# Patient Record
Sex: FEMALE | Race: WHITE | NOT HISPANIC OR LATINO | Employment: FULL TIME | URBAN - METROPOLITAN AREA
[De-identification: names, ages, dates, MRNs, and addresses within clinical notes are randomized per-mention and may not be internally consistent; named-entity substitution may affect disease eponyms.]

---

## 2017-02-08 ENCOUNTER — ALLSCRIPTS OFFICE VISIT (OUTPATIENT)
Dept: OTHER | Facility: OTHER | Age: 52
End: 2017-02-08

## 2017-02-08 DIAGNOSIS — M77.12 LATERAL EPICONDYLITIS OF LEFT ELBOW: ICD-10-CM

## 2017-02-15 ENCOUNTER — APPOINTMENT (OUTPATIENT)
Dept: OCCUPATIONAL THERAPY | Facility: CLINIC | Age: 52
End: 2017-02-15
Payer: COMMERCIAL

## 2017-02-15 DIAGNOSIS — M77.12 LATERAL EPICONDYLITIS OF LEFT ELBOW: ICD-10-CM

## 2017-02-15 PROCEDURE — 97140 MANUAL THERAPY 1/> REGIONS: CPT

## 2017-02-15 PROCEDURE — 97165 OT EVAL LOW COMPLEX 30 MIN: CPT

## 2017-02-15 PROCEDURE — 97035 APP MDLTY 1+ULTRASOUND EA 15: CPT

## 2017-02-21 ENCOUNTER — APPOINTMENT (OUTPATIENT)
Dept: OCCUPATIONAL THERAPY | Facility: CLINIC | Age: 52
End: 2017-02-21
Payer: COMMERCIAL

## 2017-02-21 PROCEDURE — 97140 MANUAL THERAPY 1/> REGIONS: CPT

## 2017-02-21 PROCEDURE — 97010 HOT OR COLD PACKS THERAPY: CPT

## 2017-02-21 PROCEDURE — 97110 THERAPEUTIC EXERCISES: CPT

## 2017-02-23 ENCOUNTER — APPOINTMENT (OUTPATIENT)
Dept: OCCUPATIONAL THERAPY | Facility: CLINIC | Age: 52
End: 2017-02-23
Payer: COMMERCIAL

## 2017-02-23 PROCEDURE — 97110 THERAPEUTIC EXERCISES: CPT

## 2017-02-23 PROCEDURE — 97014 ELECTRIC STIMULATION THERAPY: CPT

## 2017-02-23 PROCEDURE — 97140 MANUAL THERAPY 1/> REGIONS: CPT

## 2017-02-23 PROCEDURE — 97035 APP MDLTY 1+ULTRASOUND EA 15: CPT

## 2017-03-02 ENCOUNTER — APPOINTMENT (OUTPATIENT)
Dept: OCCUPATIONAL THERAPY | Facility: CLINIC | Age: 52
End: 2017-03-02
Payer: COMMERCIAL

## 2017-03-02 PROCEDURE — 97110 THERAPEUTIC EXERCISES: CPT

## 2017-03-02 PROCEDURE — 97035 APP MDLTY 1+ULTRASOUND EA 15: CPT

## 2017-03-02 PROCEDURE — 97014 ELECTRIC STIMULATION THERAPY: CPT

## 2017-03-02 PROCEDURE — 97140 MANUAL THERAPY 1/> REGIONS: CPT

## 2017-03-07 ENCOUNTER — APPOINTMENT (OUTPATIENT)
Dept: OCCUPATIONAL THERAPY | Facility: CLINIC | Age: 52
End: 2017-03-07
Payer: COMMERCIAL

## 2017-03-07 PROCEDURE — 97035 APP MDLTY 1+ULTRASOUND EA 15: CPT

## 2017-03-07 PROCEDURE — 97014 ELECTRIC STIMULATION THERAPY: CPT

## 2017-03-07 PROCEDURE — 97140 MANUAL THERAPY 1/> REGIONS: CPT

## 2017-03-07 PROCEDURE — 97110 THERAPEUTIC EXERCISES: CPT

## 2017-03-10 ENCOUNTER — APPOINTMENT (OUTPATIENT)
Dept: OCCUPATIONAL THERAPY | Facility: CLINIC | Age: 52
End: 2017-03-10
Payer: COMMERCIAL

## 2017-03-10 PROCEDURE — 97035 APP MDLTY 1+ULTRASOUND EA 15: CPT

## 2017-03-10 PROCEDURE — 97110 THERAPEUTIC EXERCISES: CPT

## 2017-03-10 PROCEDURE — 97014 ELECTRIC STIMULATION THERAPY: CPT

## 2017-03-10 PROCEDURE — 97140 MANUAL THERAPY 1/> REGIONS: CPT

## 2017-03-15 ENCOUNTER — APPOINTMENT (OUTPATIENT)
Dept: OCCUPATIONAL THERAPY | Facility: CLINIC | Age: 52
End: 2017-03-15
Payer: COMMERCIAL

## 2017-03-16 ENCOUNTER — APPOINTMENT (OUTPATIENT)
Dept: OCCUPATIONAL THERAPY | Facility: CLINIC | Age: 52
End: 2017-03-16
Payer: COMMERCIAL

## 2017-03-21 ENCOUNTER — APPOINTMENT (OUTPATIENT)
Dept: OCCUPATIONAL THERAPY | Facility: CLINIC | Age: 52
End: 2017-03-21
Payer: COMMERCIAL

## 2017-03-21 PROCEDURE — 97110 THERAPEUTIC EXERCISES: CPT

## 2017-03-21 PROCEDURE — 97014 ELECTRIC STIMULATION THERAPY: CPT

## 2017-03-21 PROCEDURE — 97035 APP MDLTY 1+ULTRASOUND EA 15: CPT

## 2017-03-21 PROCEDURE — 97140 MANUAL THERAPY 1/> REGIONS: CPT

## 2017-03-22 ENCOUNTER — ALLSCRIPTS OFFICE VISIT (OUTPATIENT)
Dept: OTHER | Facility: OTHER | Age: 52
End: 2017-03-22

## 2017-03-28 ENCOUNTER — APPOINTMENT (OUTPATIENT)
Dept: OCCUPATIONAL THERAPY | Facility: CLINIC | Age: 52
End: 2017-03-28
Payer: COMMERCIAL

## 2017-03-28 PROCEDURE — 97110 THERAPEUTIC EXERCISES: CPT

## 2017-03-28 PROCEDURE — 97014 ELECTRIC STIMULATION THERAPY: CPT

## 2017-03-28 PROCEDURE — 97140 MANUAL THERAPY 1/> REGIONS: CPT

## 2017-04-04 ENCOUNTER — APPOINTMENT (OUTPATIENT)
Dept: OCCUPATIONAL THERAPY | Facility: CLINIC | Age: 52
End: 2017-04-04
Payer: COMMERCIAL

## 2017-04-04 PROCEDURE — 97110 THERAPEUTIC EXERCISES: CPT

## 2017-04-04 PROCEDURE — 97140 MANUAL THERAPY 1/> REGIONS: CPT

## 2017-04-04 PROCEDURE — 97014 ELECTRIC STIMULATION THERAPY: CPT

## 2017-04-04 PROCEDURE — 97035 APP MDLTY 1+ULTRASOUND EA 15: CPT

## 2017-04-06 ENCOUNTER — APPOINTMENT (OUTPATIENT)
Dept: OCCUPATIONAL THERAPY | Facility: CLINIC | Age: 52
End: 2017-04-06
Payer: COMMERCIAL

## 2017-04-06 PROCEDURE — 97110 THERAPEUTIC EXERCISES: CPT

## 2017-04-06 PROCEDURE — 97035 APP MDLTY 1+ULTRASOUND EA 15: CPT

## 2017-04-06 PROCEDURE — 97140 MANUAL THERAPY 1/> REGIONS: CPT

## 2017-04-06 PROCEDURE — 97014 ELECTRIC STIMULATION THERAPY: CPT

## 2017-04-14 ENCOUNTER — APPOINTMENT (OUTPATIENT)
Dept: OCCUPATIONAL THERAPY | Facility: CLINIC | Age: 52
End: 2017-04-14
Payer: COMMERCIAL

## 2017-04-14 PROCEDURE — 97035 APP MDLTY 1+ULTRASOUND EA 15: CPT

## 2017-04-14 PROCEDURE — 97014 ELECTRIC STIMULATION THERAPY: CPT

## 2017-04-14 PROCEDURE — 97110 THERAPEUTIC EXERCISES: CPT

## 2017-04-14 PROCEDURE — 97140 MANUAL THERAPY 1/> REGIONS: CPT

## 2017-04-18 ENCOUNTER — APPOINTMENT (OUTPATIENT)
Dept: OCCUPATIONAL THERAPY | Facility: CLINIC | Age: 52
End: 2017-04-18
Payer: COMMERCIAL

## 2017-04-18 PROCEDURE — 97010 HOT OR COLD PACKS THERAPY: CPT

## 2017-04-18 PROCEDURE — 97140 MANUAL THERAPY 1/> REGIONS: CPT

## 2017-04-18 PROCEDURE — 97014 ELECTRIC STIMULATION THERAPY: CPT

## 2017-04-18 PROCEDURE — 97110 THERAPEUTIC EXERCISES: CPT

## 2017-04-20 ENCOUNTER — APPOINTMENT (OUTPATIENT)
Dept: OCCUPATIONAL THERAPY | Facility: CLINIC | Age: 52
End: 2017-04-20
Payer: COMMERCIAL

## 2017-04-20 PROCEDURE — 97035 APP MDLTY 1+ULTRASOUND EA 15: CPT

## 2017-04-20 PROCEDURE — 97110 THERAPEUTIC EXERCISES: CPT

## 2017-04-20 PROCEDURE — 97140 MANUAL THERAPY 1/> REGIONS: CPT

## 2017-04-25 ENCOUNTER — APPOINTMENT (OUTPATIENT)
Dept: OCCUPATIONAL THERAPY | Facility: CLINIC | Age: 52
End: 2017-04-25
Payer: COMMERCIAL

## 2017-04-25 PROCEDURE — 97014 ELECTRIC STIMULATION THERAPY: CPT

## 2017-04-25 PROCEDURE — 97140 MANUAL THERAPY 1/> REGIONS: CPT

## 2017-04-25 PROCEDURE — 97110 THERAPEUTIC EXERCISES: CPT

## 2017-04-27 ENCOUNTER — APPOINTMENT (OUTPATIENT)
Dept: OCCUPATIONAL THERAPY | Facility: CLINIC | Age: 52
End: 2017-04-27
Payer: COMMERCIAL

## 2017-04-28 ENCOUNTER — APPOINTMENT (OUTPATIENT)
Dept: OCCUPATIONAL THERAPY | Facility: CLINIC | Age: 52
End: 2017-04-28
Payer: COMMERCIAL

## 2017-05-04 ENCOUNTER — ALLSCRIPTS OFFICE VISIT (OUTPATIENT)
Dept: OTHER | Facility: OTHER | Age: 52
End: 2017-05-04

## 2017-05-10 ENCOUNTER — APPOINTMENT (OUTPATIENT)
Dept: OCCUPATIONAL THERAPY | Facility: CLINIC | Age: 52
End: 2017-05-10
Payer: COMMERCIAL

## 2017-05-10 PROCEDURE — 97110 THERAPEUTIC EXERCISES: CPT

## 2017-05-10 PROCEDURE — 97140 MANUAL THERAPY 1/> REGIONS: CPT

## 2017-05-10 PROCEDURE — 97014 ELECTRIC STIMULATION THERAPY: CPT

## 2017-05-11 ENCOUNTER — APPOINTMENT (OUTPATIENT)
Dept: OCCUPATIONAL THERAPY | Facility: CLINIC | Age: 52
End: 2017-05-11
Payer: COMMERCIAL

## 2017-05-11 PROCEDURE — 97140 MANUAL THERAPY 1/> REGIONS: CPT

## 2017-05-11 PROCEDURE — 97014 ELECTRIC STIMULATION THERAPY: CPT

## 2017-05-11 PROCEDURE — 97110 THERAPEUTIC EXERCISES: CPT

## 2017-05-17 ENCOUNTER — APPOINTMENT (OUTPATIENT)
Dept: OCCUPATIONAL THERAPY | Facility: CLINIC | Age: 52
End: 2017-05-17
Payer: COMMERCIAL

## 2017-05-17 PROCEDURE — 97110 THERAPEUTIC EXERCISES: CPT

## 2017-05-17 PROCEDURE — 97014 ELECTRIC STIMULATION THERAPY: CPT

## 2017-05-17 PROCEDURE — 97140 MANUAL THERAPY 1/> REGIONS: CPT

## 2017-05-19 ENCOUNTER — APPOINTMENT (OUTPATIENT)
Dept: OCCUPATIONAL THERAPY | Facility: CLINIC | Age: 52
End: 2017-05-19
Payer: COMMERCIAL

## 2017-05-19 PROCEDURE — 97140 MANUAL THERAPY 1/> REGIONS: CPT

## 2017-05-19 PROCEDURE — 97110 THERAPEUTIC EXERCISES: CPT

## 2017-05-23 ENCOUNTER — APPOINTMENT (OUTPATIENT)
Dept: OCCUPATIONAL THERAPY | Facility: CLINIC | Age: 52
End: 2017-05-23
Payer: COMMERCIAL

## 2017-05-23 PROCEDURE — 97140 MANUAL THERAPY 1/> REGIONS: CPT

## 2017-05-23 PROCEDURE — 97110 THERAPEUTIC EXERCISES: CPT

## 2017-05-23 PROCEDURE — 97014 ELECTRIC STIMULATION THERAPY: CPT

## 2017-05-25 ENCOUNTER — APPOINTMENT (OUTPATIENT)
Dept: OCCUPATIONAL THERAPY | Facility: CLINIC | Age: 52
End: 2017-05-25
Payer: COMMERCIAL

## 2017-05-30 ENCOUNTER — APPOINTMENT (OUTPATIENT)
Dept: OCCUPATIONAL THERAPY | Facility: CLINIC | Age: 52
End: 2017-05-30
Payer: COMMERCIAL

## 2017-05-30 PROCEDURE — 97014 ELECTRIC STIMULATION THERAPY: CPT

## 2017-05-30 PROCEDURE — 97140 MANUAL THERAPY 1/> REGIONS: CPT

## 2017-05-30 PROCEDURE — 97110 THERAPEUTIC EXERCISES: CPT

## 2017-06-02 ENCOUNTER — APPOINTMENT (OUTPATIENT)
Dept: OCCUPATIONAL THERAPY | Facility: CLINIC | Age: 52
End: 2017-06-02
Payer: COMMERCIAL

## 2017-06-07 ENCOUNTER — APPOINTMENT (OUTPATIENT)
Dept: OCCUPATIONAL THERAPY | Facility: CLINIC | Age: 52
End: 2017-06-07
Payer: COMMERCIAL

## 2017-06-07 PROCEDURE — 97014 ELECTRIC STIMULATION THERAPY: CPT

## 2017-06-07 PROCEDURE — 97110 THERAPEUTIC EXERCISES: CPT

## 2017-06-07 PROCEDURE — 97140 MANUAL THERAPY 1/> REGIONS: CPT

## 2017-06-15 ENCOUNTER — ALLSCRIPTS OFFICE VISIT (OUTPATIENT)
Dept: OTHER | Facility: OTHER | Age: 52
End: 2017-06-15

## 2017-06-15 ENCOUNTER — APPOINTMENT (OUTPATIENT)
Dept: OCCUPATIONAL THERAPY | Facility: CLINIC | Age: 52
End: 2017-06-15
Payer: COMMERCIAL

## 2017-06-22 ENCOUNTER — TRANSCRIBE ORDERS (OUTPATIENT)
Dept: LAB | Facility: HOSPITAL | Age: 52
End: 2017-06-22

## 2017-06-22 ENCOUNTER — ALLSCRIPTS OFFICE VISIT (OUTPATIENT)
Dept: OTHER | Facility: OTHER | Age: 52
End: 2017-06-22

## 2017-06-22 ENCOUNTER — APPOINTMENT (OUTPATIENT)
Dept: LAB | Facility: HOSPITAL | Age: 52
End: 2017-06-22
Payer: COMMERCIAL

## 2017-06-22 DIAGNOSIS — K51.90 ULCERATIVE COLITIS WITHOUT COMPLICATIONS (HCC): ICD-10-CM

## 2017-06-22 LAB
ALBUMIN SERPL BCP-MCNC: 3.4 G/DL (ref 3.5–5)
ALP SERPL-CCNC: 86 U/L (ref 46–116)
ALT SERPL W P-5'-P-CCNC: 16 U/L (ref 12–78)
ANION GAP SERPL CALCULATED.3IONS-SCNC: 4 MMOL/L (ref 4–13)
AST SERPL W P-5'-P-CCNC: 17 U/L (ref 5–45)
BASOPHILS # BLD AUTO: 0.04 THOUSANDS/ΜL (ref 0–0.1)
BASOPHILS NFR BLD AUTO: 0 % (ref 0–1)
BILIRUB SERPL-MCNC: 0.24 MG/DL (ref 0.2–1)
BUN SERPL-MCNC: 12 MG/DL (ref 5–25)
CALCIUM SERPL-MCNC: 10.1 MG/DL (ref 8.3–10.1)
CHLORIDE SERPL-SCNC: 105 MMOL/L (ref 100–108)
CO2 SERPL-SCNC: 29 MMOL/L (ref 21–32)
CREAT SERPL-MCNC: 0.8 MG/DL (ref 0.6–1.3)
CRP SERPL QL: 10.8 MG/L
EOSINOPHIL # BLD AUTO: 0.28 THOUSAND/ΜL (ref 0–0.61)
EOSINOPHIL NFR BLD AUTO: 3 % (ref 0–6)
ERYTHROCYTE [DISTWIDTH] IN BLOOD BY AUTOMATED COUNT: 13.5 % (ref 11.6–15.1)
ERYTHROCYTE [SEDIMENTATION RATE] IN BLOOD: 34 MM/HOUR (ref 0–20)
GFR SERPL CREATININE-BSD FRML MDRD: >60 ML/MIN/1.73SQ M
GLUCOSE SERPL-MCNC: 85 MG/DL (ref 65–140)
HCT VFR BLD AUTO: 35.2 % (ref 34.8–46.1)
HGB BLD-MCNC: 11.2 G/DL (ref 11.5–15.4)
LYMPHOCYTES # BLD AUTO: 2.54 THOUSANDS/ΜL (ref 0.6–4.47)
LYMPHOCYTES NFR BLD AUTO: 27 % (ref 14–44)
MCH RBC QN AUTO: 28.9 PG (ref 26.8–34.3)
MCHC RBC AUTO-ENTMCNC: 31.8 G/DL (ref 31.4–37.4)
MCV RBC AUTO: 91 FL (ref 82–98)
MONOCYTES # BLD AUTO: 0.47 THOUSAND/ΜL (ref 0.17–1.22)
MONOCYTES NFR BLD AUTO: 5 % (ref 4–12)
NEUTROPHILS # BLD AUTO: 6.04 THOUSANDS/ΜL (ref 1.85–7.62)
NEUTS SEG NFR BLD AUTO: 65 % (ref 43–75)
NRBC BLD AUTO-RTO: 0 /100 WBCS
PLATELET # BLD AUTO: 430 THOUSANDS/UL (ref 149–390)
PMV BLD AUTO: 9.6 FL (ref 8.9–12.7)
POTASSIUM SERPL-SCNC: 4.1 MMOL/L (ref 3.5–5.3)
PROT SERPL-MCNC: 7 G/DL (ref 6.4–8.2)
RBC # BLD AUTO: 3.88 MILLION/UL (ref 3.81–5.12)
SODIUM SERPL-SCNC: 138 MMOL/L (ref 136–145)
WBC # BLD AUTO: 9.39 THOUSAND/UL (ref 4.31–10.16)

## 2017-06-22 PROCEDURE — 86480 TB TEST CELL IMMUN MEASURE: CPT

## 2017-06-22 PROCEDURE — 85652 RBC SED RATE AUTOMATED: CPT

## 2017-06-22 PROCEDURE — 87340 HEPATITIS B SURFACE AG IA: CPT

## 2017-06-22 PROCEDURE — 36415 COLL VENOUS BLD VENIPUNCTURE: CPT

## 2017-06-22 PROCEDURE — 80053 COMPREHEN METABOLIC PANEL: CPT

## 2017-06-22 PROCEDURE — 86704 HEP B CORE ANTIBODY TOTAL: CPT

## 2017-06-22 PROCEDURE — 86140 C-REACTIVE PROTEIN: CPT

## 2017-06-22 PROCEDURE — 86706 HEP B SURFACE ANTIBODY: CPT

## 2017-06-22 PROCEDURE — 85025 COMPLETE CBC W/AUTO DIFF WBC: CPT

## 2017-06-23 LAB
HBV CORE AB SER QL: NORMAL
HBV SURFACE AB SER-ACNC: >1000 MIU/ML
HBV SURFACE AG SER QL: NORMAL

## 2017-06-24 LAB
ANNOTATION COMMENT IMP: NORMAL
GAMMA INTERFERON BACKGROUND BLD IA-ACNC: 0.03 IU/ML
M TB IFN-G BLD-IMP: NEGATIVE
M TB IFN-G CD4+ BCKGRND COR BLD-ACNC: 0.02 IU/ML
M TB IFN-G CD4+ T-CELLS BLD-ACNC: 0.05 IU/ML
MITOGEN IGNF BLD-ACNC: 2.23 IU/ML
QUANTIFERON-TB GOLD IN TUBE: NORMAL
SERVICE CMNT-IMP: NORMAL

## 2017-06-25 ENCOUNTER — APPOINTMENT (OUTPATIENT)
Dept: LAB | Facility: HOSPITAL | Age: 52
End: 2017-06-25
Payer: COMMERCIAL

## 2017-06-25 DIAGNOSIS — K51.90 ULCERATIVE COLITIS WITHOUT COMPLICATIONS (HCC): ICD-10-CM

## 2017-06-25 PROCEDURE — 83993 ASSAY FOR CALPROTECTIN FECAL: CPT

## 2017-06-25 PROCEDURE — 87493 C DIFF AMPLIFIED PROBE: CPT

## 2017-06-26 ENCOUNTER — GENERIC CONVERSION - ENCOUNTER (OUTPATIENT)
Dept: OTHER | Facility: OTHER | Age: 52
End: 2017-06-26

## 2017-06-26 LAB — C DIFF TOX GENS STL QL NAA+PROBE: NORMAL

## 2017-06-30 ENCOUNTER — ALLSCRIPTS OFFICE VISIT (OUTPATIENT)
Dept: OTHER | Facility: OTHER | Age: 52
End: 2017-06-30

## 2017-06-30 LAB — CALPROTECTIN STL-MCNT: 301 UG/G (ref 0–120)

## 2017-07-03 ENCOUNTER — GENERIC CONVERSION - ENCOUNTER (OUTPATIENT)
Dept: OTHER | Facility: OTHER | Age: 52
End: 2017-07-03

## 2017-07-25 ENCOUNTER — TRANSCRIBE ORDERS (OUTPATIENT)
Dept: ADMINISTRATIVE | Facility: HOSPITAL | Age: 52
End: 2017-07-25

## 2017-07-25 ENCOUNTER — APPOINTMENT (OUTPATIENT)
Dept: LAB | Facility: HOSPITAL | Age: 52
End: 2017-07-25
Payer: COMMERCIAL

## 2017-07-25 DIAGNOSIS — Z00.8 HEALTH EXAMINATION IN POPULATION SURVEY: ICD-10-CM

## 2017-07-25 DIAGNOSIS — Z00.8 HEALTH EXAMINATION IN POPULATION SURVEY: Primary | ICD-10-CM

## 2017-07-25 LAB
CHOLEST SERPL-MCNC: 198 MG/DL (ref 50–200)
EST. AVERAGE GLUCOSE BLD GHB EST-MCNC: 128 MG/DL
HBA1C MFR BLD: 6.1 % (ref 4.2–6.3)
HDLC SERPL-MCNC: 95 MG/DL (ref 40–60)
LDLC SERPL CALC-MCNC: 85 MG/DL (ref 0–100)
TRIGL SERPL-MCNC: 91 MG/DL

## 2017-07-25 PROCEDURE — 80061 LIPID PANEL: CPT

## 2017-07-25 PROCEDURE — 36415 COLL VENOUS BLD VENIPUNCTURE: CPT

## 2017-07-25 PROCEDURE — 83036 HEMOGLOBIN GLYCOSYLATED A1C: CPT

## 2017-08-16 DIAGNOSIS — M47.819 SPONDYLOSIS WITHOUT MYELOPATHY OR RADICULOPATHY: ICD-10-CM

## 2017-08-22 ENCOUNTER — GENERIC CONVERSION - ENCOUNTER (OUTPATIENT)
Dept: OTHER | Facility: OTHER | Age: 52
End: 2017-08-22

## 2017-08-29 ENCOUNTER — APPOINTMENT (OUTPATIENT)
Dept: LAB | Facility: HOSPITAL | Age: 52
End: 2017-08-29
Payer: COMMERCIAL

## 2017-08-29 ENCOUNTER — TRANSCRIBE ORDERS (OUTPATIENT)
Dept: ADMINISTRATIVE | Facility: HOSPITAL | Age: 52
End: 2017-08-29

## 2017-08-29 DIAGNOSIS — M47.819 SPONDYLOSIS WITHOUT MYELOPATHY OR RADICULOPATHY: ICD-10-CM

## 2017-08-29 LAB
ALBUMIN SERPL BCP-MCNC: 3.1 G/DL (ref 3.5–5)
ALP SERPL-CCNC: 79 U/L (ref 46–116)
ALT SERPL W P-5'-P-CCNC: 16 U/L (ref 12–78)
ANION GAP SERPL CALCULATED.3IONS-SCNC: 6 MMOL/L (ref 4–13)
AST SERPL W P-5'-P-CCNC: 13 U/L (ref 5–45)
BASOPHILS # BLD AUTO: 0.05 THOUSANDS/ΜL (ref 0–0.1)
BASOPHILS NFR BLD AUTO: 1 % (ref 0–1)
BILIRUB SERPL-MCNC: 0.2 MG/DL (ref 0.2–1)
BUN SERPL-MCNC: 10 MG/DL (ref 5–25)
CALCIUM ALBUM COR SERPL-MCNC: 11 MG/DL (ref 8.3–10.1)
CALCIUM SERPL-MCNC: 10.3 MG/DL (ref 8.3–10.1)
CHLORIDE SERPL-SCNC: 108 MMOL/L (ref 100–108)
CO2 SERPL-SCNC: 29 MMOL/L (ref 21–32)
CREAT SERPL-MCNC: 0.91 MG/DL (ref 0.6–1.3)
CRP SERPL QL: 6.9 MG/L
EOSINOPHIL # BLD AUTO: 0.36 THOUSAND/ΜL (ref 0–0.61)
EOSINOPHIL NFR BLD AUTO: 4 % (ref 0–6)
ERYTHROCYTE [DISTWIDTH] IN BLOOD BY AUTOMATED COUNT: 14.3 % (ref 11.6–15.1)
ERYTHROCYTE [SEDIMENTATION RATE] IN BLOOD: 19 MM/HOUR (ref 0–15)
GFR SERPL CREATININE-BSD FRML MDRD: 73 ML/MIN/1.73SQ M
GLUCOSE P FAST SERPL-MCNC: 97 MG/DL (ref 65–99)
HCT VFR BLD AUTO: 34.7 % (ref 34.8–46.1)
HGB BLD-MCNC: 11.2 G/DL (ref 11.5–15.4)
LYMPHOCYTES # BLD AUTO: 3.23 THOUSANDS/ΜL (ref 0.6–4.47)
LYMPHOCYTES NFR BLD AUTO: 35 % (ref 14–44)
MCH RBC QN AUTO: 29.6 PG (ref 26.8–34.3)
MCHC RBC AUTO-ENTMCNC: 32.3 G/DL (ref 31.4–37.4)
MCV RBC AUTO: 92 FL (ref 82–98)
MONOCYTES # BLD AUTO: 0.63 THOUSAND/ΜL (ref 0.17–1.22)
MONOCYTES NFR BLD AUTO: 7 % (ref 4–12)
NEUTROPHILS # BLD AUTO: 5.07 THOUSANDS/ΜL (ref 1.85–7.62)
NEUTS SEG NFR BLD AUTO: 53 % (ref 43–75)
PLATELET # BLD AUTO: 390 THOUSANDS/UL (ref 149–390)
PMV BLD AUTO: 10.3 FL (ref 8.9–12.7)
POTASSIUM SERPL-SCNC: 3.5 MMOL/L (ref 3.5–5.3)
PROT SERPL-MCNC: 6.5 G/DL (ref 6.4–8.2)
RBC # BLD AUTO: 3.78 MILLION/UL (ref 3.81–5.12)
SODIUM SERPL-SCNC: 143 MMOL/L (ref 136–145)
WBC # BLD AUTO: 9.34 THOUSAND/UL (ref 4.31–10.16)

## 2017-08-29 PROCEDURE — 36415 COLL VENOUS BLD VENIPUNCTURE: CPT

## 2017-08-29 PROCEDURE — 85025 COMPLETE CBC W/AUTO DIFF WBC: CPT

## 2017-08-29 PROCEDURE — 85652 RBC SED RATE AUTOMATED: CPT

## 2017-08-29 PROCEDURE — 86140 C-REACTIVE PROTEIN: CPT

## 2017-08-29 PROCEDURE — 80053 COMPREHEN METABOLIC PANEL: CPT

## 2017-08-30 ENCOUNTER — ALLSCRIPTS OFFICE VISIT (OUTPATIENT)
Dept: OTHER | Facility: OTHER | Age: 52
End: 2017-08-30

## 2017-10-16 DIAGNOSIS — M47.819 SPONDYLOSIS WITHOUT MYELOPATHY OR RADICULOPATHY: ICD-10-CM

## 2017-10-16 DIAGNOSIS — K51.90 ULCERATIVE COLITIS WITHOUT COMPLICATIONS (HCC): ICD-10-CM

## 2017-10-16 DIAGNOSIS — Z79.899 OTHER LONG TERM (CURRENT) DRUG THERAPY: ICD-10-CM

## 2017-10-24 ENCOUNTER — TRANSCRIBE ORDERS (OUTPATIENT)
Dept: LAB | Facility: CLINIC | Age: 52
End: 2017-10-24

## 2017-10-24 ENCOUNTER — APPOINTMENT (OUTPATIENT)
Dept: LAB | Facility: CLINIC | Age: 52
End: 2017-10-24
Payer: COMMERCIAL

## 2017-10-24 DIAGNOSIS — Z79.899 OTHER LONG TERM (CURRENT) DRUG THERAPY: ICD-10-CM

## 2017-10-24 DIAGNOSIS — K51.90 ULCERATIVE COLITIS WITHOUT COMPLICATIONS (HCC): ICD-10-CM

## 2017-10-24 DIAGNOSIS — M47.819 SPONDYLOSIS WITHOUT MYELOPATHY OR RADICULOPATHY: ICD-10-CM

## 2017-10-24 LAB
ALBUMIN SERPL BCP-MCNC: 3.8 G/DL (ref 3.5–5)
ALP SERPL-CCNC: 93 U/L (ref 46–116)
ALT SERPL W P-5'-P-CCNC: 23 U/L (ref 12–78)
ANION GAP SERPL CALCULATED.3IONS-SCNC: 8 MMOL/L (ref 4–13)
AST SERPL W P-5'-P-CCNC: 21 U/L (ref 5–45)
BASOPHILS # BLD AUTO: 0.05 THOUSANDS/ΜL (ref 0–0.1)
BASOPHILS NFR BLD AUTO: 1 % (ref 0–1)
BILIRUB SERPL-MCNC: 0.3 MG/DL (ref 0.2–1)
BUN SERPL-MCNC: 12 MG/DL (ref 5–25)
CALCIUM ALBUM COR SERPL-MCNC: 11 MG/DL (ref 8.3–10.1)
CALCIUM SERPL-MCNC: 10.8 MG/DL (ref 8.3–10.1)
CHLORIDE SERPL-SCNC: 103 MMOL/L (ref 100–108)
CO2 SERPL-SCNC: 29 MMOL/L (ref 21–32)
CREAT SERPL-MCNC: 0.87 MG/DL (ref 0.6–1.3)
CRP SERPL QL: 4.2 MG/L
EOSINOPHIL # BLD AUTO: 0.85 THOUSAND/ΜL (ref 0–0.61)
EOSINOPHIL NFR BLD AUTO: 10 % (ref 0–6)
ERYTHROCYTE [DISTWIDTH] IN BLOOD BY AUTOMATED COUNT: 13.9 % (ref 11.6–15.1)
ERYTHROCYTE [SEDIMENTATION RATE] IN BLOOD: 13 MM/HOUR (ref 0–20)
GFR SERPL CREATININE-BSD FRML MDRD: 77 ML/MIN/1.73SQ M
GLUCOSE SERPL-MCNC: 102 MG/DL (ref 65–140)
HCT VFR BLD AUTO: 38.9 % (ref 34.8–46.1)
HGB BLD-MCNC: 12.5 G/DL (ref 11.5–15.4)
LYMPHOCYTES # BLD AUTO: 3.25 THOUSANDS/ΜL (ref 0.6–4.47)
LYMPHOCYTES NFR BLD AUTO: 37 % (ref 14–44)
MCH RBC QN AUTO: 28.7 PG (ref 26.8–34.3)
MCHC RBC AUTO-ENTMCNC: 32.1 G/DL (ref 31.4–37.4)
MCV RBC AUTO: 89 FL (ref 82–98)
MONOCYTES # BLD AUTO: 0.59 THOUSAND/ΜL (ref 0.17–1.22)
MONOCYTES NFR BLD AUTO: 7 % (ref 4–12)
NEUTROPHILS # BLD AUTO: 4.1 THOUSANDS/ΜL (ref 1.85–7.62)
NEUTS SEG NFR BLD AUTO: 45 % (ref 43–75)
PLATELET # BLD AUTO: 380 THOUSANDS/UL (ref 149–390)
PMV BLD AUTO: 9.9 FL (ref 8.9–12.7)
POTASSIUM SERPL-SCNC: 3.8 MMOL/L (ref 3.5–5.3)
PROT SERPL-MCNC: 7.4 G/DL (ref 6.4–8.2)
RBC # BLD AUTO: 4.35 MILLION/UL (ref 3.81–5.12)
SODIUM SERPL-SCNC: 140 MMOL/L (ref 136–145)
WBC # BLD AUTO: 8.84 THOUSAND/UL (ref 4.31–10.16)

## 2017-10-24 PROCEDURE — 80053 COMPREHEN METABOLIC PANEL: CPT

## 2017-10-24 PROCEDURE — 86140 C-REACTIVE PROTEIN: CPT

## 2017-10-24 PROCEDURE — 85652 RBC SED RATE AUTOMATED: CPT

## 2017-10-24 PROCEDURE — 36415 COLL VENOUS BLD VENIPUNCTURE: CPT

## 2017-10-24 PROCEDURE — 85025 COMPLETE CBC W/AUTO DIFF WBC: CPT

## 2017-11-01 ENCOUNTER — ALLSCRIPTS OFFICE VISIT (OUTPATIENT)
Dept: OTHER | Facility: OTHER | Age: 52
End: 2017-11-01

## 2017-11-02 ENCOUNTER — APPOINTMENT (OUTPATIENT)
Dept: LAB | Facility: HOSPITAL | Age: 52
End: 2017-11-02
Payer: COMMERCIAL

## 2017-11-02 ENCOUNTER — TRANSCRIBE ORDERS (OUTPATIENT)
Dept: ADMINISTRATIVE | Facility: HOSPITAL | Age: 52
End: 2017-11-02

## 2017-11-02 DIAGNOSIS — M47.819 SPINAL ARTHRITIS DEFORMANS: ICD-10-CM

## 2017-11-02 DIAGNOSIS — M47.819 SPINAL ARTHRITIS DEFORMANS: Primary | ICD-10-CM

## 2017-11-02 DIAGNOSIS — K51.90 ULCERATIVE COLITIS WITHOUT COMPLICATIONS (HCC): ICD-10-CM

## 2017-11-02 DIAGNOSIS — M47.819 SPONDYLOSIS WITHOUT MYELOPATHY OR RADICULOPATHY: ICD-10-CM

## 2017-11-02 LAB
CALCIUM SERPL-MCNC: 10.1 MG/DL (ref 8.3–10.1)
CHOLEST SERPL-MCNC: 176 MG/DL (ref 50–200)
EST. AVERAGE GLUCOSE BLD GHB EST-MCNC: 120 MG/DL
HBA1C MFR BLD: 5.8 % (ref 4.2–6.3)
HDLC SERPL-MCNC: 93 MG/DL (ref 40–60)
LDLC SERPL CALC-MCNC: 73 MG/DL (ref 0–100)
PTH-INTACT SERPL-MCNC: 42.7 PG/ML (ref 14–72)
TRIGL SERPL-MCNC: 49 MG/DL
TSH SERPL DL<=0.05 MIU/L-ACNC: 2.34 UIU/ML (ref 0.36–3.74)

## 2017-11-02 PROCEDURE — 36415 COLL VENOUS BLD VENIPUNCTURE: CPT

## 2017-11-02 PROCEDURE — 83036 HEMOGLOBIN GLYCOSYLATED A1C: CPT

## 2017-11-02 PROCEDURE — 82310 ASSAY OF CALCIUM: CPT

## 2017-11-02 PROCEDURE — 83970 ASSAY OF PARATHORMONE: CPT

## 2017-11-02 PROCEDURE — 84443 ASSAY THYROID STIM HORMONE: CPT

## 2017-11-02 PROCEDURE — 80061 LIPID PANEL: CPT

## 2017-11-13 ENCOUNTER — ALLSCRIPTS OFFICE VISIT (OUTPATIENT)
Dept: OTHER | Facility: OTHER | Age: 52
End: 2017-11-13
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
## 2017-11-30 ENCOUNTER — GENERIC CONVERSION - ENCOUNTER (OUTPATIENT)
Dept: OTHER | Facility: OTHER | Age: 52
End: 2017-11-30

## 2017-11-30 DIAGNOSIS — M25.559 PAIN IN HIP: ICD-10-CM

## 2017-12-07 ENCOUNTER — HOSPITAL ENCOUNTER (OUTPATIENT)
Dept: RADIOLOGY | Facility: HOSPITAL | Age: 52
Discharge: HOME | End: 2017-12-07
Payer: COMMERCIAL

## 2017-12-07 ENCOUNTER — TRANSCRIBE ORDERS (OUTPATIENT)
Dept: ADMINISTRATIVE | Facility: HOSPITAL | Age: 52
End: 2017-12-07

## 2017-12-07 DIAGNOSIS — M25.559 PAIN IN HIP: ICD-10-CM

## 2017-12-26 ENCOUNTER — ALLSCRIPTS OFFICE VISIT (OUTPATIENT)
Dept: OTHER | Facility: OTHER | Age: 52
End: 2017-12-26

## 2017-12-26 LAB
BILIRUB UR QL STRIP: NORMAL
CLARITY UR: NORMAL
COLOR UR: YELLOW
GLUCOSE (HISTORICAL): NORMAL
HGB UR QL STRIP.AUTO: NORMAL
KETONES UR STRIP-MCNC: NORMAL MG/DL
LEUKOCYTE ESTERASE UR QL STRIP: NORMAL
NITRITE UR QL STRIP: NORMAL
PROT UR STRIP-MCNC: NORMAL MG/DL
SP GR UR STRIP.AUTO: 1
UROBILINOGEN UR QL STRIP.AUTO: 0.2
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
## 2018-01-10 NOTE — RESULT NOTES
Verified Results  (1) TSH 17Wyv0233 10:54AM Jer[place]      Test Name Result Flag Reference   TSH 2 271 uIU/mL  0 358-3 740   Patients undergoing fluorescein dye angiography may retain small amounts of fluorescein in the body for 48-72 hours post procedure  Samples containing fluorescein can produce falsely depressed TSH values  If the patient had this procedure,a specimen should be resubmitted post fluorescein clearance            The recommended reference ranges for TSH during pregnancy are as follows:  First trimester 0 1 to 2 5 uIU/mL  Second trimester  0 2 to 3 0 uIU/mL  Third trimester 0 3 to 3 0 uIU/m

## 2018-01-12 VITALS
HEIGHT: 63 IN | DIASTOLIC BLOOD PRESSURE: 70 MMHG | SYSTOLIC BLOOD PRESSURE: 116 MMHG | OXYGEN SATURATION: 97 % | TEMPERATURE: 98.3 F | HEART RATE: 80 BPM

## 2018-01-12 VITALS
HEART RATE: 66 BPM | WEIGHT: 150 LBS | HEIGHT: 63 IN | SYSTOLIC BLOOD PRESSURE: 116 MMHG | BODY MASS INDEX: 26.58 KG/M2 | DIASTOLIC BLOOD PRESSURE: 76 MMHG

## 2018-01-12 VITALS
HEIGHT: 63 IN | SYSTOLIC BLOOD PRESSURE: 108 MMHG | WEIGHT: 150 LBS | BODY MASS INDEX: 26.58 KG/M2 | HEART RATE: 66 BPM | DIASTOLIC BLOOD PRESSURE: 72 MMHG

## 2018-01-12 VITALS — HEIGHT: 63 IN | SYSTOLIC BLOOD PRESSURE: 114 MMHG | HEART RATE: 68 BPM | DIASTOLIC BLOOD PRESSURE: 74 MMHG

## 2018-01-12 NOTE — RESULT NOTES
Verified Results  (1) C  DIFFICILE TOXIN BY PCR 32Sef8917 08:07PM Naomi Lima Order Number: [id number]     Test Name Result Flag Reference   C  DIFFICILE TOXIN BY PCR   NEGATIVE for C difficle toxin by PCR  NEGATIVE for C difficle toxin by PCR  Plan  Ulcerative colitis without complications    · PredniSONE 10 MG Oral Tablet;  Take 3 tablets by mouth daily for 1 week, then 2  tablets by mouth daily for 1 week, then 1 tablet by mouth daily for 1 week, then STOP

## 2018-01-12 NOTE — MISCELLANEOUS
Message  Spoke with pt  at [phone] re: MRI results  I explained that there is a significant amount of tenosynovitis and a partial thickness tear of the posterior tibialis tendon  Will refer pt  to Dr Vivi Rodriguez for further evaluation and management  I explained to the pt  that someone from his office would be contacting her to schedule her  F/U as scheduled with me        Signatures   Electronically signed by : All Streeter DO; Jul 1 2016  8:49AM EST                       (Author)

## 2018-01-13 VITALS
HEIGHT: 63 IN | SYSTOLIC BLOOD PRESSURE: 117 MMHG | DIASTOLIC BLOOD PRESSURE: 75 MMHG | BODY MASS INDEX: 26.58 KG/M2 | WEIGHT: 150 LBS | HEART RATE: 60 BPM

## 2018-01-13 VITALS
HEIGHT: 63 IN | DIASTOLIC BLOOD PRESSURE: 71 MMHG | WEIGHT: 150 LBS | BODY MASS INDEX: 26.58 KG/M2 | HEART RATE: 69 BPM | SYSTOLIC BLOOD PRESSURE: 109 MMHG
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
## 2018-01-13 NOTE — PROGRESS NOTES
Assessment    1  Seronegative spondyloarthropathy (721 90) (M47 819)   2  Ulcerative colitis without complications (281 2) (Y61 58)   3  Raynaud's syndrome without gangrene (443 0) (I73 00)   4  Hypothyroidism (244 9) (E03 9)   5  Hypokalemia (276 8) (E87 6)   6  Bilateral leg edema (782 3) (R60 0)    Plan    1  (1) CBC/PLT/DIFF; Status:Active; Requested for:18Oct2016;    2  (1) COMPREHENSIVE METABOLIC PANEL; Status:Active; Requested for:18Oct2016;    3  (1) C-REACTIVE PROTEIN; Status:Active; Requested for:18Oct2016;    4  (1) SED RATE; Status:Active; Requested for:18Oct2016;    5  Follow-up visit in 2 months Evaluation and Treatment  Follow-up  Status: Hold For -   Scheduling  Requested for: 01Sep2016    6  Call [phone] if: The pain seems worse ; Status:Complete;   Done: 00FXO6828   7  Call [phone] if: The symptoms seem worse ; Status:Complete;   Done:   84QJR6470   8  Call [phone] if: You have questions or concerns about your problem ;   Status:Complete;   Done: 25CJQ2482    Discussion/Summary    Ms Blount underwent surgery for her left ankle tendon tear on August 12 of this year  She was initially treated with a boot, but unfortunately she did not have improvement in her symptoms  She is currently to remain nonweightbearing for the next several weeks as her ankle continues to heal  She does complain of some pain in her hands from using her crutches  She also had a significant episode of back spasms which took 2 weeks before resolution, and she did require Soma and Valium to help treat her pain  She is no longer utilizing his muscle relaxers  She does continue to have some mild lower back pain  She has been infrequently using tramadol as needed for her pain with some good relief  She does report swelling in her left foot and ankle, as well as her bilateral hands  She states that overall her pain has been improved, as she has been out of work for the last several weeks because of her surgery  She denies any significant morning stiffness  She does report difficulty sleeping because of pain as well as nonrestorative sleep  She does report fatigue which she attributes to her medications  She also states that since her last visit she did have an ulcerative colitis flare and was seen in the emergency department here  She had significant diarrhea and was found to be hypokalemic  She was treated by her primary care physician with a prolonged prednisone taper and did resume her Lialda and her UC has been relatively well controlled at this time  On exam, there is mild tenderness to palpation of the MCPs of the hands without any overt swelling  There is no obvious active synovitis on her exam today  She does have some crepitus of her bilateral knees  There is trace bilateral lower extremity edema  She does have paraspinal spasm noted in the lumbar spine area  Review of laboratory studies revealed negative connective-tissue disease serologies with the exception of a mildly positive CCP antibody at 20  HLA-B27 antigen was negative  CBC and CMP were essentially within normal limits  A urinalysis did show trace blood but no protein  A urine protein creatinine ratio is within normal limits  Vitamin D was mildly decreased at 22 6  ESR was normal, but CRP was elevated at 12 0  At this time, her history, exam, and laboratory studies do appear 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 which is mildly active  Since she is feeling generally well right now, I will not make any changes to her medications at this time  We will see what her joint pain does when she returns to work  If she does experience worsening of her pain at that time, we may consider adding sulfasalazine to her regimen  I would like to recheck a CBC, CMP, ESR, and CRP before her follow-up  She can continue to utilize the tramadol on an as-needed basis  I will reevaluate her in 2 months   She will call in the interim if there are any questions or concerns  Counseling  Rheumatology Counseling Documentation: The patient and patient's family was counseled regarding diagnostic results, instructions for management, impressions and risks and benefits of treatment options  Chief Complaint  F/U polyarthritis   Patient is here today for follow up of chronic conditions described in HPI  History of Present Illness  Pt  returns for F/U for polyarthritis  Had L ankle tendon repair on 8/12 for torn tendon  Tried to do Cam boot before surgery, but it did not heal  To be non-weight bearing for the next few weeks  c/o pain in hands from using crutches  Was having significant back spasms since last visit  Took Soma and Valium for 2 weeks with significant improvement  Still with some mild LBP  Taking Tramadol as needed for pain with good relief  + swelling in L ankle and foot  + swelling in hands as well  Out of work for the last several weeks due to surgery  No significant AM stiffness  + difficulty sleeping due to pain  + non-restorative sleep  + fatigue due to meds  Had UC flare after last visit  Was seen in ED at [facility]  Had significant diarrhea  RAPID3: 18 5/30      Review of Systems    Constitutional: fever and recent weight loss, but no recent weight gain, no chills and no anorexia    The patient presents with complaints of fatigue (2/2 meds)  HEENT: dryness of the eyes and dryness mouth, but no blurred vision, no double vision, no amaurosis fugax, no eye pain, no erythema eye(s), no mouth sores, not feeling congested and no sore throat  Cardiovascular: palpitations present  and swelling in the arms or legs, but no chest pain or pressure and no dyspnea on exertion  Respiratory: no unusual or persistent cough, no shortness of breath and no pleurisy  Gastrointestinal: heartburn, but no abdominal pain, no nausea, no vomiting, no constipation, no melena and no BRBPR    The patient presents with complaints of diarrhea (2/2 UC)  Genitourinary: no foamy urine, but no dysuria and no hematuria  Musculoskeletal: as noted in HPI  Integumentary rash, Raynaud's, alopecia and nail changes, but no photosensitivity  Endocrine polydipsia, but no polyuria  Hematologic/Lymphatic: no unusual bleeding and no tendency for easy bruising  Neurological: headache, tingling and weakness    The patient presents with complaints of occasional episodes of vertigo or dizziness, described as lightheadedness  Symptoms are made worse by getting up quickly  Psychiatric: insomnia and non-restorative sleep  Active Problems    1  History of Acute colitis (558 9) (K52 9)   2  Acute left-sided low back pain with left-sided sciatica (724 2,724 3) (M54 42)   3  Asthma with acute exacerbation (493 92) (J45 901)   4  Asthma, mild intermittent (493 90) (J45 20)   5  Bilateral leg edema (782 3) (R60 0)   6  Dyspareunia (625 0)   7  Fatigue (780 79) (R53 83)   8  Ganglion, tendon sheath (727 42) (M67 40)   9  Hand pain, unspecified laterality   10  Hypokalemia (276 8) (E87 6)   11  Hypothyroidism (244 9) (E03 9)   12  Injury of superior glenoid labrum of shoulder joint, left, initial encounter   13  Left shoulder tendonitis (726 10) (M75 82)   14  Loss of appetite (783 0) (R63 0)   15  Muscle spasm of back (724 8) (M62 830)   16  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)   17  Posterior tibial tendon dysfunction (734) (M21 40)   18  Raynaud's syndrome without gangrene (443 0) (I73 00)   19  Recent weight loss (783 21) (R63 4)   20  Seronegative spondyloarthropathy (721 90) (M47 819)   21  Shoulder pain (719 41) (M25 519)   22  Ulcerative colitis without complications (603 8) (R42 64)    Past Medical History    1  History of Abrasion of skin (919 0) (T14 8)   2  History of Acute colitis (558 9) (K52 9)   3  History of Acute sinusitis (461 9) (J01 90)   4  History of Adjustment disorder (309 9) (F43 20)   5   History of Aftercare following surgery of the musculoskeletal system (V58 78) (Z47 89)   6  History of Animal-rider injured by fall from or being thrown from horse in [address]   accident, initial encounter (E828 2) (V80 010A)   7  History of Ankle pain (719 47) (M25 579)   8  History of Carpal tunnel syndrome, unspecified laterality (354 0) (G56 00)   9  History of Encounter for screening mammogram for malignant neoplasm of breast   (V76 12) (Z12 31)   10  History of Ganglion (727 43) (M67 40)   11  History of Herniated cervical disc (722 0) (M50 20)   12  History of anemia (V12 3) (Z86 2)   13  History of arthritis (V13 4) (Z87 39)   14  History of blepharitis (V12 49) (Z86 69)   15  History of bronchitis (V12 69) (Z87 09)   16  History of diarrhea (V12 79) (Z87 898)   17  History of edema (V13 89) (Z87 898)   18  History of motion sickness (V13 89) (Z87 898)   19  History of polyarthritis (V13 4) (Z87 39)   20  History of temporomandibular joint disorder (V13 59) (Z87 39)   21  History of thyroid disease (V12 29) (Z86 39)   22  History of Lump of skin (782 2) (R22 9)   23  History of Mouth ulcers (528 9) (K12 1)   24  History of Neck pain (723 1) (M54 2)   25  History of Other acute sinusitis (461 8) (J01 80)   26  History of Other eczema (692 9) (L30 8)   27  History of Other sinusitis (473 8) (J32 9)   28  History of Sprain of trapezium, left, initial encounter (842 09) (I83 56PO)    The active problems and past medical history were reviewed and updated today  Surgical History    1  History of Ankle Surgery   2  History of Arthroscopy Knee   3  History of  Section   4  History of Hysterectomy   5  History of Lipectomy   6  History of Neuroplasty Decompression Median Nerve At Carpal Tunnel   7  History of Shoulder Surgery   8  History of Tonsillectomy   9  History of Umbilical Hernia Repair    The surgical history was reviewed and updated today  Family History  Mother    1   Family history of rheumatoid arthritis (V17 7) (Z82 61)   2  Family history of Osteoporosis (V17 81)  Father    3  Family history of Acute Myocardial Infarction (V17 3)   4  Family history of Hypertension (V17 49)  Paternal Grandfather    11  Family history of Malignant Penile Neoplasm (V16 49)   6  Family history of Prostate Cancer (V16 42)  Maternal Aunt    7  Family history of rheumatoid arthritis (V17 7) (Z82 61)  Maternal Uncle    8  Family history of Crohn's disease (V18 59) (Z83 79)   9  Family history of psoriasis (V19 4) (Z84 0)   10  Family history of ulcerative colitis (V18 59) (Z83 79)  Other    11  Family history of Crohn's disease (V18 59) (Z83 79)  Family History    12  Family history of Crohn's Disease   13  Denied: Family history of systemic lupus erythematosus   14  Family history of Osteoarthritis (V17 7)   15  Family history of Rheumatoid Arthritis    The family history was reviewed and updated today  Social History    · Being A Social Drinker   · Employed   · Former smoker (A48 14) (T96 585)   · Never a smoker   · No drug use  The social history was reviewed and updated today  The social history was reviewed and is unchanged  Current Meds   1  Albuterol Sulfate 1 25 MG/3ML Inhalation Nebulization Solution; USE 1 UNIT DOSE IN   NEBULIZER EVERY 4 TO 6 HOURS AS NEEDED; Therapy: 42Fwm8332 to (Last Rx:42Zxq4665)  Requested for: 75Qxc9317 Ordered   2  Diazepam 5 MG Oral Tablet; TAKE 1 TABLET AT BEDTIME AS NEEDED; Therapy: 14BYJ9198 to (Evaluate:36Gdn0714); Last Rx:47Tiy7752 Ordered   3  Hyoscyamine Sulfate 0 125 MG Oral Tablet Dispersible; TAKE 1 TABLET 3-4 TIMES   DAILY as needed for abdominal cramping; Therapy: 81Udq5247 to (Last Rx:52Mpa7452)  Requested for: 84Lom8579 Ordered   4  Levothyroxine Sodium 25 MCG Oral Tablet; take 1 tablet by mouth once daily; Therapy: 66OMW1329 to (Last Rx:71Nnh6640)  Requested for: 13Ypb0972 Ordered   5  Lialda 1 2 GM Oral Tablet Delayed Release; TAKE 2 TABS DAILY;  Last Rx:69Jmo2162   Ordered 6  Multi Vitamin Daily Oral Tablet; TAKE 1 TABLET DAILY; Therapy: (Recorded:24Jun2016) to Recorded   7  Potassium Chloride Odalys ER 20 MEQ Oral Tablet Extended Release; take 1 tablet by   mouth once daily; Therapy: 92Wzb4395 to (Evaluate:45Jfp7545)  Requested for: 39Gzl5159; Last   Rx:62Kou8967 Ordered   8  Proventil  (90 Base) MCG/ACT Inhalation Aerosol Solution; INHALE 2 PUFFS   EVERY 4 TO 6 HOURS AS NEEDED; Therapy: 86BKP5375 to (Evaluate:11Nov2015)  Requested for: 54Ohe8213; Last   Rx:36Emu6406 Ordered   9  Tacrolimus 0 03 % External Ointment; APPLY AND GENTLY MASSAGE INTO AFFECTED   AREA(S) TWICE DAILY; Therapy: 95RFT8411 to (Last Rx:34Ydw8650)  Requested for: 26SZZ7856 Ordered   10  Zolpidem Tartrate 10 MG Oral Tablet; take 1 tablet by mouth at bedtime for sleep; Therapy: 88QLG7002 to (Evaluate:30Oct2016)  Requested for: 24Jzc8942; Last    Rx:73Mof4143 Ordered    The medication list was reviewed and updated today  Immunizations  Influenza --- Lesli Grow: Oct 2015   PPD --- Lesli Grow: 30-Jan-2013   Tdap --- Series1: 08-Dec-2009     Allergies    1  No Known Drug Allergies    Vitals  Signs   Recorded: 64VLO2573 09:60JY   Systolic: 926  Diastolic: 78  Heart Rate: 64    Physical Exam    Constitutional   General appearance: Abnormal   overweight  Eyes   Conjunctiva and lids: No swelling, erythema or discharge  Pupils and irises: Equal, round and reactive to light  Ears, Nose, Mouth, and Throat   External inspection of ears and nose: Normal     Oropharynx: Normal with no erythema, edema, exudate lesions, or ulcers  Pulmonary   Respiratory effort: No increased work of breathing or signs of respiratory distress  Auscultation of lungs: Clear to auscultation  Cardiovascular   Auscultation of heart: Normal rate and rhythm, normal S1 and S2, without murmurs      Examination of extremities for edema and/or varicosities: Abnormal   (+ trace B/L LE edema)   Lymphatic   Palpation of lymph nodes in neck: No lymphadenopathy  Psychiatric   Orientation to person, place, and time: Normal     Mood and affect: Normal         Left Lower Extremity: (unable to assess foot and ankle due to presence of Cam walker boot)   Musculoskeletal - Joints, bones, and muscles: Abnormal  Palpation - bilateral knee crepitus  Skin - Skin and subcutaneous tissue: Normal    Neurologic - Sensation: Normal      The patient has tenderness in all MCP joints of the right hand  The patient has tenderness in all MCP joints of the left hand  Results/Data  (1) CBC/PLT/DIFF 31JCU6874 10:54AM Devoraharavind Blanco     Test Name Result Flag Reference   WBC COUNT 8 32 Thousand/uL  4 31-10 16   RBC COUNT 4 38 Million/uL  3 81-5 12   HEMOGLOBIN 12 3 g/dL  11 5-15 4   HEMATOCRIT 38 7 %  34 8-46  1   MCV 88 fL  82-98   MCH 28 1 pg  26 8-34 3   MCHC 31 8 g/dL  31 4-37 4   RDW 12 8 %  11 6-15 1   MPV 9 9 fL  8 9-12 7   PLATELET COUNT 017 Thousands/uL H 149-390   NEUTROPHILS RELATIVE PERCENT 67 %  43-75   LYMPHOCYTES RELATIVE PERCENT 21 %  14-44   MONOCYTES RELATIVE PERCENT 8 %  4-12   EOSINOPHILS RELATIVE PERCENT 4 %  0-6   BASOPHILS RELATIVE PERCENT 0 %  0-1   NEUTROPHILS ABSOLUTE COUNT 5 59 Thousands/?L  1 85-7 62   LYMPHOCYTES ABSOLUTE COUNT 1 71 Thousands/?L  0 60-4 47   MONOCYTES ABSOLUTE COUNT 0 70 Thousand/?L  0 17-1 22   EOSINOPHILS ABSOLUTE COUNT 0 29 Thousand/?L  0 00-0 61   BASOPHILS ABSOLUTE COUNT 0 03 Thousands/?L  0 00-0 10     (1) TSH 18Qtq8496 10:54AM Hien Hernandez     Test Name Result Flag Reference   TSH 2 271 uIU/mL  0 358-3 740   Patients undergoing fluorescein dye angiography may retain small amounts of fluorescein in the body for 48-72 hours post procedure  Samples containing fluorescein can produce falsely depressed TSH values  If the patient had this procedure,a specimen should be resubmitted post fluorescein clearance            The recommended reference ranges for TSH during pregnancy are as follows:  First trimester 0 1 to 2 5 uIU/mL  Second trimester  0 2 to 3 0 uIU/mL  Third trimester 0 3 to 3 0 uIU/m     * MRI ANKLE/HEEL LEFT  WO CONTRAST 30Jun2016 08:45AM Burnadette Horseman     Test Name Result Flag Reference   MRI ANKLE/HEEL LEFT  WO CONTRAST (Report)     This is a summary report  The complete report is available in the patient's medical record  If you cannot access the medical record, please contact the sending organization for a detailed fax or copy  MRI LEFT ANKLE - WITHOUT CONTRAST     INDICATION: Persistent heel pain and swelling     COMPARISON: Plain film dated 5/22/2016     TECHNIQUE:  MR sequences were obtained of the left ankle including: Localizers, coronal STIR, coronal T1, axial T2 fat sat, axial PD, sagittal T2 fat sat, sagittal T1 sequences were obtained  Images were acquired on a1 5 Kellee Unit  Gadolinium was not    used  FINDINGS:     SUBCUTANEOUS TISSUES: Normal     JOINT EFFUSION: None  TENDONS:   Achilles tendon: Normal    Peroneus brevis and longus: Minimal common peroneal tenosynovitis without tendinosis or tear  Posterior tibialis, flexor digitorum longus, flexor hallucis longus: Medial compartment tenosynovitis with posterior tibialis tendinosis and longitudinal interstitial tearing as well as a 50% thickness superficial tear of the posterior tibialis best seen   on series 4 image 23 with minimal tendon retraction at the level of a vitamin E marker placed prior to imaging  Anterior tibialis, extensor digitorum longus, extensor hallucis longus: Normal      LIGAMENTS:   Lateral collateral ligament complex: Normal    Distal tibiofibular syndesmosis: Normal    Medial collateral ligament complex: Normal      PLANTAR FASCIA: Normal      ARTICULAR SURFACE: Intact  SINUS TARSI: Normal      Tarsal Tunnel: Unremarkable  BONE MARROW SIGNAL: Hindfoot degenerative changes noted  Mild midfoot degenerative changes also evident       MUSCULATURE: Normal        IMPRESSION:   Medial compartment tenosynovitis with posterior tibialis tendinosis as well as a 50% thickness tear of the tendon adjacent to a vitamin E marker placed prior to imaging over the region of interest  Mild tendon retraction with an intact    insertion at the medial navicular  No secondary findings to indicate PTT dysfunction  ##sigslh##sigslh       Workstation performed: [id number]     Signed by:   Mat Barros MD   6/30/16     (1) CBC/PLT/DIFF 33CQE4035 10:01AM Shabana [facility] Order Number: [id number]   Order Number: [id number]     Test Name Result Flag Reference   WBC COUNT 6 33 Thousand/uL  4 31-10 16   RBC COUNT 4 45 Million/uL  3 81-5 12   HEMOGLOBIN 13 1 g/dL  11 5-15 4   HEMATOCRIT 39 7 %  34 8-46  1   MCV 89 fL  82-98   MCH 29 4 pg  26 8-34 3   MCHC 33 0 g/dL  31 4-37 4   RDW 13 3 %  11 6-15 1   MPV 10 2 fL  8 9-12 7   PLATELET COUNT 271 Thousands/uL H 149-390   NEUTROPHILS RELATIVE PERCENT 60 %  43-75   LYMPHOCYTES RELATIVE PERCENT 27 %  14-44   MONOCYTES RELATIVE PERCENT 8 %  4-12   EOSINOPHILS RELATIVE PERCENT 4 %  0-6   BASOPHILS RELATIVE PERCENT 1 %  0-1   NEUTROPHILS ABSOLUTE COUNT 3 82 Thousands/?L  1 85-7 62   LYMPHOCYTES ABSOLUTE COUNT 1 72 Thousands/?L  0 60-4 47   MONOCYTES ABSOLUTE COUNT 0 49 Thousand/?L  0 17-1 22   EOSINOPHILS ABSOLUTE COUNT 0 26 Thousand/?L  0 00-0 61   BASOPHILS ABSOLUTE COUNT 0 04 Thousands/?L  0 00-0 10     (1) CHRONIC HEPATITIS PANEL 24Jun2016 10:01AM Shabana Gomez    Order Number: [id number]  TW Order Number: [id number]     Test Name Result Flag Reference   HEPATITIS B SURFACE ANTIGEN Non-reactive  Non-reactive, NonReactive - Confirmed   HEPATITIS C ANTIBODY Non-reactive  Non-reactive   HEPATITIS B CORE IGM ANTIBODY Non-reactive  Non-reactive   HEPATITIS B CORE TOTAL ANTIBODY Non-reactive  Non-reactive     (1) COMPREHENSIVE METABOLIC PANEL 46NNN5876 43:09BO Shabana [facility] Order Number: [id number]   Order Number: [id number] Order Number: [id number]     Test Name Result Flag Reference   GLUCOSE,RANDM 93 mg/dL     If the patient is fasting, the ADA then defines impaired fasting glucose as > 100 mg/dL and diabetes as > or equal to 123 mg/dL  SODIUM 139 mmol/L  136-145   POTASSIUM 3 6 mmol/L  3 5-5 3   CHLORIDE 103 mmol/L  100-108   CARBON DIOXIDE 29 mmol/L  21-32   ANION GAP (CALC) 7 mmol/L  4-13   BLOOD UREA NITROGEN 11 mg/dL  5-25   CREATININE 0 88 mg/dL  0 60-1 30   Standardized to IDMS reference method   CALCIUM 9 8 mg/dL  8 3-10 1   BILI, TOTAL 0 20 mg/dL  0 20-1 00   ALK PHOSPHATAS 75 U/L     ALT (SGPT) 18 U/L  12-78   AST(SGOT) 18 U/L  5-45   ALBUMIN 3 7 g/dL  3 5-5 0   TOTAL PROTEIN 7 6 g/dL  6 4-8 2   eGFR Non-African American      >60 0 ml/min/1 73sq [facility] Disease Education Program recommendations are as follows:  GFR calculation is accurate only with a steady state creatinine  Chronic Kidney disease less than 60 ml/min/1 73 sq  meters  Kidney failure less than 15 ml/min/1 73 sq  meters       (1) C-REACTIVE PROTEIN 15AGV5173 10:01AM Bonnie Perry    Order Number: [id number]  TW Order Number: [id number] Order Number: [id number]     Test Name Result Flag Reference   C-REACT PROTEIN 12 0 mg/L H <1 1     (1) CYCLIC CITRULLINATED PEPTIDE 01GSI3278 10:01AM Kasia Trotter Order Number: [id number]     Test Name Result Flag Reference   CYCLIC CITRULLINATED PEPTIDE 20 units H 0 - 19   Negative               <20                          Weak positive      20 - 39                          Moderate positive  40 - 59                          Strong positive        >59    Performed at:  [address]  [id number]  : Scout Strauss MD, Phone:  [phone]     (1) HIV AG/AB Marla, [facility]9 [facility] [id number] 10:01[place] [address] Order Number: [id number] Test Name Result Flag Reference   HIV 1/2 AND P24 Non-Reactive  Non-Reactive   This test detects HIV 1, HIV2 and p24 Antigen  (1) HLA B27 33BMG3927 10:01AM Mendel MAURICE Order Number: [id number]     Test Name Result Flag Reference   HLA B27 Negative     HLA-B*27 NegativeHLA allele interpretation for all loci based on IMGT/HLAdatabase version 3 21A Lab CLIA ID Number [id number] test was performed using PCR (Polymerase Chain Reaction)/SSOP(Sequence Specific Oligonucleotide Probes) technique  SBT (SequenceBased Typing) and/or SSP (Sequence Specific Primers) may be used assupplemental methods when necessary  Please contact HLA CustomerService at [phone] if you have any questions  Director of [facility] Dr Kobi Ortiz, PhD    Performed at:  [address]  [id number]  : Royal Okeefe PhD, Phone:  [phone]     (1) SED RATE [id number] 10:01AM Mendel MAURICE Order Number: [id number]     Test Name Result Flag Reference   SED RATE 12 mm/hour  0-20     (1) [facility] 70PTU8506 10:01AM Mendel Anand   TW Order Number: [id number]     Test Name Result Flag Reference   SS-A <0 2 AI  0 0 - 0 9   SS-B <0 2 AI  0 0 - 0 9   Performed at:  [address] [place] [address]  [id number]  : Elijah Messer MD, Phone:  [phone]     (1) VITAMIN D 25-HYDROXY 76KHS8067 10:01AM Mendel MAURICE Order Number: [id number]  TW Order Number: [id number]     Test Name Result Flag Reference   VIT D 25-HYDROX 22 6 ng/mL L 30 0-100 0   This assay is a certified procedure of the CDC Vitamin D Standardization Certification Program (VDSCP)     Deficiency <20ng/ml   Insufficiency 20-30ng/ml   Sufficient  ng/ml     *Patients undergoing fluorescein dye angiography may retain small amounts of fluorescein in the body for 48-72 hours post procedure  Samples containing fluorescein can produce falsely elevated Vitamin D values  If the patient had this procedure, a specimen should be resubmitted post fluorescein clearance  (1) URINALYSIS (will reflex a microscopy if leukocytes, occult blood, protein or nitrites are not within normal limits) 24Jun2016 10:01AM Mica Resendez    Order Number: [id number]     Test Name Result Flag Reference   COLOR Yellow     CLARITY Clear     SPECIFIC GRAVITY UA <=1 005  1 003-1 030   PH UA 5 5  4 5-8 0   LEUKOCYTE ESTERASE UA Negative  Negative   NITRITE UA Negative  Negative   PROTEIN UA Negative mg/dl  Negative   GLUCOSE UA Negative mg/dl  Negative   KETONES UA Negative mg/dl  Negative   UROBILINOGEN UA 0 2 E U /dl  0 2, 1 0 E U /dl   BILIRUBIN UA Negative  Negative   BLOOD UA Trace-Intact A Negative   BACTERIA None Seen /hpf  None Seen, Occasional   EPITHELIAL CELLS None Seen /hpf  None Seen, Occasional   RBC UA 1-2 /hpf A None Seen   WBC UA 0-1 /hpf A None Seen     (1) URINE PROTEIN CREATININE RATIO 24Jun2016 10:01AM Mica Resendez    Order Number: [id number]   Order Number: [id number]     Test Name Result Flag Reference   CREATININE URINE 23 7 mg/dL     URINE PROTEIN:CREATININE RATIO <0 25 H 0 00-0 10   URINE PROTEIN <6 mg/dL         Future Appointments    Date/Time Provider Specialty Site   11/01/2016 08:00 AM Italo Randolph DO Gastroenterology Adult [facility] GASTROENTEROLOGY ENDOSCOPY   12/13/2016 10:00 AM Italo Randolph DO Gastroenterology Adult [facility] GASTROENTEROLOGY SPECIAL   11/01/2016 10:20 AM Mica Resendez DO Rheumatology [facility]   09/08/2016 08:40 AM LEANA Mac   Orthopedic Surgery [address]     Signatures   Electronically signed by : Lincoln Means DO; Sep  1 2016  3:34PM EST                       (Author)

## 2018-01-14 VITALS
HEIGHT: 63 IN | HEART RATE: 82 BPM | DIASTOLIC BLOOD PRESSURE: 70 MMHG | SYSTOLIC BLOOD PRESSURE: 122 MMHG | BODY MASS INDEX: 26.58 KG/M2 | WEIGHT: 150 LBS

## 2018-01-14 NOTE — CONSULTS
Assessment    1  Polyarthritis (716 50) (M13 0)   2  Ulcerative colitis without complications (512 0) (U11 65)   3  Raynaud's syndrome without gangrene (443 0) (I73 00)   4  Bilateral leg edema (782 3) (R60 0)   5  Hypothyroidism (244 9) (E03 9)   6  Asthma, mild intermittent (493 90) (J45 20)    Plan  Bilateral leg edema, Polyarthritis    · (1) URINALYSIS (will reflex a microscopy if leukocytes, occult blood, protein or nitrites are  not within normal limits); Status:Active; Requested QJP:47DTH7647;    · (1) URINE PROTEIN CREATININE RATIO; Status:Active; Requested CFY:29HTJ5776;   Polyarthritis    · Call [phone] if: The pain seems worse ; Status:Complete;   Done: 22OZM0890   · Call [phone] if: The symptoms seem worse ; Status:Complete;   Done:  13XWD9944   · Call [phone] if: You have questions or concerns about your problem ;  Status:Complete;   Done: 50GHW1350  Polyarthritis, Raynaud's syndrome without gangrene, Ulcerative colitis without  complications    · (1) CBC/PLT/DIFF; Status:Active; Requested CTR:70UNE1932;    · (1) CHRONIC HEPATITIS PANEL; Status:Active; Requested VAO:34XFF9956;    · (1) COMPREHENSIVE METABOLIC PANEL; Status:Active; Requested QKM:37RHS9074;    · (1) C-REACTIVE PROTEIN; Status:Active; Requested VFU:61NGV1098;    · (1) CYCLIC CITRULLINATED PEPTIDE; Status:Active; Requested TFI:91EBK3708;    · (1) HIV AG/AB COMBO, 4TH GEN; [Do Not Release]; Status:Active; Requested  RTE:58DCW6318;    · (1) HLA B27; Status:Active; Requested QCZ:73GCR0356;    · (1) SED RATE; Status:Active; Requested SJY:58VUJ0246;    · (1) SJOGREN'S ANTIBODIES; Status:Active; Requested MML:23SHV8986;    · (1) VITAMIN D 25-HYDROXY; Status:Active; Requested FFT:13PRJ6682;    · * MRI ANKLE/HEEL LEFT  WO CONTRAST; Status:Need Information - Financial  Authorization;  Requested OUC:[id number];    · Follow-up visit in 2 months Evaluation and Treatment  Follow-up  Status: Hold For -  Scheduling  Requested for: 41FVM9844    Discussion/Summary    Ms Vera Elam is a 60-year-old  female who presents the office with a 10 year history of multiple complaints including hypothyroidism, Raynaud's phenomenon involving her fingers, toes, and does, and joint pain and swelling  She also carries a history of eyelid eczema and angular cheilitis  She states that she's had laboratory studies over the years to investigate her complaints, but no clear etiology has ever been found  She reports that in early May of this year she suffered an injury to her left ankle when a champagne bottle which dropped on the ground and the Court ankle last shot in her left medial aspect of the ankle  She was seen in the emergency department and had some glass extracted and was sutured, but she continues to have persistent pain and swelling noted in this area  She has been taking tramadol as needed for this discomfort without much relief  She does have some concerns about possible RSD involving this ankle  She complains of pain in her bilateral hands, right more so than left  She has also noted swelling in her knees and her left ankle  She denies any other obvious joint swelling  She reports a history of pitting edema bilaterally in her lower extremities for which she takes Lasix  She states that the pain in her left ankle is constant, but her other arthralgias are usually only present with overuse  She is unable to take NSAIDs for her discomfort because of a history of ulcerative colitis  She reports some mild morning stiffness in her feet which typically lasts several minutes before improvement  She does report difficulty sleeping because of pain  She denies nonrestorative sleep, as she uses Ambien to help with this  She also reports occasional fatigue  On exam, she is having active Raynaud's in the office  There is synovitis of the MCPs and PIPs bilaterally, with tenderness to palpation of the bilateral second MCPs   There is also mild swelling of the right ankle, and significant swelling and tenderness to palpation of the left ankle  She does have mildly decreased range of motion of the left shoulder  Review of laboratory studies revealed a negative EMELY and rheumatoid factor, and a markedly elevated ESR in 2013 at 53  A recent ESR was only 21  At this time, her history and exam to appear consistent with an inflammatory arthropathy, 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 given her history of ulcerative colitis  Given the fact that her findings in her left ankle her much out of proportion to her other joints, I will obtain an MRI of this ankle to evaluate for any other soft tissue injuries that were not visible on her x-ray  I will check some basic laboratory studies in preparation for definitive DMARD therapy for her joint symptoms, including a CBC, CMP, hepatitis panel, HIV, ESR, CRP, CCP, HLA-B27 antigen, SSA, and SSB antibodies  I will also obtain a vitamin D level, a urinalysis, and a urine protein to creatinine ratio  I instructed her to contact me approximately 1-2 weeks after the laboratory studies are drawn, at which time we may plan to start sulfasalazine 500 mg daily for 1 week, then 500 mg twice a day for 1 week, then 1000 mg twice a day  If we do start this medication, we will plan to recheck a CBC, CMP, ESR, and CRP before her follow-up  I will reevaluate her in 2 months  She will call in the interim if there are any questions or concerns  Counseling  Rheumatology Counseling Documentation: The patient was counseled regarding diagnostic results, instructions for management, impressions and risks and benefits of treatment options  Chief Complaint  Joint pain and swelling      History of Present Illness  Pt  is a 49 yo  female who presents with 10 year history of hypothyroidism, followed by Raynaud's phenomenon usually involving fingers, toes, and nose  Also with joint pain and swelling   Has history of eyelid eczema and angular cheilitis  In May, she suffered injury to L ankle with champagne cork  Still with ankle pain and swelling  Taking Tramadol for pain without much relief  Has concerns about RSD in L ankle  Also with pain in hands (R > L)  Has seen swelling in knees and L ankle  No other obvious joint swelling  Has history of pitting edema for which she takes Lasix  Pain in L ankle is constant, but other arthralgias are usually present with use  Unable to take NSAIDs due history of UC  + AM stiffness in feet x several minutes  + difficulty sleeping due to pain  No non-restorative sleep with Ambien use  + occasional fatigue  RAPID3: 16 7/30      Review of Systems    Constitutional: no fever, no recent weight gain, no chills, no recent weight loss and no anorexia    The patient presents with complaints of occasional episodes of fatigue  HEENT: no blurred vision, no double vision, no amaurosis fugax, no eye pain, no erythema eye(s), no dryness mouth, not feeling congested and no sore throat    The patient presents with complaints of dryness of the eyes  Symptoms are made worse by contact lens use  The patient presents with complaints of occasional episodes of mouth sores  Cardiovascular: swelling in the arms or legs, but no chest pain or pressure and no dyspnea on exertion  Respiratory: no unusual or persistent cough, no shortness of breath and no pleurisy  Gastrointestinal: heartburn, but no abdominal pain, no nausea, no vomiting, no constipation, no melena and no BRBPR    The patient presents with complaints of occasional episodes of diarrhea  Genitourinary: no foamy urine, but no dysuria and no hematuria  Musculoskeletal: as noted in HPI  Integumentary rash, Raynaud's, alopecia and nail changes, but no photosensitivity  Endocrine no polyuria and no polydipsia  Hematologic/Lymphatic: no unusual bleeding and no tendency for easy bruising     Neurological: headache, tingling and weakness    The patient presents with complaints of occasional episodes of vertigo or dizziness, described as lightheadedness and sensation of movement  Symptoms are made worse by getting up quickly  Psychiatric: no insomnia, no non-restorative sleep, no depression and no anxiety  Active Problems    1  History of Acute colitis (558 9) (K52 9)   2  Asthma with acute exacerbation (493 92) (J45 901)   3  Asthma, mild intermittent (493 90) (J45 20)   4  Dyspareunia (625 0)   5  Ganglion, tendon sheath (727 42) (M67 40)   6  Hand pain, unspecified laterality   7  Hypokalemia (276 8) (E87 6)   8  Hypothyroidism (244 9) (E03 9)   9  Injury of superior glenoid labrum of shoulder joint, left, initial encounter   10  Left shoulder tendonitis (726 10) (M75 82)   11  Mouth ulcers (528 9) (K12 1)   12  Neck pain (723 1) (M54 2)   13  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)   14  Raynaud's syndrome without gangrene (443 0) (I73 00)   15  Screening for diabetes mellitus (DM) (V77 1) (Z13 1)   16  Shoulder pain (719 41) (M25 519)   17  Ulcerative colitis without complications (324 3) (Z79 08)    Past Medical History    1  History of Abrasion of skin (919 0) (T14 8)   2  History of Acute colitis (558 9) (K52 9)   3  History of Acute sinusitis (461 9) (J01 90)   4  History of Adjustment disorder (309 9) (F43 20)   5  History of Aftercare following surgery of the musculoskeletal system (V58 78) (Z47 89)   6  History of Animal-rider injured by fall from or being thrown from horse in [address] [place]   accident, initial encounter (E828 2) (V80 010A)   7  History of Carpal tunnel syndrome, unspecified laterality (354 0) (G56 00)   8  History of Encounter for screening mammogram for malignant neoplasm of breast   (V76 12) (Z12 31)   9  History of Ganglion (727 43) (M67 40)   10  History of Herniated cervical disc (722 0) (M50 20)   11  History of blepharitis (V12 49) (Z86 69)   12  History of bronchitis (V12 69) (Z87 09)   13   History of diarrhea (V12 79) (Z87 898)   14  History of edema (V13 89) (Z87 898)   15  History of motion sickness (V13 89) (Z87 898)   16  History of temporomandibular joint disorder (V13 59) (Z87 39)   17  History of Lump of skin (782 2) (R22 9)   18  History of Other acute sinusitis (461 8) (J01 80)   19  History of Other eczema (692 9) (L30 8)   20  History of Other sinusitis (473 8) (J32 9)   21  History of Sprain of trapezium, left, initial encounter (842 09) (B09 86TB)    Surgical History    1  History of Arthroscopy Knee   2  History of  Section   3  History of Hysterectomy   4  History of Lipectomy   5  History of Neuroplasty Decompression Median Nerve At Carpal Tunnel   6  History of Shoulder Surgery   7  History of Tonsillectomy   8  History of Umbilical Hernia Repair    OB History  Pregnancy History (Brief):   Prior pregnancies: : 4  Para: 1 (abortions) and 3 (living)   Additional pregnancy history details: 1 miscarriage(s)   1 early miscarriage      Family History  Mother    1  Family history of rheumatoid arthritis (V17 7) (Z82 61)   2  Family history of Osteoporosis (V17 81)  Father    3  Family history of Acute Myocardial Infarction (V17 3)   4  Family history of Hypertension (V17 49)  Paternal Grandfather    11  Family history of Malignant Penile Neoplasm (V16 49)   6  Family history of Prostate Cancer (V16 42)  Maternal Aunt    7  Family history of rheumatoid arthritis (V17 7) (Z82 61)  Maternal Uncle    8  Family history of Crohn's disease (V18 59) (Z83 79)   9  Family history of psoriasis (V19 4) (Z84 0)   10  Family history of ulcerative colitis (V18 59) (Z83 79)  Other    11  Family history of Crohn's disease (V18 59) (Z83 79)  Family History    12  Family history of Crohn's Disease   13  Denied: Family history of systemic lupus erythematosus   14  Family history of Osteoarthritis (V17 7)   15   Family history of Rheumatoid Arthritis    Social History    · Being A Social Drinker   · Employed · Former smoker (V15 82) (W35 002)   · No drug use    Current Meds   1  Albuterol Sulfate 1 25 MG/3ML Inhalation Nebulization Solution; USE 1 UNIT DOSE IN   NEBULIZER EVERY 4 TO 6 HOURS AS NEEDED; Therapy: 13Yyp6104 to (Last Rx:12Sep2015)  Requested for: 13Zmi6956 Ordered   2  Furosemide 40 MG Oral Tablet; take 1/2 tablet daily as directed; Therapy: (Recorded:24Jun2016) to Recorded   3  Levothyroxine Sodium 25 MCG Oral Tablet; take 1 tablet by mouth once daily; Therapy: 73RYC4731 to (Last Rx:18Mar2016)  Requested for: 15DDF9870 Ordered   4  Multi Vitamin Daily Oral Tablet; TAKE 1 TABLET DAILY; Therapy: (Recorded:24Jun2016) to Recorded   5  Proventil  (90 Base) MCG/ACT Inhalation Aerosol Solution; INHALE 2 PUFFS   EVERY 4 TO 6 HOURS AS NEEDED; Therapy: 63SKQ9682 to (Evaluate:11Nov2015)  Requested for: 12Sep2015; Last   Rx:12Sep2015 Ordered   6  Tacrolimus 0 03 % External Ointment; APPLY AND GENTLY MASSAGE INTO AFFECTED   AREA(S) TWICE DAILY; Therapy: 69DQU9154 to (Last Rx:81Aoc9741)  Requested for: 16HAH1668 Ordered   7  Zolpidem Tartrate 10 MG Oral Tablet; take 1 tablet by mouth at bedtime for sleep; Therapy: 39KLJ7875 to (Evaluate:21Emw3333)  Requested for: 20Apr2016; Last   Rx:88Rvr9419 Ordered    Immunizations   1    PPD  16CAT3678    Tdap  91Kou7660     Allergies    1  No Known Drug Allergies    Vitals  Signs [Data Includes: Current Encounter]   Recorded: 10AUH8786 09:03AM   Heart Rate: 80  Systolic: 231  Diastolic: 72  Weight: 964 lb   BMI Calculated: 26 93  BSA Calculated: 1 72    Physical Exam    Constitutional   General appearance: Abnormal   overweight  Eyes   Conjunctiva and lids: No swelling, erythema or discharge  Pupils and irises: Equal, round and reactive to light  Ears, Nose, Mouth, and Throat   External inspection of ears and nose: Normal     Oropharynx: Normal with no erythema, edema, exudate lesions, or ulcers      Pulmonary   Respiratory effort: No increased work of breathing or signs of respiratory distress  Auscultation of lungs: Clear to auscultation  Cardiovascular   Auscultation of heart: Normal rate and rhythm, normal S1 and S2, without murmurs  Examination of extremities for edema and/or varicosities: Abnormal   (+ trace B/L LE edema)   Lymphatic   Palpation of lymph nodes in neck: No lymphadenopathy  Psychiatric   Orientation to person, place, and time: Normal     Mood and affect: Normal       Right 2nd MCP tenderness  Right glenohumeral joint tenderness  Left 2nd MCP tenderness  Left glenohumeral joint tenderness and restricted ROM  Right ankle swelling  Left ankle tenderness, swelling and restricted ROM  Musculoskeletal - Joints, bones, and muscles: Abnormal  Palpation - bilateral knee crepitus  Muscle strength/tone: Normal  Motor Strength Findings: right hand dominance, but normal upper extremity strength and normal lower extremity strength  Right upper extremity: shoulder flexion 5/5, shoulder extension 5/5, biceps 5/5, triceps 5/5, but normal hand   Left upper extremity shoulder flexion 5/5, shoulder extension 5/5, biceps 5/5, triceps 5/5, but normal hand   Right lower extremity strength: hip flexion 5/5, hip abduction 5/5, hip adduction 5/5, knee flexion 5/5, knee extension 5/5, ankle dorsiflexion 5/5, ankle plantar flexion 5/5  Left lower extremity strength: hip flexion 5/5, hip abduction 5/5, hip adduction 5/5, knee flexion 5/5, knee extension 5/5, ankle dorsiflexion 5/5, ankle plantar flexion 5/5  Skin - Skin and subcutaneous tissue: Abnormal  Clinical impression: Raynaud's phenomenon on both hands (on multiple fingers of both hands )  Neurologic - Reflexes: Normal  Deep tendon reflexes: 2+ right biceps, 2+ left biceps, 2+ right triceps, 2+ left triceps, 2+ right brachioradialis, 2+ left brachioradialis, 2+ right patella, 2+ left patella, 2+ right ankle jerk and 2+ left ankle jerk   Sensation: Normal      The patient has swelling of all MCP and PIP joints of the right hand  The patient has swelling of all MCP and PIP joints of the left hand  Results/Data  Results   * XR ANKLE 3+ VIEW LEFT 55ATO4065 04:11PM EPIC, Provider   Test ordered by: Mary Farfan     Test Name Result Flag Reference   XR ANKLE 3+ VW LEFT (Report)     LEFT ANKLE     INDICATION: Patient has swelling, pain and slight redness along the medial left ankle  Patient states previous laceration from shattered champagne bottle which required stitches  COMPARISON: Images of the left foot from 9/18/2012     VIEWS: 3; 3 images     FINDINGS:     There is no acute fracture or dislocation  No degenerative changes  No lytic or blastic lesions are seen  Mild soft tissue swelling overlying the medial malleolus without evidence of soft tissue gas or radiopaque foreign bodies  IMPRESSION:     No acute osseous abnormality  Mild soft tissue swelling overlying the medial malleolus  Workstation performed: [id number]     Signed by:   Francesco Ventura DO   5/23/16     (1) SED RATE 53UJY6576 12:58PM Ramandeep Setters     Test Name Result Flag Reference   SED RATE 21 mm/hour H 0-15     (1) RHEUMATOID FACTOR SCREEN 51BWC4416 12:58PM Ramandeep Setters     Test Name Result Flag Reference   RHEUMATOID FACTOR Negative  Negative     (1) EMELY SCREEN W/REFLEX TO TITER/PATTERN 57RNH0395 12:58PM Ramandeep Setters     Test Name Result Flag Reference   EMELY SCREEN   Negative  Negative       Future Appointments    Date/Time Provider Specialty Site   08/18/2016 10:00 AM Sofie Castillo DO Rheumatology [facility] [address] [facility]     Signatures   Electronically signed by : Scott Bañuelos DO; Jun 24 2016 10:36AM EST                       (Author)

## 2018-01-14 NOTE — MISCELLANEOUS
Message  Return to work or school:  03/02/2016   She is able to return to work on  03/12/2016      Kin Jacksonan was seen in the office on 03/02/2016 with a DX of J01 80, J40 & R19 7        Signatures   Electronically signed by : Leyla Rollins OM; Mar 21 2016  2:47PM EST                       (Author)
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
## 2018-01-15 NOTE — RESULT NOTES
Verified Results  (1) C-REACTIVE PROTEIN 55WVQ2696 03:49PM Jennifer Grullon Order Number: [id number]     Test Name Result Flag Reference   C-REACT PROTEIN 10 8 mg/L H <3 0     (1) SED RATE 45DNS9611 03:49PM Jenniferhemanth Grullon Order Number: [id number]     Test Name Result Flag Reference   SED RATE 34 mm/hour H 0-20     (1) CBC/PLT/DIFF 70JCG3239 03:49PM Jenniferhemanth Grullon Order Number: [id number]     Test Name Result Flag Reference   WBC COUNT 9 39 Thousand/uL  4 31-10 16   RBC COUNT 3 88 Million/uL  3 81-5 12   HEMOGLOBIN 11 2 g/dL L 11 5-15 4   HEMATOCRIT 35 2 %  34 8-46  1   MCV 91 fL  82-98   MCH 28 9 pg  26 8-34 3   MCHC 31 8 g/dL  31 4-37 4   RDW 13 5 %  11 6-15 1   MPV 9 6 fL  8 9-12 7   PLATELET COUNT 055 Thousands/uL H 149-390   nRBC AUTOMATED 0 /100 WBCs     NEUTROPHILS RELATIVE PERCENT 65 %  43-75   LYMPHOCYTES RELATIVE PERCENT 27 %  14-44   MONOCYTES RELATIVE PERCENT 5 %  4-12   EOSINOPHILS RELATIVE PERCENT 3 %  0-6   BASOPHILS RELATIVE PERCENT 0 %  0-1   NEUTROPHILS ABSOLUTE COUNT 6 04 Thousands/? ??L  1 85-7 62   LYMPHOCYTES ABSOLUTE COUNT 2 54 Thousands/? ??L  0 60-4 47   MONOCYTES ABSOLUTE COUNT 0 47 Thousand/? ??L  0 17-1 22   EOSINOPHILS ABSOLUTE COUNT 0 28 Thousand/? ??L  0 00-0 61   BASOPHILS ABSOLUTE COUNT 0 04 Thousands/? ??L  0 00-0 10     (1) COMPREHENSIVE METABOLIC PANEL 01WMI9120 13:82FZ Jenniferhemanth Grullon Order Number: [id number]     Test Name Result Flag Reference   GLUCOSE,RANDM 85 mg/dL     If the patient is fasting, the ADA then defines impaired fasting glucose as > 100 mg/dL and diabetes as > or equal to 123 mg/dL     SODIUM 138 mmol/L  136-145   POTASSIUM 4 1 mmol/L  3 5-5 3   CHLORIDE 105 mmol/L  100-108   CARBON DIOXIDE 29 mmol/L  21-32   ANION GAP (CALC) 4 mmol/L  4-13   BLOOD UREA NITROGEN 12 mg/dL  5-25   CREATININE 0 80 mg/dL  0 60-1 30   Standardized to IDMS reference method   CALCIUM 10 1 mg/dL 8  3-10 1   BILI, TOTAL 0 24 mg/dL  0 20-1 00   ALK PHOSPHATAS 86 U/L     ALT (SGPT) 16 U/L  12-78   AST(SGOT) 17 U/L  5-45   ALBUMIN 3 4 g/dL L 3 5-5 0   TOTAL PROTEIN 7 0 g/dL  6 4-8 2   eGFR Non-African American      >60 0 ml/min/1 73sq m   [facility] Disease Education Program recommendations are as follows:  GFR calculation is accurate only with a steady state creatinine  Chronic Kidney disease less than 60 ml/min/1 73 sq  meters  Kidney failure less than 15 ml/min/1 73 sq  meters  (1) QUANTIFERON - TB GOLD 99HFH9157 03:49PM Carly Mendez Order Number: [id number]     Test Name Result Flag Reference   QUANTIFERON TB GOLD      Specimen incubated at [place], [place]    Performed at:  [address] [place] [address]  [id number]  : Zuleyma Davsi MD, Phone:  [phone]   QUANTIFERON TB GOLD Negative  Negative   QUANTIFERON CRITERIA Comment     To be considered positive a specimen should have a TB Ag minus Nil  value greater than or equal to 0 35 IU/mL and in addition the TB Ag  minus Nil value must be greater than or equal to 25% of the Nil  value  There may be insufficient information in these values to  differentiate between some negative and some indeterminate test  values  QUANTIFERON TB AG VALUE 0 05 IU/mL     QUANTIFERON NIL VALUE 0 03 IU/mL     QUANTIFERON MITOGEN VALUE 2 23 IU/mL     QFT TB AG MINUS NIL VALUE 0 02 IU/mL     QFT INTERPRETATION Comment     The QuantiFERON TB Gold (in Tube) assay is intended for use as an aid  in the diagnosis of TB infection  Negative results suggest that there  is no TB infection  In patients with high suspicion of exposure, a  negative test should be repeated  A positive test indicates infection  with Mycobacterium tuberculosis  Among individuals without  tuberculosis infection, a positive test may be due to exposure to  [place], M  christin or M  marinum   On the Internet, go to  cdc gov/tb for further details      Performed at:  [address]  [id number]  : Ankit Ho MD, Phone:  [phone]     (1) Camila 03QUN1768 03:49PM Ruth Aureliant Order Number: [id number]     Test Name Result Flag Reference   HEPATITIS B SURFACE ANTIGEN Non-reactive  Non-reactive, NonReactive - Confirmed     (1) HEP B SURFACE ANTIBODY [id number] 03:49PM Ruth Qumuing Order Number: [id number]     Test Name Result Flag Reference   HEPATITIS B SURFACE ANTIBODY >1000 00 mIU/mL     Protective Immunity: Hep B Surface Antibody >= 10 mIu/ml (Traceable to [facility] Reference Preparation)     (1) HEP B CORE AB, TOTAL 89VDG5776 03:49PM Ruth Aureliant Order Number: [id number]     Test Name Result Flag Reference   HEPATITIS B CORE TOTAL ANTIBODY Non-reactive  Non-reactive

## 2018-01-15 NOTE — MISCELLANEOUS
Message  I called and left  for Leilani regarding lab and stool study results  Labs are generally stable overall--ESR and CRP were elevated  She is negative for hepatitis B and TB  She does have protective immunity from hepatitis B  C  diff testing came back negative, good news, so I prescribed 3 week prednisone taper  In the meantime, our office will complete prior authorization for Entbre  I told her to call the office with any questions or concerns and left our office number  Plan  Ulcerative colitis without complications    · PredniSONE 10 MG Oral Tablet;  Take 3 tablets by mouth daily for 1 week, then 2  tablets by mouth daily for 1 week, then 1 tablet by mouth daily for 1 week, then makr   Electronically signed by : LYUBOV Norman; Jun 26 2017  1:11PM EST                       (Author)

## 2018-01-15 NOTE — RESULT NOTES
Verified Results  (1) BASIC METABOLIC PROFILE 60EJC0759 03:00PM Momo Deleon     Test Name Result Flag Reference   GLUCOSE,RANDM 102 mg/dL     If the patient is fasting, the ADA then defines impaired fasting glucose as > 100 mg/dL and diabetes as > or equal to 123 mg/dL  SODIUM 141 mmol/L  136-145   POTASSIUM 4 4 mmol/L  3 5-5 3   CHLORIDE 104 mmol/L  100-108   CARBON DIOXIDE 30 mmol/L  21-32   ANION GAP (CALC) 7 mmol/L  4-13   BLOOD UREA NITROGEN 5 mg/dL  5-25   CREATININE 0 72 mg/dL  0 60-1 30   Standardized to IDMS reference method   CALCIUM 9 8 mg/dL  8 3-10 1   eGFR Non-African American      >60 0 ml/min/1 73sq [facility]asmin [facility] [employer] Disease Education Program recommendations are as follows:  GFR calculation is accurate only with a steady state creatinine  Chronic Kidney disease less than 60 ml/min/1 73 sq  meters  Kidney failure less than 15 ml/min/1 73 sq  meters

## 2018-01-15 NOTE — RESULT NOTES
Verified Results  (1) RHEUMATOID FACTOR SCREEN 12YAN1746 12:58PM Cheryl Rash     Test Name Result Flag Reference   RHEUMATOID FACTOR Negative  Negative     (1) EMELY SCREEN, (INC  PATTERN IF INDICATED) 92NOY3943 12:58PM Cheryl Rash     Test Name Result Flag Reference   EMELY SCREEN   Negative  Negative

## 2018-01-15 NOTE — MISCELLANEOUS
Message  Return to work or school:   Marbella Harris is under my professional care  She was seen in my office on 3/2/2016   She is able to return to work on  3/12/2016      She was out of work on 3/5 AND 3/6/2016  Claudette Burton DO        Signatures   Electronically signed by : Aida Krueger DO; Mar  9 2016 11:13PM EST                       (Author)

## 2018-01-16 NOTE — MISCELLANEOUS
Message  Return to work or school:   Yasmine Awad is under my professional care  She was seen in my office on 11/13/2017, NO WORK 11/12, 11/13, 11/14        VANDANA Chapman        Future Appointments    Signatures   Electronically signed by : Negro Ortiz DO; Nov 13 2017 11:02PM EST                       (Author)
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
## 2018-01-22 ENCOUNTER — APPOINTMENT (OUTPATIENT)
Dept: LAB | Facility: HOSPITAL | Age: 53
End: 2018-01-22
Payer: COMMERCIAL

## 2018-01-22 ENCOUNTER — TRANSCRIBE ORDERS (OUTPATIENT)
Dept: LAB | Facility: HOSPITAL | Age: 53
End: 2018-01-22

## 2018-01-22 VITALS — HEIGHT: 63 IN | HEART RATE: 78 BPM | DIASTOLIC BLOOD PRESSURE: 78 MMHG | SYSTOLIC BLOOD PRESSURE: 126 MMHG

## 2018-01-22 VITALS
SYSTOLIC BLOOD PRESSURE: 110 MMHG | OXYGEN SATURATION: 98 % | HEIGHT: 63 IN | BODY MASS INDEX: 27.29 KG/M2 | DIASTOLIC BLOOD PRESSURE: 65 MMHG | WEIGHT: 154 LBS | HEART RATE: 73 BPM

## 2018-01-22 VITALS — SYSTOLIC BLOOD PRESSURE: 102 MMHG | HEART RATE: 78 BPM | DIASTOLIC BLOOD PRESSURE: 70 MMHG

## 2018-01-22 DIAGNOSIS — K51.90 ULCERATIVE COLITIS WITHOUT COMPLICATIONS (HCC): ICD-10-CM

## 2018-01-22 DIAGNOSIS — M47.819 SPONDYLOSIS WITHOUT MYELOPATHY OR RADICULOPATHY: ICD-10-CM

## 2018-01-22 LAB
ALBUMIN SERPL BCP-MCNC: 3.8 G/DL (ref 3.5–5)
ALP SERPL-CCNC: 96 U/L (ref 46–116)
ALT SERPL W P-5'-P-CCNC: 15 U/L (ref 12–78)
ANION GAP SERPL CALCULATED.3IONS-SCNC: 5 MMOL/L (ref 4–13)
AST SERPL W P-5'-P-CCNC: 12 U/L (ref 5–45)
BASOPHILS # BLD AUTO: 0.03 THOUSANDS/ΜL (ref 0–0.1)
BASOPHILS NFR BLD AUTO: 0 % (ref 0–1)
BILIRUB SERPL-MCNC: 0.27 MG/DL (ref 0.2–1)
BUN SERPL-MCNC: 14 MG/DL (ref 5–25)
CALCIUM ALBUM COR SERPL-MCNC: 10.7 MG/DL (ref 8.3–10.1)
CALCIUM SERPL-MCNC: 10.5 MG/DL (ref 8.3–10.1)
CHLORIDE SERPL-SCNC: 106 MMOL/L (ref 100–108)
CO2 SERPL-SCNC: 27 MMOL/L (ref 21–32)
CREAT SERPL-MCNC: 0.69 MG/DL (ref 0.6–1.3)
CRP SERPL QL: 6.2 MG/L
EOSINOPHIL # BLD AUTO: 0.29 THOUSAND/ΜL (ref 0–0.61)
EOSINOPHIL NFR BLD AUTO: 4 % (ref 0–6)
ERYTHROCYTE [DISTWIDTH] IN BLOOD BY AUTOMATED COUNT: 12.8 % (ref 11.6–15.1)
ERYTHROCYTE [SEDIMENTATION RATE] IN BLOOD: 19 MM/HOUR (ref 0–20)
GFR SERPL CREATININE-BSD FRML MDRD: 100 ML/MIN/1.73SQ M
GLUCOSE P FAST SERPL-MCNC: 81 MG/DL (ref 65–99)
HCT VFR BLD AUTO: 36.1 % (ref 34.8–46.1)
HGB BLD-MCNC: 11.8 G/DL (ref 11.5–15.4)
LYMPHOCYTES # BLD AUTO: 3.12 THOUSANDS/ΜL (ref 0.6–4.47)
LYMPHOCYTES NFR BLD AUTO: 42 % (ref 14–44)
MCH RBC QN AUTO: 29.8 PG (ref 26.8–34.3)
MCHC RBC AUTO-ENTMCNC: 32.7 G/DL (ref 31.4–37.4)
MCV RBC AUTO: 91 FL (ref 82–98)
MONOCYTES # BLD AUTO: 0.59 THOUSAND/ΜL (ref 0.17–1.22)
MONOCYTES NFR BLD AUTO: 8 % (ref 4–12)
NEUTROPHILS # BLD AUTO: 3.4 THOUSANDS/ΜL (ref 1.85–7.62)
NEUTS SEG NFR BLD AUTO: 46 % (ref 43–75)
NRBC BLD AUTO-RTO: 0 /100 WBCS
PLATELET # BLD AUTO: 365 THOUSANDS/UL (ref 149–390)
PMV BLD AUTO: 10.7 FL (ref 8.9–12.7)
POTASSIUM SERPL-SCNC: 3.7 MMOL/L (ref 3.5–5.3)
PROT SERPL-MCNC: 7.4 G/DL (ref 6.4–8.2)
RBC # BLD AUTO: 3.96 MILLION/UL (ref 3.81–5.12)
SODIUM SERPL-SCNC: 138 MMOL/L (ref 136–145)
WBC # BLD AUTO: 7.44 THOUSAND/UL (ref 4.31–10.16)

## 2018-01-22 PROCEDURE — 36415 COLL VENOUS BLD VENIPUNCTURE: CPT

## 2018-01-22 PROCEDURE — 86140 C-REACTIVE PROTEIN: CPT

## 2018-01-22 PROCEDURE — 80053 COMPREHEN METABOLIC PANEL: CPT

## 2018-01-22 PROCEDURE — 85025 COMPLETE CBC W/AUTO DIFF WBC: CPT

## 2018-01-22 PROCEDURE — 85652 RBC SED RATE AUTOMATED: CPT

## 2018-01-23 VITALS
WEIGHT: 293 LBS | BODY MASS INDEX: 51.91 KG/M2 | HEART RATE: 60 BPM | HEIGHT: 63 IN | OXYGEN SATURATION: 98 % | DIASTOLIC BLOOD PRESSURE: 70 MMHG | SYSTOLIC BLOOD PRESSURE: 110 MMHG | TEMPERATURE: 98.8 F

## 2018-01-30 PROBLEM — L40.50 PSORIATIC ARTHRITIS (HCC): Status: ACTIVE | Noted: 2017-11-30

## 2018-01-30 NOTE — PROGRESS NOTES
No problem-specific Assessment & Plan notes found for this encounter  Assessment/Plan:  Diagnoses and all orders for this visit:    Seronegative spondyloarthropathy    Psoriatic arthritis ([facility] [address] )    Ulcerative colitis without complications, unspecified location ([facility] [address] )    Raynaud's syndrome without gangrene    Hypothyroidism, unspecified type    Mild intermittent asthma, unspecified whether complicated     This is a 70-year-old female presenting today for follow-up for an IBD associated spondyloarthropathy  The patient states that she was sick for the last month therefore was off of Humira  She states that she did restart Humira yesterday and does plan to restarted every other week  She states that she did notice symptoms of colitis returning yesterday as well  She notes pain in her hips however states that she was sent for x-rays of her hips by her gastroenterologist   She does note that she has more pain in her hips with the use of Humira  She does however state that her ankles have been more severe and have had increased pain since being off of Humira  The patient states she is working out will often and that may be contributing  She also feels like her ankles home week  She describes morning stiffness lasting a few minutes and also describes pain in the right hand  She notes swelling of the legs but denies any further obvious joint swelling  Patient states that she does have difficulty with sleep due to pain as well as non restorative sleep and fatigue  On physical examination, there is synovitis of the PIP is bilaterally as well as the ankles  She has tenderness as well as swelling of the 1st MCP of the right hand  There is tenderness of the left elbow as well as the right hip and the left ankle  There is crepitus of the knees  Her most recent labs reveal an elevated CRP of 6 2 as well as an elevated corrected calcium tab  Otherwise patient's CBC, CMP, and sed rate are within normal range    At this time patient's history and physical examination is 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 which appears to be active at this time with the use of Humira  Patient would like to continue with Humira every other week at this time  Patient was however advised that if her inflammatory markers remain elevated she continues to have joint pain it would be recommended that she escalate her dose of Humira to weekly will consider further biologic therapy  In addition we may consider the addition of azathioprine or methotrexate  Patient did not want to proceed with any further treatment changes at this time  She will plan to obtain a CBC, CMP, CRP, ESR before next follow-up  In addition she was provided with a referral to Endocrinology at had elevated calcium pills on several occasions with a normal PTH  Patient will plan to return to the office in 3 months time however contact the office in the interim if she has any further questions or concerns  She will continue to follow up with Gastroenterology for further evaluation of her ulcerative colitis as well  Subjective:      Patient ID: Roshan Allan is a 46 y o   female presenting today for follow up for USpA/PsA/UC  Was sick and off Humira x 1 month  Restarted yesterday  Symptoms of colitis yesterday  Pain in the hips and GI ordered xray of the hips  Hips are improving  Ankles are worse  Working out more often at this time  Ankles feel weak  +Am stiffness x few minutes  Pain in the right index finger as well  +Swelling in the legs  No other obvious joint swelling  +Difficulty with sleep due to pain  +Non restorative sleep   +fatigue  Using Humira every toher week  The following portions of the patient's history were reviewed and updated as appropriate:   She  has a past medical history of Anemia; Asthma; Bilateral leg edema; Colitis, acute; Dyspareunia in female; History of transfusion; Hypokalemia;  Hypothyroidism; Hypothyroidism; Insomnia; Nontraumatic tear of left tibialis posterior tendon; Raynaud's disease; Rayna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 ([facility] [address] ); Ulcerative colitis ([facility] [address] ); and Ulcerative colitis ([facility] [address] )  She  does not have any pertinent problems on file  She  has a past surgical history that includes Hysterectomy; Carpal tunnel release (Bilateral); Shoulder surgery (Left); Lipoma resection;  section, low transverse; Hernia repair; Knee arthroscopy (Right); Tonsillectomy; pr colonoscopy flx dx w/collj spec when pfrmd (N/A, 2016); and pr repair flex leg tendon,second,ea (Left, 2016)  Her family history is not on file  She  reports that she has quit smoking  She does not have any smokeless tobacco history on file  She reports that she drinks alcohol  She reports that she does not use drugs  Current Outpatient Prescriptions   Medication Sig Dispense Refill    albuterol (ACCUNEB) 1 25 MG/3ML nebulizer solution Take 1 ampule by nebulization every 6 (six) hours as needed for wheezing      Budesonide 9 MG TB24 Take 9 mg by mouth as needed   furosemide (LASIX) 20 mg tablet Take 20 mg by mouth 2 (two) times a day      hyoscyamine (ANASPAZ) 0 125 mg Take 0 125 mg by mouth 4 (four) times a day as needed      levothyroxine 25 mcg tablet Take 25 mcg by mouth daily   mesalamine (LIALDA) 1 2 G EC tablet Take 1,200 mg by mouth daily  2 tablets Q am       Multiple Vitamins-Minerals (MULTIVITAL PO) Take 1 tablet by mouth daily at bedtime  Woman's over 50       phenazopyridine (PYRIDIUM) 100 mg tablet Take 1 tablet by mouth 3 (three) times a day as needed      Potassium Chloride JULIOCESAR 20 mEq by Does not apply route daily      tacrolimus (PROTOPIC) 0 03 % ointment Apply topically 2 (two) times a day      zolpidem (AMBIEN) 10 mg tablet Take 10 mg by mouth daily at bedtime as needed for sleep        adalimumab (HUMIRA) 40 mg/0 8 mL PSKT Inject 0 8 mL (40 mg total) under the skin every 14 (fourteen) days 6 each 0     No current facility-administered medications for this visit  Current Outpatient Prescriptions on File Prior to Visit   Medication Sig    albuterol (ACCUNEB) 1 25 MG/3ML nebulizer solution Take 1 ampule by nebulization every 6 (six) hours as needed for wheezing    Budesonide 9 MG TB24 Take 9 mg by mouth as needed   furosemide (LASIX) 20 mg tablet Take 20 mg by mouth 2 (two) times a day    hyoscyamine (ANASPAZ) 0 125 mg Take 0 125 mg by mouth 4 (four) times a day as needed    levothyroxine 25 mcg tablet Take 25 mcg by mouth daily   mesalamine (LIALDA) 1 2 G EC tablet Take 1,200 mg by mouth daily  2 tablets Q am     Multiple Vitamins-Minerals (MULTIVITAL PO) Take 1 tablet by mouth daily at bedtime  Woman's over 50     Potassium Chloride JULIOCESAR 20 mEq by Does not apply route daily    tacrolimus (PROTOPIC) 0 03 % ointment Apply topically 2 (two) times a day    zolpidem (AMBIEN) 10 mg tablet Take 10 mg by mouth daily at bedtime as needed for sleep   [DISCONTINUED] traMADol (ULTRAM) 50 mg tablet Take 50 mg by mouth every 6 (six) hours as needed for moderate pain  Takes 1/2 tab    [DISCONTINUED] Methylprednisolone (MEDROL) 4 MG TBPK Use as directed on package    [DISCONTINUED] PredniSONE 10 MG (21) TBPK Take by mouth     No current facility-administered medications on file prior to visit  She has No Known Allergies     Review of Systems   Constitutional: Positive for chills, fatigue and fever  Negative for appetite change and unexpected weight change  HENT: Positive for congestion  Negative for mouth sores and sore throat  Eyes: Negative for pain, redness and visual disturbance  Respiratory: Negative for cough, chest tightness and shortness of breath  Cardiovascular: Negative for chest pain and leg swelling  Gastrointestinal: Positive for blood in stool (1 episode )  Negative for abdominal pain, constipation, diarrhea, nausea and vomiting     Endocrine: Negative for polydipsia and polyuria  Genitourinary: Negative for frequency and hematuria  Skin: Negative for color change and rash  Neurological: Positive for light-headedness (intermittently )  Negative for weakness, numbness and headaches  Hematological: Negative for adenopathy  Psychiatric/Behavioral: Negative for behavioral problems  The patient is not nervous/anxious  Objective:    Physical Exam   Constitutional: She is oriented to person, place, and time  She appears well-developed and well-nourished  HENT:   Head: Normocephalic  Mouth/Throat: Oropharynx is clear and moist    Eyes: Conjunctivae are normal  Pupils are equal, round, and reactive to light  Neck: Normal range of motion  Neck supple  Cardiovascular: Normal rate, regular rhythm and normal heart sounds  Pulmonary/Chest: Effort normal and breath sounds normal    Musculoskeletal:   Crepitus of B/L knees   Neurological: She is alert and oriented to person, place, and time  Skin: Skin is warm and dry  Psychiatric: She has a normal mood and affect   Her behavior is normal        Physical Exam     Tenderness:   LUE: ulnohumeral and radiohumeral  Right hand: 2nd MCP  RLE: acetabulofemoral  LLE: tibiotalar    Swelling:   Right hand: 2nd MCP, 2nd PIP, 3rd PIP, 4th PIP and 5th PIP  Left hand: 2nd PIP, 3rd PIP, 4th PIP and 5th PIP  RLE: tibiotalar  LLE: tibiotalar    HUIZAR-28 tender joint count: 2  HUIZAR-28 swollen joint count: 9  HUIZAR-28 score: 1 63 (Remission)      Results Reviewed     None

## 2018-01-31 ENCOUNTER — OFFICE VISIT (OUTPATIENT)
Dept: RHEUMATOLOGY | Facility: CLINIC | Age: 53
End: 2018-01-31
Payer: COMMERCIAL

## 2018-01-31 VITALS
DIASTOLIC BLOOD PRESSURE: 70 MMHG | SYSTOLIC BLOOD PRESSURE: 112 MMHG | BODY MASS INDEX: 27.29 KG/M2 | HEART RATE: 68 BPM | HEIGHT: 63 IN | WEIGHT: 154 LBS

## 2018-01-31 DIAGNOSIS — E03.9 HYPOTHYROIDISM, UNSPECIFIED TYPE: ICD-10-CM

## 2018-01-31 DIAGNOSIS — I73.00 RAYNAUD'S SYNDROME WITHOUT GANGRENE: ICD-10-CM

## 2018-01-31 DIAGNOSIS — K51.90 ULCERATIVE COLITIS WITHOUT COMPLICATIONS, UNSPECIFIED LOCATION (HCC): ICD-10-CM

## 2018-01-31 DIAGNOSIS — J45.20 MILD INTERMITTENT ASTHMA, UNSPECIFIED WHETHER COMPLICATED: ICD-10-CM

## 2018-01-31 DIAGNOSIS — L40.50 PSORIATIC ARTHRITIS (HCC): ICD-10-CM

## 2018-01-31 DIAGNOSIS — M47.819 SERONEGATIVE SPONDYLOARTHROPATHY: Primary | ICD-10-CM

## 2018-01-31 PROCEDURE — 99214 OFFICE O/P EST MOD 30 MIN: CPT | Performed by: PHYSICIAN ASSISTANT

## 2018-01-31 RX ORDER — PHENAZOPYRIDINE HYDROCHLORIDE 100 MG/1
1 TABLET, FILM COATED ORAL 3 TIMES DAILY PRN
COMMUNITY
Start: 2017-12-26 | End: 2018-04-06

## 2018-01-31 RX ORDER — CEFUROXIME AXETIL 250 MG/1
250 TABLET ORAL EVERY 12 HOURS
Refills: 0 | COMMUNITY
Start: 2017-11-13 | End: 2018-01-31 | Stop reason: CLARIF

## 2018-01-31 RX ORDER — CIPROFLOXACIN 500 MG/1
500 TABLET, FILM COATED ORAL 2 TIMES DAILY
Refills: 0 | COMMUNITY
Start: 2017-12-26 | End: 2018-01-31 | Stop reason: CLARIF

## 2018-03-05 ENCOUNTER — TELEPHONE (OUTPATIENT)
Dept: FAMILY MEDICINE CLINIC | Facility: CLINIC | Age: 53
End: 2018-03-05

## 2018-03-05 DIAGNOSIS — J11.1 INFLUENZA: Primary | ICD-10-CM

## 2018-03-05 RX ORDER — OSELTAMIVIR PHOSPHATE 75 MG/1
75 CAPSULE ORAL EVERY 12 HOURS SCHEDULED
Qty: 10 CAPSULE | Refills: 0 | Status: SHIPPED | OUTPATIENT
Start: 2018-03-05 | End: 2018-03-10

## 2018-03-05 NOTE — TELEPHONE ENCOUNTER
PATIENT CALLED EARLIER TODAY WITH FLU LIKE Sx, NAUSEA DIARREA, TEMP  PATIENT IS WONDERING IF YOU ARE ABLE TO SEND TAMIFLU INTO [facility] [place]      [address] [facility] PHONE NUMBER

## 2018-03-05 NOTE — TELEPHONE ENCOUNTER
PT HAVING FLU LIKE SYMPTOMS DIARRHEA, FATIGUE, FEVER, COUGH ASKING IF SHE NEEDS TO COME IN OR IF SHE CAN HAVE A PRESCRIPTION CALLED IN

## 2018-03-07 ENCOUNTER — OFFICE VISIT (OUTPATIENT)
Dept: URGENT CARE | Facility: CLINIC | Age: 53
End: 2018-03-07
Payer: COMMERCIAL

## 2018-03-07 VITALS
SYSTOLIC BLOOD PRESSURE: 118 MMHG | TEMPERATURE: 98.3 F | BODY MASS INDEX: 26.58 KG/M2 | WEIGHT: 150 LBS | DIASTOLIC BLOOD PRESSURE: 62 MMHG | OXYGEN SATURATION: 97 % | HEIGHT: 63 IN | HEART RATE: 87 BPM | RESPIRATION RATE: 16 BRPM

## 2018-03-07 DIAGNOSIS — R05.9 COUGH: ICD-10-CM

## 2018-03-07 DIAGNOSIS — J06.9 ACUTE URI: Primary | ICD-10-CM

## 2018-03-07 PROBLEM — M25.559 HIP JOINT PAIN: Status: ACTIVE | Noted: 2017-11-30

## 2018-03-07 PROBLEM — M77.12 LATERAL EPICONDYLITIS OF LEFT ELBOW: Status: ACTIVE | Noted: 2017-02-08

## 2018-03-07 PROBLEM — E83.52 HYPERCALCEMIA: Status: ACTIVE | Noted: 2017-08-30

## 2018-03-07 PROBLEM — N39.0 URINARY TRACT INFECTION: Status: ACTIVE | Noted: 2017-12-26

## 2018-03-07 PROBLEM — R39.9 SYMPTOMS INVOLVING URINARY SYSTEM: Status: ACTIVE | Noted: 2017-12-26

## 2018-03-07 PROCEDURE — S9088 SERVICES PROVIDED IN URGENT: HCPCS | Performed by: NURSE PRACTITIONER

## 2018-03-07 PROCEDURE — 99213 OFFICE O/P EST LOW 20 MIN: CPT | Performed by: NURSE PRACTITIONER

## 2018-03-07 RX ORDER — FUROSEMIDE 40 MG/1
TABLET ORAL
COMMUNITY
Start: 2018-01-08 | End: 2018-12-13 | Stop reason: SINTOL

## 2018-03-07 RX ORDER — BENZONATATE 100 MG/1
100 CAPSULE ORAL 3 TIMES DAILY PRN
Qty: 20 CAPSULE | Refills: 0 | Status: SHIPPED | OUTPATIENT
Start: 2018-03-07 | End: 2018-04-06

## 2018-03-07 RX ORDER — ADALIMUMAB 40MG/0.8ML
KIT SUBCUTANEOUS
COMMUNITY
Start: 2018-02-06 | End: 2018-04-06

## 2018-03-07 RX ORDER — LEVOFLOXACIN 500 MG/1
500 TABLET, FILM COATED ORAL EVERY 24 HOURS
Qty: 7 TABLET | Refills: 0 | Status: SHIPPED | OUTPATIENT
Start: 2018-03-07 | End: 2018-03-14

## 2018-03-07 NOTE — LETTER
March 7, 2018     Patient: Chirag Gann   YOB: 1965   Date of Visit: 3/7/2018       To Whom It May Concern: It is my medical opinion that [place] Andrew may return to work on 03/09/2018  If you have any questions or concerns, please don't hesitate to call           Sincerely,        YFN Torres    CC: No Recipients

## 2018-03-07 NOTE — PATIENT INSTRUCTIONS
Follow up with pcp   Take meds as directed  Rest  Increase fluids  Use zyrtec and flonase as directed     Continue tamiflu for flu like symptoms

## 2018-03-07 NOTE — PROGRESS NOTES
NAME: Bassam Irvin is a 46 y o  female  : 1965    MRN: [id number]      Assessment and Plan   Acute URI [J06 9]  1  Acute URI  levofloxacin (LEVAQUIN) 500 mg tablet    benzonatate (TESSALON PERLES) 100 mg capsule   2  Cough  benzonatate (TESSALON PERLES) 100 mg capsule       Leilani was seen today for cold like symptoms  Diagnoses and all orders for this visit:    Acute URI  -     levofloxacin (LEVAQUIN) 500 mg tablet; Take 1 tablet (500 mg total) by mouth every 24 hours for 7 days  -     benzonatate (TESSALON PERLES) 100 mg capsule; Take 1 capsule (100 mg total) by mouth 3 (three) times a day as needed for cough    Cough  -     benzonatate (TESSALON PERLES) 100 mg capsule; Take 1 capsule (100 mg total) by mouth 3 (three) times a day as needed for cough        Patient Instructions   Patient Instructions   Follow up with pcp   Take meds as directed  Rest  Increase fluids  Use zyrtec and flonase as directed  Proceed to ER if symptoms worsen  Chief Complaint     Chief Complaint   Patient presents with    Cold Like Symptoms     fatigue, fever, sore throat, body aches x days  Started tamiflu on monday  Took tylenol and nightquil last night         History of Present Illness     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  She woke on Saturday night and feels horrible  She has nausea, back pain, body aches and feels horrible  She has been having these symptoms since the weekend and started tamiflu right away on Monday night and no better in feeling  She feels worse since Monday  Today she has dry cough, chest burning, sore throat and exhausted  She has decrease appetite, and energy  No fevers today but fever 100 5 today and has been taking a lot of tylenol and taking it every 4 hours  She is currently taking tamiflu and will continue to do so  Review of Systems   Review of Systems   Constitutional: Positive for appetite change (decreased), fatigue and fever     HENT: Positive for congestion, postnasal drip, sinus pain, sinus pressure and sore throat  Negative for rhinorrhea and sneezing  Respiratory: Positive for cough and chest tightness  Negative for shortness of breath and wheezing  Apnea: burning sensation at times  Cardiovascular: Negative  Endocrine: Negative  Genitourinary: Negative  Musculoskeletal: Positive for myalgias  Neurological: Positive for headaches  Hematological: Negative  Psychiatric/Behavioral: Negative  Current Medications       Current Outpatient Prescriptions:     adalimumab (HUMIRA) 40 mg/0 8 mL PSKT, Inject 0 8 mL (40 mg total) under the skin every 14 (fourteen) days, Disp: 6 each, Rfl: 0    albuterol (ACCUNEB) 1 25 MG/3ML nebulizer solution, Take 1 ampule by nebulization every 6 (six) hours as needed for wheezing, Disp: , Rfl:     furosemide (LASIX) 20 mg tablet, Take 20 mg by mouth 2 (two) times a day, Disp: , Rfl:     hyoscyamine (ANASPAZ) 0 125 mg, Take 0 125 mg by mouth 4 (four) times a day as needed, Disp: , Rfl:     levothyroxine 25 mcg tablet, Take 25 mcg by mouth daily  , Disp: , Rfl:     Multiple Vitamins-Minerals (MULTIVITAL PO), Take 1 tablet by mouth daily at bedtime  Woman's over 50 , Disp: , Rfl:     oseltamivir (TAMIFLU) 75 mg capsule, Take 1 capsule (75 mg total) by mouth every 12 (twelve) hours for 5 days, Disp: 10 capsule, Rfl: 0    phenazopyridine (PYRIDIUM) 100 mg tablet, Take 1 tablet by mouth 3 (three) times a day as needed, Disp: , Rfl:     tacrolimus (PROTOPIC) 0 03 % ointment, Apply topically 2 (two) times a day, Disp: , Rfl:     zolpidem (AMBIEN) 10 mg tablet, Take 10 mg by mouth daily at bedtime as needed for sleep , Disp: , Rfl:     benzonatate (TESSALON PERLES) 100 mg capsule, Take 1 capsule (100 mg total) by mouth 3 (three) times a day as needed for cough, Disp: 20 capsule, Rfl: 0    Budesonide 9 MG TB24, Take 9 mg by mouth as needed    , Disp: , Rfl:     furosemide (LASIX) 40 mg tablet, , Disp: , Rfl:    HUMIRA PEN 40 MG/0 8ML PNKT, , Disp: , Rfl:     levofloxacin (LEVAQUIN) 500 mg tablet, Take 1 tablet (500 mg total) by mouth every 24 hours for 7 days, Disp: 7 tablet, Rfl: 0    mesalamine (LIALDA) 1 2 G EC tablet, Take 1,200 mg by mouth daily  2 tablets Q am , Disp: , Rfl:     Potassium Chloride JULIOCESAR, 20 mEq by Does not apply route daily, Disp: , Rfl:     Current Allergies     Allergies as of 03/07/2018    (No Known Allergies)            The following portions of the patient's history were reviewed and updated as appropriate: allergies, current medications, past family history, past medical history, past social history, past surgical history and problem list     Objective   /62   Pulse 87   Temp 98 3 °F (36 8 °C)   Resp 16   Ht 5' 3" (1 6 m)   Wt 68 kg (150 lb)   SpO2 97%   BMI 26 57 kg/m²      Physical Exam     Physical Exam   Constitutional: She is oriented to person, place, and time  She appears well-developed and well-nourished  She appears ill  HENT:   Head: Normocephalic  Right Ear: Hearing, tympanic membrane, external ear and ear canal normal    Left Ear: Hearing, tympanic membrane, external ear and ear canal normal    Nose: Mucosal edema present  No rhinorrhea  Mouth/Throat: Uvula is midline, oropharynx is clear and moist and mucous membranes are normal    Eyes: Conjunctivae and EOM are normal  Lids are everted and swept, no foreign bodies found  Neck: Trachea normal    Cardiovascular: Regular rhythm, normal heart sounds and normal pulses  Pulmonary/Chest: No respiratory distress  She has decreased breath sounds in the right lower field and the left lower field  She has wheezes in the right middle field and the left middle field  She has no rhonchi  She has no rales  Musculoskeletal:   Body aches all over and worse with deeper touch   Neurological: She is alert and oriented to person, place, and time  Skin: No rash noted         YFN Pollack

## 2018-03-08 ENCOUNTER — TELEPHONE (OUTPATIENT)
Dept: FAMILY MEDICINE CLINIC | Facility: CLINIC | Age: 53
End: 2018-03-08

## 2018-03-08 NOTE — TELEPHONE ENCOUNTER
Pt has been out of work since 03/04/2018  She ended up going to [facility] yesterday and was diagnosed with upper & Lower resp infect  Was placed on Levaquin and Tessalon Perles  Is asking for a note to cover her from 03/04/2018 thru 03/11/2018

## 2018-03-08 NOTE — LETTER
march 8, 2018    Please excuse Rheta Estimable from work from 3/4-3/11/2018      Sincerely,        Monita Bosworth, D O

## 2018-03-30 ENCOUNTER — TELEPHONE (OUTPATIENT)
Dept: GASTROENTEROLOGY | Facility: MEDICAL CENTER | Age: 53
End: 2018-03-30

## 2018-04-06 ENCOUNTER — OFFICE VISIT (OUTPATIENT)
Dept: FAMILY MEDICINE CLINIC | Facility: CLINIC | Age: 53
End: 2018-04-06
Payer: COMMERCIAL

## 2018-04-06 VITALS
HEART RATE: 66 BPM | TEMPERATURE: 97.9 F | HEIGHT: 63 IN | DIASTOLIC BLOOD PRESSURE: 60 MMHG | RESPIRATION RATE: 14 BRPM | SYSTOLIC BLOOD PRESSURE: 102 MMHG

## 2018-04-06 DIAGNOSIS — J01.00 ACUTE NON-RECURRENT MAXILLARY SINUSITIS: Primary | ICD-10-CM

## 2018-04-06 DIAGNOSIS — J06.9 ACUTE URI: ICD-10-CM

## 2018-04-06 PROCEDURE — 99213 OFFICE O/P EST LOW 20 MIN: CPT | Performed by: FAMILY MEDICINE

## 2018-04-06 RX ORDER — BENZONATATE 100 MG/1
100 CAPSULE ORAL 3 TIMES DAILY PRN
Qty: 30 CAPSULE | Refills: 0 | Status: SHIPPED | OUTPATIENT
Start: 2018-04-06 | End: 2018-06-04

## 2018-04-06 RX ORDER — LEVOFLOXACIN 500 MG/1
500 TABLET, FILM COATED ORAL EVERY 24 HOURS
Qty: 10 TABLET | Refills: 0 | Status: SHIPPED | OUTPATIENT
Start: 2018-04-06 | End: 2018-04-16

## 2018-04-06 NOTE — PROGRESS NOTES
Assessment/Plan:    No problem-specific Assessment & Plan notes found for this encounter  Diagnoses and all orders for this visit:    Acute non-recurrent maxillary sinusitis  -     levofloxacin (LEVAQUIN) 500 mg tablet; Take 1 tablet (500 mg total) by mouth every 24 hours for 10 days    Acute URI  -     benzonatate (TESSALON PERLES) 100 mg capsule; Take 1 capsule (100 mg total) by mouth 3 (three) times a day as needed for cough      I suspect that the patient has a bacterial sinusitis  I prescribed antibiotics and encouraged medication for sx relief  Rest and fluids encouraged as well  Subjective:      Patient ID: Phil Schwab is a 46 y o  female  The pt is here because she has been sick for 5 days  Always feels terrible on Mondays - works over the weekend at night  but has gotten worse over the rest of the week  + sinus pain/pressure - teeth hurt  + ear pain and pressure  + cough, she has taken tessalon perles at night - dry cough  + nasal congestion  + headaches  + PND  very sore throat, feels it looks terrible when she looks  No fevers          The following portions of the patient's history were reviewed and updated as appropriate: allergies, current medications, past family history, past medical history, past social history, past surgical history and problem list     Review of Systems      Objective:  Vitals:    04/06/18 1143   BP: 102/60   Pulse: 66   Resp: 14   Temp: 97 9 °F (36 6 °C)   Height: 5' 3" (1 6 m)      Physical Exam   Constitutional: She is oriented to person, place, and time  Vital signs are normal  She appears well-developed and well-nourished  She appears ill  HENT:   Head: Normocephalic and atraumatic  Right Ear: Tympanic membrane and external ear normal    Left Ear: Tympanic membrane and external ear normal    Nose: Mucosal edema and sinus tenderness present  No rhinorrhea  Right sinus exhibits maxillary sinus tenderness and frontal sinus tenderness   Left sinus exhibits maxillary sinus tenderness and frontal sinus tenderness  Mouth/Throat: Mucous membranes are normal  Posterior oropharyngeal erythema present  No oropharyngeal exudate, posterior oropharyngeal edema or tonsillar abscesses  Eyes: Conjunctivae and lids are normal    Pulmonary/Chest: Effort normal and breath sounds normal    Lymphadenopathy:     She has cervical adenopathy  Neurological: She is alert and oriented to person, place, and time  Skin: Skin is warm, dry and intact  Psychiatric: She has a normal mood and affect   Thought content normal

## 2018-04-06 NOTE — PATIENT INSTRUCTIONS
Rhinosinusitis   WHAT YOU NEED TO KNOW:   Rhinosinusitis (RS) is inflammation of your nose and sinuses  It commonly begins as a virus, often as a common cold  Viruses usually last 7 to 10 days and do not need treatment  When the virus does not get better on its own, you may have bacterial RS  This means that bacteria have begun to grow inside your sinuses  Acute RS lasts less than 4 weeks  Chronic RS lasts 12 weeks or more  Recurrent RS is when you have 4 or more episodes of RS in one year  DISCHARGE INSTRUCTIONS:   Return to the emergency department if:   · Your eye and eyelid are red, swollen, and painful  · You cannot open your eye  · You have double vision or you cannot see  · Your eyeball bulges out or you cannot move your eye  · You are more sleepy than normal or you notice changes in your ability to think, move, or talk  · You have a stiff neck, a fever, or a bad headache  · You have swelling of your forehead or scalp  Contact your healthcare provider if:   · Your symptoms are worse or do not improve after 3 to 5 days of treatment  · You have questions or concerns about your condition or care  Medicines: You may need any of the following:  · Acetaminophen  decreases pain and fever  It is available without a doctor's order  Ask how much to take and how often to take it  Follow directions  Acetaminophen can cause liver damage if not taken correctly  · NSAIDs , such as ibuprofen, help decrease swelling, pain, and fever  This medicine is available with or without a doctor's order  NSAIDs can cause stomach bleeding or kidney problems in certain people  If you take blood thinner medicine, always ask your healthcare provider if NSAIDs are safe for you  Always read the medicine label and follow directions  · Nasal steroid sprays  decrease inflammation in your nose and sinuses  · Decongestants  reduce swelling and drain mucus in the nose and sinuses   They may help you breathe easier  · Antihistamines  dry mucus in the nose and relieve sneezing  · Antibiotics  treat a bacterial infection and may be needed if your symptoms do not improve or they get worse  · Take your medicine as directed  Contact your healthcare provider if you think your medicine is not helping or if you have side effects  Tell him or her if you are allergic to any medicine  Keep a list of the medicines, vitamins, and herbs you take  Include the amounts, and when and why you take them  Bring the list or the pill bottles to follow-up visits  Carry your medicine list with you in case of an emergency  Self-care:   · Rinse your sinuses  Use a sinus rinse device to rinse your nasal passages with a saline (salt water) solution  This will help thin the mucus in your nose and rinse away pollen and dirt  It will also help reduce swelling so you can breathe normally  Ask your healthcare provider how often to do this  · Breathe in steam   Heat a bowl of water until you see steam  Lean over the bowl and make a tent over your head with a large towel  Breathe deeply for about 20 minutes  Be careful not to get too close to the steam or burn yourself  Do this 3 times a day  You can also breathe deeply when you take a hot shower  · Sleep with your head elevated  Place an extra pillow under your head before you go to sleep to help your sinuses drain  · Drink liquids as directed  Ask your healthcare provider how much liquid to drink each day and which liquids are best for you  Liquids will thin the mucus in your nose and help it drain  Avoid drinks that contain alcohol or caffeine  · Do not smoke, and avoid secondhand smoke  Nicotine and other chemicals in cigarettes and cigars can make your symptoms worse  Ask your healthcare provider for information if you currently smoke and need help to quit  E-cigarettes or smokeless tobacco still contain nicotine   Talk to your healthcare provider before you use these products  Follow up with your healthcare provider as directed: Follow up if your symptoms are worse or not better after 3 to 5 days of treatment  Write down your questions so you remember to ask them during your visits  © 2017 [employer] Information is for End User's use only and may not be sold, redistributed or otherwise used for commercial purposes  All illustrations and images included in CareNotes® are the copyrighted property of A [employer] Inc  or [employer]  The above information is an  only  It is not intended as medical advice for individual conditions or treatments  Talk to your doctor, nurse or pharmacist before following any medical regimen to see if it is safe and effective for you

## 2018-04-18 ENCOUNTER — TRANSCRIBE ORDERS (OUTPATIENT)
Dept: LAB | Facility: HOSPITAL | Age: 53
End: 2018-04-18

## 2018-04-18 ENCOUNTER — APPOINTMENT (OUTPATIENT)
Dept: LAB | Facility: HOSPITAL | Age: 53
End: 2018-04-18
Payer: COMMERCIAL

## 2018-04-18 LAB
ALBUMIN SERPL BCP-MCNC: 3.9 G/DL (ref 3.5–5)
ALP SERPL-CCNC: 113 U/L (ref 46–116)
ALT SERPL W P-5'-P-CCNC: 16 U/L (ref 12–78)
ANION GAP SERPL CALCULATED.3IONS-SCNC: 7 MMOL/L (ref 4–13)
AST SERPL W P-5'-P-CCNC: 18 U/L (ref 5–45)
BASOPHILS # BLD AUTO: 0.02 THOUSANDS/ΜL (ref 0–0.1)
BASOPHILS NFR BLD AUTO: 0 % (ref 0–1)
BILIRUB SERPL-MCNC: 0.38 MG/DL (ref 0.2–1)
BUN SERPL-MCNC: 13 MG/DL (ref 5–25)
CALCIUM ALBUM COR SERPL-MCNC: 10.3 MG/DL (ref 8.3–10.1)
CALCIUM SERPL-MCNC: 10.2 MG/DL (ref 8.3–10.1)
CHLORIDE SERPL-SCNC: 108 MMOL/L (ref 100–108)
CO2 SERPL-SCNC: 25 MMOL/L (ref 21–32)
CREAT SERPL-MCNC: 0.76 MG/DL (ref 0.6–1.3)
CRP SERPL QL: <3 MG/L
EOSINOPHIL # BLD AUTO: 0.21 THOUSAND/ΜL (ref 0–0.61)
EOSINOPHIL NFR BLD AUTO: 3 % (ref 0–6)
ERYTHROCYTE [DISTWIDTH] IN BLOOD BY AUTOMATED COUNT: 13.7 % (ref 11.6–15.1)
ERYTHROCYTE [SEDIMENTATION RATE] IN BLOOD: 13 MM/HOUR (ref 0–20)
GFR SERPL CREATININE-BSD FRML MDRD: 90 ML/MIN/1.73SQ M
GLUCOSE SERPL-MCNC: 90 MG/DL (ref 65–140)
HCT VFR BLD AUTO: 38.7 % (ref 34.8–46.1)
HGB BLD-MCNC: 12.4 G/DL (ref 11.5–15.4)
LYMPHOCYTES # BLD AUTO: 3.12 THOUSANDS/ΜL (ref 0.6–4.47)
LYMPHOCYTES NFR BLD AUTO: 37 % (ref 14–44)
MCH RBC QN AUTO: 29.1 PG (ref 26.8–34.3)
MCHC RBC AUTO-ENTMCNC: 32 G/DL (ref 31.4–37.4)
MCV RBC AUTO: 91 FL (ref 82–98)
MONOCYTES # BLD AUTO: 0.61 THOUSAND/ΜL (ref 0.17–1.22)
MONOCYTES NFR BLD AUTO: 7 % (ref 4–12)
NEUTROPHILS # BLD AUTO: 4.54 THOUSANDS/ΜL (ref 1.85–7.62)
NEUTS SEG NFR BLD AUTO: 53 % (ref 43–75)
NRBC BLD AUTO-RTO: 0 /100 WBCS
PLATELET # BLD AUTO: 409 THOUSANDS/UL (ref 149–390)
PMV BLD AUTO: 10.2 FL (ref 8.9–12.7)
POTASSIUM SERPL-SCNC: 3.8 MMOL/L (ref 3.5–5.3)
PROT SERPL-MCNC: 7.6 G/DL (ref 6.4–8.2)
RBC # BLD AUTO: 4.26 MILLION/UL (ref 3.81–5.12)
SODIUM SERPL-SCNC: 140 MMOL/L (ref 136–145)
WBC # BLD AUTO: 8.51 THOUSAND/UL (ref 4.31–10.16)

## 2018-04-18 PROCEDURE — 36415 COLL VENOUS BLD VENIPUNCTURE: CPT | Performed by: PHYSICIAN ASSISTANT

## 2018-04-18 PROCEDURE — 85025 COMPLETE CBC W/AUTO DIFF WBC: CPT | Performed by: PHYSICIAN ASSISTANT

## 2018-04-18 PROCEDURE — 86140 C-REACTIVE PROTEIN: CPT | Performed by: PHYSICIAN ASSISTANT

## 2018-04-18 PROCEDURE — 80053 COMPREHEN METABOLIC PANEL: CPT | Performed by: PHYSICIAN ASSISTANT

## 2018-04-18 PROCEDURE — 85652 RBC SED RATE AUTOMATED: CPT | Performed by: PHYSICIAN ASSISTANT

## 2018-04-25 DIAGNOSIS — F51.04 CHRONIC INSOMNIA: ICD-10-CM

## 2018-04-25 DIAGNOSIS — E03.9 ACQUIRED HYPOTHYROIDISM: Primary | ICD-10-CM

## 2018-04-26 DIAGNOSIS — F51.04 CHRONIC INSOMNIA: Primary | ICD-10-CM

## 2018-04-26 DIAGNOSIS — E03.9 ACQUIRED HYPOTHYROIDISM: ICD-10-CM

## 2018-04-26 RX ORDER — LEVOTHYROXINE SODIUM 0.03 MG/1
25 TABLET ORAL DAILY
Qty: 90 TABLET | Refills: 1 | Status: CANCELLED | OUTPATIENT
Start: 2018-04-26

## 2018-04-26 RX ORDER — ZOLPIDEM TARTRATE 10 MG/1
10 TABLET ORAL
Qty: 30 TABLET | Refills: 0 | Status: CANCELLED | OUTPATIENT
Start: 2018-04-26

## 2018-04-27 RX ORDER — LEVOTHYROXINE SODIUM 0.03 MG/1
25 TABLET ORAL DAILY
Qty: 90 TABLET | Refills: 3 | Status: SHIPPED | OUTPATIENT
Start: 2018-04-27 | End: 2019-07-24 | Stop reason: SDUPTHER

## 2018-04-27 RX ORDER — ZOLPIDEM TARTRATE 10 MG/1
10 TABLET ORAL
Qty: 90 TABLET | Refills: 0 | Status: SHIPPED | OUTPATIENT
Start: 2018-04-27 | End: 2018-11-12 | Stop reason: SDUPTHER

## 2018-06-04 ENCOUNTER — OFFICE VISIT (OUTPATIENT)
Dept: GASTROENTEROLOGY | Facility: CLINIC | Age: 53
End: 2018-06-04
Payer: COMMERCIAL

## 2018-06-04 VITALS
DIASTOLIC BLOOD PRESSURE: 75 MMHG | HEART RATE: 63 BPM | SYSTOLIC BLOOD PRESSURE: 119 MMHG | TEMPERATURE: 97.9 F | HEIGHT: 63 IN

## 2018-06-04 DIAGNOSIS — E55.9 VITAMIN D DEFICIENCY: Primary | ICD-10-CM

## 2018-06-04 PROCEDURE — 99213 OFFICE O/P EST LOW 20 MIN: CPT | Performed by: INTERNAL MEDICINE

## 2018-06-04 NOTE — LETTER
June 5, 2018     Edni Carmichael, [address] [address]    Patient: Lor Garcia   YOB: 1965   Date of Visit: 6/4/2018       Dear Dr Jorge Flair: Thank you for referring Brianna Hopkins to me for evaluation  Below are my notes for this consultation  If you have questions, please do not hesitate to call me  I look forward to following your patient along with you           Sincerely,        Tono Peralta DO        CC: No Recipients

## 2018-06-05 NOTE — PROGRESS NOTES
[facility] - Outpatient Follow-up Note  Quentin Ferris 46 y o  female MRN: [id number]  Encounter: [id number]          ASSESSMENT AND PLAN:      1  pancolitis due to UC  -now improved on humira, she is compliant with her medication and grateful for my care     2  Vit D def  -continue vit d    Follow up in six months time      ______________________________________________________________________    SUBJECTIVE:  Pt here for follow up  Doing well now  BM are more formed and denies rectal bleeding  REVIEW OF SYSTEMS IS OTHERWISE NEGATIVE  Historical Information   Past Medical History:   Diagnosis Date    Anemia     HX of    Asthma     mild - intermittent    Bilateral leg edema     Colitis, acute     Dyspareunia in female     History of transfusion     Hypokalemia     Hypothyroidism     Hypothyroidism     Insomnia     Nontraumatic tear of left tibialis posterior tendon     Traumatic teart     Raynaud's disease     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 [address] [facility]     [facility]cerative colitis [facility] [facility]     Ulcerative colitis ([facility] [address] [facility]      Past Surgical History:   Procedure Laterality Date    CARPAL TUNNEL RELEASE Bilateral      SECTION, LOW TRANSVERSE      HERNIA REPAIR      umbilical    HYSTERECTOMY      KNEE ARTHROSCOPY Right     LIPOMA RESECTION      NC COLONOSCOPY FLX DX W/COLLJ SPEC WHEN PFRMD N/A 2016    Procedure: COLONOSCOPY;  Surgeon: Jean Pierre Emerson DO;  Location: [facility] GI LAB;   Service: Gastroenterology    NC REPAIR FLEX LEG TENDON,SECOND,EA Left 2016    Procedure: REPAIR OF LEFT POSTERIOR TIBIAL TENDON WITH GRAFT, EXPLORATION LEFT ANKLE ;  Surgeon: Selma Jacinto DPM;  Location: [facility] OR;  Service: Podiatry    SHOULDER SURGERY Left     bicep tendon and labrum repair    TONSILLECTOMY       Social History   History   Alcohol Use No     History   Drug Use No     History   Smoking Status    Former Smoker    Quit date: 2003   Smokeless Tobacco    Never Used     No family history on file  Meds/Allergies       Current Outpatient Prescriptions:     adalimumab (HUMIRA) 40 mg/0 8 mL PSKT    albuterol (ACCUNEB) 1 25 MG/3ML nebulizer solution    furosemide (LASIX) 40 mg tablet    levothyroxine 25 mcg tablet    tacrolimus (PROTOPIC) 0 03 % ointment    zolpidem (AMBIEN) 10 mg tablet    No Known Allergies        Objective     Blood pressure 119/75, pulse 63, temperature 97 9 °F (36 6 °C), temperature source Tympanic, height 5' 3" (1 6 m)  There is no height or weight on file to calculate BMI  PHYSICAL EXAM:      General Appearance:   Alert, cooperative, no distress   HEENT:   Normocephalic, atraumatic, anicteric      Neck:  Supple, symmetrical, trachea midline   Lungs:   Clear to auscultation bilaterally; no rales, rhonchi or wheezing; respirations unlabored    Heart[de-identified]   Regular rate and rhythm; no murmur, rub, or gallop  Abdomen:   Soft, non-tender, non-distended; normal bowel sounds; no masses, no organomegaly    Genitalia:   Deferred    Rectal:   Deferred    Extremities:  No cyanosis, clubbing or edema    Pulses:  2+ and symmetric    Skin:  No jaundice, rashes, or lesions    Lymph nodes:  No palpable cervical lymphadenopathy        Lab Results:   No visits with results within 1 Day(s) from this visit     Latest known visit with results is:   Office Visit on 01/31/2018   Component Date Value    WBC 04/18/2018 8 51     RBC 04/18/2018 4 26     Hemoglobin 04/18/2018 12 4     Hematocrit 04/18/2018 38 7     MCV 04/18/2018 91     MCH 04/18/2018 29 1     MCHC 04/18/2018 32 0     RDW 04/18/2018 13 7     MPV 04/18/2018 10 2     Platelets 17/51/7095 409*    nRBC 04/18/2018 0     Neutrophils Relative 04/18/2018 53     Lymphocytes Relative 04/18/2018 37     Monocytes Relative 04/18/2018 7     Eosinophils Relative 04/18/2018 3     Basophils Relative 04/18/2018 0     Neutrophils Absolute 04/18/2018 4 54     Lymphocytes Absolute 04/18/2018 3 12  Monocytes Absolute 04/18/2018 0 61     Eosinophils Absolute 04/18/2018 0 21     Basophils Absolute 04/18/2018 0 02     Sodium 04/18/2018 140     Potassium 04/18/2018 3 8     Chloride 04/18/2018 108     CO2 04/18/2018 25     Anion Gap 04/18/2018 7     BUN 04/18/2018 13     Creatinine 04/18/2018 0 76     Glucose 04/18/2018 90     Calcium 04/18/2018 10 2*    Corrected Calcium 04/18/2018 10 3*    AST 04/18/2018 18     ALT 04/18/2018 16     Alkaline Phosphatase 04/18/2018 113     Total Protein 04/18/2018 7 6     Albumin 04/18/2018 3 9     Total Bilirubin 04/18/2018 0 38     eGFR 04/18/2018 90     CRP 04/18/2018 <3 0     Sed Rate 04/18/2018 13          Radiology Results:   No results found

## 2018-06-13 ENCOUNTER — APPOINTMENT (OUTPATIENT)
Dept: LAB | Facility: HOSPITAL | Age: 53
End: 2018-06-13
Payer: COMMERCIAL

## 2018-06-13 ENCOUNTER — TRANSCRIBE ORDERS (OUTPATIENT)
Dept: ADMINISTRATIVE | Facility: HOSPITAL | Age: 53
End: 2018-06-13

## 2018-06-13 DIAGNOSIS — E55.9 AVITAMINOSIS D: ICD-10-CM

## 2018-06-13 DIAGNOSIS — E83.52 HYPERCALCEMIA: ICD-10-CM

## 2018-06-13 DIAGNOSIS — E21.3 HYPERPARATHYROIDISM, UNSPECIFIED (HCC): ICD-10-CM

## 2018-06-13 DIAGNOSIS — E55.9 VITAMIN D DEFICIENCY: ICD-10-CM

## 2018-06-13 DIAGNOSIS — E83.52 HYPERCALCEMIA: Primary | ICD-10-CM

## 2018-06-13 DIAGNOSIS — Z00.8 HEALTH EXAMINATION IN POPULATION SURVEY: Primary | ICD-10-CM

## 2018-06-13 DIAGNOSIS — Z00.8 HEALTH EXAMINATION IN POPULATION SURVEY: ICD-10-CM

## 2018-06-13 LAB
25(OH)D3 SERPL-MCNC: 18.9 NG/ML (ref 30–100)
ANION GAP SERPL CALCULATED.3IONS-SCNC: 7 MMOL/L (ref 4–13)
BUN SERPL-MCNC: 14 MG/DL (ref 5–25)
CALCIUM SERPL-MCNC: 9.8 MG/DL (ref 8.3–10.1)
CHLORIDE SERPL-SCNC: 106 MMOL/L (ref 100–108)
CHOLEST SERPL-MCNC: 190 MG/DL (ref 50–200)
CO2 SERPL-SCNC: 28 MMOL/L (ref 21–32)
CREAT SERPL-MCNC: 0.85 MG/DL (ref 0.6–1.3)
EST. AVERAGE GLUCOSE BLD GHB EST-MCNC: 111 MG/DL
GFR SERPL CREATININE-BSD FRML MDRD: 79 ML/MIN/1.73SQ M
GLUCOSE P FAST SERPL-MCNC: 100 MG/DL (ref 65–99)
HBA1C MFR BLD: 5.5 % (ref 4.2–6.3)
HDLC SERPL-MCNC: 101 MG/DL (ref 40–60)
LDLC SERPL CALC-MCNC: 81 MG/DL (ref 0–100)
MAGNESIUM SERPL-MCNC: 1.8 MG/DL (ref 1.6–2.6)
NONHDLC SERPL-MCNC: 89 MG/DL
PHOSPHATE SERPL-MCNC: 2.8 MG/DL (ref 2.7–4.5)
POTASSIUM SERPL-SCNC: 3.9 MMOL/L (ref 3.5–5.3)
PTH-INTACT SERPL-MCNC: 66 PG/ML (ref 18.4–80.1)
SODIUM SERPL-SCNC: 141 MMOL/L (ref 136–145)
TRIGL SERPL-MCNC: 40 MG/DL

## 2018-06-13 PROCEDURE — 80048 BASIC METABOLIC PNL TOTAL CA: CPT

## 2018-06-13 PROCEDURE — 83970 ASSAY OF PARATHORMONE: CPT

## 2018-06-13 PROCEDURE — 83735 ASSAY OF MAGNESIUM: CPT

## 2018-06-13 PROCEDURE — 82306 VITAMIN D 25 HYDROXY: CPT

## 2018-06-13 PROCEDURE — 83036 HEMOGLOBIN GLYCOSYLATED A1C: CPT

## 2018-06-13 PROCEDURE — 36415 COLL VENOUS BLD VENIPUNCTURE: CPT

## 2018-06-13 PROCEDURE — 80061 LIPID PANEL: CPT

## 2018-06-13 PROCEDURE — 84100 ASSAY OF PHOSPHORUS: CPT

## 2018-06-14 ENCOUNTER — APPOINTMENT (OUTPATIENT)
Dept: LAB | Facility: HOSPITAL | Age: 53
End: 2018-06-14
Payer: COMMERCIAL

## 2018-06-14 DIAGNOSIS — E21.3 HYPERPARATHYROIDISM, UNSPECIFIED (HCC): ICD-10-CM

## 2018-06-14 DIAGNOSIS — E83.52 HYPERCALCEMIA: ICD-10-CM

## 2018-06-14 DIAGNOSIS — E55.9 AVITAMINOSIS D: ICD-10-CM

## 2018-06-14 LAB
CALCIUM 24H UR-MCNC: 363 MG/24 HRS (ref 42–353)
SPECIMEN VOL UR: 3000 ML

## 2018-06-14 PROCEDURE — 82340 ASSAY OF CALCIUM IN URINE: CPT

## 2018-06-25 ENCOUNTER — TELEPHONE (OUTPATIENT)
Dept: GASTROENTEROLOGY | Facility: MEDICAL CENTER | Age: 53
End: 2018-06-25

## 2018-06-25 DIAGNOSIS — E55.9 VITAMIN D DEFICIENCY: ICD-10-CM

## 2018-06-25 DIAGNOSIS — Z98.84 GASTRIC BYPASS STATUS FOR OBESITY: Primary | ICD-10-CM

## 2018-08-21 ENCOUNTER — TRANSCRIBE ORDERS (OUTPATIENT)
Dept: GASTROENTEROLOGY | Facility: CLINIC | Age: 53
End: 2018-08-21

## 2018-08-21 ENCOUNTER — TELEPHONE (OUTPATIENT)
Dept: GASTROENTEROLOGY | Facility: CLINIC | Age: 53
End: 2018-08-21

## 2018-08-21 DIAGNOSIS — E55.9 AVITAMINOSIS D: Primary | ICD-10-CM

## 2018-08-21 DIAGNOSIS — E21.3 HYPERPARATHYROIDISM, UNSPECIFIED (HCC): ICD-10-CM

## 2018-08-21 NOTE — TELEPHONE ENCOUNTER
Received a request from [facility] for  medical records sent request via faxed to [facility] on 8/21/18 when info was received in the [place] office

## 2018-09-06 ENCOUNTER — APPOINTMENT (OUTPATIENT)
Dept: LAB | Facility: HOSPITAL | Age: 53
End: 2018-09-06
Payer: COMMERCIAL

## 2018-09-06 ENCOUNTER — TRANSCRIBE ORDERS (OUTPATIENT)
Dept: ADMINISTRATIVE | Facility: HOSPITAL | Age: 53
End: 2018-09-06

## 2018-09-06 ENCOUNTER — TELEPHONE (OUTPATIENT)
Dept: GASTROENTEROLOGY | Facility: CLINIC | Age: 53
End: 2018-09-06

## 2018-09-06 DIAGNOSIS — Z03.89 OBSERVATION FOR SUSPECTED GENETIC OR METABOLIC CONDITION: ICD-10-CM

## 2018-09-06 DIAGNOSIS — E21.3 HYPERPARATHYROIDISM, UNSPECIFIED (HCC): ICD-10-CM

## 2018-09-06 DIAGNOSIS — E55.9 AVITAMINOSIS D: Primary | ICD-10-CM

## 2018-09-06 DIAGNOSIS — E55.9 AVITAMINOSIS D: ICD-10-CM

## 2018-09-06 LAB
25(OH)D3 SERPL-MCNC: 29 NG/ML (ref 30–100)
CA-I BLD-SCNC: 1.39 MMOL/L (ref 1.12–1.32)
EST. AVERAGE GLUCOSE BLD GHB EST-MCNC: 114 MG/DL
GLUCOSE P FAST SERPL-MCNC: 87 MG/DL (ref 65–99)
HBA1C MFR BLD: 5.6 % (ref 4.2–6.3)
PTH-INTACT SERPL-MCNC: 56.4 PG/ML (ref 18.4–80.1)

## 2018-09-06 PROCEDURE — 82306 VITAMIN D 25 HYDROXY: CPT

## 2018-09-06 PROCEDURE — 83036 HEMOGLOBIN GLYCOSYLATED A1C: CPT

## 2018-09-06 PROCEDURE — 82947 ASSAY GLUCOSE BLOOD QUANT: CPT

## 2018-09-06 PROCEDURE — 36415 COLL VENOUS BLD VENIPUNCTURE: CPT

## 2018-09-06 PROCEDURE — 83970 ASSAY OF PARATHORMONE: CPT

## 2018-09-06 PROCEDURE — 82330 ASSAY OF CALCIUM: CPT

## 2018-09-06 NOTE — TELEPHONE ENCOUNTER
Patient called back stating that she does not want her medical records forwarded to [facility] on 9/6/18 she is calling them to let them know

## 2018-09-06 NOTE — TELEPHONE ENCOUNTER
Governor Rounds from [facility] called in to check the status of the request for medical records   Phone # [phone] reference Case ID # [id number]

## 2018-09-06 NOTE — TELEPHONE ENCOUNTER
Clifford for patient regarding her medical records that she needed the status on let patient know that they were forwarded to [facility] on 8/21/18 and they have 30 days from the day they received them to forward information

## 2018-09-17 ENCOUNTER — TRANSCRIBE ORDERS (OUTPATIENT)
Dept: ADMINISTRATIVE | Facility: HOSPITAL | Age: 53
End: 2018-09-17

## 2018-09-17 DIAGNOSIS — E83.50 DISORDER OF CALCIUM METABOLISM: ICD-10-CM

## 2018-09-17 DIAGNOSIS — E21.3 HYPERPARATHYROIDISM, UNSPECIFIED (HCC): Primary | ICD-10-CM

## 2018-09-18 ENCOUNTER — HOSPITAL ENCOUNTER (OUTPATIENT)
Dept: BONE DENSITY | Facility: IMAGING CENTER | Age: 53
Discharge: HOME | End: 2018-09-18
Payer: COMMERCIAL

## 2018-09-18 DIAGNOSIS — E21.3 HYPERPARATHYROIDISM, UNSPECIFIED (HCC): ICD-10-CM

## 2018-09-18 PROCEDURE — 77080 DXA BONE DENSITY AXIAL: CPT

## 2018-09-28 ENCOUNTER — HOSPITAL ENCOUNTER (OUTPATIENT)
Dept: ULTRASOUND IMAGING | Facility: HOSPITAL | Age: 53
Discharge: HOME | End: 2018-09-28
Payer: COMMERCIAL

## 2018-09-28 ENCOUNTER — HOSPITAL ENCOUNTER (OUTPATIENT)
Dept: NUCLEAR MEDICINE | Facility: HOSPITAL | Age: 53
Discharge: HOME | End: 2018-09-28
Payer: COMMERCIAL

## 2018-09-28 DIAGNOSIS — E83.50 DISORDER OF CALCIUM METABOLISM: ICD-10-CM

## 2018-09-28 DIAGNOSIS — E21.3 HYPERPARATHYROIDISM, UNSPECIFIED (HCC): ICD-10-CM

## 2018-09-28 PROCEDURE — 78071 PARATHYRD PLANAR W/WO SUBTRJ: CPT

## 2018-09-28 PROCEDURE — 76770 US EXAM ABDO BACK WALL COMP: CPT

## 2018-09-28 PROCEDURE — A9500 TC99M SESTAMIBI: HCPCS

## 2018-10-25 ENCOUNTER — TRANSCRIBE ORDERS (OUTPATIENT)
Dept: ADMINISTRATIVE | Facility: HOSPITAL | Age: 53
End: 2018-10-25

## 2018-10-25 DIAGNOSIS — E21.3 HYPERPARATHYROIDISM (HCC): Primary | ICD-10-CM

## 2018-10-25 DIAGNOSIS — E83.52 HYPERCALCEMIA: ICD-10-CM

## 2018-10-25 DIAGNOSIS — E55.9 VITAMIN D DEFICIENCY: ICD-10-CM

## 2018-10-26 DIAGNOSIS — M47.819 SPONDYLOARTHROPATHY: Primary | ICD-10-CM

## 2018-10-26 RX ORDER — ADALIMUMAB 40MG/0.8ML
KIT SUBCUTANEOUS
Qty: 4 EACH | Refills: 0 | Status: SHIPPED | OUTPATIENT
Start: 2018-10-26 | End: 2018-11-25 | Stop reason: HOSPADM

## 2018-10-26 NOTE — TELEPHONE ENCOUNTER
Patient needs her humera sent to the [facility]  Patient has been dealing with Leela Saavedra at [phone]  She took her last [place] today  Does patient need a follow up appt it has been a year since she was seen  Thanks

## 2018-10-26 NOTE — TELEPHONE ENCOUNTER
Jan Arvizu is assisting in the scheduling of this patient  Patient made aware Axel Nicole approved for refill

## 2018-10-29 ENCOUNTER — HOSPITAL ENCOUNTER (OUTPATIENT)
Dept: ULTRASOUND IMAGING | Facility: HOSPITAL | Age: 53
Discharge: HOME | End: 2018-10-29
Attending: SPECIALIST
Payer: COMMERCIAL

## 2018-10-29 DIAGNOSIS — E83.52 HYPERCALCEMIA: ICD-10-CM

## 2018-10-29 DIAGNOSIS — E21.3 HYPERPARATHYROIDISM (HCC): ICD-10-CM

## 2018-10-29 DIAGNOSIS — E55.9 VITAMIN D DEFICIENCY: ICD-10-CM

## 2018-10-29 PROCEDURE — 76536 US EXAM OF HEAD AND NECK: CPT

## 2018-11-01 ENCOUNTER — TRANSCRIBE ORDERS (OUTPATIENT)
Dept: ADMINISTRATIVE | Facility: HOSPITAL | Age: 53
End: 2018-11-01

## 2018-11-01 ENCOUNTER — APPOINTMENT (OUTPATIENT)
Dept: LAB | Facility: HOSPITAL | Age: 53
End: 2018-11-01
Payer: COMMERCIAL

## 2018-11-01 ENCOUNTER — OFFICE VISIT (OUTPATIENT)
Dept: GASTROENTEROLOGY | Facility: CLINIC | Age: 53
End: 2018-11-01
Payer: COMMERCIAL

## 2018-11-01 ENCOUNTER — TELEPHONE (OUTPATIENT)
Dept: GASTROENTEROLOGY | Facility: CLINIC | Age: 53
End: 2018-11-01

## 2018-11-01 VITALS
SYSTOLIC BLOOD PRESSURE: 117 MMHG | BODY MASS INDEX: 27.29 KG/M2 | HEART RATE: 73 BPM | TEMPERATURE: 99.2 F | HEIGHT: 63 IN | WEIGHT: 154 LBS | DIASTOLIC BLOOD PRESSURE: 72 MMHG

## 2018-11-01 DIAGNOSIS — E83.52 HYPERCALCEMIA: ICD-10-CM

## 2018-11-01 DIAGNOSIS — K51.00 ULCERATIVE PANCOLITIS WITHOUT COMPLICATION (HCC): ICD-10-CM

## 2018-11-01 DIAGNOSIS — E21.3 HYPERPARATHYROIDISM (HCC): Primary | ICD-10-CM

## 2018-11-01 DIAGNOSIS — E55.9 VITAMIN D DEFICIENCY: ICD-10-CM

## 2018-11-01 DIAGNOSIS — K92.1 HEMATOCHEZIA: ICD-10-CM

## 2018-11-01 DIAGNOSIS — E05.90 HYPERTHYROIDISM: Primary | ICD-10-CM

## 2018-11-01 DIAGNOSIS — K51.00 ULCERATIVE PANCOLITIS WITHOUT COMPLICATION (HCC): Primary | ICD-10-CM

## 2018-11-01 LAB
ALBUMIN SERPL BCP-MCNC: 3 G/DL (ref 3.5–5)
ALP SERPL-CCNC: 88 U/L (ref 46–116)
ALT SERPL W P-5'-P-CCNC: 12 U/L (ref 12–78)
ANION GAP SERPL CALCULATED.3IONS-SCNC: 7 MMOL/L (ref 4–13)
AST SERPL W P-5'-P-CCNC: 11 U/L (ref 5–45)
BASOPHILS # BLD AUTO: 0.05 THOUSANDS/ΜL (ref 0–0.1)
BASOPHILS NFR BLD AUTO: 1 % (ref 0–1)
BILIRUB SERPL-MCNC: 0.21 MG/DL (ref 0.2–1)
BUN SERPL-MCNC: 13 MG/DL (ref 5–25)
CALCIUM ALBUM COR SERPL-MCNC: 11.1 MG/DL (ref 8.3–10.1)
CALCIUM SERPL-MCNC: 10.3 MG/DL (ref 8.3–10.1)
CHLORIDE SERPL-SCNC: 106 MMOL/L (ref 100–108)
CO2 SERPL-SCNC: 26 MMOL/L (ref 21–32)
CREAT SERPL-MCNC: 0.68 MG/DL (ref 0.6–1.3)
CRP SERPL QL: 39.9 MG/L
EOSINOPHIL # BLD AUTO: 0.35 THOUSAND/ΜL (ref 0–0.61)
EOSINOPHIL NFR BLD AUTO: 3 % (ref 0–6)
ERYTHROCYTE [DISTWIDTH] IN BLOOD BY AUTOMATED COUNT: 12.2 % (ref 11.6–15.1)
GFR SERPL CREATININE-BSD FRML MDRD: 100 ML/MIN/1.73SQ M
GLUCOSE P FAST SERPL-MCNC: 90 MG/DL (ref 65–99)
HCT VFR BLD AUTO: 35.1 % (ref 34.8–46.1)
HGB BLD-MCNC: 11 G/DL (ref 11.5–15.4)
IMM GRANULOCYTES # BLD AUTO: 0.04 THOUSAND/UL (ref 0–0.2)
IMM GRANULOCYTES NFR BLD AUTO: 0 % (ref 0–2)
LYMPHOCYTES # BLD AUTO: 2.88 THOUSANDS/ΜL (ref 0.6–4.47)
LYMPHOCYTES NFR BLD AUTO: 28 % (ref 14–44)
MCH RBC QN AUTO: 29.2 PG (ref 26.8–34.3)
MCHC RBC AUTO-ENTMCNC: 31.3 G/DL (ref 31.4–37.4)
MCV RBC AUTO: 93 FL (ref 82–98)
MONOCYTES # BLD AUTO: 0.71 THOUSAND/ΜL (ref 0.17–1.22)
MONOCYTES NFR BLD AUTO: 7 % (ref 4–12)
NEUTROPHILS # BLD AUTO: 6.12 THOUSANDS/ΜL (ref 1.85–7.62)
NEUTS SEG NFR BLD AUTO: 61 % (ref 43–75)
NRBC BLD AUTO-RTO: 0 /100 WBCS
PLATELET # BLD AUTO: 440 THOUSANDS/UL (ref 149–390)
PMV BLD AUTO: 9.6 FL (ref 8.9–12.7)
POTASSIUM SERPL-SCNC: 3.9 MMOL/L (ref 3.5–5.3)
PROT SERPL-MCNC: 7 G/DL (ref 6.4–8.2)
RBC # BLD AUTO: 3.77 MILLION/UL (ref 3.81–5.12)
SODIUM SERPL-SCNC: 139 MMOL/L (ref 136–145)
WBC # BLD AUTO: 10.15 THOUSAND/UL (ref 4.31–10.16)

## 2018-11-01 PROCEDURE — 99213 OFFICE O/P EST LOW 20 MIN: CPT | Performed by: PHYSICIAN ASSISTANT

## 2018-11-01 PROCEDURE — 87493 C DIFF AMPLIFIED PROBE: CPT | Performed by: PHYSICIAN ASSISTANT

## 2018-11-01 PROCEDURE — 36415 COLL VENOUS BLD VENIPUNCTURE: CPT

## 2018-11-01 PROCEDURE — 83993 ASSAY FOR CALPROTECTIN FECAL: CPT | Performed by: PHYSICIAN ASSISTANT

## 2018-11-01 PROCEDURE — 80053 COMPREHEN METABOLIC PANEL: CPT

## 2018-11-01 PROCEDURE — 86140 C-REACTIVE PROTEIN: CPT

## 2018-11-01 PROCEDURE — 85025 COMPLETE CBC W/AUTO DIFF WBC: CPT

## 2018-11-01 NOTE — PROGRESS NOTES
[facility] - Outpatient Follow-up Note  Jordi King 48 y o  female MRN: [id number]  Encounter: [id number]          ASSESSMENT AND PLAN:      1  Ulcerative pancolitis without complication ([address] )  2  Hematochezia  She reports worsening abdominal pain, diarrhea, and bleeding for the past 2 months  She likely is experiencing a flare of ulcerative colitis however should rule out infection  Check CBC, CMP, CRP, fecal calprotectin, and C diff toxin  If infection is ruled out, we can start steroids  We will also order Humira drug and antibody levels  She was instructed to get this blood work done the day prior to her next dose of Humira  Depending upon her drug and antibody levels, may need to increase frequency of dosing or change biologic therapy altogether  She already got her flu shot and is scheduled to receive the pneumonia vaccine this month  Follow-up in 2 months with Dr Jorge L Weaver  ______________________________________________________________________    SUBJECTIVE:  59-year-old female with history of ulcerative colitis presenting for evaluation of worsening diarrhea, abdominal pain, and bleeding  She has been on Humira for over 1 year  She was doing very well up until the end of August 2018  She reports upwards of 10 bowel movements per day  The stool is generally light brown, watery, and mixed with blood  Sometimes she passes only frothy blood  She is often incontinent of stool and wears a pad while at work  She has ruined several pairs of underwear  She reports nocturnal symptoms  She has generalized lower abdominal cramping which is constant and unrelenting  REVIEW OF SYSTEMS IS OTHERWISE NEGATIVE        Historical Information   Past Medical History:   Diagnosis Date    Adjustment disorder     last assessed 05/16/12    Anemia     HX of    Asthma     mild - intermittent    Bilateral leg edema     Blepharitis     last assessed 02/04/16    Carpal tunnel syndrome Unspecified laterality    Colitis, acute     Dyspareunia in female     Edema     last assessed 06/22/15    Ganglion     Herniated cervical disc     History of transfusion     Hypokalemia     Hypothyroidism     Hypothyroidism     Insomnia     Lumps on the skin     last assessed 14    Mouth ulcers     last assessed 06/22/15    Nontraumatic tear of left tibialis posterior tendon     Traumatic teart     Polyarthritis     last assessed 16    Raynaud's disease     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 [address] [facility]     Temporomandibular disorder     Joint    Thyroid disease     Ulcerative colitis [facility] [address] [facility]     Ulcerative colitis ([facility] [address] [facility]      Past Surgical History:   Procedure Laterality Date    ANKLE SURGERY Left     Tendon repair    CARPAL TUNNEL RELEASE Bilateral      SECTION, LOW TRANSVERSE      COLONOSCOPY      HERNIA REPAIR      umbilical    HYSTERECTOMY      KNEE ARTHROSCOPY Right     LIPECTOMY      Multipe lipoma removals    LIPOMA RESECTION      MA COLONOSCOPY FLX DX W/COLLJ SPEC WHEN PFRMD N/A 2016    Procedure: COLONOSCOPY;  Surgeon: Amish Taylor DO;  Location: [facility] GI LAB;   Service: Gastroenterology    MA REPAIR FLEX LEG TENDON,SECOND,EA Left 2016    Procedure: REPAIR OF LEFT POSTERIOR TIBIAL TENDON WITH GRAFT, EXPLORATION LEFT ANKLE ;  Surgeon: Mundo Morse DPM;  Location: [facility] OR;  Service: Podiatry    SHOULDER SURGERY Left     bicep tendon and labrum repair    TONSILLECTOMY       Social History   History   Alcohol Use No     Comment: social 1 drink per weeek     History   Drug Use No     History   Smoking Status    Former Smoker    Quit date: 2003   Smokeless Tobacco    Never Used     Comment: Quit , rare use for 2 years     Family History   Problem Relation Age of Onset    Parkinsonism Mother     Rheum arthritis Mother     Heart attack Father     Hypertension Father     Osteoporosis Father     Prostate cancer Father     Hashimoto's thyroiditis Sister     Cancer Paternal Grandfather         Penile    Prostate cancer Paternal Grandfather     Crohn's disease Family     Osteoarthritis Family     Rheum arthritis Family     Crohn's disease Other     Crohn's disease Maternal Uncle     Psoriasis Maternal Uncle     Ulcerative colitis Maternal Uncle     Rheum arthritis Maternal Aunt     Ulcerative colitis Family        Meds/Allergies       Current Outpatient Prescriptions:     adalimumab (HUMIRA) 40 mg/0 8 mL PSKT    albuterol (ACCUNEB) 1 25 MG/3ML nebulizer solution    HUMIRA PEN 40 MG/0 8ML PNKT    tacrolimus (PROTOPIC) 0 03 % ointment    calcium carbonate-vitamin D (OSCAL-D) 500 mg-200 units per tablet    furosemide (LASIX) 40 mg tablet    levothyroxine 25 mcg tablet    zolpidem (AMBIEN) 10 mg tablet    No Known Allergies        Objective     Blood pressure 117/72, pulse 73, temperature 99 2 °F (37 3 °C), temperature source Tympanic, height 5' 3" (1 6 m), weight 69 9 kg (154 lb)  Body mass index is 27 28 kg/m²  PHYSICAL EXAM:      General Appearance:   Alert, cooperative, no distress   HEENT:   Normocephalic, atraumatic, anicteric      Neck:  Supple, symmetrical, trachea midline   Lungs:   Clear to auscultation bilaterally; no rales, rhonchi or wheezing; respirations unlabored    Heart[de-identified]   Regular rate and rhythm; no murmur, rub, or gallop  Abdomen:   Non-distended  Normal bowel sounds  Soft  Diffuse mild tenderness to palpation  No rebound or guarding      Genitalia:   Deferred    Rectal:   Deferred    Extremities:  No cyanosis, clubbing or edema    Pulses:  2+ and symmetric    Skin:  No jaundice, rashes, or lesions    Lymph nodes:  No palpable cervical lymphadenopathy        Lab Results:   No visits with results within 1 Day(s) from this visit     Latest known visit with results is:   Appointment on 09/06/2018   Component Date Value    Vit D, 25-Hydroxy 09/06/2018 29 0*    Glucose, Fasting 09/06/2018 87     Hemoglobin A1C 09/06/2018 5 6     EAG 09/06/2018 114     PTH 09/06/2018 56 4     Calcium, Ionized 09/06/2018 1 39*         Radiology Results:   Us Thyroid    Result Date: 10/30/2018  Narrative: THYROID ULTRASOUND INDICATION:    E55 9: Vitamin D deficiency, unspecified E21 3: Hyperparathyroidism, unspecified E83 52: Hypercalcemia  Recent nuclear medicine scan parathyroid identifying subtle focus in the left inferior neck  COMPARISON:  Nuclear medicine parathyroid scan with on September 28, 2018  TECHNIQUE:   Ultrasound of the thyroid was performed with a high frequency linear transducer in transverse and sagittal planes including volumetric imaging sweeps as well as traditional still imaging technique  FINDINGS:  Thyroid parenchyma is diffusely heterogeneous in echotexture  Right lobe:  5 x 1 6 x 1 4 cm  Left lobe:  4 x 1 3 x 1 3 cm  Isthmus:  0 2 cm  Nodule #1  Image 1024  RIGHT lower pole nodule measuring 0 7 x 0 3 x 0 6 cm  No priors for comparison  COMPOSITION:  2 points, solid or almost completely solid   ECHOGENICITY:  2 points, hypoechoic  SHAPE:  0 points, wider-than-tall  MARGIN: 0 points, smooth  ECHOGENIC FOCI:  0 points, none or large comet-tail artifacts  TI-RADS Classification: TR 4 (4-6 points), Moderately suspicious  FNA if > 1 5 cm  Follow if > 1cm  Nodule #2  Image 1792  RIGHT lower pole nodule measuring 0 8 x 0 5 x 0 7 cm  No priors for comparison  COMPOSITION:  2 points, solid or almost completely solid   ECHOGENICITY:  1 point, hyperechoic or isoechoic  SHAPE:  0 points, wider-than-tall  MARGIN: 0 points, ill-defined  ECHOGENIC FOCI:  0 points, none or large comet-tail artifacts  TI-RADS Classification: TR 3 (3 points), Mildly suspicious  FNA if >2 5 cm  Follow if >1 5 cm  Nodule #3  Image [id number]  LEFT lower pole nodule measuring 0 8 x 0 4 x 0 7 cm  No priors for comparison  COMPOSITION:  2 points, solid or almost completely solid   ECHOGENICITY:  3 points, very hypoechoic    SHAPE:  0 points, wider-than-tall  MARGIN: 0 points, smooth  ECHOGENIC FOCI:  0 points, none or large comet-tail artifacts  TI-RADS Classification: TR 4 (4-6 points), Moderately suspicious  FNA if > 1 5 cm  Follow if > 1cm  (This nodule does correspond to the location of the uptake on the nuclear medicine parathyroid scan, and given its marked hypoechoic and also hypervascular appearance, could potentially be a intrathyroid parathyroid adenoma ) Nodule #4  Image 7680  LEFT lower pole nodule measuring 1 2 x 0 4 x 0 6 cm  No priors for comparison  COMPOSITION:  2 points, solid or almost completely solid   ECHOGENICITY:  1 point, hyperechoic or isoechoic  SHAPE:  0 points, wider-than-tall  MARGIN: 0 points, ill-defined  ECHOGENIC FOCI:  0 points, none or large comet-tail artifacts  TI-RADS Classification: TR 3 (3 points), Mildly suspicious  FNA if >2 5 cm  Follow if >1 5 cm  There are no extra-thyroid nodules  Impression: Diffusely heterogeneous thyroid gland with several small thyroid nodules as above  No nodule meets current ACR criteria for requiring biopsy or followup ultrasounds  Please note that nodule #3, a 0 8 x 0 4 x 0 7 cm left thyroid lower pole nodule (image [id number]), does correspond to the location of the uptake on the nuclear medicine parathyroid scan, and given its marked hypoechoic and also hypervascular appearance, could  potentially be a intrathyroid parathyroid adenoma  SCRIBE ATTESTATION Raul BATES PA-C am acting as a scribe while in the presence of the attending physician  Princess BATES, personally reviewed and interpreted the study in this report as scribed in my presence  Reference: ACR Thyroid Imaging, Reporting and Data System (TI-RADS): White Paper of the [employer]   J AM Beverly Radiol [id number]:[id number]  (additional recommendations based on American Thyroid Association 2015 guidelines ) Workstation performed: [id number]

## 2018-11-01 NOTE — LETTER
November 1, 2018     Radha Vora, [address] [address]    Patient: Bassam Irvin   YOB: 1965   Date of Visit: 11/1/2018       Dear Dr Shivam Gabriel: Thank you for referring Joselito Small to me for evaluation  Below are my notes for this consultation  If you have questions, please do not hesitate to call me  I look forward to following your patient along with you  Sincerely,        Wilfredo PatientMARY        CC: No Recipients  Wilfredo PatientMARY  11/1/2018  5:43 PM  Sign at close encounter  [facility] - Outpatient Follow-up Note  Bassam Irvin 48 y o  female MRN: [id number]  Encounter: [id number]          ASSESSMENT AND PLAN:      1  Ulcerative pancolitis without complication ([address] )  2  Hematochezia  She reports worsening abdominal pain, diarrhea, and bleeding for the past 2 months  She likely is experiencing a flare of ulcerative colitis however should rule out infection  Check CBC, CMP, CRP, fecal calprotectin, and C diff toxin  If infection is ruled out, we can start steroids  We will also order Humira drug and antibody levels  She was instructed to get this blood work done the day prior to her next dose of Humira  Depending upon her drug and antibody levels, may need to increase frequency of dosing or change biologic therapy altogether  She already got her flu shot and is scheduled to receive the pneumonia vaccine this month  Follow-up in 2 months with Dr Kendrick Ruiz  ______________________________________________________________________    SUBJECTIVE:  51-year-old female with history of ulcerative colitis presenting for evaluation of worsening diarrhea, abdominal pain, and bleeding  She has been on Humira for over 1 year  She was doing very well up until the end of August 2018  She reports upwards of 10 bowel movements per day  The stool is generally light brown, watery, and mixed with blood   Sometimes she passes only frothy blood  She is often incontinent of stool and wears a pad while at work  She has ruined several pairs of underwear  She reports nocturnal symptoms  She has generalized lower abdominal cramping which is constant and unrelenting  REVIEW OF SYSTEMS IS OTHERWISE NEGATIVE  Historical Information   Past Medical History:   Diagnosis Date    Adjustment disorder     last assessed 12    Anemia     HX of    Asthma     mild - intermittent    Bilateral leg edema     Blepharitis     last assessed 16    Carpal tunnel syndrome     Unspecified laterality    Colitis, acute     Dyspareunia in female     Edema     last assessed 06/22/15    Ganglion     Herniated cervical disc     History of transfusion     Hypokalemia     Hypothyroidism     Hypothyroidism     Insomnia     Lumps on the skin     last assessed 14    Mouth ulcers     last assessed 06/22/15    Nontraumatic tear of left tibialis posterior tendon     Traumatic teart     Polyarthritis     last assessed 16    Raynaud's disease     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     Temporomandibular disorder     Joint    Thyroid disease     Ulcerative colitis [facility]     Ulcerative colitis ([facility] )      Past Surgical History:   Procedure Laterality Date    ANKLE SURGERY Left     Tendon repair    CARPAL TUNNEL RELEASE Bilateral      SECTION, LOW TRANSVERSE      COLONOSCOPY      HERNIA REPAIR      umbilical    HYSTERECTOMY      KNEE ARTHROSCOPY Right     LIPECTOMY      Multipe lipoma removals    LIPOMA RESECTION      OR COLONOSCOPY FLX DX W/COLLJ SPEC WHEN PFRMD N/A 2016    Procedure: COLONOSCOPY;  Surgeon: Rolf Santamaria DO;  Location: [facility];   Service: Gastroenterology    OR REPAIR FLEX LEG TENDON,SECOND,EA Left 2016    Procedure: REPAIR OF LEFT POSTERIOR TIBIAL TENDON WITH GRAFT, EXPLORATION LEFT ANKLE ;  Surgeon: Camille Farooq DPM;  Location: [facility] OR;  Service: Podiatry   [facility] Left     bicep tendon and labrum repair    TONSILLECTOMY       Social History   History   Alcohol Use No     Comment: social 1 drink per weeek     History   Drug Use No     History   Smoking Status    Former Smoker    Quit date: 4/6/2003   Smokeless Tobacco    Never Used     Comment: Quit 1991, rare use for 2 years     Family History   Problem Relation Age of Onset    Parkinsonism Mother     Rheum arthritis Mother     Heart attack Father     Hypertension Father     Osteoporosis Father     Prostate cancer Father     Hashimoto's thyroiditis Sister     Cancer Paternal Grandfather         Penile    Prostate cancer Paternal Grandfather     Crohn's disease Family     Osteoarthritis Family     Rheum arthritis Family     Crohn's disease Other     Crohn's disease Maternal Uncle     Psoriasis Maternal Uncle     Ulcerative colitis Maternal Uncle     Rheum arthritis Maternal Aunt     Ulcerative colitis Family        Meds/Allergies       Current Outpatient Prescriptions:     adalimumab (HUMIRA) 40 mg/0 8 mL PSKT    albuterol (ACCUNEB) 1 25 MG/3ML nebulizer solution    HUMIRA PEN 40 MG/0 8ML PNKT    tacrolimus (PROTOPIC) 0 03 % ointment    calcium carbonate-vitamin D (OSCAL-D) 500 mg-200 units per tablet    furosemide (LASIX) 40 mg tablet    levothyroxine 25 mcg tablet    zolpidem (AMBIEN) 10 mg tablet    No Known Allergies        Objective     Blood pressure 117/72, pulse 73, temperature 99 2 °F (37 3 °C), temperature source Tympanic, height 5' 3" (1 6 m), weight 69 9 kg (154 lb)  Body mass index is 27 28 kg/m²  PHYSICAL EXAM:      General Appearance:   Alert, cooperative, no distress   HEENT:   Normocephalic, atraumatic, anicteric      Neck:  Supple, symmetrical, trachea midline   Lungs:   Clear to auscultation bilaterally; no rales, rhonchi or wheezing; respirations unlabored    Heart[de-identified]   Regular rate and rhythm; no murmur, rub, or gallop  Abdomen:   Non-distended  Normal bowel sounds  Soft  Diffuse mild tenderness to palpation  No rebound or guarding      Genitalia:   Deferred    Rectal:   Deferred    Extremities:  No cyanosis, clubbing or edema    Pulses:  2+ and symmetric    Skin:  No jaundice, rashes, or lesions    Lymph nodes:  No palpable cervical lymphadenopathy        Lab Results:   No visits with results within 1 Day(s) from this visit  Latest known visit with results is:   Appointment on 09/06/2018   Component Date Value    Vit D, 25-Hydroxy 09/06/2018 29 0*    Glucose, Fasting 09/06/2018 87     Hemoglobin A1C 09/06/2018 5 6     EAG 09/06/2018 114     PTH 09/06/2018 56 4     Calcium, Ionized 09/06/2018 1 39*         Radiology Results:   Us Thyroid    Result Date: 10/30/2018  Narrative: THYROID ULTRASOUND INDICATION:    E55 9: Vitamin D deficiency, unspecified E21 3: Hyperparathyroidism, unspecified E83 52: Hypercalcemia  Recent nuclear medicine scan parathyroid identifying subtle focus in the left inferior neck  COMPARISON:  Nuclear medicine parathyroid scan with on September 28, 2018  TECHNIQUE:   Ultrasound of the thyroid was performed with a high frequency linear transducer in transverse and sagittal planes including volumetric imaging sweeps as well as traditional still imaging technique  FINDINGS:  Thyroid parenchyma is diffusely heterogeneous in echotexture  Right lobe:  5 x 1 6 x 1 4 cm  Left lobe:  4 x 1 3 x 1 3 cm  Isthmus:  0 2 cm  Nodule #1  Image 1024  RIGHT lower pole nodule measuring 0 7 x 0 3 x 0 6 cm  No priors for comparison  COMPOSITION:  2 points, solid or almost completely solid   ECHOGENICITY:  2 points, hypoechoic  SHAPE:  0 points, wider-than-tall  MARGIN: 0 points, smooth  ECHOGENIC FOCI:  0 points, none or large comet-tail artifacts  TI-RADS Classification: TR 4 (4-6 points), Moderately suspicious  FNA if > 1 5 cm  Follow if > 1cm  Nodule #2  Image 1792  RIGHT lower pole nodule measuring 0 8 x 0 5 x 0 7 cm  No priors for comparison   COMPOSITION: 2 points, solid or almost completely solid   ECHOGENICITY:  1 point, hyperechoic or isoechoic  SHAPE:  0 points, wider-than-tall  MARGIN: 0 points, ill-defined  ECHOGENIC FOCI:  0 points, none or large comet-tail artifacts  TI-RADS Classification: TR 3 (3 points), Mildly suspicious  FNA if >2 5 cm  Follow if >1 5 cm  Nodule #3  Image 6656  LEFT lower pole nodule measuring 0 8 x 0 4 x 0 7 cm  No priors for comparison  COMPOSITION:  2 points, solid or almost completely solid   ECHOGENICITY:  3 points, very hypoechoic  SHAPE:  0 points, wider-than-tall  MARGIN: 0 points, smooth  ECHOGENIC FOCI:  0 points, none or large comet-tail artifacts  TI-RADS Classification: TR 4 (4-6 points), Moderately suspicious  FNA if > 1 5 cm  Follow if > 1cm  (This nodule does correspond to the location of the uptake on the nuclear medicine parathyroid scan, and given its marked hypoechoic and also hypervascular appearance, could potentially be a intrathyroid parathyroid adenoma ) Nodule #4  Image 7680  LEFT lower pole nodule measuring 1 2 x 0 4 x 0 6 cm  No priors for comparison  COMPOSITION:  2 points, solid or almost completely solid   ECHOGENICITY:  1 point, hyperechoic or isoechoic  SHAPE:  0 points, wider-than-tall  MARGIN: 0 points, ill-defined  ECHOGENIC FOCI:  0 points, none or large comet-tail artifacts  TI-RADS Classification: TR 3 (3 points), Mildly suspicious  FNA if >2 5 cm  Follow if >1 5 cm  There are no extra-thyroid nodules  Impression: Diffusely heterogeneous thyroid gland with several small thyroid nodules as above  No nodule meets current ACR criteria for requiring biopsy or followup ultrasounds  Please note that nodule #3, a 0 8 x 0 4 x 0 7 cm left thyroid lower pole nodule (image 7628), does correspond to the location of the uptake on the nuclear medicine parathyroid scan, and given its marked hypoechoic and also hypervascular appearance, could  potentially be a intrathyroid parathyroid adenoma  SCRIBE ATTESTATION Marine BATES PA-C am acting as a scribe while in the presence of the attending physician  I, Gladis Hatfield, personally reviewed and interpreted the study in this report as scribed in my presence  Reference: ACR Thyroid Imaging, Reporting and Data System (TI-RADS): White Paper of the [facility]   J AM Beverly Radiol 1405;34:919-231  (additional recommendations based on American Thyroid Association 2015 guidelines ) Workstation performed: [id number]

## 2018-11-02 ENCOUNTER — TELEPHONE (OUTPATIENT)
Dept: GASTROENTEROLOGY | Facility: CLINIC | Age: 53
End: 2018-11-02

## 2018-11-02 ENCOUNTER — HOSPITAL ENCOUNTER (OUTPATIENT)
Dept: CT IMAGING | Facility: HOSPITAL | Age: 53
Discharge: HOME | End: 2018-11-02
Attending: SPECIALIST
Payer: COMMERCIAL

## 2018-11-02 DIAGNOSIS — E05.90 HYPERTHYROIDISM: ICD-10-CM

## 2018-11-02 DIAGNOSIS — E55.9 VITAMIN D DEFICIENCY: ICD-10-CM

## 2018-11-02 DIAGNOSIS — K51.011 ULCERATIVE PANCOLITIS WITH RECTAL BLEEDING (HCC): Primary | ICD-10-CM

## 2018-11-02 DIAGNOSIS — E83.52 HYPERCALCEMIA: ICD-10-CM

## 2018-11-02 LAB — C DIFF TOX GENS STL QL NAA+PROBE: NORMAL

## 2018-11-02 PROCEDURE — 70492 CT SFT TSUE NCK W/O & W/DYE: CPT

## 2018-11-02 RX ORDER — PREDNISONE 10 MG/1
40 TABLET ORAL
Qty: 120 TABLET | Refills: 2 | Status: SHIPPED | OUTPATIENT
Start: 2018-11-02 | End: 2018-11-25 | Stop reason: HOSPADM

## 2018-11-02 RX ADMIN — IOHEXOL 85 ML: 350 INJECTION, SOLUTION INTRAVENOUS at 14:52

## 2018-11-05 ENCOUNTER — TELEPHONE (OUTPATIENT)
Dept: GASTROENTEROLOGY | Facility: MEDICAL CENTER | Age: 53
End: 2018-11-05

## 2018-11-08 ENCOUNTER — APPOINTMENT (OUTPATIENT)
Dept: LAB | Facility: HOSPITAL | Age: 53
End: 2018-11-08
Payer: COMMERCIAL

## 2018-11-08 DIAGNOSIS — K51.00 ULCERATIVE PANCOLITIS WITHOUT COMPLICATION (HCC): ICD-10-CM

## 2018-11-08 LAB — CALPROTECTIN STL-MCNT: 378 UG/G (ref 0–120)

## 2018-11-08 PROCEDURE — 80299 QUANTITATIVE ASSAY DRUG: CPT

## 2018-11-08 PROCEDURE — 82397 CHEMILUMINESCENT ASSAY: CPT

## 2018-11-08 PROCEDURE — 36415 COLL VENOUS BLD VENIPUNCTURE: CPT

## 2018-11-09 DIAGNOSIS — K51.011 ULCERATIVE PANCOLITIS WITH RECTAL BLEEDING (HCC): Primary | ICD-10-CM

## 2018-11-09 RX ORDER — MESALAMINE 4 G/60ML
4 ENEMA RECTAL
Qty: 1800 ML | Refills: 3 | Status: SHIPPED | OUTPATIENT
Start: 2018-11-09 | End: 2018-12-21 | Stop reason: ALTCHOICE

## 2018-11-09 RX ORDER — HYOSCYAMINE SULFATE 0.125 MG
0.12 TABLET ORAL EVERY 4 HOURS PRN
Qty: 30 TABLET | Refills: 2 | Status: SHIPPED | OUTPATIENT
Start: 2018-11-09 | End: 2018-12-05 | Stop reason: SDUPTHER

## 2018-11-12 DIAGNOSIS — F51.04 CHRONIC INSOMNIA: ICD-10-CM

## 2018-11-13 RX ORDER — ZOLPIDEM TARTRATE 10 MG/1
TABLET ORAL
Qty: 90 TABLET | Refills: 0 | Status: SHIPPED | OUTPATIENT
Start: 2018-11-13 | End: 2019-12-10 | Stop reason: SDUPTHER

## 2018-11-14 ENCOUNTER — CLINICAL SUPPORT (OUTPATIENT)
Dept: FAMILY MEDICINE CLINIC | Facility: CLINIC | Age: 53
End: 2018-11-14
Payer: COMMERCIAL

## 2018-11-14 DIAGNOSIS — Z23 NEED FOR PNEUMOCOCCAL VACCINATION: Primary | ICD-10-CM

## 2018-11-14 PROCEDURE — 90670 PCV13 VACCINE IM: CPT

## 2018-11-14 PROCEDURE — 90471 IMMUNIZATION ADMIN: CPT

## 2018-11-15 ENCOUNTER — TELEPHONE (OUTPATIENT)
Dept: GASTROENTEROLOGY | Facility: AMBULARY SURGERY CENTER | Age: 53
End: 2018-11-15

## 2018-11-15 NOTE — TELEPHONE ENCOUNTER
Called [facility], lab was sent out to lab [facility] who also sent it out to another lab to be resulted  Someone from the out sourced lab said the results should be back by 11/21/2018   Called the patient and informed her of the information

## 2018-11-15 NOTE — TELEPHONE ENCOUNTER
This lab is still pending- she has an appointment with Dr Juan Cheung 11/19- so hopefully the lab will be back by then

## 2018-11-19 ENCOUNTER — OFFICE VISIT (OUTPATIENT)
Dept: GASTROENTEROLOGY | Facility: MEDICAL CENTER | Age: 53
End: 2018-11-19
Payer: COMMERCIAL

## 2018-11-19 ENCOUNTER — HOSPITAL ENCOUNTER (INPATIENT)
Facility: HOSPITAL | Age: 53
LOS: 6 days | Discharge: HOME | DRG: 387 | End: 2018-11-25
Attending: INTERNAL MEDICINE | Admitting: INTERNAL MEDICINE
Payer: COMMERCIAL

## 2018-11-19 VITALS — DIASTOLIC BLOOD PRESSURE: 74 MMHG | HEART RATE: 84 BPM | TEMPERATURE: 98.7 F | SYSTOLIC BLOOD PRESSURE: 126 MMHG

## 2018-11-19 DIAGNOSIS — K51.911 ULCERATIVE COLITIS WITH RECTAL BLEEDING, UNSPECIFIED LOCATION (HCC): Primary | ICD-10-CM

## 2018-11-19 LAB
ALBUMIN SERPL BCP-MCNC: 2.7 G/DL (ref 3.5–5)
ALP SERPL-CCNC: 82 U/L (ref 46–116)
ALT SERPL W P-5'-P-CCNC: 16 U/L (ref 12–78)
ANION GAP SERPL CALCULATED.3IONS-SCNC: 7 MMOL/L (ref 4–13)
APTT PPP: 24 SECONDS (ref 26–38)
AST SERPL W P-5'-P-CCNC: 8 U/L (ref 5–45)
BASOPHILS # BLD AUTO: 0.01 THOUSANDS/ΜL (ref 0–0.1)
BASOPHILS NFR BLD AUTO: 0 % (ref 0–1)
BILIRUB SERPL-MCNC: 0.2 MG/DL (ref 0.2–1)
BUN SERPL-MCNC: 5 MG/DL (ref 5–25)
CALCIUM SERPL-MCNC: 9.9 MG/DL (ref 8.3–10.1)
CHLORIDE SERPL-SCNC: 104 MMOL/L (ref 100–108)
CO2 SERPL-SCNC: 28 MMOL/L (ref 21–32)
CREAT SERPL-MCNC: 0.85 MG/DL (ref 0.6–1.3)
EOSINOPHIL # BLD AUTO: 0.01 THOUSAND/ΜL (ref 0–0.61)
EOSINOPHIL NFR BLD AUTO: 0 % (ref 0–6)
ERYTHROCYTE [DISTWIDTH] IN BLOOD BY AUTOMATED COUNT: 12.4 % (ref 11.6–15.1)
GFR SERPL CREATININE-BSD FRML MDRD: 78 ML/MIN/1.73SQ M
GLUCOSE SERPL-MCNC: 148 MG/DL (ref 65–140)
HCT VFR BLD AUTO: 32.4 % (ref 34.8–46.1)
HGB BLD-MCNC: 10.2 G/DL (ref 11.5–15.4)
IMM GRANULOCYTES # BLD AUTO: 0.03 THOUSAND/UL (ref 0–0.2)
IMM GRANULOCYTES NFR BLD AUTO: 0 % (ref 0–2)
INR PPP: 1.02 (ref 0.86–1.17)
LYMPHOCYTES # BLD AUTO: 1.8 THOUSANDS/ΜL (ref 0.6–4.47)
LYMPHOCYTES NFR BLD AUTO: 17 % (ref 14–44)
MAGNESIUM SERPL-MCNC: 1.8 MG/DL (ref 1.6–2.6)
MCH RBC QN AUTO: 28.8 PG (ref 26.8–34.3)
MCHC RBC AUTO-ENTMCNC: 31.5 G/DL (ref 31.4–37.4)
MCV RBC AUTO: 92 FL (ref 82–98)
MONOCYTES # BLD AUTO: 0.58 THOUSAND/ΜL (ref 0.17–1.22)
MONOCYTES NFR BLD AUTO: 5 % (ref 4–12)
NEUTROPHILS # BLD AUTO: 8.33 THOUSANDS/ΜL (ref 1.85–7.62)
NEUTS SEG NFR BLD AUTO: 78 % (ref 43–75)
NRBC BLD AUTO-RTO: 0 /100 WBCS
PHOSPHATE SERPL-MCNC: 2.9 MG/DL (ref 2.7–4.5)
PLATELET # BLD AUTO: 503 THOUSANDS/UL (ref 149–390)
PMV BLD AUTO: 8.8 FL (ref 8.9–12.7)
POTASSIUM SERPL-SCNC: 3.5 MMOL/L (ref 3.5–5.3)
PROT SERPL-MCNC: 6.3 G/DL (ref 6.4–8.2)
PROTHROMBIN TIME: 13.1 SECONDS (ref 11.8–14.2)
RBC # BLD AUTO: 3.54 MILLION/UL (ref 3.81–5.12)
SODIUM SERPL-SCNC: 139 MMOL/L (ref 136–145)
TSH SERPL DL<=0.05 MIU/L-ACNC: 0.71 UIU/ML (ref 0.36–3.74)
WBC # BLD AUTO: 10.76 THOUSAND/UL (ref 4.31–10.16)

## 2018-11-19 PROCEDURE — 84443 ASSAY THYROID STIM HORMONE: CPT | Performed by: INTERNAL MEDICINE

## 2018-11-19 PROCEDURE — 80053 COMPREHEN METABOLIC PANEL: CPT | Performed by: INTERNAL MEDICINE

## 2018-11-19 PROCEDURE — 84100 ASSAY OF PHOSPHORUS: CPT | Performed by: INTERNAL MEDICINE

## 2018-11-19 PROCEDURE — 99223 1ST HOSP IP/OBS HIGH 75: CPT | Performed by: INTERNAL MEDICINE

## 2018-11-19 PROCEDURE — 99215 OFFICE O/P EST HI 40 MIN: CPT | Performed by: INTERNAL MEDICINE

## 2018-11-19 PROCEDURE — 85730 THROMBOPLASTIN TIME PARTIAL: CPT | Performed by: INTERNAL MEDICINE

## 2018-11-19 PROCEDURE — 83735 ASSAY OF MAGNESIUM: CPT | Performed by: INTERNAL MEDICINE

## 2018-11-19 PROCEDURE — 85610 PROTHROMBIN TIME: CPT | Performed by: INTERNAL MEDICINE

## 2018-11-19 PROCEDURE — 85025 COMPLETE CBC W/AUTO DIFF WBC: CPT | Performed by: INTERNAL MEDICINE

## 2018-11-19 PROCEDURE — 1111F DSCHRG MED/CURRENT MED MERGE: CPT | Performed by: INTERNAL MEDICINE

## 2018-11-19 PROCEDURE — 87493 C DIFF AMPLIFIED PROBE: CPT | Performed by: INTERNAL MEDICINE

## 2018-11-19 RX ORDER — MELATONIN
3000 DAILY
COMMUNITY
End: 2019-04-17 | Stop reason: ALTCHOICE

## 2018-11-19 RX ORDER — MESALAMINE 4 G/60ML
4 ENEMA RECTAL
Status: DISCONTINUED | OUTPATIENT
Start: 2018-11-19 | End: 2018-11-25 | Stop reason: HOSPADM

## 2018-11-19 RX ORDER — ZOLPIDEM TARTRATE 5 MG/1
10 TABLET ORAL
Status: DISCONTINUED | OUTPATIENT
Start: 2018-11-19 | End: 2018-11-25 | Stop reason: HOSPADM

## 2018-11-19 RX ORDER — ONDANSETRON 2 MG/ML
4 INJECTION INTRAMUSCULAR; INTRAVENOUS EVERY 6 HOURS PRN
Status: DISCONTINUED | OUTPATIENT
Start: 2018-11-19 | End: 2018-11-25 | Stop reason: HOSPADM

## 2018-11-19 RX ORDER — DEXTROSE, SODIUM CHLORIDE, AND POTASSIUM CHLORIDE 5; .45; .15 G/100ML; G/100ML; G/100ML
100 INJECTION INTRAVENOUS CONTINUOUS
Status: DISCONTINUED | OUTPATIENT
Start: 2018-11-19 | End: 2018-11-21

## 2018-11-19 RX ORDER — ALBUTEROL SULFATE 1.25 MG/3ML
1 SOLUTION RESPIRATORY (INHALATION) EVERY 6 HOURS PRN
Status: DISCONTINUED | OUTPATIENT
Start: 2018-11-19 | End: 2018-11-20

## 2018-11-19 RX ORDER — ACETAMINOPHEN 325 MG/1
650 TABLET ORAL EVERY 6 HOURS PRN
Status: DISCONTINUED | OUTPATIENT
Start: 2018-11-19 | End: 2018-11-25 | Stop reason: HOSPADM

## 2018-11-19 RX ORDER — LEVOTHYROXINE SODIUM 0.03 MG/1
25 TABLET ORAL
Status: DISCONTINUED | OUTPATIENT
Start: 2018-11-20 | End: 2018-11-25 | Stop reason: HOSPADM

## 2018-11-19 RX ADMIN — DEXTROSE, SODIUM CHLORIDE, AND POTASSIUM CHLORIDE 100 ML/HR: 5; .45; .15 INJECTION INTRAVENOUS at 21:46

## 2018-11-19 RX ADMIN — ZOLPIDEM TARTRATE 10 MG: 5 TABLET, FILM COATED ORAL at 21:46

## 2018-11-19 RX ADMIN — MESALAMINE 4 G: 4 ENEMA RECTAL at 21:29

## 2018-11-19 RX ADMIN — ACETAMINOPHEN 650 MG: 325 TABLET, FILM COATED ORAL at 21:27

## 2018-11-19 NOTE — LETTER
November 19, 2018     Matthew Freire, [address] [address]    Patient: Roshan Allan   YOB: 1965   Date of Visit: 11/19/2018       Dear Dr Rizwana Wong Recipients: Thank you for referring Jacek Head to me for evaluation  Below are my notes for this consultation  If you have questions, please do not hesitate to call me  I look forward to following your patient along with you  Sincerely,        Kera Mack,         CC: No Recipients  Tedooro Marcosnoemy  11/19/2018  3:05 PM  Sign at close encounter  [facility] - Outpatient Follow-up Note  Roshan Allan 48 y o  female MRN: [id number]  Encounter: [id number]          ASSESSMENT AND PLAN:    Roshan Allan is a 48 y o  female who presents today in follow-up regarding continued management of her ulcerative colitis  1  Ulcerative colitis with rectal bleeding  She is currently on Humira every other week, mesalamine, hyoscyamine, and Prednisone 40 mg but continues to experience abdominal pain, hematochezia, frequent diarrhea, lack of appetite, and nausea  CRP on 11/1 was elevated at 39 9, fecal calprotectin was elevated at 378  She did get Humira level and antibody drawn but results are currently pending  She has been taking the Prednisone for two weeks without relief of symptoms  She will eat a very little amount of food each day with approximately 5 BM during the day and several throughout the night  Explained that it would be best to have her admitted to the hospital for IV steroids and to have her scheduled for a flexible sigmoidoscopy to help alleviate symptoms and evaluation of her colitis   She will be admitted to [address] [place] [address] and I informed the admitting team of the patient's condition as well as the likely plan     ______________________________________________________________________    SUBJECTIVE:  Roshan Allan is a 48 y o  female who presents today in follow-up regarding continued management of her ulcerative colitis  Last seen by Nata Bridges PA-C on 11/1/18 at which time patient reported worsening abdominal pain, diarrhea, and hematochezia for 2 months  CRP on 11/1 was elevated at 39 9, fecal calprotectin was elevated at 378  CBC on 11/1 showed low RBC of 3 7, low Hgb of 11, and elevated platelets of 898  Patient was negative for C diff  Adalimumad concentration and anti-adalimumad levels were ordered and results are currently pending  She is currently on Humira every other week as well as mesalamine 4 g, hyoscyamine, and Prednisone  Today, she reports that she is not doing well, experiencing frequent bowel movements, loss of appetite, abdominal pain, and nausea  She has been taking Prednisone for two weeks and has been using the mesalamine enemas but reports difficulty with keeping these down  She had 5 BM in the middle of the night last night  She will have intermittent blood in the stool but believes that when there is a reduction in blood it is because she does not eat a significant amount each day  Yesterday, she only had one drink and a piece of toast but had several BM  She has been on Humira for over a year with good compliance  Last colonoscopy was one year ago  REVIEW OF SYSTEMS IS OTHERWISE NEGATIVE        Historical Information   Past Medical History:   Diagnosis Date    Adjustment disorder     last assessed 05/16/12    Anemia     HX of    Asthma     mild - intermittent    Bilateral leg edema     Blepharitis     last assessed 02/04/16    Carpal tunnel syndrome     Unspecified laterality    Colitis, acute     Dyspareunia in female     Edema     last assessed 06/22/15    Ganglion     Herniated cervical disc     History of transfusion     Hypokalemia     Hypothyroidism     Hypothyroidism     Insomnia     Lumps on the skin     last assessed 03/12/14    Mouth ulcers     last assessed 06/22/15    Nontraumatic tear of left tibialis posterior tendon     Traumatic teart     Polyarthritis     last assessed 16    Raynaud's disease     [facility]na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 [address]     Temporomandibular disorder     Joint    Thyroid disease     Ulcerative colitis [facility] [address] [facility]     Ulcerative colitis ([facility] [address] )      Past Surgical History:   Procedure Laterality Date    ANKLE SURGERY Left     Tendon repair    CARPAL TUNNEL RELEASE Bilateral      SECTION, LOW TRANSVERSE      COLONOSCOPY      HERNIA REPAIR      umbilical    HYSTERECTOMY      KNEE ARTHROSCOPY Right     LIPECTOMY      Multipe lipoma removals    LIPOMA RESECTION      AK COLONOSCOPY FLX DX W/COLLJ SPEC WHEN PFRMD N/A 2016    Procedure: COLONOSCOPY;  Surgeon: Abhilash Cabrera DO;  Location: [facility];   Service: Gastroenterology    AK REPAIR FLEX LEG TENDON,SECOND,EA Left 2016    Procedure: REPAIR OF LEFT POSTERIOR TIBIAL TENDON WITH GRAFT, EXPLORATION LEFT ANKLE ;  Surgeon: Js Mckeon DPM;  Location: [facility] OR;  Service: Podiatry    SHOULDER SURGERY Left     bicep tendon and labrum repair    TONSILLECTOMY       Social History   History   Alcohol Use No     Comment: social 1 drink per weeek     History   Drug Use No     History   Smoking Status    Former Smoker    Quit date: 2003   Smokeless Tobacco    Never Used     Comment: Quit , rare use for 2 years     Family History   Problem Relation Age of Onset    Parkinsonism Mother     Rheum arthritis Mother     Heart attack Father     Hypertension Father     Osteoporosis Father     Prostate cancer Father     Hashimoto's thyroiditis Sister     Cancer Paternal Grandfather         Penile    Prostate cancer Paternal Grandfather     Crohn's disease Family     Osteoarthritis Family     Rheum arthritis Family     Crohn's disease Other     Crohn's disease Maternal Uncle     Psoriasis Maternal Uncle     Ulcerative colitis Maternal Uncle     Rheum arthritis Maternal Aunt     Ulcerative colitis Family Meds/Allergies       Current Outpatient Prescriptions:     adalimumab (HUMIRA) 40 mg/0 8 mL PSKT    albuterol (ACCUNEB) 1 25 MG/3ML nebulizer solution    calcium carbonate-vitamin D (OSCAL-D) 500 mg-200 units per tablet    furosemide (LASIX) 40 mg tablet    HUMIRA PEN 40 MG/0 8ML PNKT    hyoscyamine (ANASPAZ,LEVSIN) 0 125 MG tablet    mesalamine (ROWASA) 4 g    predniSONE 10 mg tablet    tacrolimus (PROTOPIC) 0 03 % ointment    zolpidem (AMBIEN) 10 mg tablet    levothyroxine 25 mcg tablet    No Known Allergies        Objective     Blood pressure 126/74, pulse 84, temperature 98 7 °F (37 1 °C), temperature source Tympanic  There is no height or weight on file to calculate BMI  PHYSICAL EXAM:      General Appearance:   Alert, cooperative, no distress   HEENT:   Normocephalic, atraumatic, anicteric      Neck:  Supple, symmetrical, trachea midline   Lungs:   Clear to auscultation bilaterally; no rales, rhonchi or wheezing; respirations unlabored    Heart[de-identified]   Regular rate and rhythm; no murmur, rub, or gallop  Abdomen:   Soft, non-tender, non-distended; normal bowel sounds; no masses, no organomegaly    Genitalia:   Deferred    Rectal:   Deferred    Extremities:  No cyanosis, clubbing or edema    Pulses:  2+ and symmetric    Skin:  No jaundice, rashes, or lesions    Lymph nodes:  No palpable cervical lymphadenopathy        Lab Results:   No visits with results within 1 Day(s) from this visit     Latest known visit with results is:   Appointment on 11/01/2018   Component Date Value    CRP 11/01/2018 39 9*    WBC 11/01/2018 10 15     RBC 11/01/2018 3 77*    Hemoglobin 11/01/2018 11 0*    Hematocrit 11/01/2018 35 1     MCV 11/01/2018 93     MCH 11/01/2018 29 2     MCHC 11/01/2018 31 3*    RDW 11/01/2018 12 2     MPV 11/01/2018 9 6     Platelets 20/53/0820 440*    nRBC 11/01/2018 0     Neutrophils Relative 11/01/2018 61     Immat GRANS % 11/01/2018 0     Lymphocytes Relative 11/01/2018 28     Monocytes Relative 11/01/2018 7     Eosinophils Relative 11/01/2018 3     Basophils Relative 11/01/2018 1     Neutrophils Absolute 11/01/2018 6 12     Immature Grans Absolute 11/01/2018 0 04     Lymphocytes Absolute 11/01/2018 2 88     Monocytes Absolute 11/01/2018 0 71     Eosinophils Absolute 11/01/2018 0 35     Basophils Absolute 11/01/2018 0 05     Sodium 11/01/2018 139     Potassium 11/01/2018 3 9     Chloride 11/01/2018 106     CO2 11/01/2018 26     ANION GAP 11/01/2018 7     BUN 11/01/2018 13     Creatinine 11/01/2018 0 68     Glucose, Fasting 11/01/2018 90     Calcium 11/01/2018 10 3*    Corrected Calcium 11/01/2018 11 1*    AST 11/01/2018 11     ALT 11/01/2018 12     Alkaline Phosphatase 11/01/2018 88     Total Protein 11/01/2018 7 0     Albumin 11/01/2018 3 0*    Total Bilirubin 11/01/2018 0 21     eGFR 11/01/2018 100          Radiology Results:     US Thyroid on 10/30/2018  Impression: Diffusely heterogeneous thyroid gland with several small thyroid nodules as above  No nodule meets current ACR criteria for requiring biopsy or followup ultrasounds  Please note that nodule #3, a 0 8 x 0 4 x 0 7 cm left thyroid lower pole nodule (image [id number]), does correspond to the location of the uptake on the nuclear medicine parathyroid scan, and given its marked hypoechoic and also hypervascular appearance, could  potentially be a intrathyroid parathyroid adenoma  SCRIBE ATTESTATION Amanda BATES PA-C am acting as a scribe while in the presence of the attending physician  Sreedhar BATES, personally reviewed and interpreted the study in this report as scribed in my presence  Reference: ACR Thyroid Imaging, Reporting and Data System (TI-RADS): White Paper of the [employer]   J AM Beverly Radiol 9641;44:[id number]  (additional recommendations based on American Thyroid Association 2015 guidelines ) Workstation performed: [id number]       CT Parathyroid Study W Wo Contrast on 11/5/2018  Impression: 1   3 8 x 4 2 x 3 mm lesion meeting the CT enhancement criteria suspicious for parathyroid adenoma identified in the right neck, sitting posterior to the inferior right thyroid lobe, and immediately lateral to the trachea  Please see above for additional details  2   Lesion identified by sestamibi and ultrasound located in the inferior left thyroid lobe is most consistent with a thyroid nodule  3   Evidence of chronic sinusitis in involving the right maxillary sinus  Workstation performed: [id number]       Attestation:   By signing my name below, Avinash Burns, attest that this documentation has been prepared under the direction and in the presence of Willian Aviles MD  Electronically Signed: Cristina Peck  11/19/18     I, Willian Aviles, personally performed the services described in this documentation  All medical record entries made by the scribe were at my direction and in my presence  I have reviewed the chart and discharge instructions and agree that the record reflects my personal performance and is accurate and complete   Willian Aviles MD  11/19/18

## 2018-11-19 NOTE — LETTER
November 19, 2018     Jeremy Soliman MD  [address] [facility]    Patient: Raguel Bence   YOB: 1965   Date of Visit: 11/19/2018       Dear Dr Cindy Jacobo: Thank you for referring Cece Villalobos to me for evaluation  Below are my notes for this consultation  If you have questions, please do not hesitate to call me  I look forward to following your patient along with you  Sincerely,        [employer], DO        CC: Fransico Giles PA-C  [employer], DO  11/19/2018  3:14 PM  Addendum  [address] [facility] - Outpatient Follow-up Note  Raguel Bence 48 y o  female MRN: [id number]  Encounter: [id number]          ASSESSMENT AND PLAN:    Raguel Bence is a 48 y o  female who presents today in follow-up regarding continued management of her ulcerative colitis  1  Ulcerative colitis with rectal bleeding  She is currently on Humira every other week, mesalamine, hyoscyamine, and Prednisone 40 mg but continues to experience abdominal pain, hematochezia, frequent diarrhea, lack of appetite, and nausea  CRP on 11/1 was elevated at 39 9, fecal calprotectin was elevated at 378  She did get Humira level and antibody drawn but results are currently pending  She has been taking the Prednisone for two weeks without relief of symptoms  She will eat a very little amount of food each day with approximately 5 BM during the day and several throughout the night  Explained that it would be best to have her admitted to the hospital for IV steroids and to have her scheduled for a flexible sigmoidoscopy to help alleviate symptoms and evaluation of her colitis  She will be admitted to [facility] and I will inform the admitting team and the rounding GI team about this pt  Recent C diff was negative  Would repeat this and then start of solumedrol 20 IV Q12 if remains neg    Plan clear liquid diet and two tap water enemas and plan flex sig with biopsies to assess for infectious vs inflammatory cause of her continued symptoms despite prednisone 40 mg for the past two weeks  Also will need IVF hydration  Follow up in office in one month  ______________________________________________________________________    SUBJECTIVE:  Doc Johnny is a 48 y o  female who presents today in follow-up regarding continued management of her ulcerative colitis  Last seen by Margo Castaneda PA-C on 11/1/18 at which time patient reported worsening abdominal pain, diarrhea, and hematochezia for 2 months  CRP on 11/1 was elevated at 39 9, fecal calprotectin was elevated at 378  CBC on 11/1 showed low RBC of 3 7, low Hgb of 11, and elevated platelets of 336  Patient was negative for C diff  Adalimumad concentration and anti-adalimumad levels were ordered and results are currently pending  She is currently on Humira every other week as well as mesalamine 4 g, hyoscyamine, and Prednisone  Today, she reports that she is not doing well, experiencing frequent bowel movements, loss of appetite, abdominal pain, and nausea  She has been taking Prednisone for two weeks and has been using the mesalamine enemas but reports difficulty with keeping these down  She had 5 BM in the middle of the night last night  She will have intermittent blood in the stool but believes that when there is a reduction in blood it is because she does not eat a significant amount each day  Yesterday, she only had one drink and a piece of toast but had several BM  She has been on Humira for over a year with good compliance  Last colonoscopy was one year ago  REVIEW OF SYSTEMS IS OTHERWISE NEGATIVE        Historical Information   Past Medical History:   Diagnosis Date    Adjustment disorder     last assessed 05/16/12    Anemia     HX of    Asthma     mild - intermittent    Bilateral leg edema     Blepharitis     last assessed 02/04/16    Carpal tunnel syndrome     Unspecified laterality    Colitis, acute  Dyspareunia in female     Edema     last assessed 06/22/15    Ganglion     Herniated cervical disc     History of transfusion     Hypokalemia     Hypothyroidism     Hypothyroidism     Insomnia     Lumps on the skin     last assessed 14    Mouth ulcers     last assessed 06/22/15    Nontraumatic tear of left tibialis posterior tendon     Traumatic teart     Polyarthritis     last assessed 16    Raynaud's disease     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 [address]     Temporomandibular disorder     Joint    Thyroid disease     Ulcerative colitis [facility] [address] [facility]     Ulcerative colitis ([facility] [address] [facility]      Past Surgical History:   Procedure Laterality Date    ANKLE SURGERY Left     Tendon repair    CARPAL TUNNEL RELEASE Bilateral      SECTION, LOW TRANSVERSE      COLONOSCOPY      HERNIA REPAIR      umbilical    HYSTERECTOMY      KNEE ARTHROSCOPY Right     LIPECTOMY      Multipe lipoma removals    LIPOMA RESECTION      SC COLONOSCOPY FLX DX W/COLLJ SPEC WHEN PFRMD N/A 2016    Procedure: COLONOSCOPY;  Surgeon: Deyvi Mcclellan DO;  Location: [facility] GI LAB;   Service: Gastroenterology    SC REPAIR FLEX LEG TENDON,SECOND,EA Left 2016    Procedure: REPAIR OF LEFT POSTERIOR TIBIAL TENDON WITH GRAFT, EXPLORATION LEFT ANKLE ;  Surgeon: Natalya Gould DPM;  Location: [facility] OR;  Service: Podiatry    SHOULDER SURGERY Left     bicep tendon and labrum repair    TONSILLECTOMY       Social History   History   Alcohol Use No     Comment: social 1 drink per weeek     History   Drug Use No     History   Smoking Status    Former Smoker    Quit date: 2003   Smokeless Tobacco    Never Used     Comment: Quit , rare use for 2 years     Family History   Problem Relation Age of Onset    Parkinsonism Mother     Rheum arthritis Mother     Heart attack Father     Hypertension Father     Osteoporosis Father     Prostate cancer Father     Hashimoto's thyroiditis Sister     Cancer Paternal Grandfather Penile    Prostate cancer Paternal Grandfather     Crohn's disease Family     Osteoarthritis Family     Rheum arthritis Family     Crohn's disease Other     Crohn's disease Maternal Uncle     Psoriasis Maternal Uncle     Ulcerative colitis Maternal Uncle     Rheum arthritis Maternal Aunt     Ulcerative colitis Family        Meds/Allergies       Current Outpatient Prescriptions:     adalimumab (HUMIRA) 40 mg/0 8 mL PSKT    albuterol (ACCUNEB) 1 25 MG/3ML nebulizer solution    calcium carbonate-vitamin D (OSCAL-D) 500 mg-200 units per tablet    furosemide (LASIX) 40 mg tablet    HUMIRA PEN 40 MG/0 8ML PNKT    hyoscyamine (ANASPAZ,LEVSIN) 0 125 MG tablet    mesalamine (ROWASA) 4 g    predniSONE 10 mg tablet    tacrolimus (PROTOPIC) 0 03 % ointment    zolpidem (AMBIEN) 10 mg tablet    levothyroxine 25 mcg tablet    No Known Allergies        Objective     Blood pressure 126/74, pulse 84, temperature 98 7 °F (37 1 °C), temperature source Tympanic  There is no height or weight on file to calculate BMI  PHYSICAL EXAM:      General Appearance:   Alert, cooperative, no distress   HEENT:   Normocephalic, atraumatic, anicteric      Neck:  Supple, symmetrical, trachea midline   Lungs:   Clear to auscultation bilaterally; no rales, rhonchi or wheezing; respirations unlabored    Heart[de-identified]   Regular rate and rhythm; no murmur, rub, or gallop  Abdomen:   Soft, non-tender, non-distended; normal bowel sounds; no masses, no organomegaly    Genitalia:   Deferred    Rectal:   Deferred    Extremities:  No cyanosis, clubbing or edema    Pulses:  2+ and symmetric    Skin:  No jaundice, rashes, or lesions    Lymph nodes:  No palpable cervical lymphadenopathy        Lab Results:   No visits with results within 1 Day(s) from this visit     Latest known visit with results is:   Appointment on 11/01/2018   Component Date Value    CRP 11/01/2018 39 9*    WBC 11/01/2018 10 15     RBC 11/01/2018 3 77*    Hemoglobin 11/01/2018 11 0*    Hematocrit 11/01/2018 35 1     MCV 11/01/2018 93     MCH 11/01/2018 29 2     MCHC 11/01/2018 31 3*    RDW 11/01/2018 12 2     MPV 11/01/2018 9 6     Platelets 51/30/8321 440*    nRBC 11/01/2018 0     Neutrophils Relative 11/01/2018 61     Immat GRANS % 11/01/2018 0     Lymphocytes Relative 11/01/2018 28     Monocytes Relative 11/01/2018 7     Eosinophils Relative 11/01/2018 3     Basophils Relative 11/01/2018 1     Neutrophils Absolute 11/01/2018 6 12     Immature Grans Absolute 11/01/2018 0 04     Lymphocytes Absolute 11/01/2018 2 88     Monocytes Absolute 11/01/2018 0 71     Eosinophils Absolute 11/01/2018 0 35     Basophils Absolute 11/01/2018 0 05     Sodium 11/01/2018 139     Potassium 11/01/2018 3 9     Chloride 11/01/2018 106     CO2 11/01/2018 26     ANION GAP 11/01/2018 7     BUN 11/01/2018 13     Creatinine 11/01/2018 0 68     Glucose, Fasting 11/01/2018 90     Calcium 11/01/2018 10 3*    Corrected Calcium 11/01/2018 11 1*    AST 11/01/2018 11     ALT 11/01/2018 12     Alkaline Phosphatase 11/01/2018 88     Total Protein 11/01/2018 7 0     Albumin 11/01/2018 3 0*    Total Bilirubin 11/01/2018 0 21     eGFR 11/01/2018 100          Radiology Results:     US Thyroid on 10/30/2018  Impression: Diffusely heterogeneous thyroid gland with several small thyroid nodules as above  No nodule meets current ACR criteria for requiring biopsy or followup ultrasounds  Please note that nodule #3, a 0 8 x 0 4 x 0 7 cm left thyroid lower pole nodule (image [id number]), does correspond to the location of the uptake on the nuclear medicine parathyroid scan, and given its marked hypoechoic and also hypervascular appearance, could  potentially be a intrathyroid parathyroid adenoma  SCRIBE ATTESTATION Pepe BATES PA-C am acting as a scribe while in the presence of the attending physician   ILeatha, personally reviewed and interpreted the study in this report as scribed in my presence  Reference: ACR Thyroid Imaging, Reporting and Data System (TI-RADS): White Paper of the [employer]  J AM Beverly Radiol 1893;33:092-531  (additional recommendations based on American Thyroid Association 2015 guidelines ) Workstation performed: [id number]       CT Parathyroid Study W Wo Contrast on 11/5/2018  Impression: 1   3 8 x 4 2 x 3 mm lesion meeting the CT enhancement criteria suspicious for parathyroid adenoma identified in the right neck, sitting posterior to the inferior right thyroid lobe, and immediately lateral to the trachea  Please see above for additional details  2   Lesion identified by sestamibi and ultrasound located in the inferior left thyroid lobe is most consistent with a thyroid nodule  3   Evidence of chronic sinusitis in involving the right maxillary sinus  Workstation performed: [id number]       Attestation:   By signing my name below, Kiran Kang, attest that this documentation has been prepared under the direction and in the presence of Lenny Chaidez MD  Electronically Signed: Cristina Sanders  11/19/18     I, Lenny Chaidez, personally performed the services described in this documentation  All medical record entries made by the scribe were at my direction and in my presence  I have reviewed the chart and discharge instructions and agree that the record reflects my personal performance and is accurate and complete   Lenny Chaidez MD  11/19/18

## 2018-11-19 NOTE — PROGRESS NOTES
[facility] - Outpatient Follow-up Note  Urban Tucker 48 y o  female MRN: [id number]  Encounter: [id number]          ASSESSMENT AND PLAN:    Urban Tucker is a 48 y o  female who presents today in follow-up regarding continued management of her ulcerative colitis  1  Ulcerative colitis with rectal bleeding  She is currently on Humira every other week, mesalamine, hyoscyamine, and Prednisone 40 mg but continues to experience abdominal pain, hematochezia, frequent diarrhea, lack of appetite, and nausea  CRP on 11/1 was elevated at 39 9, fecal calprotectin was elevated at 378  She did get Humira level and antibody drawn but results are currently pending  She has been taking the Prednisone for two weeks without relief of symptoms  She will eat a very little amount of food each day with approximately 5 BM during the day and several throughout the night  Explained that it would be best to have her admitted to the hospital for IV steroids and to have her scheduled for a flexible sigmoidoscopy to help alleviate symptoms and evaluation of her colitis  She will be admitted to [address] [place] [address] and I will inform the admitting team and the rounding GI team about this pt  Recent C diff was negative  Would repeat this and then start of solumedrol 20 IV Q12 if remains neg  Plan clear liquid diet and two tap water enemas and plan flex sig with biopsies to assess for infectious vs inflammatory cause of her continued symptoms despite prednisone 40 mg for the past two weeks  Also will need IVF hydration  Follow up in office in one month  ______________________________________________________________________    SUBJECTIVE:  Urban Tucker is a 48 y o  female who presents today in follow-up regarding continued management of her ulcerative colitis  Last seen by Ni Neri PA-C on 11/1/18 at which time patient reported worsening abdominal pain, diarrhea, and hematochezia for 2 months  CRP on 11/1 was elevated at 39 9, fecal calprotectin was elevated at 378  CBC on 11/1 showed low RBC of 3 7, low Hgb of 11, and elevated platelets of 425  Patient was negative for C diff  Adalimumad concentration and anti-adalimumad levels were ordered and results are currently pending  She is currently on Humira every other week as well as mesalamine 4 g, hyoscyamine, and Prednisone  Today, she reports that she is not doing well, experiencing frequent bowel movements, loss of appetite, abdominal pain, and nausea  She has been taking Prednisone for two weeks and has been using the mesalamine enemas but reports difficulty with keeping these down  She had 5 BM in the middle of the night last night  She will have intermittent blood in the stool but believes that when there is a reduction in blood it is because she does not eat a significant amount each day  Yesterday, she only had one drink and a piece of toast but had several BM  She has been on Humira for over a year with good compliance  Last colonoscopy was one year ago  REVIEW OF SYSTEMS IS OTHERWISE NEGATIVE        Historical Information   Past Medical History:   Diagnosis Date    Adjustment disorder     last assessed 05/16/12    Anemia     HX of    Asthma     mild - intermittent    Bilateral leg edema     Blepharitis     last assessed 02/04/16    Carpal tunnel syndrome     Unspecified laterality    Colitis, acute     Dyspareunia in female     Edema     last assessed 06/22/15    Ganglion     Herniated cervical disc     History of transfusion     Hypokalemia     Hypothyroidism     Hypothyroidism     Insomnia     Lumps on the skin     last assessed 03/12/14    Mouth ulcers     last assessed 06/22/15    Nontraumatic tear of left tibialis posterior tendon     Traumatic teart     Polyarthritis     last assessed 06/24/16    Raynaud's disease     Rayna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 ([facility] [address]     Temporomandibular disorder     Joint    Thyroid disease     Ulcerative colitis [facility] [address]     Ulcerative colitis ([facility] [address] [facility]      Past Surgical History:   Procedure Laterality Date    ANKLE SURGERY Left     Tendon repair    CARPAL TUNNEL RELEASE Bilateral      SECTION, LOW TRANSVERSE      COLONOSCOPY      HERNIA REPAIR      umbilical    HYSTERECTOMY      KNEE ARTHROSCOPY Right     LIPECTOMY      Multipe lipoma removals    LIPOMA RESECTION      NH COLONOSCOPY FLX DX W/COLLJ SPEC WHEN PFRMD N/A 2016    Procedure: COLONOSCOPY;  Surgeon: Yaron Ness DO;  Location: [facility];   Service: Gastroenterology    NH REPAIR FLEX LEG TENDON,SECOND,EA Left 2016    Procedure: REPAIR OF LEFT POSTERIOR TIBIAL TENDON WITH GRAFT, EXPLORATION LEFT ANKLE ;  Surgeon: Mono Abbott DPM;  Location: [facility] OR;  Service: Podiatry    SHOULDER SURGERY Left     bicep tendon and labrum repair    TONSILLECTOMY       Social History   History   Alcohol Use No     Comment: social 1 drink per weeek     History   Drug Use No     History   Smoking Status    Former Smoker    Quit date: 2003   Smokeless Tobacco    Never Used     Comment: Quit , rare use for 2 years     Family History   Problem Relation Age of Onset    Parkinsonism Mother     Rheum arthritis Mother     Heart attack Father     Hypertension Father     Osteoporosis Father     Prostate cancer Father     Hashimoto's thyroiditis Sister     Cancer Paternal Grandfather         Penile    Prostate cancer Paternal Grandfather     Crohn's disease Family     Osteoarthritis Family     Rheum arthritis Family     Crohn's disease Other     Crohn's disease Maternal Uncle     Psoriasis Maternal Uncle     Ulcerative colitis Maternal Uncle     Rheum arthritis Maternal Aunt     Ulcerative colitis Family        Meds/Allergies       Current Outpatient Prescriptions:     adalimumab (HUMIRA) 40 mg/0 8 mL PSKT    albuterol (ACCUNEB) 1 25 MG/3ML nebulizer solution    calcium carbonate-vitamin D (OSCAL-D) 500 mg-200 units per tablet    furosemide (LASIX) 40 mg tablet    HUMIRA PEN 40 MG/0 8ML PNKT    hyoscyamine (ANASPAZ,LEVSIN) 0 125 MG tablet    mesalamine (ROWASA) 4 g    predniSONE 10 mg tablet    tacrolimus (PROTOPIC) 0 03 % ointment    zolpidem (AMBIEN) 10 mg tablet    levothyroxine 25 mcg tablet    No Known Allergies        Objective     Blood pressure 126/74, pulse 84, temperature 98 7 °F (37 1 °C), temperature source Tympanic  There is no height or weight on file to calculate BMI  PHYSICAL EXAM:      General Appearance:   Alert, cooperative, no distress   HEENT:   Normocephalic, atraumatic, anicteric      Neck:  Supple, symmetrical, trachea midline   Lungs:   Clear to auscultation bilaterally; no rales, rhonchi or wheezing; respirations unlabored    Heart[de-identified]   Regular rate and rhythm; no murmur, rub, or gallop  Abdomen:   Soft, non-tender, non-distended; normal bowel sounds; no masses, no organomegaly    Genitalia:   Deferred    Rectal:   Deferred    Extremities:  No cyanosis, clubbing or edema    Pulses:  2+ and symmetric    Skin:  No jaundice, rashes, or lesions    Lymph nodes:  No palpable cervical lymphadenopathy        Lab Results:   No visits with results within 1 Day(s) from this visit     Latest known visit with results is:   Appointment on 11/01/2018   Component Date Value    CRP 11/01/2018 39 9*    WBC 11/01/2018 10 15     RBC 11/01/2018 3 77*    Hemoglobin 11/01/2018 11 0*    Hematocrit 11/01/2018 35 1     MCV 11/01/2018 93     MCH 11/01/2018 29 2     MCHC 11/01/2018 31 3*    RDW 11/01/2018 12 2     MPV 11/01/2018 9 6     Platelets 12/71/1620 440*    nRBC 11/01/2018 0     Neutrophils Relative 11/01/2018 61     Immat GRANS % 11/01/2018 0     Lymphocytes Relative 11/01/2018 28     Monocytes Relative 11/01/2018 7     Eosinophils Relative 11/01/2018 3     Basophils Relative 11/01/2018 1     Neutrophils Absolute 11/01/2018 6 12     Immature Grans Absolute 11/01/2018 0 04  Lymphocytes Absolute 11/01/2018 2 88     Monocytes Absolute 11/01/2018 0 71     Eosinophils Absolute 11/01/2018 0 35     Basophils Absolute 11/01/2018 0 05     Sodium 11/01/2018 139     Potassium 11/01/2018 3 9     Chloride 11/01/2018 106     CO2 11/01/2018 26     ANION GAP 11/01/2018 7     BUN 11/01/2018 13     Creatinine 11/01/2018 0 68     Glucose, Fasting 11/01/2018 90     Calcium 11/01/2018 10 3*    Corrected Calcium 11/01/2018 11 1*    AST 11/01/2018 11     ALT 11/01/2018 12     Alkaline Phosphatase 11/01/2018 88     Total Protein 11/01/2018 7 0     Albumin 11/01/2018 3 0*    Total Bilirubin 11/01/2018 0 21     eGFR 11/01/2018 100          Radiology Results:     US Thyroid on 10/30/2018  Impression: Diffusely heterogeneous thyroid gland with several small thyroid nodules as above  No nodule meets current ACR criteria for requiring biopsy or followup ultrasounds  Please note that nodule #3, a 0 8 x 0 4 x 0 7 cm left thyroid lower pole nodule (image [id number]), does correspond to the location of the uptake on the nuclear medicine parathyroid scan, and given its marked hypoechoic and also hypervascular appearance, could  potentially be a intrathyroid parathyroid adenoma  SCRIBE ATTESTATION Ira BATES PA-C am acting as a scribe while in the presence of the attending physician  Dina BATES, personally reviewed and interpreted the study in this report as scribed in my presence  Reference: ACR Thyroid Imaging, Reporting and Data System (TI-RADS): White Paper of the [employer]   J AM Beverly Radiol 0623;94:[id number]  (additional recommendations based on American Thyroid Association 2015 guidelines ) Workstation performed: [id number]       CT Parathyroid Study W Wo Contrast on 11/5/2018  Impression: 1   3 8 x 4 2 x 3 mm lesion meeting the CT enhancement criteria suspicious for parathyroid adenoma identified in the right neck, sitting posterior to the inferior right thyroid lobe, and immediately lateral to the trachea  Please see above for additional details  2   Lesion identified by sestamibi and ultrasound located in the inferior left thyroid lobe is most consistent with a thyroid nodule  3   Evidence of chronic sinusitis in involving the right maxillary sinus  Workstation performed: [id number]       Attestation:   By signing my name below, Virgie Perkins, attest that this documentation has been prepared under the direction and in the presence of James Al MD  Electronically Signed: Sherry Arriaga  11/19/18     I, James Al, personally performed the services described in this documentation  All medical record entries made by the sherry were at my direction and in my presence  I have reviewed the chart and discharge instructions and agree that the record reflects my personal performance and is accurate and complete   James Al MD  11/19/18

## 2018-11-20 ENCOUNTER — ANESTHESIA EVENT (INPATIENT)
Dept: GASTROENTEROLOGY | Facility: HOSPITAL | Age: 53
DRG: 387 | End: 2018-11-20
Payer: COMMERCIAL

## 2018-11-20 ENCOUNTER — ANESTHESIA (INPATIENT)
Dept: GASTROENTEROLOGY | Facility: HOSPITAL | Age: 53
DRG: 387 | End: 2018-11-20
Payer: COMMERCIAL

## 2018-11-20 LAB
ANION GAP SERPL CALCULATED.3IONS-SCNC: 8 MMOL/L (ref 4–13)
BUN SERPL-MCNC: 4 MG/DL (ref 5–25)
C DIFF TOX GENS STL QL NAA+PROBE: NORMAL
CALCIUM SERPL-MCNC: 9.8 MG/DL (ref 8.3–10.1)
CHLORIDE SERPL-SCNC: 107 MMOL/L (ref 100–108)
CO2 SERPL-SCNC: 27 MMOL/L (ref 21–32)
CREAT SERPL-MCNC: 0.75 MG/DL (ref 0.6–1.3)
ERYTHROCYTE [DISTWIDTH] IN BLOOD BY AUTOMATED COUNT: 12.3 % (ref 11.6–15.1)
GFR SERPL CREATININE-BSD FRML MDRD: 91 ML/MIN/1.73SQ M
GLUCOSE SERPL-MCNC: 95 MG/DL (ref 65–140)
HCT VFR BLD AUTO: 30.2 % (ref 34.8–46.1)
HGB BLD-MCNC: 9.6 G/DL (ref 11.5–15.4)
MCH RBC QN AUTO: 29 PG (ref 26.8–34.3)
MCHC RBC AUTO-ENTMCNC: 31.8 G/DL (ref 31.4–37.4)
MCV RBC AUTO: 91 FL (ref 82–98)
PLATELET # BLD AUTO: 443 THOUSANDS/UL (ref 149–390)
PMV BLD AUTO: 9.1 FL (ref 8.9–12.7)
POTASSIUM SERPL-SCNC: 3.6 MMOL/L (ref 3.5–5.3)
RBC # BLD AUTO: 3.31 MILLION/UL (ref 3.81–5.12)
SODIUM SERPL-SCNC: 142 MMOL/L (ref 136–145)
WBC # BLD AUTO: 13.32 THOUSAND/UL (ref 4.31–10.16)

## 2018-11-20 PROCEDURE — 88341 IMHCHEM/IMCYTCHM EA ADD ANTB: CPT | Performed by: PATHOLOGY

## 2018-11-20 PROCEDURE — 88342 IMHCHEM/IMCYTCHM 1ST ANTB: CPT | Performed by: PATHOLOGY

## 2018-11-20 PROCEDURE — 80048 BASIC METABOLIC PNL TOTAL CA: CPT | Performed by: INTERNAL MEDICINE

## 2018-11-20 PROCEDURE — 88305 TISSUE EXAM BY PATHOLOGIST: CPT | Performed by: PATHOLOGY

## 2018-11-20 PROCEDURE — 85027 COMPLETE CBC AUTOMATED: CPT | Performed by: INTERNAL MEDICINE

## 2018-11-20 PROCEDURE — 0DBL8ZX EXCISION OF TRANSVERSE COLON, VIA NATURAL OR ARTIFICIAL OPENING ENDOSCOPIC, DIAGNOSTIC: ICD-10-PCS | Performed by: INTERNAL MEDICINE

## 2018-11-20 PROCEDURE — 45380 COLONOSCOPY AND BIOPSY: CPT | Performed by: INTERNAL MEDICINE

## 2018-11-20 PROCEDURE — 99254 IP/OBS CNSLTJ NEW/EST MOD 60: CPT | Performed by: INTERNAL MEDICINE

## 2018-11-20 PROCEDURE — 99232 SBSQ HOSP IP/OBS MODERATE 35: CPT | Performed by: INTERNAL MEDICINE

## 2018-11-20 RX ORDER — METHYLPREDNISOLONE SODIUM SUCCINATE 40 MG/ML
20 INJECTION, POWDER, LYOPHILIZED, FOR SOLUTION INTRAMUSCULAR; INTRAVENOUS EVERY 8 HOURS SCHEDULED
Status: DISCONTINUED | OUTPATIENT
Start: 2018-11-20 | End: 2018-11-20

## 2018-11-20 RX ORDER — SODIUM CHLORIDE 9 MG/ML
INJECTION, SOLUTION INTRAVENOUS CONTINUOUS PRN
Status: DISCONTINUED | OUTPATIENT
Start: 2018-11-20 | End: 2018-11-20 | Stop reason: SURG

## 2018-11-20 RX ORDER — METHYLPREDNISOLONE SODIUM SUCCINATE 40 MG/ML
20 INJECTION, POWDER, LYOPHILIZED, FOR SOLUTION INTRAMUSCULAR; INTRAVENOUS EVERY 12 HOURS SCHEDULED
Status: DISCONTINUED | OUTPATIENT
Start: 2018-11-20 | End: 2018-11-20

## 2018-11-20 RX ORDER — MELATONIN
3000 DAILY
Status: DISCONTINUED | OUTPATIENT
Start: 2018-11-21 | End: 2018-11-25 | Stop reason: HOSPADM

## 2018-11-20 RX ORDER — METHYLPREDNISOLONE SODIUM SUCCINATE 40 MG/ML
20 INJECTION, POWDER, LYOPHILIZED, FOR SOLUTION INTRAMUSCULAR; INTRAVENOUS EVERY 8 HOURS SCHEDULED
Status: DISCONTINUED | OUTPATIENT
Start: 2018-11-20 | End: 2018-11-23

## 2018-11-20 RX ORDER — ALBUTEROL SULFATE 2.5 MG/3ML
2.5 SOLUTION RESPIRATORY (INHALATION) EVERY 6 HOURS PRN
Status: DISCONTINUED | OUTPATIENT
Start: 2018-11-20 | End: 2018-11-25 | Stop reason: HOSPADM

## 2018-11-20 RX ORDER — PROPOFOL 10 MG/ML
INJECTION, EMULSION INTRAVENOUS AS NEEDED
Status: DISCONTINUED | OUTPATIENT
Start: 2018-11-20 | End: 2018-11-20 | Stop reason: SURG

## 2018-11-20 RX ADMIN — LIDOCAINE HYDROCHLORIDE 100 MG: 20 INJECTION, SOLUTION INTRAVENOUS at 15:21

## 2018-11-20 RX ADMIN — PROPOFOL 30 MG: 10 INJECTION, EMULSION INTRAVENOUS at 15:30

## 2018-11-20 RX ADMIN — DEXTROSE, SODIUM CHLORIDE, AND POTASSIUM CHLORIDE 100 ML/HR: 5; .45; .15 INJECTION INTRAVENOUS at 22:51

## 2018-11-20 RX ADMIN — HYOSCYAMINE SULFATE 0.12 MG: 0.12 TABLET ORAL at 10:21

## 2018-11-20 RX ADMIN — PROPOFOL 200 MG: 10 INJECTION, EMULSION INTRAVENOUS at 15:21

## 2018-11-20 RX ADMIN — METHYLPREDNISOLONE SODIUM SUCCINATE 20 MG: 40 INJECTION, POWDER, FOR SOLUTION INTRAMUSCULAR; INTRAVENOUS at 16:47

## 2018-11-20 RX ADMIN — LEVOTHYROXINE SODIUM 25 MCG: 25 TABLET ORAL at 05:29

## 2018-11-20 RX ADMIN — ACETAMINOPHEN 650 MG: 325 TABLET, FILM COATED ORAL at 17:13

## 2018-11-20 RX ADMIN — SODIUM CHLORIDE: 0.9 INJECTION, SOLUTION INTRAVENOUS at 12:50

## 2018-11-20 RX ADMIN — PROPOFOL 30 MG: 10 INJECTION, EMULSION INTRAVENOUS at 15:26

## 2018-11-20 RX ADMIN — DEXTROSE, SODIUM CHLORIDE, AND POTASSIUM CHLORIDE 100 ML/HR: 5; .45; .15 INJECTION INTRAVENOUS at 08:04

## 2018-11-20 RX ADMIN — ZOLPIDEM TARTRATE 10 MG: 5 TABLET, FILM COATED ORAL at 21:29

## 2018-11-20 RX ADMIN — ACETAMINOPHEN 650 MG: 325 TABLET, FILM COATED ORAL at 11:06

## 2018-11-20 RX ADMIN — PROPOFOL 30 MG: 10 INJECTION, EMULSION INTRAVENOUS at 15:24

## 2018-11-20 RX ADMIN — METRONIDAZOLE 500 MG: 500 INJECTION, SOLUTION INTRAVENOUS at 23:55

## 2018-11-20 RX ADMIN — METRONIDAZOLE 500 MG: 500 INJECTION, SOLUTION INTRAVENOUS at 17:06

## 2018-11-20 RX ADMIN — ACETAMINOPHEN 650 MG: 325 TABLET, FILM COATED ORAL at 05:31

## 2018-11-20 NOTE — ANESTHESIA PREPROCEDURE EVALUATION
Review of Systems/Medical History  Patient summary reviewed  Chart reviewed  No history of anesthetic complications     Cardiovascular   Pulmonary  Asthma ,        GI/Hepatic      Comment: UC          Endo/Other  History of thyroid disease , hypothyroidism,      GYN       Hematology  Anemia ,     Musculoskeletal    Comment: Psoriatic Arthritis  Raynaud's      Neurology   Psychology           Physical Exam    Airway    Mallampati score: II  TM Distance: >3 FB  Neck ROM: full     Dental       Cardiovascular      Pulmonary      Other Findings        Anesthesia Plan  ASA Score- 3     Anesthesia Type- IV sedation with anesthesia with ASA Monitors  Additional Monitors:   Airway Plan:         Plan Factors-    Induction- intravenous  Postoperative Plan-     Informed Consent- Anesthetic plan and risks discussed with patient  I personally reviewed this patient with the CRNA  Discussed and agreed on the Anesthesia Plan with the CRNA  Staci Bright

## 2018-11-20 NOTE — CONSULTS
Consultation - [address] [facility]  Sneha Kerri 48 y o  female MRN: [id number]  Unit/Bed#: -01 Encounter: [id number]    Inpatient consult to gastroenterology  Consult performed by: Javier Curran ordered by: Ramiro Campbell      Reason for Consult / Principal Problem: ulcerative colitis with rectal bleeding    ASSESSMENT AND PLAN:      Diarrhea with rectal bleeding in the setting of ulcerative colitis  -Follows with Dr Unique Martinez for 1 year, she had been doing well up until September when she started with loose bloody stools and abdominal pain  -inflammatory markers elevated CRP 39 9 fecal calprotectin 378  Hemoglobin is 9 6 Albumin is 2 7  -r/o cdiff, was negative 11/1, if negative will start her on 20mg IV solumedrol BID  -continue mesalamine enemas  -plan for flex sig today to assess severity of inflammation, also to assess for infectious causes of colitis such as CMV    ______________________________________________________________________    HPI:  Shaina Finnegan is a 49 y/o female with a history of ulcerative pancolitis on humira who presents with worsening symptoms  She sees Dr Rosa Stanley as an outpatient and was seen in the office yesterday and sent to the hospital given lack of response to PO steroids  She has been on humira for about a year and had been doing well until about September when her symptoms starting flaring  She developed loose stools with blood, going about 7-8 times daily  She was seen by GI and found to have elevated inflammatory markers and a negative cdiff 11/1  Humira antibodies are pending  She has general abdominal cramping, nausea with 1 episode of vomiting about 1 week ago  Her last colonoscopy was in 2016 and she had severe proctitis as well as colitis throughout the colon  She has lost about 10 pounds in the last 3 weeks  She states she has not been eating because it causes her to run to the bathroom   She didn't notice significant change on PO steroids, she noticed a slight decrease in bleeding after the enemas were started       REVIEW OF SYSTEMS:    CONSTITUTIONAL: Denies any fever, chills + weight loss  HEENT: Denies hearing loss or visual disturbances  CARDIOVASCULAR: No chest pain or palpitations  RESPIRATORY: Denies any cough, hemoptysis, shortness of breath or dyspnea on exertion  GASTROINTESTINAL: As noted in the History of Present Illness  GENITOURINARY: No problems with urination  NEUROLOGIC: +dizziness, denies headaches  MUSCULOSKELETAL: Denies any muscle or joint pain  SKIN: Denies skin rashes or itching  ENDOCRINE: Denies excessive thirst  Denies intolerance to heat or cold  PSYCHOSOCIAL: Denies depression or anxiety  Denies any recent memory loss         Historical Information   Past Medical History:   Diagnosis Date    Adjustment disorder     last assessed 12    Anemia     HX of    Asthma     mild - intermittent    Bilateral leg edema     Blepharitis     last assessed 16    Carpal tunnel syndrome     Unspecified laterality    Colitis, acute     Dyspareunia in female     Edema     last assessed 06/22/15    Ganglion     Herniated cervical disc     History of transfusion     Hypokalemia     Hypothyroidism     Hypothyroidism     Insomnia     Lumps on the skin     last assessed 14    Mouth ulcers     last assessed 06/22/15    Nontraumatic tear of left tibialis posterior tendon     Traumatic teart     Polyarthritis     last assessed 16    Raynaud's disease     Rayna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 ([address]     Temporomandibular disorder     Joint    Thyroid disease     Ulcerative colitis ([address]     Ulcerative colitis ([address] )      Past Surgical History:   Procedure Laterality Date    ANKLE SURGERY Left     Tendon repair    CARPAL TUNNEL RELEASE Bilateral      SECTION, LOW TRANSVERSE      COLONOSCOPY      HERNIA REPAIR      umbilical    HYSTERECTOMY      KNEE ARTHROSCOPY Right     LIPECTOMY      Multipe lipoma removals    LIPOMA RESECTION      SD COLONOSCOPY FLX DX W/COLLJ SPEC WHEN PFRMD N/A 11/1/2016    Procedure: COLONOSCOPY;  Surgeon: Machelle Petit DO;  Location: [facility] GI LAB;   Service: Gastroenterology    SD REPAIR FLEX LEG TENDON,SECOND,EA Left 8/12/2016    Procedure: REPAIR OF LEFT POSTERIOR TIBIAL TENDON WITH GRAFT, EXPLORATION LEFT ANKLE ;  Surgeon: Paige Schultz DPM;  Location: [facility] OR;  Service: Podiatry    SHOULDER SURGERY Left     bicep tendon and labrum repair    TONSILLECTOMY       Social History   History   Alcohol Use No     Comment: social 1 drink per weeek     History   Drug Use No     History   Smoking Status    Former Smoker    Quit date: 4/6/2003   Smokeless Tobacco    Never Used     Comment: Quit 1991, rare use for 2 years     Family History   Problem Relation Age of Onset    Parkinsonism Mother     Rheum arthritis Mother     Heart attack Father     Hypertension Father     Osteoporosis Father     Prostate cancer Father     Hashimoto's thyroiditis Sister     Cancer Paternal Grandfather         Penile    Prostate cancer Paternal Grandfather     Crohn's disease Family     Osteoarthritis Family     Rheum arthritis Family     Crohn's disease Other     Crohn's disease Maternal Uncle     Psoriasis Maternal Uncle     Ulcerative colitis Maternal Uncle     Rheum arthritis Maternal Aunt     Ulcerative colitis Family        Meds/Allergies     Prescriptions Prior to Admission   Medication    adalimumab (HUMIRA) 40 mg/0 8 mL PSKT    cholecalciferol (VITAMIN D3) 1,000 units tablet    hyoscyamine (ANASPAZ,LEVSIN) 0 125 MG tablet    levothyroxine 25 mcg tablet    mesalamine (ROWASA) 4 g    predniSONE 10 mg tablet    zolpidem (AMBIEN) 10 mg tablet    albuterol (ACCUNEB) 1 25 MG/3ML nebulizer solution    calcium carbonate-vitamin D (OSCAL-D) 500 mg-200 units per tablet    furosemide (LASIX) 40 mg tablet    HUMIRA PEN 40 MG/0 8ML PNKT    tacrolimus (PROTOPIC) 0 03 % ointment     Current Facility-Administered Medications   Medication Dose Route Frequency    acetaminophen (TYLENOL) tablet 650 mg  650 mg Oral Q6H PRN    albuterol inhalation solution 2 5 mg  2 5 mg Nebulization Q6H PRN    dextrose 5 % and sodium chloride 0 45 % with KCl 20 mEq/L infusion  100 mL/hr Intravenous Continuous    hyoscyamine (LEVSIN/SL) SL tablet 0 125 mg  0 125 mg Oral Q4H PRN    levothyroxine tablet 25 mcg  25 mcg Oral Early Morning    mesalamine (ROWASA) enema 4 g  4 g Rectal HS    ondansetron (ZOFRAN) injection 4 mg  4 mg Intravenous Q6H PRN    zolpidem (AMBIEN) tablet 10 mg  10 mg Oral HS PRN       No Known Allergies        Objective     Blood pressure 90/52, pulse 74, temperature 99 8 °F (37 7 °C), temperature source Oral, resp  rate 16, height 5' 3" (1 6 m), weight 69 7 kg (153 lb 11 2 oz), SpO2 98 %  Body mass index is 27 23 kg/m²  Intake/Output Summary (Last 24 hours) at 11/20/18 0855  Last data filed at 11/20/18 0804   Gross per 24 hour   Intake             1030 ml   Output             1100 ml   Net              -70 ml         PHYSICAL EXAM:      General Appearance:   Alert, cooperative, no distress   HEENT:   Normocephalic, atraumatic, anicteric      Neck:  Supple, symmetrical, trachea midline   Lungs:   Clear to auscultation bilaterally   Heart[de-identified]   Regular rate and rhythm; no murmur, rub, or gallop     Abdomen:   Soft, mildly tender diffusely, non-distended; normal bowel sounds; no masses, no organomegaly    Genitalia:   Deferred    Rectal:   Deferred    Extremities:  Mild bilateral pitting edema   Pulses:  2+ and symmetric all extremities    Skin:  No jaundice, rashes, or lesions    Lymph nodes:  No palpable cervical lymphadenopathy        Lab Results:   Admission on 11/19/2018   Component Date Value    TSH 3RD GENERATON 11/19/2018 0 707     Sodium 11/19/2018 139     Potassium 11/19/2018 3 5     Chloride 11/19/2018 104     CO2 11/19/2018 28     ANION GAP 11/19/2018 7     BUN 11/19/2018 5     Creatinine 11/19/2018 0 85     Glucose 11/19/2018 148*    Calcium 11/19/2018 9 9     AST 11/19/2018 8     ALT 11/19/2018 16     Alkaline Phosphatase 11/19/2018 82     Total Protein 11/19/2018 6 3*    Albumin 11/19/2018 2 7*    Total Bilirubin 11/19/2018 0 20     eGFR 11/19/2018 78     Magnesium 11/19/2018 1 8     Phosphorus 11/19/2018 2 9     WBC 11/19/2018 10 76*    RBC 11/19/2018 3 54*    Hemoglobin 11/19/2018 10 2*    Hematocrit 11/19/2018 32 4*    MCV 11/19/2018 92     MCH 11/19/2018 28 8     MCHC 11/19/2018 31 5     RDW 11/19/2018 12 4     MPV 11/19/2018 8 8*    Platelets 92/31/1178 503*    nRBC 11/19/2018 0     Neutrophils Relative 11/19/2018 78*    Immat GRANS % 11/19/2018 0     Lymphocytes Relative 11/19/2018 17     Monocytes Relative 11/19/2018 5     Eosinophils Relative 11/19/2018 0     Basophils Relative 11/19/2018 0     Neutrophils Absolute 11/19/2018 8 33*    Immature Grans Absolute 11/19/2018 0 03     Lymphocytes Absolute 11/19/2018 1 80     Monocytes Absolute 11/19/2018 0 58     Eosinophils Absolute 11/19/2018 0 01     Basophils Absolute 11/19/2018 0 01     Protime 11/19/2018 13 1     INR 11/19/2018 1 02     PTT 11/19/2018 24*    WBC 11/20/2018 13 32*    RBC 11/20/2018 3 31*    Hemoglobin 11/20/2018 9 6*    Hematocrit 11/20/2018 30 2*    MCV 11/20/2018 91     MCH 11/20/2018 29 0     MCHC 11/20/2018 31 8     RDW 11/20/2018 12 3     Platelets 98/76/5454 443*    MPV 11/20/2018 9 1     Sodium 11/20/2018 142     Potassium 11/20/2018 3 6     Chloride 11/20/2018 107     CO2 11/20/2018 27     ANION GAP 11/20/2018 8     BUN 11/20/2018 4*    Creatinine 11/20/2018 0 75     Glucose 11/20/2018 95     Calcium 11/20/2018 9 8     eGFR 11/20/2018 91        Imaging Studies: I have personally reviewed pertinent imaging studies

## 2018-11-20 NOTE — UTILIZATION REVIEW
Initial Clinical Review    Admission: Date/Time/Statement: 11/19/18 @ 783 2304     Orders Placed This Encounter   Procedures    Inpatient Admission     Standing Status:   Standing     Number of Occurrences:   1     Order Specific Question:   Admitting Physician     Answer:   Jalyan Mendoza [[id number]]     Order Specific Question:   Level of Care     Answer:   Med Surg [[id number]]     Order Specific Question:   Estimated length of stay     Answer:   More than 2 Midnights     Order Specific Question:   Certification     Answer:   I certify that inpatient services are medically necessary for this patient for a duration of greater than two midnights  See H&P and MD Progress Notes for additional information about the patient's course of treatment  Chief Complaint: Diarrhea and rectal bleeding  History of Illness: 48 y o  female who presents to [facility] as a direct admit from the GI doctor (Dr Netta Hicks) due to persistent diarrhea and rectal bleeding  Patient has history of ulcerative colitis and lately, according to patient's GI doctor, patient has been failing outpatient treatment  Patient claims that she has been having persistent diarrhea as well as rectal bleeding for some time now  According to the patient, 2 weeks ago, she was started on prednisone 2 weeks ago, however, without any relief of her symptoms  Patient was also started with mesalamine enemas, but again no improvement  Patient is on urea every other week  Patient also has been having very poor appetite and has not been really eating well  Patient has occasional nausea  Patient having generalized abdominal pains for some time now  Approximately 2 weeks ago, patient had negative C difficile PCR, her CRP as well as fecal calprotectin was noted to be high at that point  Patient had Humira antibody level drawn, but no results yet    Patient was sent here by the GI doctor for further evaluation management and likely for IV Solu-Medrol once patient is ruled out again for C difficile diarrhea    ED Vital Signs:   ED Triage Vitals [11/19/18 1736]   Temperature Pulse Respirations Blood Pressure SpO2   98 4 °F (36 9 °C) 65 18 144/74 100 %      Temp Source Heart Rate Source Patient Position - Orthostatic VS BP Location FiO2 (%)   Oral -- Lying Left arm --      Pain Score       5        Wt Readings from Last 1 Encounters:   11/19/18 69 7 kg (153 lb 11 2 oz)       Vital Signs (abnormal):   11/20/18 0745  99 8 °F (37 7 °C)  74  16  90/52  98 %  None (Room air)     Exam - dry oral mucosa  General abdominal tenderness,    Abnormal Labs/Diagnostic Test Results:   Wbc 10 76, hgb 10 2,  hct 32 4  Glucose 148  Total protein 6 3  Albumin 2 7      11/20/2018-  Wbc 13 32, hgb 9 6, hct 30 2  Platelets 285  Bun 4  Stool clostridium difficile in process    Past Medical/Surgical History:   Past Medical History:   Diagnosis Date    Adjustment disorder     Anemia     Asthma     Bilateral leg edema     Blepharitis     Carpal tunnel syndrome     Colitis, acute     Dyspareunia in female     Edema     Ganglion     Herniated cervical disc     History of transfusion     Hypokalemia     Hypothyroidism     Hypothyroidism     Insomnia     Lumps on the skin     Mouth ulcers     Nontraumatic tear of left tibialis posterior tendon     Polyarthritis     Raynaud's disease     Rayna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 [address] [address]     Temporomandibular disorder     Thyroid disease     Ulcerative colitis ([facility]     Ulcerative colitis ([facility] )        Admitting Diagnosis: Ulcerative colitis with rectal bleeding, unspecified location [facility])    Age/Sex: 48 y o  female    Assessment/Plan:   Ulcerative colitis with rectal bleeding (HCC)   Assessment & Plan     · History of ulcerative colitis; according to Dr Donny Swain, GI doctor, patient failed outpatient treatment  Patient was on Humira, oral prednisone and mesalamine    · With significant diarrhea, rectal bleeding, poor appetite  · From the instructions of the GI doctor: We will rule out 1st possibility that patient has Clostridium difficile infection  Thus patient will be contact precautions and C difficile PCR will be sent  Only if the test is negative, that we may start patient on IV steroids with Solu-Medrol at 20 mg IV q 12 hours  Patient should be on clear liquid diet for now  Patient will eventually need flexible sigmoidoscopy with enema prep  According to the Dr Roxana White, this will be ordered by the GI doctor here in [place]  At this point in time, patient wants to expedite things and she told me that she would want to be NPO post midnight so that if possible, they can do the sigmoidoscopy as soon as possible/tomorrow  GI doctor wants me to hold off on patient's oral prednisone at this point as we are ruling out the possibility of C difficile infection  For now, continue with patient's mesalamine  · Monitor CBC/hemoglobin hematocrit  · Transfuse as needed       Hypercalcemia   Assessment & Plan     · On the last blood exam, November 1, 2018, patient's calcium was already elevated at that point  · Thus we will hold off patient's calcium with vitamin-D as well as vitamin-D medications in the meantime  · Check CMP  · IV fluids  · Monitor calcium levels       Asthma, mild intermittent   Assessment & Plan     · No exacerbation  · Continue albuterol p r n         Hypothyroidism   Assessment & Plan     · Continue Synthroid  · Check TSH            Admission Orders:  11/19/2018  1929 INPATIENT   Scheduled Meds:   Current Facility-Administered Medications:  acetaminophen 650 mg Oral Q6H PRN    albuterol 2 5 mg Nebulization Q6H PRN    dextrose 5 % and sodium chloride 0 45 % with KCl 20 mEq/L 100 mL/hr Intravenous Continuous Last Rate: 100 mL/hr (11/20/18 0804)   hyoscyamine 0 125 mg Oral Q4H PRN    levothyroxine 25 mcg Oral Early Morning    mesalamine 4 g Rectal HS    ondansetron 4 mg Intravenous Q6H PRN    zolpidem 10 mg Oral HS PRN      Continuous Infusions:   dextrose 5 % and sodium chloride 0 45 % with KCl 20 mEq/L 100 mL/hr Last Rate: 100 mL/hr (11/20/18 0804)     PRN Meds:   Acetaminophen - used x 2      Zolpidem - used x 1       Tap water enema  scds  NPO after midnight  Consult GI

## 2018-11-20 NOTE — PLAN OF CARE
DISCHARGE PLANNING     Discharge to home or other facility with appropriate resources Progressing        GASTROINTESTINAL - ADULT     Minimal or absence of nausea and/or vomiting Progressing     Maintains or returns to baseline bowel function Progressing     Maintains adequate nutritional intake Progressing        INFECTION - ADULT     Absence or prevention of progression during hospitalization Progressing     Absence of fever/infection during neutropenic period Progressing        Knowledge Deficit     Patient/family/caregiver demonstrates understanding of disease process, treatment plan, medications, and discharge instructions Progressing        Nutrition/Hydration-ADULT     Nutrient/Hydration intake appropriate for improving, restoring or maintaining nutritional needs Progressing        PAIN - ADULT     Verbalizes/displays adequate comfort level or baseline comfort level Progressing        SAFETY ADULT     Patient will remain free of falls Progressing     Maintain or return to baseline ADL function Progressing     Maintain or return mobility status to optimal level Progressing

## 2018-11-20 NOTE — ANESTHESIA POSTPROCEDURE EVALUATION
Post-Op Assessment Note      CV Status:  Stable    Mental Status:  Alert and awake    Hydration Status:  Euvolemic    PONV Controlled:  Controlled    Airway Patency:  Patent and adequate    Post Op Vitals Reviewed: Yes          Staff: CRNA           BP   137/68   Temp      Pulse  73   Resp      SpO2   96%

## 2018-11-20 NOTE — PROGRESS NOTES
Progress Note - Sheng Ulloa 1965, 48 y o  female MRN: [id number]    Unit/Bed#: [facility] Encounter: [id number]    Primary Care Provider: Evelyn Burton DO   Date and time admitted to hospital: 11/19/2018  5:55 PM    * Ulcerative colitis with rectal bleeding ([facility] [address] [address] )   Assessment & Plan    · Presents with intractable diarrhea with rectal bleeding having failed outpatient treatment  · Had been on Humira as well as prednisone and mesalamine and had initially done well but now symptoms have worsened  · She is status post flexible sigmoidoscopy today showing severe ulcerative colitis from the hepatic flexure, distally with ulcerations and diffuse inflammatory changes  · Stools negative for C diff  · She will be started on IV Solu-Medrol and metronidazole  · Results of Humira antibody levels pending     Hypercalcemia   Assessment & Plan    · Calcium levels now within normal limits  Continue to monitor  · Calcium and vitamin D supplements discontinued     Hypothyroidism   Assessment & Plan    · Continue Synthroid  · TSH within normal limits       VTE Pharmacologic Prophylaxis:   Pharmacologic: Pharmacologic VTE Prophylaxis contraindicated due to GI bleed  Mechanical VTE Prophylaxis in Place: Yes    Patient Centered Rounds: I have performed bedside rounds with nursing staff today  Discussions with Specialists or Other Care Team Provider:  Nursing    Education and Discussions with Family / Patient:  Patient    Current Length of Stay: 1 day(s)    Current Patient Status: Inpatient   Certification Statement: The patient will continue to require additional inpatient hospital stay due to Above diagnosis and care plan    Discharge Plan:  Not medically stable for discharge    Code Status: Level 1 - Full Code      Subjective:   Reports abdominal pain with continued diarrhea which he is now cleared since taking enema prep  Low-grade temp of 99° this morning which she is high for her      Objective:     Vitals:   Temp (24hrs), Av 7 °F (37 1 °C), Min:97 5 °F (36 4 °C), Max:99 8 °F (37 7 °C)    Temp:  [97 5 °F (36 4 °C)-99 8 °F (37 7 °C)] 98 5 °F (36 9 °C)  HR:  [62-76] 62  Resp:  [16-18] 18  BP: ()/(52-74) 98/53  SpO2:  [96 %-100 %] 99 %  Body mass index is 27 23 kg/m²  Input and Output Summary (last 24 hours): Intake/Output Summary (Last 24 hours) at 18 1632  Last data filed at 18 1600   Gross per 24 hour   Intake             1480 ml   Output             1300 ml   Net              180 ml       Physical Exam:  General Appearance:    Alert, cooperative, no distress, appropriately responsive    Head:    Normocephalic, without obvious abnormality, atraumatic, mucous membranes dry   Eyes:    Conjunctiva/corneas clear, EOM's intact   Neck:   Supple   Lungs:     Clear to auscultation bilaterally, respirations unlabored, no crackles or wheeze     Heart:    Regular rate and rhythm, S1 and S2    Abdomen:     Soft, generalized tenderness, no guarding, no rebound   Extremities:  Trace ankle edema bilaterally   Neurologic:  nonfocal         Additional Data:     Labs:      Results from last 7 days  Lab Units 18  0450 18  2109   WBC Thousand/uL 13 32* 10 76*   HEMOGLOBIN g/dL 9 6* 10 2*   HEMATOCRIT % 30 2* 32 4*   PLATELETS Thousands/uL 443* 503*   NEUTROS PCT %  --  78*   LYMPHS PCT %  --  17   MONOS PCT %  --  5   EOS PCT %  --  0       Results from last 7 days  Lab Units 18  0450 18  2109   POTASSIUM mmol/L 3 6 3 5   CHLORIDE mmol/L 107 104   CO2 mmol/L 27 28   BUN mg/dL 4* 5   CREATININE mg/dL 0 75 0 85   CALCIUM mg/dL 9 8 9 9   ALK PHOS U/L  --  82   ALT U/L  --  16   AST U/L  --  8       Results from last 7 days  Lab Units 18  2109   INR  1 02       * I Have Reviewed All Lab Data Listed Above  * Additional Pertinent Lab Tests Reviewed:  [address] Admission Reviewed    Cultures:   Blood Culture: No results found for: BLOODCX  Urine Culture: No results found for: URINECX  Sputum Culture: No components found for: SPUTUMCX  Wound Culture: No results found for: WOUNDCULT    Last 24 Hours Medication List:     Current Facility-Administered Medications:  acetaminophen 650 mg Oral Q6H PRN Alice Ennis MD    albuterol 2 5 mg Nebulization Q6H PRN Tess Calix MD    dextrose 5 % and sodium chloride 0 45 % with KCl 20 mEq/L 100 mL/hr Intravenous Continuous Alice Ennis MD Last Rate: 100 mL/hr (11/20/18 0804)   hyoscyamine 0 125 mg Oral Q4H PRN Alice Ennis MD    levothyroxine 25 mcg Oral Early Morning Alice Ennis MD    mesalamine 4 g Rectal HS Alice Ennis MD    methylPREDNISolone sodium succinate 20 mg Intravenous Q8H Jaycee Maddox PA-C    ondansetron 4 mg Intravenous Q6H PRN Alice Ennis MD    zolpidem 10 mg Oral HS PRN Meredith Stern MD         Today, Patient Was Seen By: Tess Calix MD    ** Please Note: Dragon 360 Dictation voice to text software may have been used in the creation of this document   **

## 2018-11-20 NOTE — ASSESSMENT & PLAN NOTE
· On the last blood exam, November 1, 2018, patient's calcium was already elevated at that point  · Thus we will hold off patient's calcium with vitamin-D as well as vitamin-D medications in the meantime  · Check CMP  · IV fluids  · Monitor calcium levels

## 2018-11-20 NOTE — OP NOTE
Colonoscopy Procedure Note    Procedure: Colonoscopy    Sedation: Monitored anesthesia care, check anesthesia records      ASA Class: 2    INDICATIONS:  Ulcerative colitis    POST-OP DIAGNOSIS: See the impression below    Procedure Details     Prior colonoscopy: 3 years ago  Informed consent was obtained for the procedure, including sedation  Risks of perforation, hemorrhage, adverse drug reaction and aspiration were discussed  The patient was placed in the left lateral decubitus position  Based on the pre-procedure assessment, including review of the patient's medical history, medications, allergies, and review of systems, she had been deemed to be an appropriate candidate for conscious sedation; she was therefore sedated with the medications listed below  The patient was monitored continuously with telemetry, pulse oximetry, blood pressure monitoring, and direct observations  A rectal examination was performed  The variable-stiffness pediatric colonoscope was inserted into the rectum and advanced under direct vision to the cecum, which was identified by the ileocecal valve and appendiceal orifice  The quality of the colonic preparation was fair  A careful inspection was made as the colonoscope was withdrawn, including a retroflexed view of the rectum; findings and interventions are described below  Findings:    Severe inflammatory changes from the anal verge extending to the hepatic flexure  Diffusely edematous, erythematous, friable mucosa with deep ulcerations in the left side of the colon  Loss of haustral folds  Multiple biopsies were taken for CMV  Normalization of mucosa in the ascending colon, the cecum was poorly prepped there was no evidence of inflammatory changes  Due to the stool in the cecum, TI was not intubated  Retroflexion was not performed due to severe inflammatory changes             Complications:  Awaiting results of adalimumab antibody levels e; patient tolerated the procedure well  Impression:      Severe ulcerative colitis from the hepatic flexure on distally with ulcerations, diffuse inflammatory changes    Multiple biopsies taken for CMV    Recommendations:    Return to floor  Clear liquid diet today  20 mg q 8 hours of IV Solu-Medrol  Flagyl IV  Awaiting results of adalimumab antibody levels

## 2018-11-20 NOTE — H&P
H&P- Stuart Natalia 1965, 48 y o  female MRN: [id number]    Unit/Bed#: -01 Encounter: [id number]    Primary Care Provider: Amelia Christianson DO   Date and time admitted to hospital: 11/19/2018  5:55 PM        * Ulcerative colitis with rectal bleeding ([facility] [address] )   Assessment & Plan    · History of ulcerative colitis; according to Dr Horace Velasquez, GI doctor, patient failed outpatient treatment  Patient was on Humira, oral prednisone and mesalamine  · With significant diarrhea, rectal bleeding, poor appetite  · From the instructions of the GI doctor: We will rule out 1st possibility that patient has Clostridium difficile infection  Thus patient will be contact precautions and C difficile PCR will be sent  Only if the test is negative, that we may start patient on IV steroids with Solu-Medrol at 20 mg IV q 12 hours  Patient should be on clear liquid diet for now  Patient will eventually need flexible sigmoidoscopy with enema prep  According to the Dr Horace Velasquez, this will be ordered by the GI doctor here in [facility]  At this point in time, patient wants to expedite things and she told me that she would want to be NPO post midnight so that if possible, they can do the sigmoidoscopy as soon as possible/tomorrow  GI doctor wants me to hold off on patient's oral prednisone at this point as we are ruling out the possibility of C difficile infection  For now, continue with patient's mesalamine  · Monitor CBC/hemoglobin hematocrit  · Transfuse as needed  Hypercalcemia   Assessment & Plan    · On the last blood exam, November 1, 2018, patient's calcium was already elevated at that point  · Thus we will hold off patient's calcium with vitamin-D as well as vitamin-D medications in the meantime  · Check CMP  · IV fluids  · Monitor calcium levels  Asthma, mild intermittent   Assessment & Plan    · No exacerbation  · Continue albuterol p r n       Hypothyroidism   Assessment & Plan    · Continue Synthroid  · Check TSH         VTE Prophylaxis: Pharmacologic VTE Prophylaxis contraindicated due to GI bleeding   / sequential compression device   Code Status:  Full code  POLST: POLST form is not discussed and not completed at this time  Discussion with family:  I spoke to the patient's  at bedside  I discussed our findings and plans  Anticipated Length of Stay:  Patient will be admitted on an Inpatient basis with an anticipated length of stay of  greater than 2 midnights  Justification for Hospital Stay:  Due to the above findings and plans  Total Time for Visit, including Counseling / Coordination of Care: 1 hour  Greater than 50% of this total time spent on direct patient counseling and coordination of care  Chief Complaint:   Diarrhea and rectal bleeding  History of Present Illness:    Siddhartha Phillips is a 48 y o  female who presents to [facility] as a direct admit from the GI doctor (Dr Naga Chiu) due to persistent diarrhea and rectal bleeding  Patient has history of ulcerative colitis and lately, according to patient's GI doctor, patient has been failing outpatient treatment  Patient claims that she has been having persistent diarrhea as well as rectal bleeding for some time now  According to the patient, 2 weeks ago, she was started on prednisone 2 weeks ago, however, without any relief of her symptoms  Patient was also started with mesalamine enemas, but again no improvement  Patient is on urea every other week  Patient also has been having very poor appetite and has not been really eating well  Patient has occasional nausea but no vomiting episodes  Patient having generalized abdominal pains for some time now  Approximately 2 weeks ago, patient had negative C difficile PCR, her CRP as well as fecal calprotectin was noted to be high at that point  Patient had Humira antibody level drawn, but no results yet    Patient was sent here by the GI doctor for further evaluation management and likely for IV Solu-Medrol once patient is ruled out again for C difficile diarrhea  Patient denies any other symptoms other the ones mentioned above  No fever or chills  Review of Systems:    Review of Systems    Ten point review systems done and they were negative except for the ones I mentioned in my history of present illness  Past Medical and Surgical History:     Past Medical History:   Diagnosis Date    Adjustment disorder     last assessed 12    Anemia     HX of    Asthma     mild - intermittent    Bilateral leg edema     Blepharitis     last assessed 16    Carpal tunnel syndrome     Unspecified laterality    Colitis, acute     Dyspareunia in female     Edema     last assessed 06/22/15    Ganglion     Herniated cervical disc     History of transfusion     Hypokalemia     Hypothyroidism     Hypothyroidism     Insomnia     Lumps on the skin     last assessed 14    Mouth ulcers     last assessed 06/22/15    Nontraumatic tear of left tibialis posterior tendon     Traumatic teart     Polyarthritis     last assessed 16    Raynaud's disease     Rayna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 ([address] )     Temporomandibular disorder     Joint    Thyroid disease     Ulcerative colitis ([address]     Ulcerative colitis ([address] )        Past Surgical History:   Procedure Laterality Date    ANKLE SURGERY Left     Tendon repair    CARPAL TUNNEL RELEASE Bilateral      SECTION, LOW TRANSVERSE      COLONOSCOPY      HERNIA REPAIR      umbilical    HYSTERECTOMY      KNEE ARTHROSCOPY Right     LIPECTOMY      Multipe lipoma removals    LIPOMA RESECTION      MI COLONOSCOPY FLX DX W/COLLJ SPEC WHEN PFRMD N/A 2016    Procedure: COLONOSCOPY;  Surgeon: Franco Gupta DO;  Location: [facility];   Service: Gastroenterology    MI REPAIR FLEX LEG TENDON,SECOND,EA Left 2016    Procedure: REPAIR OF LEFT POSTERIOR TIBIAL TENDON WITH GRAFT, EXPLORATION LEFT ANKLE ;  Surgeon: Lis Pena DPM;  Location: [facility];  Service: Podiatry    SHOULDER SURGERY Left     bicep tendon and labrum repair    TONSILLECTOMY         Meds/Allergies:    Prior to Admission medications    Medication Sig Start Date End Date Taking?  Authorizing Provider   adalimumab (HUMIRA) 40 mg/0 8 mL PSKT Inject 0 8 mL (40 mg total) under the skin every 14 (fourteen) days 1/31/18  Yes Stephenie Bush PA-C   cholecalciferol (VITAMIN D3) 1,000 units tablet Take 3,000 Units by mouth daily   Yes Historical Provider, MD   hyoscyamine (ANASPAZ,LEVSIN) 0 125 MG tablet Take 1 tablet (0 125 mg total) by mouth every 4 (four) hours as needed for cramping 11/9/18  Yes Bindu Melgar PA-C   levothyroxine 25 mcg tablet Take 1 tablet (25 mcg total) by mouth daily for 90 days 4/27/18 11/19/18 Yes Curly Ojeda DO   mesalamine (ROWASA) 4 g Insert 60 mL (4 g total) into the rectum daily at bedtime for 30 days 11/9/18 12/9/18 Yes Bindu Melgra PA-C   predniSONE 10 mg tablet Take 4 tablets (40 mg total) by mouth daily with breakfast for 30 days 11/2/18 12/2/18 Yes Dago Machado PA-C   zolpidem (AMBIEN) 10 mg tablet TAKE ONE TABLET BY MOUTH EVERY DAY AT BEDTIME AS NEEDED FOR SLEEP 11/13/18  Yes Curly Ojeda DO   albuterol (ACCUNEB) 1 25 MG/3ML nebulizer solution Take 1 ampule by nebulization every 6 (six) hours as needed for wheezing    Historical Provider, MD   calcium carbonate-vitamin D (OSCAL-D) 500 mg-200 units per tablet Take 1 tablet by mouth 2 (two) times a day with meals  Patient not taking: Reported on 11/19/2018 6/25/18   Kaim Vasquez DO   furosemide (LASIX) 40 mg tablet  1/8/18   Historical Provider, MD SOUSA PEN 40 MG/0 8ML PNKT INJECT [facility]  Patient not taking: Reported on 11/19/2018 10/26/18   Tiffany Sanches PA-C   tacrolimus (PROTOPIC) 0 03 % ointment Apply topically 2 (two) times a day    Historical Provider, MD     Medication list was reviewed    Allergies: No Known Allergies    Social History:     Marital Status: /Civil Union     History   Alcohol Use No     Comment: social 1 drink per weeek     History   Smoking Status    Former Smoker    Quit date: 4/6/2003   Smokeless Tobacco    Never Used     Comment: Quit 1991, rare use for 2 years     History   Drug Use No       Family History:    Family History   Problem Relation Age of Onset    Parkinsonism Mother     Rheum arthritis Mother     Heart attack Father     Hypertension Father     Osteoporosis Father     Prostate cancer Father     Hashimoto's thyroiditis Sister     Cancer Paternal Grandfather         Penile    Prostate cancer Paternal Grandfather     Crohn's disease Family     Osteoarthritis Family     Rheum arthritis Family     Crohn's disease Other     Crohn's disease Maternal Uncle     Psoriasis Maternal Uncle     Ulcerative colitis Maternal Uncle     Rheum arthritis Maternal Aunt     Ulcerative colitis Family        Physical Exam:     Vitals:   Blood Pressure: 144/74 (11/19/18 1736)  Pulse: 65 (11/19/18 1736)  Temperature: 98 4 °F (36 9 °C) (11/19/18 1736)  Temp Source: Oral (11/19/18 1736)  Respirations: 18 (11/19/18 1736)  Height: 5' 3" (160 cm) (11/19/18 1839)  Weight - Scale: 69 7 kg (153 lb 11 2 oz) (11/19/18 1839)  SpO2: 100 % (11/19/18 1736)    Physical Exam   Constitutional: No distress  HENT:   Head: Normocephalic and atraumatic  Mouth/Throat: No oropharyngeal exudate  Dry oral mucosa  Eyes: Conjunctivae are normal  Right eye exhibits no discharge  Left eye exhibits no discharge  No scleral icterus  Neck: No JVD present  No tracheal deviation present  Cardiovascular: Normal rate, regular rhythm and normal heart sounds  Exam reveals no gallop and no friction rub  No murmur heard  Pulmonary/Chest: Effort normal and breath sounds normal  No stridor  No respiratory distress  She has no wheezes  She has no rales  Abdominal: Soft   Bowel sounds are normal  She exhibits no distension  There is tenderness  There is no rebound and no guarding  Positive for generalized abdominal direct tenderness   Musculoskeletal: She exhibits no edema, tenderness or deformity  Neurological: She is alert  No cranial nerve deficit  Skin: Skin is warm and dry  No rash noted  She is not diaphoretic  No erythema  No pallor  Psychiatric: She has a normal mood and affect  Her behavior is normal  Thought content normal    Vitals reviewed  Additional Data:     Lab Results: No labs yet for today  Imaging: No imaging studies done yet  No orders to display       EKG, Pathology, and Other Studies Reviewed on Admission:   · No EKG done  Allscripts / Epic Records Reviewed: Yes     ** Please Note: This note has been constructed using a voice recognition system   **

## 2018-11-20 NOTE — ASSESSMENT & PLAN NOTE
· Calcium levels now within normal limits    Continue to monitor  · Calcium and vitamin D supplements discontinued

## 2018-11-20 NOTE — ASSESSMENT & PLAN NOTE
· Presents with intractable diarrhea with rectal bleeding having failed outpatient treatment  · Had been on Humira as well as prednisone and mesalamine and had initially done well but now symptoms have worsened  · She is status post flexible sigmoidoscopy today showing severe ulcerative colitis from the hepatic flexure, distally with ulcerations and diffuse inflammatory changes  · Stools negative for C diff  · She will be started on IV Solu-Medrol and metronidazole  · Results of Humira antibody levels pending

## 2018-11-20 NOTE — ASSESSMENT & PLAN NOTE
· History of ulcerative colitis; according to Dr Donny Swain, GI doctor, patient failed outpatient treatment  Patient was on Humira, oral prednisone and mesalamine  · With significant diarrhea, rectal bleeding, poor appetite  · From the instructions of the GI doctor: We will rule out 1st possibility that patient has Clostridium difficile infection  Thus patient will be contact precautions and C difficile PCR will be sent  Only if the test is negative, that we may start patient on IV steroids with Solu-Medrol at 20 mg IV q 12 hours  Patient should be on clear liquid diet for now  Patient will eventually need flexible sigmoidoscopy with enema prep  According to the Dr Donny Swain, this will be ordered by the GI doctor here in [facility]  At this point in time, patient wants to expedite things and she told me that she would want to be NPO post midnight so that if possible, they can do the sigmoidoscopy as soon as possible/tomorrow  GI doctor wants me to hold off on patient's oral prednisone at this point as we are ruling out the possibility of C difficile infection  For now, continue with patient's mesalamine  · Monitor CBC/hemoglobin hematocrit  · Transfuse as needed

## 2018-11-21 PROBLEM — E83.52 HYPERCALCEMIA: Status: RESOLVED | Noted: 2017-08-30 | Resolved: 2018-11-21

## 2018-11-21 LAB
ADALIMUMAB AB SERPL-MCNC: NORMAL NG/ML
ADALIMUMAB SERPL-MCNC: <0.6 UG/ML
ANION GAP SERPL CALCULATED.3IONS-SCNC: 7 MMOL/L (ref 4–13)
BASOPHILS # BLD MANUAL: 0 THOUSAND/UL (ref 0–0.1)
BASOPHILS NFR MAR MANUAL: 0 % (ref 0–1)
BUN SERPL-MCNC: 4 MG/DL (ref 5–25)
CALCIUM SERPL-MCNC: 9.7 MG/DL (ref 8.3–10.1)
CHLORIDE SERPL-SCNC: 106 MMOL/L (ref 100–108)
CO2 SERPL-SCNC: 26 MMOL/L (ref 21–32)
CREAT SERPL-MCNC: 0.73 MG/DL (ref 0.6–1.3)
EOSINOPHIL # BLD MANUAL: 0 THOUSAND/UL (ref 0–0.4)
EOSINOPHIL NFR BLD MANUAL: 0 % (ref 0–6)
ERYTHROCYTE [DISTWIDTH] IN BLOOD BY AUTOMATED COUNT: 12.3 % (ref 11.6–15.1)
GFR SERPL CREATININE-BSD FRML MDRD: 94 ML/MIN/1.73SQ M
GLUCOSE SERPL-MCNC: 151 MG/DL (ref 65–140)
HCT VFR BLD AUTO: 32.9 % (ref 34.8–46.1)
HGB BLD-MCNC: 10.3 G/DL (ref 11.5–15.4)
LYMPHOCYTES # BLD AUTO: 0.36 THOUSAND/UL (ref 0.6–4.47)
LYMPHOCYTES # BLD AUTO: 3 % (ref 14–44)
MCH RBC QN AUTO: 28.7 PG (ref 26.8–34.3)
MCHC RBC AUTO-ENTMCNC: 31.3 G/DL (ref 31.4–37.4)
MCV RBC AUTO: 92 FL (ref 82–98)
MONOCYTES # BLD AUTO: 0.12 THOUSAND/UL (ref 0–1.22)
MONOCYTES NFR BLD: 1 % (ref 4–12)
NEUTROPHILS # BLD MANUAL: 11.65 THOUSAND/UL (ref 1.85–7.62)
NEUTS BAND NFR BLD MANUAL: 1 % (ref 0–8)
NEUTS SEG NFR BLD AUTO: 95 % (ref 43–75)
NRBC BLD AUTO-RTO: 0 /100 WBCS
PLATELET # BLD AUTO: 474 THOUSANDS/UL (ref 149–390)
PLATELET BLD QL SMEAR: ABNORMAL
PMV BLD AUTO: 9 FL (ref 8.9–12.7)
POTASSIUM SERPL-SCNC: 4.2 MMOL/L (ref 3.5–5.3)
RBC # BLD AUTO: 3.59 MILLION/UL (ref 3.81–5.12)
SODIUM SERPL-SCNC: 139 MMOL/L (ref 136–145)
TOTAL CELLS COUNTED SPEC: 100
WBC # BLD AUTO: 12.14 THOUSAND/UL (ref 4.31–10.16)

## 2018-11-21 PROCEDURE — 85027 COMPLETE CBC AUTOMATED: CPT | Performed by: INTERNAL MEDICINE

## 2018-11-21 PROCEDURE — 80048 BASIC METABOLIC PNL TOTAL CA: CPT | Performed by: INTERNAL MEDICINE

## 2018-11-21 PROCEDURE — 99232 SBSQ HOSP IP/OBS MODERATE 35: CPT | Performed by: PHYSICIAN ASSISTANT

## 2018-11-21 PROCEDURE — 85007 BL SMEAR W/DIFF WBC COUNT: CPT | Performed by: INTERNAL MEDICINE

## 2018-11-21 RX ADMIN — LEVOTHYROXINE SODIUM 25 MCG: 25 TABLET ORAL at 05:19

## 2018-11-21 RX ADMIN — METRONIDAZOLE 500 MG: 500 INJECTION, SOLUTION INTRAVENOUS at 08:40

## 2018-11-21 RX ADMIN — ZOLPIDEM TARTRATE 10 MG: 5 TABLET, FILM COATED ORAL at 21:35

## 2018-11-21 RX ADMIN — METHYLPREDNISOLONE SODIUM SUCCINATE 20 MG: 40 INJECTION, POWDER, FOR SOLUTION INTRAMUSCULAR; INTRAVENOUS at 08:40

## 2018-11-21 RX ADMIN — METRONIDAZOLE 500 MG: 500 INJECTION, SOLUTION INTRAVENOUS at 17:02

## 2018-11-21 RX ADMIN — METHYLPREDNISOLONE SODIUM SUCCINATE 20 MG: 40 INJECTION, POWDER, FOR SOLUTION INTRAMUSCULAR; INTRAVENOUS at 00:00

## 2018-11-21 RX ADMIN — VITAMIN D, TAB 1000IU (100/BT) 3000 UNITS: 25 TAB at 08:40

## 2018-11-21 RX ADMIN — METHYLPREDNISOLONE SODIUM SUCCINATE 20 MG: 40 INJECTION, POWDER, FOR SOLUTION INTRAMUSCULAR; INTRAVENOUS at 17:02

## 2018-11-21 RX ADMIN — ACETAMINOPHEN 650 MG: 325 TABLET, FILM COATED ORAL at 08:40

## 2018-11-21 RX ADMIN — ACETAMINOPHEN 650 MG: 325 TABLET, FILM COATED ORAL at 21:35

## 2018-11-21 RX ADMIN — DEXTROSE, SODIUM CHLORIDE, AND POTASSIUM CHLORIDE 100 ML/HR: 5; .45; .15 INJECTION INTRAVENOUS at 08:44

## 2018-11-21 NOTE — PLAN OF CARE
Problem: Nutrition/Hydration-ADULT  Goal: Nutrient/Hydration intake appropriate for improving, restoring or maintaining nutritional needs  Monitor and assess patient's nutrition/hydration status for malnutrition (ex- brittle hair, bruises, dry skin, pale skin and conjunctiva, muscle wasting, smooth red tongue, and disorientation)  Collaborate with interdisciplinary team and initiate plan and interventions as ordered  Monitor patient's weight and dietary intake as ordered or per policy  Utilize nutrition screening tool and intervene per policy  Determine patient's food preferences and provide high-protein, high-caloric foods as appropriate       INTERVENTIONS:  - Monitor oral intake, urinary output, labs, and treatment plans  - Assess nutrition and hydration status and recommend course of action  - Evaluate amount of meals eaten  - Assist patient with eating if necessary   - Allow adequate time for meals  - Recommend/ encourage appropriate diets, oral nutritional supplements, and vitamin/mineral supplements  - Order, calculate, and assess calorie counts as needed  - Recommend, monitor, and adjust tube feedings and TPN/PPN based on assessed needs  - Assess need for intravenous fluids  - Provide specific nutrition/hydration education as appropriate  - Include patient/family/caregiver in decisions related to nutrition   Outcome: Progressing      Problem: PAIN - ADULT  Goal: Verbalizes/displays adequate comfort level or baseline comfort level  Interventions:  - Encourage patient to monitor pain and request assistance  - Assess pain using appropriate pain scale  - Administer analgesics based on type and severity of pain and evaluate response  - Implement non-pharmacological measures as appropriate and evaluate response  - Consider cultural and social influences on pain and pain management  - Notify physician/advanced practitioner if interventions unsuccessful or patient reports new pain   Outcome: Progressing      Problem: INFECTION - ADULT  Goal: Absence or prevention of progression during hospitalization  INTERVENTIONS:  - Assess and monitor for signs and symptoms of infection  - Monitor lab/diagnostic results  - Monitor all insertion sites, i e  indwelling lines, tubes, and drains  - Monitor endotracheal (as able) and nasal secretions for changes in amount and color  - [place] appropriate cooling/warming therapies per order  - Administer medications as ordered  - Instruct and encourage patient and family to use good hand hygiene technique  - Identify and instruct in appropriate isolation precautions for identified infection/condition   Outcome: Progressing    Goal: Absence of fever/infection during neutropenic period  INTERVENTIONS:  - Monitor WBC  - Implement neutropenic guidelines   Outcome: Completed Date Met: 11/21/18      Problem: SAFETY ADULT  Goal: Patient will remain free of falls  INTERVENTIONS:  - Assess patient frequently for physical needs  -  Identify cognitive and physical deficits and behaviors that affect risk of falls    -  [place] fall precautions as indicated by assessment   - Educate patient/family on patient safety including physical limitations  - Instruct patient to call for assistance with activity based on assessment  - Modify environment to reduce risk of injury  - Consider OT/PT consult to assist with strengthening/mobility   Outcome: Progressing    Goal: Maintain or return to baseline ADL function  INTERVENTIONS:  -  Assess patient's ability to carry out ADLs; assess patient's baseline for ADL function and identify physical deficits which impact ability to perform ADLs (bathing, care of mouth/teeth, toileting, grooming, dressing, etc )  - Assess/evaluate cause of self-care deficits   - Assess range of motion  - Assess patient's mobility; develop plan if impaired  - Assess patient's need for assistive devices and provide as appropriate  - Encourage maximum independence but intervene and supervise when necessary  ¯ Involve family in performance of ADLs  ¯ Assess for home care needs following discharge   ¯ Request OT consult to assist with ADL evaluation and planning for discharge  ¯ Provide patient education as appropriate   Outcome: Completed Date Met: 11/21/18    Goal: Maintain or return mobility status to optimal level  INTERVENTIONS:  - Assess patient's baseline mobility status (ambulation, transfers, stairs, etc )    - Identify cognitive and physical deficits and behaviors that affect mobility  - Identify mobility aids required to assist with transfers and/or ambulation (gait belt, sit-to-stand, lift, walker, cane, etc )  - [place] fall precautions as indicated by assessment  - Record patient progress and toleration of activity level on Mobility SBAR; progress patient to next Phase/Stage  - Instruct patient to call for assistance with activity based on assessment  - Request Rehabilitation consult to assist with strengthening/weightbearing, etc    Outcome: Completed Date Met: 11/21/18      Problem: DISCHARGE PLANNING  Goal: Discharge to home or other facility with appropriate resources  INTERVENTIONS:  - Identify barriers to discharge w/patient and caregiver  - Arrange for needed discharge resources and transportation as appropriate  - Identify discharge learning needs (meds, wound care, etc )  - Arrange for interpretive services to assist at discharge as needed  - Refer to Case Management Department for coordinating discharge planning if the patient needs post-hospital services based on physician/advanced practitioner order or complex needs related to functional status, cognitive ability, or social support system   Outcome: Progressing      Problem: Knowledge Deficit  Goal: Patient/family/caregiver demonstrates understanding of disease process, treatment plan, medications, and discharge instructions  Complete learning assessment and assess knowledge base    Interventions:  - Provide teaching at level of understanding  - Provide teaching via preferred learning methods   Outcome: Progressing      Problem: GASTROINTESTINAL - ADULT  Goal: Minimal or absence of nausea and/or vomiting  INTERVENTIONS:  - Administer IV fluids as ordered to ensure adequate hydration  - Maintain NPO status until nausea and vomiting are resolved  - Nasogastric tube as ordered  - Administer ordered antiemetic medications as needed  - Provide nonpharmacologic comfort measures as appropriate  - Advance diet as tolerated, if ordered  - Nutrition services referral to assist patient with adequate nutrition and appropriate food choices   Outcome: Progressing    Goal: Maintains or returns to baseline bowel function  INTERVENTIONS:  - Assess bowel function  - Encourage oral fluids to ensure adequate hydration  - Administer IV fluids as ordered to ensure adequate hydration  - Administer ordered medications as needed  - Encourage mobilization and activity  - Nutrition services referral to assist patient with appropriate food choices   Outcome: Progressing    Goal: Maintains adequate nutritional intake  INTERVENTIONS:  - Monitor percentage of each meal consumed  - Identify factors contributing to decreased intake, treat as appropriate  - Assist with meals as needed  - Monitor I&O, WT and lab values  - Obtain nutrition services referral as needed   Outcome: Progressing

## 2018-11-21 NOTE — PROGRESS NOTES
[facility]  Progress Note - Gayleen Frame 1965, 48 y o  female MRN: [id number]    Unit/Bed#: -01 Encounter: [id number]    Primary Care Provider: Kurt Burleson DO   Date and time admitted to hospital: 11/19/2018  5:55 PM        * Ulcerative colitis with rectal bleeding [facility])   Assessment & Plan    · Presents with intractable diarrhea with rectal bleeding having failed outpatient treatment  Had been on Humira as well as prednisone and mesalamine and had initially done well but now symptoms have worsened, it is noted that she has developed antibodies to Humira and this will no longer be effective  She is status post flexible sigmoidoscopy showing severe ulcerative colitis from the hepatic flexure, distally with ulcerations and diffuse inflammatory changes  Stools negative for C diff  · Continue IV Solu-Medrol and metronidazole and monitor for stool response  · GI has advanced diet   · Will need close outpatient follow up and steroid taper at discharge      Hypothyroidism   Assessment & Plan    · Continue Synthroid  · TSH within normal limits     Hypercalcemia-resolved as of 11/21/2018   Assessment & Plan    · Calcium levels now within normal limits  Continue to monitor  · Calcium and vitamin D supplements discontinued       VTE Pharmacologic Prophylaxis:   Pharmacologic: Pharmacologic VTE Prophylaxis contraindicated due to GIB  Mechanical VTE Prophylaxis in Place: No    Patient Centered Rounds: I have performed bedside rounds with nursing staff today  Discussions with Specialists or Other Care Team Provider: Discussed with RNDEISI    Education and Discussions with Family / Patient: Discussed with patient, she will update family     Time Spent for Care: 30 minutes  More than 50% of total time spent on counseling and coordination of care as described above      Current Length of Stay: 2 day(s)    Current Patient Status: Inpatient   Certification Statement: The patient will continue to require additional inpatient hospital stay due to on going need for IV steroids, IV Flagyl     Discharge Plan: Hopefully in next 24-48 hours pending tolerating diet and decrease in stool amount and bleeding     Code Status: Level 1 - Full Code      Subjective:   Patient reports that she still has some abdominal pain  States that it is generalized  She reports that she still has diarrhea, but reports that it is not bloody, but rather brown  She feels as though she has no more stool to pass  Objective:     Vitals:   Temp (24hrs), Av 4 °F (36 9 °C), Min:97 5 °F (36 4 °C), Max:99 1 °F (37 3 °C)    Temp:  [97 5 °F (36 4 °C)-99 1 °F (37 3 °C)] 98 8 °F (37 1 °C)  HR:  [62-76] 71  Resp:  [18] 18  BP: ()/(53-62) 138/62  SpO2:  [95 %-99 %] 95 %  Body mass index is 27 23 kg/m²  Input and Output Summary (last 24 hours): Intake/Output Summary (Last 24 hours) at 18 1232  Last data filed at 18 0845   Gross per 24 hour   Intake          2808 33 ml   Output              900 ml   Net          1908 33 ml       Physical Exam:     Physical Exam   Constitutional: She is oriented to person, place, and time  Vital signs are normal  She appears well-developed and well-nourished  Non-toxic appearance  No distress  HENT:   Head: Normocephalic and atraumatic  Mouth/Throat: Mucous membranes are not dry  Eyes: Pupils are equal, round, and reactive to light  Conjunctivae and EOM are normal    Neck: Neck supple  Cardiovascular: Normal rate, regular rhythm, S1 normal, S2 normal, normal heart sounds and intact distal pulses  Exam reveals no S3 and no S4  No murmur heard  Pulmonary/Chest: Effort normal  No accessory muscle usage  No respiratory distress  She has no decreased breath sounds  She has no wheezes  She has no rhonchi  She has no rales  She exhibits no tenderness  Abdominal: Soft  Bowel sounds are normal  She exhibits no distension and no mass  There is generalized tenderness   There is no rigidity, no rebound and no guarding  Neurological: She is alert and oriented to person, place, and time  She is not disoriented  Skin: Skin is warm and dry  Additional Data:     Labs:      Results from last 7 days  Lab Units 11/21/18 0446 11/19/18 2109   WBC Thousand/uL 12 14*  < > 10 76*   HEMOGLOBIN g/dL 10 3*  < > 10 2*   HEMATOCRIT % 32 9*  < > 32 4*   PLATELETS Thousands/uL 474*  < > 503*   NEUTROS PCT %  --   --  78*   LYMPHS PCT %  --   --  17   LYMPHO PCT % 3*  --   --    MONOS PCT %  --   --  5   MONO PCT % 1*  --   --    EOS PCT % 0  --  0   < > = values in this interval not displayed  Results from last 7 days  Lab Units 11/21/18 0446 11/19/18 2109   POTASSIUM mmol/L 4 2  < > 3 5   CHLORIDE mmol/L 106  < > 104   CO2 mmol/L 26  < > 28   BUN mg/dL 4*  < > 5   CREATININE mg/dL 0 73  < > 0 85   CALCIUM mg/dL 9 7  < > 9 9   ALK PHOS U/L  --   --  82   ALT U/L  --   --  16   AST U/L  --   --  8   < > = values in this interval not displayed  Results from last 7 days  Lab Units 11/19/18 2109   INR  1 02       * I Have Reviewed All Lab Data Listed Above  * Additional Pertinent Lab Tests Reviewed: [facility] Ad[facility] Reviewed    Imaging:    Imaging Reports Reviewed Today Include: None  Imaging Personally Reviewed by Myself Includes:  None    Recent Cultures (last 7 days):       Results from last 7 days  Lab Units 11/19/18 2101   C DIFF TOXIN B  NEGATIVE for C difficle toxin by PCR          Last 24 Hours Medication List:     Current Facility-Administered Medications:  acetaminophen 650 mg Oral Q6H PRN Alice Ennis MD    albuterol 2 5 mg Nebulization Q6H PRN Sarah Garcia MD    cholecalciferol 3,000 Units Oral Daily Gaston Murcia PA-C    hyoscyamine 0 125 mg Oral Q4H PRN Alice Ennis MD    levothyroxine 25 mcg Oral Early Morning Alice Ennis MD    mesalamine 4 g Rectal HS Alice Ennis MD    methylPREDNISolone sodium succinate 20 mg Intravenous Q8H 1333 Moneda Hamilton MD    metroNIDAZOLE 500 mg Intravenous Q8H Florencio Keating MD Last Rate: 500 mg (11/21/18 0840)   ondansetron 4 mg Intravenous Q6H PRN Alice Ennis MD    zolpidem 10 mg Oral HS PRN Jose Luis Ennis MD         Today, Patient Was Seen By: Gilmar Martinez PA-C    ** Please Note: Dictation voice to text software may have been used in the creation of this document   **

## 2018-11-21 NOTE — PROGRESS NOTES
Progress Note- Samir Carreon 48 y o  female MRN: [id number]    Unit/Bed#: -01 Encounter: [id number]      Assessment and Plan:    Diarrhea with rectal bleeding in the setting of ulcerative colitis  -Follows with Dr Isabella Wong for 1 year prior to that on mesalamines, she had been doing well up until September when she started with loose bloody stools and abdominal pain  -inflammatory markers elevated CRP 39 9 fecal calprotectin 378  -colonoscopy 11/20 with severe colitis from hepatic flexure on distally, biopsies were obtained to rule out CMV  -continue IV solumedrol, discussed that we would like to see improvement in stool consistency, frequency and abdominal pain prior to discharge  She started last night on steroids  -continue mesalamine enemas  -She has developed antibodies to humira and will need alternative  Therapy established as an outpatient    ______________________________________________________________________    Subjective:     Jannie Hernandez feels okay today  She states she only has had 3 watery bms since 8pm last night and that there is less blood at this time  She still has abdominal cramping  No nausea or vomiting       Medication Administration - last 24 hours from 11/20/2018 1042 to 11/21/2018 1042       Date/Time Order Dose Route Action Action by     11/21/2018 0519 levothyroxine tablet 25 mcg 25 mcg Oral Given Lm Lake RN     11/20/2018 2129 mesalamine (ROWASA) enema 4 g 4 g Rectal Not Given Joe Kang RN     11/20/2018 2129 zolpidem (AMBIEN) tablet 10 mg 10 mg Oral Given Joe Kang RN     11/21/2018 0840 acetaminophen (TYLENOL) tablet 650 mg 650 mg Oral Given Kelsey Bowman RN     11/20/2018 1713 acetaminophen (TYLENOL) tablet 650 mg 650 mg Oral Given Joe Kang RN     11/20/2018 1106 acetaminophen (TYLENOL) tablet 650 mg 650 mg Oral Given Yael Hayes RN     11/21/2018 0844 dextrose 5 % and sodium chloride 0 45 % with KCl 20 mEq/L infusion 100 mL/hr Intravenous New 1010 [facility], RN     2018 0100 dextrose 5 % and sodium chloride 0 45 % with KCl 20 mEq/L infusion 100 mL/hr Intravenous Restarted Colleen Almaraz, ABBIE     2018 2355 dextrose 5 % and sodium chloride 0 45 % with KCl 20 mEq/L infusion 0 mL/hr Intravenous Hold Stefan Meredith, ABBIE     2018 2251 dextrose 5 % and sodium chloride 0 45 % with KCl 20 mEq/L infusion 100 mL/hr Intravenous New Bag Mary Anne Rodriguez, ABBIE     2018 1648 methylPREDNISolone sodium succinate (Solu-MEDROL) injection 20 mg 20 mg Intravenous Not Given Mary Anne Rodriguez RN     2018 0840 methylPREDNISolone sodium succinate (Solu-MEDROL) injection 20 mg 20 mg Intravenous Given Luba Rodrigues, RN     2018 0000 methylPREDNISolone sodium succinate (Solu-MEDROL) injection 20 mg 20 mg Intravenous Given Colleen Almaraz, RN     2018 1647 methylPREDNISolone sodium succinate (Solu-MEDROL) injection 20 mg 20 mg Intravenous Given Mary Anne Rodriguez RN     2018 2214 metroNIDAZOLE (FLAGYL) IVPB (premix) 500 mg 500 mg Intravenous Not Given Mary Anne Rodriguez, ABBIE     2018 2014 metroNIDAZOLE (FLAGYL) IVPB (premix) 500 mg 0 mg Intravenous Stopped Mary Anne Rodriguez RN     2018 2013 metroNIDAZOLE (FLAGYL) IVPB (premix) 500 mg 0 mg Intravenous Stopped Mary Anne JenniferABBIE saxena     2018 0840 metroNIDAZOLE (FLAGYL) IVPB (premix) 500 mg 500 mg Intravenous [address] Luba Rodrigues, ABBIE     2018 2355 metroNIDAZOLE (FLAGYL) IVPB (premix) 500 mg 500 mg Intravenous [facility] Georgina, ABBIE     2018 1706 metroNIDAZOLE (FLAGYL) IVPB (premix) 500 mg 500 mg Intravenous New [address] Mary Anne Rodriguez RN     2018 0840 cholecalciferol (VITAMIN D3) tablet 3,000 Units 3,000 Units Oral Given Luba Rodrigues, ABBIE          Objective:     Vitals: Blood pressure 138/62, pulse 71, temperature 98 8 °F (37 1 °C), temperature source Oral, resp  rate 18, height 5' 3" (1 6 m), weight 69 7 kg (153 lb 11 2 oz), SpO2 95 %  ,Body mass index is 27 23 kg/m²        Intake/Output Summary (Last 24 hours) at 11/21/18 1042  Last data filed at 11/21/18 0845   Gross per 24 hour   Intake          2808 33 ml   Output              900 ml   Net          1908 33 ml       Physical Exam:   General Appearance: Awake and alert, in no acute distress  Abdomen: Soft, moderate left sided tenderness, non-distended; bowel sounds normal    Invasive Devices     Peripheral Intravenous Line            Peripheral IV 11/19/18 Left Forearm 1 day                Lab Results:  Admission on 11/19/2018   Component Date Value    TSH 3RD GENERATON 11/19/2018 0 707     Sodium 11/19/2018 139     Potassium 11/19/2018 3 5     Chloride 11/19/2018 104     CO2 11/19/2018 28     ANION GAP 11/19/2018 7     BUN 11/19/2018 5     Creatinine 11/19/2018 0 85     Glucose 11/19/2018 148*    Calcium 11/19/2018 9 9     AST 11/19/2018 8     ALT 11/19/2018 16     Alkaline Phosphatase 11/19/2018 82     Total Protein 11/19/2018 6 3*    Albumin 11/19/2018 2 7*    Total Bilirubin 11/19/2018 0 20     eGFR 11/19/2018 78     Magnesium 11/19/2018 1 8     Phosphorus 11/19/2018 2 9     WBC 11/19/2018 10 76*    RBC 11/19/2018 3 54*    Hemoglobin 11/19/2018 10 2*    Hematocrit 11/19/2018 32 4*    MCV 11/19/2018 92     MCH 11/19/2018 28 8     MCHC 11/19/2018 31 5     RDW 11/19/2018 12 4     MPV 11/19/2018 8 8*    Platelets 84/38/3589 503*    nRBC 11/19/2018 0     Neutrophils Relative 11/19/2018 78*    Immat GRANS % 11/19/2018 0     Lymphocytes Relative 11/19/2018 17     Monocytes Relative 11/19/2018 5     Eosinophils Relative 11/19/2018 0     Basophils Relative 11/19/2018 0     Neutrophils Absolute 11/19/2018 8 33*    Immature Grans Absolute 11/19/2018 0 03     Lymphocytes Absolute 11/19/2018 1 80     Monocytes Absolute 11/19/2018 0 58     Eosinophils Absolute 11/19/2018 0 01     Basophils Absolute 11/19/2018 0 01     Protime 11/19/2018 13 1     INR 11/19/2018 1 02     PTT 11/19/2018 24*    C difficile toxin by PCR 11/19/2018 NEGATIVE for C difficle toxin by PCR   WBC 11/20/2018 13 32*    RBC 11/20/2018 3 31*    Hemoglobin 11/20/2018 9 6*    Hematocrit 11/20/2018 30 2*    MCV 11/20/2018 91     MCH 11/20/2018 29 0     MCHC 11/20/2018 31 8     RDW 11/20/2018 12 3     Platelets 51/12/8317 443*    MPV 11/20/2018 9 1     Sodium 11/20/2018 142     Potassium 11/20/2018 3 6     Chloride 11/20/2018 107     CO2 11/20/2018 27     ANION GAP 11/20/2018 8     BUN 11/20/2018 4*    Creatinine 11/20/2018 0 75     Glucose 11/20/2018 95     Calcium 11/20/2018 9 8     eGFR 11/20/2018 91     Sodium 11/21/2018 139     Potassium 11/21/2018 4 2     Chloride 11/21/2018 106     CO2 11/21/2018 26     ANION GAP 11/21/2018 7     BUN 11/21/2018 4*    Creatinine 11/21/2018 0 73     Glucose 11/21/2018 151*    Calcium 11/21/2018 9 7     eGFR 11/21/2018 94     WBC 11/21/2018 12 14*    RBC 11/21/2018 3 59*    Hemoglobin 11/21/2018 10 3*    Hematocrit 11/21/2018 32 9*    MCV 11/21/2018 92     MCH 11/21/2018 28 7     MCHC 11/21/2018 31 3*    RDW 11/21/2018 12 3     MPV 11/21/2018 9 0     Platelets 17/46/5290 474*    nRBC 11/21/2018 0     Segmented % 11/21/2018 95*    Bands % 11/21/2018 1     Lymphocytes % 11/21/2018 3*    Monocytes % 11/21/2018 1*    Eosinophils, % 11/21/2018 0     Basophils % 11/21/2018 0     Absolute Neutrophils 11/21/2018 11 65*    Lymphocytes Absolute 11/21/2018 0 36*    Monocytes Absolute 11/21/2018 0 12     Eosinophils Absolute 11/21/2018 0 00     Basophils Absolute 11/21/2018 0 00     Total Counted 11/21/2018 100     Platelet Estimate 89/33/1218 Increased*       Imaging Studies: I have personally reviewed pertinent imaging studies

## 2018-11-21 NOTE — ASSESSMENT & PLAN NOTE
· Presents with intractable diarrhea with rectal bleeding having failed outpatient treatment  Had been on Humira as well as prednisone and mesalamine and had initially done well but now symptoms have worsened, it is noted that she has developed antibodies to Humira and this will no longer be effective  She is status post flexible sigmoidoscopy showing severe ulcerative colitis from the hepatic flexure, distally with ulcerations and diffuse inflammatory changes   Stools negative for C diff  · Continue IV Solu-Medrol and metronidazole and monitor for stool response  · GI has advanced diet   · Will need close outpatient follow up and steroid taper at discharge

## 2018-11-22 LAB
ALBUMIN SERPL BCP-MCNC: 2.4 G/DL (ref 3.5–5)
ALP SERPL-CCNC: 85 U/L (ref 46–116)
ALT SERPL W P-5'-P-CCNC: 11 U/L (ref 12–78)
ANION GAP SERPL CALCULATED.3IONS-SCNC: 9 MMOL/L (ref 4–13)
AST SERPL W P-5'-P-CCNC: 6 U/L (ref 5–45)
BASOPHILS # BLD AUTO: 0.01 THOUSANDS/ΜL (ref 0–0.1)
BASOPHILS NFR BLD AUTO: 0 % (ref 0–1)
BILIRUB SERPL-MCNC: 0.1 MG/DL (ref 0.2–1)
BUN SERPL-MCNC: 10 MG/DL (ref 5–25)
CALCIUM SERPL-MCNC: 10.1 MG/DL (ref 8.3–10.1)
CHLORIDE SERPL-SCNC: 106 MMOL/L (ref 100–108)
CO2 SERPL-SCNC: 24 MMOL/L (ref 21–32)
CREAT SERPL-MCNC: 0.66 MG/DL (ref 0.6–1.3)
EOSINOPHIL # BLD AUTO: 0 THOUSAND/ΜL (ref 0–0.61)
EOSINOPHIL NFR BLD AUTO: 0 % (ref 0–6)
ERYTHROCYTE [DISTWIDTH] IN BLOOD BY AUTOMATED COUNT: 12.3 % (ref 11.6–15.1)
GFR SERPL CREATININE-BSD FRML MDRD: 101 ML/MIN/1.73SQ M
GLUCOSE SERPL-MCNC: 136 MG/DL (ref 65–140)
HCT VFR BLD AUTO: 33.9 % (ref 34.8–46.1)
HGB BLD-MCNC: 10.7 G/DL (ref 11.5–15.4)
IMM GRANULOCYTES # BLD AUTO: 0.08 THOUSAND/UL (ref 0–0.2)
IMM GRANULOCYTES NFR BLD AUTO: 1 % (ref 0–2)
LYMPHOCYTES # BLD AUTO: 1.25 THOUSANDS/ΜL (ref 0.6–4.47)
LYMPHOCYTES NFR BLD AUTO: 8 % (ref 14–44)
MCH RBC QN AUTO: 28.7 PG (ref 26.8–34.3)
MCHC RBC AUTO-ENTMCNC: 31.6 G/DL (ref 31.4–37.4)
MCV RBC AUTO: 91 FL (ref 82–98)
MONOCYTES # BLD AUTO: 0.61 THOUSAND/ΜL (ref 0.17–1.22)
MONOCYTES NFR BLD AUTO: 4 % (ref 4–12)
NEUTROPHILS # BLD AUTO: 13.95 THOUSANDS/ΜL (ref 1.85–7.62)
NEUTS SEG NFR BLD AUTO: 87 % (ref 43–75)
NRBC BLD AUTO-RTO: 0 /100 WBCS
PLATELET # BLD AUTO: 531 THOUSANDS/UL (ref 149–390)
PMV BLD AUTO: 9 FL (ref 8.9–12.7)
POTASSIUM SERPL-SCNC: 4.1 MMOL/L (ref 3.5–5.3)
PROT SERPL-MCNC: 6.1 G/DL (ref 6.4–8.2)
RBC # BLD AUTO: 3.73 MILLION/UL (ref 3.81–5.12)
SODIUM SERPL-SCNC: 139 MMOL/L (ref 136–145)
WBC # BLD AUTO: 15.9 THOUSAND/UL (ref 4.31–10.16)

## 2018-11-22 PROCEDURE — 99232 SBSQ HOSP IP/OBS MODERATE 35: CPT | Performed by: INTERNAL MEDICINE

## 2018-11-22 PROCEDURE — 99232 SBSQ HOSP IP/OBS MODERATE 35: CPT | Performed by: PHYSICIAN ASSISTANT

## 2018-11-22 PROCEDURE — 80053 COMPREHEN METABOLIC PANEL: CPT | Performed by: PHYSICIAN ASSISTANT

## 2018-11-22 PROCEDURE — 85025 COMPLETE CBC W/AUTO DIFF WBC: CPT | Performed by: PHYSICIAN ASSISTANT

## 2018-11-22 RX ORDER — DICYCLOMINE HYDROCHLORIDE 10 MG/1
10 CAPSULE ORAL
Status: DISCONTINUED | OUTPATIENT
Start: 2018-11-22 | End: 2018-11-23

## 2018-11-22 RX ADMIN — DICYCLOMINE HYDROCHLORIDE 10 MG: 10 CAPSULE ORAL at 11:23

## 2018-11-22 RX ADMIN — DICYCLOMINE HYDROCHLORIDE 10 MG: 10 CAPSULE ORAL at 21:22

## 2018-11-22 RX ADMIN — METHYLPREDNISOLONE SODIUM SUCCINATE 20 MG: 40 INJECTION, POWDER, FOR SOLUTION INTRAMUSCULAR; INTRAVENOUS at 16:54

## 2018-11-22 RX ADMIN — DICYCLOMINE HYDROCHLORIDE 10 MG: 10 CAPSULE ORAL at 17:02

## 2018-11-22 RX ADMIN — HYOSCYAMINE SULFATE 0.12 MG: 0.12 TABLET ORAL at 05:46

## 2018-11-22 RX ADMIN — ZOLPIDEM TARTRATE 10 MG: 5 TABLET, FILM COATED ORAL at 21:22

## 2018-11-22 RX ADMIN — METHYLPREDNISOLONE SODIUM SUCCINATE 20 MG: 40 INJECTION, POWDER, FOR SOLUTION INTRAMUSCULAR; INTRAVENOUS at 08:52

## 2018-11-22 RX ADMIN — METRONIDAZOLE 500 MG: 500 INJECTION, SOLUTION INTRAVENOUS at 01:33

## 2018-11-22 RX ADMIN — METRONIDAZOLE 500 MG: 500 INJECTION, SOLUTION INTRAVENOUS at 08:52

## 2018-11-22 RX ADMIN — METRONIDAZOLE 500 MG: 500 INJECTION, SOLUTION INTRAVENOUS at 16:54

## 2018-11-22 RX ADMIN — ACETAMINOPHEN 650 MG: 325 TABLET, FILM COATED ORAL at 05:43

## 2018-11-22 RX ADMIN — METHYLPREDNISOLONE SODIUM SUCCINATE 20 MG: 40 INJECTION, POWDER, FOR SOLUTION INTRAMUSCULAR; INTRAVENOUS at 01:33

## 2018-11-22 RX ADMIN — ACETAMINOPHEN 650 MG: 325 TABLET, FILM COATED ORAL at 17:53

## 2018-11-22 RX ADMIN — LEVOTHYROXINE SODIUM 25 MCG: 25 TABLET ORAL at 05:43

## 2018-11-22 RX ADMIN — VITAMIN D, TAB 1000IU (100/BT) 3000 UNITS: 25 TAB at 08:52

## 2018-11-22 NOTE — PLAN OF CARE
DISCHARGE PLANNING     Discharge to home or other facility with appropriate resources Progressing        GASTROINTESTINAL - ADULT     Minimal or absence of nausea and/or vomiting Progressing     Maintains or returns to baseline bowel function Progressing     Maintains adequate nutritional intake Progressing    Patient states she feels at baseline   Stool is less watery    INFECTION - ADULT     Absence or prevention of progression during hospitalization Progressing        Knowledge Deficit     Patient/family/caregiver demonstrates understanding of disease process, treatment plan, medications, and discharge instructions Progressing        Nutrition/Hydration-ADULT     Nutrient/Hydration intake appropriate for improving, restoring or maintaining nutritional needs Progressing        PAIN - ADULT     Verbalizes/displays adequate comfort level or baseline comfort level Progressing    Patient feels at baseline    SAFETY ADULT     Patient will remain free of falls Progressing

## 2018-11-22 NOTE — PROGRESS NOTES
[facility]  Progress Note - Atascosa Rash 1965, 48 y o  female MRN: [id number]    Unit/Bed#: -01 Encounter: [id number]    Primary Care Provider: Anshul Monday, DO   Date and time admitted to hospital: 11/19/2018  5:55 PM        * Ulcerative colitis with rectal bleeding [facility])   Assessment & Plan    · Presents with intractable diarrhea with rectal bleeding having failed outpatient treatment  Had been on Humira as well as prednisone and mesalamine and had initially done well but now symptoms have worsened, it is noted that she has developed antibodies to Humira and this will no longer be effective  She is status post flexible sigmoidoscopy showing severe ulcerative colitis from the hepatic flexure, distally with ulcerations and diffuse inflammatory changes  Stools negative for C diff  She continues to have frequent diarrhea with some blood  Notes little improvement  · Continue IV Solu-Medrol and metronidazole and monitor for stool response  · GI has advanced diet   · Will need close outpatient follow up and steroid taper at discharge   · Add Bentyl   · Add Ensure supplementation      Hypothyroidism   Assessment & Plan    · Continue Synthroid  · TSH within normal limits       VTE Pharmacologic Prophylaxis:   Pharmacologic: Pharmacologic VTE Prophylaxis contraindicated due to GI bleeding secondary to UC  Mechanical VTE Prophylaxis in Place: No    Patient Centered Rounds: I have performed bedside rounds with nursing staff today  Discussions with Specialists or Other Care Team Provider: Discussed with RN    Education and Discussions with Family / Patient: Discussed with patient,  at bedside     Time Spent for Care: 30 minutes  More than 50% of total time spent on counseling and coordination of care as described above      Current Length of Stay: 3 day(s)    Current Patient Status: Inpatient   Certification Statement: The patient will continue to require additional inpatient hospital stay due to continued intractable diarrhea and rectal bleeding needing IV steroids     Discharge Plan: Pending improvement of diarrhea, hopefully 24-48 hours     Code Status: Level 1 - Full Code      Subjective:   Patient reports that she does not feel well today  She states that she feels wiped out  Reports that she has had frequent diarrhea overnight, some of which is scantly bloody  She is concerned about her albumin being low  Denies fevers or chills  Denies nausea or vomiting  Objective:     Vitals:   Temp (24hrs), Av °F (37 2 °C), Min:98 6 °F (37 °C), Max:99 2 °F (37 3 °C)    Temp:  [98 6 °F (37 °C)-99 2 °F (37 3 °C)] 99 2 °F (37 3 °C)  HR:  [74-87] 87  Resp:  [18] 18  BP: (105-116)/(51-61) 116/61  SpO2:  [97 %-99 %] 99 %  Body mass index is 27 23 kg/m²  Input and Output Summary (last 24 hours): Intake/Output Summary (Last 24 hours) at 18 1316  Last data filed at 18 1957   Gross per 24 hour   Intake                0 ml   Output              700 ml   Net             -700 ml       Physical Exam:     Physical Exam   Constitutional: She is oriented to person, place, and time  Vital signs are normal  She appears well-developed and well-nourished  Non-toxic appearance  No distress  HENT:   Head: Normocephalic and atraumatic  Eyes: Pupils are equal, round, and reactive to light  Conjunctivae and EOM are normal    Neck: Neck supple  Cardiovascular: Normal rate, regular rhythm, S1 normal, S2 normal, normal heart sounds and intact distal pulses  Exam reveals no S3 and no S4  No murmur heard  Pulmonary/Chest: Effort normal and breath sounds normal  No accessory muscle usage  No respiratory distress  She has no decreased breath sounds  She has no wheezes  She has no rhonchi  She has no rales  She exhibits no tenderness  Abdominal: Soft  Bowel sounds are normal  She exhibits no distension and no mass  There is generalized tenderness   There is no rigidity, no rebound and no guarding  Neurological: She is alert and oriented to person, place, and time  She is not disoriented  GCS eye subscore is 4  GCS verbal subscore is 5  GCS motor subscore is 6  Skin: Skin is warm and dry  Additional Data:     Labs:      Results from last 7 days  Lab Units 11/22/18  0532   WBC Thousand/uL 15 90*   HEMOGLOBIN g/dL 10 7*   HEMATOCRIT % 33 9*   PLATELETS Thousands/uL 531*   NEUTROS PCT % 87*   LYMPHS PCT % 8*   MONOS PCT % 4   EOS PCT % 0       Results from last 7 days  Lab Units 11/22/18  0532   POTASSIUM mmol/L 4 1   CHLORIDE mmol/L 106   CO2 mmol/L 24   BUN mg/dL 10   CREATININE mg/dL 0 66   CALCIUM mg/dL 10 1   ALK PHOS U/L 85   ALT U/L 11*   AST U/L 6       Results from last 7 days  Lab Units 11/19/18  2109   INR  1 02       * I Have Reviewed All Lab Data Listed Above  * Additional Pertinent Lab Tests Reviewed: [facility] 66 Admission Reviewed    Imaging:    Imaging Reports Reviewed Today Include: None  Imaging Personally Reviewed by Myself Includes:  None    Recent Cultures (last 7 days):       Results from last 7 days  Lab Units 11/19/18 2101   C DIFF TOXIN B  NEGATIVE for C difficle toxin by PCR          Last 24 Hours Medication List:     Current Facility-Administered Medications:  acetaminophen 650 mg Oral Q6H PRN Alice Ennis MD    albuterol 2 5 mg Nebulization Q6H PRN Wild Nicole MD    cholecalciferol 3,000 Units Oral Daily Gaston Murcia PA-C    dicyclomine 10 mg Oral 4x Daily (AC & HS) Gaston Murcia PA-C    hyoscyamine 0 125 mg Oral Q4H PRN Alice Ennis MD    levothyroxine 25 mcg Oral Early Morning Alice Ennis MD    mesalamine 4 g Rectal HS Alice Ennis MD    methylPREDNISolone sodium succinate 20 mg Intravenous Q8H [address] Liyah Arroyo MD    metroNIDAZOLE 500 mg Intravenous Q8H Wild Nicole MD Last Rate: 500 mg (11/22/18 0852)   ondansetron 4 mg Intravenous Q6H PRN Alice Ennis MD    zolpidem 10 mg Oral HS PRN Alice GOVEA MD Loli         Today, Patient Was Seen By: Maxim Franklin PA-C    ** Please Note: Dictation voice to text software may have been used in the creation of this document   **

## 2018-11-22 NOTE — PROGRESS NOTES
Progress Note- Dmitriy Bowers 48 y o  female MRN: [id number]    Unit/Bed#: -01 Encounter: [id number]      Assessment and Plan:    1  ulcerative colitis:  -continue IV steroids, 20 mg q 8 hours x 24 hours more  -Humira antibodies returned with high antibody level and minimal therapeutic drug level, will follow-up as an outpatient for likely modification of therapy (vedolizumab vs infliximab), decided pending comoribidity of psoriatic arthritis  - transition to PO steroids tomorrow pending how tolerating diet  - okay for PRN bentyl for abdominal cramping    ______________________________________________________________________    Subjective:     49-year-old female with a history of severe ulcerative colitis, had been on Humira with flare-up recently as an outpatient, failed outpatient prednisone therapy, noted to have moderate to severe ulcerative colitis extending to her hepatic flexure on sigmoidoscopy  Has been started on IV steroids, C diff studies are negative  Diarrhea is decreased in frequency to 5 bm/12 hours  Decreased rectal bleeding  Tolerating regular (low fiber/low residue) diet, but with postprandial cramping with radiation to her back  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      Medication Administration - last 24 hours from 11/21/2018 1153 to 11/22/2018 1153       Date/Time Order Dose Route Action Action by     11/22/2018 0546 hyoscyamine (LEVSIN/SL) SL tablet 0 125 mg 0 125 mg Oral Given Brando Hsu, RN     11/22/2018 0543 levothyroxine tablet 25 mcg 25 mcg Oral Given Brando Hsu, RN     11/21/2018 2135 mesalamine (ROWASA) enema 4 g 4 g Rectal Not Given Brando Hsu, RN     11/21/2018 2135 zolpidem (AMBIEN) tablet 10 mg 10 mg Oral Given Brando Hsu, RN     11/22/2018 0543 acetaminophen (TYLENOL) tablet 650 mg 650 mg Oral Given Brando Hsu, RN     11/21/2018 2135 acetaminophen (TYLENOL) tablet 650 mg 650 mg Oral Given Brando Hsu, RN     11/22/2018 0852 methylPREDNISolone sodium succinate (Solu-MEDROL) injection 20 mg 20 mg Intravenous Given Axel Rosales RN     11/22/2018 0133 methylPREDNISolone sodium succinate (Solu-MEDROL) injection 20 mg 20 mg Intravenous Given Annika Nixon RN     11/21/2018 1702 methylPREDNISolone sodium succinate (Solu-MEDROL) injection 20 mg 20 mg Intravenous Given Shyam Pantoja RN     11/22/2018 0852 metroNIDAZOLE (FLAGYL) IVPB (premix) 500 mg 500 mg Intravenous [address] Axel Rosales RN     11/22/2018 0133 metroNIDAZOLE (FLAGYL) IVPB (premix) 500 mg 500 mg Intravenous New Bag Annika Nixon, ABBIE     11/21/2018 1702 metroNIDAZOLE (FLAGYL) IVPB (premix) 500 mg 500 mg Intravenous [address] Francisco Javierashley Pantoja, ABBIE     11/22/2018 1691 cholecalciferol (VITAMIN D3) tablet 3,000 Units 3,000 Units Oral Given Axel Rosales RN     11/22/2018 1123 dicyclomine (BENTYL) capsule 10 mg 10 mg Oral Given Axel Rosales RN          Objective:     Vitals: Blood pressure 116/61, pulse 87, temperature 99 2 °F (37 3 °C), temperature source Oral, resp  rate 18, height 5' 3" (1 6 m), weight 69 7 kg (153 lb 11 2 oz), SpO2 99 %  ,Body mass index is 27 23 kg/m²        Intake/Output Summary (Last 24 hours) at 11/22/18 1153  Last data filed at 11/21/18 1957   Gross per 24 hour   Intake                0 ml   Output              700 ml   Net             -700 ml       Physical Exam:   General Appearance: Awake and alert, in no acute distress  Abdomen: Soft, diffuse tenderness to palpation, non-distended; bowel sounds normal; no masses or no organomegaly    Invasive Devices     Peripheral Intravenous Line            Peripheral IV 11/19/18 Left Forearm 2 days                Lab Results:      Results from last 7 days  Lab Units 11/22/18  0532 11/21/18  0446 11/20/18  0450   WBC Thousand/uL 15 90* 12 14* 13 32*   HEMOGLOBIN g/dL 10 7* 10 3* 9 6*   HEMATOCRIT % 33 9* 32 9* 30 2*   PLATELETS Thousands/uL 531* 474* 443*         Results from last 7 days  Lab Units 11/22/18  0532 11/21/18  0446 11/20/18  0450 11/19/18  2109   POTASSIUM mmol/L 4 1 4 2 3 6 3 5   CHLORIDE mmol/L 106 106 107 104   CO2 mmol/L 24 26 27 28   BUN mg/dL 10 4* 4* 5   CREATININE mg/dL 0 66 0 73 0 75 0 85   CALCIUM mg/dL 10 1 9 7 9 8 9 9   ALK PHOS U/L 85  --   --  82   ALT U/L 11*  --   --  16   AST U/L 6  --   --  8     Lab Results   Component Value Date    CRP 39 9 (H) 11/01/2018     No results found for: SEDRATE      Imaging Studies: I have personally reviewed pertinent imaging studies

## 2018-11-22 NOTE — ASSESSMENT & PLAN NOTE
· Presents with intractable diarrhea with rectal bleeding having failed outpatient treatment  Had been on Humira as well as prednisone and mesalamine and had initially done well but now symptoms have worsened, it is noted that she has developed antibodies to Humira and this will no longer be effective  She is status post flexible sigmoidoscopy showing severe ulcerative colitis from the hepatic flexure, distally with ulcerations and diffuse inflammatory changes  Stools negative for C diff  She continues to have frequent diarrhea with some blood  Notes little improvement     · Continue IV Solu-Medrol and metronidazole and monitor for stool response  · GI has advanced diet   · Will need close outpatient follow up and steroid taper at discharge   · Add Bentyl   · Add Ensure supplementation

## 2018-11-22 NOTE — PLAN OF CARE
DISCHARGE PLANNING     Discharge to home or other facility with appropriate resources Progressing        GASTROINTESTINAL - ADULT     Minimal or absence of nausea and/or vomiting Progressing     Maintains or returns to baseline bowel function Progressing     Maintains adequate nutritional intake Progressing        INFECTION - ADULT     Absence or prevention of progression during hospitalization Progressing        Knowledge Deficit     Patient/family/caregiver demonstrates understanding of disease process, treatment plan, medications, and discharge instructions Progressing        Nutrition/Hydration-ADULT     Nutrient/Hydration intake appropriate for improving, restoring or maintaining nutritional needs Progressing        PAIN - ADULT     Verbalizes/displays adequate comfort level or baseline comfort level Progressing        SAFETY ADULT     Patient will remain free of falls Progressing

## 2018-11-23 ENCOUNTER — TELEPHONE (OUTPATIENT)
Dept: GASTROENTEROLOGY | Facility: AMBULARY SURGERY CENTER | Age: 53
End: 2018-11-23

## 2018-11-23 LAB
ALBUMIN SERPL BCP-MCNC: 2.4 G/DL (ref 3.5–5)
ALP SERPL-CCNC: 85 U/L (ref 46–116)
ALT SERPL W P-5'-P-CCNC: 13 U/L (ref 12–78)
ANION GAP SERPL CALCULATED.3IONS-SCNC: 6 MMOL/L (ref 4–13)
AST SERPL W P-5'-P-CCNC: 8 U/L (ref 5–45)
BILIRUB SERPL-MCNC: 0.1 MG/DL (ref 0.2–1)
BUN SERPL-MCNC: 10 MG/DL (ref 5–25)
CALCIUM SERPL-MCNC: 9.8 MG/DL (ref 8.3–10.1)
CHLORIDE SERPL-SCNC: 107 MMOL/L (ref 100–108)
CO2 SERPL-SCNC: 27 MMOL/L (ref 21–32)
CREAT SERPL-MCNC: 0.72 MG/DL (ref 0.6–1.3)
ERYTHROCYTE [DISTWIDTH] IN BLOOD BY AUTOMATED COUNT: 12.8 % (ref 11.6–15.1)
GFR SERPL CREATININE-BSD FRML MDRD: 96 ML/MIN/1.73SQ M
GLUCOSE SERPL-MCNC: 121 MG/DL (ref 65–140)
HCT VFR BLD AUTO: 33.3 % (ref 34.8–46.1)
HGB BLD-MCNC: 10.5 G/DL (ref 11.5–15.4)
MCH RBC QN AUTO: 28.6 PG (ref 26.8–34.3)
MCHC RBC AUTO-ENTMCNC: 31.5 G/DL (ref 31.4–37.4)
MCV RBC AUTO: 91 FL (ref 82–98)
PLATELET # BLD AUTO: 525 THOUSANDS/UL (ref 149–390)
PMV BLD AUTO: 9.1 FL (ref 8.9–12.7)
POTASSIUM SERPL-SCNC: 4.2 MMOL/L (ref 3.5–5.3)
PROT SERPL-MCNC: 5.8 G/DL (ref 6.4–8.2)
RBC # BLD AUTO: 3.67 MILLION/UL (ref 3.81–5.12)
SODIUM SERPL-SCNC: 140 MMOL/L (ref 136–145)
WBC # BLD AUTO: 16.16 THOUSAND/UL (ref 4.31–10.16)

## 2018-11-23 PROCEDURE — 99232 SBSQ HOSP IP/OBS MODERATE 35: CPT | Performed by: PHYSICIAN ASSISTANT

## 2018-11-23 PROCEDURE — 80053 COMPREHEN METABOLIC PANEL: CPT | Performed by: PHYSICIAN ASSISTANT

## 2018-11-23 PROCEDURE — 85027 COMPLETE CBC AUTOMATED: CPT | Performed by: PHYSICIAN ASSISTANT

## 2018-11-23 RX ORDER — PREDNISONE 20 MG/1
40 TABLET ORAL DAILY
Status: DISCONTINUED | OUTPATIENT
Start: 2018-11-23 | End: 2018-11-24

## 2018-11-23 RX ORDER — CHOLESTYRAMINE LIGHT 4 G/5.7G
4 POWDER, FOR SUSPENSION ORAL DAILY
Status: DISCONTINUED | OUTPATIENT
Start: 2018-11-23 | End: 2018-11-25 | Stop reason: HOSPADM

## 2018-11-23 RX ADMIN — METRONIDAZOLE 500 MG: 500 INJECTION, SOLUTION INTRAVENOUS at 16:31

## 2018-11-23 RX ADMIN — LEVOTHYROXINE SODIUM 25 MCG: 25 TABLET ORAL at 06:16

## 2018-11-23 RX ADMIN — CHOLESTYRAMINE 4 G: 4 POWDER, FOR SUSPENSION ORAL at 13:39

## 2018-11-23 RX ADMIN — ZOLPIDEM TARTRATE 10 MG: 5 TABLET, FILM COATED ORAL at 21:07

## 2018-11-23 RX ADMIN — METRONIDAZOLE 500 MG: 500 INJECTION, SOLUTION INTRAVENOUS at 01:10

## 2018-11-23 RX ADMIN — ACETAMINOPHEN 650 MG: 325 TABLET, FILM COATED ORAL at 08:28

## 2018-11-23 RX ADMIN — DICYCLOMINE HYDROCHLORIDE 10 MG: 10 CAPSULE ORAL at 08:03

## 2018-11-23 RX ADMIN — PREDNISONE 40 MG: 20 TABLET ORAL at 12:15

## 2018-11-23 RX ADMIN — HYOSCYAMINE SULFATE 0.12 MG: 0.12 TABLET ORAL at 21:59

## 2018-11-23 RX ADMIN — METHYLPREDNISOLONE SODIUM SUCCINATE 20 MG: 40 INJECTION, POWDER, FOR SOLUTION INTRAMUSCULAR; INTRAVENOUS at 01:14

## 2018-11-23 RX ADMIN — HYOSCYAMINE SULFATE 0.12 MG: 0.12 TABLET ORAL at 16:31

## 2018-11-23 RX ADMIN — VITAMIN D, TAB 1000IU (100/BT) 3000 UNITS: 25 TAB at 08:03

## 2018-11-23 RX ADMIN — METRONIDAZOLE 500 MG: 500 INJECTION, SOLUTION INTRAVENOUS at 08:09

## 2018-11-23 RX ADMIN — METHYLPREDNISOLONE SODIUM SUCCINATE 20 MG: 40 INJECTION, POWDER, FOR SOLUTION INTRAMUSCULAR; INTRAVENOUS at 08:04

## 2018-11-23 RX ADMIN — ACETAMINOPHEN 650 MG: 325 TABLET, FILM COATED ORAL at 16:30

## 2018-11-23 NOTE — ASSESSMENT & PLAN NOTE
· Presents with intractable diarrhea with rectal bleeding having failed outpatient treatment  Had been on Humira as well as prednisone and mesalamine and had initially done well but now symptoms have worsened, it is noted that she has developed antibodies to Humira and this will no longer be effective  She is status post flexible sigmoidoscopy showing severe ulcerative colitis from the hepatic flexure, distally with ulcerations and diffuse inflammatory changes  Stools negative for C diff  She continues to have frequent diarrhea with some blood  Notes little improvement     · Start PO Prednisone and monitor for 24 hours to assess response  · Continue Flagyl   · GI has advanced diet   · Will need close outpatient follow up and steroid taper at discharge   · Stop Bentyl    · Add Ensure supplementation

## 2018-11-23 NOTE — PROGRESS NOTES
Progress Note - Tiffanie Doran 48 y o  female MRN: [id number]    Unit/Bed#: -01 Encounter: [id number]    Assessment and Plan:   Principal Problem:    Ulcerative colitis with rectal bleeding ([address] )  Active Problems:    Hypothyroidism    #1  Refractory severe ulcerative colitis: patient still with abdominal cramping and frequent stools but overall improved compared to previous  Minimal blood in stool improved compared to prior, having bowel movements about once every 3-4 hours but was previously every hour  Patient is anxious to get home    -Transition from IV steroids to oral prednisone 40 mg daily today  -Continue flagyl for total of 7 days    -Continue lo residue diet  -Will d/c Bentyl due to side effect of dry eye, dry mouth, etc,   -Continue Levsin PRN  -Will need close follow up with Dr Rocco Mitchell to discuss transition to a different biologic agent since patient has high antibody levels to Humira  Will send a message to [facility] to arrange this      ----------------------------------------------------------------------------------------------------------------    Subjective:     Patient having BM about once every 3-4 hours, loose and with some small amounts of stefany blood at times but this is improved compared to previous  Has abdominal cramping especially with BM and lower back pain that persists for several minutes after  Lack of appetite but is eating small amounts  No N/V  Objective:     Vitals: Blood pressure 108/56, pulse 61, temperature 98 5 °F (36 9 °C), temperature source Oral, resp  rate 18, height 5' 3" (1 6 m), weight 69 7 kg (153 lb 11 2 oz), SpO2 97 %  ,Body mass index is 27 23 kg/m²        Intake/Output Summary (Last 24 hours) at 11/23/18 1007  Last data filed at 11/23/18 8444   Gross per 24 hour   Intake              480 ml   Output                0 ml   Net              480 ml       Physical Exam:     General Appearance: Alert, appears stated age and cooperative  Lungs: Clear to auscultation bilaterally, no rales or rhonchi, no labored breathing/accessory muscle use  Heart: Regular rate and rhythm, S1, S2 normal, no murmur, click, rub or gallop  Abdomen: Soft, diffuse tenderness to palpation, non-distended; hyperactive bowel sounds; no masses or no organomegaly  Extremities: No cyanosis, clubbing, or edema    Invasive Devices     Peripheral Intravenous Line            Peripheral IV 11/19/18 Left Forearm 3 days                Lab Results:    Results from last 7 days  Lab Units 11/23/18  0432 11/22/18  0532   WBC Thousand/uL 16 16* 15 90*   HEMOGLOBIN g/dL 10 5* 10 7*   HEMATOCRIT % 33 3* 33 9*   PLATELETS Thousands/uL 525* 531*   NEUTROS PCT %  --  87*   LYMPHS PCT %  --  8*   MONOS PCT %  --  4   EOS PCT %  --  0       Results from last 7 days  Lab Units 11/23/18  0432   POTASSIUM mmol/L 4 2   CHLORIDE mmol/L 107   CO2 mmol/L 27   BUN mg/dL 10   CREATININE mg/dL 0 72   CALCIUM mg/dL 9 8   ALK PHOS U/L 85   ALT U/L 13   AST U/L 8       Results from last 7 days  Lab Units 11/19/18  2109   INR  1 02           Imaging Studies: I have personally reviewed pertinent imaging studies  No results found

## 2018-11-23 NOTE — PROGRESS NOTES
[facility]  Progress Note - [place] Rash 1965, 48 y o  female MRN: [id number]    Unit/Bed#: -01 Encounter: [id number]    Primary Care Provider: Anshul Monday, DO   Date and time admitted to hospital: 11/19/2018  5:55 PM        * Ulcerative colitis with rectal bleeding [facility])   Assessment & Plan    · Presents with intractable diarrhea with rectal bleeding having failed outpatient treatment  Had been on Humira as well as prednisone and mesalamine and had initially done well but now symptoms have worsened, it is noted that she has developed antibodies to Humira and this will no longer be effective  She is status post flexible sigmoidoscopy showing severe ulcerative colitis from the hepatic flexure, distally with ulcerations and diffuse inflammatory changes  Stools negative for C diff  She continues to have frequent diarrhea with some blood  Notes little improvement  · Start PO Prednisone and monitor for 24 hours to assess response  · Continue Flagyl   · GI has advanced diet   · Will need close outpatient follow up and steroid taper at discharge   · Stop Bentyl    · Add Ensure supplementation      Hypothyroidism   Assessment & Plan    · Continue Synthroid  · TSH within normal limits       VTE Pharmacologic Prophylaxis:   Pharmacologic: Pharmacologic VTE Prophylaxis contraindicated due to UC  Mechanical VTE Prophylaxis in Place: No    Patient Centered Rounds: I have performed bedside rounds with nursing staff today  Discussions with Specialists or Other Care Team Provider: Discussed with RN, CM    Education and Discussions with Family / Patient: Discussed with patient    Time Spent for Care: 30 minutes  More than 50% of total time spent on counseling and coordination of care as described above      Current Length of Stay: 4 day(s)    Current Patient Status: Inpatient   Certification Statement: The patient will continue to require additional inpatient hospital stay due to monitoring response of PO steroids     Discharge Plan: Tomorrow if further response for steroids     Code Status: Level 1 - Full Code      Subjective:   Patient reports that it seems that her diarrhea has decreased slightly and that her stool is not as watery  She still has intense gas pain with bowel movements  Tolerating a diet decently, but reports that her appetite is decreased still  Denies blood in stool     Objective:     Vitals:   Temp (24hrs), Av 6 °F (37 °C), Min:98 5 °F (36 9 °C), Max:98 7 °F (37 1 °C)    Temp:  [98 5 °F (36 9 °C)-98 7 °F (37 1 °C)] 98 5 °F (36 9 °C)  HR:  [55-70] 61  Resp:  [18] 18  BP: (108-129)/(56-66) 108/56  SpO2:  [96 %-97 %] 97 %  Body mass index is 27 23 kg/m²  Input and Output Summary (last 24 hours): Intake/Output Summary (Last 24 hours) at 18 1210  Last data filed at 18 6116   Gross per 24 hour   Intake              480 ml   Output                0 ml   Net              480 ml       Physical Exam:     Physical Exam   Constitutional: She is oriented to person, place, and time  Vital signs are normal  She appears well-developed and well-nourished  Non-toxic appearance  No distress  HENT:   Head: Normocephalic and atraumatic  Eyes: Pupils are equal, round, and reactive to light  Conjunctivae and EOM are normal    Neck: Neck supple  Cardiovascular: Normal rate, regular rhythm, S1 normal, S2 normal, normal heart sounds and intact distal pulses  Exam reveals no S3 and no S4  No murmur heard  Trace pitting edema     Pulmonary/Chest: Effort normal and breath sounds normal  No accessory muscle usage  No respiratory distress  She has no decreased breath sounds  She has no wheezes  She has no rhonchi  She has no rales  She exhibits no tenderness  Abdominal: Soft  Bowel sounds are normal  She exhibits no distension and no mass  There is generalized tenderness  There is no rigidity, no rebound and no guarding     Neurological: She is alert and oriented to person, place, and time  She is not disoriented  GCS eye subscore is 4  GCS verbal subscore is 5  GCS motor subscore is 6  Skin: Skin is warm and dry  Additional Data:     Labs:      Results from last 7 days  Lab Units 11/23/18  0432 11/22/18  0532   WBC Thousand/uL 16 16* 15 90*   HEMOGLOBIN g/dL 10 5* 10 7*   HEMATOCRIT % 33 3* 33 9*   PLATELETS Thousands/uL 525* 531*   NEUTROS PCT %  --  87*   LYMPHS PCT %  --  8*   MONOS PCT %  --  4   EOS PCT %  --  0       Results from last 7 days  Lab Units 11/23/18  0432   POTASSIUM mmol/L 4 2   CHLORIDE mmol/L 107   CO2 mmol/L 27   BUN mg/dL 10   CREATININE mg/dL 0 72   CALCIUM mg/dL 9 8   ALK PHOS U/L 85   ALT U/L 13   AST U/L 8       Results from last 7 days  Lab Units 11/19/18  2109   INR  1 02       * I Have Reviewed All Lab Data Listed Above  * Additional Pertinent Lab Tests Reviewed: [address] [facility] Admission Reviewed    Imaging:    Imaging Reports Reviewed Today Include: None  Imaging Personally Reviewed by Myself Includes:  None    Recent Cultures (last 7 days):       Results from last 7 days  Lab Units 11/19/18  2101   C DIFF TOXIN B  NEGATIVE for C difficle toxin by PCR          Last 24 Hours Medication List:     Current Facility-Administered Medications:  acetaminophen 650 mg Oral Q6H PRN Alice Ennis MD    albuterol 2 5 mg Nebulization Q6H PRN Jason Novak MD    cholecalciferol 3,000 Units Oral Daily Gaston Murcia PA-C    hyoscyamine 0 125 mg Oral Q4H PRN Alice Ennis MD    levothyroxine 25 mcg Oral Early Morning Alice Ennis MD    mesalamine 4 g Rectal HS Alice Ennis MD    metroNIDAZOLE 500 mg Intravenous Q8H Jason Novak MD Last Rate: 500 mg (11/23/18 0809)   ondansetron 4 mg Intravenous Q6H PRN Alice Ennis MD    predniSONE 40 mg Oral Daily Adri Herrera PA-C    zolpidem 10 mg Oral HS PRN Daniela Lara MD         Today, Patient Was Seen By: Kade Horton PA-C    ** Please Note: Dictation voice to text software may have been used in the creation of this document   **

## 2018-11-24 PROCEDURE — 99232 SBSQ HOSP IP/OBS MODERATE 35: CPT | Performed by: INTERNAL MEDICINE

## 2018-11-24 RX ORDER — PREDNISONE 20 MG/1
60 TABLET ORAL DAILY
Status: DISCONTINUED | OUTPATIENT
Start: 2018-11-25 | End: 2018-11-25 | Stop reason: HOSPADM

## 2018-11-24 RX ORDER — PREDNISONE 20 MG/1
20 TABLET ORAL ONCE
Status: COMPLETED | OUTPATIENT
Start: 2018-11-24 | End: 2018-11-24

## 2018-11-24 RX ORDER — METRONIDAZOLE 500 MG/1
500 TABLET ORAL EVERY 8 HOURS SCHEDULED
Status: DISCONTINUED | OUTPATIENT
Start: 2018-11-24 | End: 2018-11-25 | Stop reason: HOSPADM

## 2018-11-24 RX ADMIN — METRONIDAZOLE 500 MG: 500 TABLET ORAL at 10:11

## 2018-11-24 RX ADMIN — HYOSCYAMINE SULFATE 0.12 MG: 0.12 TABLET ORAL at 04:38

## 2018-11-24 RX ADMIN — HYOSCYAMINE SULFATE 0.12 MG: 0.12 TABLET ORAL at 09:40

## 2018-11-24 RX ADMIN — ACETAMINOPHEN 650 MG: 325 TABLET, FILM COATED ORAL at 15:52

## 2018-11-24 RX ADMIN — ZOLPIDEM TARTRATE 10 MG: 5 TABLET, FILM COATED ORAL at 20:50

## 2018-11-24 RX ADMIN — LEVOTHYROXINE SODIUM 25 MCG: 25 TABLET ORAL at 05:19

## 2018-11-24 RX ADMIN — ACETAMINOPHEN 650 MG: 325 TABLET, FILM COATED ORAL at 04:37

## 2018-11-24 RX ADMIN — VITAMIN D, TAB 1000IU (100/BT) 3000 UNITS: 25 TAB at 09:38

## 2018-11-24 RX ADMIN — CHOLESTYRAMINE 4 G: 4 POWDER, FOR SUSPENSION ORAL at 09:40

## 2018-11-24 RX ADMIN — HYOSCYAMINE SULFATE 0.12 MG: 0.12 TABLET ORAL at 20:52

## 2018-11-24 RX ADMIN — METRONIDAZOLE 500 MG: 500 INJECTION, SOLUTION INTRAVENOUS at 00:38

## 2018-11-24 RX ADMIN — HYOSCYAMINE SULFATE 0.12 MG: 0.12 TABLET ORAL at 15:52

## 2018-11-24 RX ADMIN — PREDNISONE 40 MG: 20 TABLET ORAL at 09:38

## 2018-11-24 RX ADMIN — PREDNISONE 20 MG: 20 TABLET ORAL at 11:35

## 2018-11-24 RX ADMIN — METRONIDAZOLE 500 MG: 500 TABLET ORAL at 18:07

## 2018-11-24 RX ADMIN — ACETAMINOPHEN 650 MG: 325 TABLET, FILM COATED ORAL at 20:52

## 2018-11-24 NOTE — PROGRESS NOTES
Progress Note - Aman Gupta 48 y o  female MRN: [id number]    Unit/Bed#: -01 Encounter: [id number]    Assessment and Plan:   Principal Problem:    Ulcerative colitis with rectal bleeding ([address] )  Active Problems:    Hypothyroidism    #1  Severe ulcerative colitis:  Has been refractory to oral steroids  Patient was started on 40 mg oral steroids yesterday and had about 7 bowel movements overnight  Fifteen bowel movements in the last 24 hours  Continues to have abdominal cramping  -we will increase her steroids to 60 mg daily   -continue Questran for symptom control  Can consider increasing this to b i d   -continue Levsin for abdominal cramping  -continue diet for now  If patient continues to have severe symptoms, can consider backing off on diet and doing complete bowel rest with TPN  -patient will need close outpatient follow up with Dr Reyes Monroe  I have sent a message to the office to get her in within the next 1 to 2 weeks  -patient will ultimately need to likely be on Entyvio for her severe ulcerative colitis  She has significant antibodies to Humira which will greatly increase her risk of failing Remicade as well     ----------------------------------------------------------------------------------------------------------------    Subjective:     Patient had about 15 bowel movements in the last 24 hours  Most of them were very small  She did use to have abdominal cramping with bowel movements and back pain for about an hour after each bowel movement  She has a lack of appetite and has been taking an minimal amounts of food  Objective:     Vitals: Blood pressure 132/63, pulse 55, temperature 99 °F (37 2 °C), temperature source Oral, resp  rate 18, height 5' 3" (1 6 m), weight 69 7 kg (153 lb 11 2 oz), SpO2 98 %  ,Body mass index is 27 23 kg/m²        Intake/Output Summary (Last 24 hours) at 11/24/18 1001  Last data filed at 11/23/18 1500   Gross per 24 hour   Intake              260 ml   Output 0 ml   Net              260 ml       Physical Exam:     General Appearance: Alert, appears stated age and cooperative; tearful  Lungs: Clear to auscultation bilaterally, no rales or rhonchi, no labored breathing/accessory muscle use  Heart: Regular rate and rhythm, S1, S2 normal, no murmur, click, rub or gallop  Abdomen: Soft, diffuse abdominal tenderness to palpation, non-distended; bowel sounds normal; no masses or no organomegaly  Extremities: No cyanosis, clubbing, or edema    Invasive Devices     Peripheral Intravenous Line            Peripheral IV 11/19/18 Left Forearm 4 days                Lab Results:    Results from last 7 days  Lab Units 11/23/18  0432 11/22/18  0532   WBC Thousand/uL 16 16* 15 90*   HEMOGLOBIN g/dL 10 5* 10 7*   HEMATOCRIT % 33 3* 33 9*   PLATELETS Thousands/uL 525* 531*   NEUTROS PCT %  --  87*   LYMPHS PCT %  --  8*   MONOS PCT %  --  4   EOS PCT %  --  0       Results from last 7 days  Lab Units 11/23/18  0432   POTASSIUM mmol/L 4 2   CHLORIDE mmol/L 107   CO2 mmol/L 27   BUN mg/dL 10   CREATININE mg/dL 0 72   CALCIUM mg/dL 9 8   ALK PHOS U/L 85   ALT U/L 13   AST U/L 8       Results from last 7 days  Lab Units 11/19/18  2109   INR  1 02           Imaging Studies: I have personally reviewed pertinent imaging studies  No results found

## 2018-11-24 NOTE — PROGRESS NOTES
[facility] Internal Medicine  Progress Note - Roshan Jerrell 1965, 48 y o  female MRN: [id number]    Unit/Bed#: -01 Encounter: [id number]    Primary Care Provider: Matthew Freire,    Date and time admitted to hospital: 11/19/2018  5:55 PM        * Ulcerative colitis with rectal bleeding [facility])   Assessment & Plan    · Presents with intractable diarrhea with rectal bleeding having failed outpatient treatment  Had been on Humira as well as prednisone and mesalamine and had initially done well but now symptoms have worsened, it is noted that she has developed antibodies to Humira and this will no longer be effective  She is status post flexible sigmoidoscopy showing severe ulcerative colitis from the hepatic flexure, distally with ulcerations and diffuse inflammatory changes  Stools negative for C diff  She continues to have frequent diarrhea with some blood that returned last night  · Follow up with further GI recommendations as far as steroid, biologic, and further management   · Continue Flagyl   · Continue current diet   · Will need close outpatient follow up and steroid taper at discharge   · Add Ensure supplementation      Hypothyroidism   Assessment & Plan    · Continue Synthroid  · TSH within normal limits       VTE Pharmacologic Prophylaxis:   Pharmacologic: Pharmacologic VTE Prophylaxis contraindicated due to UC with bleeding  Mechanical VTE Prophylaxis in Place: No    Patient Centered Rounds: I have performed bedside rounds with nursing staff today  Discussions with Specialists or Other Care Team Provider: Discussed with GI, RN, CM    Education and Discussions with Family / Patient: Discussed with patient    Time Spent for Care: 30 minutes  More than 50% of total time spent on counseling and coordination of care as described above      Current Length of Stay: 5 day(s)    Current Patient Status: Inpatient   Certification Statement: The patient will continue to require additional inpatient hospital stay due to on going work up for UC, final plan regarding biologic/steroid     Discharge Plan: Pending further GI recommendations     Code Status: Level 1 - Full Code      Subjective:   Patient reports that she had roughly 9, mostly small, BMs overnight  Still reporting intense abdominal pain prior to BM and back pain status post BM  Reports a small amount of blood  Denies fevers or chills  Denies abdominal pain, nausea, or vomiting  Objective:     Vitals:   Temp (24hrs), Av 9 °F (37 2 °C), Min:98 4 °F (36 9 °C), Max:99 4 °F (37 4 °C)    Temp:  [98 4 °F (36 9 °C)-99 4 °F (37 4 °C)] 99 °F (37 2 °C)  HR:  [52-66] 55  Resp:  [18] 18  BP: (123-132)/(58-65) 132/63  SpO2:  [98 %] 98 %  Body mass index is 27 23 kg/m²  Input and Output Summary (last 24 hours): Intake/Output Summary (Last 24 hours) at 18 1059  Last data filed at 18 1500   Gross per 24 hour   Intake              260 ml   Output                0 ml   Net              260 ml       Physical Exam:     Physical Exam   Constitutional: She is oriented to person, place, and time  Vital signs are normal  She appears well-developed and well-nourished  Non-toxic appearance  No distress  HENT:   Head: Normocephalic and atraumatic  Eyes: Pupils are equal, round, and reactive to light  Conjunctivae and EOM are normal    Neck: Neck supple  Cardiovascular: Normal rate, regular rhythm, S1 normal, S2 normal, normal heart sounds and intact distal pulses  Exam reveals no S3 and no S4  No murmur heard  Pulmonary/Chest: Effort normal and breath sounds normal  No accessory muscle usage  No respiratory distress  She has no decreased breath sounds  She has no wheezes  She has no rhonchi  She has no rales  She exhibits no tenderness  Abdominal: Soft  Bowel sounds are normal  She exhibits no distension and no mass  There is generalized tenderness  There is no rigidity, no rebound and no guarding     Neurological: She is alert and oriented to person, place, and time  She is not disoriented  GCS eye subscore is 4  GCS verbal subscore is 5  GCS motor subscore is 6  Skin: Skin is warm and dry  Additional Data:     Labs:      Results from last 7 days  Lab Units 11/23/18  0432 11/22/18  0532   WBC Thousand/uL 16 16* 15 90*   HEMOGLOBIN g/dL 10 5* 10 7*   HEMATOCRIT % 33 3* 33 9*   PLATELETS Thousands/uL 525* 531*   NEUTROS PCT %  --  87*   LYMPHS PCT %  --  8*   MONOS PCT %  --  4   EOS PCT %  --  0       Results from last 7 days  Lab Units 11/23/18  0432   POTASSIUM mmol/L 4 2   CHLORIDE mmol/L 107   CO2 mmol/L 27   BUN mg/dL 10   CREATININE mg/dL 0 72   CALCIUM mg/dL 9 8   ALK PHOS U/L 85   ALT U/L 13   AST U/L 8       Results from last 7 days  Lab Units 11/19/18  2109   INR  1 02       * I Have Reviewed All Lab Data Listed Above  * Additional Pertinent Lab Tests Reviewed: [address] [facility] Admission Reviewed    Imaging:    Imaging Reports Reviewed Today Include: None  Imaging Personally Reviewed by Myself Includes:  None    Recent Cultures (last 7 days):       Results from last 7 days  Lab Units 11/19/18  2101   C DIFF TOXIN B  NEGATIVE for C difficle toxin by PCR          Last 24 Hours Medication List:     Current Facility-Administered Medications:  acetaminophen 650 mg Oral Q6H PRN Alice Ennis MD   albuterol 2 5 mg Nebulization Q6H PRN Lashaun Angel MD   cholecalciferol 3,000 Units Oral Daily Gaston Murcia PA-C   cholestyramine sugar free 4 g Oral Daily Adri Herrera PA-C   hyoscyamine 0 125 mg Oral Q4H PRN Alice Ennis MD   levothyroxine 25 mcg Oral Early Morning Alice Ennis MD   mesalamine 4 g Rectal HS Alice Ennis MD   metroNIDAZOLE 500 mg Oral Q8H [address] Adri Herrera PA-C   ondansetron 4 mg Intravenous Q6H PRN Alice Ennis MD   predniSONE 40 mg Oral Daily Adri Herrera PA-C   zolpidem 10 mg Oral HS PRN Jan Reed MD        Today, Patient Was Seen By: Gifty Hanna, MARY    ** Please Note: Dictation voice to text software may have been used in the creation of this document   **

## 2018-11-24 NOTE — ASSESSMENT & PLAN NOTE
· Presents with intractable diarrhea with rectal bleeding having failed outpatient treatment  Had been on Humira as well as prednisone and mesalamine and had initially done well but now symptoms have worsened, it is noted that she has developed antibodies to Humira and this will no longer be effective  She is status post flexible sigmoidoscopy showing severe ulcerative colitis from the hepatic flexure, distally with ulcerations and diffuse inflammatory changes  Stools negative for C diff  She continues to have frequent diarrhea with some blood that returned last night     · Follow up with further GI recommendations as far as steroid, biologic, and further management   · Continue Flagyl   · Continue current diet   · Will need close outpatient follow up and steroid taper at discharge   · Add Ensure supplementation

## 2018-11-25 VITALS
TEMPERATURE: 99.1 F | RESPIRATION RATE: 16 BRPM | HEART RATE: 62 BPM | OXYGEN SATURATION: 98 % | BODY MASS INDEX: 27.23 KG/M2 | WEIGHT: 153.7 LBS | SYSTOLIC BLOOD PRESSURE: 119 MMHG | DIASTOLIC BLOOD PRESSURE: 56 MMHG | HEIGHT: 63 IN

## 2018-11-25 LAB
ERYTHROCYTE [DISTWIDTH] IN BLOOD BY AUTOMATED COUNT: 12.6 % (ref 11.6–15.1)
HCT VFR BLD AUTO: 33.4 % (ref 34.8–46.1)
HGB BLD-MCNC: 10.4 G/DL (ref 11.5–15.4)
MCH RBC QN AUTO: 28.4 PG (ref 26.8–34.3)
MCHC RBC AUTO-ENTMCNC: 31.1 G/DL (ref 31.4–37.4)
MCV RBC AUTO: 91 FL (ref 82–98)
PLATELET # BLD AUTO: 528 THOUSANDS/UL (ref 149–390)
PMV BLD AUTO: 9 FL (ref 8.9–12.7)
RBC # BLD AUTO: 3.66 MILLION/UL (ref 3.81–5.12)
WBC # BLD AUTO: 20.69 THOUSAND/UL (ref 4.31–10.16)

## 2018-11-25 PROCEDURE — 85027 COMPLETE CBC AUTOMATED: CPT | Performed by: PHYSICIAN ASSISTANT

## 2018-11-25 PROCEDURE — 99239 HOSP IP/OBS DSCHRG MGMT >30: CPT | Performed by: INTERNAL MEDICINE

## 2018-11-25 PROCEDURE — 99232 SBSQ HOSP IP/OBS MODERATE 35: CPT | Performed by: INTERNAL MEDICINE

## 2018-11-25 RX ORDER — PREDNISONE 10 MG/1
35 TABLET ORAL DAILY
Qty: 24.5 TABLET | Refills: 0 | Status: SHIPPED | OUTPATIENT
Start: 2018-11-25 | End: 2018-12-02

## 2018-11-25 RX ORDER — PREDNISONE 20 MG/1
60 TABLET ORAL DAILY
Qty: 21 TABLET | Refills: 0 | Status: SHIPPED | OUTPATIENT
Start: 2018-11-25 | End: 2018-12-20 | Stop reason: ALTCHOICE

## 2018-11-25 RX ORDER — CHOLESTYRAMINE LIGHT 4 G/5.7G
4 POWDER, FOR SUSPENSION ORAL DAILY
Qty: 60 PACKET | Refills: 0 | Status: SHIPPED | OUTPATIENT
Start: 2018-11-26 | End: 2019-02-05

## 2018-11-25 RX ORDER — PREDNISONE 1 MG/1
5 TABLET ORAL DAILY
Qty: 7 TABLET | Refills: 0 | Status: SHIPPED | OUTPATIENT
Start: 2018-11-25 | End: 2018-12-02

## 2018-11-25 RX ORDER — PREDNISONE 1 MG/1
15 TABLET ORAL DAILY
Qty: 21 TABLET | Refills: 0 | Status: SHIPPED | OUTPATIENT
Start: 2018-11-25 | End: 2018-12-02

## 2018-11-25 RX ORDER — PREDNISONE 10 MG/1
55 TABLET ORAL DAILY
Qty: 39 TABLET | Refills: 0 | Status: SHIPPED | OUTPATIENT
Start: 2018-12-03 | End: 2018-12-10

## 2018-11-25 RX ORDER — PREDNISONE 50 MG/1
50 TABLET ORAL DAILY
Qty: 7 TABLET | Refills: 0 | Status: SHIPPED | OUTPATIENT
Start: 2018-12-11 | End: 2018-12-18

## 2018-11-25 RX ORDER — PREDNISONE 10 MG/1
30 TABLET ORAL DAILY
Qty: 21 TABLET | Refills: 0 | Status: SHIPPED | OUTPATIENT
Start: 2018-11-25 | End: 2018-12-02

## 2018-11-25 RX ORDER — PREDNISONE 10 MG/1
45 TABLET ORAL DAILY
Qty: 32 TABLET | Refills: 0 | Status: SHIPPED | OUTPATIENT
Start: 2018-11-25 | End: 2018-12-02

## 2018-11-25 RX ORDER — PREDNISONE 20 MG/1
10 TABLET ORAL DAILY
Qty: 7 TABLET | Refills: 0 | Status: SHIPPED | OUTPATIENT
Start: 2018-11-25 | End: 2018-12-02

## 2018-11-25 RX ORDER — PREDNISONE 20 MG/1
20 TABLET ORAL DAILY
Qty: 7 TABLET | Refills: 0 | Status: SHIPPED | OUTPATIENT
Start: 2018-11-25 | End: 2018-12-02

## 2018-11-25 RX ORDER — PREDNISONE 10 MG/1
25 TABLET ORAL DAILY
Qty: 18 TABLET | Refills: 0 | Status: SHIPPED | OUTPATIENT
Start: 2018-11-25 | End: 2018-12-02

## 2018-11-25 RX ORDER — PREDNISONE 20 MG/1
40 TABLET ORAL DAILY
Qty: 14 TABLET | Refills: 0 | Status: SHIPPED | OUTPATIENT
Start: 2018-12-26 | End: 2018-12-27

## 2018-11-25 RX ADMIN — METRONIDAZOLE 500 MG: 500 TABLET ORAL at 09:11

## 2018-11-25 RX ADMIN — VITAMIN D, TAB 1000IU (100/BT) 3000 UNITS: 25 TAB at 09:06

## 2018-11-25 RX ADMIN — LEVOTHYROXINE SODIUM 25 MCG: 25 TABLET ORAL at 05:30

## 2018-11-25 RX ADMIN — HYOSCYAMINE SULFATE 0.12 MG: 0.12 TABLET ORAL at 06:05

## 2018-11-25 RX ADMIN — PREDNISONE 60 MG: 20 TABLET ORAL at 09:07

## 2018-11-25 RX ADMIN — CHOLESTYRAMINE 4 G: 4 POWDER, FOR SUSPENSION ORAL at 09:07

## 2018-11-25 RX ADMIN — METRONIDAZOLE 500 MG: 500 TABLET ORAL at 02:08

## 2018-11-25 NOTE — PROGRESS NOTES
Progress Note - Stuart Fisher 48 y o  female MRN: [id number]    Unit/Bed#: -01 Encounter: [id number]    Assessment and Plan:   Principal Problem:    Ulcerative colitis with rectal bleeding ([address] )  Active Problems:    Hypothyroidism       #1  Severe ulcerative colitis:  Has been refractory to oral steroids  Bowel frequency somewhat improved on higher oral dose, went twice overnight, once this morning with moderate amount of blood but hgb stable last several days  -will continue 60 mg of steroids daily for 1 week with a very slow taper  We will taper down 5 mg each week  -continue Questran for symptom control    -continue Levsin for abdominal cramping  -low residue diet  -patient will need close outpatient follow up with Dr Horace Velasquez  I have sent a message to the office to get her in within the next 1 to 2 weeks  I also advised the patient to call 1st thing tomorrow morning to discuss with them about getting the appointment  -patient will ultimately need to likely be on Entyvio for her severe ulcerative colitis  -patient's symptoms are somewhat stable  It is unlikely that we will get much more improvement without starting her on a biologic agent  We will continue with the high-dose oral steroids  We are not doing anything here in the hospital that would be different from what she could do at home  Because of this we will plan for discharge today with very close outpatient follow-up to start a biologic agent for her ulcerative colitis  The patient is aware that if any of her symptoms get significantly worse that she is to let us know and return to the hospital         ----------------------------------------------------------------------------------------------------------------    Subjective:     Patient continues to have loose stools with abdominal cramping  She only had 2 bowel movements overnight  She had another 2 this morning  One of them had moderate amount of blood    Hemoglobin has remained stable  Abdominal cramping still occurs with bowel movements  She is eating breakfast however reports a lack of appetite  Objective:     Vitals: Blood pressure 119/56, pulse 62, temperature 99 1 °F (37 3 °C), temperature source Oral, resp  rate 16, height 5' 3" (1 6 m), weight 69 7 kg (153 lb 11 2 oz), SpO2 98 %  ,Body mass index is 27 23 kg/m²  Intake/Output Summary (Last 24 hours) at 11/25/18 1031  Last data filed at 11/25/18 0900   Gross per 24 hour   Intake              960 ml   Output              800 ml   Net              160 ml       Physical Exam:     General Appearance: Alert, appears stated age and cooperative  Lungs: Clear to auscultation bilaterally, no rales or rhonchi, no labored breathing/accessory muscle use  Heart: Regular rate and rhythm, S1, S2 normal, no murmur, click, rub or gallop  Abdomen: Soft, diffuse discomfort to palpation, non-distended; bowel sounds normal; no masses or no organomegaly  Extremities: No cyanosis, clubbing, or edema    Invasive Devices     Peripheral Intravenous Line            Peripheral IV 11/19/18 Left Forearm 5 days                Lab Results:    Results from last 7 days  Lab Units 11/25/18  0622  11/22/18  0532   WBC Thousand/uL 20 69*  < > 15 90*   HEMOGLOBIN g/dL 10 4*  < > 10 7*   HEMATOCRIT % 33 4*  < > 33 9*   PLATELETS Thousands/uL 528*  < > 531*   NEUTROS PCT %  --   --  87*   LYMPHS PCT %  --   --  8*   MONOS PCT %  --   --  4   EOS PCT %  --   --  0   < > = values in this interval not displayed  Results from last 7 days  Lab Units 11/23/18  0432   POTASSIUM mmol/L 4 2   CHLORIDE mmol/L 107   CO2 mmol/L 27   BUN mg/dL 10   CREATININE mg/dL 0 72   CALCIUM mg/dL 9 8   ALK PHOS U/L 85   ALT U/L 13   AST U/L 8       Results from last 7 days  Lab Units 11/19/18  2109   INR  1 02           Imaging Studies: I have personally reviewed pertinent imaging studies  No results found

## 2018-11-25 NOTE — DISCHARGE SUMMARY
[facility] Internal Medicine  Discharge- Laurie Duncan 1965, 48 y o  female MRN: [id number]    Unit/Bed#: -01 Encounter: [id number]    Primary Care Provider: Angelina Plascencia DO   Date and time admitted to hospital: 11/19/2018  5:55 PM        * Ulcerative colitis with rectal bleeding [facility])   Assessment & Plan    · Presents with intractable diarrhea with rectal bleeding having failed outpatient treatment  Had been on Humira as well as prednisone and mesalamine and had initially done well but now symptoms have worsened, it is noted that she has developed antibodies to Humira and this will no longer be effective  She is status post flexible sigmoidoscopy showing severe ulcerative colitis from the hepatic flexure, distally with ulcerations and diffuse inflammatory changes  Stools negative for C diff  Still with bleeding, but stool amount has decreased  Discussed with GI     · Follow up with further GI recommendations as far as steroid, biologic, and further management   · Stable for discharge   · Prednisone 60 mg taper - 5 mg every week  · Continue current diet      Hypothyroidism   Assessment & Plan    · Continue Synthroid  · TSH within normal limits       Discharging Physician / Practitioner: Juvencio Selby PA-C  PCP: Angelina Plascencia DO  Admission Date:   Admission Orders     Ordered        11/19/18 1929  Inpatient Admission  Once             Discharge Date: 11/25/18    Resolved Problems  Date Reviewed: 11/25/2018          Resolved    Hypercalcemia 11/21/2018     Resolved by  Juvencio Selby PA-C          Consultations During Hospital Stay:  · Gastroenterology - Dr Neo Bernal, Dr Linda Zuniga     Procedures Performed:     · None    Significant Findings / Test Results:     · None    Incidental Findings:   · None     Test Results Pending at Discharge (will require follow up):    · None     Outpatient Tests Requested:  · Follow up with gastroenterology     Complications:  None    Reason for Admission: Ulcerative Colitis with Bleeding     Hospital Course:     Bassam Irvin is a 48 y o  female patient who originally presented to the hospital on 11/19/2018 due to ulcerative colitis with rectal bleeding  The patient was directly admitted from her primary GI office - Dr Kendrikc Ruiz  Gastroenterology was consulted  She was started on IV steroids and further laboratory testing was ordered to rule out C  Diff and other infections in the stool  Gastroenterology saw the patient in consultation and further testing was ordered to check for Humira antibodies  Unfortunately, it was found that she had developed Humira antibodies therefore she would need to find another biologic agent  However, fortunately there was no infection in her colon and her hemoglobin stayed stable throughout her stay  Her diarrhea was slow to improve and after a lengthy hospitalization she was transitioned to an extended steroid taper in an attempted to control her symptoms  She was instructed to follow up with her outpatient gastroenterologist ASAP in order to facilitate the addition of another biologic agent  Please see above list of diagnoses and related plan for additional information  Condition at Discharge: stable     Discharge Day Visit / Exam:     Subjective:  Patient reports that her stool number seems to have decreased overnight  Denies fevers or chills  Patient desperately wants to go home  She understands follow up instructions  Vitals: Blood Pressure: 119/56 (11/25/18 0700)  Pulse: 62 (11/25/18 0700)  Temperature: 99 1 °F (37 3 °C) (11/25/18 0700)  Temp Source: Oral (11/25/18 0700)  Respirations: 16 (11/25/18 0700)  Height: 5' 3" (160 cm) (11/19/18 1839)  Weight - Scale: 69 7 kg (153 lb 11 2 oz) (11/19/18 1839)  SpO2: 98 % (11/25/18 0700)  Exam:   Physical Exam   Constitutional: She is oriented to person, place, and time  Vital signs are normal  She appears well-developed and well-nourished  Non-toxic appearance  No distress     HENT:   Head: Normocephalic and atraumatic  Mouth/Throat: Mucous membranes are not dry  Eyes: Pupils are equal, round, and reactive to light  Conjunctivae and EOM are normal    Neck: Neck supple  Cardiovascular: Normal rate, regular rhythm, S1 normal, S2 normal, normal heart sounds and intact distal pulses  Exam reveals no S3 and no S4  No murmur heard  Pulmonary/Chest: Effort normal and breath sounds normal  No accessory muscle usage  No respiratory distress  She has no decreased breath sounds  She has no wheezes  She has no rhonchi  She has no rales  She exhibits no tenderness  Abdominal: Soft  Bowel sounds are normal  She exhibits no distension and no mass  There is no tenderness  There is no rigidity, no rebound and no guarding  Neurological: She is alert and oriented to person, place, and time  She is not disoriented  GCS eye subscore is 4  GCS verbal subscore is 5  GCS motor subscore is 6  Skin: Skin is warm and dry  Discussion with Family: Discussed with patient     Discharge instructions/Information to patient and family:   See after visit summary for information provided to patient and family  Provisions for Follow-Up Care:  See after visit summary for information related to follow-up care and any pertinent home health orders  Disposition:     Home    For Discharges to [facility]:   · Not Applicable to this Patient - Not Applicable to this Patient    Planned Readmission: None     Discharge Statement:  I spent 45 minutes discharging the patient  This time was spent on the day of discharge  I had direct contact with the patient on the day of discharge  Greater than 50% of the total time was spent examining patient, answering all patient questions, arranging and discussing plan of care with patient as well as directly providing post-discharge instructions  Additional time then spent on discharge activities      Discharge Medications:  See after visit summary for reconciled discharge medications provided to patient and family        ** Please Note: This note has been constructed using a voice recognition system **

## 2018-11-25 NOTE — ASSESSMENT & PLAN NOTE
· Presents with intractable diarrhea with rectal bleeding having failed outpatient treatment  Had been on Humira as well as prednisone and mesalamine and had initially done well but now symptoms have worsened, it is noted that she has developed antibodies to Humira and this will no longer be effective  She is status post flexible sigmoidoscopy showing severe ulcerative colitis from the hepatic flexure, distally with ulcerations and diffuse inflammatory changes  Stools negative for C diff  Still with bleeding, but stool amount has decreased   Discussed with GI     · Follow up with further GI recommendations as far as steroid, biologic, and further management   · Stable for discharge   · Prednisone 60 mg taper - 5 mg every week  · Continue current diet

## 2018-11-26 ENCOUNTER — TELEPHONE (OUTPATIENT)
Dept: GASTROENTEROLOGY | Facility: AMBULARY SURGERY CENTER | Age: 53
End: 2018-11-26

## 2018-11-26 ENCOUNTER — TRANSITIONAL CARE MANAGEMENT (OUTPATIENT)
Dept: FAMILY MEDICINE CLINIC | Facility: CLINIC | Age: 53
End: 2018-11-26

## 2018-11-26 NOTE — TELEPHONE ENCOUNTER
Spoke with Alex Workman, representative from [address] [place] [address] and provided medical information including diagnosis code for Ulcerative colitis, last date of work 11/19/18  All questions were answered to the best of my ability  Requested disability forms be re-faxed to the office today for completion      Klarissa Singh PA-C

## 2018-11-26 NOTE — TELEPHONE ENCOUNTER
DR ELIAS'S PT    [employer] called requesting medical info   Please call [phone] NVN#[id number]

## 2018-12-03 ENCOUNTER — TELEPHONE (OUTPATIENT)
Dept: GASTROENTEROLOGY | Facility: CLINIC | Age: 53
End: 2018-12-03

## 2018-12-03 ENCOUNTER — DOCUMENTATION (OUTPATIENT)
Dept: GASTROENTEROLOGY | Facility: MEDICAL CENTER | Age: 53
End: 2018-12-03

## 2018-12-03 NOTE — TELEPHONE ENCOUNTER
chapdoc Chaves from [facility] called you back regarding entyvio     Call back # [phone]

## 2018-12-03 NOTE — TELEPHONE ENCOUNTER
----- Message from Bruna Coleman MD sent at 12/3/2018 10:49 AM EST -----  P please inform the patient that we received for final biopsy results  There is no evidence of CMV, she does have active colitis      Please have her call with any questions or concerns

## 2018-12-03 NOTE — TELEPHONE ENCOUNTER
Called pt, she requested for the soonest available appointment for an infusion  I called Roshan Haddad, and Pranav Shah for the patient, the first available is 12/7/2018 @ [facility]  I called the patient and informed her  She also has an appointment with Caprice Molina on 12/5/2018   I faxed a physician order to the infusion center @ [phone]

## 2018-12-03 NOTE — TELEPHONE ENCOUNTER
Called Julio Morales back, patient has been approved   I have to call the patient to schedule her first infusion

## 2018-12-05 ENCOUNTER — OFFICE VISIT (OUTPATIENT)
Dept: GASTROENTEROLOGY | Facility: CLINIC | Age: 53
End: 2018-12-05
Payer: COMMERCIAL

## 2018-12-05 ENCOUNTER — APPOINTMENT (OUTPATIENT)
Dept: LAB | Facility: HOSPITAL | Age: 53
End: 2018-12-05
Payer: COMMERCIAL

## 2018-12-05 ENCOUNTER — TELEPHONE (OUTPATIENT)
Dept: GASTROENTEROLOGY | Facility: CLINIC | Age: 53
End: 2018-12-05

## 2018-12-05 VITALS
HEART RATE: 61 BPM | HEIGHT: 63 IN | SYSTOLIC BLOOD PRESSURE: 111 MMHG | BODY MASS INDEX: 26.49 KG/M2 | TEMPERATURE: 98 F | DIASTOLIC BLOOD PRESSURE: 73 MMHG | WEIGHT: 149.5 LBS

## 2018-12-05 DIAGNOSIS — K51.011 ULCERATIVE PANCOLITIS WITH RECTAL BLEEDING (HCC): ICD-10-CM

## 2018-12-05 LAB
ANISOCYTOSIS BLD QL SMEAR: PRESENT
BASOPHILS # BLD MANUAL: 0 THOUSAND/UL (ref 0–0.1)
BASOPHILS NFR MAR MANUAL: 0 % (ref 0–1)
EOSINOPHIL # BLD MANUAL: 0 THOUSAND/UL (ref 0–0.4)
EOSINOPHIL NFR BLD MANUAL: 0 % (ref 0–6)
ERYTHROCYTE [DISTWIDTH] IN BLOOD BY AUTOMATED COUNT: 13.5 % (ref 11.6–15.1)
HCT VFR BLD AUTO: 34.5 % (ref 34.8–46.1)
HGB BLD-MCNC: 10.8 G/DL (ref 11.5–15.4)
LYMPHOCYTES # BLD AUTO: 0.46 THOUSAND/UL (ref 0.6–4.47)
LYMPHOCYTES # BLD AUTO: 3 % (ref 14–44)
MCH RBC QN AUTO: 29 PG (ref 26.8–34.3)
MCHC RBC AUTO-ENTMCNC: 31.3 G/DL (ref 31.4–37.4)
MCV RBC AUTO: 93 FL (ref 82–98)
MONOCYTES # BLD AUTO: 0.31 THOUSAND/UL (ref 0–1.22)
MONOCYTES NFR BLD: 2 % (ref 4–12)
NEUTROPHILS # BLD MANUAL: 14.58 THOUSAND/UL (ref 1.85–7.62)
NEUTS SEG NFR BLD AUTO: 95 % (ref 43–75)
NRBC BLD AUTO-RTO: 0 /100 WBCS
PLATELET # BLD AUTO: 529 THOUSANDS/UL (ref 149–390)
PLATELET BLD QL SMEAR: ABNORMAL
PMV BLD AUTO: 9.4 FL (ref 8.9–12.7)
POLYCHROMASIA BLD QL SMEAR: PRESENT
RBC # BLD AUTO: 3.72 MILLION/UL (ref 3.81–5.12)
RBC MORPH BLD: PRESENT
WBC # BLD AUTO: 15.35 THOUSAND/UL (ref 4.31–10.16)

## 2018-12-05 PROCEDURE — 36415 COLL VENOUS BLD VENIPUNCTURE: CPT | Performed by: PHYSICIAN ASSISTANT

## 2018-12-05 PROCEDURE — 85007 BL SMEAR W/DIFF WBC COUNT: CPT | Performed by: PHYSICIAN ASSISTANT

## 2018-12-05 PROCEDURE — 85027 COMPLETE CBC AUTOMATED: CPT | Performed by: PHYSICIAN ASSISTANT

## 2018-12-05 PROCEDURE — 99213 OFFICE O/P EST LOW 20 MIN: CPT | Performed by: PHYSICIAN ASSISTANT

## 2018-12-05 RX ORDER — HYOSCYAMINE SULFATE 0.125 MG
0.12 TABLET ORAL EVERY 4 HOURS PRN
Qty: 30 TABLET | Refills: 0 | Status: ON HOLD | OUTPATIENT
Start: 2018-12-05 | End: 2019-01-17

## 2018-12-05 RX ORDER — HYDROCODONE BITARTRATE AND ACETAMINOPHEN 5; 325 MG/1; MG/1
1 TABLET ORAL EVERY 6 HOURS PRN
COMMUNITY
End: 2018-12-20 | Stop reason: ALTCHOICE

## 2018-12-05 RX ORDER — AMOXICILLIN 500 MG/1
500 CAPSULE ORAL
COMMUNITY
End: 2018-12-20 | Stop reason: ALTCHOICE

## 2018-12-05 NOTE — PROGRESS NOTES
Marlee Rios [facility] - Outpatient Follow-up Note  Siddhartha Phillips 48 y o  female MRN: [id number]  Encounter: [id number]          ASSESSMENT AND PLAN:      1  Ulcerative pancolitis with rectal bleeding [facility]): severe active colitis seen on flex sig on 11/20  Biopsy without evidence of CMV  She was discharged on 60mg prednisone, she does not feel significantly better and started tapering her steroids now on 55mg, she is worried to be on high dose  I recommended staying on 55mg daily until she can be seen in the office again given her symptoms are not under good control and her first entyvio dose is 12/27 and this may take some time to work, however she wants to try to taper  Recommend continuing at least 40mg daily until next office appt  She continues to have 5-10 episodes of diarrhea daily but it is improved since admission  She also admits to occasional rectal bleeding   - CBC and differential  - hyoscyamine (ANASPAZ,LEVSIN) 0 125 MG tablet; Take 1 tablet (0 125 mg total) by mouth every 4 (four) hours as needed for cramping  Dispense: 30 tablet; Refill: 0  -start entyvio infusions 12/7  -if diarrhea worsens or she is having signs of dehydration including dizziness/lightheadedness she knows she may need to return to ED for fluids  -continue prednisone taper to 40mg, continue 40mg daily until follow up appt  -f/u 2 weeks    ______________________________________________________________________    SUBJECTIVE:  Wes Judd is a 47 yo female with pmh ulcerative colitis previously on humira but failed secondary to antibodies and low levels  She returned to the hospital 11/19 and received IV steroids and underwent flex sig on 11/20 that revealed severe colitis with no evidence of CMV  She was having 15 episodes of diarrhea and this has decreased to 5-10 after discharged on 60mg prednisone  She wants to taper this for fear of high dose steroids   We discussed continuing at least at 40mg until seen again in the office  She is currently taking 55mg prednisone without change in symptoms  She is taking levsin for pain and continues to see occasional blood in her stool with abdominal cramping  She will get her first dose of entyvio   REVIEW OF SYSTEMS IS OTHERWISE NEGATIVE  Historical Information   Past Medical History:   Diagnosis Date    Adjustment disorder     last assessed 12    Anemia     HX of    Asthma     mild - intermittent    Bilateral leg edema     Blepharitis     last assessed 16    Carpal tunnel syndrome     Unspecified laterality    Colitis, acute     Dyspareunia in female     Edema     last assessed 06/22/15    Ganglion     Herniated cervical disc     History of transfusion     Hypokalemia     Hypothyroidism     Hypothyroidism     Insomnia     Lumps on the skin     last assessed 14    Mouth ulcers     last assessed 06/22/15    Nontraumatic tear of left tibialis posterior tendon     Traumatic teart     Polyarthritis     last assessed 16    Raynaud's disease     [facility]na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 [facility] [address] [facility]     Temporomandibular disorder     Joint    Thyroid disease     Ulcerative colitis ([facility] [address] [facility]     Ulcerative colitis ([facility] [address] [facility]      Past Surgical History:   Procedure Laterality Date    ANKLE SURGERY Left     Tendon repair    CARPAL TUNNEL RELEASE Bilateral      SECTION, LOW TRANSVERSE      COLONOSCOPY      COLONOSCOPY N/A 2018    Procedure: COLONOSCOPY;  Surgeon: Juan Jose Neff MD;  [facility]: [facility]; Service: Gastroenterology    HERNIA REPAIR      umbilical    HYSTERECTOMY      KNEE ARTHROSCOPY Right     LIPECTOMY      Multipe lipoma removals    LIPOMA RESECTION      SC COLONOSCOPY FLX DX W/COLLJ SPEC WHEN PFRMD N/A 2016    Procedure: COLONOSCOPY;  Surgeon: Michael Conrad DO;  Location: [facility];   Service: Gastroenterology    SC REPAIR FLEX LEG TENDON,SECOND,EA Left 2016    Procedure: REPAIR OF LEFT POSTERIOR TIBIAL TENDON WITH GRAFT, EXPLORATION LEFT ANKLE ;  Surgeon: Madi Armendariz DPM;  Location: [facility];  Service: Podiatry    SHOULDER SURGERY Left     bicep tendon and labrum repair    TONSILLECTOMY      TOOTH EXTRACTION  12/05/2018     Social History   History   Alcohol Use No     Comment: social 1 drink per weeek     History   Drug Use No     History   Smoking Status    Former Smoker    Quit date: 4/6/2003   Smokeless Tobacco    Never Used     Comment: Quit 1991, rare use for 2 years     Family History   Problem Relation Age of Onset    Parkinsonism Mother     Rheum arthritis Mother     Heart attack Father     Hypertension Father     Osteoporosis Father     Prostate cancer Father     Hashimoto's thyroiditis Sister     Cancer Paternal Grandfather         Penile    Prostate cancer Paternal Grandfather     Crohn's disease Family     Osteoarthritis Family     Rheum arthritis Family     Crohn's disease Other     Crohn's disease Maternal Uncle     Psoriasis Maternal Uncle     Ulcerative colitis Maternal Uncle     Rheum arthritis Maternal Aunt     Ulcerative colitis Family        Meds/Allergies       Current Outpatient Prescriptions:     albuterol (ACCUNEB) 1 25 MG/3ML nebulizer solution    amoxicillin (AMOXIL) 500 mg capsule    cholecalciferol (VITAMIN D3) 1,000 units tablet    cholestyramine sugar free (QUESTRAN LIGHT) 4 g packet    HYDROcodone-acetaminophen (NORCO) 5-325 mg per tablet    hyoscyamine (ANASPAZ,LEVSIN) 0 125 MG tablet    predniSONE 10 mg tablet    predniSONE 20 mg tablet    [START ON 12/26/2018] predniSONE 20 mg tablet    [START ON 12/11/2018] predniSONE 50 mg tablet    tacrolimus (PROTOPIC) 0 03 % ointment    zolpidem (AMBIEN) 10 mg tablet    calcium carbonate-vitamin D (OSCAL-D) 500 mg-200 units per tablet    furosemide (LASIX) 40 mg tablet    levothyroxine 25 mcg tablet    mesalamine (ROWASA) 4 g    No Known Allergies        Objective     Blood pressure 111/73, pulse 61, temperature 98 °F (36 7 °C), temperature source Tympanic, height 5' 3" (1 6 m), weight 67 8 kg (149 lb 8 oz)  Body mass index is 26 48 kg/m²  PHYSICAL EXAM:      General Appearance:   Alert, cooperative, no distress   HEENT:   Normocephalic, atraumatic, anicteric   right eye exhibits no discharge  Left eye exhibits no discharge  No scleral icterus  Neck:  Supple, symmetrical, trachea midline, no stridor   Lungs:   Clear to auscultation bilaterally; no rales, rhonchi or wheezing; respirations unlabored    Heart[de-identified]   Regular rate and rhythm; no murmur, rub, or gallop  Abdomen:   Soft, diffuse tenderness, non-distended; normal bowel sounds; no masses, no organomegaly    Genitalia:   Deferred    Rectal:   Deferred    Extremities:  No cyanosis, clubbing or edema        Skin:  No jaundice, rashes, or lesions          Lab Results:   No visits with results within 1 Day(s) from this visit     Latest known visit with results is:   Admission on 11/19/2018, Discharged on 11/25/2018   Component Date Value    TSH 3RD GENERATON 11/19/2018 0 707     Sodium 11/19/2018 139     Potassium 11/19/2018 3 5     Chloride 11/19/2018 104     CO2 11/19/2018 28     ANION GAP 11/19/2018 7     BUN 11/19/2018 5     Creatinine 11/19/2018 0 85     Glucose 11/19/2018 148*    Calcium 11/19/2018 9 9     AST 11/19/2018 8     ALT 11/19/2018 16     Alkaline Phosphatase 11/19/2018 82     Total Protein 11/19/2018 6 3*    Albumin 11/19/2018 2 7*    Total Bilirubin 11/19/2018 0 20     eGFR 11/19/2018 78     Magnesium 11/19/2018 1 8     Phosphorus 11/19/2018 2 9     WBC 11/19/2018 10 76*    RBC 11/19/2018 3 54*    Hemoglobin 11/19/2018 10 2*    Hematocrit 11/19/2018 32 4*    MCV 11/19/2018 92     MCH 11/19/2018 28 8     MCHC 11/19/2018 31 5     RDW 11/19/2018 12 4     MPV 11/19/2018 8 8*    Platelets 40/34/0556 503*    nRBC 11/19/2018 0     Neutrophils Relative 11/19/2018 78*    Immat GRANS % 11/19/2018 0     Lymphocytes Relative 11/19/2018 17     Monocytes Relative 11/19/2018 5     Eosinophils Relative 11/19/2018 0     Basophils Relative 11/19/2018 0     Neutrophils Absolute 11/19/2018 8 33*    Immature Grans Absolute 11/19/2018 0 03     Lymphocytes Absolute 11/19/2018 1 80     Monocytes Absolute 11/19/2018 0 58     Eosinophils Absolute 11/19/2018 0 01     Basophils Absolute 11/19/2018 0 01     Protime 11/19/2018 13 1     INR 11/19/2018 1 02     PTT 11/19/2018 24*    C difficile toxin by PCR 11/19/2018 NEGATIVE for C difficle toxin by PCR   WBC 11/20/2018 13 32*    RBC 11/20/2018 3 31*    Hemoglobin 11/20/2018 9 6*    Hematocrit 11/20/2018 30 2*    MCV 11/20/2018 91     MCH 11/20/2018 29 0     MCHC 11/20/2018 31 8     RDW 11/20/2018 12 3     Platelets 69/22/5077 443*    MPV 11/20/2018 9 1     Sodium 11/20/2018 142     Potassium 11/20/2018 3 6     Chloride 11/20/2018 107     CO2 11/20/2018 27     ANION GAP 11/20/2018 8     BUN 11/20/2018 4*    Creatinine 11/20/2018 0 75     Glucose 11/20/2018 95     Calcium 11/20/2018 9 8     eGFR 11/20/2018 91     Case Report 11/20/2018                      Value:Surgical Pathology Report                         Case: [id number]                                   Authorizing Provider:  Memo Doyle MD            Collected:           11/20/2018 1527              Ordering Location:     [facility]        Received:            11/20/2018 [facility]                                                           Pathologist:           Jade Bernabe MD                                                              Specimen:    Large Intestine, Transverse Colon                                                          Addendum 11/20/2018                      Value: This result contains rich text formatting which cannot be displayed here   Final Diagnosis 11/20/2018                      Value: This result contains rich text formatting which cannot be displayed here   Note 11/20/2018                      Value: This result contains rich text formatting which cannot be displayed here   Additional Information 11/20/2018                      Value: This result contains rich text formatting which cannot be displayed here  [facility] Gross Description 11/20/2018                      Value: This result contains rich text formatting which cannot be displayed here      Sodium 11/21/2018 139     Potassium 11/21/2018 4 2     Chloride 11/21/2018 106     CO2 11/21/2018 26     ANION GAP 11/21/2018 7     BUN 11/21/2018 4*    Creatinine 11/21/2018 0 73     Glucose 11/21/2018 151*    Calcium 11/21/2018 9 7     eGFR 11/21/2018 94     WBC 11/21/2018 12 14*    RBC 11/21/2018 3 59*    Hemoglobin 11/21/2018 10 3*    Hematocrit 11/21/2018 32 9*    MCV 11/21/2018 92     MCH 11/21/2018 28 7     MCHC 11/21/2018 31 3*    RDW 11/21/2018 12 3     MPV 11/21/2018 9 0     Platelets 64/87/8485 474*    nRBC 11/21/2018 0     Segmented % 11/21/2018 95*    Bands % 11/21/2018 1     Lymphocytes % 11/21/2018 3*    Monocytes % 11/21/2018 1*    Eosinophils, % 11/21/2018 0     Basophils % 11/21/2018 0     Absolute Neutrophils 11/21/2018 11 65*    Lymphocytes Absolute 11/21/2018 0 36*    Monocytes Absolute 11/21/2018 0 12     Eosinophils Absolute 11/21/2018 0 00     Basophils Absolute 11/21/2018 0 00     Total Counted 11/21/2018 100     Platelet Estimate 29/54/3629 Increased*    WBC 11/22/2018 15 90*    RBC 11/22/2018 3 73*    Hemoglobin 11/22/2018 10 7*    Hematocrit 11/22/2018 33 9*    MCV 11/22/2018 91     MCH 11/22/2018 28 7     MCHC 11/22/2018 31 6     RDW 11/22/2018 12 3     MPV 11/22/2018 9 0     Platelets 54/37/3067 531*    nRBC 11/22/2018 0     Neutrophils Relative 11/22/2018 87*    Immat GRANS % 11/22/2018 1     Lymphocytes Relative 11/22/2018 8*    Monocytes Relative 11/22/2018 4     Eosinophils Relative 11/22/2018 0     Basophils Relative 11/22/2018 0     Neutrophils Absolute 11/22/2018 13 95*    Immature Grans Absolute 11/22/2018 0 08     Lymphocytes Absolute 11/22/2018 1 25     Monocytes Absolute 11/22/2018 0 61     Eosinophils Absolute 11/22/2018 0 00     Basophils Absolute 11/22/2018 0 01     Sodium 11/22/2018 139     Potassium 11/22/2018 4 1     Chloride 11/22/2018 106     CO2 11/22/2018 24     ANION GAP 11/22/2018 9     BUN 11/22/2018 10     Creatinine 11/22/2018 0 66     Glucose 11/22/2018 136     Calcium 11/22/2018 10 1     AST 11/22/2018 6     ALT 11/22/2018 11*    Alkaline Phosphatase 11/22/2018 85     Total Protein 11/22/2018 6 1*    Albumin 11/22/2018 2 4*    Total Bilirubin 11/22/2018 0 10*    eGFR 11/22/2018 101     Sodium 11/23/2018 140     Potassium 11/23/2018 4 2     Chloride 11/23/2018 107     CO2 11/23/2018 27     ANION GAP 11/23/2018 6     BUN 11/23/2018 10     Creatinine 11/23/2018 0 72     Glucose 11/23/2018 121     Calcium 11/23/2018 9 8     AST 11/23/2018 8     ALT 11/23/2018 13     Alkaline Phosphatase 11/23/2018 85     Total Protein 11/23/2018 5 8*    Albumin 11/23/2018 2 4*    Total Bilirubin 11/23/2018 0 10*    eGFR 11/23/2018 96     WBC 11/23/2018 16 16*    RBC 11/23/2018 3 67*    Hemoglobin 11/23/2018 10 5*    Hematocrit 11/23/2018 33 3*    MCV 11/23/2018 91     MCH 11/23/2018 28 6     MCHC 11/23/2018 31 5     RDW 11/23/2018 12 8     Platelets 78/80/0731 525*    MPV 11/23/2018 9 1     WBC 11/25/2018 20 69*    RBC 11/25/2018 3 66*    Hemoglobin 11/25/2018 10 4*    Hematocrit 11/25/2018 33 4*    MCV 11/25/2018 91     MCH 11/25/2018 28 4     MCHC 11/25/2018 31 1*    RDW 11/25/2018 12 6     Platelets 91/93/6670 528*    MPV 11/25/2018 9 0          Radiology Results:   No results found

## 2018-12-06 RX ORDER — ACETAMINOPHEN 325 MG/1
975 TABLET ORAL ONCE
Status: DISCONTINUED | OUTPATIENT
Start: 2018-12-07 | End: 2018-12-10 | Stop reason: HOSPADM

## 2018-12-06 RX ORDER — SODIUM CHLORIDE 9 MG/ML
20 INJECTION, SOLUTION INTRAVENOUS ONCE
Status: COMPLETED | OUTPATIENT
Start: 2018-12-07 | End: 2018-12-07

## 2018-12-06 RX ORDER — DIPHENHYDRAMINE HCL 25 MG
25 TABLET ORAL ONCE
Status: COMPLETED | OUTPATIENT
Start: 2018-12-07 | End: 2018-12-07

## 2018-12-06 NOTE — TELEPHONE ENCOUNTER
Patient called back, patient states she discussed with Gina Lau her return to work date should be on the 22nd  Just want to make sure you're aware

## 2018-12-06 NOTE — TELEPHONE ENCOUNTER
I have the forms on Dr Gibran Sommers desk, she is in the office tomorrow  I will have her sign the forms and I will fax it to patients insurance company and I will call her when I send it

## 2018-12-07 ENCOUNTER — HOSPITAL ENCOUNTER (OUTPATIENT)
Dept: INFUSION CENTER | Facility: HOSPITAL | Age: 53
Discharge: HOME | End: 2018-12-07
Payer: COMMERCIAL

## 2018-12-07 ENCOUNTER — TELEPHONE (OUTPATIENT)
Dept: GASTROENTEROLOGY | Facility: CLINIC | Age: 53
End: 2018-12-07

## 2018-12-07 VITALS
HEART RATE: 68 BPM | DIASTOLIC BLOOD PRESSURE: 60 MMHG | TEMPERATURE: 99.1 F | RESPIRATION RATE: 18 BRPM | SYSTOLIC BLOOD PRESSURE: 110 MMHG

## 2018-12-07 PROCEDURE — 96413 CHEMO IV INFUSION 1 HR: CPT

## 2018-12-07 RX ADMIN — VEDOLIZUMAB 300 MG: 300 INJECTION, POWDER, LYOPHILIZED, FOR SOLUTION INTRAVENOUS at 13:41

## 2018-12-07 RX ADMIN — DIPHENHYDRAMINE HCL 25 MG: 25 TABLET, FILM COATED ORAL at 13:01

## 2018-12-07 RX ADMIN — SODIUM CHLORIDE 20 ML/HR: 0.9 INJECTION, SOLUTION INTRAVENOUS at 13:00

## 2018-12-07 NOTE — TELEPHONE ENCOUNTER
Dr Miriam Bowles pt    kallie from the infusion center called requesting an order clarifying that it is ok for the pt to have her infusion done today because she is on antibiotics she was given after her tooth extraction   Please call kallie to clarify at [phone] then fax the order to [phone]

## 2018-12-07 NOTE — PROGRESS NOTES
Entyvio infused without incident, pt tolerated well and offered no complaints   Pt scheduled next appt prior to d/c, card given, declined avs

## 2018-12-07 NOTE — TELEPHONE ENCOUNTER
[employer] [phone] [facility]#[id number]   is Yousuf Manual  Agent stated they need a treatment recommendation

## 2018-12-07 NOTE — PROGRESS NOTES
Called GI to obtain OK to proceed w Missael, pt states she just had extraction 2 days ago and is on abx  PA-C will call me back

## 2018-12-10 NOTE — TELEPHONE ENCOUNTER
The forms are at the [facility] office, if you tell me the treatment recommendation I can add it to the form itself  Please advise

## 2018-12-13 ENCOUNTER — TELEPHONE (OUTPATIENT)
Dept: GASTROENTEROLOGY | Facility: CLINIC | Age: 53
End: 2018-12-13

## 2018-12-13 NOTE — TELEPHONE ENCOUNTER
Dr Nancy Brooke pt    Pt is requesting to speak to a PA in regards to her ongoing issues with diarrhea throughout the day  she states the worst time for her is in the morning  please advise   [phone]

## 2018-12-13 NOTE — TELEPHONE ENCOUNTER
Please let her know that Dr Christopher Colon agreed with trying the Imodium and that she can try light compression stockings but currently would not recommend albumin infusion  If she has any questions I'm happy to speak with her, thanks!

## 2018-12-13 NOTE — TELEPHONE ENCOUNTER
Please let her know that as her inflammation improves her albumin will improve and the swelling should improve  Would not give albumin infusion yet  She can try light compression stockings to improve the swelling in her legs  Also to try imodium first for the diarrhea  I see that she is scheduled to see me soon in the office for an appointment

## 2018-12-13 NOTE — TELEPHONE ENCOUNTER
She recently had her first Entyvio dose - she understands it will take time to work and has noted improvement, but continues to have diarrhea in the morning and is worried about going back to work  She hasn't tried Imodium yet - she will try this in the morning  Also let her know Lomotil would be an option if the Imodium does not work  She continues to have 3+ pitting edema bilaterally  She states this is not new, that it began when the Humira stopped working for her and has not worsened  She questions if an albumin infusion would help this  She tried Lasix 40mg in the past but this did not improve the edema  She does have a low albumin, it was 2 4 last month  What do you think about the albumin?     Juan Batista

## 2018-12-17 ENCOUNTER — TELEPHONE (OUTPATIENT)
Dept: GASTROENTEROLOGY | Facility: MEDICAL CENTER | Age: 53
End: 2018-12-17

## 2018-12-17 NOTE — TELEPHONE ENCOUNTER
Dr Will Bhakta pt called stating she is supposed to return to work this Saturday 12/22/18 and will need a note  Pt states she normally works 12hr shifts but would like the note to be for only 8hrs to see how she does  Pt states she is sched for a FU appt 12/24/18   Pt would like to be called and to discuss and can be reached at [phone]

## 2018-12-17 NOTE — TELEPHONE ENCOUNTER
Spoke with pt but still feeling weak  Wanted a note for 8 instead of 12hrs  Also inquiring about FMLA paperwork  She would like to  at [place] on the 21st when she gets her infusion  Can you check?   Charmayne Duster

## 2018-12-18 NOTE — TELEPHONE ENCOUNTER
Received the e-mail from [address] [place] [address] this morning, fill it out and faxed requested records to [phone]  I also filled out a letter for the patient to take to work for Saturday  I have forwarded that letter to Karin at the [place] office to print out as the patient is requesting to pick this up on Friday

## 2018-12-20 ENCOUNTER — OFFICE VISIT (OUTPATIENT)
Dept: FAMILY MEDICINE CLINIC | Facility: CLINIC | Age: 53
End: 2018-12-20
Payer: COMMERCIAL

## 2018-12-20 ENCOUNTER — TELEPHONE (OUTPATIENT)
Dept: GASTROENTEROLOGY | Facility: CLINIC | Age: 53
End: 2018-12-20

## 2018-12-20 VITALS
BODY MASS INDEX: 27.29 KG/M2 | TEMPERATURE: 98.6 F | HEART RATE: 74 BPM | HEIGHT: 63 IN | DIASTOLIC BLOOD PRESSURE: 76 MMHG | WEIGHT: 154 LBS | OXYGEN SATURATION: 99 % | SYSTOLIC BLOOD PRESSURE: 128 MMHG

## 2018-12-20 DIAGNOSIS — R39.9 UTI SYMPTOMS: ICD-10-CM

## 2018-12-20 DIAGNOSIS — N30.01 ACUTE CYSTITIS WITH HEMATURIA: Primary | ICD-10-CM

## 2018-12-20 LAB
SL AMB  POCT GLUCOSE, UA: NEGATIVE
SL AMB LEUKOCYTE ESTERASE,UA: ABNORMAL
SL AMB POCT BILIRUBIN,UA: NEGATIVE
SL AMB POCT BLOOD,UA: ABNORMAL
SL AMB POCT CLARITY,UA: ABNORMAL
SL AMB POCT COLOR,UA: ABNORMAL
SL AMB POCT KETONES,UA: NEGATIVE
SL AMB POCT NITRITE,UA: NEGATIVE
SL AMB POCT PH,UA: 6.5
SL AMB POCT SPECIFIC GRAVITY,UA: 1.02
SL AMB POCT URINE PROTEIN: 300
SL AMB POCT UROBILINOGEN: 0.2

## 2018-12-20 PROCEDURE — 3008F BODY MASS INDEX DOCD: CPT | Performed by: FAMILY MEDICINE

## 2018-12-20 PROCEDURE — 81002 URINALYSIS NONAUTO W/O SCOPE: CPT | Performed by: FAMILY MEDICINE

## 2018-12-20 PROCEDURE — 87086 URINE CULTURE/COLONY COUNT: CPT | Performed by: FAMILY MEDICINE

## 2018-12-20 PROCEDURE — 87077 CULTURE AEROBIC IDENTIFY: CPT | Performed by: FAMILY MEDICINE

## 2018-12-20 PROCEDURE — 1036F TOBACCO NON-USER: CPT | Performed by: FAMILY MEDICINE

## 2018-12-20 PROCEDURE — 87186 SC STD MICRODIL/AGAR DIL: CPT | Performed by: FAMILY MEDICINE

## 2018-12-20 PROCEDURE — 99213 OFFICE O/P EST LOW 20 MIN: CPT | Performed by: FAMILY MEDICINE

## 2018-12-20 RX ORDER — PHENAZOPYRIDINE HYDROCHLORIDE 200 MG/1
200 TABLET, FILM COATED ORAL
Qty: 10 TABLET | Refills: 0 | Status: SHIPPED | OUTPATIENT
Start: 2018-12-20 | End: 2019-01-17 | Stop reason: HOSPADM

## 2018-12-20 RX ORDER — PREDNISONE 10 MG/1
TABLET ORAL
Refills: 0 | COMMUNITY
Start: 2018-12-14 | End: 2018-12-27 | Stop reason: SDUPTHER

## 2018-12-20 RX ORDER — SODIUM CHLORIDE 9 MG/ML
20 INJECTION, SOLUTION INTRAVENOUS ONCE
Status: COMPLETED | OUTPATIENT
Start: 2018-12-21 | End: 2018-12-21

## 2018-12-20 RX ORDER — CIPROFLOXACIN 500 MG/1
500 TABLET, FILM COATED ORAL EVERY 12 HOURS SCHEDULED
Qty: 14 TABLET | Refills: 0 | Status: SHIPPED | OUTPATIENT
Start: 2018-12-20 | End: 2018-12-24 | Stop reason: SDUPTHER

## 2018-12-20 RX ORDER — DIPHENHYDRAMINE HCL 25 MG
25 TABLET ORAL ONCE
Status: COMPLETED | OUTPATIENT
Start: 2018-12-21 | End: 2018-12-21

## 2018-12-20 RX ORDER — ACETAMINOPHEN 325 MG/1
975 TABLET ORAL ONCE
Status: COMPLETED | OUTPATIENT
Start: 2018-12-21 | End: 2018-12-21

## 2018-12-20 NOTE — PROGRESS NOTES
Subjective:   Chief Complaint   Patient presents with    Urinary Tract Infection     Pt states that she is experiencing burning when urininating since yesterday  She has seen blood in the toilet when urinating, but she is unsure if it is from her ulcertive colitis or if it is from a UTI  She also has cramping, but she is unsure if it is from her Ulcertive Colitis or from a possible UTI  Patient ID: Dhruv Carrero is a 48 y o  female  Pt started with symptoms of burning with urination yesterday     She is on a steroid taper for her ulcerative colitis  Fevers at bedtime, no back pain, decreased appetite   She had some blood in the toilet both was unsure if it was from her ulcerative colitis or urinary tract  She has constant abdominal cramping so she did not notice increased pain  She denies any back pain  The following portions of the patient's history were reviewed and updated as appropriate: allergies, current medications, past family history, past medical history, past social history, past surgical history and problem list     Review of Systems   Constitutional: Positive for fever  Negative for fatigue  HENT: Negative  Eyes: Negative  Respiratory: Negative  Negative for cough  Cardiovascular: Negative  Gastrointestinal: Positive for abdominal pain  Endocrine: Negative  Genitourinary: Positive for dysuria, frequency and urgency  Negative for flank pain  Musculoskeletal: Negative  Skin: Negative  Allergic/Immunologic: Negative  Neurological: Negative  Psychiatric/Behavioral: Negative  Objective:  Vitals:    12/20/18 0935   BP: 128/76   Pulse: 74   Temp: 98 6 °F (37 °C)   SpO2: 99%   Weight: 69 9 kg (154 lb)   Height: 5' 3" (1 6 m)      Physical Exam   Constitutional: She is oriented to person, place, and time  She appears well-developed and well-nourished  HENT:   Head: Normocephalic and atraumatic     Cardiovascular: Normal rate, regular rhythm and normal heart sounds  Pulmonary/Chest: Effort normal and breath sounds normal    Abdominal: Soft  Bowel sounds are normal  There is tenderness  Generalized tenderness over entire abdomen, increased over suprapubic area   Musculoskeletal:   No CVA tenderness   Neurological: She is alert and oriented to person, place, and time  Skin: Skin is warm and dry  Psychiatric: She has a normal mood and affect  Her behavior is normal  Judgment and thought content normal    Nursing note and vitals reviewed  Assessment/Plan:    No problem-specific Assessment & Plan notes found for this encounter  Diagnoses and all orders for this visit:    Acute cystitis with hematuria  -     ciprofloxacin (CIPRO) 500 mg tablet; Take 1 tablet (500 mg total) by mouth every 12 (twelve) hours for 7 days  -     phenazopyridine (PYRIDIUM) 200 mg tablet;  Take 1 tablet (200 mg total) by mouth 3 (three) times a day with meals  -     POCT urine dip  -     Urine culture    UTI symptoms  -     POCT urine dip  -     Urine culture    Other orders  -     predniSONE 10 mg tablet; TAKE 4 & 1/2 TABS DAILY (45MG) FOR 7 DAYS

## 2018-12-20 NOTE — TELEPHONE ENCOUNTER
I called her and let her know that it is okay to proceed with the Entyvio infusion even though she is taking antibiotics for urinary tract infection

## 2018-12-20 NOTE — TELEPHONE ENCOUNTER
Dr Renee Lemons pt    Pt is scheduled for entyvio infusion tomorrow she would like to know if it is ok for her to have the infusion if she was put on antibiotics for a uti   Please advise [phone]

## 2018-12-21 ENCOUNTER — HOSPITAL ENCOUNTER (OUTPATIENT)
Dept: INFUSION CENTER | Facility: HOSPITAL | Age: 53
Discharge: HOME | End: 2018-12-21
Payer: COMMERCIAL

## 2018-12-21 VITALS
SYSTOLIC BLOOD PRESSURE: 116 MMHG | DIASTOLIC BLOOD PRESSURE: 58 MMHG | RESPIRATION RATE: 18 BRPM | HEART RATE: 61 BPM | TEMPERATURE: 98 F

## 2018-12-21 PROCEDURE — 96413 CHEMO IV INFUSION 1 HR: CPT

## 2018-12-21 RX ADMIN — DIPHENHYDRAMINE HCL 25 MG: 25 TABLET, FILM COATED ORAL at 08:35

## 2018-12-21 RX ADMIN — ACETAMINOPHEN 975 MG: 325 TABLET ORAL at 08:35

## 2018-12-21 RX ADMIN — VEDOLIZUMAB 300 MG: 300 INJECTION, POWDER, LYOPHILIZED, FOR SOLUTION INTRAVENOUS at 09:05

## 2018-12-21 RX ADMIN — SODIUM CHLORIDE 20 ML/HR: 0.9 INJECTION, SOLUTION INTRAVENOUS at 08:35

## 2018-12-21 NOTE — PLAN OF CARE
Problem: Potential for Falls  Goal: Patient will remain free of falls  INTERVENTIONS:  - Assess patient frequently for physical needs  -  Identify cognitive and physical deficits and behaviors that affect risk of falls    -  [place] fall precautions as indicated by assessment   - Educate patient/family on patient safety including physical limitations  - Instruct patient to call for assistance with activity based on assessment  - Modify environment to reduce risk of injury  - Consider OT/PT consult to assist with strengthening/mobility   Outcome: Progressing

## 2018-12-22 ENCOUNTER — TELEPHONE (OUTPATIENT)
Dept: OTHER | Facility: OTHER | Age: 53
End: 2018-12-22

## 2018-12-22 LAB — BACTERIA UR CULT: ABNORMAL

## 2018-12-23 NOTE — TELEPHONE ENCOUNTER
Karen Burton 1965  CONFIDENTIALTY NOTICE: This fax transmission is intended only for the addressee  It contains information that is legally privileged,  confidential or otherwise protected from use or disclosure  If you are not the intended recipient, you are strictly prohibited from reviewing,  disclosing, copying using or disseminating any of this information or taking any action in reliance on or regarding this information  If you have  received this fax in error, please notify us immediately by telephone so that we can arrange for its return to us  Page:  3  Call Id: [id number]  Health Call  Standard Call Report  Health Call  Patient Name: Karen Burton  Gender: Female  : 1965  Age: 48 Y 3 M 12 D  Return Phone  Number: [phone] (Home)  Address: [address] [place] [address]/[facility]/Zip: [address] [place], [place] [address]  Practice Name: Mahesh Steinberg  Practice Charged:  Physician:  [facility] Name:  Relationship To  Patient: Self  Return Phone Number: [phone] (Home)  Presenting Problem: "I have a UTI and am currently on  Cipro  I want to know if Pyridium can  be called in for me "  Service Type: Triage  Charged Service 1: Susy Cat U  38  Name and  Number:  Nurse Assessment  Nurse: Scooby Lockhart RN, Carlton Bateman Date/Time: 2018 6:50:47 PM  Type of assessment required:  ---General (Adult or Child)  Duration of Current S/S  ---Since Thursday  Location/Radiation  ---  Temperature (F) and route:  ---Denies fever  Symptom Specific Meds (Dose/Time):  ---Ciprofloxacin 500 mg / tab 1 tab by mouth BID  Other S/S  ---Is still having burning sensation when urinating  Pain Scale on scale of 1-10, 10 being the worst:  ---Discomfort from burning  Symptom progression:  ---same  Intake and Output  ---WNL  LMP/ Pregnancy:  Karen Burton 1965  CONFIDENTIALTY NOTICE: This fax transmission is intended only for the addressee   It contains information that is legally privileged,  confidential or otherwise protected from use or disclosure  If you are not the intended recipient, you are strictly prohibited from reviewing,  disclosing, copying using or disseminating any of this information or taking any action in reliance on or regarding this information  If you have  received this fax in error, please notify us immediately by telephone so that we can arrange for its return to us  Page: 2 of 3  Call Id: [id number]  Nurse Assessment  ---Not discussed  Breastfeeding  ---No  Last Exam/Treatment:  ---In the office on 12/20/2018 and diagnosed with UTI  Protocols  Protocol Title Nurse Date/Time  Urinary Tract Infection on Antibiotic Follow-up  Call - Female  Miranda Farias RN, Gia Walters 12/22/2018 7:01:40 PM  Question Caller Affirmed  Disp  Time Disposition Final User  12/22/2018 6:26:26 PM Send to [address] [place] [address]  12/22/2018 [address] Ot[address] Willingham RN, Gia Walters  12/22/2018 7:09:42 PM RN Triaged Miranda Farias RN, Gia Walters  12/22/2018 7:10:40 PM RN Triaged Yes Miranda Farias RN, [facility] Advice Given Per Protocol  HOME CARE: You should be able to treat this at home  REASSURANCE: * Most bacterial infections begin responding to antibiotics  over a 1-3 day period  * The fever should disappear within 24 hours of starting the antibiotic  * Urinary symptoms and pain should  decrease during the 24-72 hour period after starting the antibiotic  OTC PHENAZOPYRIDINE FOR SEVERE DYSURIA AND  FREQUENCY * It has a numbing effect on the lining of the bladder and urethra  It can help reduce burning during urination, urgency,  and frequency until the antibiotics start working  It does not have any anti-bacterial effect  * [place] Phenazopyridine (Uristat) is  not available OTC in [facility])  * [place]: Phenazopyridine (Uristat) is available OTC  Dosage is 2 pills by mouth three times  a day  * Prescription: Phenazopyridine is also available as a prescription medicine (Pyridium, Urodine, Urogesic)   CAUTION -  PHENAZOPYRIDINE: * It turns the urine bright orange  This can cause staining of underwear  It can also sometimes stain contact  lenses, because it gets in your tears  FLUIDS: Drink extra fluids  Drink 8-10 glasses of liquids a day  (Reason: to produce a dilute,  non-irritating urine ) CAUTION - FLUIDS: Increased fluid intake may be contraindicated in adults with renal failure or heart failure  Discuss with PCP  CRANBERRY JUICE: * Some people think that drinking cranberry juice may help in fighting urinary tract infections  However, there is no good research that has ever proved this  * Dosage Cranberry Juice Cocktail: 8 oz (240 ml) twice a day  * Dosage  100% Cranberry Juice: 1 oz (30 ml) twice a day  * Do not drink more than 12 oz (360 ml)  Here is the reason: too much cranberry juice  can also be irritating to the bladder  CALL BACK IF: * Fever lasts over 24 hours on antibiotics * Pain does not improve by day 4 on  antibiotics * Urine symptoms do not improve by day 4 on antibiotics * You become worse  CARE ADVICE given per Urinary Tract  Infection on Antibiotic Follow-Up Call, Female (Adult) guideline  Caller Understands: Yes  Caller Disagree/Comply: Comply  PreDisposition: Nancy Byrd 1965  CONFIDENTIALTY NOTICE: This fax transmission is intended only for the addressee  It contains information that is legally privileged,  confidential or otherwise protected from use or disclosure  If you are not the intended recipient, you are strictly prohibited from reviewing,  disclosing, copying using or disseminating any of this information or taking any action in reliance on or regarding this information  If you have  received this fax in error, please notify us immediately by telephone so that we can arrange for its return to us  Page: 3 of 3  Call Id: [id number]  Comments  User: Marline Scheuermann, RN Date/Time: 12/22/2018 7:09:36 PM  Patient wanted Pyridium called in for her  Pyridium is an OTC medication that does not need to be called in

## 2018-12-24 ENCOUNTER — OFFICE VISIT (OUTPATIENT)
Dept: GASTROENTEROLOGY | Facility: MEDICAL CENTER | Age: 53
End: 2018-12-24
Payer: COMMERCIAL

## 2018-12-24 ENCOUNTER — TELEPHONE (OUTPATIENT)
Dept: FAMILY MEDICINE CLINIC | Facility: CLINIC | Age: 53
End: 2018-12-24

## 2018-12-24 VITALS
TEMPERATURE: 98 F | SYSTOLIC BLOOD PRESSURE: 122 MMHG | DIASTOLIC BLOOD PRESSURE: 78 MMHG | BODY MASS INDEX: 26.68 KG/M2 | WEIGHT: 150.6 LBS | HEART RATE: 64 BPM

## 2018-12-24 DIAGNOSIS — E88.09 HYPOALBUMINEMIA: ICD-10-CM

## 2018-12-24 DIAGNOSIS — K51.011 ULCERATIVE PANCOLITIS WITH RECTAL BLEEDING (HCC): Primary | ICD-10-CM

## 2018-12-24 DIAGNOSIS — N30.01 ACUTE CYSTITIS WITH HEMATURIA: ICD-10-CM

## 2018-12-24 PROCEDURE — 99214 OFFICE O/P EST MOD 30 MIN: CPT | Performed by: INTERNAL MEDICINE

## 2018-12-24 RX ORDER — CIPROFLOXACIN 500 MG/1
500 TABLET, FILM COATED ORAL EVERY 12 HOURS SCHEDULED
Qty: 14 TABLET | Refills: 0 | Status: SHIPPED | OUTPATIENT
Start: 2018-12-24 | End: 2018-12-31

## 2018-12-24 RX ORDER — PHENAZOPYRIDINE HYDROCHLORIDE 200 MG/1
200 TABLET, FILM COATED ORAL 3 TIMES DAILY PRN
Qty: 90 TABLET | Refills: 1 | Status: SHIPPED | OUTPATIENT
Start: 2018-12-24 | End: 2019-02-05

## 2018-12-24 RX ORDER — PREDNISONE 20 MG/1
40 TABLET ORAL DAILY
Qty: 180 TABLET | Refills: 0 | Status: SHIPPED | OUTPATIENT
Start: 2018-12-24 | End: 2018-12-27

## 2018-12-24 NOTE — LETTER
December 24, 2018     No Recipients    Patient: Aman Gupta   YOB: 1965   Date of Visit: 12/24/2018       Dear Dr Glory Sewell Recipients: Thank you for referring Citlalli Connor to me for evaluation  Below are my notes for this consultation  If you have questions, please do not hesitate to call me  I look forward to following your patient along with you  Sincerely,        Amish Taylor DO        CC: No Recipients  Amish Taylor DO  12/24/2018 11:55 AM  Sign at close encounter  [facility] - Outpatient Follow-up Note  Aman Gupta 48 y o  female MRN: [id number]  Encounter: [id number]      ASSESSMENT AND PLAN:      48year old female here for follow up  1  Ulcerative pancolitis with rectal bleeding ([facility] [address] )  -now severe, continue entyvio  -steroid taper to start now by 5 mg daily and patient instructed to increase dose back to prior dose if symptoms return  -already received flu and getting pneumonia vaccine      2  Hypoalbuminemia-likely due to her ulcerative colitis  -will repeat labs today    3  Recent UTI  -continue prydium and finish course of ciprofloxacin    Follow-up in 1 month      ______________________________________________________________________    SUBJECTIVE:  48year old female here for follow up of severe UC  Recently started on entyvio and has had the first two infusions so far  Is still on prednisone 40 mg  Is currently taking imodium every other day or once daily  She reports recently having increased urination with burning  She was diagnosed with a UTI and was started on ciprofloxacin  She got the PNA 13 and can get the PnA 23 in January  She did receive the flu vaccine this year  REVIEW OF SYSTEMS IS OTHERWISE NEGATIVE        Historical Information   Past Medical History:   Diagnosis Date    Adjustment disorder     last assessed 05/16/12    Anemia     HX of    Asthma     mild - intermittent    Bilateral leg edema     Blepharitis     last assessed 16    Carpal tunnel syndrome     Unspecified laterality    Colitis, acute     Dyspareunia in female     Edema     last assessed 06/22/15    Ganglion     Herniated cervical disc     History of transfusion     Hypokalemia     Hypothyroidism     Hypothyroidism     Insomnia     Lumps on the skin     last assessed 14    Mouth ulcers     last assessed 06/22/15    Nontraumatic tear of left tibialis posterior tendon     Traumatic teart     Polyarthritis     last assessed 16    Raynaud's disease     Rayna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 ([facility] [address] )     Temporomandibular disorder     Joint    Thyroid disease     Ulcerative colitis ([facility] [address] )     Ulcerative colitis ([facility] [address] )      Past Surgical History:   Procedure Laterality Date    ANKLE SURGERY Left     Tendon repair    CARPAL TUNNEL RELEASE Bilateral      SECTION, LOW TRANSVERSE      COLONOSCOPY      COLONOSCOPY N/A 2018    Procedure: COLONOSCOPY;  Surgeon: Erlinda Renteria MD;  Location:  GI LAB; Service: Gastroenterology    HERNIA REPAIR      umbilical    HYSTERECTOMY      KNEE ARTHROSCOPY Right     LIPECTOMY      Multipe lipoma removals    LIPOMA RESECTION      NV COLONOSCOPY FLX DX W/COLLJ SPEC WHEN PFRMD N/A 2016    Procedure: COLONOSCOPY;  Surgeon: Bridgett Downey DO;  Location[facility];   Service: Gastroenterology    NV REPAIR FLEX LEG TENDON,SECOND,EA Left 2016    Procedure: REPAIR OF LEFT POSTERIOR TIBIAL TENDON WITH GRAFT, EXPLORATION LEFT ANKLE ;  Surgeon: Chriss Weaver DPM;  Location: [facility] OR;  Service: Podiatry    SHOULDER SURGERY Left     bicep tendon and labrum repair    TONSILLECTOMY      TOOTH EXTRACTION  2018     Social History   History   Alcohol Use No     Comment: social 1 drink per weeek     History   Drug Use No     History   Smoking Status    Former Smoker    Quit date: 2003   Smokeless Tobacco    Never Used     Comment: Quit , rare use for 2 years     Family History   Problem Relation Age of Onset    Parkinsonism Mother     Rheum arthritis Mother     Heart attack Father     Hypertension Father     Osteoporosis Father     Prostate cancer Father     Hashimoto's thyroiditis Sister     Cancer Paternal Grandfather         Penile    Prostate cancer Paternal Grandfather     Crohn's disease Family     Osteoarthritis Family     Rheum arthritis Family     Crohn's disease Other     Crohn's disease Maternal Uncle     Psoriasis Maternal Uncle     Ulcerative colitis Maternal Uncle     Rheum arthritis Maternal Aunt     Ulcerative colitis Family        Meds/Allergies       Current Outpatient Prescriptions:     albuterol (ACCUNEB) 1 25 MG/3ML nebulizer solution    cholecalciferol (VITAMIN D3) 1,000 units tablet    cholestyramine sugar free (QUESTRAN LIGHT) 4 g packet    ciprofloxacin (CIPRO) 500 mg tablet    hyoscyamine (ANASPAZ,LEVSIN) 0 125 MG tablet    phenazopyridine (PYRIDIUM) 200 mg tablet    [START ON 12/26/2018] predniSONE 20 mg tablet    tacrolimus (PROTOPIC) 0 03 % ointment    zolpidem (AMBIEN) 10 mg tablet    levothyroxine 25 mcg tablet    predniSONE 10 mg tablet    No Known Allergies    Objective     Blood pressure 122/78, pulse 64, temperature 98 °F (36 7 °C), temperature source Tympanic, weight 68 3 kg (150 lb 9 6 oz)  Body mass index is 26 68 kg/m²  PHYSICAL EXAM:      General Appearance:   Alert, cooperative, no distress   HEENT:   Normocephalic, atraumatic, anicteric      Neck:  Supple, symmetrical, trachea midline   Lungs:   Clear to auscultation bilaterally; no rales, rhonchi or wheezing; respirations unlabored    Heart[de-identified]   Regular rate and rhythm; no murmur, rub, or gallop     Abdomen:   Soft, non-tender, non-distended; normal bowel sounds; no masses, no organomegaly    Genitalia:   Deferred    Rectal:   Deferred    Extremities:  No cyanosis, clubbing or edema    Pulses:  2+ and symmetric    Skin:  No jaundice, rashes, or lesions    Lymph nodes:  No palpable cervical lymphadenopathy        Lab Results:   No visits with results within 1 Day(s) from this visit  Latest known visit with results is:   Office Visit on 12/20/2018   Component Date Value    LEUKOCYTE ESTERASE,UA 12/20/2018 moderate     NITRITE,UA 12/20/2018 negative     SL AMB POCT UROBILINOGEN 12/20/2018 0 2     POCT URINE PROTEIN 12/20/2018 300      PH,UA 12/20/2018 6 5     BLOOD,UA 12/20/2018 large     SPECIFIC GRAVITY,UA 12/20/2018 1 020     KETONES,UA 12/20/2018 negative     BILIRUBIN,UA 12/20/2018 negative     GLUCOSE, UA 12/20/2018 negative      COLOR,UA 12/20/2018 bloody     CLARITY,UA 12/20/2018 cloudy     Urine Culture 12/20/2018 80,000-89,000 cfu/ml Klebsiella pneumoniae*     Radiology Results:   No results found

## 2018-12-24 NOTE — LETTER
December 24, 2018     Patient: Lissette Moralez   YOB: 1965   Date of Visit: 12/24/2018       To Whom it May Concern:    Zaygrover Bach is under my professional care  She was seen in my office on 12/24/2018  She may return to work on 12/29/2019, full duties  If you have any questions or concerns, please don't hesitate to call           Sincerely,          Stacia Ortiz,         CC: No Recipients

## 2018-12-24 NOTE — PROGRESS NOTES
[facility] - Outpatient Follow-up Note  Giovanny Valle 48 y o  female MRN: [id number]  Encounter: [id number]      ASSESSMENT AND PLAN:      48year old female here for follow up  1  Ulcerative pancolitis with rectal bleeding ([address] )  -now severe, continue entyvio  -steroid taper to start now by 5 mg daily and patient instructed to increase dose back to prior dose if symptoms return  -already received flu and getting pneumonia vaccine      2  Hypoalbuminemia-likely due to her ulcerative colitis  -will repeat labs today    3  Recent UTI  -continue prydium and finish course of ciprofloxacin, refill for pyrdium given to pt    Follow-up in 1 month      ______________________________________________________________________    SUBJECTIVE:  48year old female here for follow up of severe UC  Recently started on entyvio and has had the first two infusions so far  Is still on prednisone 40 mg  Is currently taking imodium every other day or once daily  She reports recently having increased urination with burning  She was diagnosed with a UTI and was started on ciprofloxacin  She got the PNA 13 and can get the PnA 23 in January  She did receive the flu vaccine this year  REVIEW OF SYSTEMS IS OTHERWISE NEGATIVE        Historical Information   Past Medical History:   Diagnosis Date    Adjustment disorder     last assessed 05/16/12    Anemia     HX of    Asthma     mild - intermittent    Bilateral leg edema     Blepharitis     last assessed 02/04/16    Carpal tunnel syndrome     Unspecified laterality    Colitis, acute     Dyspareunia in female     Edema     last assessed 06/22/15    Ganglion     Herniated cervical disc     History of transfusion     Hypokalemia     Hypothyroidism     Hypothyroidism     Insomnia     Lumps on the skin     last assessed 03/12/14    Mouth ulcers     last assessed 06/22/15    Nontraumatic tear of left tibialis posterior tendon Traumatic teart     Polyarthritis     last assessed 16    Raynaud's disease     Rayna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 ([facility] [address]     Temporomandibular disorder     Joint    Thyroid disease     Ulcerative colitis ([facility] [address] [facility]     Ulcerative colitis ([facility] [address] )      Past Surgical History:   Procedure Laterality Date    ANKLE SURGERY Left     Tendon repair    CARPAL TUNNEL RELEASE Bilateral      SECTION, LOW TRANSVERSE      COLONOSCOPY      COLONOSCOPY N/A 2018    Procedure: COLONOSCOPY;  Surgeon: Dawson Victoria MD;  [facility]; Service: Gastroenterology    HERNIA REPAIR      umbilical    HYSTERECTOMY      KNEE ARTHROSCOPY Right     LIPECTOMY      Multipe lipoma removals    LIPOMA RESECTION      TN COLONOSCOPY FLX DX W/COLLJ SPEC WHEN PFRMD N/A 2016    Procedure: COLONOSCOPY;  Surgeon: Albert Chang DO;  Location: [facility] GI LAB;   Service: Gastroenterology    TN REPAIR FLEX LEG TENDON,SECOND,EA Left 2016    Procedure: REPAIR OF LEFT POSTERIOR TIBIAL TENDON WITH GRAFT, EXPLORATION LEFT ANKLE ;  Surgeon: Francoise Campoverde DPM;  Location: [facility] OR;  Service: Podiatry    SHOULDER SURGERY Left     bicep tendon and labrum repair    TONSILLECTOMY      TOOTH EXTRACTION  2018     Social History   History   Alcohol Use No     Comment: social 1 drink per weeek     History   Drug Use No     History   Smoking Status    Former Smoker    Quit date: 2003   Smokeless Tobacco    Never Used     Comment: Quit , rare use for 2 years     Family History   Problem Relation Age of Onset    Parkinsonism Mother     Rheum arthritis Mother     Heart attack Father     Hypertension Father     Osteoporosis Father     Prostate cancer Father     Hashimoto's thyroiditis Sister     Cancer Paternal Grandfather         Penile    Prostate cancer Paternal Grandfather     Crohn's disease Family     Osteoarthritis Family     Rheum arthritis Family     Crohn's disease Other     Crohn's disease Maternal Uncle     Psoriasis Maternal Uncle     Ulcerative colitis Maternal Uncle     Rheum arthritis Maternal Aunt     Ulcerative colitis Family        Meds/Allergies       Current Outpatient Prescriptions:     albuterol (ACCUNEB) 1 25 MG/3ML nebulizer solution    cholecalciferol (VITAMIN D3) 1,000 units tablet    cholestyramine sugar free (QUESTRAN LIGHT) 4 g packet    ciprofloxacin (CIPRO) 500 mg tablet    hyoscyamine (ANASPAZ,LEVSIN) 0 125 MG tablet    phenazopyridine (PYRIDIUM) 200 mg tablet    [START ON 12/26/2018] predniSONE 20 mg tablet    tacrolimus (PROTOPIC) 0 03 % ointment    zolpidem (AMBIEN) 10 mg tablet    levothyroxine 25 mcg tablet    predniSONE 10 mg tablet    No Known Allergies    Objective     Blood pressure 122/78, pulse 64, temperature 98 °F (36 7 °C), temperature source Tympanic, weight 68 3 kg (150 lb 9 6 oz)  Body mass index is 26 68 kg/m²  PHYSICAL EXAM:      General Appearance:   Alert, cooperative, no distress   HEENT:   Normocephalic, atraumatic, anicteric      Neck:  Supple, symmetrical, trachea midline   Lungs:   Clear to auscultation bilaterally; no rales, rhonchi or wheezing; respirations unlabored    Heart[de-identified]   Regular rate and rhythm; no murmur, rub, or gallop  Abdomen:   Soft, non-tender, non-distended; normal bowel sounds; no masses, no organomegaly    Genitalia:   Deferred    Rectal:   Deferred    Extremities:  No cyanosis, clubbing or edema    Pulses:  2+ and symmetric    Skin:  No jaundice, rashes, or lesions    Lymph nodes:  No palpable cervical lymphadenopathy        Lab Results:   No visits with results within 1 Day(s) from this visit     Latest known visit with results is:   Office Visit on 12/20/2018   Component Date Value    LEUKOCYTE ESTERASE,UA 12/20/2018 moderate     NITRITE,UA 12/20/2018 negative     SL AMB POCT UROBILINOGEN 12/20/2018 0 2     POCT URINE PROTEIN 12/20/2018 300      PH,UA 12/20/2018 6 5     BLOOD,UA 12/20/2018 large     SPECIFIC GRAVITY,UA 12/20/2018 1 020     KETONES,UA 12/20/2018 negative     BILIRUBIN,UA 12/20/2018 negative     GLUCOSE, UA 12/20/2018 negative      COLOR,UA 12/20/2018 bloody     CLARITY,UA 12/20/2018 cloudy     Urine Culture 12/20/2018 80,000-89,000 cfu/ml Klebsiella pneumoniae*     Radiology Results:   No results found

## 2018-12-24 NOTE — TELEPHONE ENCOUNTER
Patient is still having pain and pressure  Patient would like to know if we can renew her medicine      [facility] - [phone]

## 2018-12-24 NOTE — LETTER
December 24, 2018     [address] [address]    Patient: Stuart Fisher   YOB: 1965   Date of Visit: 12/24/2018       Dear Dr Maxine Contreras: Thank you for referring Aissatou Gallagher to me for evaluation  Below are my notes for this consultation  If you have questions, please do not hesitate to call me  I look forward to following your patient along with you  Sincerely,        Albert Chang DO        CC: No Recipients  Albert Chang DO  12/24/2018 11:55 AM  Sign at close encounter  [facility] - Outpatient Follow-up Note  Stuart Fisher 48 y o  female MRN: [id number]  Encounter: [id number]      ASSESSMENT AND PLAN:      48year old female here for follow up  1  Ulcerative pancolitis with rectal bleeding ([facility] [address] )  -now severe, continue entyvio  -steroid taper to start now by 5 mg daily and patient instructed to increase dose back to prior dose if symptoms return  -already received flu and getting pneumonia vaccine      2  Hypoalbuminemia-likely due to her ulcerative colitis  -will repeat labs today    3  Recent UTI  -continue prydium and finish course of ciprofloxacin    Follow-up in 1 month      ______________________________________________________________________    SUBJECTIVE:  48year old female here for follow up of severe UC  Recently started on entyvio and has had the first two infusions so far  Is still on prednisone 40 mg  Is currently taking imodium every other day or once daily  She reports recently having increased urination with burning  She was diagnosed with a UTI and was started on ciprofloxacin  She got the PNA 13 and can get the PnA 23 in January  She did receive the flu vaccine this year  REVIEW OF SYSTEMS IS OTHERWISE NEGATIVE        Historical Information   Past Medical History:   Diagnosis Date    Adjustment disorder     last assessed 05/16/12    Anemia     HX of    Asthma mild - intermittent    Bilateral leg edema     Blepharitis     last assessed 16    Carpal tunnel syndrome     Unspecified laterality    Colitis, acute     Dyspareunia in female     Edema     last assessed 06/22/15    Ganglion     Herniated cervical disc     History of transfusion     Hypokalemia     Hypothyroidism     Hypothyroidism     Insomnia     Lumps on the skin     last assessed 14    Mouth ulcers     last assessed 06/22/15    Nontraumatic tear of left tibialis posterior tendon     Traumatic teart     Polyarthritis     last assessed 16    Raynaud's disease     Rayna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 ([facility] [address] )     Temporomandibular disorder     Joint    Thyroid disease     Ulcerative colitis ([facility] [address] )     Ulcerative colitis ([facility] [address] )      Past Surgical History:   Procedure Laterality Date    ANKLE SURGERY Left     Tendon repair    CARPAL TUNNEL RELEASE Bilateral      SECTION, LOW TRANSVERSE      COLONOSCOPY      COLONOSCOPY N/A 2018    Procedure: COLONOSCOPY;  Surgeon: Guy Pacheco MD;  [facility]; Service: Gastroenterology    HERNIA REPAIR      umbilical    HYSTERECTOMY      KNEE ARTHROSCOPY Right     LIPECTOMY      Multipe lipoma removals    LIPOMA RESECTION      [place] COLONOSCOPY FLX DX W/COLLJ SPEC WHEN PFRMD N/A 2016    Procedure: COLONOSCOPY;  Surgeon: Rolf Santamaira DO;  Location: [facility];   Service: Gastroenterology    [place] REPAIR FLEX LEG TENDON,SECOND,EA Left 2016    Procedure: REPAIR OF LEFT POSTERIOR TIBIAL TENDON WITH GRAFT, EXPLORATION LEFT ANKLE ;  Surgeon: Camille Farooq DPM;  Location: [facility] OR;  Service: Podiatry    SHOULDER SURGERY Left     bicep tendon and labrum repair    TONSILLECTOMY      TOOTH EXTRACTION  2018     Social History   History   Alcohol Use No     Comment: social 1 drink per weeek     History   Drug Use No     History   Smoking Status    Former Smoker    Quit date: 2003   Smokeless Tobacco    Never Used     Comment: Quit 1991, rare use for 2 years     Family History   Problem Relation Age of Onset    Parkinsonism Mother     Rheum arthritis Mother     Heart attack Father     Hypertension Father     Osteoporosis Father     Prostate cancer Father     Hashimoto's thyroiditis Sister     Cancer Paternal Grandfather         Penile    Prostate cancer Paternal Grandfather     Crohn's disease Family     Osteoarthritis Family     Rheum arthritis Family     Crohn's disease Other     Crohn's disease Maternal Uncle     Psoriasis Maternal Uncle     Ulcerative colitis Maternal Uncle     Rheum arthritis Maternal Aunt     Ulcerative colitis Family        Meds/Allergies       Current Outpatient Prescriptions:     albuterol (ACCUNEB) 1 25 MG/3ML nebulizer solution    cholecalciferol (VITAMIN D3) 1,000 units tablet    cholestyramine sugar free (QUESTRAN LIGHT) 4 g packet    ciprofloxacin (CIPRO) 500 mg tablet    hyoscyamine (ANASPAZ,LEVSIN) 0 125 MG tablet    phenazopyridine (PYRIDIUM) 200 mg tablet    [START ON 12/26/2018] predniSONE 20 mg tablet    tacrolimus (PROTOPIC) 0 03 % ointment    zolpidem (AMBIEN) 10 mg tablet    levothyroxine 25 mcg tablet    predniSONE 10 mg tablet    No Known Allergies    Objective     Blood pressure 122/78, pulse 64, temperature 98 °F (36 7 °C), temperature source Tympanic, weight 68 3 kg (150 lb 9 6 oz)  Body mass index is 26 68 kg/m²  PHYSICAL EXAM:      General Appearance:   Alert, cooperative, no distress   HEENT:   Normocephalic, atraumatic, anicteric      Neck:  Supple, symmetrical, trachea midline   Lungs:   Clear to auscultation bilaterally; no rales, rhonchi or wheezing; respirations unlabored    Heart[de-identified]   Regular rate and rhythm; no murmur, rub, or gallop     Abdomen:   Soft, non-tender, non-distended; normal bowel sounds; no masses, no organomegaly    Genitalia:   Deferred    Rectal:   Deferred    Extremities:  No cyanosis, clubbing or edema  Pulses:  2+ and symmetric    Skin:  No jaundice, rashes, or lesions    Lymph nodes:  No palpable cervical lymphadenopathy        Lab Results:   No visits with results within 1 Day(s) from this visit  Latest known visit with results is:   Office Visit on 12/20/2018   Component Date Value    LEUKOCYTE ESTERASE,UA 12/20/2018 moderate     NITRITE,UA 12/20/2018 negative     SL AMB POCT UROBILINOGEN 12/20/2018 0 2     POCT URINE PROTEIN 12/20/2018 300      PH,UA 12/20/2018 6 5     BLOOD,UA 12/20/2018 large     SPECIFIC GRAVITY,UA 12/20/2018 1 020     KETONES,UA 12/20/2018 negative     BILIRUBIN,UA 12/20/2018 negative     GLUCOSE, UA 12/20/2018 negative      COLOR,UA 12/20/2018 bloody     CLARITY,UA 12/20/2018 cloudy     Urine Culture 12/20/2018 80,000-89,000 cfu/ml Klebsiella pneumoniae*     Radiology Results:   No results found

## 2018-12-26 ENCOUNTER — TELEPHONE (OUTPATIENT)
Dept: FAMILY MEDICINE CLINIC | Facility: CLINIC | Age: 53
End: 2018-12-26

## 2018-12-26 NOTE — TELEPHONE ENCOUNTER
Pt wants to have another UA and Urine culture tomorrow  She still has systoms  [id number]    She is very concerned that she is not getting better  Going to [address]    Do you want her to come here today for a dip  Her dr not here today

## 2018-12-27 ENCOUNTER — TELEPHONE (OUTPATIENT)
Dept: GASTROENTEROLOGY | Facility: AMBULARY SURGERY CENTER | Age: 53
End: 2018-12-27

## 2018-12-27 ENCOUNTER — APPOINTMENT (OUTPATIENT)
Dept: LAB | Facility: HOSPITAL | Age: 53
End: 2018-12-27
Payer: COMMERCIAL

## 2018-12-27 ENCOUNTER — TELEPHONE (OUTPATIENT)
Dept: FAMILY MEDICINE CLINIC | Facility: CLINIC | Age: 53
End: 2018-12-27

## 2018-12-27 DIAGNOSIS — K51.011 ULCERATIVE PANCOLITIS WITH RECTAL BLEEDING (HCC): ICD-10-CM

## 2018-12-27 DIAGNOSIS — K51.011 ULCERATIVE PANCOLITIS WITH RECTAL BLEEDING (HCC): Primary | ICD-10-CM

## 2018-12-27 DIAGNOSIS — R31.9 URINARY TRACT INFECTION WITH HEMATURIA, SITE UNSPECIFIED: ICD-10-CM

## 2018-12-27 DIAGNOSIS — N39.0 URINARY TRACT INFECTION WITH HEMATURIA, SITE UNSPECIFIED: Primary | ICD-10-CM

## 2018-12-27 DIAGNOSIS — N39.0 URINARY TRACT INFECTION WITH HEMATURIA, SITE UNSPECIFIED: ICD-10-CM

## 2018-12-27 DIAGNOSIS — R31.9 URINARY TRACT INFECTION WITH HEMATURIA, SITE UNSPECIFIED: Primary | ICD-10-CM

## 2018-12-27 LAB
25(OH)D3 SERPL-MCNC: 26 NG/ML (ref 30–100)
ALBUMIN SERPL BCP-MCNC: 2.8 G/DL (ref 3.5–5)
ALP SERPL-CCNC: 68 U/L (ref 46–116)
ALT SERPL W P-5'-P-CCNC: 14 U/L (ref 12–78)
ANION GAP SERPL CALCULATED.3IONS-SCNC: 10 MMOL/L (ref 4–13)
AST SERPL W P-5'-P-CCNC: 8 U/L (ref 5–45)
BASOPHILS # BLD AUTO: 0.02 THOUSANDS/ΜL (ref 0–0.1)
BASOPHILS NFR BLD AUTO: 0 % (ref 0–1)
BILIRUB SERPL-MCNC: 0.2 MG/DL (ref 0.2–1)
BILIRUB UR QL STRIP: NEGATIVE
BUN SERPL-MCNC: 10 MG/DL (ref 5–25)
CALCIUM SERPL-MCNC: 9.6 MG/DL (ref 8.3–10.1)
CHLORIDE SERPL-SCNC: 104 MMOL/L (ref 100–108)
CLARITY UR: CLEAR
CO2 SERPL-SCNC: 27 MMOL/L (ref 21–32)
COLOR UR: YELLOW
CREAT SERPL-MCNC: 0.87 MG/DL (ref 0.6–1.3)
CRP SERPL QL: 15.8 MG/L
EOSINOPHIL # BLD AUTO: 0.1 THOUSAND/ΜL (ref 0–0.61)
EOSINOPHIL NFR BLD AUTO: 1 % (ref 0–6)
ERYTHROCYTE [DISTWIDTH] IN BLOOD BY AUTOMATED COUNT: 14.8 % (ref 11.6–15.1)
FERRITIN SERPL-MCNC: 21 NG/ML (ref 8–388)
GFR SERPL CREATININE-BSD FRML MDRD: 76 ML/MIN/1.73SQ M
GLUCOSE P FAST SERPL-MCNC: 87 MG/DL (ref 65–99)
GLUCOSE UR STRIP-MCNC: NEGATIVE MG/DL
HCT VFR BLD AUTO: 33 % (ref 34.8–46.1)
HGB BLD-MCNC: 9.9 G/DL (ref 11.5–15.4)
HGB UR QL STRIP.AUTO: NEGATIVE
IMM GRANULOCYTES # BLD AUTO: 0.06 THOUSAND/UL (ref 0–0.2)
IMM GRANULOCYTES NFR BLD AUTO: 0 % (ref 0–2)
IRON SATN MFR SERPL: 11 %
IRON SERPL-MCNC: 26 UG/DL (ref 50–170)
KETONES UR STRIP-MCNC: NEGATIVE MG/DL
LEUKOCYTE ESTERASE UR QL STRIP: NEGATIVE
LYMPHOCYTES # BLD AUTO: 2.46 THOUSANDS/ΜL (ref 0.6–4.47)
LYMPHOCYTES NFR BLD AUTO: 17 % (ref 14–44)
MCH RBC QN AUTO: 27.3 PG (ref 26.8–34.3)
MCHC RBC AUTO-ENTMCNC: 30 G/DL (ref 31.4–37.4)
MCV RBC AUTO: 91 FL (ref 82–98)
MONOCYTES # BLD AUTO: 0.97 THOUSAND/ΜL (ref 0.17–1.22)
MONOCYTES NFR BLD AUTO: 7 % (ref 4–12)
NEUTROPHILS # BLD AUTO: 10.84 THOUSANDS/ΜL (ref 1.85–7.62)
NEUTS SEG NFR BLD AUTO: 75 % (ref 43–75)
NITRITE UR QL STRIP: NEGATIVE
PH UR STRIP.AUTO: 6.5 [PH] (ref 4.5–8)
PLATELET # BLD AUTO: 530 THOUSANDS/UL (ref 149–390)
PMV BLD AUTO: 9.1 FL (ref 8.9–12.7)
POTASSIUM SERPL-SCNC: 3.3 MMOL/L (ref 3.5–5.3)
PROT SERPL-MCNC: 6.3 G/DL (ref 6.4–8.2)
PROT UR STRIP-MCNC: NEGATIVE MG/DL
RBC # BLD AUTO: 3.62 MILLION/UL (ref 3.81–5.12)
SODIUM SERPL-SCNC: 141 MMOL/L (ref 136–145)
SP GR UR STRIP.AUTO: <=1.005 (ref 1–1.03)
TIBC SERPL-MCNC: 231 UG/DL (ref 250–450)
UROBILINOGEN UR QL STRIP.AUTO: 0.2 E.U./DL
WBC # BLD AUTO: 14.45 THOUSAND/UL (ref 4.31–10.16)

## 2018-12-27 PROCEDURE — 86140 C-REACTIVE PROTEIN: CPT

## 2018-12-27 PROCEDURE — 82306 VITAMIN D 25 HYDROXY: CPT

## 2018-12-27 PROCEDURE — 83550 IRON BINDING TEST: CPT

## 2018-12-27 PROCEDURE — 80053 COMPREHEN METABOLIC PANEL: CPT

## 2018-12-27 PROCEDURE — 82728 ASSAY OF FERRITIN: CPT

## 2018-12-27 PROCEDURE — 85025 COMPLETE CBC W/AUTO DIFF WBC: CPT

## 2018-12-27 PROCEDURE — 36415 COLL VENOUS BLD VENIPUNCTURE: CPT

## 2018-12-27 PROCEDURE — 83540 ASSAY OF IRON: CPT

## 2018-12-27 PROCEDURE — 81003 URINALYSIS AUTO W/O SCOPE: CPT

## 2018-12-27 RX ORDER — PREDNISONE 10 MG/1
TABLET ORAL
Qty: 120 TABLET | Refills: 1 | Status: SHIPPED | OUTPATIENT
Start: 2018-12-27 | End: 2019-01-17 | Stop reason: HOSPADM

## 2018-12-27 NOTE — TELEPHONE ENCOUNTER
DR ELIAS'S PT    Pt called requesting prednisone 20mg as discussed during the ov  Pt stated the mail order pharmacy didn't received the order

## 2018-12-27 NOTE — TELEPHONE ENCOUNTER
Rx was printed & not sent  Resent for 10mg tabs as easier to break  Discussed with pt    Charmayne Duster

## 2019-01-03 ENCOUNTER — TELEPHONE (OUTPATIENT)
Dept: GASTROENTEROLOGY | Facility: AMBULARY SURGERY CENTER | Age: 54
End: 2019-01-03

## 2019-01-03 DIAGNOSIS — D50.9 IRON DEFICIENCY ANEMIA, UNSPECIFIED IRON DEFICIENCY ANEMIA TYPE: Primary | ICD-10-CM

## 2019-01-03 DIAGNOSIS — E55.9 VITAMIN D DEFICIENCY: ICD-10-CM

## 2019-01-03 NOTE — TELEPHONE ENCOUNTER
Patient aware of lab results  She is requesting advice on what type of iron supplement she should be using with colitis dx  She is taking Vit D 3000  Can she take multivitamin with elevated calcium issues?

## 2019-01-03 NOTE — TELEPHONE ENCOUNTER
----- Message from Kera Mack DO sent at 1/3/2019  2:31 PM EST -----  Please let patient know that labs were improving, good news  Her C reactive protein has decreased from almost 40 to 15  Her albumin is increasing and now it is 2 8  Around 4 is normal so we will get there once the inflammation in the colon improves  She is still iron deficient but this is also improving  She is still vitamin-D deficient  She should be on iron and vitamin-D supplementation  I should see her in the office as previously scheduled soon  Please let me know if she has any questions  She is a nurse at [employer]

## 2019-01-04 RX ORDER — FERROUS SULFATE TAB EC 324 MG (65 MG FE EQUIVALENT) 324 (65 FE) MG
324 TABLET DELAYED RESPONSE ORAL
Qty: 90 TABLET | Refills: 3 | Status: SHIPPED | OUTPATIENT
Start: 2019-01-04 | End: 2021-07-16

## 2019-01-04 NOTE — TELEPHONE ENCOUNTER
Patient aware Vitamin D and Iron supplement set to pharmacy  She has follow up visit scheduled for February

## 2019-01-04 NOTE — TELEPHONE ENCOUNTER
Please let patient know that I will call in her iron supplementation and a larger dose of vitamin D which is taken once weekly for 3 months  The iron supplementation she should start once a day for a week then twice a day for a week for that third week she should gradually increase to 3 times a day that 3rd week  I will see her in the office in follow-up soon

## 2019-01-07 ENCOUNTER — TELEPHONE (OUTPATIENT)
Dept: GASTROENTEROLOGY | Facility: CLINIC | Age: 54
End: 2019-01-07

## 2019-01-07 NOTE — TELEPHONE ENCOUNTER
chaput patient      She is in the process of weaning off the prednisone but would like to stay at 40 mg for now due to her symptoms  Just wanted to make you aware

## 2019-01-07 NOTE — TELEPHONE ENCOUNTER
Spoke with Eryn Villafana and relayed the response from Dr Rocco Mitchell  She will continue on 40mg prednisone for now  She has an upcoming infusion of Entyvio later this month and a follow up appointment scheduled in early February 2019  She will continue to monitor her symptoms and call with any changes      Wilber Grider PA-C

## 2019-01-09 ENCOUNTER — APPOINTMENT (OUTPATIENT)
Dept: LAB | Facility: HOSPITAL | Age: 54
End: 2019-01-09
Payer: COMMERCIAL

## 2019-01-09 DIAGNOSIS — K51.011 ULCERATIVE PANCOLITIS WITH RECTAL BLEEDING (HCC): ICD-10-CM

## 2019-01-09 DIAGNOSIS — K51.011 ULCERATIVE PANCOLITIS WITH RECTAL BLEEDING (HCC): Primary | ICD-10-CM

## 2019-01-09 LAB
ALBUMIN SERPL BCP-MCNC: 2.9 G/DL (ref 3.5–5)
ALP SERPL-CCNC: 74 U/L (ref 46–116)
ALT SERPL W P-5'-P-CCNC: 13 U/L (ref 12–78)
ANION GAP SERPL CALCULATED.3IONS-SCNC: 9 MMOL/L (ref 4–13)
AST SERPL W P-5'-P-CCNC: 10 U/L (ref 5–45)
BASOPHILS # BLD AUTO: 0.01 THOUSANDS/ΜL (ref 0–0.1)
BASOPHILS NFR BLD AUTO: 0 % (ref 0–1)
BILIRUB SERPL-MCNC: 0.3 MG/DL (ref 0.2–1)
BUN SERPL-MCNC: 9 MG/DL (ref 5–25)
CALCIUM ALBUM COR SERPL-MCNC: 11.1 MG/DL (ref 8.3–10.1)
CALCIUM SERPL-MCNC: 10.2 MG/DL (ref 8.3–10.1)
CHLORIDE SERPL-SCNC: 99 MMOL/L (ref 100–108)
CO2 SERPL-SCNC: 30 MMOL/L (ref 21–32)
CREAT SERPL-MCNC: 0.88 MG/DL (ref 0.6–1.3)
CRP SERPL QL: >90 MG/L
EOSINOPHIL # BLD AUTO: 0.04 THOUSAND/ΜL (ref 0–0.61)
EOSINOPHIL NFR BLD AUTO: 0 % (ref 0–6)
ERYTHROCYTE [DISTWIDTH] IN BLOOD BY AUTOMATED COUNT: 13.8 % (ref 11.6–15.1)
ERYTHROCYTE [SEDIMENTATION RATE] IN BLOOD: 52 MM/HOUR (ref 0–15)
GFR SERPL CREATININE-BSD FRML MDRD: 75 ML/MIN/1.73SQ M
GLUCOSE SERPL-MCNC: 148 MG/DL (ref 65–140)
HCT VFR BLD AUTO: 35.1 % (ref 34.8–46.1)
HGB BLD-MCNC: 10.7 G/DL (ref 11.5–15.4)
IMM GRANULOCYTES # BLD AUTO: 0.08 THOUSAND/UL (ref 0–0.2)
IMM GRANULOCYTES NFR BLD AUTO: 1 % (ref 0–2)
LYMPHOCYTES # BLD AUTO: 1.05 THOUSANDS/ΜL (ref 0.6–4.47)
LYMPHOCYTES NFR BLD AUTO: 10 % (ref 14–44)
MCH RBC QN AUTO: 27.4 PG (ref 26.8–34.3)
MCHC RBC AUTO-ENTMCNC: 30.5 G/DL (ref 31.4–37.4)
MCV RBC AUTO: 90 FL (ref 82–98)
MONOCYTES # BLD AUTO: 0.26 THOUSAND/ΜL (ref 0.17–1.22)
MONOCYTES NFR BLD AUTO: 2 % (ref 4–12)
NEUTROPHILS # BLD AUTO: 9.56 THOUSANDS/ΜL (ref 1.85–7.62)
NEUTS SEG NFR BLD AUTO: 87 % (ref 43–75)
NRBC BLD AUTO-RTO: 0 /100 WBCS
PLATELET # BLD AUTO: 550 THOUSANDS/UL (ref 149–390)
PMV BLD AUTO: 9.3 FL (ref 8.9–12.7)
POTASSIUM SERPL-SCNC: 3.4 MMOL/L (ref 3.5–5.3)
PROT SERPL-MCNC: 7.1 G/DL (ref 6.4–8.2)
RBC # BLD AUTO: 3.9 MILLION/UL (ref 3.81–5.12)
SODIUM SERPL-SCNC: 138 MMOL/L (ref 136–145)
WBC # BLD AUTO: 11 THOUSAND/UL (ref 4.31–10.16)

## 2019-01-09 PROCEDURE — 80053 COMPREHEN METABOLIC PANEL: CPT

## 2019-01-09 PROCEDURE — 86140 C-REACTIVE PROTEIN: CPT

## 2019-01-09 PROCEDURE — 36415 COLL VENOUS BLD VENIPUNCTURE: CPT

## 2019-01-09 PROCEDURE — 85025 COMPLETE CBC W/AUTO DIFF WBC: CPT

## 2019-01-09 PROCEDURE — 85652 RBC SED RATE AUTOMATED: CPT

## 2019-01-09 PROCEDURE — 83993 ASSAY FOR CALPROTECTIN FECAL: CPT

## 2019-01-09 PROCEDURE — 87493 C DIFF AMPLIFIED PROBE: CPT

## 2019-01-09 RX ORDER — DIPHENOXYLATE HYDROCHLORIDE AND ATROPINE SULFATE 2.5; .025 MG/1; MG/1
1 TABLET ORAL 4 TIMES DAILY PRN
Qty: 120 TABLET | Refills: 3 | Status: SHIPPED | OUTPATIENT
Start: 2019-01-09 | End: 2019-01-17 | Stop reason: HOSPADM

## 2019-01-09 NOTE — TELEPHONE ENCOUNTER
I spoke with Dr Pimentel Poster  She can go up to prednisone 60mg & we'll await lab results  LM for pt    Susan Beckford

## 2019-01-09 NOTE — TELEPHONE ENCOUNTER
Pt called back in, she states last night her symptoms worsened - she had liquid stools and today she has 100 3 temperature  Please return her call   [phone]

## 2019-01-09 NOTE — TELEPHONE ENCOUNTER
Spoke with Army Grate  States she is having frequent diarrhea >6 episodes with bleeding despite taking max dose imodium  Lots of abdominal pain  Still on 40mg prednisone  Low grade temp 100 3 last night  Due for next [facility] 1/18  Recommended checking labs & c diff  Also added lomotil  I told her if things continue/ get worse she may need to go back to hospital  She will get labs today  Do you want to increase prednisone?  She states she was on 60mg on d/c from hospital   [address] [place] [address]

## 2019-01-10 LAB — C DIFF TOX GENS STL QL NAA+PROBE: NORMAL

## 2019-01-11 ENCOUNTER — HOSPITAL ENCOUNTER (INPATIENT)
Facility: HOSPITAL | Age: 54
LOS: 6 days | Discharge: HOME | DRG: 386 | End: 2019-01-17
Attending: INTERNAL MEDICINE | Admitting: INTERNAL MEDICINE
Payer: COMMERCIAL

## 2019-01-11 DIAGNOSIS — K51.911 ULCERATIVE COLITIS WITH RECTAL BLEEDING (HCC): ICD-10-CM

## 2019-01-11 DIAGNOSIS — K51.011 ULCERATIVE PANCOLITIS WITH RECTAL BLEEDING (HCC): Primary | ICD-10-CM

## 2019-01-11 PROCEDURE — 99223 1ST HOSP IP/OBS HIGH 75: CPT | Performed by: PHYSICIAN ASSISTANT

## 2019-01-11 RX ORDER — CHOLESTYRAMINE LIGHT 4 G/5.7G
4 POWDER, FOR SUSPENSION ORAL DAILY
Status: DISCONTINUED | OUTPATIENT
Start: 2019-01-12 | End: 2019-01-13

## 2019-01-11 RX ORDER — METHYLPREDNISOLONE SODIUM SUCCINATE 40 MG/ML
20 INJECTION, POWDER, LYOPHILIZED, FOR SOLUTION INTRAMUSCULAR; INTRAVENOUS EVERY 8 HOURS SCHEDULED
Status: DISCONTINUED | OUTPATIENT
Start: 2019-01-11 | End: 2019-01-14

## 2019-01-11 RX ORDER — MELATONIN
3000 DAILY
Status: DISCONTINUED | OUTPATIENT
Start: 2019-01-12 | End: 2019-01-17 | Stop reason: HOSPADM

## 2019-01-11 RX ORDER — ZOLPIDEM TARTRATE 5 MG/1
10 TABLET ORAL ONCE
Status: COMPLETED | OUTPATIENT
Start: 2019-01-11 | End: 2019-01-11

## 2019-01-11 RX ORDER — POTASSIUM CHLORIDE 29.8 MG/ML
40 INJECTION INTRAVENOUS ONCE
Status: DISCONTINUED | OUTPATIENT
Start: 2019-01-11 | End: 2019-01-11 | Stop reason: SDUPTHER

## 2019-01-11 RX ORDER — LEVOTHYROXINE SODIUM 0.03 MG/1
25 TABLET ORAL
Status: DISCONTINUED | OUTPATIENT
Start: 2019-01-12 | End: 2019-01-17 | Stop reason: HOSPADM

## 2019-01-11 RX ORDER — TACROLIMUS 0.3 MG/G
OINTMENT TOPICAL 2 TIMES DAILY
Status: DISCONTINUED | OUTPATIENT
Start: 2019-01-11 | End: 2019-01-12

## 2019-01-11 RX ORDER — ALBUTEROL SULFATE 2.5 MG/3ML
1.25 SOLUTION RESPIRATORY (INHALATION) EVERY 6 HOURS PRN
Status: DISCONTINUED | OUTPATIENT
Start: 2019-01-11 | End: 2019-01-17 | Stop reason: HOSPADM

## 2019-01-11 RX ORDER — ACETAMINOPHEN 325 MG/1
650 TABLET ORAL EVERY 6 HOURS PRN
Status: DISCONTINUED | OUTPATIENT
Start: 2019-01-11 | End: 2019-01-17 | Stop reason: HOSPADM

## 2019-01-11 RX ORDER — SODIUM CHLORIDE 9 MG/ML
50 INJECTION, SOLUTION INTRAVENOUS CONTINUOUS
Status: DISCONTINUED | OUTPATIENT
Start: 2019-01-11 | End: 2019-01-16

## 2019-01-11 RX ORDER — ONDANSETRON 2 MG/ML
4 INJECTION INTRAMUSCULAR; INTRAVENOUS EVERY 6 HOURS PRN
Status: DISCONTINUED | OUTPATIENT
Start: 2019-01-11 | End: 2019-01-17 | Stop reason: HOSPADM

## 2019-01-11 RX ORDER — DIPHENOXYLATE HYDROCHLORIDE AND ATROPINE SULFATE 2.5; .025 MG/1; MG/1
1 TABLET ORAL 4 TIMES DAILY PRN
Status: DISCONTINUED | OUTPATIENT
Start: 2019-01-11 | End: 2019-01-12

## 2019-01-11 RX ORDER — POTASSIUM CHLORIDE 14.9 MG/ML
20 INJECTION INTRAVENOUS ONCE
Status: COMPLETED | OUTPATIENT
Start: 2019-01-12 | End: 2019-01-12

## 2019-01-11 RX ORDER — FERROUS SULFATE 325(65) MG
325 TABLET ORAL
Status: DISCONTINUED | OUTPATIENT
Start: 2019-01-12 | End: 2019-01-17 | Stop reason: HOSPADM

## 2019-01-11 RX ORDER — HYOSCYAMINE SULFATE 0.12 MG/ML
0.12 LIQUID ORAL EVERY 4 HOURS PRN
Status: DISCONTINUED | OUTPATIENT
Start: 2019-01-11 | End: 2019-01-17 | Stop reason: HOSPADM

## 2019-01-11 RX ORDER — PHENAZOPYRIDINE HYDROCHLORIDE 100 MG/1
200 TABLET, FILM COATED ORAL 3 TIMES DAILY PRN
Status: DISCONTINUED | OUTPATIENT
Start: 2019-01-11 | End: 2019-01-17 | Stop reason: HOSPADM

## 2019-01-11 RX ORDER — POTASSIUM CHLORIDE 14.9 MG/ML
20 INJECTION INTRAVENOUS ONCE
Status: COMPLETED | OUTPATIENT
Start: 2019-01-11 | End: 2019-01-12

## 2019-01-11 RX ADMIN — DIPHENOXYLATE HYDROCHLORIDE AND ATROPINE SULFATE 1 TABLET: 2.5; .025 TABLET ORAL at 22:12

## 2019-01-11 RX ADMIN — SODIUM CHLORIDE 125 ML/HR: 0.9 INJECTION, SOLUTION INTRAVENOUS at 21:56

## 2019-01-11 RX ADMIN — METHYLPREDNISOLONE SODIUM SUCCINATE 20 MG: 40 INJECTION, POWDER, FOR SOLUTION INTRAMUSCULAR; INTRAVENOUS at 22:12

## 2019-01-11 RX ADMIN — ZOLPIDEM TARTRATE 10 MG: 5 TABLET, COATED ORAL at 22:12

## 2019-01-11 NOTE — TELEPHONE ENCOUNTER
Pt called again wanting to know the status of Dr Barbee Meckel being able to call her back today   Pt would now like to speak to a PA

## 2019-01-11 NOTE — LETTER
[address] [place] [id number]  Dept: [phone]    January 17, 2019     Patient: Alexy Benavides   YOB: 1965   Date of Visit: 1/11/2019       To Whom it May Concern:    Kaye Galvez is under my professional care  She was seen in the hospital from 1/11/2019   to 01/17/19  She may return to work on 2/5/19  If you have any questions or concerns, please don't hesitate to call           Sincerely,          Cole Mcgowan MD

## 2019-01-11 NOTE — TELEPHONE ENCOUNTER
I called her and so did Dr Jorge L Weaver, we are working on a direct admit for her  She is likely dehydrated and needs IVF as well as solumedrol 20mg Q8, her recent C  Diff was negative so it is ok to start it once she is admitted

## 2019-01-12 PROBLEM — D50.9 IRON DEFICIENCY ANEMIA: Status: ACTIVE | Noted: 2019-01-12

## 2019-01-12 LAB
ANION GAP SERPL CALCULATED.3IONS-SCNC: 6 MMOL/L (ref 4–13)
BUN SERPL-MCNC: 5 MG/DL (ref 5–25)
CALCIUM SERPL-MCNC: 9.9 MG/DL (ref 8.3–10.1)
CHLORIDE SERPL-SCNC: 106 MMOL/L (ref 100–108)
CO2 SERPL-SCNC: 28 MMOL/L (ref 21–32)
CREAT SERPL-MCNC: 0.72 MG/DL (ref 0.6–1.3)
CRP SERPL QL: 70.3 MG/L
ERYTHROCYTE [DISTWIDTH] IN BLOOD BY AUTOMATED COUNT: 13.5 % (ref 11.6–15.1)
ERYTHROCYTE [SEDIMENTATION RATE] IN BLOOD: 46 MM/HOUR (ref 0–20)
GFR SERPL CREATININE-BSD FRML MDRD: 96 ML/MIN/1.73SQ M
GLUCOSE SERPL-MCNC: 154 MG/DL (ref 65–140)
HCT VFR BLD AUTO: 29.4 % (ref 34.8–46.1)
HGB BLD-MCNC: 8.9 G/DL (ref 11.5–15.4)
MAGNESIUM SERPL-MCNC: 1.9 MG/DL (ref 1.6–2.6)
MCH RBC QN AUTO: 26.6 PG (ref 26.8–34.3)
MCHC RBC AUTO-ENTMCNC: 30.3 G/DL (ref 31.4–37.4)
MCV RBC AUTO: 88 FL (ref 82–98)
PLATELET # BLD AUTO: 471 THOUSANDS/UL (ref 149–390)
PMV BLD AUTO: 8.7 FL (ref 8.9–12.7)
POTASSIUM SERPL-SCNC: 4.6 MMOL/L (ref 3.5–5.3)
RBC # BLD AUTO: 3.34 MILLION/UL (ref 3.81–5.12)
SODIUM SERPL-SCNC: 140 MMOL/L (ref 136–145)
WBC # BLD AUTO: 10.64 THOUSAND/UL (ref 4.31–10.16)

## 2019-01-12 PROCEDURE — 86140 C-REACTIVE PROTEIN: CPT | Performed by: PHYSICIAN ASSISTANT

## 2019-01-12 PROCEDURE — 85652 RBC SED RATE AUTOMATED: CPT | Performed by: PHYSICIAN ASSISTANT

## 2019-01-12 PROCEDURE — 85027 COMPLETE CBC AUTOMATED: CPT | Performed by: PHYSICIAN ASSISTANT

## 2019-01-12 PROCEDURE — 80048 BASIC METABOLIC PNL TOTAL CA: CPT | Performed by: PHYSICIAN ASSISTANT

## 2019-01-12 PROCEDURE — 99254 IP/OBS CNSLTJ NEW/EST MOD 60: CPT | Performed by: INTERNAL MEDICINE

## 2019-01-12 PROCEDURE — 99232 SBSQ HOSP IP/OBS MODERATE 35: CPT | Performed by: INTERNAL MEDICINE

## 2019-01-12 PROCEDURE — 83735 ASSAY OF MAGNESIUM: CPT | Performed by: PHYSICIAN ASSISTANT

## 2019-01-12 RX ORDER — ZOLPIDEM TARTRATE 5 MG/1
10 TABLET ORAL
Status: DISCONTINUED | OUTPATIENT
Start: 2019-01-12 | End: 2019-01-17 | Stop reason: HOSPADM

## 2019-01-12 RX ADMIN — POTASSIUM CHLORIDE 20 MEQ: 14.9 INJECTION, SOLUTION INTRAVENOUS at 03:42

## 2019-01-12 RX ADMIN — VITAMIN D, TAB 1000IU (100/BT) 3000 UNITS: 25 TAB at 09:35

## 2019-01-12 RX ADMIN — FERROUS SULFATE TAB 325 MG (65 MG ELEMENTAL FE) 325 MG: 325 (65 FE) TAB at 16:32

## 2019-01-12 RX ADMIN — SODIUM CHLORIDE 125 ML/HR: 0.9 INJECTION, SOLUTION INTRAVENOUS at 03:58

## 2019-01-12 RX ADMIN — METHYLPREDNISOLONE SODIUM SUCCINATE 20 MG: 40 INJECTION, POWDER, FOR SOLUTION INTRAMUSCULAR; INTRAVENOUS at 21:59

## 2019-01-12 RX ADMIN — POTASSIUM CHLORIDE 20 MEQ: 14.9 INJECTION, SOLUTION INTRAVENOUS at 00:20

## 2019-01-12 RX ADMIN — SODIUM CHLORIDE 125 ML/HR: 0.9 INJECTION, SOLUTION INTRAVENOUS at 12:02

## 2019-01-12 RX ADMIN — HYOSCYAMINE SULFATE 0.12 MG: 0.12 SOLUTION/ DROPS ORAL at 14:34

## 2019-01-12 RX ADMIN — DIPHENOXYLATE HYDROCHLORIDE AND ATROPINE SULFATE 1 TABLET: 2.5; .025 TABLET ORAL at 09:29

## 2019-01-12 RX ADMIN — METHYLPREDNISOLONE SODIUM SUCCINATE 20 MG: 40 INJECTION, POWDER, FOR SOLUTION INTRAMUSCULAR; INTRAVENOUS at 05:40

## 2019-01-12 RX ADMIN — METHYLPREDNISOLONE SODIUM SUCCINATE 20 MG: 40 INJECTION, POWDER, FOR SOLUTION INTRAMUSCULAR; INTRAVENOUS at 14:36

## 2019-01-12 RX ADMIN — ZOLPIDEM TARTRATE 10 MG: 5 TABLET, COATED ORAL at 22:14

## 2019-01-12 RX ADMIN — HYOSCYAMINE SULFATE 0.12 MG: 0.12 SOLUTION/ DROPS ORAL at 18:38

## 2019-01-12 RX ADMIN — SODIUM CHLORIDE 125 ML/HR: 0.9 INJECTION, SOLUTION INTRAVENOUS at 19:49

## 2019-01-12 RX ADMIN — HYOSCYAMINE SULFATE 0.12 MG: 0.12 SOLUTION/ DROPS ORAL at 09:29

## 2019-01-12 RX ADMIN — LEVOTHYROXINE SODIUM 25 MCG: 25 TABLET ORAL at 05:41

## 2019-01-12 NOTE — CONSULTS
Consultation - [facility]  Lisa Nunn 48 y o  female MRN: [id number]  Unit/Bed#: -01 Encounter: [id number]         Reason for Consult / Principal Problem:  Ulcerative colitis, patient of Dr Larisa Mason    HPI:  Beatriz Dominguez is a 63-year-old female with history of ulcerative colitis currently on Entyvio, mild intermittent asthma, hypothyroidism, iron deficiency anemia, psoriatic arthritis and Raynaud's  Patient was instructed by Dr Sheela Wu to be seen in the emergency room for persistent abdominal pain, hematochezia and lack of improvement in CRP  Patient has been on a long prednisone taper  Because the patient was not improving on this taper, Dr Sheela Wu instructed the patient to go back up to 60 mg of prednisone  Despite this, the patient was experiencing fever and night sweats  She continued to have numerous bowel movements, and is unable to quantify how many times  She reports having alternating hematochezia  As an outpatient, the patient had a negative C diff  Her CRP was 90  On repeat today, now down to 70  On admission, patient had a white blood cell count of 10k  Hemoglobin 8 9  In the past, the patient developed antibodies to Humira  She recently started [facility], and has only had 2 infusions  She was due for her 6 week loading dose on 1/18  Patient's last colonoscopy was in November with Dr Kaye Landry as an inpatient  This revealed severe inflammatory changes extending from the rectum to the hepatic flexure  The mucosa appeared normal in the ascending colon  The cecum could not be evaluated secondary to stool  Biopsies at that time did not show CMV, and were consistent with chronic active colitis with cryptitis and crypt abscess formation  Review of Systems:    CONSTITUTIONAL: Denies any fever, chills, or rigors  Good appetite, and no recent weight loss  HEENT: No earache or tinnitus  Denies hearing loss or visual disturbances    CARDIOVASCULAR: No chest pain or palpitations  RESPIRATORY: Denies any cough, hemoptysis, shortness of breath or dyspnea on exertion  GASTROINTESTINAL: As noted in the History of Present Illness  GENITOURINARY: No problems with urination  Denies any hematuria or dysuria  NEUROLOGIC: No dizziness or vertigo, denies headaches  MUSCULOSKELETAL: Denies any muscle or joint pain  SKIN: Denies skin rashes or itching  ENDOCRINE: Denies excessive thirst  Denies intolerance to heat or cold  PSYCHOSOCIAL: Denies depression or anxiety  Denies any recent memory loss  Historical Information   Past Medical History:   Diagnosis Date    Adjustment disorder     last assessed 12    Anemia     HX of    Asthma     mild - intermittent    Bilateral leg edema     Blepharitis     last assessed 16    Carpal tunnel syndrome     Unspecified laterality    Colitis, acute     Dyspareunia in female     Edema     last assessed 06/22/15    Ganglion     Herniated cervical disc     History of transfusion     Hypokalemia     Hypothyroidism     Hypothyroidism     Insomnia     Lumps on the skin     last assessed 14    Mouth ulcers     last assessed 06/22/15    Nontraumatic tear of left tibialis posterior tendon     Traumatic teart     Polyarthritis     last assessed 16    Raynaud's disease     Rayna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 ([address]     Temporomandibular disorder     Joint    Thyroid disease     Ulcerative colitis ([address]     Ulcerative colitis ([address] )      Past Surgical History:   Procedure Laterality Date    ANKLE SURGERY Left     Tendon repair    CARPAL TUNNEL RELEASE Bilateral      SECTION, LOW TRANSVERSE      COLONOSCOPY      COLONOSCOPY N/A 2018    Procedure: COLONOSCOPY;  Surgeon: Erlinda Renteria MD;  [facility];   Service: Gastroenterology    HERNIA REPAIR      umbilical    HYSTERECTOMY      KNEE ARTHROSCOPY Right     LIPECTOMY      Multipe lipoma removals    LIPOMA RESECTION      [place] COLONOSCOPY FLX DX W/COLLJ SPEC WHEN PFRMD N/A 11/1/2016    Procedure: COLONOSCOPY;  Surgeon: Amish Taylor DO;  Location: [facility];   Service: Gastroenterology    [place] REPAIR FLEX LEG TENDON,SECOND,EA Left 8/12/2016    Procedure: REPAIR OF LEFT POSTERIOR TIBIAL TENDON WITH GRAFT, EXPLORATION LEFT ANKLE ;  Surgeon: Mundo Morse DPM;  [facility];  Service: Podiatry    SHOULDER SURGERY Left     bicep tendon and labrum repair    TONSILLECTOMY      TOOTH EXTRACTION  12/05/2018     Social History   History   Alcohol Use No     Comment: social 1 drink per weeek     History   Drug Use No     History   Smoking Status    Former Smoker    Quit date: 4/6/2003   Smokeless Tobacco    Never Used     Comment: Quit 1991, rare use for 2 years     Family History   Problem Relation Age of Onset    Parkinsonism Mother     Rheum arthritis Mother     Heart attack Father     Hypertension Father     Osteoporosis Father     Prostate cancer Father     Hashimoto's thyroiditis Sister     Cancer Paternal Grandfather         Penile    Prostate cancer Paternal Grandfather     Crohn's disease Family     Osteoarthritis Family     Rheum arthritis Family     Crohn's disease Other     Crohn's disease Maternal Uncle     Psoriasis Maternal Uncle     Ulcerative colitis Maternal Uncle     Rheum arthritis Maternal Aunt     Ulcerative colitis Family         Meds/Allergies     Current Facility-Administered Medications   Medication Dose Route Frequency    acetaminophen (TYLENOL) tablet 650 mg  650 mg Oral Q6H PRN    albuterol inhalation solution 1 25 mg  1 25 mg Nebulization Q6H PRN    cholecalciferol (VITAMIN D3) tablet 3,000 Units  3,000 Units Oral Daily    cholestyramine sugar free (QUESTRAN LIGHT) packet 4 g  4 g Oral Daily    diphenoxylate-atropine (LOMOTIL) 2 5-0 025 mg per tablet 1 tablet  1 tablet Oral 4x Daily PRN    ferrous sulfate tablet 325 mg  325 mg Oral TID With Meals    hyoscyamine (HYOSYNE) oral drops 0 125 mg  0 125 mg Oral Q4H PRN    levothyroxine tablet 25 mcg  25 mcg Oral Early Morning    methylPREDNISolone sodium succinate (Solu-MEDROL) injection 20 mg  20 mg Intravenous Q8H [facility]    ondansetron (ZOFRAN) injection 4 mg  4 mg Intravenous Q6H PRN    phenazopyridine (PYRIDIUM) tablet 200 mg  200 mg Oral TID PRN    sodium chloride 0 9 % infusion  125 mL/hr Intravenous Continuous       No Known Allergies      Objective     Blood pressure 117/58, pulse 55, temperature 98 3 °F (36 8 °C), temperature source Oral, resp  rate 18, height 5' 3" (1 6 m), weight 66 4 kg (146 lb 6 4 oz), SpO2 96 %  No intake or output data in the 24 hours ending 01/12/19 1119      PHYSICAL EXAM:      General Appearance:   Alert and oriented x 3  Cooperative, and in no respiratory distress   HEENT:   Normocephalic, atraumatic, anicteric      Neck:  Supple, symmetrical, trachea midline   Lungs:   Clear to auscultation bilaterally; no rales, rhonchi or wheezing; respirations unlabored    Heart[de-identified]   S1 and S2 normal; regular rate and rhythm; no murmur, rub, or gallop     Abdomen:   Soft, diffuse tenderness without guarding or rigidity, lower abdominal distension; normal bowel sounds; no masses, no organomegaly    Genitalia:   Deferred    Rectal:   Deferred    Extremities:  No cyanosis, clubbing or edema    Pulses:  2+ and symmetric all extremities    Skin:  Skin color, texture, turgor normal, no rashes or lesions    Lymph nodes:  No palpable cervical or supraclavicular lymphadenopathy        Lab Results:     Results from last 7 days  Lab Units 01/12/19  0511 01/09/19  1409   WBC Thousand/uL 10 64* 11 00*   HEMOGLOBIN g/dL 8 9* 10 7*   HEMATOCRIT % 29 4* 35 1   PLATELETS Thousands/uL 471* 550*   NEUTROS PCT %  --  87*   LYMPHS PCT %  --  10*   MONOS PCT %  --  2*   EOS PCT %  --  0       Results from last 7 days  Lab Units 01/12/19  0509 01/09/19  1409   POTASSIUM mmol/L 4 6 3 4*   CHLORIDE mmol/L 106 99*   CO2 mmol/L 28 30   BUN mg/dL 5 9   CREATININE mg/dL 0 72 0 88   CALCIUM mg/dL 9 9 10 2*   ALK PHOS U/L  --  74   ALT U/L  --  13   AST U/L  --  10             ASSESSMENT and PLAN:      1) Severe ulcerative colitis - Patient was recently switched to Rena Grooms as an outpatient as she developed antibodies to Humira  She was also on a long prednisone taper that she failed  Patient was instructed to increase her dosing up to 60 mg daily, however she continued to have severe symptoms that prompted her admission  Patient's CRP has improved from over 90 to 70  She continues to have numerous bowel movements  She had a negative C diff prior to admission  Dr Pimentel Poster is also aware that the patient is in the hospital   Remicade is being considered as an outpatient  Unfortunately, Rena Grooms does take longer to provide remission for patients as compared to Remicade  - Will continue IV Solu-Medrol 20 mg every 8 hours and monitor her response  - Bowel rest with clear liquids  - Levsin up to 3 times daily as needed  - Would avoid Lomotil or antidiarrheals in the setting of flare  - Serial abdominal exams  - May need to administer Remicade as an inpatient    The patient was seen and examined by Dr Romana Skye, all dos santos medical decisions were made with Dr Romana Skye  Thank you for allowing us to participate in the care of this pleasant patient  We will follow up with you closely

## 2019-01-12 NOTE — MALNUTRITION/BMI
This medical record reflects one or more clinical indicators suggestive of malnutrition and/or morbid obesity  Malnutrition Findings:   Malnutrition type: Chronic illness  Degree of Malnutrition: Malnutrition of mild degree  Malnutrition Characteristics: Fluid accumulation, Weight loss, Inadequate energy (+1 edema, food avoidance, lack of interest in food, 4 6% weight decrease  x <2 months)Currently treated with nutrition education     BMI Findings: Body mass index is 25 93 kg/m²  See Nutrition note dated 1/12/18 for additional details  Completed nutrition assessment is viewable in the nutrition documentation

## 2019-01-12 NOTE — PROGRESS NOTES
Progress Note - Renato Bob 1965, 48 y o  female MRN: [id number]    Unit/Bed#: -01 Encounter: [id number]    Primary Care Provider: Claudette Hopkins DO   Date and time admitted to hospital: 1/11/2019  8:02 PM        * Ulcerative colitis with rectal bleeding ([facility] [address] )   Assessment & Plan    · POA  Patient sent in by GI  Patient hospitalized in November for acute exacerbation  Was discharged on 11/25/18 on 60mg of prednisone (tapering down by 5mg Q2 weeks)  Patient made it do 35mg of prednisone, but began to feel worse (increased BMs, fatigue, dehydration, fevers, weight loss)  CRP elevated  Currently on Entyvio-- next dose is Friday next week  · Continue IV steroid  · Clear liquid diet for now  · IVF, replete electrolytes p r n  [place] · Continue lo-motil, hyosyne  · GI consult-- appreciate input:  Patient was on Humira before but developed antibodies  Patient received to induction doses of Entyvio  Patient has been on oral steroids without much symptomatic relief with inflammatory markers are significantly elevated  If no improvement, consider flexible sigmoidoscopy with biopsies for CMV  Will consider starting Remicade if no improvement  Monitor inflammatory markers, CBC on daily basis  Monitor abdominal exam closely  If worsening findings on abdominal exam, for KUB to assess for megacolon  Iron deficiency anemia   Assessment & Plan    · With acute blood loss anemia due to GI bleeding  · Serum iron is low and serum ferritin at low normal levels  · Ferrous sulfate  Asthma, mild intermittent   Assessment & Plan    · No wheezing on auscultation  · Continue PRN nebs     Hypothyroidism   Assessment & Plan    · Continue synthroid            VTE Pharmacologic Prophylaxis:   Pharmacologic: Pharmacologic VTE Prophylaxis contraindicated due to GI bleeding  Mechanical VTE Prophylaxis in Place: No patient is low risk      Patient Centered Rounds: I have performed bedside rounds with nursing staff today     Discussions with Specialists or Other Care Team Provider:  Case management  Advance practitioner for gastroenterologist     Education and Discussions with Family / Patient:  Patient and patient's family at bedside  I discussed our findings and plans  I answered questions and concerns  Time Spent for Care: 30 minutes  More than 50% of total time spent on counseling and coordination of care as described above  Current Length of Stay: 1 day(s)    Current Patient Status: Inpatient   Certification Statement: The patient will continue to require additional inpatient hospital stay due to Above findings and plans  Discharge Plan:  None yet  Code Status: Level 1 - Full Code      Subjective:   Patient does not feel well  Patient claims still having GI bleeding and abdominal pains  Otherwise no other complaints  No other pains  No shortness of breath  Objective:     Vitals:   Temp (24hrs), Av 6 °F (37 °C), Min:98 2 °F (36 8 °C), Max:99 2 °F (37 3 °C)    Temp:  [98 2 °F (36 8 °C)-99 2 °F (37 3 °C)] 99 2 °F (37 3 °C)  HR:  [55-72] 65  Resp:  [18] 18  BP: (111-138)/(56-74) 111/56  SpO2:  [96 %-99 %] 96 %  Body mass index is 25 93 kg/m²  Input and Output Summary (last 24 hours): Intake/Output Summary (Last 24 hours) at 19 1617  Last data filed at 19 1500   Gross per 24 hour   Intake             1740 ml   Output                0 ml   Net             1740 ml       Physical Exam:     Physical Exam   Constitutional: No distress  HENT:   Head: Normocephalic and atraumatic  Eyes: Right eye exhibits no discharge  Left eye exhibits no discharge  No scleral icterus  Neck: No JVD present  No tracheal deviation present  Cardiovascular: Normal rate, regular rhythm and normal heart sounds  Exam reveals no gallop and no friction rub  No murmur heard  Pulmonary/Chest: Effort normal and breath sounds normal  No stridor  No respiratory distress  She has no wheezes   She has no rales  Abdominal: Soft  Bowel sounds are normal  She exhibits no distension  There is tenderness  There is no rebound and no guarding  Positive for generalized tenderness   Musculoskeletal: She exhibits no edema, tenderness or deformity  Neurological: She is alert  No cranial nerve deficit  Skin: Skin is warm  No rash noted  She is not diaphoretic  No erythema  No pallor  Psychiatric: She has a normal mood and affect  Her behavior is normal  Thought content normal    Vitals reviewed  Additional Data:     Labs:      Results from last 7 days  Lab Units 01/12/19  0511 01/09/19  1409   WBC Thousand/uL 10 64* 11 00*   HEMOGLOBIN g/dL 8 9* 10 7*   HEMATOCRIT % 29 4* 35 1   PLATELETS Thousands/uL 471* 550*   NEUTROS PCT %  --  87*   LYMPHS PCT %  --  10*   MONOS PCT %  --  2*   EOS PCT %  --  0       Results from last 7 days  Lab Units 01/12/19  0509 01/09/19  1409   POTASSIUM mmol/L 4 6 3 4*   CHLORIDE mmol/L 106 99*   CO2 mmol/L 28 30   BUN mg/dL 5 9   CREATININE mg/dL 0 72 0 88   CALCIUM mg/dL 9 9 10 2*   ALK PHOS U/L  --  74   ALT U/L  --  13   AST U/L  --  10           * I Have Reviewed All Lab Data Listed Above  * Additional Pertinent Lab Tests Reviewed: [address] [facility] Admission Reviewed    Imaging:    Imaging Reports Reviewed Today Include:  Diagnostic imaging studies that were done on this admission  Imaging Personally Reviewed by Myself Includes:  None  Recent Cultures (last 7 days):       Results from last 7 days  Lab Units 01/09/19  1428   C DIFF TOXIN B  NEGATIVE for C difficle toxin by PCR          Last 24 Hours Medication List:     Current Facility-Administered Medications:  acetaminophen 650 mg Oral Q6H PRN Kathi Jones PA-C    albuterol 1 25 mg Nebulization Q6H PRN Kathi Jones PA-C    cholecalciferol 3,000 Units Oral Daily Kathi Jones PA-C    cholestyramine sugar free 4 g Oral Daily Kathi Jones PA-C    ferrous sulfate 325 mg Oral TID With Meals Kathi Jones PA-C    hyoscyamine 0 125 mg Oral Q4H PRN Kathi Jones PA-C    levothyroxine 25 mcg Oral Early Morning Kathi Jones PA-C    methylPREDNISolone sodium succinate 20 mg Intravenous Q8H [facility] Kathi Jones PA-C    ondansetron 4 mg Intravenous Q6H PRN Kathi Jones PA-C    phenazopyridine 200 mg Oral TID PRN Kathi Jones PA-C    sodium chloride 125 mL/hr Intravenous Continuous Kathi Jones PA-C Last Rate: 125 mL/hr (01/12/19 1202)        Today, Patient Was Seen By: Hector Ham MD    ** Please Note: Dragon 360 Dictation voice to text software may have been used in the creation of this document   **

## 2019-01-12 NOTE — ASSESSMENT & PLAN NOTE
· POA  Patient sent in by GI  Patient hospitalized in November for acute exacerbation  Was discharged on 11/25/18 on 60mg of prednisone (tapering down by 5mg Q2 weeks)  Patient made it do 35mg of prednisone, but began to feel worse (increased BMs, fatigue, dehydration, fevers, weight loss)  CRP elevated   Currently on Entyvio-- next dose is Friday next week  · Admit  · IV steroids: SoluMedrol 20mg IV TID  · Clear liquid diet   · IVF, replete electrolytes  · Continue lo-motil, hyosyne  · GI consult-- appreciate input

## 2019-01-12 NOTE — ASSESSMENT & PLAN NOTE
· With acute blood loss anemia due to GI bleeding  · Serum iron is low and serum ferritin at low normal levels  · Ferrous sulfate

## 2019-01-12 NOTE — UTILIZATION REVIEW
Initial Clinical Review    Admission: Date/Time/Statement: 1/11/19 @ 2002 direct admission per request of GI    Orders Placed This Encounter   Procedures    Inpatient Admission     Standing Status:   Standing     Number of Occurrences:   1     Order Specific Question:   Admitting Physician     Answer:   Arturo Glover [[id number]]     Order Specific Question:   Level of Care     Answer:   Med Surg [[id number]]     Order Specific Question:   Estimated length of stay     Answer:   More than 2 Midnights     Order Specific Question:   Certification     Answer:   I certify that inpatient services are medically necessary for this patient for a duration of greater than two midnights  See H&P and MD Progress Notes for additional information about the patient's course of treatment  Chief Complaint: Ulcerative colitis flare       History of Illness: 48 y o  female with past medical history significant for ulcerative colitis, hypothyroidism presents the ED for evaluation at the advice of her primary GI doctor  Patient reports she was not hospitalized just before Thanksgiving for an ulcerative colitis flare  She was here for 1 week on IV steroids  She was discharged on an long prednisone taper starting at 60 mg and decreasing by 5 mg every 2 weeks  At the beginning of this week, patient made it to 35 mg of prednisone, but she started to feel worse and had increasing bowel movements with further abdominal pain and blood in stool  She called her GI who said to continue on 40 mg  Patient continued on 40 mg for the following 2 days but still felt poorly  She started having fevers overnight  Her GI ordered further testing and started her on iron and Lomotil  On Wednesday, patient called GI once again who recommended restarting at 60 mg  Patient took this medication for 48 hours but continued to have 6-7 loose watery bowel movements with fevers and significant dehydration and fatigue    At this point, patient's primary GI doctor recommended admission for IV steroids and IV fluids  Patient overall feels unwell  She has reported approximately 18 lb weight loss since November  She has no desire to eat  She has nausea  Patient reports that the majority of her bowel movements are just liquid with the occasional bowel movement having bright red blood     She reports she gets crampy abdominal pain prior to having a bowel movement, then after she gets significant low back pain  Patient is currently on Entyvio and her 6 week appointment is next Friday    ED Vital Signs:   ED Triage Vitals [01/11/19 2011]   Temperature Pulse Respirations Blood Pressure SpO2   98 5 °F (36 9 °C) 72 18 138/74 99 %      Temp Source Heart Rate Source Patient Position - Orthostatic VS BP Location FiO2 (%)   Oral -- Lying Left arm --      Pain Score       6        Wt Readings from Last 1 Encounters:   01/11/19 66 4 kg (146 lb 6 4 oz)       Vital Signs (abnormal): none  Exam - diffuse abdominal tenderness    Abnormal Labs/Diagnostic Test Results:   Wbc 11   hgb 10 , hct 35 1    K 3 4  Cl 99  Glucose 148  Calcium 10 2  Corrected calcium 11 1  Albumin 2 9  Sed rate 52    Clostridium difficile toxin by PCR [[id number]] (Normal) Collected: 01/09/19 1428   Lab Status: Final result Specimen: Stool Updated: 01/10/19 1456    C difficile toxin by PCR      NEGATIVE for C difficle toxin by PCR  Ref Range: NEGATIVE for C difficle toxin by PCR         1/12/2019-  Sed rate 46  Wbc 10 64, hgb 8 9, hct 29 4  Platelets 155  Glucose 154  CRP 70 3       ED Treatment:   Medication Administration - No Administrations Displayed (No Start Event Found)     None          Past Medical/Surgical History:   Past Medical History:   Diagnosis Date    Adjustment disorder     Anemia     Asthma     Bilateral leg edema     Blepharitis     Carpal tunnel syndrome     Colitis, acute     Dyspareunia in female     Edema     Ganglion     Herniated cervical disc     History of transfusion     Hypokalemia     Hypothyroidism     Hypothyroidism     Insomnia     Lumps on the skin     Mouth ulcers     Nontraumatic tear of left tibialis posterior tendon     Polyarthritis     Raynaud's disease     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 [facility]     Temporomandibular disorder     Thyroid disease     Ulcerative colitis ([facility]     Ulcerative colitis ([facility] )        Admitting Diagnosis: Ulcerative pancolitis with rectal bleeding ([facility] )    Age/Sex: 48 y o  female    Assessment/Plan:   Asthma, mild intermittent   Assessment & Plan     · No wheezing on auscultation  · Continue PRN nebs      Hypothyroidism   Assessment & Plan     · Continue synthroid       * Ulcerative colitis with rectal bleeding ([facility])   Assessment & Plan     · POA  Patient sent in by GI  Patient hospitalized in November for acute exacerbation  Was discharged on 11/25/18 on 60mg of prednisone (tapering down by 5mg Q2 weeks)  Patient made it do 35mg of prednisone, but began to feel worse (increased BMs, fatigue, dehydration, fevers, weight loss)  CRP elevated   Currently on Entyvio-- next dose is Friday next week  · Admit  · IV steroids: SoluMedrol 20mg IV TID  · Clear liquid diet   · IVF, replete electrolytes  · Continue lo-motil, hyosyne  · GI consult-- appreciate input          Admission Orders: 1/11/2019 2032 INPATIENT   Scheduled Meds:   Current Facility-Administered Medications:  acetaminophen 650 mg Oral Q6H PRN    albuterol 1 25 mg Nebulization Q6H PRN    cholecalciferol 3,000 Units Oral Daily    cholestyramine sugar free 4 g Oral Daily    diphenoxylate-atropine 1 tablet Oral 4x Daily PRN    ferrous sulfate 325 mg Oral TID With Meals    hyoscyamine 0 125 mg Oral Q4H PRN    levothyroxine 25 mcg Oral Early Morning    methylPREDNISolone sodium succinate 20 mg Intravenous Q8H IVANA    ondansetron 4 mg Intravenous Q6H PRN    phenazopyridine 200 mg Oral TID PRN    sodium chloride 125 mL/hr Intravenous Continuous Last Rate: 125 mL/hr (01/12/19 0358)     Continuous Infusions:   sodium chloride 125 mL/hr Last Rate: 125 mL/hr (01/12/19 0358)     PRN Meds:     diphenoxylate-atropine - used x 2      Hyoscyamine - used x 1  Clears  [id number] [facility] Utilization Review Department  Phone: [phone]; Fax [phone]  [email or web address]  ATTENTION: Please call with any questions or concerns to [phone]  and carefully listen to the prompts so that you are directed to the right person  Send all requests for admission clinical reviews, approved or denied determinations and any other requests to fax [phone]   All voicemails are confidential

## 2019-01-12 NOTE — H&P
H&P- Renato Bob 1965, 48 y o  female MRN: [id number]    Unit/Bed#: -01 Encounter: [id number]    Primary Care Provider: Claudette Hopkins DO   Date and time admitted to hospital: 1/11/2019  8:02 PM    Asthma, mild intermittent   Assessment & Plan    · No wheezing on auscultation  · Continue PRN nebs     Hypothyroidism   Assessment & Plan    · Continue synthroid      * Ulcerative colitis with rectal bleeding (HCC)   Assessment & Plan    · POA  Patient sent in by GI  Patient hospitalized in November for acute exacerbation  Was discharged on 11/25/18 on 60mg of prednisone (tapering down by 5mg Q2 weeks)  Patient made it do 35mg of prednisone, but began to feel worse (increased BMs, fatigue, dehydration, fevers, weight loss)  CRP elevated  Currently on Entyvio-- next dose is Friday next week  · Admit  · IV steroids: SoluMedrol 20mg IV TID  · Clear liquid diet   · IVF, replete electrolytes  · Continue lo-motil, hyosyne  · GI consult-- appreciate input        VTE Prophylaxis: Pharmacologic VTE Prophylaxis contraindicated due to low risk-- ambulate  / reason for no mechanical VTE prophylaxis low risk-- ambulate   Code Status: Level 1-- Full Code  POLST: There is no POLST form on file for this patient (pre-hospital)  Discussion with family:  at bedside     Anticipated Length of Stay:  Patient will be admitted on an Inpatient basis with an anticipated length of stay of  > 2 midnights  Justification for Hospital Stay: tx of UC    Total Time for Visit, including Counseling / Coordination of Care: 30 minutes  Greater than 50% of this total time spent on direct patient counseling and coordination of care  Chief Complaint:   Ulcerative colitis flare    History of Present Illness:    Renato Bob is a 48 y o  female with past medical history significant for ulcerative colitis, hypothyroidism presents the ED for evaluation at the advice of her primary GI doctor    Patient reports she was not hospitalized just before Thanksgiving for an ulcerative colitis flare  She was here for 1 week on IV steroids  She was discharged on an long prednisone taper starting at 60 mg and decreasing by 5 mg every 2 weeks  At the beginning of this week, patient made it to 35 mg of prednisone, but she started to feel worse and had increasing bowel movements with further abdominal pain and blood in stool  She called her GI who said to continue on 40 mg  Patient continued on 40 mg for the following 2 days but still felt poorly  She started having fevers overnight  Her GI ordered further testing and started her on iron and Lomotil  On Wednesday, patient called GI once again who recommended restarting at 60 mg  Patient took this medication for 48 hours but continued to have 6-7 loose watery bowel movements with fevers and significant dehydration and fatigue  At this point, patient's primary GI doctor recommended admission for IV steroids and IV fluids  Patient overall feels unwell  She has reported approximately 18 lb weight loss since November  She has no desire to eat  She has nausea  Patient reports that the majority of her bowel movements are just liquid with the occasional bowel movement having bright red blood  She has had no chest pain or shortness of breath  She reports she gets crampy abdominal pain prior to having a bowel movement, then after she gets significant low back pain  Patient is currently on Entyvio and her 6 week appointment is next Friday  Review of Systems:    Review of Systems   Constitutional: Positive for appetite change, fever and unexpected weight change  HENT: Negative  Eyes: Negative  Respiratory: Negative  Cardiovascular: Negative  Gastrointestinal: Positive for abdominal pain, blood in stool and diarrhea  Genitourinary: Negative  Musculoskeletal: Negative  Skin: Negative  Hematological: Negative  Psychiatric/Behavioral: Negative          Past Medical and Surgical History:     Past Medical History:   Diagnosis Date    Adjustment disorder     last assessed 12    Anemia     HX of    Asthma     mild - intermittent    Bilateral leg edema     Blepharitis     last assessed 16    Carpal tunnel syndrome     Unspecified laterality    Colitis, acute     Dyspareunia in female     Edema     last assessed 06/22/15    Ganglion     Herniated cervical disc     History of transfusion     Hypokalemia     Hypothyroidism     Hypothyroidism     Insomnia     Lumps on the skin     last assessed 14    Mouth ulcers     last assessed 06/22/15    Nontraumatic tear of left tibialis posterior tendon     Traumatic teart     Polyarthritis     last assessed 16    Raynaud's disease     [facility]na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 [facility]     Temporomandibular disorder     Joint    Thyroid disease     Ulcerative colitis [facility]     Ulcerative colitis ([facility] )        Past Surgical History:   Procedure Laterality Date    ANKLE SURGERY Left     Tendon repair    CARPAL TUNNEL RELEASE Bilateral      SECTION, LOW TRANSVERSE      COLONOSCOPY      COLONOSCOPY N/A 2018    Procedure: COLONOSCOPY;  Surgeon: Mike Franklin MD;  [facility]; Service: Gastroenterology    HERNIA REPAIR      umbilical    HYSTERECTOMY      KNEE ARTHROSCOPY Right     LIPECTOMY      Multipe lipoma removals    LIPOMA RESECTION      FL COLONOSCOPY FLX DX W/COLLJ SPEC WHEN PFRMD N/A 2016    Procedure: COLONOSCOPY;  Surgeon: Kristyn De La Paz DO;  Location: [facility];   Service: Gastroenterology    FL REPAIR FLEX LEG TENDON,SECOND,EA Left 2016    Procedure: REPAIR OF LEFT POSTERIOR TIBIAL TENDON WITH GRAFT, EXPLORATION LEFT ANKLE ;  Surgeon: Vera Harman DPM;  Location: [facility];  Service: Podiatry    SHOULDER SURGERY Left     bicep tendon and labrum repair    TONSILLECTOMY      TOOTH EXTRACTION  2018       Meds/Allergies:    Prior to Admission medications Medication Sig Start Date End Date Taking? Authorizing Provider   albuterol (ACCUNEB) 1 25 MG/3ML nebulizer solution Take 1 ampule by nebulization every 6 (six) hours as needed for wheezing    Historical Provider, MD   cholecalciferol (VITAMIN D3) 1,000 units tablet Take 3,000 Units by mouth daily    Historical Provider, MD   Cholecalciferol 96022 units TABS Take 1 tablet (50,000 Units total) by mouth once a week 1/4/19   Du Liz,    cholestyramine sugar free (QUESTRAN LIGHT) 4 g packet Take 1 packet (4 g total) by mouth daily 11/26/18   Gaston CHRISTOPHER Yuliya NicoleMARY saxena   diphenoxylate-atropine (LOMOTIL) 2 5-0 025 mg per tablet Take 1 tablet by mouth 4 (four) times a day as needed for diarrhea 1/9/19   Carlos Ng PA-C   ferrous sulfate 324 (65 Fe) mg Take 1 tablet (324 mg total) by mouth 3 (three) times a day before meals 1/4/19   Du Liz DO   hyoscyamine (ANASPAZ,LEVSIN) 0 125 MG tablet Take 1 tablet (0 125 mg total) by mouth every 4 (four) hours as needed for cramping 12/5/18   Giovanna Hernández PA-C   levothyroxine 25 mcg tablet Take 1 tablet (25 mcg total) by mouth daily for 90 days 4/27/18 11/19/18  Radha Vora DO   phenazopyridine (PYRIDIUM) 200 mg tablet Take 1 tablet (200 mg total) by mouth 3 (three) times a day with meals 12/20/18   Radha Vora DO   phenazopyridine (PYRIDIUM) 200 mg tablet Take 1 tablet (200 mg total) by mouth 3 (three) times a day as needed for bladder spasms 12/24/18   Du Liz DO   predniSONE 10 mg tablet 4 tabs/day x 1 week, then 3 1/2/day x 1 wk, then 3   Until gone 12/27/18   Carlos Ng PA-C   tacrolimus (PROTOPIC) 0 03 % ointment Apply topically 2 (two) times a day    Historical Provider, MD   zolpidem (AMBIEN) 10 mg tablet TAKE ONE TABLET BY MOUTH EVERY DAY AT BEDTIME AS NEEDED FOR SLEEP 11/13/18   Radha Vora, DO     I have reviewed home medications with patient personally      Allergies: No Known Allergies    Social History:     Marital Status: /Civil Union   Occupation: RN @ [employer]  Patient Pre-hospital Living Situation: home with family  Patient Pre-hospital Level of Mobility: independent  Patient Pre-hospital Diet Restrictions: none  Substance Use History:   History   Alcohol Use No     Comment: social 1 drink per weeek     History   Smoking Status    Former Smoker    Quit date: 4/6/2003   Smokeless Tobacco    Never Used     Comment: Quit 1991, rare use for 2 years     History   Drug Use No       Family History:    Family History   Problem Relation Age of Onset    Parkinsonism Mother     Rheum arthritis Mother     Heart attack Father     Hypertension Father     Osteoporosis Father     Prostate cancer Father     Hashimoto's thyroiditis Sister     Cancer Paternal Grandfather         Penile    Prostate cancer Paternal Grandfather     Crohn's disease Family     Osteoarthritis Family     Rheum arthritis Family     Crohn's disease Other     Crohn's disease Maternal Uncle     Psoriasis Maternal Uncle     Ulcerative colitis Maternal Uncle     Rheum arthritis Maternal Aunt     Ulcerative colitis Family        Physical Exam:     Vitals:   Blood Pressure: 138/74 (01/11/19 2011)  Pulse: 72 (01/11/19 2011)  Temperature: 98 5 °F (36 9 °C) (01/11/19 2011)  Temp Source: Oral (01/11/19 2011)  Respirations: 18 (01/11/19 2011)  Height: 5' 3" (160 cm) (01/11/19 2011)  Weight - Scale: 66 4 kg (146 lb 6 4 oz) (01/11/19 2011)  SpO2: 99 % (01/11/19 2011)    Physical Exam   Constitutional: She is oriented to person, place, and time  No distress  HENT:   Head: Normocephalic and atraumatic  Mouth/Throat: No oropharyngeal exudate  Eyes: Pupils are equal, round, and reactive to light  Conjunctivae and EOM are normal  No scleral icterus  Neck: No JVD present  Cardiovascular: Normal rate and regular rhythm  Exam reveals no gallop and no friction rub  No murmur heard    Pulmonary/Chest: Effort normal and breath sounds normal  No respiratory distress  She has no wheezes  She has no rales  Abdominal: Soft  Bowel sounds are normal  She exhibits no distension  There is tenderness (diffuse)  There is no rebound and no guarding  Musculoskeletal: She exhibits no edema or tenderness  Neurological: She is alert and oriented to person, place, and time  She displays normal reflexes  No cranial nerve deficit  She exhibits normal muscle tone  Skin: Skin is warm and dry  No rash noted  She is not diaphoretic  No erythema  Psychiatric: She has a normal mood and affect  Her behavior is normal        Additional Data:     Lab Results: I have personally reviewed pertinent reports  Results from last 7 days  Lab Units 01/09/19  1409   WBC Thousand/uL 11 00*   HEMOGLOBIN g/dL 10 7*   HEMATOCRIT % 35 1   PLATELETS Thousands/uL 550*   NEUTROS PCT % 87*   LYMPHS PCT % 10*   MONOS PCT % 2*   EOS PCT % 0       Results from last 7 days  Lab Units 01/09/19  1409   SODIUM mmol/L 138   POTASSIUM mmol/L 3 4*   CHLORIDE mmol/L 99*   CO2 mmol/L 30   BUN mg/dL 9   CREATININE mg/dL 0 88   ANION GAP mmol/L 9   CALCIUM mg/dL 10 2*   ALBUMIN g/dL 2 9*   TOTAL BILIRUBIN mg/dL 0 30   ALK PHOS U/L 74   ALT U/L 13   AST U/L 10   GLUCOSE RANDOM mg/dL 148*                       Imaging: I have personally reviewed pertinent reports  No orders to display       EKG, Pathology, and Other Studies Reviewed on Admission:   · EKG: unavailable    Allscripts / Epic Records Reviewed: Yes     ** Please Note: This note has been constructed using a voice recognition system   **

## 2019-01-12 NOTE — ASSESSMENT & PLAN NOTE
· POA  Patient sent in by GI  Patient hospitalized in November for acute exacerbation  Was discharged on 11/25/18 on 60mg of prednisone (tapering down by 5mg Q2 weeks)  Patient made it do 35mg of prednisone, but began to feel worse (increased BMs, fatigue, dehydration, fevers, weight loss)  CRP elevated  Currently on Entyvio-- next dose is Friday next week  · Continue IV steroid  · Clear liquid diet for now  · IVF, replete electrolytes p r n  Crystal Burow · Continue lo-motil, hyosyne  · GI consult-- appreciate input:  Patient was on Humira before but developed antibodies  Patient received to induction doses of Entyvio  Patient has been on oral steroids without much symptomatic relief with inflammatory markers are significantly elevated  If no improvement, consider flexible sigmoidoscopy with biopsies for CMV  Will consider starting Remicade if no improvement  Monitor inflammatory markers, CBC on daily basis  Monitor abdominal exam closely  If worsening findings on abdominal exam, for KUB to assess for megacolon

## 2019-01-13 PROBLEM — E44.1 MILD PROTEIN-CALORIE MALNUTRITION (HCC): Status: ACTIVE | Noted: 2019-01-13

## 2019-01-13 PROBLEM — D72.829 LEUKOCYTOSIS: Status: ACTIVE | Noted: 2019-01-13

## 2019-01-13 LAB
CRP SERPL QL: 38.6 MG/L
ERYTHROCYTE [DISTWIDTH] IN BLOOD BY AUTOMATED COUNT: 13.7 % (ref 11.6–15.1)
ERYTHROCYTE [SEDIMENTATION RATE] IN BLOOD: 36 MM/HOUR (ref 0–20)
HCT VFR BLD AUTO: 29.8 % (ref 34.8–46.1)
HGB BLD-MCNC: 8.9 G/DL (ref 11.5–15.4)
MCH RBC QN AUTO: 26.6 PG (ref 26.8–34.3)
MCHC RBC AUTO-ENTMCNC: 29.9 G/DL (ref 31.4–37.4)
MCV RBC AUTO: 89 FL (ref 82–98)
PLATELET # BLD AUTO: 522 THOUSANDS/UL (ref 149–390)
PMV BLD AUTO: 8.6 FL (ref 8.9–12.7)
RBC # BLD AUTO: 3.35 MILLION/UL (ref 3.81–5.12)
WBC # BLD AUTO: 13.5 THOUSAND/UL (ref 4.31–10.16)

## 2019-01-13 PROCEDURE — 99232 SBSQ HOSP IP/OBS MODERATE 35: CPT | Performed by: INTERNAL MEDICINE

## 2019-01-13 PROCEDURE — 85652 RBC SED RATE AUTOMATED: CPT | Performed by: INTERNAL MEDICINE

## 2019-01-13 PROCEDURE — 86140 C-REACTIVE PROTEIN: CPT | Performed by: PHYSICIAN ASSISTANT

## 2019-01-13 PROCEDURE — 85027 COMPLETE CBC AUTOMATED: CPT | Performed by: INTERNAL MEDICINE

## 2019-01-13 RX ADMIN — VITAMIN D, TAB 1000IU (100/BT) 3000 UNITS: 25 TAB at 09:20

## 2019-01-13 RX ADMIN — METHYLPREDNISOLONE SODIUM SUCCINATE 20 MG: 40 INJECTION, POWDER, FOR SOLUTION INTRAMUSCULAR; INTRAVENOUS at 21:19

## 2019-01-13 RX ADMIN — ZOLPIDEM TARTRATE 10 MG: 5 TABLET, COATED ORAL at 21:19

## 2019-01-13 RX ADMIN — SODIUM CHLORIDE 125 ML/HR: 0.9 INJECTION, SOLUTION INTRAVENOUS at 10:22

## 2019-01-13 RX ADMIN — METHYLPREDNISOLONE SODIUM SUCCINATE 20 MG: 40 INJECTION, POWDER, FOR SOLUTION INTRAMUSCULAR; INTRAVENOUS at 13:15

## 2019-01-13 RX ADMIN — FERROUS SULFATE TAB 325 MG (65 MG ELEMENTAL FE) 325 MG: 325 (65 FE) TAB at 09:20

## 2019-01-13 RX ADMIN — FERROUS SULFATE TAB 325 MG (65 MG ELEMENTAL FE) 325 MG: 325 (65 FE) TAB at 16:14

## 2019-01-13 RX ADMIN — SODIUM CHLORIDE 125 ML/HR: 0.9 INJECTION, SOLUTION INTRAVENOUS at 19:07

## 2019-01-13 RX ADMIN — LEVOTHYROXINE SODIUM 25 MCG: 25 TABLET ORAL at 05:40

## 2019-01-13 RX ADMIN — HYOSCYAMINE SULFATE 0.12 MG: 0.12 SOLUTION/ DROPS ORAL at 13:08

## 2019-01-13 RX ADMIN — HYOSCYAMINE SULFATE 0.12 MG: 0.12 SOLUTION/ DROPS ORAL at 18:40

## 2019-01-13 RX ADMIN — HYOSCYAMINE SULFATE 0.12 MG: 0.12 SOLUTION/ DROPS ORAL at 03:49

## 2019-01-13 RX ADMIN — SODIUM CHLORIDE 125 ML/HR: 0.9 INJECTION, SOLUTION INTRAVENOUS at 02:58

## 2019-01-13 RX ADMIN — METHYLPREDNISOLONE SODIUM SUCCINATE 20 MG: 40 INJECTION, POWDER, FOR SOLUTION INTRAMUSCULAR; INTRAVENOUS at 05:40

## 2019-01-13 NOTE — ASSESSMENT & PLAN NOTE
· Presently, patient does not have the GI bleeding anymore  Patient had 2 episodes of bowel movements yesterday with some blood, but none so far today  · POA  Patient sent in by GI  Patient hospitalized in November for acute exacerbation  Was discharged on 11/25/18 on 60mg of prednisone (tapering down by 5mg Q2 weeks)  Patient made it do 35mg of prednisone, but began to feel worse (increased BMs, fatigue, dehydration, fevers, weight loss)  CRP elevated  Currently on Entyvio-- next dose is Friday next week  · Continue IV steroid  · Clear liquid diet for now  · IVF, replete electrolytes p r n  Monia Le · Continue lo-motil, hyosyne  · GI consult-- appreciate input:  Patient was on Humira before but developed antibodies  Patient received to induction doses of Entyvio  Patient has been on oral steroids without much symptomatic relief with inflammatory markers are significantly elevated  If no improvement, consider flexible sigmoidoscopy with biopsies for CMV  Will consider starting Remicade if no improvement  Monitor inflammatory markers, CBC on daily basis  Monitor abdominal exam closely  If worsening findings on abdominal exam, for KUB to assess for megacolon  · C diff PCR was negative

## 2019-01-13 NOTE — ASSESSMENT & PLAN NOTE
· Stable  · With acute blood loss anemia due to GI bleeding  · Today, patient did not report any blood in the stools  Last night, patient admits to 2 bowel movements with some blood  · Serum iron is low and serum ferritin at low normal levels  · Ferrous sulfate

## 2019-01-13 NOTE — ASSESSMENT & PLAN NOTE
Malnutrition Findings:   Malnutrition type: Chronic illness  Degree of Malnutrition: Malnutrition of mild degree    BMI Findings: Body mass index is 25 93 kg/m²  · Due to ulcerative colitis flare, patient is on fluid liquid diet  Will continue with this diet in the meantime  Previously on clear liquid diet

## 2019-01-13 NOTE — PROGRESS NOTES
Progress Note - Siddhartha Phillips 1965, 48 y o  female MRN: [id number]    Unit/Bed#: -01 Encounter: [id number]    Primary Care Provider: Davidson Bennett DO   Date and time admitted to hospital: 1/11/2019  8:02 PM        * Ulcerative colitis with rectal bleeding ([facility] )   Assessment & Plan    · Presently, patient does not have the GI bleeding anymore  Patient had 2 episodes of bowel movements yesterday with some blood, but none so far today  · POA  Patient sent in by GI  Patient hospitalized in November for acute exacerbation  Was discharged on 11/25/18 on 60mg of prednisone (tapering down by 5mg Q2 weeks)  Patient made it do 35mg of prednisone, but began to feel worse (increased BMs, fatigue, dehydration, fevers, weight loss)  CRP elevated  Currently on Entyvio-- next dose is Friday next week  · Continue IV steroid  · Clear liquid diet for now  · IVF, replete electrolytes p r n  [place] · Continue lo-motil, hyosyne  · GI consult-- appreciate input:  Patient was on Humira before but developed antibodies  Patient received to induction doses of Entyvio  Patient has been on oral steroids without much symptomatic relief with inflammatory markers are significantly elevated  If no improvement, consider flexible sigmoidoscopy with biopsies for CMV  Will consider starting Remicade if no improvement  Monitor inflammatory markers, CBC on daily basis  Monitor abdominal exam closely  If worsening findings on abdominal exam, for KUB to assess for megacolon  · C diff PCR was negative  Leukocytosis   Assessment & Plan    · Likely due to steroids  Mild protein-calorie malnutrition ([facility] )   Assessment & Plan    Malnutrition Findings:   Malnutrition type: Chronic illness  Degree of Malnutrition: Malnutrition of mild degree    BMI Findings: Body mass index is 25 93 kg/m²  · Due to ulcerative colitis flare, patient is on fluid liquid diet  Will continue with this diet in the meantime    Previously on clear liquid diet  Iron deficiency anemia   Assessment & Plan    · Stable  · With acute blood loss anemia due to GI bleeding  · Today, patient did not report any blood in the stools  Last night, patient admits to 2 bowel movements with some blood  · Serum iron is low and serum ferritin at low normal levels  · Ferrous sulfate  Asthma, mild intermittent   Assessment & Plan    · No wheezing on auscultation  · Continue PRN nebs     Hypothyroidism   Assessment & Plan    · Continue synthroid            VTE Pharmacologic Prophylaxis:   Pharmacologic: Pharmacologic VTE Prophylaxis contraindicated due to GI bleeding  Mechanical VTE Prophylaxis in Place: No    Patient Centered Rounds: I have performed bedside rounds with nursing staff today  Discussions with Specialists or Other Care Team Provider:     Education and Discussions with Family / Patient:  Patient  Patient's  at bedside  I gave updates for today  I answered questions and concerns  Time Spent for Care: 30 minutes  More than 50% of total time spent on counseling and coordination of care as described above  Current Length of Stay: 2 day(s)    Current Patient Status: Inpatient   Certification Statement: The patient will continue to require additional inpatient hospital stay due to Above findings and plans  Discharge Plan:  None yet  Code Status: Level 1 - Full Code      Subjective:   Patient told me that she had 2 bowel movements yesterday with some blood  Today, so far, no blood in her stools  Patient still at times having abdominal cramping  Otherwise no other pains and no other complaints  Objective:     Vitals:   Temp (24hrs), Av 9 °F (37 2 °C), Min:98 6 °F (37 °C), Max:99 2 °F (37 3 °C)    Temp:  [98 6 °F (37 °C)-99 2 °F (37 3 °C)] 98 6 °F (37 °C)  HR:  [64-73] 64  Resp:  [18] 18  BP: (111-119)/(56-69) 119/69  SpO2:  [96 %-98 %] 98 %  Body mass index is 25 93 kg/m²       Input and Output Summary (last 24 hours): Intake/Output Summary (Last 24 hours) at 01/13/19 1355  Last data filed at 01/13/19 1300   Gross per 24 hour   Intake             2340 ml   Output                0 ml   Net             2340 ml       Physical Exam:     Physical Exam   Constitutional: No distress  HENT:   Head: Normocephalic and atraumatic  Eyes: Right eye exhibits no discharge  Left eye exhibits no discharge  No scleral icterus  Neck: No JVD present  No tracheal deviation present  Cardiovascular: Normal rate, regular rhythm and normal heart sounds  Exam reveals no gallop and no friction rub  No murmur heard  Pulmonary/Chest: Effort normal and breath sounds normal  No stridor  No respiratory distress  She has no wheezes  She has no rales  Abdominal: Soft  Bowel sounds are normal  She exhibits no distension  There is tenderness  There is no rebound and no guarding  Positive for generalized abdominal tenderness  Musculoskeletal: She exhibits no edema, tenderness or deformity  Neurological: She is alert  Skin: Skin is warm  No rash noted  She is not diaphoretic  No erythema  No pallor  Psychiatric: She has a normal mood and affect  Her behavior is normal  Thought content normal    Vitals reviewed  Additional Data:     Labs:      Results from last 7 days  Lab Units 01/13/19  0540  01/09/19  1409   WBC Thousand/uL 13 50*  < > 11 00*   HEMOGLOBIN g/dL 8 9*  < > 10 7*   HEMATOCRIT % 29 8*  < > 35 1   PLATELETS Thousands/uL 522*  < > 550*   NEUTROS PCT %  --   --  87*   LYMPHS PCT %  --   --  10*   MONOS PCT %  --   --  2*   EOS PCT %  --   --  0   < > = values in this interval not displayed  Results from last 7 days  Lab Units 01/12/19  0509 01/09/19  1409   POTASSIUM mmol/L 4 6 3 4*   CHLORIDE mmol/L 106 99*   CO2 mmol/L 28 30   BUN mg/dL 5 9   CREATININE mg/dL 0 72 0 88   CALCIUM mg/dL 9 9 10 2*   ALK PHOS U/L  --  74   ALT U/L  --  13   AST U/L  --  10           * I Have Reviewed All Lab Data Listed Above    * Additional Pertinent Lab Tests Reviewed: [place] [facility] Admission Reviewed    Imaging:    Imaging Reports Reviewed Today Include:  Diagnostic imaging studies that were done on this admission  Imaging Personally Reviewed by Myself Includes:  None  Recent Cultures (last 7 days):       Results from last 7 days  Lab Units 01/09/19  1428   C DIFF TOXIN B  NEGATIVE for C difficle toxin by PCR  Last 24 Hours Medication List:     Current Facility-Administered Medications:  acetaminophen 650 mg Oral Q6H PRN MARIOLA Wade-IRA    albuterol 1 25 mg Nebulization Q6H PRN Kathi Jones, PA-C    cholecalciferol 3,000 Units Oral Daily Kathi Jones PA-C    ferrous sulfate 325 mg Oral TID With Meals Kathi Jones PA-C    hyoscyamine 0 125 mg Oral Q4H PRN Kathi Jones, PA-C    levothyroxine 25 mcg Oral Early Morning MARIOLA Wade-C    methylPREDNISolone sodium succinate 20 mg Intravenous Q8H [address] MARIOLA Wade-IRA    ondansetron 4 mg Intravenous Q6H PRN Kathi Jones, PA-C    phenazopyridine 200 mg Oral TID PRN Kathi Jones, PA-C    sodium chloride 125 mL/hr Intravenous Continuous Kathi Jones PA-C Last Rate: 125 mL/hr (01/13/19 1022)   zolpidem 10 mg Oral HS YFN Orozco         Today, Patient Was Seen By: Luiz Parham MD    ** Please Note: Dragon 360 Dictation voice to text software may have been used in the creation of this document   **

## 2019-01-13 NOTE — PROGRESS NOTES
Progress Note - Stuart Fisher 48 y o  female MRN: [id number]    Unit/Bed#: -La Encounter: [id number]        Subjective:     Patient reports that she is doing slightly better  She reports that she has had 2 bowel movements last evening without bright red blood  She had another to loose bowel movements this morning, also without overt bleeding  ROS: As noted in the HPI, otherwise all others negative  Objective:     Vitals: Blood pressure 119/69, pulse 64, temperature 98 6 °F (37 °C), temperature source Oral, resp  rate 18, height 5' 3" (1 6 m), weight 66 4 kg (146 lb 6 4 oz), SpO2 98 %  ,Body mass index is 25 93 kg/m²  Intake/Output Summary (Last 24 hours) at 01/13/19 1024  Last data filed at 01/13/19 1022   Gross per 24 hour   Intake             2620 ml   Output                0 ml   Net             2620 ml       Physical Exam:     General Appearance: Alert and oriented x 3  In no respiratory distress  Lungs: Clear to auscultation bilaterally, no rales or rhonchi  Heart: Regular rate and rhythm, S1, S2 normal, no murmur, click, rub or gallop  Abdomen: Soft, non-tender, non-distended; bowel sounds normal; no masses or no organomegaly  Extremities: No cyanosis, edema    Invasive Devices     Peripheral Intravenous Line            Peripheral IV 01/11/19 Left Forearm 1 day                Lab Results:    Results from last 7 days  Lab Units 01/13/19  0540  01/09/19  1409   WBC Thousand/uL 13 50*  < > 11 00*   HEMOGLOBIN g/dL 8 9*  < > 10 7*   HEMATOCRIT % 29 8*  < > 35 1   PLATELETS Thousands/uL 522*  < > 550*   NEUTROS PCT %  --   --  87*   LYMPHS PCT %  --   --  10*   MONOS PCT %  --   --  2*   EOS PCT %  --   --  0   < > = values in this interval not displayed      Results from last 7 days  Lab Units 01/12/19  0509 01/09/19  1409   POTASSIUM mmol/L 4 6 3 4*   CHLORIDE mmol/L 106 99*   CO2 mmol/L 28 30   BUN mg/dL 5 9   CREATININE mg/dL 0 72 0 88   CALCIUM mg/dL 9 9 10 2*   ALK PHOS U/L  --  74   ALT U/L --  13   AST U/L  --  10             Assessment and Plan:     1) Severe ulcerative colitis - As an outpatient, the patient was recently switched to [facility]  She is due for her 6 week infusion on 01/18  As an outpatient, she has failed a long prednisone taper  She also had markedly elevated inflammatory markers  She had a negative C diff prior to admission  Today, the patient does have some improvement in the frequency of her hematochezia  Her abdominal exam is also improved  Patient is aware that [facility] this take longer to provide remission of symptoms as compared to Remicade    Dr Naga Chiu is also aware for admission   - Will continue IV Solu-Medrol 20 mg every 8 hours and monitor response  - Will repeat inflammatory markers, CRP and ESR  - Continue Levsin up to 3 times daily  - Full liquid diet as tolerated  - Would avoid Lomotil or antidiarrheals in the setting of flare  - May need to administer Remicade as an inpatient if no improvement  - Could also consider a flexible sigmoidoscopy, however it appears the patient can hold off for now

## 2019-01-14 LAB
CALPROTECTIN STL-MCNT: 443 UG/G (ref 0–120)
CRP SERPL QL: 25.9 MG/L
ERYTHROCYTE [DISTWIDTH] IN BLOOD BY AUTOMATED COUNT: 13.8 % (ref 11.6–15.1)
HCT VFR BLD AUTO: 30.2 % (ref 34.8–46.1)
HGB BLD-MCNC: 9.1 G/DL (ref 11.5–15.4)
MCH RBC QN AUTO: 26.8 PG (ref 26.8–34.3)
MCHC RBC AUTO-ENTMCNC: 30.1 G/DL (ref 31.4–37.4)
MCV RBC AUTO: 89 FL (ref 82–98)
PLATELET # BLD AUTO: 529 THOUSANDS/UL (ref 149–390)
PMV BLD AUTO: 8.7 FL (ref 8.9–12.7)
RBC # BLD AUTO: 3.39 MILLION/UL (ref 3.81–5.12)
WBC # BLD AUTO: 9.87 THOUSAND/UL (ref 4.31–10.16)

## 2019-01-14 PROCEDURE — 99232 SBSQ HOSP IP/OBS MODERATE 35: CPT | Performed by: INTERNAL MEDICINE

## 2019-01-14 PROCEDURE — 94760 N-INVAS EAR/PLS OXIMETRY 1: CPT

## 2019-01-14 PROCEDURE — 85027 COMPLETE CBC AUTOMATED: CPT | Performed by: INTERNAL MEDICINE

## 2019-01-14 PROCEDURE — 86140 C-REACTIVE PROTEIN: CPT | Performed by: INTERNAL MEDICINE

## 2019-01-14 RX ORDER — METHYLPREDNISOLONE SODIUM SUCCINATE 40 MG/ML
20 INJECTION, POWDER, LYOPHILIZED, FOR SOLUTION INTRAMUSCULAR; INTRAVENOUS EVERY 12 HOURS SCHEDULED
Status: DISCONTINUED | OUTPATIENT
Start: 2019-01-14 | End: 2019-01-16

## 2019-01-14 RX ADMIN — FERROUS SULFATE TAB 325 MG (65 MG ELEMENTAL FE) 325 MG: 325 (65 FE) TAB at 09:32

## 2019-01-14 RX ADMIN — FERROUS SULFATE TAB 325 MG (65 MG ELEMENTAL FE) 325 MG: 325 (65 FE) TAB at 13:59

## 2019-01-14 RX ADMIN — METHYLPREDNISOLONE SODIUM SUCCINATE 20 MG: 40 INJECTION, POWDER, FOR SOLUTION INTRAMUSCULAR; INTRAVENOUS at 20:59

## 2019-01-14 RX ADMIN — HYOSCYAMINE SULFATE 0.12 MG: 0.12 SOLUTION/ DROPS ORAL at 03:23

## 2019-01-14 RX ADMIN — SODIUM CHLORIDE 50 ML/HR: 0.9 INJECTION, SOLUTION INTRAVENOUS at 20:59

## 2019-01-14 RX ADMIN — METHYLPREDNISOLONE SODIUM SUCCINATE 20 MG: 40 INJECTION, POWDER, FOR SOLUTION INTRAMUSCULAR; INTRAVENOUS at 13:59

## 2019-01-14 RX ADMIN — METHYLPREDNISOLONE SODIUM SUCCINATE 20 MG: 40 INJECTION, POWDER, FOR SOLUTION INTRAMUSCULAR; INTRAVENOUS at 05:42

## 2019-01-14 RX ADMIN — HYOSCYAMINE SULFATE 0.12 MG: 0.12 SOLUTION/ DROPS ORAL at 22:06

## 2019-01-14 RX ADMIN — SODIUM CHLORIDE 125 ML/HR: 0.9 INJECTION, SOLUTION INTRAVENOUS at 03:20

## 2019-01-14 RX ADMIN — ZOLPIDEM TARTRATE 10 MG: 5 TABLET, COATED ORAL at 21:01

## 2019-01-14 RX ADMIN — HYOSCYAMINE SULFATE 0.12 MG: 0.12 SOLUTION/ DROPS ORAL at 17:38

## 2019-01-14 RX ADMIN — LEVOTHYROXINE SODIUM 25 MCG: 25 TABLET ORAL at 05:42

## 2019-01-14 RX ADMIN — HYOSCYAMINE SULFATE 0.12 MG: 0.12 SOLUTION/ DROPS ORAL at 09:33

## 2019-01-14 RX ADMIN — VITAMIN D, TAB 1000IU (100/BT) 3000 UNITS: 25 TAB at 09:32

## 2019-01-14 RX ADMIN — FERROUS SULFATE TAB 325 MG (65 MG ELEMENTAL FE) 325 MG: 325 (65 FE) TAB at 16:00

## 2019-01-14 NOTE — PROGRESS NOTES
Progress Note - Doc Johnny 1965, 48 y o  female MRN: [id number]    Unit/Bed#: -01 Encounter: [id number]    Primary Care Provider: Joaquina Barr DO   Date and time admitted to hospital: 1/11/2019  8:02 PM        * Ulcerative colitis with rectal bleeding ([facility] )   Assessment & Plan    · Clinically improving  · Likely malabsorption of oral steroids in the setting of hypoalbuminemia  · Presently, patient does not have the GI bleeding anymore  Patient told me that her stools are greenish in color  · POA  Patient sent in by GI  Patient hospitalized in November for acute exacerbation  Was discharged on 11/25/18 on 60mg of prednisone (tapering down by 5mg Q2 weeks)  Patient made it do 35mg of prednisone, but began to feel worse (increased BMs, fatigue, dehydration, fevers, weight loss)  CRP elevated  Currently on Entyvio-- next dose is Friday this week  · Continue IV steroid  · Diet was advanced to surgically soft diet today  · IVF, replete electrolytes p r n  Staci Blend · Continue lo-motil, hyosyne  · Monitor inflammatory markers, CBC on daily basis  Monitor abdominal exam closely  If worsening findings on abdominal exam, for KUB to assess for megacolon  · C diff PCR was negative  · GI doctor on board  · Patient's IV Solu-Medrol was tapered down to 20 mg twice a day today  If she is tolerating this, according to GI, will give her 1 to 2 days of oral dosing of 40 mg prednisone to see if she tolerates this  Then patient will be discharge with a very slow taper  · If she continues not to respond clinically, can consider infliximab  Leukocytosis   Assessment & Plan    · Resolved  · Likely due to steroids  Mild protein-calorie malnutrition ([facility] )   Assessment & Plan    Malnutrition Findings:   Malnutrition type: Chronic illness  Degree of Malnutrition: Malnutrition of mild degree    BMI Findings: Body mass index is 25 93 kg/m²       · Diet was advanced today to surgically soft diet   · Continue with Ensure nutritional supplements  Iron deficiency anemia   Assessment & Plan    · Stable and better  · With acute blood loss anemia due to GI bleeding  · Today, patient did not report any blood in the stools  · Serum iron is low and serum ferritin at low normal levels  · Ferrous sulfate  · Monitor  Asthma, mild intermittent   Assessment & Plan    · No wheezing on auscultation  · Continue PRN nebs     Hypothyroidism   Assessment & Plan    · Continue synthroid            VTE Pharmacologic Prophylaxis:   Pharmacologic: Pharmacologic VTE Prophylaxis contraindicated due to GI bleeding  Mechanical VTE Prophylaxis in Place: Yes    Patient Centered Rounds: I have performed bedside rounds with nursing staff today  Discussions with Specialists or Other Care Team Provider:  Case management  Education and Discussions with Family / Patient:  Patient and patient's   I gave updates for today  I answered questions and concerns  Time Spent for Care: 30 minutes  More than 50% of total time spent on counseling and coordination of care as described above  Current Length of Stay: 3 day(s)    Current Patient Status: Inpatient   Certification Statement: The patient will continue to require additional inpatient hospital stay due to Above findings and plans  Discharge Plan:  None yet  Code Status: Level 1 - Full Code      Subjective:   Patient still has occasional abdominal pains/cramping  No diarrhea  No stefany blood in the stools anymore  Patient told me that her stools are greenish in color  No other complaints  Objective:     Vitals:   Temp (24hrs), Av 1 °F (37 3 °C), Min:99 °F (37 2 °C), Max:99 3 °F (37 4 °C)    Temp:  [99 °F (37 2 °C)-99 3 °F (37 4 °C)] 99 °F (37 2 °C)  HR:  [50-57] 50  Resp:  [18] 18  BP: (106-145)/(53-65) 145/65  SpO2:  [97 %-100 %] 100 %  Body mass index is 25 93 kg/m²  Input and Output Summary (last 24 hours):        Intake/Output Summary (Last 24 hours) at 01/14/19 1823  Last data filed at 01/13/19 1907   Gross per 24 hour   Intake             1000 ml   Output                0 ml   Net             1000 ml       Physical Exam:     Physical Exam   Constitutional: No distress  HENT:   Head: Normocephalic and atraumatic  Eyes: Right eye exhibits no discharge  Left eye exhibits no discharge  No scleral icterus  Neck: No JVD present  No tracheal deviation present  Cardiovascular: Normal rate, regular rhythm and normal heart sounds  Exam reveals no gallop and no friction rub  No murmur heard  Pulmonary/Chest: Effort normal and breath sounds normal  No stridor  No respiratory distress  She has no wheezes  She has no rales  Abdominal: Soft  Bowel sounds are normal  She exhibits no distension  There is tenderness  There is no rebound and no guarding  Musculoskeletal: She exhibits no edema, tenderness or deformity  Neurological: She is alert  No cranial nerve deficit  Skin: Skin is warm  No rash noted  She is not diaphoretic  No erythema  No pallor  Psychiatric: She has a normal mood and affect  Her behavior is normal  Thought content normal    Vitals reviewed  Additional Data:     Labs:      Results from last 7 days  Lab Units 01/14/19  0515  01/09/19  1409   WBC Thousand/uL 9 87  < > 11 00*   HEMOGLOBIN g/dL 9 1*  < > 10 7*   HEMATOCRIT % 30 2*  < > 35 1   PLATELETS Thousands/uL 529*  < > 550*   NEUTROS PCT %  --   --  87*   LYMPHS PCT %  --   --  10*   MONOS PCT %  --   --  2*   EOS PCT %  --   --  0   < > = values in this interval not displayed  Results from last 7 days  Lab Units 01/12/19  0509 01/09/19  1409   POTASSIUM mmol/L 4 6 3 4*   CHLORIDE mmol/L 106 99*   CO2 mmol/L 28 30   BUN mg/dL 5 9   CREATININE mg/dL 0 72 0 88   CALCIUM mg/dL 9 9 10 2*   ALK PHOS U/L  --  74   ALT U/L  --  13   AST U/L  --  10           * I Have Reviewed All Lab Data Listed Above  * Additional Pertinent Lab Tests Reviewed:  All [facility] Admission Reviewed    Imaging:    Imaging Reports Reviewed Today Include:  Diagnostic imaging studies that were done on this admission  Imaging Personally Reviewed by Myself Includes:  None  Recent Cultures (last 7 days):       Results from last 7 days  Lab Units 01/09/19  1428   C DIFF TOXIN B  NEGATIVE for C difficle toxin by PCR  Last 24 Hours Medication List:     Current Facility-Administered Medications:  acetaminophen 650 mg Oral Q6H PRN Kathi Jones PA-C    albuterol 1 25 mg Nebulization Q6H PRN MARIOLA Wade-IRA    cholecalciferol 3,000 Units Oral Daily Kathi Jones PA-C    ferrous sulfate 325 mg Oral TID With Meals Kathi Jones PA-C    hyoscyamine 0 125 mg Oral Q4H PRN Kathi Jones PA-C    levothyroxine 25 mcg Oral Early Morning Kathi Jones PA-C    methylPREDNISolone sodium succinate 20 mg Intravenous Q12H [address] Meinardo Carlos PA-C    ondansetron 4 mg Intravenous Q6H PRN Kathi Jones PA-C    phenazopyridine 200 mg Oral TID PRN Kathi Jones PA-C    sodium chloride 50 mL/hr Intravenous Continuous Manav Gonzalez PA-C Last Rate: 50 mL/hr (01/14/19 1600)   zolpidem 10 mg Oral HS YFN Romano         Today, Patient Was Seen By: Anila Reyes MD    ** Please Note: Dragon 360 Dictation voice to text software may have been used in the creation of this document   **

## 2019-01-14 NOTE — ASSESSMENT & PLAN NOTE
· Clinically improving  · Likely malabsorption of oral steroids in the setting of hypoalbuminemia  · Presently, patient does not have the GI bleeding anymore  Patient told me that her stools are greenish in color  · POA  Patient sent in by GI  Patient hospitalized in November for acute exacerbation  Was discharged on 11/25/18 on 60mg of prednisone (tapering down by 5mg Q2 weeks)  Patient made it do 35mg of prednisone, but began to feel worse (increased BMs, fatigue, dehydration, fevers, weight loss)  CRP elevated  Currently on Entyvio-- next dose is Friday this week  · Continue IV steroid  · Diet was advanced to surgically soft diet today  · IVF, replete electrolytes p r n  Nabil Leyva · Continue lo-motil, hyosyne  · Monitor inflammatory markers, CBC on daily basis  Monitor abdominal exam closely  If worsening findings on abdominal exam, for KUB to assess for megacolon  · C diff PCR was negative  · GI doctor on board  · Patient's IV Solu-Medrol was tapered down to 20 mg twice a day today  If she is tolerating this, according to GI, will give her 1 to 2 days of oral dosing of 40 mg prednisone to see if she tolerates this  Then patient will be discharge with a very slow taper  · If she continues not to respond clinically, can consider infliximab

## 2019-01-14 NOTE — ASSESSMENT & PLAN NOTE
Malnutrition Findings:   Malnutrition type: Chronic illness  Degree of Malnutrition: Malnutrition of mild degree    BMI Findings: Body mass index is 25 93 kg/m²  · Diet was advanced today to surgically soft diet  · Continue with Ensure nutritional supplements

## 2019-01-14 NOTE — ASSESSMENT & PLAN NOTE
· Stable and better  · With acute blood loss anemia due to GI bleeding  · Today, patient did not report any blood in the stools  · Serum iron is low and serum ferritin at low normal levels  · Ferrous sulfate  · Monitor

## 2019-01-14 NOTE — PROGRESS NOTES
[facility]  Progress Note - Shiva Steele 48 y o  female MRN: [id number]    Unit/Bed#: -01 Encounter: [id number]    Assessment/Plan:    Pleasant 63-year-old female with severe ulcerative colitis, refractory to outpatient oral steroid therapy upon tapering down to 40 mg  Patient had been on Humira in the past but developed antibodies  Recently started on Entyvio, she received her 1st 2 induction doses  1  Severe ulcerative colitis:  Failed therapy with Humira, had difficulties once tapering outpatient steroids down to 40 mg  -currently on 20 mg b i d  Of Solu-Medrol tolerating this well, appears to be clinically improving  -likely malabsorption of oral steroids in setting of hypoalbuminemia  -continue to encourage oral intake, Ensure clear  -if tolerating 20 mg b i d  Of Solu-Medrol would give her 1 or 2 days of oral dosing of 40 mg prednisone to see if she tolerates this  -that would discharge her with a very slow taper  -she is due for her 3rd infusion dose of Entyvio this Friday  -continue to follow inflammatory markers  -if she continues to not respond clinically can consider infliximab  -continue Levsin for intermittent abdominal pain and spasm        Subjective: Only 1 bowel movement earlier today  Reports had some abdominal pain last night  Overall feels somewhat improved  Still on minimal diet  Objective:     Vitals: Blood pressure 106/53, pulse 56, temperature 99 3 °F (37 4 °C), temperature source Oral, resp  rate 18, height 5' 3" (1 6 m), weight 66 4 kg (146 lb 6 4 oz), SpO2 97 %  ,Body mass index is 25 93 kg/m²        Intake/Output Summary (Last 24 hours) at 01/14/19 1520  Last data filed at 01/13/19 1907   Gross per 24 hour   Intake             1000 ml   Output                0 ml   Net             1000 ml       Physical Exam:    GEN: wn/wd, NAD  HEENT: MMM, no cervical or supraclavicular LAD, anciteric  CV: RRR, no m/r/g  CHEST: CTA b/l, no w/r/r  ABD: +BS, soft, epigastric and left-sided abdominal tenderness, no hepatosplenomegaly  EXT:  Nonpitting edema  SKIN: no rashes  NEURO: aaox3      Invasive Devices     Peripheral Intravenous Line            Peripheral IV 01/11/19 Left Forearm 2 days                        Lab, Imaging and other studies:     Admission on 01/11/2019   Component Date Value    Sodium 01/12/2019 140     Potassium 01/12/2019 4 6     Chloride 01/12/2019 106     CO2 01/12/2019 28     ANION GAP 01/12/2019 6     BUN 01/12/2019 5     Creatinine 01/12/2019 0 72     Glucose 01/12/2019 154*    Calcium 01/12/2019 9 9     eGFR 01/12/2019 96     WBC 01/12/2019 10 64*    RBC 01/12/2019 3 34*    Hemoglobin 01/12/2019 8 9*    Hematocrit 01/12/2019 29 4*    MCV 01/12/2019 88     MCH 01/12/2019 26 6*    MCHC 01/12/2019 30 3*    RDW 01/12/2019 13 5     Platelets 25/51/3884 471*    MPV 01/12/2019 8 7*    Magnesium 01/12/2019 1 9     CRP 01/12/2019 70 3*    Sed Rate 01/12/2019 46*    WBC 01/13/2019 13 50*    RBC 01/13/2019 3 35*    Hemoglobin 01/13/2019 8 9*    Hematocrit 01/13/2019 29 8*    MCV 01/13/2019 89     MCH 01/13/2019 26 6*    MCHC 01/13/2019 29 9*    RDW 01/13/2019 13 7     Platelets 12/54/3997 522*    MPV 01/13/2019 8 6*    CRP 01/13/2019 38 6*    Sed Rate 01/13/2019 36*    WBC 01/14/2019 9 87     RBC 01/14/2019 3 39*    Hemoglobin 01/14/2019 9 1*    Hematocrit 01/14/2019 30 2*    MCV 01/14/2019 89     MCH 01/14/2019 26 8     MCHC 01/14/2019 30 1*    RDW 01/14/2019 13 8     Platelets 81/68/1120 529*    MPV 01/14/2019 8 7*    CRP 01/14/2019 25 9*         I have personally reviewed pertinent reports        Current Facility-Administered Medications   Medication Dose Route Frequency    acetaminophen (TYLENOL) tablet 650 mg  650 mg Oral Q6H PRN    albuterol inhalation solution 1 25 mg  1 25 mg Nebulization Q6H PRN    cholecalciferol (VITAMIN D3) tablet 3,000 Units  3,000 Units Oral Daily    ferrous sulfate tablet 325 mg  325 mg Oral TID With Meals    hyoscyamine (HYOSYNE) oral drops 0 125 mg  0 125 mg Oral Q4H PRN    levothyroxine tablet 25 mcg  25 mcg Oral Early Morning    methylPREDNISolone sodium succinate (Solu-MEDROL) injection 20 mg  20 mg Intravenous Q12H [facility]    ondansetron (ZOFRAN) injection 4 mg  4 mg Intravenous Q6H PRN    phenazopyridine (PYRIDIUM) tablet 200 mg  200 mg Oral TID PRN    sodium chloride 0 9 % infusion  50 mL/hr Intravenous Continuous    zolpidem (AMBIEN) tablet 10 mg  10 mg Oral HS

## 2019-01-15 LAB
ANION GAP SERPL CALCULATED.3IONS-SCNC: 9 MMOL/L (ref 4–13)
BUN SERPL-MCNC: 7 MG/DL (ref 5–25)
CALCIUM SERPL-MCNC: 10.1 MG/DL (ref 8.3–10.1)
CHLORIDE SERPL-SCNC: 105 MMOL/L (ref 100–108)
CO2 SERPL-SCNC: 28 MMOL/L (ref 21–32)
CREAT SERPL-MCNC: 0.79 MG/DL (ref 0.6–1.3)
CRP SERPL QL: 17.3 MG/L
ERYTHROCYTE [DISTWIDTH] IN BLOOD BY AUTOMATED COUNT: 13.8 % (ref 11.6–15.1)
ERYTHROCYTE [SEDIMENTATION RATE] IN BLOOD: 38 MM/HOUR (ref 0–20)
GFR SERPL CREATININE-BSD FRML MDRD: 86 ML/MIN/1.73SQ M
GLUCOSE SERPL-MCNC: 117 MG/DL (ref 65–140)
HCT VFR BLD AUTO: 35.7 % (ref 34.8–46.1)
HGB BLD-MCNC: 10.7 G/DL (ref 11.5–15.4)
MCH RBC QN AUTO: 26.5 PG (ref 26.8–34.3)
MCHC RBC AUTO-ENTMCNC: 30 G/DL (ref 31.4–37.4)
MCV RBC AUTO: 88 FL (ref 82–98)
PLATELET # BLD AUTO: 662 THOUSANDS/UL (ref 149–390)
PMV BLD AUTO: 8.3 FL (ref 8.9–12.7)
POTASSIUM SERPL-SCNC: 4 MMOL/L (ref 3.5–5.3)
RBC # BLD AUTO: 4.04 MILLION/UL (ref 3.81–5.12)
SODIUM SERPL-SCNC: 142 MMOL/L (ref 136–145)
WBC # BLD AUTO: 11.62 THOUSAND/UL (ref 4.31–10.16)

## 2019-01-15 PROCEDURE — 99232 SBSQ HOSP IP/OBS MODERATE 35: CPT | Performed by: INTERNAL MEDICINE

## 2019-01-15 PROCEDURE — 86140 C-REACTIVE PROTEIN: CPT | Performed by: INTERNAL MEDICINE

## 2019-01-15 PROCEDURE — 80048 BASIC METABOLIC PNL TOTAL CA: CPT | Performed by: INTERNAL MEDICINE

## 2019-01-15 PROCEDURE — 85652 RBC SED RATE AUTOMATED: CPT | Performed by: INTERNAL MEDICINE

## 2019-01-15 PROCEDURE — 85027 COMPLETE CBC AUTOMATED: CPT | Performed by: INTERNAL MEDICINE

## 2019-01-15 RX ADMIN — HYOSCYAMINE SULFATE 0.12 MG: 0.12 SOLUTION/ DROPS ORAL at 13:23

## 2019-01-15 RX ADMIN — FERROUS SULFATE TAB 325 MG (65 MG ELEMENTAL FE) 325 MG: 325 (65 FE) TAB at 16:50

## 2019-01-15 RX ADMIN — HYOSCYAMINE SULFATE 0.12 MG: 0.12 SOLUTION/ DROPS ORAL at 17:23

## 2019-01-15 RX ADMIN — METHYLPREDNISOLONE SODIUM SUCCINATE 20 MG: 40 INJECTION, POWDER, FOR SOLUTION INTRAMUSCULAR; INTRAVENOUS at 21:45

## 2019-01-15 RX ADMIN — LEVOTHYROXINE SODIUM 25 MCG: 25 TABLET ORAL at 05:48

## 2019-01-15 RX ADMIN — ZOLPIDEM TARTRATE 10 MG: 5 TABLET, COATED ORAL at 22:03

## 2019-01-15 RX ADMIN — FERROUS SULFATE TAB 325 MG (65 MG ELEMENTAL FE) 325 MG: 325 (65 FE) TAB at 10:34

## 2019-01-15 RX ADMIN — HYOSCYAMINE SULFATE 0.12 MG: 0.12 SOLUTION/ DROPS ORAL at 05:47

## 2019-01-15 RX ADMIN — HYOSCYAMINE SULFATE 0.12 MG: 0.12 SOLUTION/ DROPS ORAL at 09:29

## 2019-01-15 RX ADMIN — METHYLPREDNISOLONE SODIUM SUCCINATE 20 MG: 40 INJECTION, POWDER, FOR SOLUTION INTRAMUSCULAR; INTRAVENOUS at 08:44

## 2019-01-15 RX ADMIN — VITAMIN D, TAB 1000IU (100/BT) 3000 UNITS: 25 TAB at 10:34

## 2019-01-15 RX ADMIN — HYOSCYAMINE SULFATE 0.12 MG: 0.12 SOLUTION/ DROPS ORAL at 22:06

## 2019-01-15 NOTE — PROGRESS NOTES
Progress Note - Samir Carreon 1965, 48 y o  female MRN: [id number]    Unit/Bed#: -01 Encounter: [id number]    Primary Care Provider: Raul Allen DO   Date and time admitted to hospital: 1/11/2019  8:02 PM        * Ulcerative colitis with rectal bleeding ([facility] )   Assessment & Plan    · Clinically improving  · Likely malabsorption of oral steroids in the setting of hypoalbuminemia  · Presently, patient does not have the GI bleeding anymore  Patient told me that her stools are greenish in color  · POA  Patient sent in by GI  Patient hospitalized in November for acute exacerbation  Was discharged on 11/25/18 on 60mg of prednisone (tapering down by 5mg Q2 weeks)  Patient made it do 35mg of prednisone, but began to feel worse (increased BMs, fatigue, dehydration, fevers, weight loss)  CRP elevated  Currently on Entyvio-- next dose is Friday this week  · Continue IV steroid  · Diet was advanced to surgically soft diet today  · IVF, replete electrolytes p r n  Lotus Hall · Continue lo-motil, hyosyne  · Monitor inflammatory markers, CBC on daily basis  Monitor abdominal exam closely  If worsening findings on abdominal exam, for KUB to assess for megacolon  · C diff PCR was negative  · GI doctor on board  · ON IV Solu-Medrol 20 mg twice a day  Slow steroid taper per GI     Mild protein-calorie malnutrition (HCC)   Assessment & Plan    Malnutrition Findings:   Malnutrition type: Chronic illness  Degree of Malnutrition: Malnutrition of mild degree    BMI Findings: Body mass index is 25 93 kg/m²  · Diet was advanced today to surgically soft diet  · Continue with Ensure nutritional supplements  Iron deficiency anemia   Assessment & Plan    · Stable and better  · With acute blood loss anemia due to GI bleeding  · Today, patient did not report any blood in the stools  · Serum iron is low and serum ferritin at low normal levels  · Ferrous sulfate    · Likely reactive thrombocytosis due to iron deficiency     Asthma, mild intermittent   Assessment & Plan    · No wheezing on auscultation  · Continue PRN nebs     Hypothyroidism   Assessment & Plan    · Continue synthroid               VTE Pharmacologic Prophylaxis:   Pharmacologic: Pharmacologic VTE Prophylaxis contraindicated due to rectal bleed  Mechanical VTE Prophylaxis in Place: Yes    Patient Centered Rounds:     Discussions with Specialists or Other Care Team Provider: GI    Education and Discussions with Family / Patient:  at bedside    Time Spent for Care: 15 minutes  More than 50% of total time spent on counseling and coordination of care as described above  Current Length of Stay: 4 day(s)    Current Patient Status: Inpatient   Certification Statement: The patient will continue to require additional inpatient hospital stay due to above    Discharge Plan / Estimated Discharge Date: On IV steroid    Code Status: Level 1 - Full Code      Subjective:   Patient had 1 episode of abdominal cramps with multiple bowel movement this morning around 5 o'clock  Currently feeling better  Objective:     Vitals:   Temp (24hrs), Av 5 °F (36 9 °C), Min:97 9 °F (36 6 °C), Max:99 °F (37 2 °C)    Temp:  [97 9 °F (36 6 °C)-99 °F (37 2 °C)] 97 9 °F (36 6 °C)  HR:  [50-68] 68  Resp:  [18] 18  BP: (113-145)/(56-73) 137/73  SpO2:  [96 %-100 %] 100 %  Body mass index is 25 93 kg/m²  Input and Output Summary (last 24 hours):     No intake or output data in the 24 hours ending 01/15/19 1140    Physical Exam:     Physical Exam   Constitutional: She is oriented to person, place, and time  No distress  Eyes: Pupils are equal, round, and reactive to light  Conjunctivae are normal    Neck: Normal range of motion  Neck supple  Cardiovascular: Normal rate  Pulmonary/Chest: Effort normal and breath sounds normal  No respiratory distress  Abdominal: Soft  Bowel sounds are normal  She exhibits no distension  Musculoskeletal: She exhibits no edema  Neurological: She is alert and oriented to person, place, and time  Skin: Skin is warm  She is not diaphoretic  Psychiatric: She has a normal mood and affect  Additional Data:     Labs:      Results from last 7 days  Lab Units 01/15/19  0553  01/09/19  1409   WBC Thousand/uL 11 62*  < > 11 00*   HEMOGLOBIN g/dL 10 7*  < > 10 7*   HEMATOCRIT % 35 7  < > 35 1   PLATELETS Thousands/uL 662*  < > 550*   NEUTROS PCT %  --   --  87*   LYMPHS PCT %  --   --  10*   MONOS PCT %  --   --  2*   EOS PCT %  --   --  0   < > = values in this interval not displayed  Results from last 7 days  Lab Units 01/15/19  0553  01/09/19  1409   POTASSIUM mmol/L 4 0  < > 3 4*   CHLORIDE mmol/L 105  < > 99*   CO2 mmol/L 28  < > 30   BUN mg/dL 7  < > 9   CREATININE mg/dL 0 79  < > 0 88   CALCIUM mg/dL 10 1  < > 10 2*   ALK PHOS U/L  --   --  74   ALT U/L  --   --  13   AST U/L  --   --  10   < > = values in this interval not displayed  Recent Cultures (last 7 days):       Results from last 7 days  Lab Units 01/09/19  1428   C DIFF TOXIN B  NEGATIVE for C difficle toxin by PCR          Last 24 Hours Medication List:     Current Facility-Administered Medications:  acetaminophen 650 mg Oral Q6H PRN Kathi Jones PA-C    albuterol 1 25 mg Nebulization Q6H PRN Kathi Jones PA-C    cholecalciferol 3,000 Units Oral Daily Kathi Jones PA-C    ferrous sulfate 325 mg Oral TID With Meals Kathi Jones PA-C    hyoscyamine 0 125 mg Oral Q4H PRN Kathi Jones PA-C    levothyroxine 25 mcg Oral Early Morning Kathi Jones PA-C    methylPREDNISolone sodium succinate 20 mg Intravenous Q12H [address] Manav Gonzalez PA-C    ondansetron 4 mg Intravenous Q6H PRN Kathi Jones PA-C    phenazopyridine 200 mg Oral TID PRN Kathi Jones PA-C    sodium chloride 50 mL/hr Intravenous Continuous Manav Gonzalez PA-C Last Rate: Stopped (01/15/19 1035)   zolpidem 10 mg Oral HS Kaila Payne, YFN         Today, Patient Was Seen By: Art Thomas MD    ** Please Note: This note has been constructed using a voice recognition system   **

## 2019-01-15 NOTE — UTILIZATION REVIEW
Continued Stay Review    Date: 1/15/2019  Persistent abdominal cramping with multiple bowel movements    Vital Signs: /73 (BP Location: Right arm)   Pulse 68   Temp 97 9 °F (36 6 °C) (Oral)   Resp 18   Ht 5' 3" (1 6 m)   Wt 66 4 kg (146 lb 6 4 oz)   SpO2 100%   BMI 25 93 kg/m²     Medications:   Scheduled Meds:   Current Facility-Administered Medications:  acetaminophen 650 mg Oral Q6H PRN    albuterol 1 25 mg Nebulization Q6H PRN    cholecalciferol 3,000 Units Oral Daily    ferrous sulfate 325 mg Oral TID With Meals    hyoscyamine 0 125 mg Oral Q4H PRN    levothyroxine 25 mcg Oral Early Morning    methylPREDNISolone sodium succinate 20 mg Intravenous Q12H [facility] & half-way    ondansetron 4 mg Intravenous Q6H PRN    phenazopyridine 200 mg Oral TID PRN    sodium chloride 50 mL/hr Intravenous Continuous Last Rate: Stopped (01/15/19 1035)   zolpidem 10 mg Oral HS      Continuous Infusions:   sodium chloride 50 mL/hr Last Rate: Stopped (01/15/19 1035)     PRN Meds:     Hyoscyamine - used x 2  Abnormal Labs/Diagnostic Results:   Wbc 11 62, hgb 10 , hct 35 7  CRP 17 3  Sed rate 38    Calprotectin,Fecal [[id number]] (Abnormal) Collected: 01/09/19 1428   Lab Status: Final result Specimen: Stool Updated: 01/14/19 1406    Calprotectin 443 (H) ug/g     Comment: Concentration     Interpretation   Follow-Up   <16 - 50 ug/g     Normal           None   >50 -120 ug/g     Borderline       Re-evaluate in 4-6 weeks       >120 ug/g     Abnormal         Repeat as clinically                                      indicated        Age/Sex: 48 y o  female     Assessment/Plan:   Ulcerative colitis with rectal bleeding (HCC)   Assessment & Plan     · Clinically improving  · Likely malabsorption of oral steroids in the setting of hypoalbuminemia  · Presently, patient does not have the GI bleeding anymore  Patient told me that her stools are greenish in color  · POA  Patient sent in by GI   Patient hospitalized in November for acute exacerbation  Was discharged on 11/25/18 on 60mg of prednisone (tapering down by 5mg Q2 weeks)  Patient made it do 35mg of prednisone, but began to feel worse (increased BMs, fatigue, dehydration, fevers, weight loss)  CRP elevated  Currently on Entyvio-- next dose is Friday this week  · Continue IV steroid  · Diet was advanced to surgically soft diet today  · IVF, replete electrolytes p r n  [place] · Continue lo-motil, hyosyne  · Monitor inflammatory markers, CBC on daily basis  Monitor abdominal exam closely  If worsening findings on abdominal exam, for KUB to assess for megacolon  · C diff PCR was negative  · GI doctor on board  · ON IV Solu-Medrol 20 mg twice a day  Slow steroid taper per GI      Mild protein-calorie malnutrition (HCC)   Assessment & Plan     Malnutrition Findings:   Malnutrition type: Chronic illness  Degree of Malnutrition: Malnutrition of mild degree     BMI Findings: Body mass index is 25 93 kg/m²       · Diet was advanced today to surgically soft diet  · Continue with Ensure nutritional supplements       Iron deficiency anemia   Assessment & Plan     · Stable and better  · With acute blood loss anemia due to GI bleeding  · Today, patient did not report any blood in the stools  · Serum iron is low and serum ferritin at low normal levels  · Ferrous sulfate  · Likely reactive thrombocytosis due to iron deficiency      Asthma, mild intermittent   Assessment & Plan     · No wheezing on auscultation  · Continue PRN nebs      Hypothyroidism   Assessment & Plan     · Continue synthroid             Discharge Plan: to be determined                       [address] [facility]  Utilization Review Department  Phone: [phone]; Fax [phone]  [email or web address]  ATTENTION: Please call with any questions or concerns to [phone]  and carefully listen to the prompts so that you are directed to the right person     Send all requests for admission clinical reviews, approved or denied determinations and any other requests to fax [phone]   All voicemails are confidential

## 2019-01-15 NOTE — ASSESSMENT & PLAN NOTE
· Stable and better  · With acute blood loss anemia due to GI bleeding  · Today, patient did not report any blood in the stools  · Serum iron is low and serum ferritin at low normal levels  · Ferrous sulfate    · Likely reactive thrombocytosis due to iron deficiency

## 2019-01-15 NOTE — PROGRESS NOTES
Progress Note - Raguel Bence 48 y o  female MRN: [id number]    Unit/Bed#: -01 Encounter: [id number]        Subjective:   Patient's that she has had about 3 bowel movements since last night which were associated with a lot of abdominal cramping  She says the Levsin is helping only a little bit  She says her stools are not very formed, she denies any rectal bleeding at this time  Objective:     Vitals: Blood pressure 131/62, pulse 86, temperature 99 4 °F (37 4 °C), temperature source Oral, resp  rate 18, height 5' 3" (1 6 m), weight 66 4 kg (146 lb 6 4 oz), SpO2 99 %  ,Body mass index is 25 93 kg/m²  No intake or output data in the 24 hours ending 01/15/19 1646    Physical Exam:   General appearance: alert, appears stated age and cooperative  Lungs: clear to auscultation bilaterally, no labored breathing/accessory muscle use  Heart: regular rate and rhythm, S1, S2 normal, no murmur, click, rub or gallop  Abdomen: soft,generalized tenderness without guarding; bowel sounds normal; no masses,  no organomegaly  Extremities: no edema    Invasive Devices     Peripheral Intravenous Line            Peripheral IV 01/11/19 Left Forearm 3 days                Lab, Imaging and other studies: I have personally reviewed pertinent reports      Admission on 01/11/2019   Component Date Value    Sodium 01/12/2019 140     Potassium 01/12/2019 4 6     Chloride 01/12/2019 106     CO2 01/12/2019 28     ANION GAP 01/12/2019 6     BUN 01/12/2019 5     Creatinine 01/12/2019 0 72     Glucose 01/12/2019 154*    Calcium 01/12/2019 9 9     eGFR 01/12/2019 96     WBC 01/12/2019 10 64*    RBC 01/12/2019 3 34*    Hemoglobin 01/12/2019 8 9*    Hematocrit 01/12/2019 29 4*    MCV 01/12/2019 88     MCH 01/12/2019 26 6*    MCHC 01/12/2019 30 3*    RDW 01/12/2019 13 5     Platelets 89/50/9228 471*    MPV 01/12/2019 8 7*    Magnesium 01/12/2019 1 9     CRP 01/12/2019 70 3*    Sed Rate 01/12/2019 46*    WBC 01/13/2019 13 50*    RBC 01/13/2019 3 35*    Hemoglobin 01/13/2019 8 9*    Hematocrit 01/13/2019 29 8*    MCV 01/13/2019 89     MCH 01/13/2019 26 6*    MCHC 01/13/2019 29 9*    RDW 01/13/2019 13 7     Platelets 46/36/4139 522*    MPV 01/13/2019 8 6*    CRP 01/13/2019 38 6*    Sed Rate 01/13/2019 36*    WBC 01/14/2019 9 87     RBC 01/14/2019 3 39*    Hemoglobin 01/14/2019 9 1*    Hematocrit 01/14/2019 30 2*    MCV 01/14/2019 89     MCH 01/14/2019 26 8     MCHC 01/14/2019 30 1*    RDW 01/14/2019 13 8     Platelets 69/60/7942 529*    MPV 01/14/2019 8 7*    CRP 01/14/2019 25 9*    WBC 01/15/2019 11 62*    RBC 01/15/2019 4 04     Hemoglobin 01/15/2019 10 7*    Hematocrit 01/15/2019 35 7     MCV 01/15/2019 88     MCH 01/15/2019 26 5*    MCHC 01/15/2019 30 0*    RDW 01/15/2019 13 8     Platelets 10/59/1266 662*    MPV 01/15/2019 8 3*    CRP 01/15/2019 17 3*    Sed Rate 01/15/2019 38*    Sodium 01/15/2019 142     Potassium 01/15/2019 4 0     Chloride 01/15/2019 105     CO2 01/15/2019 28     ANION GAP 01/15/2019 9     BUN 01/15/2019 7     Creatinine 01/15/2019 0 79     Glucose 01/15/2019 117     Calcium 01/15/2019 10 1     eGFR 01/15/2019 86      Results for Hannah Kennedy (MRN [id number]) as of 1/15/2019 16:46   Ref   Range 1/12/2019 05:09 1/12/2019 05:11 1/12/2019 09:05 1/13/2019 05:40 1/13/2019 10:27 1/14/2019 05:15 1/15/2019 05:53   eGFR Latest Units: ml/min/1 73sq m 96      86   Sodium Latest Ref Range: 136 - 145 mmol/L 140      142   Potassium Latest Ref Range: 3 5 - 5 3 mmol/L 4 6      4 0   Chloride Latest Ref Range: 100 - 108 mmol/L 106      105   CO2 Latest Ref Range: 21 - 32 mmol/L 28      28   Anion Gap Latest Ref Range: 4 - 13 mmol/L 6      9   BUN Latest Ref Range: 5 - 25 mg/dL 5      7   Creatinine Latest Ref Range: 0 60 - 1 30 mg/dL 0 72      0 79   Glucose, Random Latest Ref Range: 65 - 140 mg/dL 154 (H)      117   Calcium Latest Ref Range: 8 3 - 10 1 mg/dL 9 9      10 1 Magnesium Latest Ref Range: 1 6 - 2 6 mg/dL 1 9         WBC Latest Ref Range: 4 31 - 10 16 Thousand/uL  10 64 (H)  13 50 (H)  9 87 11 62 (H)   Red Blood Cell Count Latest Ref Range: 3 81 - 5 12 Million/uL  3 34 (L)  3 35 (L)  3 39 (L) 4 04   Hemoglobin Latest Ref Range: 11 5 - 15 4 g/dL  8 9 (L)  8 9 (L)  9 1 (L) 10 7 (L)   HCT Latest Ref Range: 34 8 - 46 1 %  29 4 (L)  29 8 (L)  30 2 (L) 35 7   MCV Latest Ref Range: 82 - 98 fL  88  89  89 88   MCH Latest Ref Range: 26 8 - 34 3 pg  26 6 (L)  26 6 (L)  26 8 26 5 (L)   MCHC Latest Ref Range: 31 4 - 37 4 g/dL  30 3 (L)  29 9 (L)  30 1 (L) 30 0 (L)   RDW Latest Ref Range: 11 6 - 15 1 %  13 5  13 7  13 8 13 8   Platelet Count Latest Ref Range: 149 - 390 Thousands/uL  471 (H)  522 (H)  529 (H) 662 (H)   MPV Latest Ref Range: 8 9 - 12 7 fL  8 7 (L)  8 6 (L)  8 7 (L) 8 3 (L)   Sed Rate Latest Ref Range: 0 - 20 mm/hour   46 (H)  36 (H)  38 (H)   C-REACTIVE PROTEIN Latest Ref Range: <3 0 mg/L 70 3 (H)    38 6 (H) 25 9 (H) 17 3 (H)       Assessment/Plan:    1  Severe ulcerative colitis which has historically been refractory to oral steroid therapy; has developed antibodies to Humira; has recently started [facility] and is due for her 3rd induction dose this Friday  She appears to be having persistent symptoms while on IV steroids, although appears stable today after her dosage of Solumedrol was decreased yesterday from 20 mg TID to 20 mg BID        - Will discuss with pharmacy about having patient receive Entyvio as inpatient, as she requires her induction dose in a timely fashion to achieve steady state  - Continue IV solumedrol 20 mg BID for now and will reassess tomorrow; if she appears stable/improving, can try 20 mg IV in the morning followed by 40 mg prednisone in the evening  - Low residue diet  - Continue Levsin as needed for bowel spasm

## 2019-01-15 NOTE — ASSESSMENT & PLAN NOTE
· Clinically improving  · Likely malabsorption of oral steroids in the setting of hypoalbuminemia  · Presently, patient does not have the GI bleeding anymore  Patient told me that her stools are greenish in color  · POA  Patient sent in by GI  Patient hospitalized in November for acute exacerbation  Was discharged on 11/25/18 on 60mg of prednisone (tapering down by 5mg Q2 weeks)  Patient made it do 35mg of prednisone, but began to feel worse (increased BMs, fatigue, dehydration, fevers, weight loss)  CRP elevated  Currently on Entyvio-- next dose is Friday this week  · Continue IV steroid  · Diet was advanced to surgically soft diet today  · IVF, replete electrolytes p r n  [place] · Continue lo-motil, hyosyne  · Monitor inflammatory markers, CBC on daily basis  Monitor abdominal exam closely  If worsening findings on abdominal exam, for KUB to assess for megacolon  · C diff PCR was negative  · GI doctor on board  · ON IV Solu-Medrol 20 mg twice a day   Slow steroid taper per GI

## 2019-01-16 PROBLEM — R60.0 BILATERAL LEG EDEMA: Status: ACTIVE | Noted: 2019-01-16

## 2019-01-16 PROCEDURE — 99232 SBSQ HOSP IP/OBS MODERATE 35: CPT | Performed by: INTERNAL MEDICINE

## 2019-01-16 RX ORDER — PREDNISONE 20 MG/1
40 TABLET ORAL DAILY
Status: DISCONTINUED | OUTPATIENT
Start: 2019-01-16 | End: 2019-01-17 | Stop reason: HOSPADM

## 2019-01-16 RX ADMIN — PREDNISONE 40 MG: 20 TABLET ORAL at 18:32

## 2019-01-16 RX ADMIN — HYOSCYAMINE SULFATE 0.12 MG: 0.12 SOLUTION/ DROPS ORAL at 08:19

## 2019-01-16 RX ADMIN — ZOLPIDEM TARTRATE 10 MG: 5 TABLET, COATED ORAL at 21:39

## 2019-01-16 RX ADMIN — LEVOTHYROXINE SODIUM 25 MCG: 25 TABLET ORAL at 05:22

## 2019-01-16 RX ADMIN — VITAMIN D, TAB 1000IU (100/BT) 3000 UNITS: 25 TAB at 08:18

## 2019-01-16 RX ADMIN — METHYLPREDNISOLONE SODIUM SUCCINATE 20 MG: 40 INJECTION, POWDER, FOR SOLUTION INTRAMUSCULAR; INTRAVENOUS at 08:18

## 2019-01-16 RX ADMIN — HYOSCYAMINE SULFATE 0.12 MG: 0.12 SOLUTION/ DROPS ORAL at 16:47

## 2019-01-16 RX ADMIN — FERROUS SULFATE TAB 325 MG (65 MG ELEMENTAL FE) 325 MG: 325 (65 FE) TAB at 08:18

## 2019-01-16 RX ADMIN — FERROUS SULFATE TAB 325 MG (65 MG ELEMENTAL FE) 325 MG: 325 (65 FE) TAB at 18:32

## 2019-01-16 RX ADMIN — FERROUS SULFATE TAB 325 MG (65 MG ELEMENTAL FE) 325 MG: 325 (65 FE) TAB at 12:22

## 2019-01-16 RX ADMIN — HYOSCYAMINE SULFATE 0.12 MG: 0.12 SOLUTION/ DROPS ORAL at 12:22

## 2019-01-16 RX ADMIN — ENOXAPARIN SODIUM 40 MG: 40 INJECTION SUBCUTANEOUS at 10:51

## 2019-01-16 NOTE — PROGRESS NOTES
Progress Note - Jovi Shane 48 y o  female MRN: [id number]    Unit/Bed#: -01 Encounter: [id number]        Subjective:   Patient reports feeling better than yesterday, she had 1 bowel movement this morning which was associated with pain and cramping like yesterday, but not as bad  The stools are less frequent, she said she also tolerated her diet this morning  No rectal bleeding  Still feels weak  No subjective fevers or chills  Objective:     Vitals: Blood pressure 151/65, pulse 71, temperature 99 °F (37 2 °C), temperature source Oral, resp  rate 18, height 5' 3" (1 6 m), weight 66 4 kg (146 lb 6 4 oz), SpO2 100 %  ,Body mass index is 25 93 kg/m²  No intake or output data in the 24 hours ending 01/16/19 1111    Physical Exam:   General appearance: alert, appears stated age and cooperative  Lungs: clear to auscultation bilaterally, no labored breathing/accessory muscle use  Heart: regular rate and rhythm, S1, S2 normal, no murmur, click, rub or gallop  Abdomen: soft, non-tender; bowel sounds normal; no masses,  no organomegaly  Extremities: no edema    Invasive Devices     Peripheral Intravenous Line            Peripheral IV 01/11/19 Left Forearm 4 days                Lab, Imaging and other studies: I have personally reviewed pertinent reports      Admission on 01/11/2019   Component Date Value    Sodium 01/12/2019 140     Potassium 01/12/2019 4 6     Chloride 01/12/2019 106     CO2 01/12/2019 28     ANION GAP 01/12/2019 6     BUN 01/12/2019 5     Creatinine 01/12/2019 0 72     Glucose 01/12/2019 154*    Calcium 01/12/2019 9 9     eGFR 01/12/2019 96     WBC 01/12/2019 10 64*    RBC 01/12/2019 3 34*    Hemoglobin 01/12/2019 8 9*    Hematocrit 01/12/2019 29 4*    MCV 01/12/2019 88     MCH 01/12/2019 26 6*    MCHC 01/12/2019 30 3*    RDW 01/12/2019 13 5     Platelets 98/07/2613 471*    MPV 01/12/2019 8 7*    Magnesium 01/12/2019 1 9     CRP 01/12/2019 70 3*    Sed Rate 01/12/2019 46*    WBC 01/13/2019 13 50*    RBC 01/13/2019 3 35*    Hemoglobin 01/13/2019 8 9*    Hematocrit 01/13/2019 29 8*    MCV 01/13/2019 89     MCH 01/13/2019 26 6*    MCHC 01/13/2019 29 9*    RDW 01/13/2019 13 7     Platelets 97/64/4100 522*    MPV 01/13/2019 8 6*    CRP 01/13/2019 38 6*    Sed Rate 01/13/2019 36*    WBC 01/14/2019 9 87     RBC 01/14/2019 3 39*    Hemoglobin 01/14/2019 9 1*    Hematocrit 01/14/2019 30 2*    MCV 01/14/2019 89     MCH 01/14/2019 26 8     MCHC 01/14/2019 30 1*    RDW 01/14/2019 13 8     Platelets 02/60/4674 529*    MPV 01/14/2019 8 7*    CRP 01/14/2019 25 9*    WBC 01/15/2019 11 62*    RBC 01/15/2019 4 04     Hemoglobin 01/15/2019 10 7*    Hematocrit 01/15/2019 35 7     MCV 01/15/2019 88     MCH 01/15/2019 26 5*    MCHC 01/15/2019 30 0*    RDW 01/15/2019 13 8     Platelets 26/20/2190 662*    MPV 01/15/2019 8 3*    CRP 01/15/2019 17 3*    Sed Rate 01/15/2019 38*    Sodium 01/15/2019 142     Potassium 01/15/2019 4 0     Chloride 01/15/2019 105     CO2 01/15/2019 28     ANION GAP 01/15/2019 9     BUN 01/15/2019 7     Creatinine 01/15/2019 0 79     Glucose 01/15/2019 117     Calcium 01/15/2019 10 1     eGFR 01/15/2019 86      Results for Jaycee Lara (MRN [id number]) as of 1/16/2019 11:11   Ref   Range 1/12/2019 05:11 1/12/2019 09:05 1/13/2019 05:40 1/13/2019 10:27 1/14/2019 05:15 1/15/2019 05:53   eGFR Latest Units: ml/min/1 73sq m      86   Sodium Latest Ref Range: 136 - 145 mmol/L      142   Potassium Latest Ref Range: 3 5 - 5 3 mmol/L      4 0   Chloride Latest Ref Range: 100 - 108 mmol/L      105   CO2 Latest Ref Range: 21 - 32 mmol/L      28   Anion Gap Latest Ref Range: 4 - 13 mmol/L      9   BUN Latest Ref Range: 5 - 25 mg/dL      7   Creatinine Latest Ref Range: 0 60 - 1 30 mg/dL      0 79   Glucose, Random Latest Ref Range: 65 - 140 mg/dL      117   Calcium Latest Ref Range: 8 3 - 10 1 mg/dL      10 1   WBC Latest Ref Range: 4 31 - 10 16 Thousand/uL 10 64 (H)  13 50 (H)  9 87 11 62 (H)   Red Blood Cell Count Latest Ref Range: 3 81 - 5 12 Million/uL 3 34 (L)  3 35 (L)  3 39 (L) 4 04   Hemoglobin Latest Ref Range: 11 5 - 15 4 g/dL 8 9 (L)  8 9 (L)  9 1 (L) 10 7 (L)   HCT Latest Ref Range: 34 8 - 46 1 % 29 4 (L)  29 8 (L)  30 2 (L) 35 7   MCV Latest Ref Range: 82 - 98 fL 88  89  89 88   MCH Latest Ref Range: 26 8 - 34 3 pg 26 6 (L)  26 6 (L)  26 8 26 5 (L)   MCHC Latest Ref Range: 31 4 - 37 4 g/dL 30 3 (L)  29 9 (L)  30 1 (L) 30 0 (L)   RDW Latest Ref Range: 11 6 - 15 1 % 13 5  13 7  13 8 13 8   Platelet Count Latest Ref Range: 149 - 390 Thousands/uL 471 (H)  522 (H)  529 (H) 662 (H)   MPV Latest Ref Range: 8 9 - 12 7 fL 8 7 (L)  8 6 (L)  8 7 (L) 8 3 (L)   Sed Rate Latest Ref Range: 0 - 20 mm/hour  46 (H)  36 (H)  38 (H)   C-REACTIVE PROTEIN Latest Ref Range: <3 0 mg/L    38 6 (H) 25 9 (H) 17 3 (H)       Assessment/Plan:    1  Severe ulcerative colitis with pancolitis, recently with rapid progression, failed Humira, having been started on Entyvio with 3rd dose due this Friday  She recently had a flare up while tapering her prednisone; she now appears to be starting to improve while on IV steroids, with some improvement of symptoms noted between yesterday and today  - will transition to oral prednisone this evening, 40 mg once daily    She will require a very slow taper  - next Entyvio infusion is on Friday  - continue Levsin as needed for bowel spasm  - low residue diet  - will discuss with her primary GI regarding long term adjunctive therapy with Imuran or methotrexate

## 2019-01-16 NOTE — ASSESSMENT & PLAN NOTE
· Stable and better  · With acute blood loss anemia due to GI bleeding  ·  patient did not report any blood in the stools  · Serum iron is low and serum ferritin at low normal levels  · Ferrous sulfate    · Likely reactive thrombocytosis due to iron deficiency

## 2019-01-16 NOTE — PROGRESS NOTES
Patient refusing to IV changed  Understands risk of keeping in  IV  Stated she will address with the doctor during rounding

## 2019-01-16 NOTE — PROGRESS NOTES
Progress Note - Giovanny Valle 1965, 48 y o  female MRN: [id number]    Unit/Bed#: -01 Encounter: [id number]    Primary Care Provider: Jane Dial DO   Date and time admitted to hospital: 1/11/2019  8:02 PM        * Ulcerative colitis with rectal bleeding ([facility] )   Assessment & Plan    · Clinically improving  · Likely malabsorption of oral steroids in the setting of hypoalbuminemia  · Presently, patient does not have the GI bleeding anymore  Patient told me that her stools are greenish in color  · POA  Patient sent in by GI  Patient hospitalized in November for acute exacerbation  Was discharged on 11/25/18 on 60mg of prednisone (tapering down by 5mg Q2 weeks)  Patient made it do 35mg of prednisone, but began to feel worse (increased BMs, fatigue, dehydration, fevers, weight loss)  CRP elevated  Currently on Entyvio-- next dose is Friday this week  · Continue IV steroid  · Tolerating diet  · C diff PCR was negative  · ON IV Solu-Medrol 20 mg twice a day  Slow steroid taper per GI     Iron deficiency anemia   Assessment & Plan    · Stable and better  · With acute blood loss anemia due to GI bleeding  ·  patient did not report any blood in the stools  · Serum iron is low and serum ferritin at low normal levels  · Ferrous sulfate  · Likely reactive thrombocytosis due to iron deficiency     Bilateral leg edema   Assessment & Plan    Mild due to IVF and hypoalbuminemia  IVF discontinued  Monitor     Leukocytosis   Assessment & Plan    · Resolved  · Likely due to steroids  Mild protein-calorie malnutrition ([facility] )   Assessment & Plan    Malnutrition Findings:   Malnutrition type: Chronic illness  Degree of Malnutrition: Malnutrition of mild degree    BMI Findings: Body mass index is 25 93 kg/m²  · Diet was advanced today to surgically soft diet  · Continue with Ensure nutritional supplements       Asthma, mild intermittent   Assessment & Plan    · No wheezing on auscultation  · Continue PRN nebs     Hypothyroidism   Assessment & Plan    · Continue synthroid        VTE Pharmacologic Prophylaxis:   Pharmacologic: Enoxaparin (Lovenox) now that her diarrhea/bloody stool has resolved  Mechanical VTE Prophylaxis in Place: Yes    Patient Centered Rounds: I have performed bedside rounds with nursing staff today  Discussions with Specialists or Other Care Team Provider:     Education and Discussions with Family / Patient:     Time Spent for Care: 20 minutes  More than 50% of total time spent on counseling and coordination of care as described above  Current Length of Stay: 5 day(s)    Current Patient Status: Inpatient   Certification Statement: The patient will continue to require additional inpatient hospital stay due to above    Discharge Plan / Estimated Discharge Date: On IV steroid/not ready    Code Status: Level 1 - Full Code      Subjective:   No further diarrhea or bloody stool last 24 hours  Leg edema slightly improved since IVF was discontinued yesterday     Objective:     Vitals:   Temp (24hrs), Av 9 °F (37 2 °C), Min:98 3 °F (36 8 °C), Max:99 4 °F (37 4 °C)    Temp:  [98 3 °F (36 8 °C)-99 4 °F (37 4 °C)] 99 °F (37 2 °C)  HR:  [49-86] 71  Resp:  [18] 18  BP: (114-151)/(58-65) 151/65  SpO2:  [97 %-100 %] 100 %  Body mass index is 25 93 kg/m²  Input and Output Summary (last 24 hours):     No intake or output data in the 24 hours ending 19 1013    Physical Exam:     Physical Exam   Constitutional: She is oriented to person, place, and time  No distress  Cardiovascular: Normal rate and regular rhythm  Pulmonary/Chest: Effort normal and breath sounds normal  No respiratory distress  Abdominal: Soft  Bowel sounds are normal  She exhibits no distension  There is no tenderness  Musculoskeletal:   Trace LE edema   Neurological: She is alert and oriented to person, place, and time  Skin: Skin is warm and dry  She is not diaphoretic     Psychiatric: She has a normal mood and affect  Additional Data:     Labs:      Results from last 7 days  Lab Units 01/15/19  0553  01/09/19  1409   WBC Thousand/uL 11 62*  < > 11 00*   HEMOGLOBIN g/dL 10 7*  < > 10 7*   HEMATOCRIT % 35 7  < > 35 1   PLATELETS Thousands/uL 662*  < > 550*   NEUTROS PCT %  --   --  87*   LYMPHS PCT %  --   --  10*   MONOS PCT %  --   --  2*   EOS PCT %  --   --  0   < > = values in this interval not displayed  Results from last 7 days  Lab Units 01/15/19  0553  01/09/19  1409   POTASSIUM mmol/L 4 0  < > 3 4*   CHLORIDE mmol/L 105  < > 99*   CO2 mmol/L 28  < > 30   BUN mg/dL 7  < > 9   CREATININE mg/dL 0 79  < > 0 88   CALCIUM mg/dL 10 1  < > 10 2*   ALK PHOS U/L  --   --  74   ALT U/L  --   --  13   AST U/L  --   --  10   < > = values in this interval not displayed  Recent Cultures (last 7 days):       Results from last 7 days  Lab Units 01/09/19  1428   C DIFF TOXIN B  NEGATIVE for C difficle toxin by PCR  Last 24 Hours Medication List:     Current Facility-Administered Medications:  acetaminophen 650 mg Oral Q6H PRN Kathi Jones PA-C   albuterol 1 25 mg Nebulization Q6H PRN Kathi Jones PA-C   cholecalciferol 3,000 Units Oral Daily Kathi Jones PA-C   enoxaparin 40 mg Subcutaneous Q24H [address] Ruben Odonnell MD   ferrous sulfate 325 mg Oral TID With Meals Kathi Jones PA-C   hyoscyamine 0 125 mg Oral Q4H PRN Kathi Jones PA-C   levothyroxine 25 mcg Oral Early Morning Kathi Jones PA-C   methylPREDNISolone sodium succinate 20 mg Intravenous Q12H [address] Manav Gonzalez PA-C   ondansetron 4 mg Intravenous Q6H PRN Kathi Jones PA-C   phenazopyridine 200 mg Oral TID PRN Kathi Jones PA-C   zolpidem 10 mg Oral HS YFN [place]        Today, Patient Was Seen By: Ruben Odonnell MD    ** Please Note: This note has been constructed using a voice recognition system   **

## 2019-01-16 NOTE — ASSESSMENT & PLAN NOTE
· Clinically improving  · Likely malabsorption of oral steroids in the setting of hypoalbuminemia  · Presently, patient does not have the GI bleeding anymore  Patient told me that her stools are greenish in color  · POA  Patient sent in by GI  Patient hospitalized in November for acute exacerbation  Was discharged on 11/25/18 on 60mg of prednisone (tapering down by 5mg Q2 weeks)  Patient made it do 35mg of prednisone, but began to feel worse (increased BMs, fatigue, dehydration, fevers, weight loss)  CRP elevated  Currently on Entyvio-- next dose is Friday this week  · Continue IV steroid  · Tolerating diet  · C diff PCR was negative  · ON IV Solu-Medrol 20 mg twice a day   Slow steroid taper per GI

## 2019-01-17 ENCOUNTER — TELEPHONE (OUTPATIENT)
Dept: GASTROENTEROLOGY | Facility: CLINIC | Age: 54
End: 2019-01-17

## 2019-01-17 ENCOUNTER — APPOINTMENT (INPATIENT)
Dept: ULTRASOUND IMAGING | Facility: HOSPITAL | Age: 54
DRG: 386 | End: 2019-01-17
Payer: COMMERCIAL

## 2019-01-17 VITALS
OXYGEN SATURATION: 99 % | DIASTOLIC BLOOD PRESSURE: 69 MMHG | BODY MASS INDEX: 25.94 KG/M2 | RESPIRATION RATE: 18 BRPM | TEMPERATURE: 98.2 F | WEIGHT: 146.4 LBS | HEIGHT: 63 IN | SYSTOLIC BLOOD PRESSURE: 138 MMHG | HEART RATE: 63 BPM

## 2019-01-17 PROBLEM — E87.6 HYPOKALEMIA: Status: ACTIVE | Noted: 2019-01-17

## 2019-01-17 LAB
ALBUMIN SERPL BCP-MCNC: 2.6 G/DL (ref 3.5–5)
ALP SERPL-CCNC: 67 U/L (ref 46–116)
ALT SERPL W P-5'-P-CCNC: 17 U/L (ref 12–78)
ANION GAP SERPL CALCULATED.3IONS-SCNC: 6 MMOL/L (ref 4–13)
AST SERPL W P-5'-P-CCNC: 7 U/L (ref 5–45)
BILIRUB SERPL-MCNC: 0.1 MG/DL (ref 0.2–1)
BUN SERPL-MCNC: 12 MG/DL (ref 5–25)
CALCIUM SERPL-MCNC: 10 MG/DL (ref 8.3–10.1)
CHLORIDE SERPL-SCNC: 104 MMOL/L (ref 100–108)
CO2 SERPL-SCNC: 30 MMOL/L (ref 21–32)
CREAT SERPL-MCNC: 0.71 MG/DL (ref 0.6–1.3)
GFR SERPL CREATININE-BSD FRML MDRD: 98 ML/MIN/1.73SQ M
GLUCOSE SERPL-MCNC: 127 MG/DL (ref 65–140)
POTASSIUM SERPL-SCNC: 3 MMOL/L (ref 3.5–5.3)
PROT SERPL-MCNC: 6.2 G/DL (ref 6.4–8.2)
SODIUM SERPL-SCNC: 140 MMOL/L (ref 136–145)

## 2019-01-17 PROCEDURE — 93970 EXTREMITY STUDY: CPT

## 2019-01-17 PROCEDURE — 99239 HOSP IP/OBS DSCHRG MGMT >30: CPT | Performed by: INTERNAL MEDICINE

## 2019-01-17 PROCEDURE — 99232 SBSQ HOSP IP/OBS MODERATE 35: CPT | Performed by: INTERNAL MEDICINE

## 2019-01-17 PROCEDURE — 80053 COMPREHEN METABOLIC PANEL: CPT | Performed by: PHYSICIAN ASSISTANT

## 2019-01-17 PROCEDURE — 93970 EXTREMITY STUDY: CPT | Performed by: SURGERY

## 2019-01-17 RX ORDER — HYOSCYAMINE SULFATE 0.125 MG
0.12 TABLET ORAL EVERY 4 HOURS PRN
Qty: 30 TABLET | Refills: 0 | Status: SHIPPED | OUTPATIENT
Start: 2019-01-17 | End: 2019-09-24 | Stop reason: SDUPTHER

## 2019-01-17 RX ORDER — PREDNISONE 20 MG/1
40 TABLET ORAL DAILY
Qty: 20 TABLET | Refills: 0 | Status: SHIPPED | OUTPATIENT
Start: 2019-01-18 | End: 2019-04-01

## 2019-01-17 RX ORDER — POTASSIUM CHLORIDE 20 MEQ/1
40 TABLET, EXTENDED RELEASE ORAL 2 TIMES DAILY
Status: DISCONTINUED | OUTPATIENT
Start: 2019-01-17 | End: 2019-01-17 | Stop reason: HOSPADM

## 2019-01-17 RX ADMIN — ENOXAPARIN SODIUM 40 MG: 40 INJECTION SUBCUTANEOUS at 08:12

## 2019-01-17 RX ADMIN — PREDNISONE 40 MG: 20 TABLET ORAL at 08:12

## 2019-01-17 RX ADMIN — VITAMIN D, TAB 1000IU (100/BT) 3000 UNITS: 25 TAB at 08:12

## 2019-01-17 RX ADMIN — HYOSCYAMINE SULFATE 0.12 MG: 0.12 SOLUTION/ DROPS ORAL at 07:58

## 2019-01-17 RX ADMIN — FERROUS SULFATE TAB 325 MG (65 MG ELEMENTAL FE) 325 MG: 325 (65 FE) TAB at 11:28

## 2019-01-17 RX ADMIN — LEVOTHYROXINE SODIUM 25 MCG: 25 TABLET ORAL at 06:38

## 2019-01-17 RX ADMIN — POTASSIUM CHLORIDE 40 MEQ: 1500 TABLET, EXTENDED RELEASE ORAL at 11:28

## 2019-01-17 RX ADMIN — FERROUS SULFATE TAB 325 MG (65 MG ELEMENTAL FE) 325 MG: 325 (65 FE) TAB at 08:12

## 2019-01-17 RX ADMIN — HYOSCYAMINE SULFATE 0.12 MG: 0.12 SOLUTION/ DROPS ORAL at 12:28

## 2019-01-17 NOTE — TELEPHONE ENCOUNTER
chaput patient      She would like a call back about her [facility] papers   She would like the date to be changed until after 2-5-19 when she see dr Kalina Biswas        Call back [phone]

## 2019-01-17 NOTE — PROGRESS NOTES
Progress Note - Shaylee Singh 48 y o  female MRN: [id number]    Unit/Bed#: -01 Encounter: [id number]        Subjective:   Reports feeling better today, she was complaining of some abdominal cramping more with one bowel movement she had this morning, she says the stools are loose, neither formed nor liquid  Denies any rectal bleeding or melena  Objective:     Vitals: Blood pressure 128/78, pulse 67, temperature 98 7 °F (37 1 °C), temperature source Oral, resp  rate 18, height 5' 3" (1 6 m), weight 66 4 kg (146 lb 6 4 oz), SpO2 95 %  ,Body mass index is 25 93 kg/m²  No intake or output data in the 24 hours ending 01/17/19 1129    Physical Exam:   General appearance: alert, ambulating freely, appears stated age and cooperative  Lungs: clear to auscultation bilaterally, no labored breathing/accessory muscle use  Heart: regular rate and rhythm, S1, S2 normal, no murmur, click, rub or gallop  Abdomen: soft, non-tender; bowel sounds normal; no masses,  no organomegaly  Extremities: no edema    Invasive Devices     Peripheral Intravenous Line            Peripheral IV 01/11/19 Left Forearm 5 days                Lab, Imaging and other studies: I have personally reviewed pertinent reports      Admission on 01/11/2019   Component Date Value    Sodium 01/12/2019 140     Potassium 01/12/2019 4 6     Chloride 01/12/2019 106     CO2 01/12/2019 28     ANION GAP 01/12/2019 6     BUN 01/12/2019 5     Creatinine 01/12/2019 0 72     Glucose 01/12/2019 154*    Calcium 01/12/2019 9 9     eGFR 01/12/2019 96     WBC 01/12/2019 10 64*    RBC 01/12/2019 3 34*    Hemoglobin 01/12/2019 8 9*    Hematocrit 01/12/2019 29 4*    MCV 01/12/2019 88     MCH 01/12/2019 26 6*    MCHC 01/12/2019 30 3*    RDW 01/12/2019 13 5     Platelets 40/13/9564 471*    MPV 01/12/2019 8 7*    Magnesium 01/12/2019 1 9     CRP 01/12/2019 70 3*    Sed Rate 01/12/2019 46*    WBC 01/13/2019 13 50*    RBC 01/13/2019 3 35*    Hemoglobin 01/13/2019 8 9*    Hematocrit 01/13/2019 29 8*    MCV 01/13/2019 89     MCH 01/13/2019 26 6*    MCHC 01/13/2019 29 9*    RDW 01/13/2019 13 7     Platelets 68/03/9666 522*    MPV 01/13/2019 8 6*    CRP 01/13/2019 38 6*    Sed Rate 01/13/2019 36*    WBC 01/14/2019 9 87     RBC 01/14/2019 3 39*    Hemoglobin 01/14/2019 9 1*    Hematocrit 01/14/2019 30 2*    MCV 01/14/2019 89     MCH 01/14/2019 26 8     MCHC 01/14/2019 30 1*    RDW 01/14/2019 13 8     Platelets 07/84/7170 529*    MPV 01/14/2019 8 7*    CRP 01/14/2019 25 9*    WBC 01/15/2019 11 62*    RBC 01/15/2019 4 04     Hemoglobin 01/15/2019 10 7*    Hematocrit 01/15/2019 35 7     MCV 01/15/2019 88     MCH 01/15/2019 26 5*    MCHC 01/15/2019 30 0*    RDW 01/15/2019 13 8     Platelets 43/82/7916 662*    MPV 01/15/2019 8 3*    CRP 01/15/2019 17 3*    Sed Rate 01/15/2019 38*    Sodium 01/15/2019 142     Potassium 01/15/2019 4 0     Chloride 01/15/2019 105     CO2 01/15/2019 28     ANION GAP 01/15/2019 9     BUN 01/15/2019 7     Creatinine 01/15/2019 0 79     Glucose 01/15/2019 117     Calcium 01/15/2019 10 1     eGFR 01/15/2019 86     Sodium 01/17/2019 140     Potassium 01/17/2019 3 0*    Chloride 01/17/2019 104     CO2 01/17/2019 30     ANION GAP 01/17/2019 6     BUN 01/17/2019 12     Creatinine 01/17/2019 0 71     Glucose 01/17/2019 127     Calcium 01/17/2019 10 0     AST 01/17/2019 7     ALT 01/17/2019 17     Alkaline Phosphatase 01/17/2019 67     Total Protein 01/17/2019 6 2*    Albumin 01/17/2019 2 6*    Total Bilirubin 01/17/2019 0 10*    eGFR 01/17/2019 98      Results for Viet Eugene (MRN [id number]) as of 1/17/2019 11:29   Ref   Range 1/13/2019 10:27 1/14/2019 05:15 1/15/2019 05:53 1/17/2019 09:57   eGFR Latest Units: ml/min/1 73sq m   86 98   Sodium Latest Ref Range: 136 - 145 mmol/L   142 140   Potassium Latest Ref Range: 3 5 - 5 3 mmol/L   4 0 3 0 (L)   Chloride Latest Ref Range: 100 - 108 mmol/L 105 104   CO2 Latest Ref Range: 21 - 32 mmol/L   28 30   Anion Gap Latest Ref Range: 4 - 13 mmol/L   9 6   BUN Latest Ref Range: 5 - 25 mg/dL   7 12   Creatinine Latest Ref Range: 0 60 - 1 30 mg/dL   0 79 0 71   Glucose, Random Latest Ref Range: 65 - 140 mg/dL   117 127   Calcium Latest Ref Range: 8 3 - 10 1 mg/dL   10 1 10 0   AST Latest Ref Range: 5 - 45 U/L    7   ALT Latest Ref Range: 12 - 78 U/L    17   Alkaline Phosphatase Latest Ref Range: 46 - 116 U/L    67   Total Protein Latest Ref Range: 6 4 - 8 2 g/dL    6 2 (L)   Albumin Latest Ref Range: 3 5 - 5 0 g/dL    2 6 (L)   TOTAL BILIRUBIN Latest Ref Range: 0 20 - 1 00 mg/dL    0 10 (L)   WBC Latest Ref Range: 4 31 - 10 16 Thousand/uL  9 87 11 62 (H)    Red Blood Cell Count Latest Ref Range: 3 81 - 5 12 Million/uL  3 39 (L) 4 04    Hemoglobin Latest Ref Range: 11 5 - 15 4 g/dL  9 1 (L) 10 7 (L)    HCT Latest Ref Range: 34 8 - 46 1 %  30 2 (L) 35 7    MCV Latest Ref Range: 82 - 98 fL  89 88    MCH Latest Ref Range: 26 8 - 34 3 pg  26 8 26 5 (L)    MCHC Latest Ref Range: 31 4 - 37 4 g/dL  30 1 (L) 30 0 (L)    RDW Latest Ref Range: 11 6 - 15 1 %  13 8 13 8    Platelet Count Latest Ref Range: 149 - 390 Thousands/uL  529 (H) 662 (H)    MPV Latest Ref Range: 8 9 - 12 7 fL  8 7 (L) 8 3 (L)    Sed Rate Latest Ref Range: 0 - 20 mm/hour 36 (H)  38 (H)    C-REACTIVE PROTEIN Latest Ref Range: <3 0 mg/L 38 6 (H) 25 9 (H) 17 3 (H)        Assessment/Plan:    1  Severe ulcerative colitis with pan colitis, initially failed outpatient steroid taper, patient is due for her next Entyvio infusion tomorrow for which she is scheduled for outpatient infusion tomorrow morning    She appears to be responding well to transition to oral steroids from IV Solu-Medrol     - case and plan were discussed with Dr Jay Justice [facility]  - continue oral prednisone 40 mg dose until upcoming office visit in approximately 2 weeks, will discuss tapering at that time  - proceed with outpatient [facility] infusion tomorrow  - low residue diet, recommend lactose restriction also  - continue Levsin as needed for bowel spasm

## 2019-01-17 NOTE — ASSESSMENT & PLAN NOTE
· Likely malabsorption of oral steroids in the setting of hypoalbuminemia  · POA  Patient sent in by GI  Patient hospitalized in November for acute exacerbation  Was discharged on 11/25/18 on 60mg of prednisone (tapering down by 5mg Q2 weeks)  Patient made it do 35mg of prednisone, but began to feel worse (increased BMs, fatigue, dehydration, fevers, weight loss)  CRP elevated  · Steroid taper per GI  · Tolerating diet  · C diff PCR was negative  · Due Entyvio   Will defer to GI

## 2019-01-17 NOTE — PROGRESS NOTES
Pt is asleep, no s/s of distress  Pt request to not be disturbed during the night  No complaints at this time, will continue to monitor

## 2019-01-17 NOTE — PROGRESS NOTES
Progress Note - Benjamin Paniagua 1965, 48 y o  female MRN: [id number]    Unit/Bed#: -01 Encounter: [id number]    Primary Care Provider: Farrukh Willis DO   Date and time admitted to hospital: 1/11/2019  8:02 PM        * Ulcerative colitis with rectal bleeding ([facility] )   Assessment & Plan    · Likely malabsorption of oral steroids in the setting of hypoalbuminemia  · POA  Patient sent in by GI  Patient hospitalized in November for acute exacerbation  Was discharged on 11/25/18 on 60mg of prednisone (tapering down by 5mg Q2 weeks)  Patient made it do 35mg of prednisone, but began to feel worse (increased BMs, fatigue, dehydration, fevers, weight loss)  CRP elevated  · Steroid taper per GI  · Tolerating diet  · C diff PCR was negative  · Due Entyvio  Will defer to GI     Iron deficiency anemia   Assessment & Plan    · Stable and better  · With acute blood loss anemia due to GI bleeding  ·  patient did not report any blood in the stools  · Serum iron is low and serum ferritin at low normal levels  · Ferrous sulfate  · Likely reactive thrombocytosis due to iron deficiency     Hypokalemia   Assessment & Plan    Replete K     Bilateral leg edema   Assessment & Plan    Likely due to IVF and hypoalbuminemia  IVF discontinued  Appears L>R Check Doppler LE r/o DVT     Leukocytosis   Assessment & Plan      · Likely due to steroids  Mild protein-calorie malnutrition ([facility] )   Assessment & Plan    Malnutrition Findings:   Malnutrition type: Chronic illness  Degree of Malnutrition: Malnutrition of mild degree    BMI Findings: Body mass index is 25 93 kg/m²  · Diet was advanced today to surgically soft diet  · Continue with Ensure nutritional supplements       Asthma, mild intermittent   Assessment & Plan    · No wheezing on auscultation  · Continue PRN nebs     Hypothyroidism   Assessment & Plan    · Continue synthroid               VTE Pharmacologic Prophylaxis:   Pharmacologic: Enoxaparin (Lovenox)  Mechanical VTE Prophylaxis in Place: Yes    Patient Centered Rounds: I have performed bedside rounds with nursing staff today  Time Spent for Care: 20 minutes  More than 50% of total time spent on counseling and coordination of care as described above  Current Length of Stay: 6 day(s)    Current Patient Status: Inpatient   Certification Statement: The patient will continue to require additional inpatient hospital stay due to above    Discharge Plan / Estimated Discharge Date: D/C after Entyvio if Doppler study negative? Code Status: Level 1 - Full Code      Subjective:   Pt had occasional cramps associated with BM last episode 6 am today  Overall feeling better  Still c/o leg edema  Denies CP or dyspnea    Objective:     Vitals:   Temp (24hrs), Av 6 °F (37 °C), Min:98 2 °F (36 8 °C), Max:99 °F (37 2 °C)    Temp:  [98 2 °F (36 8 °C)-99 °F (37 2 °C)] 98 7 °F (37 1 °C)  HR:  [67-68] 67  Resp:  [18] 18  BP: (113-132)/(63-78) 128/78  SpO2:  [95 %-99 %] 95 %  Body mass index is 25 93 kg/m²  Input and Output Summary (last 24 hours):     No intake or output data in the 24 hours ending 19 1112    Physical Exam:     Physical Exam   Constitutional: She is oriented to person, place, and time  No distress  Neck: Neck supple  No JVD present  Cardiovascular: Normal rate and regular rhythm  Pulmonary/Chest: Effort normal and breath sounds normal  No respiratory distress  She has no wheezes  Abdominal: Soft  Bowel sounds are normal  She exhibits no distension  There is no tenderness  Musculoskeletal:   Trace edema L>R   Neurological: She is alert and oriented to person, place, and time  Skin: Skin is warm and dry  She is not diaphoretic  Psychiatric: She has a normal mood and affect             Additional Data:     Labs:      Results from last 7 days  Lab Units 01/15/19  0553   WBC Thousand/uL 11 62*   HEMOGLOBIN g/dL 10 7*   HEMATOCRIT % 35 7   PLATELETS Thousands/uL 662* Results from last 7 days  Lab Units 01/17/19  0957   POTASSIUM mmol/L 3 0*   CHLORIDE mmol/L 104   CO2 mmol/L 30   BUN mg/dL 12   CREATININE mg/dL 0 71   CALCIUM mg/dL 10 0   ALK PHOS U/L 67   ALT U/L 17   AST U/L 7             Recent Cultures (last 7 days):           Last 24 Hours Medication List:     Current Facility-Administered Medications:  acetaminophen 650 mg Oral Q6H PRN Kathi Jones PA-C   albuterol 1 25 mg Nebulization Q6H PRN Kathi Jones PA-C   cholecalciferol 3,000 Units Oral Daily Kathi Jones PA-C   enoxaparin 40 mg Subcutaneous Q24H [facility] Jerman Sanchez MD   ferrous sulfate 325 mg Oral TID With Meals Kathi Jones PA-C   hyoscyamine 0 125 mg Oral Q4H PRN Kathi Jones PA-C   levothyroxine 25 mcg Oral Early Morning Kathi Jones PA-C   ondansetron 4 mg Intravenous Q6H PRN Kathi Jones PA-C   phenazopyridine 200 mg Oral TID PRN Kathi Jones PA-C   predniSONE 40 mg Oral Daily Manav Gonzalez PA-C   zolpidem 10 mg Oral HS YFN Carr        Today, Patient Was Seen By: Jerman Sanchez MD    ** Please Note: This note has been constructed using a voice recognition system   **

## 2019-01-17 NOTE — DISCHARGE SUMMARY
Discharge Summary - [facility]    Patient Information: Renato Bob 48 y o  female MRN: [id number]  Unit/Bed#: -01 Encounter: [id number]    Discharging Physician / Practitioner: Gato Finch MD  PCP: Claudette Hopkins DO  Admission Date: 1/11/2019  Discharge Date: 01/17/19    Disposition:     Home     Reason for Admission:  Abdominal cramps and diarrhea    Discharge Diagnoses:     Principal Problem (Resolved):    Ulcerative colitis with rectal bleeding ([facility] )  Active Problems:    Iron deficiency anemia    Hypothyroidism    Asthma, mild intermittent    Mild protein-calorie malnutrition ([facility] )    Leukocytosis    Bilateral leg edema    Hypokalemia      Consultations During Hospital Stay:  · GI    Significant Findings / Test Results:     · Doppler study negative for DVT      Hospital Course:     Renato Bob is a 48 y o  female patient with past medical history of ulcerative colitis, hypothyroidism who originally presented to the hospital on 1/11/2019 due to abdominal cramps and diarrhea consistent with ulcerative colitis flare  She was recently admitted for IV steroid and was discharged on a long prednisone taper during which her symptoms came back  Patient was closely followed by G I  She was started another round of IV steroid with significant improvement  P o  Steroid was started 2 days ago and her symptom has been stable so far  Patient is due for outpatient Entyvio infusion tomorrow  She is cleared for discharge from a GI standpoint  Condition at Discharge:     Discharge Day Visit / Exam:     * Please refer to separate progress note for these details *    Discussion with Family:       Discharge instructions/Information to patient and family:   See after visit summary for information provided to patient and family  Provisions for Follow-Up Care:  See after visit summary for information related to follow-up care and any pertinent home health orders        Planned Readmission: No    Discharge Statement:  I spent 30 minutes discharging the patient  This time was spent on the day of discharge  I had direct contact with the patient on the day of discharge  Greater than 50% of the total time was spent examining patient, answering all patient questions, arranging and discussing plan of care with patient as well as directly providing post-discharge instructions  Additional time then spent on discharge activities  Discharge Medications:  See after visit summary for reconciled discharge medications provided to patient and family  ** Please Note: This note has been constructed using a voice recognition system   **

## 2019-01-18 ENCOUNTER — HOSPITAL ENCOUNTER (OUTPATIENT)
Dept: INFUSION CENTER | Facility: HOSPITAL | Age: 54
Discharge: HOME | End: 2019-01-18
Payer: COMMERCIAL

## 2019-01-18 ENCOUNTER — TRANSITIONAL CARE MANAGEMENT (OUTPATIENT)
Dept: FAMILY MEDICINE CLINIC | Facility: CLINIC | Age: 54
End: 2019-01-18

## 2019-01-18 VITALS
HEART RATE: 77 BPM | SYSTOLIC BLOOD PRESSURE: 119 MMHG | RESPIRATION RATE: 18 BRPM | TEMPERATURE: 98.5 F | DIASTOLIC BLOOD PRESSURE: 63 MMHG

## 2019-01-18 PROCEDURE — 96413 CHEMO IV INFUSION 1 HR: CPT

## 2019-01-18 RX ORDER — SODIUM CHLORIDE 9 MG/ML
20 INJECTION, SOLUTION INTRAVENOUS ONCE
Status: COMPLETED | OUTPATIENT
Start: 2019-01-18 | End: 2019-01-18

## 2019-01-18 RX ORDER — ACETAMINOPHEN 325 MG/1
975 TABLET ORAL ONCE
Status: COMPLETED | OUTPATIENT
Start: 2019-01-18 | End: 2019-01-18

## 2019-01-18 RX ORDER — DIPHENHYDRAMINE HCL 25 MG
25 TABLET ORAL ONCE
Status: DISCONTINUED | OUTPATIENT
Start: 2019-01-18 | End: 2019-01-21 | Stop reason: HOSPADM

## 2019-01-18 RX ADMIN — VEDOLIZUMAB 300 MG: 300 INJECTION, POWDER, LYOPHILIZED, FOR SOLUTION INTRAVENOUS at 09:20

## 2019-01-18 RX ADMIN — ACETAMINOPHEN 975 MG: 325 TABLET, FILM COATED ORAL at 08:44

## 2019-01-18 RX ADMIN — SODIUM CHLORIDE 20 ML/HR: 0.9 INJECTION, SOLUTION INTRAVENOUS at 08:44

## 2019-01-18 NOTE — PLAN OF CARE
Problem: Potential for Falls  Goal: Patient will remain free of falls  INTERVENTIONS:  - Assess patient frequently for physical needs  -  Identify cognitive and physical deficits and behaviors that affect risk of falls    -  [place] fall precautions as indicated by assessment   - Educate patient/family on patient safety including physical limitations  - Instruct patient to call for assistance with activity based on assessment  - Modify environment to reduce risk of injury  - Consider OT/PT consult to assist with strengthening/mobility   Outcome: Progressing

## 2019-01-18 NOTE — TELEPHONE ENCOUNTER
Called pt back, I changed the date on the [facility] paperwork   Advised patient to call us if she needs anything before her appointment with Dr Rocco Mitchell on 2/5/2019

## 2019-01-30 ENCOUNTER — TELEPHONE (OUTPATIENT)
Dept: GASTROENTEROLOGY | Facility: AMBULARY SURGERY CENTER | Age: 54
End: 2019-01-30

## 2019-01-30 NOTE — TELEPHONE ENCOUNTER
DR ELIAS'S PT    Pt called wanting to know if blood work is required before her office visit to support her fmla

## 2019-01-31 NOTE — TELEPHONE ENCOUNTER
I called pt back  Answered all of her questions  Will see her at her upcoming office appt  No labs needed prior to that

## 2019-02-05 ENCOUNTER — OFFICE VISIT (OUTPATIENT)
Dept: GASTROENTEROLOGY | Facility: MEDICAL CENTER | Age: 54
End: 2019-02-05
Payer: COMMERCIAL

## 2019-02-05 VITALS
HEIGHT: 63 IN | WEIGHT: 146 LBS | SYSTOLIC BLOOD PRESSURE: 116 MMHG | HEART RATE: 66 BPM | BODY MASS INDEX: 25.87 KG/M2 | DIASTOLIC BLOOD PRESSURE: 72 MMHG | TEMPERATURE: 98.5 F

## 2019-02-05 DIAGNOSIS — E03.8 OTHER SPECIFIED HYPOTHYROIDISM: ICD-10-CM

## 2019-02-05 DIAGNOSIS — K21.9 GASTROESOPHAGEAL REFLUX DISEASE, ESOPHAGITIS PRESENCE NOT SPECIFIED: ICD-10-CM

## 2019-02-05 DIAGNOSIS — K51.011 ULCERATIVE PANCOLITIS WITH RECTAL BLEEDING (HCC): Primary | ICD-10-CM

## 2019-02-05 DIAGNOSIS — E88.09 HYPOALBUMINEMIA: ICD-10-CM

## 2019-02-05 PROCEDURE — 99214 OFFICE O/P EST MOD 30 MIN: CPT | Performed by: INTERNAL MEDICINE

## 2019-02-05 PROCEDURE — 1111F DSCHRG MED/CURRENT MED MERGE: CPT | Performed by: INTERNAL MEDICINE

## 2019-02-05 RX ORDER — RANITIDINE 150 MG/1
150 CAPSULE ORAL 2 TIMES DAILY
Qty: 90 CAPSULE | Refills: 3 | Status: SHIPPED | OUTPATIENT
Start: 2019-02-05 | End: 2019-04-17 | Stop reason: ALTCHOICE

## 2019-02-05 NOTE — PROGRESS NOTES
[facility] - Outpatient Follow-up Note  Daniel Ortega 48 y o  female MRN: [id number]  Encounter: [id number]          ASSESSMENT AND PLAN:      Serafin Smith is a 48 y o female with a history of hypothyroidism here for a follow up regarding ulcerative colitis, abdominal pain and gas  Diagnoses and all orders for this visit:    1  Ulcerative pancolitis with rectal bleeding (HCC)  Continue Entyvio  Start taking 30mg of Prednisone for 3 weeks and then bring it down to 20mg  After Entyvio infusion in March, we will monitor weaning her off of the prednisone by decreasing by 5mg   -     MISCELLANEOUS LAB TEST; Future  -     C-reactive protein; Future  -     CBC and differential; Future  -     Comprehensive metabolic panel; Future  -     Iron Panel; Future  -     Vitamin D 25 hydroxy; Future    2  Hypoalbuminemia    3  Other specified hypothyroidism  -     TSH, 3rd generation with Free T4 reflex; Future      Follow up in 2 months (after March 15)  ______________________________________________________________________    SUBJECTIVE:  Daniel Ortega is a 48 y o  female with a history of hypothyroidism here for a follow up regarding ulcerative colitis, abdominal pain, and gas  She is currently on Prednisone 20mg BID and Entyvio 300mg  Patient had her last Entyvio infusion on January 18  Her next one is in March 15  Her past surgical history includes a colonoscopy on 11/20/18 which showed severe ulcerative colitis from the hepatic flexure on distally with ulcerations, diffuse inflammatory changes  Multiple biopsies taken for CMV  She is complaining of heartburn that are worsened with nocturnal symptoms  REVIEW OF SYSTEMS IS OTHERWISE NEGATIVE        Historical Information   Past Medical History:   Diagnosis Date    Adjustment disorder     last assessed 05/16/12    Anemia     HX of    Asthma     mild - intermittent    Bilateral leg edema     Blepharitis     last assessed 02/04/16    Carpal tunnel syndrome     Unspecified laterality    Colitis, acute     Dyspareunia in female     Edema     last assessed 06/22/15    Ganglion     Herniated cervical disc     History of transfusion     Hypokalemia     Hypothyroidism     Hypothyroidism     Insomnia     Lumps on the skin     last assessed 14    Mouth ulcers     last assessed 06/22/15    Nontraumatic tear of left tibialis posterior tendon     Traumatic teart     Polyarthritis     last assessed 16    Raynaud's disease     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 [address]     Temporomandibular disorder     Joint    Thyroid disease     Ulcerative colitis [facility]     Ulcerative colitis ([facility] [address] [facility]      Past Surgical History:   Procedure Laterality Date    ANKLE SURGERY Left     Tendon repair    CARPAL TUNNEL RELEASE Bilateral      SECTION, LOW TRANSVERSE      COLONOSCOPY      COLONOSCOPY N/A 2018    Procedure: COLONOSCOPY;  Surgeon: Nicolette Abel MD;  [facility]; Service: Gastroenterology    HERNIA REPAIR      umbilical    HYSTERECTOMY      KNEE ARTHROSCOPY Right     LIPECTOMY      Multipe lipoma removals    LIPOMA RESECTION      ID COLONOSCOPY FLX DX W/COLLJ SPEC WHEN PFRMD N/A 2016    Procedure: COLONOSCOPY;  Surgeon: Lieutenant Eveline DO;  Location: [facility];   Service: Gastroenterology    ID REPAIR FLEX LEG TENDON,SECOND,EA Left 2016    Procedure: REPAIR OF LEFT POSTERIOR TIBIAL TENDON WITH GRAFT, EXPLORATION LEFT ANKLE ;  Surgeon: Elise Atkins DPM;  Location: [facility] OR;  Service: Podiatry    SHOULDER SURGERY Left     bicep tendon and labrum repair    TONSILLECTOMY      TOOTH EXTRACTION  2018     Social History   History   Alcohol Use No     Comment: social 1 drink per weeek     History   Drug Use No     History   Smoking Status    Former Smoker    Quit date: 2003   Smokeless Tobacco    Never Used     Comment: Quit , rare use for 2 years     Family History   Problem Relation Age of Onset    Parkinsonism Mother     Rheum arthritis Mother     Heart attack Father     Hypertension Father     Osteoporosis Father     Prostate cancer Father     Hashimoto's thyroiditis Sister     Cancer Paternal Grandfather         Penile    Prostate cancer Paternal Grandfather     Crohn's disease Family     Osteoarthritis Family     Rheum arthritis Family     Crohn's disease Other     Crohn's disease Maternal Uncle     Psoriasis Maternal Uncle     Ulcerative colitis Maternal Uncle     Rheum arthritis Maternal Aunt     Ulcerative colitis Family        Meds/Allergies       Current Outpatient Prescriptions:     albuterol (ACCUNEB) 1 25 MG/3ML nebulizer solution    cholecalciferol (VITAMIN D3) 1,000 units tablet    Cholecalciferol 39814 units TABS    ferrous sulfate 324 (65 Fe) mg    hyoscyamine (ANASPAZ,LEVSIN) 0 125 MG tablet    levothyroxine 25 mcg tablet    predniSONE 20 mg tablet    tacrolimus (PROTOPIC) 0 03 % ointment    vedolizumab (ENTYVIO) 300 MG SOLR    zolpidem (AMBIEN) 10 mg tablet    No Known Allergies        Objective     Blood pressure 116/72, pulse 66, temperature 98 5 °F (36 9 °C), temperature source Tympanic, height 5' 3" (1 6 m), weight 66 2 kg (146 lb)  Body mass index is 25 86 kg/m²  PHYSICAL EXAM:      General Appearance:   Alert, cooperative, no distress   HEENT:   Normocephalic, atraumatic, anicteric      Neck:  Supple, symmetrical, trachea midline   Lungs:   Clear to auscultation bilaterally; no rales, rhonchi or wheezing; respirations unlabored    Heart[de-identified]   Regular rate and rhythm; no murmur, rub, or gallop     Abdomen:   Soft, non-tender, non-distended; normal bowel sounds; no masses, no organomegaly    Genitalia:   Deferred    Rectal:   Deferred    Extremities:  No cyanosis, clubbing or edema    Pulses:  2+ and symmetric    Skin:  No jaundice, rashes, or lesions    Lymph nodes:  No palpable cervical lymphadenopathy        Lab Results:   No visits with results within 1 Day(s) from this visit     Latest known visit with results is:   Admission on 01/11/2019, Discharged on 01/17/2019   Component Date Value    Sodium 01/12/2019 140     Potassium 01/12/2019 4 6     Chloride 01/12/2019 106     CO2 01/12/2019 28     ANION GAP 01/12/2019 6     BUN 01/12/2019 5     Creatinine 01/12/2019 0 72     Glucose 01/12/2019 154*    Calcium 01/12/2019 9 9     eGFR 01/12/2019 96     WBC 01/12/2019 10 64*    RBC 01/12/2019 3 34*    Hemoglobin 01/12/2019 8 9*    Hematocrit 01/12/2019 29 4*    MCV 01/12/2019 88     MCH 01/12/2019 26 6*    MCHC 01/12/2019 30 3*    RDW 01/12/2019 13 5     Platelets 63/77/2017 471*    MPV 01/12/2019 8 7*    Magnesium 01/12/2019 1 9     CRP 01/12/2019 70 3*    Sed Rate 01/12/2019 46*    WBC 01/13/2019 13 50*    RBC 01/13/2019 3 35*    Hemoglobin 01/13/2019 8 9*    Hematocrit 01/13/2019 29 8*    MCV 01/13/2019 89     MCH 01/13/2019 26 6*    MCHC 01/13/2019 29 9*    RDW 01/13/2019 13 7     Platelets 24/35/7578 522*    MPV 01/13/2019 8 6*    CRP 01/13/2019 38 6*    Sed Rate 01/13/2019 36*    WBC 01/14/2019 9 87     RBC 01/14/2019 3 39*    Hemoglobin 01/14/2019 9 1*    Hematocrit 01/14/2019 30 2*    MCV 01/14/2019 89     MCH 01/14/2019 26 8     MCHC 01/14/2019 30 1*    RDW 01/14/2019 13 8     Platelets 15/59/1590 529*    MPV 01/14/2019 8 7*    CRP 01/14/2019 25 9*    WBC 01/15/2019 11 62*    RBC 01/15/2019 4 04     Hemoglobin 01/15/2019 10 7*    Hematocrit 01/15/2019 35 7     MCV 01/15/2019 88     MCH 01/15/2019 26 5*    MCHC 01/15/2019 30 0*    RDW 01/15/2019 13 8     Platelets 82/44/4051 662*    MPV 01/15/2019 8 3*    CRP 01/15/2019 17 3*    Sed Rate 01/15/2019 38*    Sodium 01/15/2019 142     Potassium 01/15/2019 4 0     Chloride 01/15/2019 105     CO2 01/15/2019 28     ANION GAP 01/15/2019 9     BUN 01/15/2019 7     Creatinine 01/15/2019 0 79     Glucose 01/15/2019 117     Calcium 01/15/2019 10 1  eGFR 01/15/2019 86     Sodium 01/17/2019 140     Potassium 01/17/2019 3 0*    Chloride 01/17/2019 104     CO2 01/17/2019 30     ANION GAP 01/17/2019 6     BUN 01/17/2019 12     Creatinine 01/17/2019 0 71     Glucose 01/17/2019 127     Calcium 01/17/2019 10 0     AST 01/17/2019 7     ALT 01/17/2019 17     Alkaline Phosphatase 01/17/2019 67     Total Protein 01/17/2019 6 2*    Albumin 01/17/2019 2 6*    Total Bilirubin 01/17/2019 0 10*    eGFR 01/17/2019 98          Radiology Results:   Vas Lower Limb Venous Duplex Study, Complete Bilateral    Result Date: 1/17/2019  Narrative:  [facility] REPORT [facility]INICAL: Indications: Bilateral Edema, unspecified [R60 9]  Patient presents with bilateral edema, left worse than right x 1 week  Risk Factors The patient has history of previous smoking (quit >10yrs ago)  She has no history of HTN, Diabetes or DVT  FINDINGS:  Segment  Right            Left              Impression       Impression       CFV      Normal (Patent)  Normal (Patent)     CONCLUSION: Impression: RIGHT LOWER LIMB: No evidence of acute or chronic deep vein thrombosis  No evidence of superficial thrombophlebitis noted  Doppler evaluation shows a normal response to augmentation maneuvers  Popliteal, posterior tibial and anterior tibial arterial Doppler waveforms are triphasic  LEFT LOWER LIMB: No evidence of acute or chronic deep vein thrombosis  No evidence of superficial thrombophlebitis noted  Doppler evaluation shows a normal response to augmentation maneuvers  Popliteal, posterior tibial and anterior tibial arterial Doppler waveforms are triphasic    Technical findings were given to Dr Andrea Rudd at 3:55pm    [place]: Electronically Signed by: Lilly Montes MD, [address] on 2019-01-17 05:10:33 PM        Attestation:   By signing my name below, I, Graciela Pierre, attest that this documentation has been    prepared under the direction and in the presence of VANDANA Colin  Electronically    Signed: Cristina Emerson  2/5/19        I, Tammy Adams, personally performed the services described in this documentation  All medical record entries made by the adriaibbillie were at my direction and in my presence  I    have reviewed the chart and discharge instructions and agree that the record reflects my    personal performance and is accurate and complete   VANDANA Hilton  2/5/19

## 2019-02-06 ENCOUNTER — TELEPHONE (OUTPATIENT)
Dept: FAMILY MEDICINE CLINIC | Facility: CLINIC | Age: 54
End: 2019-02-06

## 2019-02-06 DIAGNOSIS — H10.33 ACUTE BACTERIAL CONJUNCTIVITIS OF BOTH EYES: Primary | ICD-10-CM

## 2019-02-06 RX ORDER — POLYMYXIN B SULFATE AND TRIMETHOPRIM 1; 10000 MG/ML; [USP'U]/ML
1 SOLUTION OPHTHALMIC EVERY 4 HOURS
Qty: 10 ML | Refills: 0 | Status: ON HOLD | OUTPATIENT
Start: 2019-02-06 | End: 2019-09-17 | Stop reason: ALTCHOICE

## 2019-02-06 NOTE — TELEPHONE ENCOUNTER
Pt has pink eye and really would like something called in  If you need to see her she has to work  Woke up this morning with it

## 2019-02-07 ENCOUNTER — APPOINTMENT (OUTPATIENT)
Dept: LAB | Facility: HOSPITAL | Age: 54
End: 2019-02-07
Payer: COMMERCIAL

## 2019-02-07 DIAGNOSIS — K51.011 ULCERATIVE PANCOLITIS WITH RECTAL BLEEDING (HCC): ICD-10-CM

## 2019-02-07 DIAGNOSIS — E03.8 OTHER SPECIFIED HYPOTHYROIDISM: ICD-10-CM

## 2019-02-07 LAB
25(OH)D3 SERPL-MCNC: 52.3 NG/ML (ref 30–100)
ALBUMIN SERPL BCP-MCNC: 3.4 G/DL (ref 3.5–5)
ALP SERPL-CCNC: 58 U/L (ref 46–116)
ALT SERPL W P-5'-P-CCNC: 16 U/L (ref 12–78)
ANION GAP SERPL CALCULATED.3IONS-SCNC: 7 MMOL/L (ref 4–13)
AST SERPL W P-5'-P-CCNC: 13 U/L (ref 5–45)
BASOPHILS # BLD AUTO: 0.04 THOUSANDS/ΜL (ref 0–0.1)
BASOPHILS NFR BLD AUTO: 0 % (ref 0–1)
BILIRUB SERPL-MCNC: 0.2 MG/DL (ref 0.2–1)
BUN SERPL-MCNC: 12 MG/DL (ref 5–25)
CALCIUM SERPL-MCNC: 10.1 MG/DL (ref 8.3–10.1)
CHLORIDE SERPL-SCNC: 103 MMOL/L (ref 100–108)
CO2 SERPL-SCNC: 30 MMOL/L (ref 21–32)
CREAT SERPL-MCNC: 0.81 MG/DL (ref 0.6–1.3)
CRP SERPL QL: <3 MG/L
EOSINOPHIL # BLD AUTO: 0.17 THOUSAND/ΜL (ref 0–0.61)
EOSINOPHIL NFR BLD AUTO: 2 % (ref 0–6)
ERYTHROCYTE [DISTWIDTH] IN BLOOD BY AUTOMATED COUNT: 16.8 % (ref 11.6–15.1)
FERRITIN SERPL-MCNC: 24 NG/ML (ref 8–388)
GFR SERPL CREATININE-BSD FRML MDRD: 83 ML/MIN/1.73SQ M
GLUCOSE P FAST SERPL-MCNC: 86 MG/DL (ref 65–99)
HCT VFR BLD AUTO: 36.2 % (ref 34.8–46.1)
HGB BLD-MCNC: 10.8 G/DL (ref 11.5–15.4)
IMM GRANULOCYTES # BLD AUTO: 0.05 THOUSAND/UL (ref 0–0.2)
IMM GRANULOCYTES NFR BLD AUTO: 1 % (ref 0–2)
IRON SATN MFR SERPL: 11 %
IRON SERPL-MCNC: 35 UG/DL (ref 50–170)
LYMPHOCYTES # BLD AUTO: 4.14 THOUSANDS/ΜL (ref 0.6–4.47)
LYMPHOCYTES NFR BLD AUTO: 40 % (ref 14–44)
MCH RBC QN AUTO: 27.4 PG (ref 26.8–34.3)
MCHC RBC AUTO-ENTMCNC: 29.8 G/DL (ref 31.4–37.4)
MCV RBC AUTO: 92 FL (ref 82–98)
MONOCYTES # BLD AUTO: 0.71 THOUSAND/ΜL (ref 0.17–1.22)
MONOCYTES NFR BLD AUTO: 7 % (ref 4–12)
NEUTROPHILS # BLD AUTO: 5.18 THOUSANDS/ΜL (ref 1.85–7.62)
NEUTS SEG NFR BLD AUTO: 50 % (ref 43–75)
NRBC BLD AUTO-RTO: 0 /100 WBCS
PLATELET # BLD AUTO: 422 THOUSANDS/UL (ref 149–390)
PMV BLD AUTO: 9.8 FL (ref 8.9–12.7)
POTASSIUM SERPL-SCNC: 3.4 MMOL/L (ref 3.5–5.3)
PROT SERPL-MCNC: 6.7 G/DL (ref 6.4–8.2)
RBC # BLD AUTO: 3.94 MILLION/UL (ref 3.81–5.12)
SODIUM SERPL-SCNC: 140 MMOL/L (ref 136–145)
TIBC SERPL-MCNC: 306 UG/DL (ref 250–450)
TSH SERPL DL<=0.05 MIU/L-ACNC: 2.92 UIU/ML (ref 0.36–3.74)
WBC # BLD AUTO: 10.29 THOUSAND/UL (ref 4.31–10.16)

## 2019-02-07 PROCEDURE — 36415 COLL VENOUS BLD VENIPUNCTURE: CPT

## 2019-02-07 PROCEDURE — 82306 VITAMIN D 25 HYDROXY: CPT

## 2019-02-07 PROCEDURE — 83550 IRON BINDING TEST: CPT

## 2019-02-07 PROCEDURE — 82728 ASSAY OF FERRITIN: CPT

## 2019-02-07 PROCEDURE — 86140 C-REACTIVE PROTEIN: CPT

## 2019-02-07 PROCEDURE — 84443 ASSAY THYROID STIM HORMONE: CPT

## 2019-02-07 PROCEDURE — 83540 ASSAY OF IRON: CPT

## 2019-02-07 PROCEDURE — 80053 COMPREHEN METABOLIC PANEL: CPT

## 2019-02-07 PROCEDURE — 85025 COMPLETE CBC W/AUTO DIFF WBC: CPT

## 2019-02-08 ENCOUNTER — TELEPHONE (OUTPATIENT)
Dept: GASTROENTEROLOGY | Facility: CLINIC | Age: 54
End: 2019-02-08

## 2019-02-08 NOTE — TELEPHONE ENCOUNTER
Vit D & TSH WNL's  Potassium mildly low  Hbg stable  CRP WNL's  Iron low, continue supplements    Mustapha Chery

## 2019-02-21 DIAGNOSIS — F51.04 CHRONIC INSOMNIA: ICD-10-CM

## 2019-02-21 RX ORDER — ZOLPIDEM TARTRATE 10 MG/1
TABLET ORAL
Qty: 90 TABLET | Refills: 0 | Status: SHIPPED | OUTPATIENT
Start: 2019-02-21 | End: 2019-02-21 | Stop reason: SDUPTHER

## 2019-02-21 RX ORDER — ZOLPIDEM TARTRATE 10 MG/1
10 TABLET ORAL
Qty: 90 TABLET | Refills: 0 | Status: SHIPPED | OUTPATIENT
Start: 2019-02-21 | End: 2019-04-17 | Stop reason: ALTCHOICE

## 2019-03-11 ENCOUNTER — TRANSCRIBE ORDERS (OUTPATIENT)
Dept: ADMINISTRATIVE | Facility: HOSPITAL | Age: 54
End: 2019-03-11

## 2019-03-11 ENCOUNTER — APPOINTMENT (OUTPATIENT)
Dept: LAB | Facility: HOSPITAL | Age: 54
End: 2019-03-11
Payer: COMMERCIAL

## 2019-03-11 DIAGNOSIS — K51.00 ULCERATIVE CHRONIC PANCOLITIS WITHOUT COMPLICATIONS (HCC): ICD-10-CM

## 2019-03-11 DIAGNOSIS — K51.00 ULCERATIVE CHRONIC PANCOLITIS WITHOUT COMPLICATIONS (HCC): Primary | ICD-10-CM

## 2019-03-11 PROCEDURE — 80299 QUANTITATIVE ASSAY DRUG: CPT

## 2019-03-11 PROCEDURE — 82397 CHEMILUMINESCENT ASSAY: CPT

## 2019-03-11 PROCEDURE — 36415 COLL VENOUS BLD VENIPUNCTURE: CPT

## 2019-03-13 ENCOUNTER — TELEPHONE (OUTPATIENT)
Dept: GASTROENTEROLOGY | Facility: CLINIC | Age: 54
End: 2019-03-13

## 2019-03-13 LAB — Lab: NORMAL

## 2019-03-14 ENCOUNTER — TELEPHONE (OUTPATIENT)
Dept: GASTROENTEROLOGY | Facility: AMBULARY SURGERY CENTER | Age: 54
End: 2019-03-14

## 2019-03-14 RX ORDER — SODIUM CHLORIDE 9 MG/ML
20 INJECTION, SOLUTION INTRAVENOUS ONCE
Status: COMPLETED | OUTPATIENT
Start: 2019-03-15 | End: 2019-03-15

## 2019-03-14 RX ORDER — ACETAMINOPHEN 325 MG/1
975 TABLET ORAL ONCE
Status: COMPLETED | OUTPATIENT
Start: 2019-03-15 | End: 2019-03-15

## 2019-03-14 RX ORDER — DIPHENHYDRAMINE HCL 25 MG
25 TABLET ORAL ONCE
Status: DISCONTINUED | OUTPATIENT
Start: 2019-03-15 | End: 2019-03-18 | Stop reason: HOSPADM

## 2019-03-14 NOTE — TELEPHONE ENCOUNTER
Called Allyssa Lee spoke with Neel Raza who stated that the test is not back yet, it was sent out and not due to be back until 3/21/2019

## 2019-03-14 NOTE — TELEPHONE ENCOUNTER
Ok please call pt to let her know that it has not been resulted to us yet       Thanks so much,  Annie Kovacs

## 2019-03-15 ENCOUNTER — HOSPITAL ENCOUNTER (OUTPATIENT)
Dept: INFUSION CENTER | Facility: HOSPITAL | Age: 54
Discharge: HOME | End: 2019-03-15
Payer: COMMERCIAL

## 2019-03-15 VITALS
RESPIRATION RATE: 18 BRPM | DIASTOLIC BLOOD PRESSURE: 68 MMHG | HEART RATE: 69 BPM | SYSTOLIC BLOOD PRESSURE: 124 MMHG | TEMPERATURE: 97.1 F

## 2019-03-15 PROCEDURE — 96413 CHEMO IV INFUSION 1 HR: CPT

## 2019-03-15 RX ADMIN — ACETAMINOPHEN 975 MG: 325 TABLET ORAL at 09:00

## 2019-03-15 RX ADMIN — SODIUM CHLORIDE 20 ML/HR: 0.9 INJECTION, SOLUTION INTRAVENOUS at 09:00

## 2019-03-15 RX ADMIN — VEDOLIZUMAB 300 MG: 300 INJECTION, POWDER, LYOPHILIZED, FOR SOLUTION INTRAVENOUS at 09:48

## 2019-03-15 NOTE — PLAN OF CARE
Problem: Potential for Falls  Goal: Patient will remain free of falls  Description  INTERVENTIONS:  - Assess patient frequently for physical needs  -  Identify cognitive and physical deficits and behaviors that affect risk of falls    -  [place] fall precautions as indicated by assessment   - Educate patient/family on patient safety including physical limitations  - Instruct patient to call for assistance with activity based on assessment  - Modify environment to reduce risk of injury  - Consider OT/PT consult to assist with strengthening/mobility  Outcome: Progressing

## 2019-03-25 LAB — MISCELLANEOUS LAB TEST RESULT: NORMAL

## 2019-04-01 ENCOUNTER — OFFICE VISIT (OUTPATIENT)
Dept: GASTROENTEROLOGY | Facility: MEDICAL CENTER | Age: 54
End: 2019-04-01
Payer: COMMERCIAL

## 2019-04-01 VITALS
TEMPERATURE: 98.1 F | DIASTOLIC BLOOD PRESSURE: 74 MMHG | SYSTOLIC BLOOD PRESSURE: 122 MMHG | BODY MASS INDEX: 26.04 KG/M2 | WEIGHT: 147 LBS | HEART RATE: 73 BPM

## 2019-04-01 DIAGNOSIS — E55.9 VITAMIN D DEFICIENCY: ICD-10-CM

## 2019-04-01 DIAGNOSIS — E88.09 HYPOALBUMINEMIA: ICD-10-CM

## 2019-04-01 DIAGNOSIS — E44.1 MILD PROTEIN-CALORIE MALNUTRITION (HCC): ICD-10-CM

## 2019-04-01 DIAGNOSIS — D50.8 OTHER IRON DEFICIENCY ANEMIA: ICD-10-CM

## 2019-04-01 DIAGNOSIS — G89.29 CHRONIC RIGHT-SIDED LOW BACK PAIN WITHOUT SCIATICA: ICD-10-CM

## 2019-04-01 DIAGNOSIS — Z79.899 OTHER LONG TERM (CURRENT) DRUG THERAPY: ICD-10-CM

## 2019-04-01 DIAGNOSIS — K51.011 ULCERATIVE PANCOLITIS WITH RECTAL BLEEDING (HCC): Primary | ICD-10-CM

## 2019-04-01 DIAGNOSIS — M54.50 CHRONIC RIGHT-SIDED LOW BACK PAIN WITHOUT SCIATICA: ICD-10-CM

## 2019-04-01 PROCEDURE — 99214 OFFICE O/P EST MOD 30 MIN: CPT | Performed by: INTERNAL MEDICINE

## 2019-04-01 RX ORDER — MELATONIN
1000 DAILY
Qty: 90 TABLET | Refills: 5 | Status: SHIPPED | OUTPATIENT
Start: 2019-04-01 | End: 2019-10-18 | Stop reason: SDUPTHER

## 2019-04-17 ENCOUNTER — OFFICE VISIT (OUTPATIENT)
Dept: FAMILY MEDICINE CLINIC | Facility: CLINIC | Age: 54
End: 2019-04-17
Payer: COMMERCIAL

## 2019-04-17 VITALS
HEIGHT: 63 IN | DIASTOLIC BLOOD PRESSURE: 68 MMHG | WEIGHT: 147 LBS | HEART RATE: 89 BPM | BODY MASS INDEX: 26.05 KG/M2 | OXYGEN SATURATION: 96 % | TEMPERATURE: 101.6 F | SYSTOLIC BLOOD PRESSURE: 102 MMHG

## 2019-04-17 DIAGNOSIS — R21 EXANTHEM: ICD-10-CM

## 2019-04-17 DIAGNOSIS — R53.83 OTHER FATIGUE: ICD-10-CM

## 2019-04-17 DIAGNOSIS — R50.9 FEBRILE ILLNESS: ICD-10-CM

## 2019-04-17 DIAGNOSIS — Z12.39 SCREENING FOR BREAST CANCER: ICD-10-CM

## 2019-04-17 DIAGNOSIS — E66.3 OVERWEIGHT (BMI 25.0-29.9): ICD-10-CM

## 2019-04-17 DIAGNOSIS — M25.50 ARTHRALGIA OF MULTIPLE JOINTS: Primary | ICD-10-CM

## 2019-04-17 PROCEDURE — 3008F BODY MASS INDEX DOCD: CPT | Performed by: FAMILY MEDICINE

## 2019-04-17 PROCEDURE — 99214 OFFICE O/P EST MOD 30 MIN: CPT | Performed by: FAMILY MEDICINE

## 2019-04-17 RX ORDER — TRIAMCINOLONE ACETONIDE 1 MG/G
CREAM TOPICAL 2 TIMES DAILY
Qty: 30 G | Refills: 0 | Status: ON HOLD | OUTPATIENT
Start: 2019-04-17 | End: 2019-09-17 | Stop reason: ALTCHOICE

## 2019-04-17 RX ORDER — HYDROCODONE BITARTRATE AND ACETAMINOPHEN 5; 325 MG/1; MG/1
1 TABLET ORAL
Refills: 0 | COMMUNITY
Start: 2019-04-08 | End: 2020-05-15 | Stop reason: ALTCHOICE

## 2019-04-17 RX ORDER — OSELTAMIVIR PHOSPHATE 75 MG/1
75 CAPSULE ORAL 2 TIMES DAILY
Qty: 10 CAPSULE | Refills: 0 | Status: SHIPPED | OUTPATIENT
Start: 2019-04-17 | End: 2019-04-22

## 2019-04-18 ENCOUNTER — APPOINTMENT (OUTPATIENT)
Dept: LAB | Facility: HOSPITAL | Age: 54
End: 2019-04-18
Payer: COMMERCIAL

## 2019-04-18 DIAGNOSIS — M25.50 ARTHRALGIA OF MULTIPLE JOINTS: ICD-10-CM

## 2019-04-18 LAB
BASOPHILS # BLD AUTO: 0.03 THOUSANDS/ΜL (ref 0–0.1)
BASOPHILS NFR BLD AUTO: 1 % (ref 0–1)
EOSINOPHIL # BLD AUTO: 0.67 THOUSAND/ΜL (ref 0–0.61)
EOSINOPHIL NFR BLD AUTO: 12 % (ref 0–6)
ERYTHROCYTE [DISTWIDTH] IN BLOOD BY AUTOMATED COUNT: 14.7 % (ref 11.6–15.1)
ERYTHROCYTE [SEDIMENTATION RATE] IN BLOOD: 24 MM/HOUR (ref 0–15)
HCT VFR BLD AUTO: 36.9 % (ref 34.8–46.1)
HGB BLD-MCNC: 11.4 G/DL (ref 11.5–15.4)
IMM GRANULOCYTES # BLD AUTO: 0.01 THOUSAND/UL (ref 0–0.2)
IMM GRANULOCYTES NFR BLD AUTO: 0 % (ref 0–2)
LYMPHOCYTES # BLD AUTO: 2.34 THOUSANDS/ΜL (ref 0.6–4.47)
LYMPHOCYTES NFR BLD AUTO: 42 % (ref 14–44)
MCH RBC QN AUTO: 27.7 PG (ref 26.8–34.3)
MCHC RBC AUTO-ENTMCNC: 30.9 G/DL (ref 31.4–37.4)
MCV RBC AUTO: 90 FL (ref 82–98)
MONOCYTES # BLD AUTO: 0.77 THOUSAND/ΜL (ref 0.17–1.22)
MONOCYTES NFR BLD AUTO: 14 % (ref 4–12)
NEUTROPHILS # BLD AUTO: 1.73 THOUSANDS/ΜL (ref 1.85–7.62)
NEUTS SEG NFR BLD AUTO: 31 % (ref 43–75)
NRBC BLD AUTO-RTO: 0 /100 WBCS
PLATELET # BLD AUTO: 283 THOUSANDS/UL (ref 149–390)
PMV BLD AUTO: 10.8 FL (ref 8.9–12.7)
RBC # BLD AUTO: 4.11 MILLION/UL (ref 3.81–5.12)
WBC # BLD AUTO: 5.55 THOUSAND/UL (ref 4.31–10.16)

## 2019-04-18 PROCEDURE — 86617 LYME DISEASE ANTIBODY: CPT

## 2019-04-18 PROCEDURE — 86618 LYME DISEASE ANTIBODY: CPT

## 2019-04-18 PROCEDURE — 85652 RBC SED RATE AUTOMATED: CPT

## 2019-04-18 PROCEDURE — 36415 COLL VENOUS BLD VENIPUNCTURE: CPT

## 2019-04-18 PROCEDURE — 85025 COMPLETE CBC W/AUTO DIFF WBC: CPT

## 2019-04-19 ENCOUNTER — HOSPITAL ENCOUNTER (EMERGENCY)
Facility: HOSPITAL | Age: 54
Discharge: HOME | End: 2019-04-19
Attending: EMERGENCY MEDICINE | Admitting: EMERGENCY MEDICINE
Payer: COMMERCIAL

## 2019-04-19 ENCOUNTER — TELEPHONE (OUTPATIENT)
Dept: FAMILY MEDICINE CLINIC | Facility: CLINIC | Age: 54
End: 2019-04-19

## 2019-04-19 VITALS
OXYGEN SATURATION: 100 % | RESPIRATION RATE: 16 BRPM | TEMPERATURE: 99.5 F | DIASTOLIC BLOOD PRESSURE: 67 MMHG | SYSTOLIC BLOOD PRESSURE: 151 MMHG | HEART RATE: 104 BPM

## 2019-04-19 DIAGNOSIS — R50.9 FEVER: ICD-10-CM

## 2019-04-19 DIAGNOSIS — T78.40XA ALLERGIC REACTION, INITIAL ENCOUNTER: Primary | ICD-10-CM

## 2019-04-19 DIAGNOSIS — L50.9 URTICARIA: ICD-10-CM

## 2019-04-19 DIAGNOSIS — B34.9 VIRAL SYNDROME: ICD-10-CM

## 2019-04-19 LAB
ALBUMIN SERPL BCP-MCNC: 3.4 G/DL (ref 3.5–5)
ALP SERPL-CCNC: 71 U/L (ref 46–116)
ALT SERPL W P-5'-P-CCNC: 18 U/L (ref 12–78)
ANION GAP SERPL CALCULATED.3IONS-SCNC: 9 MMOL/L (ref 4–13)
ANISOCYTOSIS BLD QL SMEAR: PRESENT
AST SERPL W P-5'-P-CCNC: 16 U/L (ref 5–45)
B BURGDOR IGG SER IA-ACNC: 0.31
B BURGDOR IGM SER IA-ACNC: 0.99
BASOPHILS # BLD MANUAL: 0.07 THOUSAND/UL (ref 0–0.1)
BASOPHILS NFR MAR MANUAL: 1 % (ref 0–1)
BILIRUB SERPL-MCNC: 0.2 MG/DL (ref 0.2–1)
BUN SERPL-MCNC: 9 MG/DL (ref 5–25)
CALCIUM SERPL-MCNC: 10.7 MG/DL (ref 8.3–10.1)
CHLORIDE SERPL-SCNC: 103 MMOL/L (ref 100–108)
CO2 SERPL-SCNC: 26 MMOL/L (ref 21–32)
CREAT SERPL-MCNC: 0.86 MG/DL (ref 0.6–1.3)
EOSINOPHIL # BLD MANUAL: 0.83 THOUSAND/UL (ref 0–0.4)
EOSINOPHIL NFR BLD MANUAL: 12 % (ref 0–6)
ERYTHROCYTE [DISTWIDTH] IN BLOOD BY AUTOMATED COUNT: 14.6 % (ref 11.6–15.1)
GFR SERPL CREATININE-BSD FRML MDRD: 77 ML/MIN/1.73SQ M
GLUCOSE SERPL-MCNC: 93 MG/DL (ref 65–140)
HCT VFR BLD AUTO: 36.9 % (ref 34.8–46.1)
HGB BLD-MCNC: 11.8 G/DL (ref 11.5–15.4)
LIPASE SERPL-CCNC: 123 U/L (ref 73–393)
LYMPHOCYTES # BLD AUTO: 1.11 THOUSAND/UL (ref 0.6–4.47)
LYMPHOCYTES # BLD AUTO: 16 % (ref 14–44)
MCH RBC QN AUTO: 28.3 PG (ref 26.8–34.3)
MCHC RBC AUTO-ENTMCNC: 32 G/DL (ref 31.4–37.4)
MCV RBC AUTO: 89 FL (ref 82–98)
MONOCYTES # BLD AUTO: 0.55 THOUSAND/UL (ref 0–1.22)
MONOCYTES NFR BLD: 8 % (ref 4–12)
NEUTROPHILS # BLD MANUAL: 4.22 THOUSAND/UL (ref 1.85–7.62)
NEUTS SEG NFR BLD AUTO: 61 % (ref 43–75)
NRBC BLD AUTO-RTO: 0 /100 WBCS
PLATELET # BLD AUTO: 290 THOUSANDS/UL (ref 149–390)
PLATELET BLD QL SMEAR: ADEQUATE
PMV BLD AUTO: 10.2 FL (ref 8.9–12.7)
POTASSIUM SERPL-SCNC: 3.6 MMOL/L (ref 3.5–5.3)
PROT SERPL-MCNC: 6.8 G/DL (ref 6.4–8.2)
RBC # BLD AUTO: 4.17 MILLION/UL (ref 3.81–5.12)
SODIUM SERPL-SCNC: 138 MMOL/L (ref 136–145)
TOTAL CELLS COUNTED SPEC: 100
VARIANT LYMPHS # BLD AUTO: 2 %
WBC # BLD AUTO: 6.92 THOUSAND/UL (ref 4.31–10.16)

## 2019-04-19 PROCEDURE — 96361 HYDRATE IV INFUSION ADD-ON: CPT

## 2019-04-19 PROCEDURE — 80053 COMPREHEN METABOLIC PANEL: CPT | Performed by: EMERGENCY MEDICINE

## 2019-04-19 PROCEDURE — 96375 TX/PRO/DX INJ NEW DRUG ADDON: CPT

## 2019-04-19 PROCEDURE — 83690 ASSAY OF LIPASE: CPT | Performed by: EMERGENCY MEDICINE

## 2019-04-19 PROCEDURE — 85027 COMPLETE CBC AUTOMATED: CPT | Performed by: EMERGENCY MEDICINE

## 2019-04-19 PROCEDURE — 96374 THER/PROPH/DIAG INJ IV PUSH: CPT

## 2019-04-19 PROCEDURE — 99284 EMERGENCY DEPT VISIT MOD MDM: CPT | Performed by: EMERGENCY MEDICINE

## 2019-04-19 PROCEDURE — 85007 BL SMEAR W/DIFF WBC COUNT: CPT | Performed by: EMERGENCY MEDICINE

## 2019-04-19 PROCEDURE — 36415 COLL VENOUS BLD VENIPUNCTURE: CPT | Performed by: EMERGENCY MEDICINE

## 2019-04-19 PROCEDURE — 99283 EMERGENCY DEPT VISIT LOW MDM: CPT

## 2019-04-19 RX ORDER — DIPHENHYDRAMINE HYDROCHLORIDE 50 MG/ML
25 INJECTION INTRAMUSCULAR; INTRAVENOUS ONCE
Status: COMPLETED | OUTPATIENT
Start: 2019-04-19 | End: 2019-04-19

## 2019-04-19 RX ORDER — PREDNISONE 10 MG/1
20 TABLET ORAL 3 TIMES DAILY
Qty: 30 TABLET | Refills: 0 | Status: SHIPPED | OUTPATIENT
Start: 2019-04-19 | End: 2019-04-24

## 2019-04-19 RX ORDER — METHYLPREDNISOLONE SODIUM SUCCINATE 125 MG/2ML
125 INJECTION, POWDER, LYOPHILIZED, FOR SOLUTION INTRAMUSCULAR; INTRAVENOUS ONCE
Status: COMPLETED | OUTPATIENT
Start: 2019-04-19 | End: 2019-04-19

## 2019-04-19 RX ORDER — IBUPROFEN 400 MG/1
800 TABLET ORAL ONCE
Status: COMPLETED | OUTPATIENT
Start: 2019-04-19 | End: 2019-04-19

## 2019-04-19 RX ADMIN — IBUPROFEN 800 MG: 400 TABLET ORAL at 16:39

## 2019-04-19 RX ADMIN — METHYLPREDNISOLONE SODIUM SUCCINATE 125 MG: 125 INJECTION, POWDER, FOR SOLUTION INTRAMUSCULAR; INTRAVENOUS at 17:36

## 2019-04-19 RX ADMIN — DIPHENHYDRAMINE HYDROCHLORIDE 25 MG: 50 INJECTION, SOLUTION INTRAMUSCULAR; INTRAVENOUS at 18:17

## 2019-04-19 RX ADMIN — SODIUM CHLORIDE 1000 ML: 0.9 INJECTION, SOLUTION INTRAVENOUS at 17:30

## 2019-04-20 ENCOUNTER — OFFICE VISIT (OUTPATIENT)
Dept: FAMILY MEDICINE CLINIC | Facility: CLINIC | Age: 54
End: 2019-04-20
Payer: COMMERCIAL

## 2019-04-20 VITALS
TEMPERATURE: 97.9 F | DIASTOLIC BLOOD PRESSURE: 60 MMHG | HEART RATE: 62 BPM | SYSTOLIC BLOOD PRESSURE: 108 MMHG | OXYGEN SATURATION: 97 %

## 2019-04-20 DIAGNOSIS — B09 VIRAL EXANTHEM: Primary | ICD-10-CM

## 2019-04-20 PROCEDURE — 99213 OFFICE O/P EST LOW 20 MIN: CPT | Performed by: FAMILY MEDICINE

## 2019-04-20 RX ORDER — HYDROXYZINE PAMOATE 25 MG/1
25 CAPSULE ORAL 4 TIMES DAILY PRN
Qty: 40 CAPSULE | Refills: 0 | Status: ON HOLD | OUTPATIENT
Start: 2019-04-20 | End: 2019-09-17 | Stop reason: ALTCHOICE

## 2019-04-20 RX ORDER — FAMOTIDINE 20 MG/1
20 TABLET, FILM COATED ORAL 2 TIMES DAILY
Qty: 60 TABLET | Refills: 1 | Status: ON HOLD | OUTPATIENT
Start: 2019-04-20 | End: 2019-09-17 | Stop reason: ALTCHOICE

## 2019-04-21 LAB
B BURGDOR IGG PATRN SER IB-IMP: NEGATIVE
B BURGDOR IGM PATRN SER IB-IMP: NEGATIVE
B BURGDOR18KD IGG SER QL IB: ABNORMAL
B BURGDOR23KD IGG SER QL IB: ABNORMAL
B BURGDOR23KD IGM SER QL IB: ABNORMAL
B BURGDOR28KD IGG SER QL IB: ABNORMAL
B BURGDOR30KD IGG SER QL IB: ABNORMAL
B BURGDOR39KD IGG SER QL IB: ABNORMAL
B BURGDOR39KD IGM SER QL IB: ABNORMAL
B BURGDOR41KD IGG SER QL IB: PRESENT
B BURGDOR41KD IGM SER QL IB: ABNORMAL
B BURGDOR45KD IGG SER QL IB: ABNORMAL
B BURGDOR58KD IGG SER QL IB: ABNORMAL
B BURGDOR66KD IGG SER QL IB: ABNORMAL
B BURGDOR93KD IGG SER QL IB: ABNORMAL

## 2019-05-07 ENCOUNTER — TRANSCRIBE ORDERS (OUTPATIENT)
Dept: ADMINISTRATIVE | Facility: HOSPITAL | Age: 54
End: 2019-05-07

## 2019-05-07 ENCOUNTER — APPOINTMENT (OUTPATIENT)
Dept: LAB | Facility: HOSPITAL | Age: 54
End: 2019-05-07
Payer: COMMERCIAL

## 2019-05-07 ENCOUNTER — DOCUMENTATION (OUTPATIENT)
Dept: GASTROENTEROLOGY | Facility: MEDICAL CENTER | Age: 54
End: 2019-05-07

## 2019-05-07 DIAGNOSIS — L50.1 IDIOPATHIC URTICARIA: ICD-10-CM

## 2019-05-07 DIAGNOSIS — L50.1 IDIOPATHIC URTICARIA: Primary | ICD-10-CM

## 2019-05-07 LAB
25(OH)D3 SERPL-MCNC: 47.9 NG/ML (ref 30–100)
ALBUMIN SERPL BCP-MCNC: 3.4 G/DL (ref 3.5–5)
ANION GAP SERPL CALCULATED.3IONS-SCNC: 8 MMOL/L (ref 4–13)
BASOPHILS # BLD AUTO: 0.06 THOUSANDS/ΜL (ref 0–0.1)
BASOPHILS NFR BLD AUTO: 1 % (ref 0–1)
BUN SERPL-MCNC: 9 MG/DL (ref 5–25)
C3 SERPL-MCNC: 111 MG/DL (ref 90–180)
C4 SERPL-MCNC: 20 MG/DL (ref 10–40)
CALCIUM ALBUM COR SERPL-MCNC: 10.8 MG/DL (ref 8.3–10.1)
CALCIUM SERPL-MCNC: 10.3 MG/DL (ref 8.3–10.1)
CALCIUM SERPL-MCNC: 10.3 MG/DL (ref 8.3–10.1)
CHLORIDE SERPL-SCNC: 105 MMOL/L (ref 100–108)
CO2 SERPL-SCNC: 26 MMOL/L (ref 21–32)
CREAT SERPL-MCNC: 0.74 MG/DL (ref 0.6–1.3)
CRP SERPL QL: <3 MG/L
EOSINOPHIL # BLD AUTO: 0.97 THOUSAND/ΜL (ref 0–0.61)
EOSINOPHIL NFR BLD AUTO: 19 % (ref 0–6)
ERYTHROCYTE [DISTWIDTH] IN BLOOD BY AUTOMATED COUNT: 14.3 % (ref 11.6–15.1)
GFR SERPL CREATININE-BSD FRML MDRD: 93 ML/MIN/1.73SQ M
GLUCOSE P FAST SERPL-MCNC: 84 MG/DL (ref 65–99)
HCT VFR BLD AUTO: 37.4 % (ref 34.8–46.1)
HGB BLD-MCNC: 11.6 G/DL (ref 11.5–15.4)
IMM GRANULOCYTES # BLD AUTO: 0 THOUSAND/UL (ref 0–0.2)
IMM GRANULOCYTES NFR BLD AUTO: 0 % (ref 0–2)
LYMPHOCYTES # BLD AUTO: 1.73 THOUSANDS/ΜL (ref 0.6–4.47)
LYMPHOCYTES NFR BLD AUTO: 35 % (ref 14–44)
MCH RBC QN AUTO: 28.4 PG (ref 26.8–34.3)
MCHC RBC AUTO-ENTMCNC: 31 G/DL (ref 31.4–37.4)
MCV RBC AUTO: 91 FL (ref 82–98)
MONOCYTES # BLD AUTO: 0.44 THOUSAND/ΜL (ref 0.17–1.22)
MONOCYTES NFR BLD AUTO: 9 % (ref 4–12)
NEUTROPHILS # BLD AUTO: 1.81 THOUSANDS/ΜL (ref 1.85–7.62)
NEUTS SEG NFR BLD AUTO: 36 % (ref 43–75)
NRBC BLD AUTO-RTO: 0 /100 WBCS
PLATELET # BLD AUTO: 277 THOUSANDS/UL (ref 149–390)
PMV BLD AUTO: 10.7 FL (ref 8.9–12.7)
POTASSIUM SERPL-SCNC: 3.8 MMOL/L (ref 3.5–5.3)
RBC # BLD AUTO: 4.09 MILLION/UL (ref 3.81–5.12)
SODIUM SERPL-SCNC: 139 MMOL/L (ref 136–145)
T3FREE SERPL-MCNC: 2.46 PG/ML (ref 2.3–4.2)
T4 SERPL-MCNC: 9.2 UG/DL (ref 4.7–13.3)
WBC # BLD AUTO: 5.01 THOUSAND/UL (ref 4.31–10.16)

## 2019-05-07 PROCEDURE — 86430 RHEUMATOID FACTOR TEST QUAL: CPT

## 2019-05-07 PROCEDURE — 83520 IMMUNOASSAY QUANT NOS NONAB: CPT

## 2019-05-07 PROCEDURE — 84165 PROTEIN E-PHORESIS SERUM: CPT | Performed by: PATHOLOGY

## 2019-05-07 PROCEDURE — 84165 PROTEIN E-PHORESIS SERUM: CPT

## 2019-05-07 PROCEDURE — 85025 COMPLETE CBC W/AUTO DIFF WBC: CPT

## 2019-05-07 PROCEDURE — 84436 ASSAY OF TOTAL THYROXINE: CPT

## 2019-05-07 PROCEDURE — 86800 THYROGLOBULIN ANTIBODY: CPT

## 2019-05-07 PROCEDURE — 86140 C-REACTIVE PROTEIN: CPT

## 2019-05-07 PROCEDURE — 84432 ASSAY OF THYROGLOBULIN: CPT

## 2019-05-07 PROCEDURE — 86039 ANTINUCLEAR ANTIBODIES (ANA): CPT

## 2019-05-07 PROCEDURE — 84481 FREE ASSAY (FT-3): CPT

## 2019-05-07 PROCEDURE — 82040 ASSAY OF SERUM ALBUMIN: CPT

## 2019-05-07 PROCEDURE — 82306 VITAMIN D 25 HYDROXY: CPT

## 2019-05-07 PROCEDURE — 86160 COMPLEMENT ANTIGEN: CPT

## 2019-05-07 PROCEDURE — 80048 BASIC METABOLIC PNL TOTAL CA: CPT

## 2019-05-07 PROCEDURE — 86038 ANTINUCLEAR ANTIBODIES: CPT

## 2019-05-07 PROCEDURE — 86376 MICROSOMAL ANTIBODY EACH: CPT

## 2019-05-07 PROCEDURE — 86162 COMPLEMENT TOTAL (CH50): CPT

## 2019-05-07 PROCEDURE — 36415 COLL VENOUS BLD VENIPUNCTURE: CPT

## 2019-05-08 ENCOUNTER — TELEPHONE (OUTPATIENT)
Dept: GASTROENTEROLOGY | Facility: CLINIC | Age: 54
End: 2019-05-08

## 2019-05-08 LAB
RHEUMATOID FACT SER QL LA: NEGATIVE
THYROGLOB AB SERPL-ACNC: <1 IU/ML (ref 0–0.9)
THYROGLOB SERPL-MCNC: 17.2 NG/ML (ref 1.5–38.5)
THYROPEROXIDASE AB SERPL-ACNC: 12 IU/ML (ref 0–34)

## 2019-05-09 ENCOUNTER — TELEPHONE (OUTPATIENT)
Dept: FAMILY MEDICINE CLINIC | Facility: CLINIC | Age: 54
End: 2019-05-09

## 2019-05-09 LAB
ANA HOMOGEN SER QL IF: NORMAL
ANA HOMOGEN TITR SER: NORMAL {TITER}
CH50 SERPL-ACNC: 55 U/ML
RYE IGE QN: POSITIVE
TRYPTASE SERPL-MCNC: 3.8 UG/L (ref 2.2–13.2)

## 2019-05-09 RX ORDER — SODIUM CHLORIDE 9 MG/ML
20 INJECTION, SOLUTION INTRAVENOUS ONCE
Status: COMPLETED | OUTPATIENT
Start: 2019-05-10 | End: 2019-05-10

## 2019-05-09 RX ORDER — ACETAMINOPHEN 325 MG/1
975 TABLET ORAL ONCE
Status: COMPLETED | OUTPATIENT
Start: 2019-05-10 | End: 2019-05-10

## 2019-05-09 RX ORDER — METHYLPREDNISOLONE SODIUM SUCCINATE 40 MG/ML
40 INJECTION, POWDER, LYOPHILIZED, FOR SOLUTION INTRAMUSCULAR; INTRAVENOUS ONCE
Status: COMPLETED | OUTPATIENT
Start: 2019-05-10 | End: 2019-05-10

## 2019-05-09 RX ORDER — DIPHENHYDRAMINE HCL 25 MG
25 TABLET ORAL ONCE
Status: COMPLETED | OUTPATIENT
Start: 2019-05-10 | End: 2019-05-10

## 2019-05-10 ENCOUNTER — APPOINTMENT (OUTPATIENT)
Dept: LAB | Facility: HOSPITAL | Age: 54
End: 2019-05-10
Payer: COMMERCIAL

## 2019-05-10 ENCOUNTER — HOSPITAL ENCOUNTER (OUTPATIENT)
Dept: INFUSION CENTER | Facility: HOSPITAL | Age: 54
Discharge: HOME | End: 2019-05-10
Payer: COMMERCIAL

## 2019-05-10 VITALS
HEART RATE: 60 BPM | RESPIRATION RATE: 18 BRPM | SYSTOLIC BLOOD PRESSURE: 122 MMHG | TEMPERATURE: 97.7 F | DIASTOLIC BLOOD PRESSURE: 82 MMHG

## 2019-05-10 DIAGNOSIS — L50.1 IDIOPATHIC URTICARIA: ICD-10-CM

## 2019-05-10 LAB
ALBUMIN SERPL ELPH-MCNC: 3.87 G/DL (ref 3.5–5)
ALBUMIN SERPL ELPH-MCNC: 59.6 % (ref 52–65)
ALPHA1 GLOB SERPL ELPH-MCNC: 0.3 G/DL (ref 0.1–0.4)
ALPHA1 GLOB SERPL ELPH-MCNC: 4.6 % (ref 2.5–5)
ALPHA2 GLOB SERPL ELPH-MCNC: 0.75 G/DL (ref 0.4–1.2)
ALPHA2 GLOB SERPL ELPH-MCNC: 11.5 % (ref 7–13)
BETA GLOB ABNORMAL SERPL ELPH-MCNC: 0.4 G/DL (ref 0.4–0.8)
BETA1 GLOB SERPL ELPH-MCNC: 6.1 % (ref 5–13)
BETA2 GLOB SERPL ELPH-MCNC: 5.1 % (ref 2–8)
BETA2+GAMMA GLOB SERPL ELPH-MCNC: 0.33 G/DL (ref 0.2–0.5)
GAMMA GLOB ABNORMAL SERPL ELPH-MCNC: 0.85 G/DL (ref 0.5–1.6)
GAMMA GLOB SERPL ELPH-MCNC: 13.1 % (ref 12–22)
IGG/ALB SER: 1.48 {RATIO} (ref 1.1–1.8)
PROT PATTERN SERPL ELPH-IMP: NORMAL
PROT SERPL-MCNC: 6.5 G/DL (ref 6.4–8.2)

## 2019-05-10 PROCEDURE — 36415 COLL VENOUS BLD VENIPUNCTURE: CPT

## 2019-05-10 PROCEDURE — 96375 TX/PRO/DX INJ NEW DRUG ADDON: CPT

## 2019-05-10 PROCEDURE — 86225 DNA ANTIBODY NATIVE: CPT

## 2019-05-10 PROCEDURE — 96413 CHEMO IV INFUSION 1 HR: CPT

## 2019-05-10 RX ADMIN — METHYLPREDNISOLONE SODIUM SUCCINATE 40 MG: 40 INJECTION, POWDER, FOR SOLUTION INTRAMUSCULAR; INTRAVENOUS at 08:53

## 2019-05-10 RX ADMIN — SODIUM CHLORIDE 20 ML/HR: 0.9 INJECTION, SOLUTION INTRAVENOUS at 08:53

## 2019-05-10 RX ADMIN — VEDOLIZUMAB 300 MG: 300 INJECTION, POWDER, LYOPHILIZED, FOR SOLUTION INTRAVENOUS at 09:40

## 2019-05-10 RX ADMIN — DIPHENHYDRAMINE HCL 25 MG: 25 TABLET ORAL at 08:53

## 2019-05-10 RX ADMIN — ACETAMINOPHEN 975 MG: 325 TABLET, FILM COATED ORAL at 08:53

## 2019-05-10 NOTE — PLAN OF CARE
Problem: Potential for Falls  Goal: Patient will remain free of falls  Description  INTERVENTIONS:  - Assess patient frequently for physical needs  -  Identify cognitive and physical deficits and behaviors that affect risk of falls    -  [place] fall precautions as indicated by assessment   - Educate patient/family on patient safety including physical limitations  - Instruct patient to call for assistance with activity based on assessment  - Modify environment to reduce risk of injury  - Consider OT/PT consult to assist with strengthening/mobility  Outcome: Progressing

## 2019-05-11 LAB — DSDNA AB SER-ACNC: <1 IU/ML (ref 0–9)

## 2019-06-03 ENCOUNTER — TELEPHONE (OUTPATIENT)
Dept: FAMILY MEDICINE CLINIC | Facility: CLINIC | Age: 54
End: 2019-06-03

## 2019-06-03 DIAGNOSIS — S33.5XXA LUMBAR SPRAIN, INITIAL ENCOUNTER: Primary | ICD-10-CM

## 2019-06-03 RX ORDER — CARISOPRODOL 350 MG/1
350 TABLET ORAL
Qty: 10 TABLET | Refills: 0 | Status: SHIPPED | OUTPATIENT
Start: 2019-06-03 | End: 2019-10-25 | Stop reason: ALTCHOICE

## 2019-06-03 RX ORDER — CARISOPRODOL 250 MG/1
250 TABLET ORAL EVERY 6 HOURS PRN
Qty: 25 TABLET | Refills: 0 | Status: SHIPPED | OUTPATIENT
Start: 2019-06-03 | End: 2020-03-10 | Stop reason: SDUPTHER

## 2019-06-20 RX ORDER — SODIUM CHLORIDE 9 MG/ML
20 INJECTION, SOLUTION INTRAVENOUS ONCE
Status: CANCELLED | OUTPATIENT
Start: 2019-07-05

## 2019-06-28 ENCOUNTER — OFFICE VISIT (OUTPATIENT)
Dept: GASTROENTEROLOGY | Facility: CLINIC | Age: 54
End: 2019-06-28
Payer: COMMERCIAL

## 2019-06-28 ENCOUNTER — TELEPHONE (OUTPATIENT)
Dept: GASTROENTEROLOGY | Facility: AMBULARY SURGERY CENTER | Age: 54
End: 2019-06-28

## 2019-06-28 ENCOUNTER — APPOINTMENT (OUTPATIENT)
Dept: LAB | Facility: HOSPITAL | Age: 54
End: 2019-06-28
Attending: INTERNAL MEDICINE
Payer: COMMERCIAL

## 2019-06-28 VITALS
WEIGHT: 143 LBS | TEMPERATURE: 98.7 F | HEART RATE: 78 BPM | SYSTOLIC BLOOD PRESSURE: 128 MMHG | DIASTOLIC BLOOD PRESSURE: 68 MMHG | HEIGHT: 63 IN | BODY MASS INDEX: 25.34 KG/M2

## 2019-06-28 DIAGNOSIS — R11.0 NAUSEA: Primary | ICD-10-CM

## 2019-06-28 DIAGNOSIS — K51.011 ULCERATIVE PANCOLITIS WITH RECTAL BLEEDING (HCC): ICD-10-CM

## 2019-06-28 DIAGNOSIS — K51.011 ULCERATIVE PANCOLITIS WITH RECTAL BLEEDING (HCC): Primary | ICD-10-CM

## 2019-06-28 DIAGNOSIS — Z79.899 LONG-TERM USE OF IMMUNOSUPPRESSANT MEDICATION: ICD-10-CM

## 2019-06-28 LAB
25(OH)D3 SERPL-MCNC: 39 NG/ML (ref 30–100)
ALBUMIN SERPL BCP-MCNC: 3.8 G/DL (ref 3.5–5)
ALP SERPL-CCNC: 81 U/L (ref 46–116)
ALT SERPL W P-5'-P-CCNC: 16 U/L (ref 12–78)
ANION GAP SERPL CALCULATED.3IONS-SCNC: 7 MMOL/L (ref 4–13)
AST SERPL W P-5'-P-CCNC: 13 U/L (ref 5–45)
BILIRUB SERPL-MCNC: 0.32 MG/DL (ref 0.2–1)
BUN SERPL-MCNC: 13 MG/DL (ref 5–25)
CALCIUM ALBUM COR SERPL-MCNC: 10.5 MG/DL (ref 8.3–10.1)
CALCIUM SERPL-MCNC: 10.3 MG/DL (ref 8.3–10.1)
CHLORIDE SERPL-SCNC: 105 MMOL/L (ref 100–108)
CO2 SERPL-SCNC: 26 MMOL/L (ref 21–32)
CREAT SERPL-MCNC: 0.7 MG/DL (ref 0.6–1.3)
CRP SERPL QL: 5.4 MG/L
FERRITIN SERPL-MCNC: 71 NG/ML (ref 8–388)
GFR SERPL CREATININE-BSD FRML MDRD: 99 ML/MIN/1.73SQ M
GLUCOSE SERPL-MCNC: 82 MG/DL (ref 65–140)
IRON SATN MFR SERPL: 15 %
IRON SERPL-MCNC: 40 UG/DL (ref 50–170)
POTASSIUM SERPL-SCNC: 3.8 MMOL/L (ref 3.5–5.3)
PROT SERPL-MCNC: 7.3 G/DL (ref 6.4–8.2)
SODIUM SERPL-SCNC: 138 MMOL/L (ref 136–145)
TIBC SERPL-MCNC: 265 UG/DL (ref 250–450)

## 2019-06-28 PROCEDURE — 82306 VITAMIN D 25 HYDROXY: CPT

## 2019-06-28 PROCEDURE — 86140 C-REACTIVE PROTEIN: CPT

## 2019-06-28 PROCEDURE — 83550 IRON BINDING TEST: CPT

## 2019-06-28 PROCEDURE — 82397 CHEMILUMINESCENT ASSAY: CPT

## 2019-06-28 PROCEDURE — 36415 COLL VENOUS BLD VENIPUNCTURE: CPT

## 2019-06-28 PROCEDURE — 82728 ASSAY OF FERRITIN: CPT

## 2019-06-28 PROCEDURE — 99214 OFFICE O/P EST MOD 30 MIN: CPT | Performed by: INTERNAL MEDICINE

## 2019-06-28 PROCEDURE — 83540 ASSAY OF IRON: CPT

## 2019-06-28 PROCEDURE — 80299 QUANTITATIVE ASSAY DRUG: CPT

## 2019-06-28 PROCEDURE — 80053 COMPREHEN METABOLIC PANEL: CPT

## 2019-06-28 RX ORDER — LEVOTHYROXINE SODIUM 0.03 MG/1
25 TABLET ORAL DAILY
COMMUNITY
End: 2019-10-09 | Stop reason: SDUPTHER

## 2019-06-28 RX ORDER — ONDANSETRON 4 MG/1
4 TABLET, ORALLY DISINTEGRATING ORAL EVERY 6 HOURS PRN
Qty: 30 TABLET | Refills: 1 | Status: SHIPPED | OUTPATIENT
Start: 2019-06-28

## 2019-06-28 NOTE — TELEPHONE ENCOUNTER
Chasity Whitman, I spoke with Cynthia Chakraborty and sent rx for zofran to pharmacy for nausea for her which has worked well in the past

## 2019-06-28 NOTE — TELEPHONE ENCOUNTER
DR ELIAS'S PT    Pt called requesting her script for zohran as discussed today, also please forward to [facility] [phone]

## 2019-06-28 NOTE — PROGRESS NOTES
[facility] - Outpatient Follow-up Note  Rosa Niño 48 y o  female MRN: [id number]  Encounter: [id number]          ASSESSMENT AND PLAN:  Irma Nj was seen today for follow-up and diarrhea  1  Ulcerative pancolitis-  The patient is currently on Entyvio and her last infusion was 5/10/19  She was on Prednisone 20 mg which has been discontinued on 03/01/19  Her CRP on 5/7/19 was < 3 compared to 1/14/19 which was 25 9  Today she presents liquid stool and cramps 2 weeks prior to the infusions but denies blood in the stool  Although after infusions her stools are formed again  She is Due for her Entyvio infusion soon which I told patient to get blood work 2-3 days prior to check CRP and Entyvio levels  She was on Prednisone 20 mg which has been discontinued on 03/01/19  She was previously Vitamin D deficient as well as Iron deficient which I prescribed 1,000 unit dosage of Vitamin D as well as Ferrous sulfate 65 mg  Her CBC on 05/07/19 showed her Vitamin D in normal ranges (47 9)  as well as her Hemoglobin (11 6)  Her past back pain has been reoccurring as well prior to infusions which restricts her from carrying heavy items  This may be secondary to the [facility] levels as well  -     MISCELLANEOUS LAB TEST; Future  -     C-reactive protein; Future  -     Vitamin D 25 hydroxy; Future  -     Comprehensive metabolic panel; Future  -     Iron Panel; Future      Follow up in 3 months  ______________________________________________________________________    SUBJECTIVE:  Rosa Niño is a 48 y o  female is here for a follow up regarding her history of Ulcerative Colitis  She is currently getting Entyvio infusions and her last infusion was 5/10/19  She was on Prednisone 20 mg which has been discontinued on 03/01/19  She was previously Vitamin D deficient as well as Iron deficient which I prescribed 1,000 unit dosage of Vitamin D as well as Ferrous sulfate 65 mg   Her CBC on 05/07/19 showed her Vitamin D in normal ranges (47 9)  as well as her Hemoglobin (11 6)  Today she presents liquid stool and cramps 2 weeks prior to the infusions but denies blood in the stool  Although after infusions her stools are formed again  She also has been having lower back pain which she had previous before  She states that she can not carry heavy items  Although when she gets the infusion the pain subsides as well  CBC on 19 showed- Albumin 3 4, Vitamin D 47 9, Hemoglobin 11 6  Colonoscopy on 18 showed- Severe ulcerative colitis from the hepatic flexure on distally with ulcerations, diffuse inflammatory changes  Multiple biopsies taken for CMV  REVIEW OF SYSTEMS IS OTHERWISE NEGATIVE  Historical Information   Past Medical History:   Diagnosis Date    Adjustment disorder     last assessed 12    Anemia     HX of    Asthma     mild - intermittent    Bilateral leg edema     Blepharitis     last assessed 16    Carpal tunnel syndrome     Unspecified laterality    Colitis, acute     Dyspareunia in female     Edema     last assessed 06/22/15    Ganglion     Herniated cervical disc     History of transfusion     Hypokalemia     Hypothyroidism     Hypothyroidism     Insomnia     Lumps on the skin     last assessed 14    Mouth ulcers     last assessed 06/22/15    Nontraumatic tear of left tibialis posterior tendon     Traumatic teart     Polyarthritis     last assessed 16    Raynaud's disease     Rayna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 ([address]     Temporomandibular disorder     Joint    Thyroid disease     Ulcerative colitis ([address]     Ulcerative colitis ([address]      Past Surgical History:   Procedure Laterality Date    ANKLE SURGERY Left     Tendon repair    CARPAL TUNNEL RELEASE Bilateral      SECTION, LOW TRANSVERSE      COLONOSCOPY      COLONOSCOPY N/A 2018    Procedure: COLONOSCOPY;  Surgeon: Moy Awad MD;  [facility];   Service: Gastroenterology    HERNIA REPAIR      umbilical    HYSTERECTOMY      KNEE ARTHROSCOPY Right     LIPECTOMY      Multipe lipoma removals    LIPOMA RESECTION      NH COLONOSCOPY FLX DX W/COLLJ SPEC WHEN PFRMD N/A 2016    Procedure: COLONOSCOPY;  Surgeon: Vivi Bullard DO;  Location: [facility];   Service: Gastroenterology    NH REPAIR FLEX LEG TENDON,SECOND,EA Left 2016    Procedure: REPAIR OF LEFT POSTERIOR TIBIAL TENDON WITH GRAFT, EXPLORATION LEFT ANKLE ;  Surgeon: Jennifer Heredia DPM;  Location: [facility] OR;  Service: Podiatry    SHOULDER SURGERY Left     bicep tendon and labrum repair    TONSILLECTOMY      TOOTH EXTRACTION  2018     Social History   Social History     Substance and Sexual Activity   Alcohol Use No    Comment: social 1 drink per weeek     Social History     Substance and Sexual Activity   Drug Use No     Social History     Tobacco Use   Smoking Status Former Smoker    Last attempt to quit: 2003    Years since quittin 2   Smokeless Tobacco Never Used   Tobacco Comment    Quit , rare use for 2 years     Family History   Problem Relation Age of Onset    Parkinsonism Mother     Rheum arthritis Mother     Heart attack Father     Hypertension Father     Osteoporosis Father     Prostate cancer Father     Hashimoto's thyroiditis Sister     Cancer Paternal Grandfather         Penile    Prostate cancer Paternal Grandfather     Crohn's disease Family     Osteoarthritis Family     Rheum arthritis Family     Crohn's disease Other     Crohn's disease Maternal Uncle     Psoriasis Maternal Uncle     Ulcerative colitis Maternal Uncle     Rheum arthritis Maternal Aunt     Ulcerative colitis Family        Meds/Allergies       Current Outpatient Medications:     albuterol (ACCUNEB) 1 25 MG/3ML nebulizer solution    carisoprodol (SOMA) 250 MG    carisoprodol (SOMA) 350 mg tablet    cholecalciferol (VITAMIN D3) 1,000 units tablet    Cholecalciferol 50700 units TABS    famotidine (PEPCID) 20 mg tablet    ferrous sulfate 324 (65 Fe) mg    HYDROcodone-acetaminophen (NORCO) 5-325 mg per tablet    hydrOXYzine pamoate (VISTARIL) 25 mg capsule    hyoscyamine (ANASPAZ,LEVSIN) 0 125 MG tablet    levothyroxine 25 mcg tablet    polymyxin b-trimethoprim (POLYTRIM) ophthalmic solution    tacrolimus (PROTOPIC) 0 03 % ointment    triamcinolone (KENALOG) 0 1 % cream    vedolizumab (ENTYVIO) 300 MG SOLR    zolpidem (AMBIEN) 10 mg tablet    No Known Allergies        Objective     There were no vitals taken for this visit  There is no height or weight on file to calculate BMI  PHYSICAL EXAM:      General Appearance:   Alert, cooperative, no distress   HEENT:   Normocephalic, atraumatic, anicteric      Neck:  Supple, symmetrical, trachea midline   Lungs:   Clear to auscultation bilaterally; no rales, rhonchi or wheezing; respirations unlabored    Heart[de-identified]   Regular rate and rhythm; no murmur, rub, or gallop  Abdomen:   Soft, non-tender, non-distended; normal bowel sounds; no masses, no organomegaly    Genitalia:   Deferred    Rectal:   Deferred    Extremities:  No cyanosis, clubbing or edema    Pulses:  2+ and symmetric    Skin:  No jaundice, rashes, or lesions    Lymph nodes:  No palpable cervical lymphadenopathy        Lab Results:   No visits with results within 1 Day(s) from this visit  Latest known visit with results is:   Appointment on 05/10/2019   Component Date Value    ds DNA Ab 05/10/2019 <1          Radiology Results:   No results found  Attestation:     By signing my name below, Savanna Mota, attest that this documentation has been prepared under the direction and in the presence of [employer], DO  Electronically Signed: Sherry Johnson  6/28/19      I, [employer], personally performed the services described in this documentation  All medical record entries made by the sherry were at my direction and in my presence   I have reviewed the chart and discharge instructions and agree that the record reflects my personal performance and is accurate and complete   Terrance Sam, DO  6/28/19

## 2019-07-03 ENCOUNTER — TELEPHONE (OUTPATIENT)
Dept: GASTROENTEROLOGY | Facility: AMBULARY SURGERY CENTER | Age: 54
End: 2019-07-03

## 2019-07-03 RX ORDER — SODIUM CHLORIDE 9 MG/ML
20 INJECTION, SOLUTION INTRAVENOUS ONCE
Status: CANCELLED | OUTPATIENT
Start: 2019-07-05

## 2019-07-03 NOTE — TELEPHONE ENCOUNTER
DR ELIAS'S PT    Infusion called requesting a date change for the infusion order  Pt have an apptfor Friday

## 2019-07-05 ENCOUNTER — HOSPITAL ENCOUNTER (OUTPATIENT)
Dept: INFUSION CENTER | Facility: HOSPITAL | Age: 54
Discharge: HOME | End: 2019-07-05
Payer: COMMERCIAL

## 2019-07-05 VITALS
RESPIRATION RATE: 18 BRPM | HEART RATE: 68 BPM | DIASTOLIC BLOOD PRESSURE: 78 MMHG | SYSTOLIC BLOOD PRESSURE: 118 MMHG | TEMPERATURE: 97.5 F

## 2019-07-05 DIAGNOSIS — K51.011 ULCERATIVE PANCOLITIS WITH RECTAL BLEEDING (HCC): Primary | ICD-10-CM

## 2019-07-05 PROCEDURE — 96413 CHEMO IV INFUSION 1 HR: CPT

## 2019-07-05 RX ORDER — SODIUM CHLORIDE 9 MG/ML
20 INJECTION, SOLUTION INTRAVENOUS ONCE
Status: COMPLETED | OUTPATIENT
Start: 2019-07-05 | End: 2019-07-05

## 2019-07-05 RX ORDER — SODIUM CHLORIDE 9 MG/ML
20 INJECTION, SOLUTION INTRAVENOUS ONCE
Status: CANCELLED | OUTPATIENT
Start: 2019-08-30

## 2019-07-05 RX ADMIN — SODIUM CHLORIDE 20 ML/HR: 0.9 INJECTION, SOLUTION INTRAVENOUS at 10:00

## 2019-07-05 RX ADMIN — VEDOLIZUMAB 300 MG: 300 INJECTION, POWDER, LYOPHILIZED, FOR SOLUTION INTRAVENOUS at 10:35

## 2019-07-05 NOTE — PLAN OF CARE
Problem: Potential for Falls  Goal: Patient will remain free of falls  Description  INTERVENTIONS:  - Assess patient frequently for physical needs  -  Identify cognitive and physical deficits and behaviors that affect risk of falls    -  [place] fall precautions as indicated by assessment   - Educate patient/family on patient safety including physical limitations  - Instruct patient to call for assistance with activity based on assessment  - Modify environment to reduce risk of injury  - Consider OT/PT consult to assist with strengthening/mobility  Outcome: Progressing

## 2019-07-08 ENCOUNTER — TELEPHONE (OUTPATIENT)
Dept: GASTROENTEROLOGY | Facility: AMBULARY SURGERY CENTER | Age: 54
End: 2019-07-08

## 2019-07-08 LAB — MISCELLANEOUS LAB TEST RESULT: NORMAL

## 2019-07-08 NOTE — TELEPHONE ENCOUNTER
Patient aware of lab results  Patient reports Entyvio improving symptoms, but she is concerned that symptoms return around week 5  Increased frequency of 8/day, gas, back pain and nausea  She had 2 BM with blood in toilet and tissue this week  Nausea -Zofran controlled and abdominal discomfort -Levsin ineffective  She will continue to monitor symptoms and will call if symptoms increase

## 2019-07-08 NOTE — TELEPHONE ENCOUNTER
dheeraj patient      She would like a call back with lab results      Call back # [phone] or [phone]

## 2019-07-08 NOTE — TELEPHONE ENCOUNTER
----- Message from Steven Avery DO sent at 7/2/2019  7:51 PM EDT -----  Please let patient know that labs were good, crp is mildly up it was normal at less than 3 but now is 5 4  I'm still waiting for her drug and antibody levels of entyvio to be resulted  Pt is a lactation RN

## 2019-07-09 DIAGNOSIS — E55.9 VITAMIN D DEFICIENCY: Primary | ICD-10-CM

## 2019-07-24 DIAGNOSIS — E03.9 ACQUIRED HYPOTHYROIDISM: ICD-10-CM

## 2019-07-25 RX ORDER — LEVOTHYROXINE SODIUM 0.03 MG/1
TABLET ORAL
Qty: 90 TABLET | Refills: 0 | Status: SHIPPED | OUTPATIENT
Start: 2019-07-25 | End: 2019-10-30 | Stop reason: SDUPTHER

## 2019-07-26 DIAGNOSIS — K51.011 ULCERATIVE PANCOLITIS WITH RECTAL BLEEDING (HCC): ICD-10-CM

## 2019-07-26 DIAGNOSIS — Z79.899 OTHER LONG TERM (CURRENT) DRUG THERAPY: ICD-10-CM

## 2019-07-26 DIAGNOSIS — Z79.899 OTHER LONG TERM (CURRENT) DRUG THERAPY: Primary | ICD-10-CM

## 2019-07-26 RX ORDER — SODIUM CHLORIDE 9 MG/ML
20 INJECTION, SOLUTION INTRAVENOUS ONCE
Status: CANCELLED | OUTPATIENT
Start: 2019-08-02

## 2019-07-26 RX ORDER — BUDESONIDE 9 MG/1
9 TABLET, FILM COATED, EXTENDED RELEASE ORAL DAILY
Qty: 90 TABLET | Refills: 0 | Status: SHIPPED | OUTPATIENT
Start: 2019-07-26 | End: 2019-10-25 | Stop reason: ALTCHOICE

## 2019-07-26 RX ORDER — BUDESONIDE 9 MG/1
9 TABLET, FILM COATED, EXTENDED RELEASE ORAL DAILY
Qty: 90 TABLET | Refills: 0 | Status: SHIPPED | OUTPATIENT
Start: 2019-07-26 | End: 2019-07-26

## 2019-07-26 NOTE — TELEPHONE ENCOUNTER
Inez Board - it looks like pt already on Entyvio, but she's calling to find out the status of her Entyvio approval   Would you be able to call her back and clarify? I'm adjusting an order in [place] for it per Dr Rupali Schwarz Rx sent to pharmacy

## 2019-07-26 NOTE — TELEPHONE ENCOUNTER
Pt called looking for an update on if the entyvio was approved  Pt also wanted to know the status of the medication being sent to pharmacy?

## 2019-07-31 ENCOUNTER — TELEPHONE (OUTPATIENT)
Dept: GASTROENTEROLOGY | Facility: CLINIC | Age: 54
End: 2019-07-31

## 2019-07-31 NOTE — TELEPHONE ENCOUNTER
Steven Avery DO  P [facility] [place] Provider             Can someone do this with a start date of 8/2/19 at be infusion and then q 4 weeks after that and send the order to me please        Thank you so much,   Ronny patel    Previous Messages      ----- Message -----   From: Dione Germain MA   Sent: 7/26/2019   9:59 AM EDT   To: Steven Avery DO, *   Subject: [place] order                                     Please place order for Entyvio infusion to be every 4 weeks versus every 8 weeks, please advise  Thank you!

## 2019-08-02 ENCOUNTER — HOSPITAL ENCOUNTER (OUTPATIENT)
Dept: INFUSION CENTER | Facility: HOSPITAL | Age: 54
Discharge: HOME | End: 2019-08-02
Attending: INTERNAL MEDICINE
Payer: COMMERCIAL

## 2019-08-02 VITALS
HEART RATE: 64 BPM | RESPIRATION RATE: 18 BRPM | TEMPERATURE: 98.3 F | DIASTOLIC BLOOD PRESSURE: 71 MMHG | SYSTOLIC BLOOD PRESSURE: 120 MMHG

## 2019-08-02 DIAGNOSIS — K51.011 ULCERATIVE PANCOLITIS WITH RECTAL BLEEDING (HCC): Primary | ICD-10-CM

## 2019-08-02 PROCEDURE — 96413 CHEMO IV INFUSION 1 HR: CPT

## 2019-08-02 RX ORDER — SODIUM CHLORIDE 9 MG/ML
20 INJECTION, SOLUTION INTRAVENOUS ONCE
Status: COMPLETED | OUTPATIENT
Start: 2019-08-02 | End: 2019-08-02

## 2019-08-02 RX ORDER — SODIUM CHLORIDE 9 MG/ML
20 INJECTION, SOLUTION INTRAVENOUS ONCE
Status: CANCELLED | OUTPATIENT
Start: 2019-08-30

## 2019-08-02 RX ADMIN — SODIUM CHLORIDE 20 ML/HR: 0.9 INJECTION, SOLUTION INTRAVENOUS at 09:56

## 2019-08-02 RX ADMIN — VEDOLIZUMAB 300 MG: 300 INJECTION, POWDER, LYOPHILIZED, FOR SOLUTION INTRAVENOUS at 09:57

## 2019-08-02 NOTE — PLAN OF CARE
Problem: Potential for Falls  Goal: Patient will remain free of falls  Description  INTERVENTIONS:  - Assess patient frequently for physical needs  -  Identify cognitive and physical deficits and behaviors that affect risk of falls    -  [place] fall precautions as indicated by assessment   - Educate patient/family on patient safety including physical limitations  - Instruct patient to call for assistance with activity based on assessment  - Modify environment to reduce risk of injury  - Consider OT/PT consult to assist with strengthening/mobility  Outcome: Progressing

## 2019-08-22 ENCOUNTER — TELEPHONE (OUTPATIENT)
Dept: GASTROENTEROLOGY | Facility: MEDICAL CENTER | Age: 54
End: 2019-08-22

## 2019-08-22 DIAGNOSIS — K51.011 ULCERATIVE PANCOLITIS WITH RECTAL BLEEDING (HCC): Primary | ICD-10-CM

## 2019-08-22 RX ORDER — PREDNISONE 10 MG/1
TABLET ORAL
Qty: 90 TABLET | Refills: 2 | Status: SHIPPED | OUTPATIENT
Start: 2019-08-22 | End: 2019-09-24 | Stop reason: HOSPADM

## 2019-08-22 NOTE — TELEPHONE ENCOUNTER
Dr Vasquez's patient    Patient called looking to speak with a nurse regarding budesonide  Interactions and symptoms  She is wondering if you would like her to go back to prednisone since budesonide is not working for her  She stated she has not eaten and is dropping weight with pain   Patient can be reached at [phone]

## 2019-08-22 NOTE — TELEPHONE ENCOUNTER
Dr Oni Selby patient Hx -colitis    Patient call for increasing symptoms x 1 week  6 plus bloody BMs per day and through the night  Abdominal "discomfort" 5/10 that improves after BM  Abdomen tender to touch/denies fever  Decreased appetite -reports 4 lb weight loss since Friday  Occassional dizziness and sob with exertion  Imodium x 4 doses since last night, Zofran prn, Budesonide daily and Entyvio infusion q 4 weeks  Unable to tolerate Bentyl in the past       Patient requesting change from budesonide to prednisone  Recommended patient increase po fluids for hydration and go to ER for evaluation for increased symptoms/ symptomatic

## 2019-08-22 NOTE — TELEPHONE ENCOUNTER
Patient states BMs are "flakey like fish food with some formed stool" since taking the Imodium  Prior to Imodium BMs "frothy, flaky, bloody- no formed stood"  I explained it is recommended to test for c diff before prescribing prednisone  She said she knows what c diff looks like and smells like and it is not c diff  She does not want to wait for testing for steroids

## 2019-08-26 DIAGNOSIS — F51.04 CHRONIC INSOMNIA: ICD-10-CM

## 2019-08-26 RX ORDER — ZOLPIDEM TARTRATE 10 MG/1
TABLET ORAL
Qty: 30 TABLET | Refills: 0 | Status: SHIPPED | OUTPATIENT
Start: 2019-08-26 | End: 2019-10-02 | Stop reason: SDUPTHER

## 2019-08-26 NOTE — TELEPHONE ENCOUNTER
Please see attached encounter    Patient called because she is due to start prednisone taper tomorrow -40 mg to 35 mg  Although she does see a slight improvement, she does not feel she is ready to decrease prednisone  5-6 BMs per day alternating from loose/bloody to semi formed stools  Appetite is decreased  Tolerating po fluids well

## 2019-08-26 NOTE — TELEPHONE ENCOUNTER
Pt called stating she is supposed to taper down with the steroids tomorrow but she does not feel that she is ready and would like to speak to someone regarding this   Pt would like to be called

## 2019-08-30 ENCOUNTER — HOSPITAL ENCOUNTER (OUTPATIENT)
Dept: INFUSION CENTER | Facility: HOSPITAL | Age: 54
Discharge: HOME | End: 2019-08-30
Attending: INTERNAL MEDICINE
Payer: COMMERCIAL

## 2019-08-30 VITALS
HEART RATE: 60 BPM | SYSTOLIC BLOOD PRESSURE: 117 MMHG | TEMPERATURE: 97.9 F | RESPIRATION RATE: 18 BRPM | DIASTOLIC BLOOD PRESSURE: 58 MMHG

## 2019-08-30 DIAGNOSIS — K51.011 ULCERATIVE PANCOLITIS WITH RECTAL BLEEDING (HCC): Primary | ICD-10-CM

## 2019-08-30 PROCEDURE — 96413 CHEMO IV INFUSION 1 HR: CPT

## 2019-08-30 RX ORDER — SODIUM CHLORIDE 9 MG/ML
20 INJECTION, SOLUTION INTRAVENOUS ONCE
Status: COMPLETED | OUTPATIENT
Start: 2019-08-30 | End: 2019-08-30

## 2019-08-30 RX ORDER — SODIUM CHLORIDE 9 MG/ML
20 INJECTION, SOLUTION INTRAVENOUS ONCE
Status: CANCELLED | OUTPATIENT
Start: 2019-09-27

## 2019-08-30 RX ADMIN — VEDOLIZUMAB 300 MG: 300 INJECTION, POWDER, LYOPHILIZED, FOR SOLUTION INTRAVENOUS at 10:43

## 2019-08-30 RX ADMIN — SODIUM CHLORIDE 20 ML/HR: 0.9 INJECTION, SOLUTION INTRAVENOUS at 10:15

## 2019-08-30 NOTE — PLAN OF CARE
Problem: Potential for Falls  Goal: Patient will remain free of falls  Description  INTERVENTIONS:  - Assess patient frequently for physical needs  -  Identify cognitive and physical deficits and behaviors that affect risk of falls    -  [place] fall precautions as indicated by assessment   - Educate patient/family on patient safety including physical limitations  - Instruct patient to call for assistance with activity based on assessment  - Modify environment to reduce risk of injury  - Consider OT/PT consult to assist with strengthening/mobility  Outcome: Progressing

## 2019-09-13 DIAGNOSIS — K51.011 ULCERATIVE PANCOLITIS WITH RECTAL BLEEDING (HCC): Primary | ICD-10-CM

## 2019-09-13 NOTE — TELEPHONE ENCOUNTER
Pt called stating she was supposed to taper down today to 15 mg today with the prednisone but stayed at 20 mg because her symptoms have not gotten better  Pt states she is not able to eat and have liquid stools and is very gassy  Pt would like to be called to please advise   Pt can be reached at [phone]

## 2019-09-13 NOTE — TELEPHONE ENCOUNTER
Called patient back re: current s/s  Diarrhea 4x in middle of night; 5x today @ work  Continuing to observe blood in stools (bright red & frothy throughout day)  Last BM-liquid brown in color  Patient reports no appetite & HA (pain 7/10), ABD discomfort when passing loose stools (pain 8/10)  Imodium doses: 2 @ 2200 last night; 2 @ 0600 today; 1 @ 1200 today  Tylenol 1000 mg po @ 0600 for HA  Patient stated she cannot tolerate Bentyl d/t drying effects on eyes  Patient requests not to decrease Prednisone to 15 mg as of this time & continue 20 mg  Patient requesting provider call her at home #[phone]  Encouraged clear fluids (Gatorade, Pedialyte, water to prevent dehydration)

## 2019-09-13 NOTE — TELEPHONE ENCOUNTER
I called her back, she does not feel like the Jim  is working for her  She has not had improvement in her symptoms since starting Q4 week dosing (she has had only 1 dose at that interval so far, will be due in two weeks)  She is monitoring her symptoms and trying to stay hydrated  She was tapering prednisone by 5mg every few days; I asked her to return to the lowest dose she felt better on (she states even at 40mg daily she was not completely well)  I ordered CRP, CMP, CBC, fecal calprotectin and C  Diff which she will get done over the weekend  Her next visit with you is scheduled in November  I will forward you the labs when I get them back

## 2019-09-14 ENCOUNTER — APPOINTMENT (OUTPATIENT)
Dept: LAB | Facility: HOSPITAL | Age: 54
End: 2019-09-14
Payer: COMMERCIAL

## 2019-09-14 DIAGNOSIS — K51.011 ULCERATIVE PANCOLITIS WITH RECTAL BLEEDING (HCC): ICD-10-CM

## 2019-09-14 LAB
ALBUMIN SERPL BCP-MCNC: 2.8 G/DL (ref 3.5–5)
ALP SERPL-CCNC: 74 U/L (ref 46–116)
ALT SERPL W P-5'-P-CCNC: 11 U/L (ref 12–78)
ANION GAP SERPL CALCULATED.3IONS-SCNC: 6 MMOL/L (ref 4–13)
AST SERPL W P-5'-P-CCNC: 9 U/L (ref 5–45)
BASOPHILS # BLD AUTO: 0.03 THOUSANDS/ΜL (ref 0–0.1)
BASOPHILS NFR BLD AUTO: 0 % (ref 0–1)
BILIRUB SERPL-MCNC: 0.29 MG/DL (ref 0.2–1)
BUN SERPL-MCNC: 5 MG/DL (ref 5–25)
CALCIUM SERPL-MCNC: 10.1 MG/DL (ref 8.3–10.1)
CHLORIDE SERPL-SCNC: 104 MMOL/L (ref 100–108)
CO2 SERPL-SCNC: 28 MMOL/L (ref 21–32)
CREAT SERPL-MCNC: 0.78 MG/DL (ref 0.6–1.3)
CRP SERPL QL: 73.2 MG/L
EOSINOPHIL # BLD AUTO: 0.36 THOUSAND/ΜL (ref 0–0.61)
EOSINOPHIL NFR BLD AUTO: 4 % (ref 0–6)
ERYTHROCYTE [DISTWIDTH] IN BLOOD BY AUTOMATED COUNT: 13.6 % (ref 11.6–15.1)
GFR SERPL CREATININE-BSD FRML MDRD: 86 ML/MIN/1.73SQ M
GLUCOSE P FAST SERPL-MCNC: 103 MG/DL (ref 65–99)
HCT VFR BLD AUTO: 37 % (ref 34.8–46.1)
HGB BLD-MCNC: 11.3 G/DL (ref 11.5–15.4)
IMM GRANULOCYTES # BLD AUTO: 0.03 THOUSAND/UL (ref 0–0.2)
IMM GRANULOCYTES NFR BLD AUTO: 0 % (ref 0–2)
LYMPHOCYTES # BLD AUTO: 2.4 THOUSANDS/ΜL (ref 0.6–4.47)
LYMPHOCYTES NFR BLD AUTO: 28 % (ref 14–44)
MCH RBC QN AUTO: 29.3 PG (ref 26.8–34.3)
MCHC RBC AUTO-ENTMCNC: 30.5 G/DL (ref 31.4–37.4)
MCV RBC AUTO: 96 FL (ref 82–98)
MONOCYTES # BLD AUTO: 0.96 THOUSAND/ΜL (ref 0.17–1.22)
MONOCYTES NFR BLD AUTO: 11 % (ref 4–12)
NEUTROPHILS # BLD AUTO: 4.73 THOUSANDS/ΜL (ref 1.85–7.62)
NEUTS SEG NFR BLD AUTO: 57 % (ref 43–75)
NRBC BLD AUTO-RTO: 0 /100 WBCS
PLATELET # BLD AUTO: 418 THOUSANDS/UL (ref 149–390)
PMV BLD AUTO: 8.9 FL (ref 8.9–12.7)
POTASSIUM SERPL-SCNC: 3.8 MMOL/L (ref 3.5–5.3)
PROT SERPL-MCNC: 6.4 G/DL (ref 6.4–8.2)
RBC # BLD AUTO: 3.86 MILLION/UL (ref 3.81–5.12)
SODIUM SERPL-SCNC: 138 MMOL/L (ref 136–145)
WBC # BLD AUTO: 8.51 THOUSAND/UL (ref 4.31–10.16)

## 2019-09-14 PROCEDURE — 80053 COMPREHEN METABOLIC PANEL: CPT

## 2019-09-14 PROCEDURE — 36415 COLL VENOUS BLD VENIPUNCTURE: CPT

## 2019-09-14 PROCEDURE — 83993 ASSAY FOR CALPROTECTIN FECAL: CPT

## 2019-09-14 PROCEDURE — 86140 C-REACTIVE PROTEIN: CPT

## 2019-09-14 PROCEDURE — 85025 COMPLETE CBC W/AUTO DIFF WBC: CPT

## 2019-09-14 PROCEDURE — 87493 C DIFF AMPLIFIED PROBE: CPT

## 2019-09-15 LAB — C DIFF TOX GENS STL QL NAA+PROBE: NORMAL

## 2019-09-16 ENCOUNTER — HOSPITAL ENCOUNTER (INPATIENT)
Facility: HOSPITAL | Age: 54
LOS: 8 days | Discharge: HOME | DRG: 386 | End: 2019-09-24
Attending: INTERNAL MEDICINE | Admitting: INTERNAL MEDICINE
Payer: COMMERCIAL

## 2019-09-16 ENCOUNTER — TELEPHONE (OUTPATIENT)
Dept: GASTROENTEROLOGY | Facility: AMBULARY SURGERY CENTER | Age: 54
End: 2019-09-16

## 2019-09-16 DIAGNOSIS — K51.011 ULCERATIVE PANCOLITIS WITH RECTAL BLEEDING (HCC): Primary | ICD-10-CM

## 2019-09-16 LAB
ALBUMIN SERPL BCP-MCNC: 2.6 G/DL (ref 3.5–5)
ALP SERPL-CCNC: 78 U/L (ref 46–116)
ALT SERPL W P-5'-P-CCNC: 15 U/L (ref 12–78)
ANION GAP SERPL CALCULATED.3IONS-SCNC: 9 MMOL/L (ref 4–13)
AST SERPL W P-5'-P-CCNC: 18 U/L (ref 5–45)
BILIRUB SERPL-MCNC: 0.4 MG/DL (ref 0.2–1)
BUN SERPL-MCNC: 7 MG/DL (ref 5–25)
CALCIUM SERPL-MCNC: 10.1 MG/DL (ref 8.3–10.1)
CHLORIDE SERPL-SCNC: 101 MMOL/L (ref 100–108)
CO2 SERPL-SCNC: 28 MMOL/L (ref 21–32)
CREAT SERPL-MCNC: 0.74 MG/DL (ref 0.6–1.3)
ERYTHROCYTE [DISTWIDTH] IN BLOOD BY AUTOMATED COUNT: 12.8 % (ref 11.6–15.1)
ERYTHROCYTE [SEDIMENTATION RATE] IN BLOOD: 43 MM/HOUR (ref 0–20)
GFR SERPL CREATININE-BSD FRML MDRD: 92 ML/MIN/1.73SQ M
GLUCOSE SERPL-MCNC: 126 MG/DL (ref 65–140)
HCT VFR BLD AUTO: 37.7 % (ref 34.8–46.1)
HGB BLD-MCNC: 11.9 G/DL (ref 11.5–15.4)
MCH RBC QN AUTO: 29.4 PG (ref 26.8–34.3)
MCHC RBC AUTO-ENTMCNC: 31.6 G/DL (ref 31.4–37.4)
MCV RBC AUTO: 93 FL (ref 82–98)
PLATELET # BLD AUTO: 452 THOUSANDS/UL (ref 149–390)
PMV BLD AUTO: 9 FL (ref 8.9–12.7)
POTASSIUM SERPL-SCNC: 4.4 MMOL/L (ref 3.5–5.3)
PROT SERPL-MCNC: 6.5 G/DL (ref 6.4–8.2)
RBC # BLD AUTO: 4.05 MILLION/UL (ref 3.81–5.12)
SODIUM SERPL-SCNC: 138 MMOL/L (ref 136–145)
WBC # BLD AUTO: 9.57 THOUSAND/UL (ref 4.31–10.16)

## 2019-09-16 PROCEDURE — 99223 1ST HOSP IP/OBS HIGH 75: CPT | Performed by: INTERNAL MEDICINE

## 2019-09-16 PROCEDURE — 87505 NFCT AGENT DETECTION GI: CPT | Performed by: INTERNAL MEDICINE

## 2019-09-16 PROCEDURE — 85027 COMPLETE CBC AUTOMATED: CPT | Performed by: PHYSICIAN ASSISTANT

## 2019-09-16 PROCEDURE — 80053 COMPREHEN METABOLIC PANEL: CPT | Performed by: PHYSICIAN ASSISTANT

## 2019-09-16 PROCEDURE — 85652 RBC SED RATE AUTOMATED: CPT | Performed by: PHYSICIAN ASSISTANT

## 2019-09-16 RX ORDER — FAMOTIDINE 20 MG/1
20 TABLET, FILM COATED ORAL 2 TIMES DAILY
Status: DISCONTINUED | OUTPATIENT
Start: 2019-09-16 | End: 2019-09-17

## 2019-09-16 RX ORDER — HYDROCODONE BITARTRATE AND ACETAMINOPHEN 5; 325 MG/1; MG/1
1 TABLET ORAL EVERY 6 HOURS PRN
Status: DISCONTINUED | OUTPATIENT
Start: 2019-09-16 | End: 2019-09-18

## 2019-09-16 RX ORDER — TRIAMCINOLONE ACETONIDE 1 MG/G
CREAM TOPICAL 2 TIMES DAILY
Status: DISCONTINUED | OUTPATIENT
Start: 2019-09-16 | End: 2019-09-16

## 2019-09-16 RX ORDER — ACETAMINOPHEN 325 MG/1
650 TABLET ORAL EVERY 6 HOURS PRN
Status: DISCONTINUED | OUTPATIENT
Start: 2019-09-16 | End: 2019-09-16

## 2019-09-16 RX ORDER — ZOLPIDEM TARTRATE 5 MG/1
10 TABLET ORAL
Status: DISCONTINUED | OUTPATIENT
Start: 2019-09-16 | End: 2019-09-24 | Stop reason: HOSPADM

## 2019-09-16 RX ORDER — SODIUM CHLORIDE 9 MG/ML
75 INJECTION, SOLUTION INTRAVENOUS CONTINUOUS
Status: DISCONTINUED | OUTPATIENT
Start: 2019-09-16 | End: 2019-09-16

## 2019-09-16 RX ORDER — TACROLIMUS 0.3 MG/G
OINTMENT TOPICAL 2 TIMES DAILY
Status: DISCONTINUED | OUTPATIENT
Start: 2019-09-16 | End: 2019-09-16

## 2019-09-16 RX ORDER — NEOMYCIN SULFATE, POLYMYXIN B SULFATE AND GRAMICIDIN 1.75; 10000; .025 MG/ML; [USP'U]/ML; MG/ML
1 SOLUTION/ DROPS OPHTHALMIC 4 TIMES DAILY
Status: DISCONTINUED | OUTPATIENT
Start: 2019-09-16 | End: 2019-09-16

## 2019-09-16 RX ORDER — MELATONIN
1000 DAILY
Status: DISCONTINUED | OUTPATIENT
Start: 2019-09-16 | End: 2019-09-17

## 2019-09-16 RX ORDER — HYDROXYZINE HYDROCHLORIDE 25 MG/1
25 TABLET, FILM COATED ORAL
Status: DISCONTINUED | OUTPATIENT
Start: 2019-09-16 | End: 2019-09-18

## 2019-09-16 RX ORDER — FERROUS SULFATE 325(65) MG
325 TABLET ORAL
Status: DISCONTINUED | OUTPATIENT
Start: 2019-09-17 | End: 2019-09-17

## 2019-09-16 RX ORDER — CARISOPRODOL 250 MG/1
250 TABLET ORAL EVERY 6 HOURS PRN
Status: DISCONTINUED | OUTPATIENT
Start: 2019-09-16 | End: 2019-09-16

## 2019-09-16 RX ORDER — LEVOTHYROXINE SODIUM 0.03 MG/1
25 TABLET ORAL
Status: DISCONTINUED | OUTPATIENT
Start: 2019-09-17 | End: 2019-09-24 | Stop reason: HOSPADM

## 2019-09-16 RX ORDER — MAGNESIUM HYDROXIDE/ALUMINUM HYDROXICE/SIMETHICONE 120; 1200; 1200 MG/30ML; MG/30ML; MG/30ML
30 SUSPENSION ORAL EVERY 6 HOURS PRN
Status: DISCONTINUED | OUTPATIENT
Start: 2019-09-16 | End: 2019-09-24 | Stop reason: HOSPADM

## 2019-09-16 RX ORDER — ONDANSETRON 2 MG/ML
4 INJECTION INTRAMUSCULAR; INTRAVENOUS EVERY 6 HOURS PRN
Status: DISCONTINUED | OUTPATIENT
Start: 2019-09-16 | End: 2019-09-17 | Stop reason: SDUPTHER

## 2019-09-16 RX ORDER — ACETAMINOPHEN 325 MG/1
650 TABLET ORAL EVERY 6 HOURS PRN
Status: DISCONTINUED | OUTPATIENT
Start: 2019-09-16 | End: 2019-09-24 | Stop reason: HOSPADM

## 2019-09-16 RX ORDER — ONDANSETRON 2 MG/ML
4 INJECTION INTRAMUSCULAR; INTRAVENOUS EVERY 6 HOURS PRN
Status: DISCONTINUED | OUTPATIENT
Start: 2019-09-16 | End: 2019-09-24 | Stop reason: HOSPADM

## 2019-09-16 RX ORDER — METHYLPREDNISOLONE SODIUM SUCCINATE 40 MG/ML
20 INJECTION, POWDER, LYOPHILIZED, FOR SOLUTION INTRAMUSCULAR; INTRAVENOUS EVERY 8 HOURS
Status: DISCONTINUED | OUTPATIENT
Start: 2019-09-16 | End: 2019-09-23

## 2019-09-16 RX ORDER — ALBUTEROL SULFATE 2.5 MG/3ML
2.5 SOLUTION RESPIRATORY (INHALATION) EVERY 6 HOURS PRN
Status: DISCONTINUED | OUTPATIENT
Start: 2019-09-16 | End: 2019-09-24 | Stop reason: HOSPADM

## 2019-09-16 RX ORDER — LEVOTHYROXINE SODIUM 0.03 MG/1
25 TABLET ORAL DAILY
Status: DISCONTINUED | OUTPATIENT
Start: 2019-09-16 | End: 2019-09-16

## 2019-09-16 RX ORDER — ALBUTEROL SULFATE 2.5 MG/3ML
1.25 SOLUTION RESPIRATORY (INHALATION) EVERY 6 HOURS PRN
Status: DISCONTINUED | OUTPATIENT
Start: 2019-09-16 | End: 2019-09-16

## 2019-09-16 RX ORDER — DEXTROSE AND SODIUM CHLORIDE 5; .45 G/100ML; G/100ML
150 INJECTION, SOLUTION INTRAVENOUS CONTINUOUS
Status: DISCONTINUED | OUTPATIENT
Start: 2019-09-16 | End: 2019-09-16

## 2019-09-16 RX ORDER — DEXTROSE AND SODIUM CHLORIDE 5; .9 G/100ML; G/100ML
100 INJECTION, SOLUTION INTRAVENOUS CONTINUOUS
Status: DISCONTINUED | OUTPATIENT
Start: 2019-09-16 | End: 2019-09-19

## 2019-09-16 RX ORDER — CARISOPRODOL 350 MG/1
350 TABLET ORAL
Status: DISCONTINUED | OUTPATIENT
Start: 2019-09-16 | End: 2019-09-24 | Stop reason: HOSPADM

## 2019-09-16 RX ADMIN — SODIUM CHLORIDE 75 ML/HR: 0.9 INJECTION, SOLUTION INTRAVENOUS at 15:13

## 2019-09-16 RX ADMIN — ACETAMINOPHEN 650 MG: 325 TABLET, FILM COATED ORAL at 13:25

## 2019-09-16 RX ADMIN — HYOSCYAMINE SULFATE 0.12 MG: 0.12 TABLET ORAL at 22:01

## 2019-09-16 RX ADMIN — METHYLPREDNISOLONE SODIUM SUCCINATE 20 MG: 40 INJECTION, POWDER, FOR SOLUTION INTRAMUSCULAR; INTRAVENOUS at 21:21

## 2019-09-16 RX ADMIN — ONDANSETRON 4 MG: 2 INJECTION INTRAMUSCULAR; INTRAVENOUS at 22:01

## 2019-09-16 RX ADMIN — DEXTROSE AND SODIUM CHLORIDE 100 ML/HR: 5; .9 INJECTION, SOLUTION INTRAVENOUS at 17:02

## 2019-09-16 RX ADMIN — METHYLPREDNISOLONE SODIUM SUCCINATE 20 MG: 40 INJECTION, POWDER, FOR SOLUTION INTRAMUSCULAR; INTRAVENOUS at 13:25

## 2019-09-16 RX ADMIN — ONDANSETRON 4 MG: 2 INJECTION INTRAMUSCULAR; INTRAVENOUS at 13:25

## 2019-09-16 RX ADMIN — HYOSCYAMINE SULFATE 0.12 MG: 0.12 TABLET ORAL at 17:07

## 2019-09-16 RX ADMIN — HYDROCODONE BITARTRATE AND ACETAMINOPHEN 1 TABLET: 5; 325 TABLET ORAL at 17:19

## 2019-09-16 RX ADMIN — ZOLPIDEM TARTRATE 10 MG: 5 TABLET, COATED ORAL at 21:28

## 2019-09-16 RX ADMIN — FAMOTIDINE 20 MG: 20 TABLET ORAL at 19:00

## 2019-09-16 NOTE — CONSULTS
Consultation - [facility]  Camryn Macon 47 y o  female MRN: [id number]  Unit/Bed#: -01 Encounter: [id number]        Inpatient consult to gastroenterology  Consult performed by: Deborah Yost PA-C  Consult ordered by: Jacky Puri MD          Reason for Consult / Principal Problem: UC flare    ASSESSMENT and PLAN:    Principal Problem:    Ulcerative pancolitis with rectal bleeding ([address] )  Active Problems:    Asthma, mild intermittent    Arthralgia of multiple joints    #1  Ulcerative colitis flare: CRP 73 2 on 9/14  C diff negative at that time as well  On Entyvio Q4 weeks and prednisone 30 mg daily with no benefit    -Check stool PCR  -Check sed rate  -Start solumedrol 20 mg Q6 hours  -Levsin for cramping  -Zofran as needed  -NPO with IVF for today for bowel rest  -will likely need to switch to different regiment as outpatient, likely Remicade  Had antibodies to Humira after 1 year  Leala Kalata was recently increased from Q8 to Q4 weeks with no benefit   -If no improvement on IV steroids, consider inpatient colonoscopy to assess mucosal disease and rule out HSV and CMV  Last colonoscopy Nov 2018 which showed severe ulcerative colitis from hepatic flexure to rectum    -------------------------------------------------------------------------------------------------------------------    HPI: This is a 47year old female with a history of ulcerative pancolitis, Raynaud's, and hypothyroidism who was a direct admit for failing outpatient treatment  She has been having ongoing flare symptoms for quite some time  She previously had been on Humira for about a year which was working well but then she developed antibodies to this  This was then switched over to Leala Kalata earlier this year  She was not doing very well on Entyvio and recently due to low levels of Entyvio in her blood, she was increased to Leala Kalata every 4 weeks    She has not been able to get off steroids for quite some time due to flare symptoms  She is currently on 30 mg which she recently was upped  Prior to that she had been on budesonide which was not helping  She reports that she is having persistent diarrhea with rectal bleeding  She reports that she will sometimes go once every hour  It will wake her up from sleep in the night  She has abdominal cramping with it  She has a lack of appetite with nausea but denies any vomiting  She has lost about 20 lb since last September  Her last colonoscopy was in November of 2018 which showed severe ulcerative colitis  REVIEW OF SYSTEMS:    CONSTITUTIONAL: Denies any chills, or rigors  +Fever, decreased appetite, and recent weight loss  HEENT: No earache or tinnitus  Denies hearing loss or visual disturbances  CARDIOVASCULAR: No chest pain or palpitations  RESPIRATORY: Denies any cough, hemoptysis, shortness of breath or dyspnea on exertion  GASTROINTESTINAL: As noted in the History of Present Illness  GENITOURINARY: No problems with urination  Denies any hematuria or dysuria  NEUROLOGIC: No dizziness or vertigo, denies headaches  MUSCULOSKELETAL: Denies any muscle or joint pain  SKIN: Denies skin rashes or itching  ENDOCRINE: Denies excessive thirst  Denies intolerance to heat or cold  PSYCHOSOCIAL: Denies depression or anxiety  Denies any recent memory loss         Historical Information   Past Medical History:   Diagnosis Date    Adjustment disorder     last assessed 05/16/12    Anemia     HX of    Asthma     mild - intermittent    Bilateral leg edema     Blepharitis     last assessed 02/04/16    Carpal tunnel syndrome     Unspecified laterality    Colitis, acute     Dyspareunia in female     Edema     last assessed 06/22/15    Ganglion     Herniated cervical disc     History of transfusion     Hypokalemia     Hypothyroidism     Hypothyroidism     Insomnia     Lumps on the skin     last assessed 03/12/14    Mouth ulcers     last assessed 06/22/15  Nontraumatic tear of left tibialis posterior tendon     Traumatic teart     Polyarthritis     last assessed 16    Raynaud's disease     [facility]na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 [facility] [address] [facility]     Temporomandibular disorder     Joint    Thyroid disease     Ulcerative colitis [facility]     Ulcerative colitis ([facility] )      Past Surgical History:   Procedure Laterality Date    ANKLE SURGERY Left     Tendon repair    CARPAL TUNNEL RELEASE Bilateral      SECTION, LOW TRANSVERSE      COLONOSCOPY      COLONOSCOPY N/A 2018    Procedure: COLONOSCOPY;  Surgeon: Christy Cordon MD;  [facility]; Service: Gastroenterology    HERNIA REPAIR      umbilical    HYSTERECTOMY      KNEE ARTHROSCOPY Right     LIPECTOMY      Multipe lipoma removals    LIPOMA RESECTION      IL COLONOSCOPY FLX DX W/COLLJ SPEC WHEN PFRMD N/A 2016    Procedure: COLONOSCOPY;  Surgeon: Gail Malhotra DO;  Location: [facility];   Service: Gastroenterology    IL REPAIR FLEX LEG TENDON,SECOND,EA Left 2016    Procedure: REPAIR OF LEFT POSTERIOR TIBIAL TENDON WITH GRAFT, EXPLORATION LEFT ANKLE ;  Surgeon: Oleg Nolan DPM;  Location: [facility];  Service: Podiatry    SHOULDER SURGERY Left     bicep tendon and labrum repair    TONSILLECTOMY      TOOTH EXTRACTION  2018     Social History   Social History     Substance and Sexual Activity   Alcohol Use No    Comment: social 1 drink per weeek     Social History     Substance and Sexual Activity   Drug Use No     Social History     Tobacco Use   Smoking Status Former Smoker    Types: Cigarettes    Last attempt to quit: 2003    Years since quittin 4   Smokeless Tobacco Never Used   Tobacco Comment    Quit , rare use for 2 years     Family History   Problem Relation Age of Onset    Parkinsonism Mother     Rheum arthritis Mother     Heart attack Father     Hypertension Father     Osteoporosis Father     Prostate cancer Father     Hashimoto's thyroiditis Sister     Cancer Paternal Grandfather         Penile    Prostate cancer Paternal Grandfather     Crohn's disease Family     Osteoarthritis Family     Rheum arthritis Family     Crohn's disease Other     Crohn's disease Maternal Uncle     Psoriasis Maternal Uncle     Ulcerative colitis Maternal Uncle     Rheum arthritis Maternal Aunt     Ulcerative colitis Family        Meds/Allergies     Medications Prior to Admission   Medication    albuterol (ACCUNEB) 1 25 MG/3ML nebulizer solution    budesonide (UCERIS) 9 mg TB24    carisoprodol (SOMA) 250 MG    carisoprodol (SOMA) 350 mg tablet    cholecalciferol (VITAMIN D3) 1,000 units tablet    Cholecalciferol 23985 units TABS    Cholecalciferol 43518 units TABS    famotidine (PEPCID) 20 mg tablet    ferrous sulfate 324 (65 Fe) mg    HYDROcodone-acetaminophen (NORCO) 5-325 mg per tablet    hydrOXYzine pamoate (VISTARIL) 25 mg capsule    hyoscyamine (ANASPAZ,LEVSIN) 0 125 MG tablet    levothyroxine 25 mcg tablet    levothyroxine 25 mcg tablet    ondansetron (ZOFRAN-ODT) 4 mg disintegrating tablet    polymyxin b-trimethoprim (POLYTRIM) ophthalmic solution    predniSONE 10 mg tablet    tacrolimus (PROTOPIC) 0 03 % ointment    triamcinolone (KENALOG) 0 1 % cream    vedolizumab (ENTYVIO) 300 MG SOLR    zolpidem (AMBIEN) 10 mg tablet    zolpidem (AMBIEN) 10 mg tablet     Current Facility-Administered Medications   Medication Dose Route Frequency    acetaminophen (TYLENOL) tablet 650 mg  650 mg Oral Q6H PRN    hyoscyamine (LEVSIN/SL) SL tablet 0 125 mg  0 125 mg Sublingual Q4H PRN    methylPREDNISolone sodium succinate (Solu-MEDROL) injection 20 mg  20 mg Intravenous Q8H    ondansetron (ZOFRAN) injection 4 mg  4 mg Intravenous Q6H PRN       No Known Allergies        Objective     Blood pressure 100/55, pulse 85, temperature 99 3 °F (37 4 °C), temperature source Oral, resp   rate 18, height 5' 3" (1 6 m), weight 61 7 kg (136 lb), SpO2 94 %       Intake/Output Summary (Last 24 hours) at 9/16/2019 1405  Last data filed at 9/16/2019 1301  Gross per 24 hour   Intake --   Output 300 ml   Net -300 ml         PHYSICAL EXAM:      General Appearance:   Alert, cooperative, no distress, appears stated age    HEENT:   Normocephalic, atraumatic, anicteric, no oropharyngeal thrush present      Neck:  Supple, symmetrical, trachea midline, no adenopathy;    thyroid: no enlargement/tenderness/nodules; no carotid  bruit or JVD    Lungs:   Clear to auscultation bilaterally; no rales, rhonchi or wheezing; respirations unlabored    Heart[de-identified]   S1 and S2 normal; regular rate and rhythm; no murmur, rub, or gallop  Abdomen:   Soft, diffuse abdominal discomfort to palpation, non-distended; normal bowel sounds; no masses, no organomegaly    Genitalia:   Deferred    Rectal:   Deferred    Extremities:  No cyanosis, clubbing or edema    Pulses:  2+ and symmetric all extremities    Skin:  Skin color, texture, turgor normal, no rashes or lesions    Lymph nodes:  No palpable cervical, axillary or inguinal lymphadenopathy        Lab Results:   Results from last 7 days   Lab Units 09/16/19  1319 09/14/19  0716   WBC Thousand/uL 9 57 8 51   HEMOGLOBIN g/dL 11 9 11 3*   HEMATOCRIT % 37 7 37 0   PLATELETS Thousands/uL 452* 418*   NEUTROS PCT %  --  57   LYMPHS PCT %  --  28   MONOS PCT %  --  11   EOS PCT %  --  4     Results from last 7 days   Lab Units 09/16/19  1319   POTASSIUM mmol/L 4 4   CHLORIDE mmol/L 101   CO2 mmol/L 28   BUN mg/dL 7   CREATININE mg/dL 0 74   CALCIUM mg/dL 10 1   ALK PHOS U/L 78   ALT U/L 15   AST U/L 18               Imaging Studies: I have personally reviewed pertinent imaging studies  No results found  Patient was seen and examined by Dr Clarissa Canada  All [place] [facility] medical decisions were made by Dr Clarissa Canada  Thank you for allowing us to participate in the care of this present patient  We will follow-up with you closely

## 2019-09-16 NOTE — TELEPHONE ENCOUNTER
----- Message from Saida Camarillo DO sent at 9/16/2019 10:03 AM EDT -----  Please call patient and let her know she can come to [facility] now 1st Floor registration as they will have a bed ready for her

## 2019-09-16 NOTE — TELEPHONE ENCOUNTER
See attached encounter    Patient states she just talked to Renée who is following up with Dr Dandre Harp regarding her symptoms  She is waiting return call to decide if she will be a direct admit

## 2019-09-16 NOTE — TELEPHONE ENCOUNTER
Pt called stating she did the lab work over the weekend and she is getting worse  Pt states she was up all night with watery stools and not being able to eat  Pt wants to know if she should go to the ER   Pt would like to be called and can be reached at [phone]

## 2019-09-16 NOTE — NURSING NOTE
Per Dr Aguilar Second OK to give Levcin  Checked with pharmacy because medication wouldn't scan, they OK'd to give and said they'd follow up with the manager

## 2019-09-16 NOTE — PLAN OF CARE
Problem: PAIN - ADULT  Goal: Verbalizes/displays adequate comfort level or baseline comfort level  Description  Interventions:  - Encourage patient to monitor pain and request assistance  - Assess pain using appropriate pain scale  - Administer analgesics based on type and severity of pain and evaluate response  - Implement non-pharmacological measures as appropriate and evaluate response  - Consider cultural and social influences on pain and pain management  - Notify physician/advanced practitioner if interventions unsuccessful or patient reports new pain  Outcome: Progressing     Problem: INFECTION - ADULT  Goal: Absence or prevention of progression during hospitalization  Description  INTERVENTIONS:  - Assess and monitor for signs and symptoms of infection  - Monitor lab/diagnostic results  - Monitor all insertion sites, i e  indwelling lines, tubes, and drains  - Monitor endotracheal if appropriate and nasal secretions for changes in amount and color  - [place] appropriate cooling/warming therapies per order  - Administer medications as ordered  - Instruct and encourage patient and family to use good hand hygiene technique  - Identify and instruct in appropriate isolation precautions for identified infection/condition  Outcome: Progressing     Problem: SAFETY ADULT  Goal: Patient will remain free of falls  Description  INTERVENTIONS:  - Assess patient frequently for physical needs  -  Identify cognitive and physical deficits and behaviors that affect risk of falls    -  [place] fall precautions as indicated by assessment   - Educate patient/family on patient safety including physical limitations  - Instruct patient to call for assistance with activity based on assessment  - Modify environment to reduce risk of injury  - Consider OT/PT consult to assist with strengthening/mobility  Outcome: Progressing     Problem: DISCHARGE PLANNING  Goal: Discharge to home or other facility with appropriate resources  Description  INTERVENTIONS:  - Identify barriers to discharge w/patient and caregiver  - Arrange for needed discharge resources and transportation as appropriate  - Identify discharge learning needs (meds, wound care, etc )  - Arrange for interpretive services to assist at discharge as needed  - Refer to Case Management Department for coordinating discharge planning if the patient needs post-hospital services based on physician/advanced practitioner order or complex needs related to functional status, cognitive ability, or social support system  Outcome: Progressing     Problem: Knowledge Deficit  Goal: Patient/family/caregiver demonstrates understanding of disease process, treatment plan, medications, and discharge instructions  Description  Complete learning assessment and assess knowledge base    Interventions:  - Provide teaching at level of understanding  - Provide teaching via preferred learning methods  Outcome: Progressing

## 2019-09-16 NOTE — TELEPHONE ENCOUNTER
I called pt and feel that given her increased diarrhea and rectal bleeding and she is feeling outpatient prednisone that she needs to come in for an admission  She also has been on entyvio and is on every 4 week dosing already which does not seem to be helping and this medication will need to be changed most likely  I recommend checking for C diff on admission as well as CBC, CMP, C reactive protein  I have ordered an admission through ADT 21 order in epic and am awaiting a call to see when she should present to the hospital     Will someone please call pt access center to let them know pt would like to go to the [facility]

## 2019-09-16 NOTE — H&P
H&P- Adela Barnett 1965, 47 y o  female MRN: [id number]    Unit/Bed#: -01 Encounter: [id number]    Primary Care Provider: Kalani Lopez DO   Date and time admitted to hospital: 9/16/2019 11:56 AM        * Ulcerative pancolitis with rectal bleeding (HCC)  Assessment & Plan  Acute flare of ulcerative pancolitis  Follow-up on C diff PCR  Monitor inflammatory markers, clinical response  IV Solu-Medrol, analgesics  Supportive care  Discussed with GI    Arthralgia of multiple joints  Assessment & Plan  Supportive care    Asthma, mild intermittent  Assessment & Plan  Stable  Respiratory treatments          VTE Prophylaxis: Low VTE score, vena dynes  / sequential compression device   Code Status:  Full code  POLST: There is no POLST form on file for this patient (pre-hospital)  Discussion with family:  Discussed with the patient, spouse at bedside updated    Anticipated Length of Stay:  Patient will be admitted on an Inpatient basis with an anticipated length of stay of  More than 2 midnights  Justification for Hospital Stay:  Acute flare of ulcerative colitis requiring IV Solu-Medrol close monitoring      Chief Complaint:       Diarrhea hematochezia abdominal pain    History of Present Illness:    Adela Barnett is a 47 y o  female who presents with diarrhea hematochezia abdominal pain  Patient with history of ulcerative pancolitis follows with GI Dr Rahul Zabala and [facility] however she reports her symptoms of flare are not resolving requiring multiple doses of steroids    She has been on prednisone taper and when the dose was reduced to 20 mg her symptoms of abdominal pain bloody diarrhea recurred hence the steroid dose was increased to 30 mg however she continued to have her symptoms of diarrhea hematochezia abdominal pain poor appetite hence patient was admitted at the request of GI Dr Rahul Zabala for inpatient care with IV Solu-Medrol, supportive care    She reports poor appetite lack of energy generalized weakness and easy fatigability  She denies history of fever chills sweats  Denies arthralgias or rash  No history of kidney stones  Denies hematemesis or vomiting  No history of cough chest tightness wheezing  Denies urinary symptoms  Denies chest pain shortness of breath palpitations presyncope syncope  Review of Systems:    Review of Systems   All other systems reviewed and are negative  Past Medical and Surgical History:     Past Medical History:   Diagnosis Date    Adjustment disorder     last assessed 12    Anemia     HX of    Asthma     mild - intermittent    Bilateral leg edema     Blepharitis     last assessed 16    Carpal tunnel syndrome     Unspecified laterality    Colitis, acute     Dyspareunia in female     Edema     last assessed 06/22/15    Ganglion     Herniated cervical disc     History of transfusion     Hypokalemia     Hypothyroidism     Hypothyroidism     Insomnia     Lumps on the skin     last assessed 14    Mouth ulcers     last assessed 06/22/15    Nontraumatic tear of left tibialis posterior tendon     Traumatic teart     Polyarthritis     last assessed 16    Raynaud's disease     [facility]na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 [address] [address]     Temporomandibular disorder     Joint    Thyroid disease     Ulcerative colitis ([facility] [address]     Ulcerative colitis ([address]        Past Surgical History:   Procedure Laterality Date    ANKLE SURGERY Left     Tendon repair    CARPAL TUNNEL RELEASE Bilateral      SECTION, LOW TRANSVERSE      COLONOSCOPY      COLONOSCOPY N/A 2018    Procedure: COLONOSCOPY;  Surgeon: Alexa Kirkland MD;  [facility];   Service: Gastroenterology    HERNIA REPAIR      umbilical    HYSTERECTOMY      KNEE ARTHROSCOPY Right     LIPECTOMY      Multipe lipoma removals    LIPOMA RESECTION      UT COLONOSCOPY FLX DX W/COLLJ SPEC WHEN PFRMD N/A 2016    Procedure: COLONOSCOPY;  Surgeon: Tono Peralta DO;  Location: [facility]; Service: Gastroenterology    IN REPAIR FLEX LEG TENDON,SECOND,EA Left 8/12/2016    Procedure: REPAIR OF LEFT POSTERIOR TIBIAL TENDON WITH GRAFT, EXPLORATION LEFT ANKLE ;  Surgeon: Edelmira Ayala DPM;  Location: [facility] OR;  Service: Podiatry    SHOULDER SURGERY Left     bicep tendon and labrum repair    TONSILLECTOMY      TOOTH EXTRACTION  12/05/2018       Meds/Allergies:    Prior to Admission medications    Medication Sig Start Date End Date Taking?  Authorizing Provider   albuterol (ACCUNEB) 1 25 MG/3ML nebulizer solution Take 1 ampule by nebulization every 6 (six) hours as needed for wheezing    Historical Provider, MD   budesonide (UCERIS) 9 mg TB24 Take 9 mg by mouth daily 7/26/19   Linda Cherry PA-C   carisoprodol (SOMA) 250 MG Take 1 tablet (250 mg total) by mouth every 6 (six) hours as needed for muscle spasms for up to 7 days 6/3/19 6/10/19  Caryle Pick, MD   carisoprodol (SOMA) 350 mg tablet Take 1 tablet (350 mg total) by mouth daily at bedtime as needed for muscle spasms 6/3/19   Caryle Pick, MD   cholecalciferol (VITAMIN D3) 1,000 units tablet Take 1 tablet (1,000 Units total) by mouth daily 4/1/19   Cahrlene Pod, DO   Cholecalciferol 11030 units TABS Take 1 tablet (50,000 Units total) by mouth once a week 1/4/19   Charlene Pod, DO   Cholecalciferol 81476 units TABS Take 1 tablet (50,000 Units total) by mouth once a week 7/9/19   Charlene Pod, DO   famotidine (PEPCID) 20 mg tablet Take 1 tablet (20 mg total) by mouth 2 (two) times a day 4/20/19   Gail Desalbatis, DO   ferrous sulfate 324 (65 Fe) mg Take 1 tablet (324 mg total) by mouth 3 (three) times a day before meals 1/4/19   Charlene Pod, DO   HYDROcodone-acetaminophen (NORCO) 5-325 mg per tablet Take 1 tablet by mouth 4/8/19   Historical Provider, MD   hydrOXYzine pamoate (VISTARIL) 25 mg capsule Take 1 capsule (25 mg total) by mouth 4 (four) times a day as needed for itching 4/20/19   Gail Lopez DO hyoscyamine (ANASPAZ,LEVSIN) 0 125 MG tablet Take 1 tablet (0 125 mg total) by mouth every 4 (four) hours as needed for cramping 1/17/19   Liliana Carrera MD   levothyroxine 25 mcg tablet Take 25 mcg by mouth daily    Historical Provider, MD   levothyroxine 25 mcg tablet TAKE ONE TABLET BY MOUTH EVERY DAY 7/25/19   Ashlyn Mukherjee DO   ondansetron (ZOFRAN-ODT) 4 mg disintegrating tablet Take 1 tablet (4 mg total) by mouth every 6 (six) hours as needed for nausea or vomiting 6/28/19   Angel Storm PA-C   polymyxin b-trimethoprim (POLYTRIM) ophthalmic solution Administer 1 drop to both eyes every 4 (four) hours For 3-5 days 2/6/19   Ashlyn Mukherjee DO   predniSONE 10 mg tablet Take 4 tabs daily x 5 days, then decrease by 5 mg every 3 days until down to 5 mg daily then STOP  8/22/19   Matthew Garrett PA-C   tacrolimus (PROTOPIC) 0 03 % ointment Apply topically 2 (two) times a day    Historical Provider, MD   triamcinolone (KENALOG) 0 1 % cream Apply topically 2 (two) times a day 4/17/19   Ashlyn Mukherjee DO   vedolizumab (ENTYVIO) 300 MG SOLR Infuse into a venous catheter    Historical Provider, MD   zolpidem (AMBIEN) 10 mg tablet TAKE ONE TABLET BY MOUTH EVERY DAY AT BEDTIME AS NEEDED FOR SLEEP 11/13/18   Ashlyn Mukherjee DO   zolpidem (AMBIEN) 10 mg tablet TAKE ONE TABLET BY MOUTH EVERY DAY AT BEDTIME AS NEEDED FOR SLEEP 8/26/19   Ashlyn Mukherjee DO     I have reviewed home medications with patient personally      Allergies: No Known Allergies    Social History:     Marital Status: /Civil Union   Occupation:  Nurse  Patient Pre-hospital Living Situation: home  Patient Pre-hospital Level of Mobility:  Independent  Patient Pre-hospital Diet Restrictions: no  Substance Use History:   Social History     Substance and Sexual Activity   Alcohol Use No    Comment: social 1 drink per weeek     Social History     Tobacco Use   Smoking Status Former Smoker    Types: Cigarettes    Last attempt to quit: 4/6/2003    Years since quittin 4   Smokeless Tobacco Never Used   Tobacco Comment    Quit , rare use for 2 years     Social History     Substance and Sexual Activity   Drug Use No       Family History:    Family History   Problem Relation Age of Onset   Mollie Sizer Parkinsonism Mother     Rheum arthritis Mother     Heart attack Father     Hypertension Father     Osteoporosis Father     Prostate cancer Father     Hashimoto's thyroiditis Sister     Cancer Paternal Grandfather         Penile    Prostate cancer Paternal Grandfather     Crohn's disease Family     Osteoarthritis Family     Rheum arthritis Family     Crohn's disease Other     Crohn's disease Maternal Uncle     Psoriasis Maternal Uncle     Ulcerative colitis Maternal Uncle     Rheum arthritis Maternal Aunt     Ulcerative colitis Family        Physical Exam:     Vitals:   Blood Pressure: 102/58 (19 1532)  Pulse: 67 (19 1532)  Temperature: 99 6 °F (37 6 °C) (19 153)  Temp Source: Oral (19 153)  Respirations: 18 (19 153)  Height: 5' 3" (160 cm) (19 1219)  Weight - Scale: 61 7 kg (136 lb) (19 1219)  SpO2: 94 % (19 1532)    Physical Exam    Comfortably sitting up in bed  Appears lethargic  Neck supple  Lungs clear to auscultation no additional sounds  Heart sounds S1 and S2 noted  Abdomen soft, mild diffuse tenderness  Awake and alert obeys simple commands  Nonfocal neuro exam  Pulses noted  No rash    Additional Data:     Lab Results: I have personally reviewed pertinent reports        Results from last 7 days   Lab Units 19  1319 19  0716   WBC Thousand/uL 9 57 8 51   HEMOGLOBIN g/dL 11 9 11 3*   HEMATOCRIT % 37 7 37 0   PLATELETS Thousands/uL 452* 418*   NEUTROS PCT %  --  57   LYMPHS PCT %  --  28   MONOS PCT %  --  11   EOS PCT %  --  4     Results from last 7 days   Lab Units 19  1319   SODIUM mmol/L 138   POTASSIUM mmol/L 4 4   CHLORIDE mmol/L 101   CO2 mmol/L 28   BUN mg/dL 7   CREATININE mg/dL 0  74   ANION GAP mmol/L 9   CALCIUM mg/dL 10 1   ALBUMIN g/dL 2 6*   TOTAL BILIRUBIN mg/dL 0 40   ALK PHOS U/L 78   ALT U/L 15   AST U/L 18   GLUCOSE RANDOM mg/dL 126       Imaging: I have personally reviewed pertinent reports  Allscripts / Epic Records Reviewed: Yes     ** Please Note: This note has been constructed using a voice recognition system   **

## 2019-09-17 LAB
ALBUMIN SERPL BCP-MCNC: 2.2 G/DL (ref 3.5–5)
ALP SERPL-CCNC: 71 U/L (ref 46–116)
ALT SERPL W P-5'-P-CCNC: 13 U/L (ref 12–78)
ANION GAP SERPL CALCULATED.3IONS-SCNC: 8 MMOL/L (ref 4–13)
AST SERPL W P-5'-P-CCNC: 9 U/L (ref 5–45)
BASOPHILS # BLD MANUAL: 0 THOUSAND/UL (ref 0–0.1)
BASOPHILS NFR MAR MANUAL: 0 % (ref 0–1)
BILIRUB SERPL-MCNC: 0.1 MG/DL (ref 0.2–1)
BUN SERPL-MCNC: 6 MG/DL (ref 5–25)
CALCIUM SERPL-MCNC: 9.5 MG/DL (ref 8.3–10.1)
CALPROTECTIN STL-MCNT: 2210 UG/G (ref 0–120)
CAMPYLOBACTER DNA SPEC NAA+PROBE: NORMAL
CHLORIDE SERPL-SCNC: 105 MMOL/L (ref 100–108)
CO2 SERPL-SCNC: 28 MMOL/L (ref 21–32)
CREAT SERPL-MCNC: 0.71 MG/DL (ref 0.6–1.3)
EOSINOPHIL # BLD MANUAL: 0 THOUSAND/UL (ref 0–0.4)
EOSINOPHIL NFR BLD MANUAL: 0 % (ref 0–6)
ERYTHROCYTE [DISTWIDTH] IN BLOOD BY AUTOMATED COUNT: 12.9 % (ref 11.6–15.1)
GFR SERPL CREATININE-BSD FRML MDRD: 97 ML/MIN/1.73SQ M
GLUCOSE SERPL-MCNC: 160 MG/DL (ref 65–140)
HCT VFR BLD AUTO: 35.4 % (ref 34.8–46.1)
HGB BLD-MCNC: 11.1 G/DL (ref 11.5–15.4)
LYMPHOCYTES # BLD AUTO: 1.8 THOUSAND/UL (ref 0.6–4.47)
LYMPHOCYTES # BLD AUTO: 14 % (ref 14–44)
MAGNESIUM SERPL-MCNC: 2 MG/DL (ref 1.6–2.6)
MCH RBC QN AUTO: 29.3 PG (ref 26.8–34.3)
MCHC RBC AUTO-ENTMCNC: 31.4 G/DL (ref 31.4–37.4)
MCV RBC AUTO: 93 FL (ref 82–98)
MONOCYTES # BLD AUTO: 1.16 THOUSAND/UL (ref 0–1.22)
MONOCYTES NFR BLD: 9 % (ref 4–12)
NEUTROPHILS # BLD MANUAL: 9.89 THOUSAND/UL (ref 1.85–7.62)
NEUTS BAND NFR BLD MANUAL: 17 % (ref 0–8)
NEUTS SEG NFR BLD AUTO: 60 % (ref 43–75)
NRBC BLD AUTO-RTO: 0 /100 WBCS
PLATELET # BLD AUTO: 450 THOUSANDS/UL (ref 149–390)
PLATELET BLD QL SMEAR: ABNORMAL
PMV BLD AUTO: 8.8 FL (ref 8.9–12.7)
POTASSIUM SERPL-SCNC: 4.2 MMOL/L (ref 3.5–5.3)
PROT SERPL-MCNC: 5.8 G/DL (ref 6.4–8.2)
RBC # BLD AUTO: 3.79 MILLION/UL (ref 3.81–5.12)
SALMONELLA DNA SPEC QL NAA+PROBE: NORMAL
SHIGA TOXIN STX GENE SPEC NAA+PROBE: NORMAL
SHIGELLA DNA SPEC QL NAA+PROBE: NORMAL
SODIUM SERPL-SCNC: 141 MMOL/L (ref 136–145)
TOTAL CELLS COUNTED SPEC: 100
WBC # BLD AUTO: 12.85 THOUSAND/UL (ref 4.31–10.16)

## 2019-09-17 PROCEDURE — 83735 ASSAY OF MAGNESIUM: CPT | Performed by: INTERNAL MEDICINE

## 2019-09-17 PROCEDURE — 80053 COMPREHEN METABOLIC PANEL: CPT | Performed by: INTERNAL MEDICINE

## 2019-09-17 PROCEDURE — 99232 SBSQ HOSP IP/OBS MODERATE 35: CPT | Performed by: INTERNAL MEDICINE

## 2019-09-17 PROCEDURE — 85027 COMPLETE CBC AUTOMATED: CPT | Performed by: INTERNAL MEDICINE

## 2019-09-17 PROCEDURE — 85007 BL SMEAR W/DIFF WBC COUNT: CPT | Performed by: INTERNAL MEDICINE

## 2019-09-17 RX ORDER — MELATONIN
1000
Status: DISCONTINUED | OUTPATIENT
Start: 2019-09-17 | End: 2019-09-18

## 2019-09-17 RX ORDER — MULTIVIT WITH MINERALS/LUTEIN
1000 TABLET ORAL DAILY
COMMUNITY
End: 2022-06-27 | Stop reason: SDUPTHER

## 2019-09-17 RX ORDER — FERROUS SULFATE 325(65) MG
325 TABLET ORAL EVERY EVENING
Status: DISCONTINUED | OUTPATIENT
Start: 2019-09-17 | End: 2019-09-24 | Stop reason: HOSPADM

## 2019-09-17 RX ADMIN — ONDANSETRON 4 MG: 2 INJECTION INTRAMUSCULAR; INTRAVENOUS at 20:15

## 2019-09-17 RX ADMIN — DEXTROSE AND SODIUM CHLORIDE 100 ML/HR: 5; .9 INJECTION, SOLUTION INTRAVENOUS at 02:20

## 2019-09-17 RX ADMIN — METHYLPREDNISOLONE SODIUM SUCCINATE 20 MG: 40 INJECTION, POWDER, FOR SOLUTION INTRAMUSCULAR; INTRAVENOUS at 12:12

## 2019-09-17 RX ADMIN — METHYLPREDNISOLONE SODIUM SUCCINATE 20 MG: 40 INJECTION, POWDER, FOR SOLUTION INTRAMUSCULAR; INTRAVENOUS at 05:14

## 2019-09-17 RX ADMIN — HYOSCYAMINE SULFATE 0.12 MG: 0.12 TABLET ORAL at 08:51

## 2019-09-17 RX ADMIN — ACETAMINOPHEN 650 MG: 325 TABLET, FILM COATED ORAL at 20:19

## 2019-09-17 RX ADMIN — DEXTROSE AND SODIUM CHLORIDE 100 ML/HR: 5; .9 INJECTION, SOLUTION INTRAVENOUS at 12:12

## 2019-09-17 RX ADMIN — LEVOTHYROXINE SODIUM 25 MCG: 25 TABLET ORAL at 05:14

## 2019-09-17 RX ADMIN — METHYLPREDNISOLONE SODIUM SUCCINATE 20 MG: 40 INJECTION, POWDER, FOR SOLUTION INTRAMUSCULAR; INTRAVENOUS at 20:21

## 2019-09-17 RX ADMIN — HYOSCYAMINE SULFATE 0.12 MG: 0.12 TABLET ORAL at 19:26

## 2019-09-17 RX ADMIN — HYOSCYAMINE SULFATE 0.12 MG: 0.12 TABLET ORAL at 02:20

## 2019-09-17 RX ADMIN — DEXTROSE AND SODIUM CHLORIDE 100 ML/HR: 5; .9 INJECTION, SOLUTION INTRAVENOUS at 22:15

## 2019-09-17 RX ADMIN — ONDANSETRON 4 MG: 2 INJECTION INTRAMUSCULAR; INTRAVENOUS at 11:13

## 2019-09-17 RX ADMIN — ZOLPIDEM TARTRATE 10 MG: 5 TABLET, COATED ORAL at 22:13

## 2019-09-17 NOTE — UTILIZATION REVIEW
Initial Clinical Review    Admission: Date/Time/Statement: Inpatient Admission Orders (From admission, onward)     Ordered        09/16/19 1407  Inpatient Admission  Once                   Orders Placed This Encounter   Procedures    Inpatient Admission     Standing Status:   Standing     Number of Occurrences:   1     Order Specific Question:   Admitting Physician     Answer:   Ricarda Sosa     Order Specific Question:   Level of Care     Answer:   Med Surg [16]     Order Specific Question:   Estimated length of stay     Answer:   More than 2 Midnights     Order Specific Question:   Certification     Answer:   I certify that inpatient services are medically necessary for this patient for a duration of greater than two midnights  See H&P and MD Progress Notes for additional information about the patient's course of treatment  Assessment/Plan: This is a 47year old female from home who is direct admit inpatient per GI due to Ulcerative colitis flare  Presented with diarrhea and blood and mucous in the stool with associated poor appetite and abdomina pain     Patient was diagnosed in 2012 with ulcerative colitis,  Failed Humira secondary to antibodies and is currently on Entyvio  Patient has been on oral steroids for the last couple of weeks without improvement  On exam appears lethargic  Abdomen with diffuse tenderness  WBC 9 57, hgb 11 9, hct 37 7  Albumin 2 6  Pain control and IV steroids in progress  Consult GI       9/17/2019 per GI - had ulcerative colitis flare - CRP 73 2 on 9/14 with negative C diff  On entyvio and prednisone as outpatient  Continue IV steroids, levsin for cramping, IVF, antiemetics as needed        ED Triage Vitals   Temperature Pulse Respirations Blood Pressure SpO2   09/16/19 1219 09/16/19 1219 09/16/19 1219 09/16/19 1219 09/16/19 1219   99 3 °F (37 4 °C) 85 18 100/55 94 %      Temp Source Heart Rate Source Patient Position - Orthostatic VS BP Location FiO2 (%)   09/16/19 1219 -- 09/16/19 1219 09/16/19 1219 --   Oral  Lying Left arm       Pain Score       09/16/19 1828       6        Wt Readings from Last 1 Encounters:   09/16/19 61 7 kg (136 lb)     Additional Vital Signs:   09/17/19 0700  99 2 °F (37 3 °C)  73  18  105/55  74  94 %  None (Room air)  Lying   09/16/19 2206  --  --  --  112/72  --  --  --  Lying   09/16/19 2154  98 3 °F (36 8 °C)  54Abnormal   18  149/66  --  99 %  None (Room air)  Lying   09/16/19 1532  99 6 °F (37 6 °C)  67  18  102/58  --  94 %  None (Room air)         Pertinent Labs/Diagnostic Test Results: no imaging   Results from last 7 days   Lab Units 09/17/19  0509 09/16/19  1319 09/14/19  0716   WBC Thousand/uL 12 85* 9 57 8 51   HEMOGLOBIN g/dL 11 1* 11 9 11 3*   HEMATOCRIT % 35 4 37 7 37 0   PLATELETS Thousands/uL 450* 452* 418*   NEUTROS ABS Thousands/µL  --   --  4 73   BANDS PCT % 17*  --   --      Results from last 7 days   Lab Units 09/17/19  0509 09/16/19  1319 09/14/19  0716   SODIUM mmol/L 141 138 138   POTASSIUM mmol/L 4 2 4 4 3 8   CHLORIDE mmol/L 105 101 104   CO2 mmol/L 28 28 28   ANION GAP mmol/L 8 9 6   BUN mg/dL 6 7 5   CREATININE mg/dL 0 71 0 74 0 78   EGFR ml/min/1 73sq m 97 92 86   CALCIUM mg/dL 9 5 10 1 10 1   MAGNESIUM mg/dL 2 0  --   --      Results from last 7 days   Lab Units 09/17/19  0509 09/16/19  1319 09/14/19  0716   AST U/L 9 18 9   ALT U/L 13 15 11*   ALK PHOS U/L 71 78 74   TOTAL PROTEIN g/dL 5 8* 6 5 6 4   ALBUMIN g/dL 2 2* 2 6* 2 8*   TOTAL BILIRUBIN mg/dL 0 10* 0 40 0 29     Results from last 7 days   Lab Units 09/17/19  0509 09/16/19  1319   GLUCOSE RANDOM mg/dL 160* 126     Results from last 7 days   Lab Units 09/16/19  1319 09/14/19  0716   CRP mg/L  --  73 2*   SED RATE mm/hour 43*  --      Results from last 7 days   Lab Units 09/14/19  0719   C DIFF TOXIN B  NEGATIVE for C difficle toxin by PCR        Results from last 7 days   Lab Units 09/16/19  1704   SALMONELLA SP PCR  None Detected   SHIGELLA SP/ENTEROINVASIVE E  COLI (EIEC)  None Detected   CAMPYLOBACTER SP (JEJUNI AND COLI)  None Detected   SHIGA TOXIN 1/SHIGA TOXIN 2  None Detected     Results from last 7 days   Lab Units 09/17/19  0509   TOTAL COUNTED  100     ED Treatment:   Medication Administration - No Administrations Displayed (No Start Event Found)     None        Past Medical History:   Diagnosis Date    Adjustment disorder     last assessed 05/16/12    Anemia     HX of    Asthma     mild - intermittent    Bilateral leg edema     Blepharitis     last assessed 02/04/16    Carpal tunnel syndrome     Unspecified laterality    Colitis, acute     Dyspareunia in female     Edema     last assessed 06/22/15    Ganglion     Herniated cervical disc     History of transfusion     Hypokalemia     Hypothyroidism     Hypothyroidism     Insomnia     Lumps on the skin     last assessed 03/12/14    Mouth ulcers     last assessed 06/22/15    Nontraumatic tear of left tibialis posterior tendon     Traumatic teart     Polyarthritis     last assessed 06/24/16    Raynaud's disease     Rayna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 ([address]     Temporomandibular disorder     Joint    Thyroid disease     Ulcerative colitis ([address]     Ulcerative colitis ([address]      Present on Admission:   Ulcerative pancolitis with rectal bleeding ([address] )   Arthralgia of multiple joints   Asthma, mild intermittent      Admitting Diagnosis: Ulcerative pancolitis with rectal bleeding ([address] )  Age/Sex: 47 y o  female  Admission Orders: 9/16/2019  1407 INPATIENT     Current Facility-Administered Medications:  Acetaminophen - used x 1  650 mg Oral Q6H PRN    albuterol 2 5 mg Nebulization Q6H PRN    aluminum-magnesium hydroxide-simethicone 30 mL Oral Q6H PRN    carisoprodol 350 mg Oral HS PRN    cholecalciferol 1,000 Units Oral HS    dextrose 5 % and sodium chloride 0 9 % 100 mL/hr Intravenous Continuous Last Rate: 100 mL/hr (09/17/19 1212)   ferrous sulfate 325 mg Oral QPM HYDROcodone-acetaminophen 1 tablet Oral Q6H PRN    hydrOXYzine HCL 25 mg Oral HS    Hyoscyamine - used x 4  0 125 mg Sublingual Q4H PRN    levothyroxine 25 mcg Oral Early Morning    methylPREDNISolone sodium succinate 20 mg Intravenous Q8H    Ondansetron - used x 3 4 mg Intravenous Q6H PRN 1325  2201  1113   Zolpidem - used x 1 10 mg Oral HS PRN        IP CONSULT TO GASTROENTEROLOGY   [facility]    Network Utilization Review Department  Phone: [phone]; Fax [phone]  [email or web address]  ATTENTION: Please call with any questions or concerns to [phone]  and carefully listen to the prompts so that you are directed to the right person  Send all requests for admission clinical reviews, approved or denied determinations and any other requests to fax [phone]   All voicemails are confidential

## 2019-09-17 NOTE — ASSESSMENT & PLAN NOTE
· Patient with Ulcerative colitis that was active for more than a year,failed Humira treatment ,was started on Entyvio and oral steroids with no significant improvement    · Presentation of persistent diarrhea which was bloody suggesting acute flare of ulcerative pancolitis  · C diff PCR negative   · Stool enteric bacteria was negative for shigella,salmonela,cambylopacter,shigatoxin  · Elevated ESR ,CRP  · Leucocytosis WBCs 12 85 most probably due to steroid use given the absence of infection  · continue IV Solu-Medrol, analgesics,IVF   · GI consulted: recommended that patient might need an inpatient colonoscopy if not improving ,trying different regimen for better control as outpatient ( Remicade or Stelara )

## 2019-09-17 NOTE — PLAN OF CARE
Problem: PAIN - ADULT  Goal: Verbalizes/displays adequate comfort level or baseline comfort level  Description  Interventions:  - Encourage patient to monitor pain and request assistance  - Assess pain using appropriate pain scale  - Administer analgesics based on type and severity of pain and evaluate response  - Implement non-pharmacological measures as appropriate and evaluate response  - Consider cultural and social influences on pain and pain management  - Notify physician/advanced practitioner if interventions unsuccessful or patient reports new pain  Outcome: Progressing     Problem: INFECTION - ADULT  Goal: Absence or prevention of progression during hospitalization  Description  INTERVENTIONS:  - Assess and monitor for signs and symptoms of infection  - Monitor lab/diagnostic results  - Monitor all insertion sites, i e  indwelling lines, tubes, and drains  - Monitor endotracheal if appropriate and nasal secretions for changes in amount and color  - [place] appropriate cooling/warming therapies per order  - Administer medications as ordered  - Instruct and encourage patient and family to use good hand hygiene technique  - Identify and instruct in appropriate isolation precautions for identified infection/condition  Outcome: Progressing     Problem: SAFETY ADULT  Goal: Patient will remain free of falls  Description  INTERVENTIONS:  - Assess patient frequently for physical needs  -  Identify cognitive and physical deficits and behaviors that affect risk of falls    -  [place] fall precautions as indicated by assessment   - Educate patient/family on patient safety including physical limitations  - Instruct patient to call for assistance with activity based on assessment  - Modify environment to reduce risk of injury  - Consider OT/PT consult to assist with strengthening/mobility  Outcome: Progressing     Problem: DISCHARGE PLANNING  Goal: Discharge to home or other facility with appropriate resources  Description  INTERVENTIONS:  - Identify barriers to discharge w/patient and caregiver  - Arrange for needed discharge resources and transportation as appropriate  - Identify discharge learning needs (meds, wound care, etc )  - Arrange for interpretive services to assist at discharge as needed  - Refer to Case Management Department for coordinating discharge planning if the patient needs post-hospital services based on physician/advanced practitioner order or complex needs related to functional status, cognitive ability, or social support system  Outcome: Progressing     Problem: Knowledge Deficit  Goal: Patient/family/caregiver demonstrates understanding of disease process, treatment plan, medications, and discharge instructions  Description  Complete learning assessment and assess knowledge base    Interventions:  - Provide teaching at level of understanding  - Provide teaching via preferred learning methods  Outcome: Progressing

## 2019-09-17 NOTE — PROGRESS NOTES
Progress Note - Suraj Arnett 1965, 47 y o  female MRN: [id number]    Unit/Bed#: -01 Encounter: [id number]    Primary Care Provider: Riri Diana DO   Date and time admitted to hospital: 9/16/2019 11:56 AM        * Ulcerative pancolitis with rectal bleeding ([facility] [address] )  Assessment & Plan  · Patient with Ulcerative colitis that was active for more than a year,failed Humira treatment ,was started on Entyvio and oral steroids with no significant improvement  · Presentation of persistent diarrhea which was bloody suggesting acute flare of ulcerative pancolitis  · C diff PCR negative   · Stool enteric bacteria was negative for shigella,salmonela,cambylopacter,shigatoxin  · Elevated ESR ,CRP  · Leucocytosis WBCs 12 85 most probably due to steroid use given the absence of infection  · continue IV Solu-Medrol, analgesics,IVF   · GI consulted: recommended that patient might need an inpatient colonoscopy if not improving ,trying different regimen for better control as outpatient ( Remicade or Stelara )    Arthralgia of multiple joints  Assessment & Plan  Supportive care      VTE Pharmacologic Prophylaxis:   Pharmacologic: High incidence of bleeding  Mechanical VTE Prophylaxis in Place: Yes     Discussions with Specialists or Other Care Team Provider: primary medical team, gastroenterology, care management  Education and Discussions with Family / Patient:  Discussion was made with the patient about the future plan of care    Current Length of Stay: 1 day(s)    Current Patient Status: Inpatient     Discharge Plan / Estimated Discharge Date:  Based on Gastroenterology recommendations discharge will be discussed    Code Status: Level 1 - Full Code      Subjective:   Patient was lying in bed still reporting severe abdominal pain and tenderness, patient still reports bloody diarrhea, patient describes the pain as colicky and it feels similar to labor pain, patient also reports nausea no vomiting    Denies shortness of breath, palpitations, fever, chills, night sweats  Objective:     Vitals:   Temp (24hrs), Av °F (37 2 °C), Min:98 3 °F (36 8 °C), Max:99 6 °F (37 6 °C)    Temp:  [98 3 °F (36 8 °C)-99 6 °F (37 6 °C)] 99 2 °F (37 3 °C)  HR:  [54-73] 73  Resp:  [18] 18  BP: (102-149)/(55-72) 105/55  SpO2:  [94 %-99 %] 94 %  Body mass index is 24 09 kg/m²  Input and Output Summary (last 24 hours): Intake/Output Summary (Last 24 hours) at 2019 1448  Last data filed at 2019 0950  Gross per 24 hour   Intake 2071 25 ml   Output 550 ml   Net 1521 25 ml       Physical Exam:     Physical Exam   Constitutional: She is oriented to person, place, and time  She appears well-developed and well-nourished  HENT:   Head: Normocephalic and atraumatic  Neck: Normal range of motion  Cardiovascular: Normal rate, regular rhythm, normal heart sounds and intact distal pulses  Pulmonary/Chest: Effort normal and breath sounds normal    Abdominal: Bowel sounds are normal  There is tenderness  There is guarding  Musculoskeletal: Normal range of motion  She exhibits tenderness  Neurological: She is alert and oriented to person, place, and time  Skin: Skin is warm and dry  Capillary refill takes less than 2 seconds  Psychiatric: She has a normal mood and affect  Her behavior is normal  Judgment and thought content normal            Additional Data:     Labs:    Results from last 7 days   Lab Units 19  0509  19  0716   WBC Thousand/uL 12 85*   < > 8 51   HEMOGLOBIN g/dL 11 1*   < > 11 3*   HEMATOCRIT % 35 4   < > 37 0   PLATELETS Thousands/uL 450*   < > 418*   NEUTROS PCT %  --   --  57   LYMPHS PCT %  --   --  28   LYMPHO PCT % 14  --   --    MONOS PCT %  --   --  11   MONO PCT % 9  --   --    EOS PCT % 0  --  4    < > = values in this interval not displayed       Results from last 7 days   Lab Units 19  0509   POTASSIUM mmol/L 4 2   CHLORIDE mmol/L 105   CO2 mmol/L 28   BUN mg/dL 6   CREATININE mg/dL 0  71   CALCIUM mg/dL 9 5   ALK PHOS U/L 71   ALT U/L 13   AST U/L 9           * I Have Reviewed All Lab Data Listed Above  * Additional Pertinent Lab Tests Reviewed: [address] Admission Reviewed    Imaging:    Imaging Reports Reviewed Today Include: no  Imaging Personally Reviewed by Myself Includes:  no  Recent Cultures (last 7 days):     Results from last 7 days   Lab Units 09/14/19  0719   C DIFF TOXIN B  NEGATIVE for C difficle toxin by PCR  Last 24 Hours Medication List:     Current Facility-Administered Medications:  acetaminophen 650 mg Oral Q6H PRN Bakari Allen MD    albuterol 2 5 mg Nebulization Q6H PRN Bakari Allen MD    aluminum-magnesium hydroxide-simethicone 30 mL Oral Q6H PRN Bakari Allen MD    carisoprodol 350 mg Oral HS PRN Bakari Allen MD    cholecalciferol 1,000 Units Oral HS Alice Ennis MD    dextrose 5 % and sodium chloride 0 9 % 100 mL/hr Intravenous Continuous Bakari Allen MD Last Rate: 100 mL/hr (09/17/19 1212)   ferrous sulfate 325 mg Oral QPM Alice Ennis MD    HYDROcodone-acetaminophen 1 tablet Oral Q6H PRN Bakari Allen MD    hydrOXYzine HCL 25 mg Oral HS Bakari Allen MD    hyoscyamine 0 125 mg Sublingual Q4H PRN Allen Daniels PA-C    levothyroxine 25 mcg Oral Early Morning Bakari Allen MD    methylPREDNISolone sodium succinate 20 mg Intravenous Q8H Bakari Allen MD    ondansetron 4 mg Intravenous Q6H PRN Bakari Allen MD    zolpidem 10 mg Oral HS PRN Bakari Allen MD         Today, Patient Was Seen By: Tello Smalls MD    ** Please Note: This note has been constructed using a voice recognition system   **

## 2019-09-17 NOTE — PROGRESS NOTES
Progress Note - Italo Isaacs 47 y o  female MRN: [id number]    Unit/Bed#: -01 Encounter: [id number]    Assessment and Plan:   Principal Problem:    Ulcerative pancolitis with rectal bleeding (HCC)  Active Problems:    Asthma, mild intermittent    Arthralgia of multiple joints    #1  Ulcerative colitis flare: CRP 73 2 on 9/14  C diff negative at that time as well  On Entyvio Q4 weeks and prednisone 30 mg daily with no benefit  Stool PCR negative  -Continue solumedrol 20 mg Q8 hours  -Levsin for cramping  -Zofran as needed  -IVF   -Clears  -will likely need to switch to different regiment as outpatient, likely Remicade or Stelara  Had antibodies to Humira after 1 year  [place] was recently increased from Q8 to Q4 weeks with no benefit   -If no improvement on IV steroids, consider inpatient colonoscopy to assess mucosal disease and rule out HSV and CMV  Patient hesitant at this time due to risk of perforation and wants to see how it goes with steroids prior to doing this    -Patient questioning Colazol use  I did advise this is similar to mesalamine which she used previously and would not be able to be the sole treatment for her colitis due to the severity  Would not recommend starting it currently until flare symptoms better controlled  ----------------------------------------------------------------------------------------------------------------    Subjective:     reprots symptoms about the same but BM with a little less blood  Objective:     Vitals: Blood pressure 105/55, pulse 73, temperature 99 2 °F (37 3 °C), temperature source Oral, resp  rate 18, height 5' 3" (1 6 m), weight 61 7 kg (136 lb), SpO2 94 %  ,Body mass index is 24 09 kg/m²        Intake/Output Summary (Last 24 hours) at 9/17/2019 1003  Last data filed at 9/17/2019 0220  Gross per 24 hour   Intake 1121 25 ml   Output 850 ml   Net 271 25 ml       Physical Exam:     General Appearance: Alert, appears stated age and cooperative  Lungs: Clear to auscultation bilaterally, no rales or rhonchi, no labored breathing/accessory muscle use  Heart: Regular rate and rhythm, S1, S2 normal, no murmur, click, rub or gallop  Abdomen: Soft, diffuse abdominal tenderness, non-distended; hyperactive bowel sounds; no masses or no organomegaly  Extremities: No cyanosis, clubbing, or edema    Invasive Devices     Peripheral Intravenous Line            Peripheral IV 09/16/19 Left;Ventral (anterior) Forearm less than 1 day                Lab Results:  Results from last 7 days   Lab Units 09/17/19  0509  09/14/19  0716   WBC Thousand/uL 12 85*   < > 8 51   HEMOGLOBIN g/dL 11 1*   < > 11 3*   HEMATOCRIT % 35 4   < > 37 0   PLATELETS Thousands/uL 450*   < > 418*   NEUTROS PCT %  --   --  57   LYMPHS PCT %  --   --  28   LYMPHO PCT % 14  --   --    MONOS PCT %  --   --  11   MONO PCT % 9  --   --    EOS PCT % 0  --  4    < > = values in this interval not displayed  Results from last 7 days   Lab Units 09/17/19  0509   POTASSIUM mmol/L 4 2   CHLORIDE mmol/L 105   CO2 mmol/L 28   BUN mg/dL 6   CREATININE mg/dL 0 71   CALCIUM mg/dL 9 5   ALK PHOS U/L 71   ALT U/L 13   AST U/L 9               Imaging Studies: I have personally reviewed pertinent imaging studies  No results found

## 2019-09-18 PROBLEM — D64.9 ANEMIA: Status: ACTIVE | Noted: 2019-09-18

## 2019-09-18 LAB
ANION GAP SERPL CALCULATED.3IONS-SCNC: 5 MMOL/L (ref 4–13)
BUN SERPL-MCNC: 6 MG/DL (ref 5–25)
CALCIUM SERPL-MCNC: 10 MG/DL (ref 8.3–10.1)
CHLORIDE SERPL-SCNC: 108 MMOL/L (ref 100–108)
CO2 SERPL-SCNC: 27 MMOL/L (ref 21–32)
CREAT SERPL-MCNC: 0.84 MG/DL (ref 0.6–1.3)
ERYTHROCYTE [DISTWIDTH] IN BLOOD BY AUTOMATED COUNT: 12.9 % (ref 11.6–15.1)
GFR SERPL CREATININE-BSD FRML MDRD: 79 ML/MIN/1.73SQ M
GLUCOSE SERPL-MCNC: 142 MG/DL (ref 65–140)
HCT VFR BLD AUTO: 31 % (ref 34.8–46.1)
HGB BLD-MCNC: 9.8 G/DL (ref 11.5–15.4)
MCH RBC QN AUTO: 29.5 PG (ref 26.8–34.3)
MCHC RBC AUTO-ENTMCNC: 31.6 G/DL (ref 31.4–37.4)
MCV RBC AUTO: 93 FL (ref 82–98)
PLATELET # BLD AUTO: 420 THOUSANDS/UL (ref 149–390)
PMV BLD AUTO: 9.4 FL (ref 8.9–12.7)
POTASSIUM SERPL-SCNC: 4 MMOL/L (ref 3.5–5.3)
RBC # BLD AUTO: 3.32 MILLION/UL (ref 3.81–5.12)
SODIUM SERPL-SCNC: 140 MMOL/L (ref 136–145)
WBC # BLD AUTO: 13.22 THOUSAND/UL (ref 4.31–10.16)

## 2019-09-18 PROCEDURE — 85027 COMPLETE CBC AUTOMATED: CPT | Performed by: INTERNAL MEDICINE

## 2019-09-18 PROCEDURE — 99232 SBSQ HOSP IP/OBS MODERATE 35: CPT | Performed by: INTERNAL MEDICINE

## 2019-09-18 PROCEDURE — 80048 BASIC METABOLIC PNL TOTAL CA: CPT | Performed by: INTERNAL MEDICINE

## 2019-09-18 RX ORDER — OXYCODONE HYDROCHLORIDE 5 MG/1
5 TABLET ORAL EVERY 6 HOURS PRN
Status: DISCONTINUED | OUTPATIENT
Start: 2019-09-18 | End: 2019-09-20

## 2019-09-18 RX ORDER — LEVOFLOXACIN 5 MG/ML
750 INJECTION, SOLUTION INTRAVENOUS EVERY 24 HOURS
Status: DISCONTINUED | OUTPATIENT
Start: 2019-09-18 | End: 2019-09-18 | Stop reason: ALTCHOICE

## 2019-09-18 RX ORDER — POLYETHYLENE GLYCOL 3350 17 G/17G
119 POWDER, FOR SOLUTION ORAL ONCE
Status: COMPLETED | OUTPATIENT
Start: 2019-09-18 | End: 2019-09-18

## 2019-09-18 RX ADMIN — METRONIDAZOLE 500 MG: 500 INJECTION, SOLUTION INTRAVENOUS at 17:10

## 2019-09-18 RX ADMIN — METHYLPREDNISOLONE SODIUM SUCCINATE 20 MG: 40 INJECTION, POWDER, FOR SOLUTION INTRAMUSCULAR; INTRAVENOUS at 21:03

## 2019-09-18 RX ADMIN — ONDANSETRON 4 MG: 2 INJECTION INTRAMUSCULAR; INTRAVENOUS at 18:01

## 2019-09-18 RX ADMIN — LEVOTHYROXINE SODIUM 25 MCG: 25 TABLET ORAL at 05:00

## 2019-09-18 RX ADMIN — FERROUS SULFATE TAB 325 MG (65 MG ELEMENTAL FE) 325 MG: 325 (65 FE) TAB at 17:10

## 2019-09-18 RX ADMIN — HYOSCYAMINE SULFATE 0.12 MG: 0.12 TABLET ORAL at 17:10

## 2019-09-18 RX ADMIN — CEFTRIAXONE 1000 MG: 1 INJECTION, POWDER, FOR SOLUTION INTRAMUSCULAR; INTRAVENOUS at 17:57

## 2019-09-18 RX ADMIN — HYOSCYAMINE SULFATE 0.12 MG: 0.12 TABLET ORAL at 21:45

## 2019-09-18 RX ADMIN — HYOSCYAMINE SULFATE 0.12 MG: 0.12 TABLET ORAL at 09:00

## 2019-09-18 RX ADMIN — POLYETHYLENE GLYCOL 3350 119 G: 17 POWDER, FOR SOLUTION ORAL at 17:57

## 2019-09-18 RX ADMIN — METHYLPREDNISOLONE SODIUM SUCCINATE 20 MG: 40 INJECTION, POWDER, FOR SOLUTION INTRAMUSCULAR; INTRAVENOUS at 04:57

## 2019-09-18 RX ADMIN — ZOLPIDEM TARTRATE 10 MG: 5 TABLET, COATED ORAL at 23:17

## 2019-09-18 RX ADMIN — OXYCODONE HYDROCHLORIDE 5 MG: 5 TABLET ORAL at 17:59

## 2019-09-18 RX ADMIN — METHYLPREDNISOLONE SODIUM SUCCINATE 20 MG: 40 INJECTION, POWDER, FOR SOLUTION INTRAMUSCULAR; INTRAVENOUS at 12:14

## 2019-09-18 RX ADMIN — ONDANSETRON 4 MG: 2 INJECTION INTRAMUSCULAR; INTRAVENOUS at 09:00

## 2019-09-18 RX ADMIN — HYOSCYAMINE SULFATE 0.12 MG: 0.12 TABLET ORAL at 12:14

## 2019-09-18 RX ADMIN — DEXTROSE AND SODIUM CHLORIDE 100 ML/HR: 5; .9 INJECTION, SOLUTION INTRAVENOUS at 09:05

## 2019-09-18 RX ADMIN — ACETAMINOPHEN 650 MG: 325 TABLET, FILM COATED ORAL at 09:00

## 2019-09-18 NOTE — ASSESSMENT & PLAN NOTE
· Abdominal pain is improving ,no vomiting,nasuea  · Patient still reporting night sweats ,frequent bloody watery diarrhea throughout the night   · Calprotectin,fecal 2210  · Leucocytosis WBCs 13 22 most probably due to steroid use given the absence of infection  · continue IV Solu-Medrol, analgesics,IVF   · GI consulted: recommended that patient might need an inpatient colonoscopy if not improving ,trying different regimen for better control as outpatient ( Remicade or Stelara )

## 2019-09-18 NOTE — PROGRESS NOTES
Progress Note - Juan Pablo Simmons 1965, 47 y o  female MRN: [id number]    Unit/Bed#: -01 Encounter: [id number]    Primary Care Provider: Adalgisa Marroquin DO   Date and time admitted to hospital: 9/16/2019 11:56 AM        * Ulcerative pancolitis with rectal bleeding (HCC)  Assessment & Plan  · Abdominal pain is improving ,no vomiting,nasuea  · Patient still reporting night sweats ,frequent bloody watery diarrhea throughout the night   · Calprotectin,fecal 2210  · Leucocytosis WBCs 13 22 most probably due to steroid use given the absence of infection  · continue IV Solu-Medrol, analgesics,IVF   · GI consulted: recommended that patient might need an inpatient colonoscopy if not improving ,trying different regimen for better control as outpatient ( Remicade or Stelara )    Anemia  Assessment & Plan  · Patient has a baseline hemoglobin around 11 5  · Hemoglobin  today is 9 8   · The patient had been on continuous IVF since admission,been having bloody diarrhea which could be all contributing of the patient current anemia   ·  no sudden drop of hemoglobin that might suggest active bleeding   · Repeat CBC     Arthralgia of multiple joints  Assessment & Plan  · Patient still reporting generalized malaise and myalgia   · Lower back pain which was related to the abdominal pain   · Supportive care        VTE Pharmacologic Prophylaxis:   Pharmacologic: suspecting GI bleeding  Mechanical VTE Prophylaxis in Place: Yes    Discussions with Specialists or Other Care Team Provider: primary medical team ,care management,gastroeneterology     Education and Discussions with Family / Patient: discussion with the patient about her future plan of care     Current Length of Stay: 2 day(s)    Current Patient Status: Inpatient     Discharge Plan / Estimated Discharge Date: patient is not clinically stable for discharge    Code Status: Level 1 - Full Code      Subjective:    The patient was lying in bed reported some improvement in the abd pain still having a severe pain when she feels the erge to go to the restroom and with the bowel movement  still having bloody watery bowel movements every 2-3 hours ,she also reports fever,nausea and night sweating  Denies chest pain ,SOB ,palpitation,dizziness  Objective:     Vitals:   Temp (24hrs), Av 5 °F (36 9 °C), Min:98 1 °F (36 7 °C), Max:98 8 °F (37 1 °C)    Temp:  [98 1 °F (36 7 °C)-98 8 °F (37 1 °C)] 98 1 °F (36 7 °C)  HR:  [48-58] 57  Resp:  [16-18] 18  BP: (108-124)/(56-59) 111/57  SpO2:  [97 %-99 %] 98 %  Body mass index is 24 09 kg/m²  Input and Output Summary (last 24 hours): Intake/Output Summary (Last 24 hours) at 2019 1034  Last data filed at 2019 0905  Gross per 24 hour   Intake 2343 33 ml   Output --   Net 2343 33 ml       Physical Exam:     Physical Exam   Constitutional: She is oriented to person, place, and time  She appears well-developed and well-nourished  Cardiovascular: Normal rate, regular rhythm, normal heart sounds and intact distal pulses  Pulmonary/Chest: Effort normal and breath sounds normal    Abdominal: Soft  Bowel sounds are normal  There is tenderness  Musculoskeletal: Normal range of motion  She exhibits tenderness  Neurological: She is alert and oriented to person, place, and time  Skin: Skin is warm and dry  Capillary refill takes less than 2 seconds  Psychiatric: She has a normal mood and affect  Her behavior is normal  Judgment and thought content normal    Nursing note and vitals reviewed      Additional Data:     Labs:    Results from last 7 days   Lab Units 19  0501 19  0509  19  0716   WBC Thousand/uL 13 22* 12 85*   < > 8 51   HEMOGLOBIN g/dL 9 8* 11 1*   < > 11 3*   HEMATOCRIT % 31 0* 35 4   < > 37 0   PLATELETS Thousands/uL 420* 450*   < > 418*   NEUTROS PCT %  --   --   --  57   LYMPHS PCT %  --   --   --  28   LYMPHO PCT %  --  14  --   --    MONOS PCT %  --   --   --  11   MONO PCT %  --  9  --   -- EOS PCT %  --  0  --  4    < > = values in this interval not displayed  Results from last 7 days   Lab Units 09/18/19  0501 09/17/19  0509   POTASSIUM mmol/L 4 0 4 2   CHLORIDE mmol/L 108 105   CO2 mmol/L 27 28   BUN mg/dL 6 6   CREATININE mg/dL 0 84 0 71   CALCIUM mg/dL 10 0 9 5   ALK PHOS U/L  --  71   ALT U/L  --  13   AST U/L  --  9           * I Have Reviewed All Lab Data Listed Above  * Additional Pertinent Lab Tests Reviewed: [address] Admission Reviewed    Imaging:    Imaging Reports Reviewed Today Include: no  Imaging Personally Reviewed by Myself Includes:  no    Recent Cultures (last 7 days):     Results from last 7 days   Lab Units 09/14/19  0719   C DIFF TOXIN B  NEGATIVE for C difficle toxin by PCR          Last 24 Hours Medication List:     Current Facility-Administered Medications:  acetaminophen 650 mg Oral Q6H PRN Leonard Hernandez MD    albuterol 2 5 mg Nebulization Q6H PRN Leonard Hernandez MD    aluminum-magnesium hydroxide-simethicone 30 mL Oral Q6H PRN Leonard Hernandez MD    carisoprodol 350 mg Oral HS PRN Leonard Hernandez MD    cholecalciferol 1,000 Units Oral HS Alice Ennis MD    dextrose 5 % and sodium chloride 0 9 % 100 mL/hr Intravenous Continuous Leonard Hernandez MD Last Rate: 100 mL/hr (09/18/19 0905)   ferrous sulfate 325 mg Oral QPM Alice Ennis MD    HYDROcodone-acetaminophen 1 tablet Oral Q6H PRN Leonard Hernandez MD    hydrOXYzine HCL 25 mg Oral HS Leonard Hernandez MD    hyoscyamine 0 125 mg Sublingual Q4H PRN Nadeem Spear PA-C    levothyroxine 25 mcg Oral Early Morning Leonard Hernandez MD    methylPREDNISolone sodium succinate 20 mg Intravenous Q8H Leonard Hernandez MD    ondansetron 4 mg Intravenous Q6H PRN Leonard Hernandez MD    zolpidem 10 mg Oral HS PRN Leonard Hernandez MD         Today, Patient Was Seen By: Cade Martinez MD    ** Please Note: This note has been constructed using a voice recognition system   **

## 2019-09-18 NOTE — ASSESSMENT & PLAN NOTE
· Patient still reporting generalized malaise and myalgia   · Lower back pain which was related to the abdominal pain   · Supportive care

## 2019-09-18 NOTE — PLAN OF CARE
Problem: PAIN - ADULT  Goal: Verbalizes/displays adequate comfort level or baseline comfort level  Description  Interventions:  - Encourage patient to monitor pain and request assistance  - Assess pain using appropriate pain scale  - Administer analgesics based on type and severity of pain and evaluate response  - Implement non-pharmacological measures as appropriate and evaluate response  - Consider cultural and social influences on pain and pain management  - Notify physician/advanced practitioner if interventions unsuccessful or patient reports new pain  Outcome: Progressing     Problem: INFECTION - ADULT  Goal: Absence or prevention of progression during hospitalization  Description  INTERVENTIONS:  - Assess and monitor for signs and symptoms of infection  - Monitor lab/diagnostic results  - Monitor all insertion sites, i e  indwelling lines, tubes, and drains  - Monitor endotracheal if appropriate and nasal secretions for changes in amount and color  - [place] appropriate cooling/warming therapies per order  - Administer medications as ordered  - Instruct and encourage patient and family to use good hand hygiene technique  - Identify and instruct in appropriate isolation precautions for identified infection/condition  Outcome: Progressing     Problem: SAFETY ADULT  Goal: Patient will remain free of falls  Description  INTERVENTIONS:  - Assess patient frequently for physical needs  -  Identify cognitive and physical deficits and behaviors that affect risk of falls    -  [place] fall precautions as indicated by assessment   - Educate patient/family on patient safety including physical limitations  - Instruct patient to call for assistance with activity based on assessment  - Modify environment to reduce risk of injury  - Consider OT/PT consult to assist with strengthening/mobility  Outcome: Progressing     Problem: DISCHARGE PLANNING  Goal: Discharge to home or other facility with appropriate resources  Description  INTERVENTIONS:  - Identify barriers to discharge w/patient and caregiver  - Arrange for needed discharge resources and transportation as appropriate  - Identify discharge learning needs (meds, wound care, etc )  - Arrange for interpretive services to assist at discharge as needed  - Refer to Case Management Department for coordinating discharge planning if the patient needs post-hospital services based on physician/advanced practitioner order or complex needs related to functional status, cognitive ability, or social support system  Outcome: Progressing     Problem: Knowledge Deficit  Goal: Patient/family/caregiver demonstrates understanding of disease process, treatment plan, medications, and discharge instructions  Description  Complete learning assessment and assess knowledge base    Interventions:  - Provide teaching at level of understanding  - Provide teaching via preferred learning methods  Outcome: Progressing     Problem: GASTROINTESTINAL - ADULT  Goal: Minimal or absence of nausea and/or vomiting  Description  INTERVENTIONS:  - Administer IV fluids if ordered to ensure adequate hydration  - Maintain NPO status until nausea and vomiting are resolved  - Nasogastric tube if ordered  - Administer ordered antiemetic medications as needed  - Provide nonpharmacologic comfort measures as appropriate  - Advance diet as tolerated, if ordered  - Consider nutrition services referral to assist patient with adequate nutrition and appropriate food choices  Outcome: Progressing  Goal: Maintains or returns to baseline bowel function  Description  INTERVENTIONS:  - Assess bowel function  - Encourage oral fluids to ensure adequate hydration  - Administer IV fluids if ordered to ensure adequate hydration  - Administer ordered medications as needed  - Encourage mobilization and activity  - Consider nutritional services referral to assist patient with adequate nutrition and appropriate food choices  Outcome: Progressing  Goal: Maintains adequate nutritional intake  Description  INTERVENTIONS:  - Monitor percentage of each meal consumed  - Identify factors contributing to decreased intake, treat as appropriate  - Assist with meals as needed  - Monitor I&O, weight, and lab values if indicated  - Obtain nutrition services referral as needed  Outcome: Progressing

## 2019-09-18 NOTE — ASSESSMENT & PLAN NOTE
· Patient has a baseline hemoglobin around 11 5  · Hemoglobin  today is 9 8   · The patient had been on continuous IVF since Herb Na having bloody diarrhea which could be all contributing of the patient current anemia   ·  no sudden drop of hemoglobin that might suggest active bleeding   · Repeat CBC

## 2019-09-18 NOTE — PLAN OF CARE
Problem: PAIN - ADULT  Goal: Verbalizes/displays adequate comfort level or baseline comfort level  Description  Interventions:  - Encourage patient to monitor pain and request assistance  - Assess pain using appropriate pain scale  - Administer analgesics based on type and severity of pain and evaluate response  - Implement non-pharmacological measures as appropriate and evaluate response  - Consider cultural and social influences on pain and pain management  - Notify physician/advanced practitioner if interventions unsuccessful or patient reports new pain  Outcome: Progressing     Problem: INFECTION - ADULT  Goal: Absence or prevention of progression during hospitalization  Description  INTERVENTIONS:  - Assess and monitor for signs and symptoms of infection  - Monitor lab/diagnostic results  - Monitor all insertion sites, i e  indwelling lines, tubes, and drains  - Monitor endotracheal if appropriate and nasal secretions for changes in amount and color  - [place] appropriate cooling/warming therapies per order  - Administer medications as ordered  - Instruct and encourage patient and family to use good hand hygiene technique  - Identify and instruct in appropriate isolation precautions for identified infection/condition  Outcome: Progressing     Problem: SAFETY ADULT  Goal: Patient will remain free of falls  Description  INTERVENTIONS:  - Assess patient frequently for physical needs  -  Identify cognitive and physical deficits and behaviors that affect risk of falls    -  [place] fall precautions as indicated by assessment   - Educate patient/family on patient safety including physical limitations  - Instruct patient to call for assistance with activity based on assessment  - Modify environment to reduce risk of injury  - Consider OT/PT consult to assist with strengthening/mobility  Outcome: Progressing     Problem: DISCHARGE PLANNING  Goal: Discharge to home or other facility with appropriate resources  Description  INTERVENTIONS:  - Identify barriers to discharge w/patient and caregiver  - Arrange for needed discharge resources and transportation as appropriate  - Identify discharge learning needs (meds, wound care, etc )  - Arrange for interpretive services to assist at discharge as needed  - Refer to Case Management Department for coordinating discharge planning if the patient needs post-hospital services based on physician/advanced practitioner order or complex needs related to functional status, cognitive ability, or social support system  Outcome: Progressing     Problem: Knowledge Deficit  Goal: Patient/family/caregiver demonstrates understanding of disease process, treatment plan, medications, and discharge instructions  Description  Complete learning assessment and assess knowledge base    Interventions:  - Provide teaching at level of understanding  - Provide teaching via preferred learning methods  Outcome: Progressing     Problem: GASTROINTESTINAL - ADULT  Goal: Minimal or absence of nausea and/or vomiting  Description  INTERVENTIONS:  - Administer IV fluids if ordered to ensure adequate hydration  - Maintain NPO status until nausea and vomiting are resolved  - Nasogastric tube if ordered  - Administer ordered antiemetic medications as needed  - Provide nonpharmacologic comfort measures as appropriate  - Advance diet as tolerated, if ordered  - Consider nutrition services referral to assist patient with adequate nutrition and appropriate food choices  Outcome: Progressing  Goal: Maintains or returns to baseline bowel function  Description  INTERVENTIONS:  - Assess bowel function  - Encourage oral fluids to ensure adequate hydration  - Administer IV fluids if ordered to ensure adequate hydration  - Administer ordered medications as needed  - Encourage mobilization and activity  - Consider nutritional services referral to assist patient with adequate nutrition and appropriate food choices  Outcome: Progressing  Goal: Maintains adequate nutritional intake  Description  INTERVENTIONS:  - Monitor percentage of each meal consumed  - Identify factors contributing to decreased intake, treat as appropriate  - Assist with meals as needed  - Monitor I&O, weight, and lab values if indicated  - Obtain nutrition services referral as needed  Outcome: Progressing

## 2019-09-18 NOTE — PROGRESS NOTES
Progress Note - Jacy Gutierrez 47 y o  female MRN: [id number]    Unit/Bed#: -01 Encounter: [id number]    Assessment and Plan:   Principal Problem:    Ulcerative pancolitis with rectal bleeding (HCC)  Active Problems:    Asthma, mild intermittent    Arthralgia of multiple joints    Anemia    #1  Ulcerative colitis flare: CRP 73 2 on 9/14  C diff negative at that time as well  On Entyvio Q4 weeks and prednisone 30 mg daily with no benefit  Stool PCR negative  -Continue solumedrol 20 mg Q8 hours  -Levsin for cramping  Switched to scheduled rather than PRN  -addition of PRN IV pain meds to be used in severe pain  -Zofran as needed  -IVF   -Clears  -will likely need to switch to different regiment as outpatient, likely Remicade or Stelara  Had antibodies to Humira after 1 year  Gladys Breech was recently increased from Q8 to Q4 weeks with no benefit  Could also consider Remicade inpatient if no improvement however, this may need to wait as her last dose of entyvio was only 3 weeks ago    -likely plan for flex sig or colonoscopy this week to further assess as she has had no improvement    -Patient questioning Colazol use  I did advise this is similar to mesalamine which she used previously and would not be able to be the sole treatment for her colitis due to the severity  Would not recommend starting it currently until flare symptoms better controlled  ----------------------------------------------------------------------------------------------------------------    Subjective:     Patient tearful  Continues to cramps, pain worse today  Nausea but no vomiting  Still blood in stool  Objective:     Vitals: Blood pressure 111/57, pulse 57, temperature 98 1 °F (36 7 °C), temperature source Oral, resp  rate 18, height 5' 3" (1 6 m), weight 61 7 kg (136 lb), SpO2 98 %  ,Body mass index is 24 09 kg/m²        Intake/Output Summary (Last 24 hours) at 9/18/2019 1037  Last data filed at 9/18/2019 0905  Gross per 24 hour Intake 2343 33 ml   Output --   Net 2343 33 ml       Physical Exam:     General Appearance: Alert, appears stated age and cooperative; tearful  Lungs: Clear to auscultation bilaterally, no rales or rhonchi, no labored breathing/accessory muscle use  Heart: Regular rate and rhythm, S1, S2 normal, no murmur, click, rub or gallop  Abdomen: Soft, diffuse abdominal tenderness, worst in lower abdomen, non-distended; bowel sounds normal; no masses or no organomegaly  Extremities: No cyanosis, clubbing, or edema    Invasive Devices     Peripheral Intravenous Line            Peripheral IV 09/16/19 Left;Ventral (anterior) Forearm 1 day                Lab Results:  Results from last 7 days   Lab Units 09/18/19  0501 09/17/19  0509  09/14/19  0716   WBC Thousand/uL 13 22* 12 85*   < > 8 51   HEMOGLOBIN g/dL 9 8* 11 1*   < > 11 3*   HEMATOCRIT % 31 0* 35 4   < > 37 0   PLATELETS Thousands/uL 420* 450*   < > 418*   NEUTROS PCT %  --   --   --  57   LYMPHS PCT %  --   --   --  28   LYMPHO PCT %  --  14  --   --    MONOS PCT %  --   --   --  11   MONO PCT %  --  9  --   --    EOS PCT %  --  0  --  4    < > = values in this interval not displayed  Results from last 7 days   Lab Units 09/18/19  0501 09/17/19  0509   POTASSIUM mmol/L 4 0 4 2   CHLORIDE mmol/L 108 105   CO2 mmol/L 27 28   BUN mg/dL 6 6   CREATININE mg/dL 0 84 0 71   CALCIUM mg/dL 10 0 9 5   ALK PHOS U/L  --  71   ALT U/L  --  13   AST U/L  --  9               Imaging Studies: I have personally reviewed pertinent imaging studies  No results found

## 2019-09-19 ENCOUNTER — ANESTHESIA EVENT (INPATIENT)
Dept: GASTROENTEROLOGY | Facility: HOSPITAL | Age: 54
DRG: 386 | End: 2019-09-19
Payer: COMMERCIAL

## 2019-09-19 ENCOUNTER — APPOINTMENT (INPATIENT)
Dept: GASTROENTEROLOGY | Facility: HOSPITAL | Age: 54
DRG: 386 | End: 2019-09-19
Payer: COMMERCIAL

## 2019-09-19 ENCOUNTER — ANESTHESIA (INPATIENT)
Dept: GASTROENTEROLOGY | Facility: HOSPITAL | Age: 54
DRG: 386 | End: 2019-09-19
Payer: COMMERCIAL

## 2019-09-19 DIAGNOSIS — Z71.89 COMPLEX CARE COORDINATION: Primary | ICD-10-CM

## 2019-09-19 LAB
ANION GAP SERPL CALCULATED.3IONS-SCNC: 4 MMOL/L (ref 4–13)
BASOPHILS # BLD MANUAL: 0 THOUSAND/UL (ref 0–0.1)
BASOPHILS NFR MAR MANUAL: 0 % (ref 0–1)
BUN SERPL-MCNC: 5 MG/DL (ref 5–25)
CALCIUM SERPL-MCNC: 9.5 MG/DL (ref 8.3–10.1)
CHLORIDE SERPL-SCNC: 108 MMOL/L (ref 100–108)
CO2 SERPL-SCNC: 29 MMOL/L (ref 21–32)
CREAT SERPL-MCNC: 0.75 MG/DL (ref 0.6–1.3)
EOSINOPHIL # BLD MANUAL: 0 THOUSAND/UL (ref 0–0.4)
EOSINOPHIL NFR BLD MANUAL: 0 % (ref 0–6)
ERYTHROCYTE [DISTWIDTH] IN BLOOD BY AUTOMATED COUNT: 13.2 % (ref 11.6–15.1)
GFR SERPL CREATININE-BSD FRML MDRD: 91 ML/MIN/1.73SQ M
GLUCOSE SERPL-MCNC: 126 MG/DL (ref 65–140)
HCT VFR BLD AUTO: 31.2 % (ref 34.8–46.1)
HGB BLD-MCNC: 9.5 G/DL (ref 11.5–15.4)
LYMPHOCYTES # BLD AUTO: 2.51 THOUSAND/UL (ref 0.6–4.47)
LYMPHOCYTES # BLD AUTO: 24 % (ref 14–44)
MCH RBC QN AUTO: 28.8 PG (ref 26.8–34.3)
MCHC RBC AUTO-ENTMCNC: 30.4 G/DL (ref 31.4–37.4)
MCV RBC AUTO: 95 FL (ref 82–98)
MONOCYTES # BLD AUTO: 0.94 THOUSAND/UL (ref 0–1.22)
MONOCYTES NFR BLD: 9 % (ref 4–12)
NEUTROPHILS # BLD MANUAL: 6.49 THOUSAND/UL (ref 1.85–7.62)
NEUTS BAND NFR BLD MANUAL: 7 % (ref 0–8)
NEUTS SEG NFR BLD AUTO: 55 % (ref 43–75)
NRBC BLD AUTO-RTO: 0 /100 WBCS
PLATELET # BLD AUTO: 426 THOUSANDS/UL (ref 149–390)
PLATELET BLD QL SMEAR: ADEQUATE
PMV BLD AUTO: 8.8 FL (ref 8.9–12.7)
POTASSIUM SERPL-SCNC: 3.5 MMOL/L (ref 3.5–5.3)
RBC # BLD AUTO: 3.3 MILLION/UL (ref 3.81–5.12)
RBC MORPH BLD: NORMAL
SODIUM SERPL-SCNC: 141 MMOL/L (ref 136–145)
TOTAL CELLS COUNTED SPEC: 100
VARIANT LYMPHS # BLD AUTO: 5 %
WBC # BLD AUTO: 10.46 THOUSAND/UL (ref 4.31–10.16)

## 2019-09-19 PROCEDURE — 0DBM8ZX EXCISION OF DESCENDING COLON, VIA NATURAL OR ARTIFICIAL OPENING ENDOSCOPIC, DIAGNOSTIC: ICD-10-PCS | Performed by: INTERNAL MEDICINE

## 2019-09-19 PROCEDURE — 88342 IMHCHEM/IMCYTCHM 1ST ANTB: CPT | Performed by: PATHOLOGY

## 2019-09-19 PROCEDURE — 45380 COLONOSCOPY AND BIOPSY: CPT | Performed by: INTERNAL MEDICINE

## 2019-09-19 PROCEDURE — 88305 TISSUE EXAM BY PATHOLOGIST: CPT | Performed by: PATHOLOGY

## 2019-09-19 PROCEDURE — 88341 IMHCHEM/IMCYTCHM EA ADD ANTB: CPT | Performed by: PATHOLOGY

## 2019-09-19 PROCEDURE — 88365 INSITU HYBRIDIZATION (FISH): CPT | Performed by: PATHOLOGY

## 2019-09-19 PROCEDURE — 80048 BASIC METABOLIC PNL TOTAL CA: CPT | Performed by: INTERNAL MEDICINE

## 2019-09-19 PROCEDURE — 99232 SBSQ HOSP IP/OBS MODERATE 35: CPT | Performed by: INTERNAL MEDICINE

## 2019-09-19 PROCEDURE — 0DBL8ZX EXCISION OF TRANSVERSE COLON, VIA NATURAL OR ARTIFICIAL OPENING ENDOSCOPIC, DIAGNOSTIC: ICD-10-PCS | Performed by: INTERNAL MEDICINE

## 2019-09-19 PROCEDURE — 85027 COMPLETE CBC AUTOMATED: CPT | Performed by: INTERNAL MEDICINE

## 2019-09-19 PROCEDURE — 85007 BL SMEAR W/DIFF WBC COUNT: CPT | Performed by: INTERNAL MEDICINE

## 2019-09-19 RX ORDER — LIDOCAINE HYDROCHLORIDE 10 MG/ML
INJECTION, SOLUTION EPIDURAL; INFILTRATION; INTRACAUDAL; PERINEURAL AS NEEDED
Status: DISCONTINUED | OUTPATIENT
Start: 2019-09-19 | End: 2019-09-19 | Stop reason: SURG

## 2019-09-19 RX ORDER — SODIUM CHLORIDE 9 MG/ML
INJECTION, SOLUTION INTRAVENOUS CONTINUOUS PRN
Status: DISCONTINUED | OUTPATIENT
Start: 2019-09-19 | End: 2019-09-19 | Stop reason: SURG

## 2019-09-19 RX ORDER — DEXTROSE, SODIUM CHLORIDE, AND POTASSIUM CHLORIDE 5; .45; .15 G/100ML; G/100ML; G/100ML
100 INJECTION INTRAVENOUS CONTINUOUS
Status: DISCONTINUED | OUTPATIENT
Start: 2019-09-19 | End: 2019-09-21

## 2019-09-19 RX ORDER — PROPOFOL 10 MG/ML
INJECTION, EMULSION INTRAVENOUS AS NEEDED
Status: DISCONTINUED | OUTPATIENT
Start: 2019-09-19 | End: 2019-09-19 | Stop reason: SURG

## 2019-09-19 RX ADMIN — METRONIDAZOLE 500 MG: 500 INJECTION, SOLUTION INTRAVENOUS at 08:34

## 2019-09-19 RX ADMIN — LEVOTHYROXINE SODIUM 25 MCG: 25 TABLET ORAL at 05:39

## 2019-09-19 RX ADMIN — HYOSCYAMINE SULFATE 0.12 MG: 0.12 TABLET ORAL at 09:17

## 2019-09-19 RX ADMIN — HYOSCYAMINE SULFATE 0.12 MG: 0.12 TABLET ORAL at 21:08

## 2019-09-19 RX ADMIN — METRONIDAZOLE 500 MG: 500 INJECTION, SOLUTION INTRAVENOUS at 16:54

## 2019-09-19 RX ADMIN — OXYCODONE HYDROCHLORIDE 5 MG: 5 TABLET ORAL at 10:44

## 2019-09-19 RX ADMIN — PROPOFOL 50 MG: 10 INJECTION, EMULSION INTRAVENOUS at 15:05

## 2019-09-19 RX ADMIN — FERROUS SULFATE TAB 325 MG (65 MG ELEMENTAL FE) 325 MG: 325 (65 FE) TAB at 17:59

## 2019-09-19 RX ADMIN — SODIUM CHLORIDE: 0.9 INJECTION, SOLUTION INTRAVENOUS at 14:19

## 2019-09-19 RX ADMIN — PROPOFOL 50 MG: 10 INJECTION, EMULSION INTRAVENOUS at 15:11

## 2019-09-19 RX ADMIN — ONDANSETRON 4 MG: 2 INJECTION INTRAMUSCULAR; INTRAVENOUS at 10:42

## 2019-09-19 RX ADMIN — ONDANSETRON 4 MG: 2 INJECTION INTRAMUSCULAR; INTRAVENOUS at 16:53

## 2019-09-19 RX ADMIN — METRONIDAZOLE 500 MG: 500 INJECTION, SOLUTION INTRAVENOUS at 00:19

## 2019-09-19 RX ADMIN — HYOSCYAMINE SULFATE 0.12 MG: 0.12 TABLET ORAL at 16:56

## 2019-09-19 RX ADMIN — CEFTRIAXONE 1000 MG: 1 INJECTION, POWDER, FOR SOLUTION INTRAMUSCULAR; INTRAVENOUS at 18:03

## 2019-09-19 RX ADMIN — LIDOCAINE HYDROCHLORIDE 40 MG: 10 INJECTION, SOLUTION EPIDURAL; INFILTRATION; INTRACAUDAL; PERINEURAL at 15:02

## 2019-09-19 RX ADMIN — ZOLPIDEM TARTRATE 10 MG: 5 TABLET, COATED ORAL at 21:07

## 2019-09-19 RX ADMIN — OXYCODONE HYDROCHLORIDE 5 MG: 5 TABLET ORAL at 16:53

## 2019-09-19 RX ADMIN — METHYLPREDNISOLONE SODIUM SUCCINATE 20 MG: 40 INJECTION, POWDER, FOR SOLUTION INTRAMUSCULAR; INTRAVENOUS at 13:21

## 2019-09-19 RX ADMIN — METHYLPREDNISOLONE SODIUM SUCCINATE 20 MG: 40 INJECTION, POWDER, FOR SOLUTION INTRAMUSCULAR; INTRAVENOUS at 21:08

## 2019-09-19 RX ADMIN — OXYCODONE HYDROCHLORIDE 5 MG: 5 TABLET ORAL at 00:13

## 2019-09-19 RX ADMIN — ONDANSETRON 4 MG: 2 INJECTION INTRAMUSCULAR; INTRAVENOUS at 00:13

## 2019-09-19 RX ADMIN — HYOSCYAMINE SULFATE 0.12 MG: 0.12 TABLET ORAL at 05:39

## 2019-09-19 RX ADMIN — HYOSCYAMINE SULFATE 0.12 MG: 0.12 TABLET ORAL at 13:24

## 2019-09-19 RX ADMIN — PROPOFOL 100 MG: 10 INJECTION, EMULSION INTRAVENOUS at 15:02

## 2019-09-19 RX ADMIN — DEXTROSE, SODIUM CHLORIDE, AND POTASSIUM CHLORIDE 100 ML/HR: 5; .45; .15 INJECTION INTRAVENOUS at 09:18

## 2019-09-19 RX ADMIN — PROPOFOL 50 MG: 10 INJECTION, EMULSION INTRAVENOUS at 15:08

## 2019-09-19 RX ADMIN — DEXTROSE AND SODIUM CHLORIDE 100 ML/HR: 5; .9 INJECTION, SOLUTION INTRAVENOUS at 05:38

## 2019-09-19 RX ADMIN — METHYLPREDNISOLONE SODIUM SUCCINATE 20 MG: 40 INJECTION, POWDER, FOR SOLUTION INTRAMUSCULAR; INTRAVENOUS at 05:39

## 2019-09-19 NOTE — ANESTHESIA PREPROCEDURE EVALUATION
Review of Systems/Medical History  Patient summary reviewed  Chart reviewed  No history of anesthetic complications     Cardiovascular  Exercise tolerance (METS): >4,  No hypertension , No CAD , No cardiac stents     Pulmonary  Not a smoker , Asthma , No recent URI , No sleep apnea ,        GI/Hepatic      Comment: UC; nausea, no vomiting; finished prep this AM          Endo/Other  History of thyroid disease , hypothyroidism,      GYN       Hematology  Anemia ,     Musculoskeletal    Comment: Psoriatic Arthritis  Raynaud's Arthritis     Neurology   Psychology           Physical Exam    Airway    Mallampati score: II  TM Distance: >3 FB       Dental   No notable dental hx     Cardiovascular      Pulmonary      Other Findings      Lab Results   Component Value Date    WBC 10 46 (H) 09/19/2019    HGB 9 5 (L) 09/19/2019     (H) 09/19/2019     Lab Results   Component Value Date    SODIUM 141 09/19/2019    K 3 5 09/19/2019    BUN 5 09/19/2019    CREATININE 0 75 09/19/2019    EGFR 91 09/19/2019    GLUCOSE 84 08/05/2015     Lab Results   Component Value Date    PTT 24 (L) 11/19/2018      Lab Results   Component Value Date    INR 1 02 11/19/2018           Lab Results   Component Value Date    HGBA1C 5 6 09/06/2018         Anesthesia Plan  ASA Score- 3     Anesthesia Type- IV sedation with anesthesia with ASA Monitors  Additional Monitors:   Airway Plan:     Comment: Hershall Sacks, M D , have personally seen and evaluated the patient prior to anesthetic care  I have reviewed the pre-anesthetic record, and other medical records if appropriate to the anesthetic care  If a CRNA is involved in the case, I have reviewed the CRNA assessment, if present, and agree  Risks/benefits and alternatives discussed with patient including possible PONV, sore throat, and possibility of rare anesthetic and surgical emergencies          Plan Factors-    Induction-     Postoperative Plan-     Informed Consent- Anesthetic plan and risks discussed with patient  I personally reviewed this patient with the CRNA  Discussed and agreed on the Anesthesia Plan with the CRNA  Sidney Frazier

## 2019-09-19 NOTE — QUICK NOTE
Dr Carlos Gasca speaking with patient  Patient complained of abdominal spasms  Abdomen soft non distended  Dr Carlos Gasca aware

## 2019-09-19 NOTE — ASSESSMENT & PLAN NOTE
· Lower abdominal pain improved since presentation but the same compared to last night, patient still reporting severe colicky pain with bowel movements   · The patient denies night sweats or chills today   · still having frequent bloody diarrhea throughout the night  · continue IV Solu-Medrol , analgesics,IVF   · Continue oxycodone for moderate,Morphine for severe pain PRN   · Flagyl was added by GI  · colonoscopy will be done today    · Will discuss with the pharmacy about starting Remicade as inpatient given the patient not improving on current regimen   · Patient was asking about fecal transplant as a potential treatment, IBD specialist  DR Gabriela Alaniz from [facility] was consulted and he advised against it since its still on trial phase and not FDA approved yet

## 2019-09-19 NOTE — PROGRESS NOTES
Progress Note - Lor Garcia 1965, 47 y o  female MRN: [id number]    Unit/Bed#: -01 Encounter: [id number]    Primary Care Provider: Edin Carmichael DO   Date and time admitted to hospital: 9/16/2019 11:56 AM        * Ulcerative pancolitis with rectal bleeding (HCC)  Assessment & Plan  · Lower abdominal pain improved since presentation but the same compared to last night, patient still reporting severe colicky pain with bowel movements   · The patient denies night sweats or chills today   · still having frequent bloody diarrhea throughout the night  · continue IV Solu-Medrol , analgesics,IVF   · Continue oxycodone for moderate,Morphine for severe pain PRN   · Flagyl was added by GI  · colonoscopy will be done today    · Will discuss with the pharmacy about starting Remicade as inpatient given the patient not improving on current regimen   · Patient was asking about fecal transplant as a potential treatment, IBD specialist  DR Susan Corbin from [facility] was consulted and he advised against it since its still on trial phase and not FDA approved yet       Anemia  Assessment & Plan  · Patient has a baseline hemoglobin around 11 5  · Hemoglobin  today is 9 5  · The patient had been on continuous IVF since admission,been having bloody diarrhea which could be all contributing to the patient current anemia   ·  no sudden drop of hemoglobin that might suggest active bleeding   · Repeat CBC     Arthralgia of multiple joints  Assessment & Plan  · Patient still reporting generalized malaise and myalgia   · Lower back pain which was related to the abdominal pain   · Supportive care    Asthma, mild intermittent  Assessment & Plan  · Stable  · Respiratory treatments as needed        VTE Pharmacologic Prophylaxis:   Pharmacologic: Ulcerative colitis flare was suspicious of lower GI bleed  Mechanical VTE Prophylaxis in Place: Yes    Discussions with Specialists or Other Care Team Provider:  Gastroenterology, primary care team, care management    Education and Discussions with Family / Patient:  Discussion was made with the patient about the future plan of care  Current Length of Stay: 3 day(s)    Current Patient Status: Inpatient     Discharge Plan / Estimated Discharge Date:  Patient is not clinically stable for discharge, until patient's symptoms are under control discharge will be discussed  Code Status: Level 1 - Full Code      Subjective:   Patient was lying in bed still reporting severe abdominal pain with the bowel movement which continue to be bloody and watery, patient also still reporting nausea  Denies shortness of breath, chest pain, palpitations, lightheadedness, fever, chills  Objective:     Vitals:   Temp (24hrs), Av 8 °F (37 1 °C), Min:98 4 °F (36 9 °C), Max:99 °F (37 2 °C)    Temp:  [98 4 °F (36 9 °C)-99 °F (37 2 °C)] 98 9 °F (37 2 °C)  HR:  [47-52] 51  Resp:  [18] 18  BP: (101-127)/(55-62) 101/55  SpO2:  [96 %-100 %] 96 %  Body mass index is 24 09 kg/m²  Input and Output Summary (last 24 hours): Intake/Output Summary (Last 24 hours) at 2019 1129  Last data filed at 2019 0538  Gross per 24 hour   Intake 1240 ml   Output --   Net 1240 ml       Physical Exam:     Physical Exam   Constitutional: She is oriented to person, place, and time  She appears well-developed and well-nourished  HENT:   Head: Normocephalic and atraumatic  Cardiovascular: Normal rate, regular rhythm, normal heart sounds and intact distal pulses  Pulmonary/Chest: Effort normal and breath sounds normal    Abdominal: Soft  Bowel sounds are normal  She exhibits no distension  There is tenderness  There is no guarding  Musculoskeletal: Normal range of motion  She exhibits tenderness  Neurological: She is alert and oriented to person, place, and time  Skin: Skin is warm and dry  Capillary refill takes less than 2 seconds  Psychiatric: She has a normal mood and affect   Her behavior is normal  Judgment and thought content normal    Nursing note and vitals reviewed  Additional Data:     Labs:    Results from last 7 days   Lab Units 09/19/19  0556  09/14/19  0716   WBC Thousand/uL 10 46*   < > 8 51   HEMOGLOBIN g/dL 9 5*   < > 11 3*   HEMATOCRIT % 31 2*   < > 37 0   PLATELETS Thousands/uL 426*   < > 418*   NEUTROS PCT %  --   --  57   LYMPHS PCT %  --   --  28   LYMPHO PCT % 24   < >  --    MONOS PCT %  --   --  11   MONO PCT % 9   < >  --    EOS PCT % 0   < > 4    < > = values in this interval not displayed  Results from last 7 days   Lab Units 09/19/19  0556  09/17/19  0509   POTASSIUM mmol/L 3 5   < > 4 2   CHLORIDE mmol/L 108   < > 105   CO2 mmol/L 29   < > 28   BUN mg/dL 5   < > 6   CREATININE mg/dL 0 75   < > 0 71   CALCIUM mg/dL 9 5   < > 9 5   ALK PHOS U/L  --   --  71   ALT U/L  --   --  13   AST U/L  --   --  9    < > = values in this interval not displayed  * I Have Reviewed All Lab Data Listed Above  * Additional Pertinent Lab Tests Reviewed: Valdemar 66 Admission Reviewed    Imaging:     Imaging Reports Reviewed Today Include: no  Imaging Personally Reviewed by Myself Includes:  no    Recent Cultures (last 7 days):     Results from last 7 days   Lab Units 09/14/19  0719   C DIFF TOXIN B  NEGATIVE for C difficle toxin by PCR          Last 24 Hours Medication List:     Current Facility-Administered Medications:  acetaminophen 650 mg Oral Q6H PRN Salazar Collins MD    albuterol 2 5 mg Nebulization Q6H PRN Salazar Collins MD    aluminum-magnesium hydroxide-simethicone 30 mL Oral Q6H PRN Salazar Collins MD    carisoprodol 350 mg Oral HS PRN Salazar Collins MD    cefTRIAXone 1,000 mg Intravenous Q24H Alejandro Hanley PA-C Last Rate: 1,000 mg (09/18/19 0527)   dextrose 5 % and sodium chloride 0 45 % with KCl 20 mEq/L 100 mL/hr Intravenous Continuous Alpiniavirgil Ennis MD Last Rate: 100 mL/hr (09/19/19 9780)   ferrous sulfate 325 mg Oral QPM Alice Ennis MD    hyoscyamine 0 125 mg Sublingual Q4H Alonso Hanson PA-C    levothyroxine 25 mcg Oral Early Morning Mayte Delaney MD    methylPREDNISolone sodium succinate 20 mg Intravenous Q8H Mayte Delaney MD    metroNIDAZOLE 500 mg Intravenous Q8H Alonso Hanson PA-C Last Rate: 500 mg (09/19/19 0834)   morphine injection 1 mg Intravenous Q6H PRN Ellen Castañeda MD    ondansetron 4 mg Intravenous Q6H PRN Mayte Delaney MD    oxyCODONE 5 mg Oral Q6H PRN Ellen Castañeda MD    zolpidem 10 mg Oral HS PRN Mayte Delaney MD         Today, Patient Was Seen By: Ellen Castañeda MD    ** Please Note: This note has been constructed using a voice recognition system   **

## 2019-09-19 NOTE — PLAN OF CARE
Problem: PAIN - ADULT  Goal: Verbalizes/displays adequate comfort level or baseline comfort level  Description  Interventions:  - Encourage patient to monitor pain and request assistance  - Assess pain using appropriate pain scale  - Administer analgesics based on type and severity of pain and evaluate response  - Implement non-pharmacological measures as appropriate and evaluate response  - Consider cultural and social influences on pain and pain management  - Notify physician/advanced practitioner if interventions unsuccessful or patient reports new pain  Outcome: Progressing     Problem: INFECTION - ADULT  Goal: Absence or prevention of progression during hospitalization  Description  INTERVENTIONS:  - Assess and monitor for signs and symptoms of infection  - Monitor lab/diagnostic results  - Monitor all insertion sites, i e  indwelling lines, tubes, and drains  - Monitor endotracheal if appropriate and nasal secretions for changes in amount and color  - [place] appropriate cooling/warming therapies per order  - Administer medications as ordered  - Instruct and encourage patient and family to use good hand hygiene technique  - Identify and instruct in appropriate isolation precautions for identified infection/condition  Outcome: Progressing     Problem: SAFETY ADULT  Goal: Patient will remain free of falls  Description  INTERVENTIONS:  - Assess patient frequently for physical needs  -  Identify cognitive and physical deficits and behaviors that affect risk of falls    -  [place] fall precautions as indicated by assessment   - Educate patient/family on patient safety including physical limitations  - Instruct patient to call for assistance with activity based on assessment  - Modify environment to reduce risk of injury  - Consider OT/PT consult to assist with strengthening/mobility  Outcome: Progressing     Problem: DISCHARGE PLANNING  Goal: Discharge to home or other facility with appropriate resources  Description  INTERVENTIONS:  - Identify barriers to discharge w/patient and caregiver  - Arrange for needed discharge resources and transportation as appropriate  - Identify discharge learning needs (meds, wound care, etc )  - Arrange for interpretive services to assist at discharge as needed  - Refer to Case Management Department for coordinating discharge planning if the patient needs post-hospital services based on physician/advanced practitioner order or complex needs related to functional status, cognitive ability, or social support system  Outcome: Progressing     Problem: Knowledge Deficit  Goal: Patient/family/caregiver demonstrates understanding of disease process, treatment plan, medications, and discharge instructions  Description  Complete learning assessment and assess knowledge base    Interventions:  - Provide teaching at level of understanding  - Provide teaching via preferred learning methods  Outcome: Progressing     Problem: GASTROINTESTINAL - ADULT  Goal: Minimal or absence of nausea and/or vomiting  Description  INTERVENTIONS:  - Administer IV fluids if ordered to ensure adequate hydration  - Maintain NPO status until nausea and vomiting are resolved  - Nasogastric tube if ordered  - Administer ordered antiemetic medications as needed  - Provide nonpharmacologic comfort measures as appropriate  - Advance diet as tolerated, if ordered  - Consider nutrition services referral to assist patient with adequate nutrition and appropriate food choices  Outcome: Progressing  Goal: Maintains or returns to baseline bowel function  Description  INTERVENTIONS:  - Assess bowel function  - Encourage oral fluids to ensure adequate hydration  - Administer IV fluids if ordered to ensure adequate hydration  - Administer ordered medications as needed  - Encourage mobilization and activity  - Consider nutritional services referral to assist patient with adequate nutrition and appropriate food choices  Outcome: Progressing  Goal: Maintains adequate nutritional intake  Description  INTERVENTIONS:  - Monitor percentage of each meal consumed  - Identify factors contributing to decreased intake, treat as appropriate  - Assist with meals as needed  - Monitor I&O, weight, and lab values if indicated  - Obtain nutrition services referral as needed  Outcome: Progressing

## 2019-09-19 NOTE — ANESTHESIA POSTPROCEDURE EVALUATION
Post-Op Assessment Note    CV Status:  Stable  Pain Score: 0    Pain management: adequate     Mental Status:  Sleepy   Hydration Status:  Euvolemic   PONV Controlled:  Controlled   Airway Patency:  Patent   Post Op Vitals Reviewed: Yes      Staff: CRNA   Comments: vss sv nonobstructed uneventful          BP   124/84   Temp      Pulse 54   Resp 24   SpO2 95

## 2019-09-19 NOTE — ASSESSMENT & PLAN NOTE
· Patient has a baseline hemoglobin around 11 5  · Hemoglobin  today is 9 5  · The patient had been on continuous IVF since Thaddeus Desir having bloody diarrhea which could be all contributing to the patient current anemia   ·  no sudden drop of hemoglobin that might suggest active bleeding   · Repeat CBC

## 2019-09-20 ENCOUNTER — TELEPHONE (OUTPATIENT)
Dept: GASTROENTEROLOGY | Facility: AMBULARY SURGERY CENTER | Age: 54
End: 2019-09-20

## 2019-09-20 LAB
ANION GAP SERPL CALCULATED.3IONS-SCNC: 4 MMOL/L (ref 4–13)
BUN SERPL-MCNC: 8 MG/DL (ref 5–25)
CALCIUM SERPL-MCNC: 9.5 MG/DL (ref 8.3–10.1)
CHLORIDE SERPL-SCNC: 105 MMOL/L (ref 100–108)
CO2 SERPL-SCNC: 29 MMOL/L (ref 21–32)
CREAT SERPL-MCNC: 0.81 MG/DL (ref 0.6–1.3)
ERYTHROCYTE [DISTWIDTH] IN BLOOD BY AUTOMATED COUNT: 13.2 % (ref 11.6–15.1)
GFR SERPL CREATININE-BSD FRML MDRD: 83 ML/MIN/1.73SQ M
GLUCOSE SERPL-MCNC: 123 MG/DL (ref 65–140)
HCT VFR BLD AUTO: 32.1 % (ref 34.8–46.1)
HGB BLD-MCNC: 10.1 G/DL (ref 11.5–15.4)
MCH RBC QN AUTO: 29.4 PG (ref 26.8–34.3)
MCHC RBC AUTO-ENTMCNC: 31.5 G/DL (ref 31.4–37.4)
MCV RBC AUTO: 94 FL (ref 82–98)
PLATELET # BLD AUTO: 451 THOUSANDS/UL (ref 149–390)
PMV BLD AUTO: 9.1 FL (ref 8.9–12.7)
POTASSIUM SERPL-SCNC: 3.7 MMOL/L (ref 3.5–5.3)
RBC # BLD AUTO: 3.43 MILLION/UL (ref 3.81–5.12)
SODIUM SERPL-SCNC: 138 MMOL/L (ref 136–145)
WBC # BLD AUTO: 8.74 THOUSAND/UL (ref 4.31–10.16)

## 2019-09-20 PROCEDURE — 80048 BASIC METABOLIC PNL TOTAL CA: CPT | Performed by: INTERNAL MEDICINE

## 2019-09-20 PROCEDURE — 99232 SBSQ HOSP IP/OBS MODERATE 35: CPT | Performed by: INTERNAL MEDICINE

## 2019-09-20 PROCEDURE — 85027 COMPLETE CBC AUTOMATED: CPT | Performed by: INTERNAL MEDICINE

## 2019-09-20 PROCEDURE — NC001 PR NO CHARGE: Performed by: INTERNAL MEDICINE

## 2019-09-20 RX ORDER — SIMETHICONE 80 MG
80 TABLET,CHEWABLE ORAL EVERY 6 HOURS PRN
Status: DISCONTINUED | OUTPATIENT
Start: 2019-09-20 | End: 2019-09-24 | Stop reason: HOSPADM

## 2019-09-20 RX ORDER — OXYCODONE HYDROCHLORIDE 5 MG/1
5 TABLET ORAL EVERY 4 HOURS PRN
Status: DISCONTINUED | OUTPATIENT
Start: 2019-09-20 | End: 2019-09-24 | Stop reason: HOSPADM

## 2019-09-20 RX ADMIN — SIMETHICONE CHEW TAB 80 MG 80 MG: 80 TABLET ORAL at 11:43

## 2019-09-20 RX ADMIN — HYOSCYAMINE SULFATE 0.12 MG: 0.12 TABLET ORAL at 05:22

## 2019-09-20 RX ADMIN — METRONIDAZOLE 500 MG: 500 INJECTION, SOLUTION INTRAVENOUS at 00:26

## 2019-09-20 RX ADMIN — METHYLPREDNISOLONE SODIUM SUCCINATE 20 MG: 40 INJECTION, POWDER, FOR SOLUTION INTRAMUSCULAR; INTRAVENOUS at 05:21

## 2019-09-20 RX ADMIN — HYOSCYAMINE SULFATE 0.12 MG: 0.12 TABLET ORAL at 16:03

## 2019-09-20 RX ADMIN — METRONIDAZOLE 500 MG: 500 INJECTION, SOLUTION INTRAVENOUS at 09:22

## 2019-09-20 RX ADMIN — ONDANSETRON 4 MG: 2 INJECTION INTRAMUSCULAR; INTRAVENOUS at 16:48

## 2019-09-20 RX ADMIN — HYOSCYAMINE SULFATE 0.12 MG: 0.12 TABLET ORAL at 00:31

## 2019-09-20 RX ADMIN — HYOSCYAMINE SULFATE 0.12 MG: 0.12 TABLET ORAL at 20:56

## 2019-09-20 RX ADMIN — ZOLPIDEM TARTRATE 10 MG: 5 TABLET, COATED ORAL at 20:55

## 2019-09-20 RX ADMIN — CEFTRIAXONE 1000 MG: 1 INJECTION, POWDER, FOR SOLUTION INTRAMUSCULAR; INTRAVENOUS at 16:55

## 2019-09-20 RX ADMIN — METHYLPREDNISOLONE SODIUM SUCCINATE 20 MG: 40 INJECTION, POWDER, FOR SOLUTION INTRAMUSCULAR; INTRAVENOUS at 11:43

## 2019-09-20 RX ADMIN — METRONIDAZOLE 500 MG: 500 INJECTION, SOLUTION INTRAVENOUS at 17:55

## 2019-09-20 RX ADMIN — INFLIXIMAB 600 MG: 100 INJECTION, POWDER, LYOPHILIZED, FOR SOLUTION INTRAVENOUS at 13:24

## 2019-09-20 RX ADMIN — LEVOTHYROXINE SODIUM 25 MCG: 25 TABLET ORAL at 05:21

## 2019-09-20 RX ADMIN — HYOSCYAMINE SULFATE 0.12 MG: 0.12 TABLET ORAL at 12:24

## 2019-09-20 RX ADMIN — OXYCODONE HYDROCHLORIDE 5 MG: 5 TABLET ORAL at 08:53

## 2019-09-20 RX ADMIN — OXYCODONE HYDROCHLORIDE 5 MG: 5 TABLET ORAL at 04:13

## 2019-09-20 RX ADMIN — HYOSCYAMINE SULFATE 0.12 MG: 0.12 TABLET ORAL at 08:54

## 2019-09-20 RX ADMIN — ACETAMINOPHEN 650 MG: 325 TABLET, FILM COATED ORAL at 12:28

## 2019-09-20 RX ADMIN — ONDANSETRON 4 MG: 2 INJECTION INTRAMUSCULAR; INTRAVENOUS at 04:13

## 2019-09-20 RX ADMIN — OXYCODONE HYDROCHLORIDE 5 MG: 5 TABLET ORAL at 16:03

## 2019-09-20 RX ADMIN — METHYLPREDNISOLONE SODIUM SUCCINATE 20 MG: 40 INJECTION, POWDER, FOR SOLUTION INTRAMUSCULAR; INTRAVENOUS at 20:55

## 2019-09-20 NOTE — ASSESSMENT & PLAN NOTE
· Patient reports some improvement but still reports severe abd pain with bowel movements but improvement of abd tenderness and pain on rest   · still having frequent watery diarrhea throughout the night,patient report seeing less blood since the colonoscopy   · continue IV Solu-Medrol ,Flagyl,analgesics  · Patient was refusing her morphine so will increase the frequency of her current pain meds  · Increase frequency of oxycodone for pain, discontinue Morphine   · colonoscopy was done yesterday and it showed             -Cecum-hard large stool ball and thick liquid stool washed off and suctioned  The mucosa appeared to be                           normal         -All observed locations appeared normal, including the ascending colon        -Severe diffuse ulceration was noted throughout from the rectum up to hepatic flexure area  Some stool was                  washed off and suctioned    Multiple biopsies were obtained for pathology and also to rule out CMV/HSV        -Internal hemorrhoids   · Patient was complaining of bloating and abd distension, simethicone was started  · remicade will be started as inpatient for better control the patients flare

## 2019-09-20 NOTE — PROGRESS NOTES
[facility] - Inpatient Progress Note  Mar Bañuelos 47 y o  female MRN: [id number]  Encounter: [id number]          ASSESSMENT AND PLAN:      1  Ulcerative Colitis Flare:  Patient with history of ulcerative colitis, CRP elevated to 73 1 on 09/14  C diff negative  Stool PCR negative  Patient previously on a tibial q 4 weeks and prednisone 30 mg daily without significant benefit  Patient continues to have significant abdominal pain, was found to have severe ulcerative colitis to the level of the ascending colon on colonoscopy performed on 09/19/2019  Case discussed with patient's primary GI MD, Dr Kenia Zhang, with plan to transition patient to Remicade  - CLD  - continue Solu-Medrol 20mg IV Q8H  - Levsin p r n  cramping  - add simethicone Q 6 hours p r n  bloating/gas  - clear liquid diet with IV of supplementation  - plan for loading dose of Remicade today (10mg/kg) per d/w Dr Kenia Zhang; pt in agreement with this plan    Recommendations relayed to primary team, Dr Jonathan Berumen     ______________________________________________________________________    SUBJECTIVE:  Patient reports multiple bowel movements overnight, q3 hours  Patient states she is passing some liquid brown stool, and less blood, but reports severe abdominal cramping and bloating  Patient reports passing gas  Patient denies vomiting          Historical Information   Past Medical History:   Diagnosis Date    Adjustment disorder     last assessed 05/16/12    Anemia     HX of    Asthma     mild - intermittent    Bilateral leg edema     Blepharitis     last assessed 02/04/16    Carpal tunnel syndrome     Unspecified laterality    Colitis, acute     Dyspareunia in female     Edema     last assessed 06/22/15    Ganglion     Herniated cervical disc     History of transfusion     Hypokalemia     Hypothyroidism     Hypothyroidism     Insomnia     Lumps on the skin     last assessed 03/12/14    Mouth ulcers last assessed 06/22/15    Nontraumatic tear of left tibialis posterior tendon     Traumatic teart     Polyarthritis     last assessed 16    Raynaud's disease     [facility]na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 [facility] [address]     Temporomandibular disorder     Joint    Thyroid disease     Ulcerative colitis ([facility] [address] [facility]     Ulcerative colitis ([facility] [address] [facility]      Past Surgical History:   Procedure Laterality Date    ANKLE SURGERY Left     Tendon repair    CARPAL TUNNEL RELEASE Bilateral      SECTION, LOW TRANSVERSE      COLONOSCOPY      COLONOSCOPY N/A 2018    Procedure: COLONOSCOPY;  Surgeon: Ly Hope MD;  [facility]; Service: Gastroenterology    HERNIA REPAIR      umbilical    HYSTERECTOMY      KNEE ARTHROSCOPY Right     LIPECTOMY      Multipe lipoma removals    LIPOMA RESECTION      DE COLONOSCOPY FLX DX W/COLLJ SPEC WHEN PFRMD N/A 2016    Procedure: COLONOSCOPY;  Surgeon: Steven Avery DO;  Location: [facility];   Service: Gastroenterology    DE REPAIR FLEX LEG TENDON,SECOND,EA Left 2016    Procedure: REPAIR OF LEFT POSTERIOR TIBIAL TENDON WITH GRAFT, EXPLORATION LEFT ANKLE ;  Surgeon: Hien Jones DPM;  Location: [facility] OR;  Service: Podiatry    SHOULDER SURGERY Left     bicep tendon and labrum repair    TONSILLECTOMY      TOOTH EXTRACTION  2018     Social History   Social History     Substance and Sexual Activity   Alcohol Use No    Comment: social 1 drink per weeek     Social History     Substance and Sexual Activity   Drug Use No     Social History     Tobacco Use   Smoking Status Former Smoker    Types: Cigarettes    Last attempt to quit: 2003    Years since quittin 4   Smokeless Tobacco Never Used   Tobacco Comment    Quit , rare use for 2 years     Family History   Problem Relation Age of Onset    Parkinsonism Mother     Rheum arthritis Mother     Heart attack Father     Hypertension Father     Osteoporosis Father     Prostate cancer Father     Hashimoto's thyroiditis Sister     Cancer Paternal Grandfather         Penile    Prostate cancer Paternal Grandfather     Crohn's disease Family     Osteoarthritis Family     Rheum arthritis Family     Crohn's disease Other     Crohn's disease Maternal Uncle     Psoriasis Maternal Uncle     Ulcerative colitis Maternal Uncle     Rheum arthritis Maternal Aunt     Ulcerative colitis Family        Meds/Allergies       Current Facility-Administered Medications:     acetaminophen (TYLENOL) tablet 650 mg, 650 mg, Oral, Q6H PRN, 650 mg at 09/18/19 0900    albuterol inhalation solution 2 5 mg, 2 5 mg, Nebulization, Q6H PRN    aluminum-magnesium hydroxide-simethicone (MYLANTA) 200-200-20 mg/5 mL oral suspension 30 mL, 30 mL, Oral, Q6H PRN    carisoprodol (SOMA) tablet 350 mg, 350 mg, Oral, HS PRN    cefTRIAXone (ROCEPHIN) 1,000 mg in dextrose 5 % 50 mL IVPB, 1,000 mg, Intravenous, Q24H, 1,000 mg at 09/19/19 1803    dextrose 5 % and sodium chloride 0 45 % with KCl 20 mEq/L infusion, 100 mL/hr, Intravenous, Continuous, Stopped at 09/19/19 1800    enoxaparin (LOVENOX) subcutaneous injection 40 mg, 40 mg, Subcutaneous, Daily    ferrous sulfate tablet 325 mg, 325 mg, Oral, QPM, 325 mg at 09/19/19 1759    hyoscyamine (LEVSIN/SL) SL tablet 0 125 mg, 0 125 mg, Sublingual, Q4H, 0 125 mg at 09/20/19 0854    inFLIXimab (REMICADE) 600 mg in sodium chloride 0 9 % 190 mL IVPB, 600 mg, Intravenous, Once    levothyroxine tablet 25 mcg, 25 mcg, Oral, Early Morning, 25 mcg at 09/20/19 0521    methylPREDNISolone sodium succinate (Solu-MEDROL) injection 20 mg, 20 mg, Intravenous, Q8H, 20 mg at 09/20/19 1143    metroNIDAZOLE (FLAGYL) IVPB (premix) 500 mg, 500 mg, Intravenous, Q8H, 500 mg at 09/20/19 0922    ondansetron (ZOFRAN) injection 4 mg, 4 mg, Intravenous, Q6H PRN, 4 mg at 09/20/19 0413    oxyCODONE (ROXICODONE) IR tablet 5 mg, 5 mg, Oral, Q4H PRN, 5 mg at 09/20/19 1859    simethicone (MYLICON) chewable tablet 80 mg, 80 mg, Oral, Q6H PRN, 80 mg at 09/20/19 1143    zolpidem (AMBIEN) tablet 10 mg, 10 mg, Oral, HS PRN, 10 mg at 09/19/19 2107    No Known Allergies    Objective     Blood pressure 144/70, pulse 58, temperature 98 2 °F (36 8 °C), temperature source Oral, resp  rate 18, height 5' 3" (1 6 m), weight 64 4 kg (141 lb 15 6 oz), SpO2 97 %  Body mass index is 25 15 kg/m²        PHYSICAL EXAM:      General Appearance:   Alert, cooperative, uncomfortable-appearing   HEENT:   Normocephalic, atraumatic, anicteric    Neck:  Supple, symmetrical, trachea midline   Lungs:   Clear to auscultation bilaterally; respirations even and unlabored   Heart:   Regular rate and rhythm; +S1/+S2   Abdomen:   Diffuse TTP without rebound or guarding; soft, non-distended; normal bowel sounds; no masses, no organomegaly    Genitalia:   Deferred    Rectal:   Deferred    Extremities:  No cyanosis, clubbing or edema    Pulses:  2+ and symmetric    Skin:  No jaundice, rashes, or lesions visualized          Lab Results:   Admission on 09/16/2019   Component Date Value    WBC 09/16/2019 9 57     RBC 09/16/2019 4 05     Hemoglobin 09/16/2019 11 9     Hematocrit 09/16/2019 37 7     MCV 09/16/2019 93     MCH 09/16/2019 29 4     MCHC 09/16/2019 31 6     RDW 09/16/2019 12 8     Platelets 06/67/3482 452*    MPV 09/16/2019 9 0     Sodium 09/16/2019 138     Potassium 09/16/2019 4 4     Chloride 09/16/2019 101     CO2 09/16/2019 28     ANION GAP 09/16/2019 9     BUN 09/16/2019 7     Creatinine 09/16/2019 0 74     Glucose 09/16/2019 126     Calcium 09/16/2019 10 1     AST 09/16/2019 18     ALT 09/16/2019 15     Alkaline Phosphatase 09/16/2019 78     Total Protein 09/16/2019 6 5     Albumin 09/16/2019 2 6*    Total Bilirubin 09/16/2019 0 40     eGFR 09/16/2019 92     Sed Rate 09/16/2019 43*    Salmonella sp PCR 09/16/2019 None Detected     Shigella sp/Enteroinvasi* 09/16/2019 None Detected     Campylobacter sp (jejuni* 09/16/2019 None Detected     Shiga toxin 1/Shiga toxi* 09/16/2019 None Detected     Sodium 09/17/2019 141     Potassium 09/17/2019 4 2     Chloride 09/17/2019 105     CO2 09/17/2019 28     ANION GAP 09/17/2019 8     BUN 09/17/2019 6     Creatinine 09/17/2019 0 71     Glucose 09/17/2019 160*    Calcium 09/17/2019 9 5     AST 09/17/2019 9     ALT 09/17/2019 13     Alkaline Phosphatase 09/17/2019 71     Total Protein 09/17/2019 5 8*    Albumin 09/17/2019 2 2*    Total Bilirubin 09/17/2019 0 10*    eGFR 09/17/2019 97     Magnesium 09/17/2019 2 0     WBC 09/17/2019 12 85*    RBC 09/17/2019 3 79*    Hemoglobin 09/17/2019 11 1*    Hematocrit 09/17/2019 35 4     MCV 09/17/2019 93     MCH 09/17/2019 29 3     MCHC 09/17/2019 31 4     RDW 09/17/2019 12 9     MPV 09/17/2019 8 8*    Platelets 41/91/8724 450*    nRBC 09/17/2019 0     Segmented % 09/17/2019 60     Bands % 09/17/2019 17*    Lymphocytes % 09/17/2019 14     Monocytes % 09/17/2019 9     Eosinophils, % 09/17/2019 0     Basophils % 09/17/2019 0     Absolute Neutrophils 09/17/2019 9 89*    Lymphocytes Absolute 09/17/2019 1 80     Monocytes Absolute 09/17/2019 1 16     Eosinophils Absolute 09/17/2019 0 00     Basophils Absolute 09/17/2019 0 00     Total Counted 09/17/2019 100     Platelet Estimate 22/56/9953 Increased*    WBC 09/18/2019 13 22*    RBC 09/18/2019 3 32*    Hemoglobin 09/18/2019 9 8*    Hematocrit 09/18/2019 31 0*    MCV 09/18/2019 93     MCH 09/18/2019 29 5     MCHC 09/18/2019 31 6     RDW 09/18/2019 12 9     Platelets 32/75/8216 420*    MPV 09/18/2019 9 4     Sodium 09/18/2019 140     Potassium 09/18/2019 4 0     Chloride 09/18/2019 108     CO2 09/18/2019 27     ANION GAP 09/18/2019 5     BUN 09/18/2019 6     Creatinine 09/18/2019 0 84     Glucose 09/18/2019 142*    Calcium 09/18/2019 10 0     eGFR 09/18/2019 79     WBC 09/19/2019 10 46*    RBC 09/19/2019 3 30*    Hemoglobin 09/19/2019 9 5*    Hematocrit 09/19/2019 31 2*    MCV 09/19/2019 95     MCH 09/19/2019 28 8     MCHC 09/19/2019 30 4*    RDW 09/19/2019 13 2     MPV 09/19/2019 8 8*    Platelets 45/63/9460 426*    nRBC 09/19/2019 0     Sodium 09/19/2019 141     Potassium 09/19/2019 3 5     Chloride 09/19/2019 108     CO2 09/19/2019 29     ANION GAP 09/19/2019 4     BUN 09/19/2019 5     Creatinine 09/19/2019 0 75     Glucose 09/19/2019 126     Calcium 09/19/2019 9 5     eGFR 09/19/2019 91     Segmented % 09/19/2019 55     Bands % 09/19/2019 7     Lymphocytes % 09/19/2019 24     Monocytes % 09/19/2019 9     Eosinophils, % 09/19/2019 0     Basophils % 09/19/2019 0     Atypical Lymphocytes % 09/19/2019 5*    Absolute Neutrophils 09/19/2019 6 49     Lymphocytes Absolute 09/19/2019 2 51     Monocytes Absolute 09/19/2019 0 94     Eosinophils Absolute 09/19/2019 0 00     Basophils Absolute 09/19/2019 0 00     Total Counted 09/19/2019 100     RBC Morphology 09/19/2019 Normal     Platelet Estimate 11/55/4076 Adequate     WBC 09/20/2019 8 74     RBC 09/20/2019 3 43*    Hemoglobin 09/20/2019 10 1*    Hematocrit 09/20/2019 32 1*    MCV 09/20/2019 94     MCH 09/20/2019 29 4     MCHC 09/20/2019 31 5     RDW 09/20/2019 13 2     Platelets 04/44/5889 451*    MPV 09/20/2019 9 1     Sodium 09/20/2019 138     Potassium 09/20/2019 3 7     Chloride 09/20/2019 105     CO2 09/20/2019 29     ANION GAP 09/20/2019 4     BUN 09/20/2019 8     Creatinine 09/20/2019 0 81     Glucose 09/20/2019 123     Calcium 09/20/2019 9 5     eGFR 09/20/2019 83          Radiology Results:   No results found  The patient was seen and examined by Dr Roxane Pino, all [facility] medical decisions were made with Dr Roxane Pino  Thank you for allowing us to participate in the care of this pleasant patient  We will follow up with you closely

## 2019-09-20 NOTE — TELEPHONE ENCOUNTER
DR ELIAS'S PT    Pt called to notify the office of a fax coming from credential for Dr Jazlyn Renee to fill out

## 2019-09-20 NOTE — ASSESSMENT & PLAN NOTE
· Patient has a baseline hemoglobin around 11 5  · Hemoglobin  today is 10 1  · The patient diarrhea continued but less blood was noticed by the patient   · Colonoscopy was done showed severe ulcerated colon no active bleeding    · Repeat CBC

## 2019-09-20 NOTE — CONSULTS
Nutrition Recommendations    Please add Ensure clear mixed berry TID w/ meals  Modify current diet order  Please change "RD to adjust diet per protocol" from no to yes  This will allow dietitian to add appropriate supplement independently  We would not increase po diet  Discussed nutrition options w/ pt  Addition of supplements on clear liquids will not meet 100% estimated nutrition needs but will improve them  She was apprehensive discussing TPN as an option and wanted to discuss with GI first  Let her know this should be a temporary option to provide nutrition if needed  If physicians anticipate prolonged clear liquids, should consider TPN via central line while continuing with clear liquids during inpatient stay  Initiate standard bag on day 1  Day 2: Increase to D30% 500 ml, AA15% 600 ml, 20% lipids 200 ml  Continue with clear liquids  Pt taking in about 350-400 kcal with cranberry juice and fruit ice  Goal TPN and her current po intake on clear liquids should meet estimated nutrition needs  We will continue to monitor during hospital stay  I can be reached via [place] Text for any further questions

## 2019-09-20 NOTE — UTILIZATION REVIEW
Continued Stay Review    Date: 9/20/2019                         Current Patient Class: inpatient  Current Level of Care: m/s    HPI:54 y o  female initially admitted on 9/16/2019 pan ulceragive colitis flare  Assessment/Plan: 47 yr old female with ulcerative colitis flare, continues to have significant abd pain  Colonoscopy 9/19 was found to have severe ulcerative colitis to the level of the ascending colon Hard large stool ball at the cecum, which was suctioned; multiple biopsies  Plan to transition patient to remicade  Continue solumedrol iv q8, clears,antibiotics  iv's plan for loading dose of remicade today  She continues to have diarrhea with some blood, pain in intermittent and crampy       Pertinent Labs/Diagnostic Results:   Results from last 7 days   Lab Units 09/20/19  0534 09/19/19  0556 09/18/19  0501 09/17/19  0509 09/16/19  1319 09/14/19  0716   WBC Thousand/uL 8 74 10 46* 13 22* 12 85* 9 57 8 51   HEMOGLOBIN g/dL 10 1* 9 5* 9 8* 11 1* 11 9 11 3*   HEMATOCRIT % 32 1* 31 2* 31 0* 35 4 37 7 37 0   PLATELETS Thousands/uL 451* 426* 420* 450* 452* 418*   NEUTROS ABS Thousands/µL  --   --   --   --   --  4 73   BANDS PCT %  --  7  --  17*  --   --          Results from last 7 days   Lab Units 09/20/19  0534 09/19/19  0556 09/18/19  0501 09/17/19  0509 09/16/19  1319   SODIUM mmol/L 138 141 140 141 138   POTASSIUM mmol/L 3 7 3 5 4 0 4 2 4 4   CHLORIDE mmol/L 105 108 108 105 101   CO2 mmol/L 29 29 27 28 28   ANION GAP mmol/L 4 4 5 8 9   BUN mg/dL 8 5 6 6 7   CREATININE mg/dL 0 81 0 75 0 84 0 71 0 74   EGFR ml/min/1 73sq m 83 91 79 97 92   CALCIUM mg/dL 9 5 9 5 10 0 9 5 10 1   MAGNESIUM mg/dL  --   --   --  2 0  --      Results from last 7 days   Lab Units 09/17/19  0509 09/16/19  1319 09/14/19  0716   AST U/L 9 18 9   ALT U/L 13 15 11*   ALK PHOS U/L 71 78 74   TOTAL PROTEIN g/dL 5 8* 6 5 6 4   ALBUMIN g/dL 2 2* 2 6* 2 8*   TOTAL BILIRUBIN mg/dL 0 10* 0 40 0 29         Results from last 7 days   Lab Units 09/20/19  0534 09/19/19  0556 09/18/19  0501 09/17/19  0509 09/16/19  1319   GLUCOSE RANDOM mg/dL 123 126 142* 160* 126       Results from last 7 days   Lab Units 09/16/19  1319 09/14/19  0716   CRP mg/L  --  73 2*   SED RATE mm/hour 43*  --      Results from last 7 days   Lab Units 09/14/19  0719   C DIFF TOXIN B  NEGATIVE for C difficle toxin by PCR        Results from last 7 days   Lab Units 09/16/19  1704   SALMONELLA SP PCR  None Detected   SHIGELLA SP/ENTEROINVASIVE E  COLI (EIEC)  None Detected   CAMPYLOBACTER SP (JEJUNI AND COLI)  None Detected   SHIGA TOXIN 1/SHIGA TOXIN 2  None Detected     Results from last 7 days   Lab Units 09/19/19  0556 09/17/19  0509   TOTAL COUNTED  100 100     Vital Signs:  /20/19 0700  98 2 °F (36 8 °C)  58  18  144/70  --  97 %  None (Room air)  Sitting   09/19/19 2129  98 7 °F (37 1 °C)  57  18  104/51  --  98 %  None (Room air)  Lying   09/19/19 1650  98 4 °F (36 9 °C)  52Abnormal   18  110/57  --  99 %  None (Room air)  Lying   09/19/19 1533  --  58  16  130/75  --  99 %  None (Room air)  --   09/19/19 1520  --  50Abnormal   16  147/86  --               Medications:   Scheduled Meds:   Current Facility-Administered Medications:  acetaminophen 650 mg Oral Q6H PRN x1   albuterol 2 5 mg Nebulization Q6H PRN    aluminum-magnesium hydroxide-simethicone 30 mL Oral Q6H PRN    carisoprodol 350 mg Oral HS PRN    cefTRIAXone 1,000 mg Intravenous Q24H Last Rate: 1,000 mg (09/19/19 1803)   dextrose 5 % and sodium chloride 0 45 % with KCl 20 mEq/L 100 mL/hr Intravenous Continuous Last Rate: Stopped (09/19/19 1800)   enoxaparin 40 mg Subcutaneous Daily    ferrous sulfate 325 mg Oral QPM    hyoscyamine 0 125 mg Sublingual Q4H    inFLIXimab (REMICADE) IVPB 600 mg Intravenous Once    levothyroxine 25 mcg Oral Early Morning    methylPREDNISolone sodium succinate 20 mg Intravenous Q8H    metroNIDAZOLE 500 mg Intravenous Q8H Last Rate: 500 mg (09/20/19 0922)   ondansetron 4 mg Intravenous Q6H PRN x2   oxyCODONE 5 mg Oral Q4H PRN x3   simethicone 80 mg Oral Q6H PRN    zolpidem 10 mg Oral HS PRN        Discharge Plan: home with family  Network Utilization Review Department  Phone: [phone]; Fax [phone]  [email or web address]  ATTENTION: Please call with any questions or concerns to [phone]  and carefully listen to the prompts so that you are directed to the right person  Send all requests for admission clinical reviews, approved or denied determinations and any other requests to fax [phone]   All voicemails are confidential

## 2019-09-20 NOTE — PROGRESS NOTES
Progress Note - Phil Schwab 1965, 47 y o  female MRN: [id number]    Unit/Bed#: -01 Encounter: [id number]    Primary Care Provider: Alondra Richardson DO   Date and time admitted to hospital: 9/16/2019 11:56 AM        * Ulcerative pancolitis with rectal bleeding ([facility] [address] )  Assessment & Plan  · Patient reports some improvement but still reports severe abd pain with bowel movements but improvement of abd tenderness and pain on rest   · still having frequent watery diarrhea throughout the night,patient report seeing less blood since the colonoscopy   · continue IV Solu-Medrol ,Flagyl,analgesics  · Patient was refusing her morphine so will increase the frequency of her current pain meds  · Increase frequency of oxycodone for pain, discontinue Morphine   · colonoscopy was done yesterday and it showed             -Cecum-hard large stool ball and thick liquid stool washed off and suctioned  The mucosa appeared to be                           normal         -All observed locations appeared normal, including the ascending colon        -Severe diffuse ulceration was noted throughout from the rectum up to hepatic flexure area  Some stool was                  washed off and suctioned    Multiple biopsies were obtained for pathology and also to rule out CMV/HSV        -Internal hemorrhoids   · Patient was complaining of bloating and abd distension, simethicone was started  · remicade will be started as inpatient for better control the patients flare     Anemia  Assessment & Plan  · Patient has a baseline hemoglobin around 11 5  · Hemoglobin  today is 10 1  · The patient diarrhea continued but less blood was noticed by the patient   · Colonoscopy was done showed severe ulcerated colon no active bleeding    · Repeat CBC     Arthralgia of multiple joints  Assessment & Plan  · Patient still reporting generalized malaise and myalgia   · Lower back pain which was related to the abdominal pain   · Supportive care    Asthma, mild intermittent  Assessment & Plan  · Stable  · Respiratory treatments as needed      VTE Pharmacologic Prophylaxis:   Pharmacologic: Enoxaparin (Lovenox)  Mechanical VTE Prophylaxis in Place: Yes    Discussions with Specialists or Other Care Team Provider:  Gastroenterology, primary medical team, care management  Education and Discussions with Family / Patient:  Discussion was made with the patient about the colonoscopy results and her current plan of care  Current Length of Stay: 4 day(s)    Current Patient Status: Inpatient     Discharge Plan / Estimated Discharge Date:  Patient is feeling better but not clinically stable for discharge  Code Status: Level 1 - Full Code      Subjective:   Patient was lying in bed comfortable she reports improvement of the abdominal pain and tenderness she lies down, still reports watery diarrhea was severe colicky pain with the bowel movements, she noticing less blood with the bowel movements, denies any fever, chills, shortness of breath, chest pain, headache, lightheadedness or dizziness  Objective:     Vitals:   Temp (24hrs), Av 4 °F (36 9 °C), Min:98 2 °F (36 8 °C), Max:98 7 °F (37 1 °C)    Temp:  [98 2 °F (36 8 °C)-98 7 °F (37 1 °C)] 98 2 °F (36 8 °C)  HR:  [50-58] 58  Resp:  [16-18] 18  BP: (104-171)/(51-86) 144/70  SpO2:  [50 %-100 %] 97 %  Body mass index is 25 15 kg/m²  Input and Output Summary (last 24 hours): Intake/Output Summary (Last 24 hours) at 2019 1157  Last data filed at 2019 0959  Gross per 24 hour   Intake 670 ml   Output 400 ml   Net 270 ml       Physical Exam:     Physical Exam   Constitutional: She is oriented to person, place, and time  She appears well-developed and well-nourished  HENT:   Head: Normocephalic and atraumatic  Cardiovascular: Normal rate, regular rhythm, normal heart sounds and intact distal pulses  Pulmonary/Chest: Effort normal and breath sounds normal    Abdominal: Soft   Bowel sounds are normal  She exhibits distension  There is tenderness  Musculoskeletal: Normal range of motion  She exhibits tenderness  Neurological: She is alert and oriented to person, place, and time  Skin: Skin is warm and dry  Capillary refill takes less than 2 seconds  Psychiatric: She has a normal mood and affect  Her behavior is normal  Judgment and thought content normal    Nursing note and vitals reviewed  Additional Data:     Labs:    Results from last 7 days   Lab Units 09/20/19  0534 09/19/19  0556  09/14/19  0716   WBC Thousand/uL 8 74 10 46*   < > 8 51   HEMOGLOBIN g/dL 10 1* 9 5*   < > 11 3*   HEMATOCRIT % 32 1* 31 2*   < > 37 0   PLATELETS Thousands/uL 451* 426*   < > 418*   NEUTROS PCT %  --   --   --  57   LYMPHS PCT %  --   --   --  28   LYMPHO PCT %  --  24   < >  --    MONOS PCT %  --   --   --  11   MONO PCT %  --  9   < >  --    EOS PCT %  --  0   < > 4    < > = values in this interval not displayed  Results from last 7 days   Lab Units 09/20/19  0534  09/17/19  0509   POTASSIUM mmol/L 3 7   < > 4 2   CHLORIDE mmol/L 105   < > 105   CO2 mmol/L 29   < > 28   BUN mg/dL 8   < > 6   CREATININE mg/dL 0 81   < > 0 71   CALCIUM mg/dL 9 5   < > 9 5   ALK PHOS U/L  --   --  71   ALT U/L  --   --  13   AST U/L  --   --  9    < > = values in this interval not displayed  * I Have Reviewed All Lab Data Listed Above  * Additional Pertinent Lab Tests Reviewed: [facility] Admission Reviewed    Imaging:    Imaging Reports Reviewed Today Include:  Colonoscopy  Imaging Personally Reviewed by Myself Includes:  Colonoscopy    Recent Cultures (last 7 days):     Results from last 7 days   Lab Units 09/14/19  0719   C DIFF TOXIN B  NEGATIVE for C difficle toxin by PCR          Last 24 Hours Medication List:     Current Facility-Administered Medications:  acetaminophen 650 mg Oral Q6H PRN Keyshawn Lees MD    albuterol 2 5 mg Nebulization Q6H PRN Keyshawn Lees MD    aluminum-magnesium hydroxide-simethicone 30 mL Oral Q6H PRN Radha Saldana MD    carisoprodol 350 mg Oral HS PRN Radha Saldana MD    cefTRIAXone 1,000 mg Intravenous Q24H Phyllistine MARY Frank Last Rate: 1,000 mg (09/19/19 1803)   dextrose 5 % and sodium chloride 0 45 % with KCl 20 mEq/L 100 mL/hr Intravenous Continuous Alice Ennis MD Last Rate: Stopped (09/19/19 1800)   ferrous sulfate 325 mg Oral QPM Alice Ennis MD    hyoscyamine 0 125 mg Sublingual Q4H Adri Pleitez PA-C    inFLIXimab (REMICADE) IVPB 600 mg Intravenous Once [place] [employer] MARY Hernandez    levothyroxine 25 mcg Oral Early Morning Radha Saldana MD    methylPREDNISolone sodium succinate 20 mg Intravenous Q8H Radha Saldana MD    metroNIDAZOLE 500 mg Intravenous Q8H Juana Frank PA-C Last Rate: 500 mg (09/20/19 0922)   ondansetron 4 mg Intravenous Q6H PRN Radha Saldana MD    oxyCODONE 5 mg Oral Q4H PRN Leyla Melgar MD    simethicone 80 mg Oral Q6H PRN [place] [place] [employer] MARY Hernandez    zolpidem 10 mg Oral HS PRN Radha Saldana MD         Today, Patient Was Seen By: Leyla Melgar MD    ** Please Note: This note has been constructed using a voice recognition system   **

## 2019-09-21 LAB
ANION GAP SERPL CALCULATED.3IONS-SCNC: 6 MMOL/L (ref 4–13)
BASOPHILS # BLD MANUAL: 0 THOUSAND/UL (ref 0–0.1)
BASOPHILS NFR MAR MANUAL: 0 % (ref 0–1)
BUN SERPL-MCNC: 8 MG/DL (ref 5–25)
CALCIUM SERPL-MCNC: 9.4 MG/DL (ref 8.3–10.1)
CHLORIDE SERPL-SCNC: 107 MMOL/L (ref 100–108)
CO2 SERPL-SCNC: 29 MMOL/L (ref 21–32)
CREAT SERPL-MCNC: 0.73 MG/DL (ref 0.6–1.3)
EOSINOPHIL # BLD MANUAL: 0 THOUSAND/UL (ref 0–0.4)
EOSINOPHIL NFR BLD MANUAL: 0 % (ref 0–6)
ERYTHROCYTE [DISTWIDTH] IN BLOOD BY AUTOMATED COUNT: 12.9 % (ref 11.6–15.1)
GFR SERPL CREATININE-BSD FRML MDRD: 94 ML/MIN/1.73SQ M
GLUCOSE SERPL-MCNC: 128 MG/DL (ref 65–140)
HCT VFR BLD AUTO: 33.1 % (ref 34.8–46.1)
HGB BLD-MCNC: 10.3 G/DL (ref 11.5–15.4)
LYMPHOCYTES # BLD AUTO: 1.87 THOUSAND/UL (ref 0.6–4.47)
LYMPHOCYTES # BLD AUTO: 24 % (ref 14–44)
MCH RBC QN AUTO: 29 PG (ref 26.8–34.3)
MCHC RBC AUTO-ENTMCNC: 31.1 G/DL (ref 31.4–37.4)
MCV RBC AUTO: 93 FL (ref 82–98)
MONOCYTES # BLD AUTO: 0.86 THOUSAND/UL (ref 0–1.22)
MONOCYTES NFR BLD: 11 % (ref 4–12)
NEUTROPHILS # BLD MANUAL: 4.82 THOUSAND/UL (ref 1.85–7.62)
NEUTS BAND NFR BLD MANUAL: 7 % (ref 0–8)
NEUTS SEG NFR BLD AUTO: 55 % (ref 43–75)
NRBC BLD AUTO-RTO: 0 /100 WBCS
PLATELET # BLD AUTO: 475 THOUSANDS/UL (ref 149–390)
PLATELET BLD QL SMEAR: ADEQUATE
PMV BLD AUTO: 8.9 FL (ref 8.9–12.7)
POTASSIUM SERPL-SCNC: 3.7 MMOL/L (ref 3.5–5.3)
RBC # BLD AUTO: 3.55 MILLION/UL (ref 3.81–5.12)
RBC MORPH BLD: NORMAL
SODIUM SERPL-SCNC: 142 MMOL/L (ref 136–145)
TOTAL CELLS COUNTED SPEC: 100
VARIANT LYMPHS # BLD AUTO: 3 %
WBC # BLD AUTO: 7.78 THOUSAND/UL (ref 4.31–10.16)

## 2019-09-21 PROCEDURE — 80048 BASIC METABOLIC PNL TOTAL CA: CPT | Performed by: INTERNAL MEDICINE

## 2019-09-21 PROCEDURE — 99232 SBSQ HOSP IP/OBS MODERATE 35: CPT | Performed by: INTERNAL MEDICINE

## 2019-09-21 PROCEDURE — 85007 BL SMEAR W/DIFF WBC COUNT: CPT | Performed by: INTERNAL MEDICINE

## 2019-09-21 PROCEDURE — 85027 COMPLETE CBC AUTOMATED: CPT | Performed by: INTERNAL MEDICINE

## 2019-09-21 RX ADMIN — SIMETHICONE CHEW TAB 80 MG 80 MG: 80 TABLET ORAL at 05:27

## 2019-09-21 RX ADMIN — HYOSCYAMINE SULFATE 0.12 MG: 0.12 TABLET ORAL at 00:40

## 2019-09-21 RX ADMIN — HYOSCYAMINE SULFATE 0.12 MG: 0.12 TABLET ORAL at 09:40

## 2019-09-21 RX ADMIN — METHYLPREDNISOLONE SODIUM SUCCINATE 20 MG: 40 INJECTION, POWDER, FOR SOLUTION INTRAMUSCULAR; INTRAVENOUS at 12:18

## 2019-09-21 RX ADMIN — METHYLPREDNISOLONE SODIUM SUCCINATE 20 MG: 40 INJECTION, POWDER, FOR SOLUTION INTRAMUSCULAR; INTRAVENOUS at 05:20

## 2019-09-21 RX ADMIN — CEFTRIAXONE 1000 MG: 1 INJECTION, POWDER, FOR SOLUTION INTRAMUSCULAR; INTRAVENOUS at 17:36

## 2019-09-21 RX ADMIN — HYOSCYAMINE SULFATE 0.12 MG: 0.12 TABLET ORAL at 05:21

## 2019-09-21 RX ADMIN — HYOSCYAMINE SULFATE 0.12 MG: 0.12 TABLET ORAL at 20:19

## 2019-09-21 RX ADMIN — METRONIDAZOLE 500 MG: 500 INJECTION, SOLUTION INTRAVENOUS at 16:38

## 2019-09-21 RX ADMIN — ZOLPIDEM TARTRATE 10 MG: 5 TABLET, COATED ORAL at 20:18

## 2019-09-21 RX ADMIN — METRONIDAZOLE 500 MG: 500 INJECTION, SOLUTION INTRAVENOUS at 09:39

## 2019-09-21 RX ADMIN — ENOXAPARIN SODIUM 40 MG: 40 INJECTION SUBCUTANEOUS at 09:40

## 2019-09-21 RX ADMIN — SIMETHICONE CHEW TAB 80 MG 80 MG: 80 TABLET ORAL at 12:16

## 2019-09-21 RX ADMIN — HYOSCYAMINE SULFATE 0.12 MG: 0.12 TABLET ORAL at 12:19

## 2019-09-21 RX ADMIN — LEVOTHYROXINE SODIUM 25 MCG: 25 TABLET ORAL at 05:20

## 2019-09-21 RX ADMIN — METHYLPREDNISOLONE SODIUM SUCCINATE 20 MG: 40 INJECTION, POWDER, FOR SOLUTION INTRAMUSCULAR; INTRAVENOUS at 20:19

## 2019-09-21 RX ADMIN — METRONIDAZOLE 500 MG: 500 INJECTION, SOLUTION INTRAVENOUS at 00:40

## 2019-09-21 RX ADMIN — HYOSCYAMINE SULFATE 0.12 MG: 0.12 TABLET ORAL at 16:36

## 2019-09-21 NOTE — PROGRESS NOTES
Progress Note - Porter Grubbs 47 y o  female MRN: [id number]    Unit/Bed#: -01 Encounter: [id number]    Assessment and Plan:   Principal Problem:    Ulcerative pancolitis with rectal bleeding (HCC)  Active Problems:    Asthma, mild intermittent    Arthralgia of multiple joints    Anemia    #1  Severe ulcerative colitis flare: s/p colonoscopy on Thursday with severe colitis from rectum to hepatic flexure  The rest of the colon was normal  S/p Remicade induction dose yesterday   -Continue solumedrol 20 mg Q8 hours  -Levsin for cramping  -pain medications and antiemetics as needed  -advance to fulls with ensure  -simethicone PRN  -s/p Remicade, will need to plan for office to get approval to continue this as an outpatient  Next dose in 2 weeks   -Continue to monitor stool output and abdominal exam  -If no significant improvement over next few days, would then need to consider TPN or surgical consult as last resort, no indication for this at the present time  Need to give Remicade a few days to see effects    -consider outpatient referral to tertiary care facility    ---------------------------------------------------------------------------------------------------------------    Subjective:     Reports two episodes of diarrhea overnight with pain but no blood  No nausea  Objective:     Vitals: Blood pressure 140/65, pulse 62, temperature 98 3 °F (36 8 °C), temperature source Oral, resp  rate 18, height 5' 3" (1 6 m), weight 64 4 kg (141 lb 15 6 oz), SpO2 98 %  ,Body mass index is 25 15 kg/m²        Intake/Output Summary (Last 24 hours) at 9/21/2019 0819  Last data filed at 9/21/2019 0741  Gross per 24 hour   Intake 893 67 ml   Output --   Net 893 67 ml       Physical Exam:     General Appearance: Alert, appears stated age and cooperative  Lungs: Clear to auscultation bilaterally, no rales or rhonchi, no labored breathing/accessory muscle use  Heart: Regular rate and rhythm, S1, S2 normal, no murmur, click, rub or gallop  Abdomen: Soft, diffuse discomfort to palpation, non-distended; bowel sounds normal; no masses or no organomegaly  Extremities: No cyanosis, clubbing, or edema    Invasive Devices     Peripheral Intravenous Line            Peripheral IV 09/16/19 Left;Ventral (anterior) Forearm 4 days                Lab Results:  Results from last 7 days   Lab Units 09/21/19  0545   WBC Thousand/uL 7 78   HEMOGLOBIN g/dL 10 3*   HEMATOCRIT % 33 1*   PLATELETS Thousands/uL 475*   LYMPHO PCT % 24   MONO PCT % 11   EOS PCT % 0     Results from last 7 days   Lab Units 09/21/19  0545  09/17/19  0509   POTASSIUM mmol/L 3 7   < > 4 2   CHLORIDE mmol/L 107   < > 105   CO2 mmol/L 29   < > 28   BUN mg/dL 8   < > 6   CREATININE mg/dL 0 73   < > 0 71   CALCIUM mg/dL 9 4   < > 9 5   ALK PHOS U/L  --   --  71   ALT U/L  --   --  13   AST U/L  --   --  9    < > = values in this interval not displayed  Imaging Studies: I have personally reviewed pertinent imaging studies  No results found

## 2019-09-21 NOTE — NURSING NOTE
Pt refusing IVF tonight  Pt stated she requested a break from fluids tonight in discussion with GI, GI advised pt that was OK and would speak to [facility] team   Cannot locate a note, IVF for [id number] still active

## 2019-09-21 NOTE — ASSESSMENT & PLAN NOTE
· Patient has a baseline hemoglobin around 11 5  · Hemoglobin  today is 10 3  · No blood was noticed by the patient with bowel movements  · The patient was started on Lovenox after exclusion of GI bleed, advised to watch out for any bleeding     · Colonoscopy was done showed severe ulcerated colon no active bleeding    · Repeat CBC

## 2019-09-21 NOTE — ASSESSMENT & PLAN NOTE
· Patient still reporting generalized malaise and myalgia   · Lower back pain which was related to the abdominal pain   · Supportive care  · The patient was complaining of lower extremity edema and refusing her IVF   · Emphasize on the importance of the importance of the IVF given the patient decreased oral intake ,increased bowel movement and her UC flare

## 2019-09-21 NOTE — ASSESSMENT & PLAN NOTE
· The patient reporting improvement on the current regimen ,increased the interval between the bowel movement ,patient just had 2 bowel movement through the night   · Patient denies any more blood in the stool, still watery  · Improvement of the abd distension and the gas   · still reports severe abd pain with bowel movements but said it does not last as long as before   · continue IV Solu-Medrol ,Flagyl,analgesics,simethicone   · remicade was started yesterday   · Recommendations by GI to Continue to monitor stool output and abdominal exam,If no significant improvement over next few days, would then need to consider TPN or surgical consult as last resort, no indication for this at the present time  Need to give Remicade a few days to see effects  ,consider outpatient referral to tertiary care facility  · The patient refused her Lovenox last night ,discussed with the patient the importance of the DVT prophylaxis and she agreed to resume it and using the sequential compressions

## 2019-09-21 NOTE — PROGRESS NOTES
Progress Note - Sheela Meneses 1965, 47 y o  female MRN: [id number]    Unit/Bed#: -01 Encounter: [id number]    Primary Care Provider: Aretha Hawthorne DO   Date and time admitted to hospital: 9/16/2019 11:56 AM        * Ulcerative pancolitis with rectal bleeding ([address] )  Assessment & Plan  · The patient reporting improvement on the current regimen ,increased the interval between the bowel movement ,patient just had 2 bowel movement through the night   · Patient denies any more blood in the stool, still watery  · Improvement of the abd distension and the gas   · still reports severe abd pain with bowel movements but said it does not last as long as before   · continue IV Solu-Medrol ,Flagyl,analgesics,simethicone   · remicade was started yesterday   · Recommendations by GI to Continue to monitor stool output and abdominal exam,If no significant improvement over next few days, would then need to consider TPN or surgical consult as last resort, no indication for this at the present time  Need to give Remicade a few days to see effects  ,consider outpatient referral to tertiary care facility  · The patient refused her Lovenox last night ,discussed with the patient the importance of the DVT prophylaxis and she agreed to resume it and using the sequential compressions     Anemia  Assessment & Plan  · Patient has a baseline hemoglobin around 11 5  · Hemoglobin  today is 10 3  · No blood was noticed by the patient with bowel movements  · The patient was started on Lovenox after exclusion of GI bleed, advised to watch out for any bleeding     · Colonoscopy was done showed severe ulcerated colon no active bleeding    · Repeat CBC     Arthralgia of multiple joints  Assessment & Plan  · Patient still reporting generalized malaise and myalgia   · Lower back pain which was related to the abdominal pain   · Supportive care  · The patient was complaining of lower extremity edema and refusing her IVF   · Emphasize on the importance of the importance of the IVF given the patient decreased oral intake ,increased bowel movement and her UC flare   Asthma, mild intermittent  Assessment & Plan  · Stable  · Respiratory treatments as needed      VTE Pharmacologic Prophylaxis:   Pharmacologic: Enoxaparin (Lovenox)  Mechanical VTE Prophylaxis in Place: Yes    Discussions with Specialists or Other Care Team Provider: Angelica Enriquez medical team,case management   Education and Discussions with Family / Clay Maryjane was made with patient about her current plan of care     Current Length of Stay: 5 day(s)    Current Patient Status: Inpatient     Discharge Plan / Estimated Discharge Date: the patient is not stable enough for discharge  Code Status: Level 1 - Full Code      Subjective: The patient was lying in bed ,she reporting mild improvement,she reported just 2 bowel movements throughout the night,no blood with stool,still reporting severe abd pain and back pain with the bowel movements, reporting night sweating that she has to change her shirt   she was reporting lower extremity edema  Denies chest pain ,SOB,fever,palpitations cough,lightheadness  Objective:     Vitals:   Temp (24hrs), Av 3 °F (36 8 °C), Min:98 1 °F (36 7 °C), Max:98 5 °F (36 9 °C)    Temp:  [98 1 °F (36 7 °C)-98 5 °F (36 9 °C)] 98 3 °F (36 8 °C)  HR:  [49-64] 62  Resp:  [16-18] 18  BP: ()/(53-87) 140/65  SpO2:  [97 %-100 %] 98 %  Body mass index is 25 15 kg/m²  Input and Output Summary (last 24 hours): Intake/Output Summary (Last 24 hours) at 2019 1039  Last data filed at 2019 0741  Gross per 24 hour   Intake 473 67 ml   Output --   Net 473 67 ml       Physical Exam:   Physical Exam   Constitutional: She is oriented to person, place, and time  She appears well-developed and well-nourished  HENT:   Head: Normocephalic and atraumatic     Cardiovascular: Normal rate, regular rhythm, normal heart sounds and intact distal pulses  Pulmonary/Chest: Effort normal and breath sounds normal    Abdominal: Soft  Bowel sounds are normal  She exhibits distension  There is tenderness  Musculoskeletal: Normal range of motion  She exhibits tenderness and mild lower extremity edema   Neurological: She is alert and oriented to person, place, and time  Skin: Skin is warm and dry  Capillary refill takes less than 2 seconds  Psychiatric: She has a normal mood and affect  Her behavior is normal  Judgment and thought content normal    Nursing note and vitals reviewed  Additional Data:     Labs:    Results from last 7 days   Lab Units 09/21/19  0545   WBC Thousand/uL 7 78   HEMOGLOBIN g/dL 10 3*   HEMATOCRIT % 33 1*   PLATELETS Thousands/uL 475*   LYMPHO PCT % 24   MONO PCT % 11   EOS PCT % 0     Results from last 7 days   Lab Units 09/21/19  0545  09/17/19  0509   POTASSIUM mmol/L 3 7   < > 4 2   CHLORIDE mmol/L 107   < > 105   CO2 mmol/L 29   < > 28   BUN mg/dL 8   < > 6   CREATININE mg/dL 0 73   < > 0 71   CALCIUM mg/dL 9 4   < > 9 5   ALK PHOS U/L  --   --  71   ALT U/L  --   --  13   AST U/L  --   --  9    < > = values in this interval not displayed  * I Have Reviewed All Lab Data Listed Above  * Additional Pertinent Lab Tests Reviewed:  [facility] Admission Reviewed    Imaging:    Imaging Reports Reviewed Today Include: no  Imaging Personally Reviewed by Myself Includes:  no  Recent Cultures (last 7 days):           Last 24 Hours Medication List:     Current Facility-Administered Medications:  acetaminophen 650 mg Oral Q6H PRN Otoniel Heredia MD    albuterol 2 5 mg Nebulization Q6H PRN Otoniel Heredia MD    aluminum-magnesium hydroxide-simethicone 30 mL Oral Q6H PRN Otoniel Heredia MD    carisoprodol 350 mg Oral HS PRN Otoniel Heredia MD    cefTRIAXone 1,000 mg Intravenous Q24H Severo Graver, PA-C Last Rate: 1,000 mg (09/20/19 3124)   enoxaparin 40 mg Subcutaneous Daily Skylar Martinez MD    ferrous sulfate 325 mg Oral QPM Alice Ennis MD    hyoscyamine 0 125 mg Sublingual Q4H Ganesh Jacobson PA-C    levothyroxine 25 mcg Oral Early Morning Mirella Genao MD    methylPREDNISolone sodium succinate 20 mg Intravenous Q8H Mirella Genao MD    metroNIDAZOLE 500 mg Intravenous Q8H Ganesh Jacobson PA-C Last Rate: 500 mg (09/21/19 0939)   ondansetron 4 mg Intravenous Q6H PRN Mirella Genao MD    oxyCODONE 5 mg Oral Q4H PRN Anthony Zafar MD    simethicone 80 mg Oral Q6H PRN Xiomara Martinez PA-C    zolpidem 10 mg Oral HS PRN Mirella Genao MD         Today, Patient Was Seen By: Anthony Zafar MD    ** Please Note: This note has been constructed using a voice recognition system   **

## 2019-09-22 ENCOUNTER — TELEPHONE (OUTPATIENT)
Dept: GASTROENTEROLOGY | Facility: AMBULARY SURGERY CENTER | Age: 54
End: 2019-09-22

## 2019-09-22 PROBLEM — K64.9 HEMORRHOID: Status: ACTIVE | Noted: 2019-09-22

## 2019-09-22 LAB
BASOPHILS # BLD AUTO: 0.01 THOUSANDS/ΜL (ref 0–0.1)
BASOPHILS NFR BLD AUTO: 0 % (ref 0–1)
EOSINOPHIL # BLD AUTO: 0.01 THOUSAND/ΜL (ref 0–0.61)
EOSINOPHIL NFR BLD AUTO: 0 % (ref 0–6)
ERYTHROCYTE [DISTWIDTH] IN BLOOD BY AUTOMATED COUNT: 13 % (ref 11.6–15.1)
HCT VFR BLD AUTO: 35.3 % (ref 34.8–46.1)
HGB BLD-MCNC: 11.1 G/DL (ref 11.5–15.4)
IMM GRANULOCYTES # BLD AUTO: >0.5 THOUSAND/UL (ref 0–0.2)
IMM GRANULOCYTES NFR BLD AUTO: 5 % (ref 0–2)
LYMPHOCYTES # BLD AUTO: 1.67 THOUSANDS/ΜL (ref 0.6–4.47)
LYMPHOCYTES NFR BLD AUTO: 15 % (ref 14–44)
MCH RBC QN AUTO: 29.3 PG (ref 26.8–34.3)
MCHC RBC AUTO-ENTMCNC: 31.4 G/DL (ref 31.4–37.4)
MCV RBC AUTO: 93 FL (ref 82–98)
MONOCYTES # BLD AUTO: 0.43 THOUSAND/ΜL (ref 0.17–1.22)
MONOCYTES NFR BLD AUTO: 4 % (ref 4–12)
NEUTROPHILS # BLD AUTO: 8.84 THOUSANDS/ΜL (ref 1.85–7.62)
NEUTS SEG NFR BLD AUTO: 76 % (ref 43–75)
NRBC BLD AUTO-RTO: 0 /100 WBCS
PLATELET # BLD AUTO: 485 THOUSANDS/UL (ref 149–390)
PMV BLD AUTO: 8.5 FL (ref 8.9–12.7)
RBC # BLD AUTO: 3.79 MILLION/UL (ref 3.81–5.12)
WBC # BLD AUTO: 11.5 THOUSAND/UL (ref 4.31–10.16)

## 2019-09-22 PROCEDURE — 85025 COMPLETE CBC W/AUTO DIFF WBC: CPT | Performed by: FAMILY MEDICINE

## 2019-09-22 PROCEDURE — 99232 SBSQ HOSP IP/OBS MODERATE 35: CPT | Performed by: INTERNAL MEDICINE

## 2019-09-22 RX ORDER — HYDROCORTISONE ACETATE 25 MG/1
25 SUPPOSITORY RECTAL 2 TIMES DAILY
Status: DISCONTINUED | OUTPATIENT
Start: 2019-09-22 | End: 2019-09-22

## 2019-09-22 RX ADMIN — METHYLPREDNISOLONE SODIUM SUCCINATE 20 MG: 40 INJECTION, POWDER, FOR SOLUTION INTRAMUSCULAR; INTRAVENOUS at 04:44

## 2019-09-22 RX ADMIN — METRONIDAZOLE 500 MG: 500 INJECTION, SOLUTION INTRAVENOUS at 00:11

## 2019-09-22 RX ADMIN — HYOSCYAMINE SULFATE 0.12 MG: 0.12 TABLET ORAL at 00:11

## 2019-09-22 RX ADMIN — LEVOTHYROXINE SODIUM 25 MCG: 25 TABLET ORAL at 05:21

## 2019-09-22 RX ADMIN — HYDROCORTISONE 2.5%: 25 CREAM TOPICAL at 17:34

## 2019-09-22 RX ADMIN — GLYCERIN, PETROLATUM, PHENYLEPHRINE HCL, PRAMOXINE HCL: 144; 2.5; 10; 15 CREAM TOPICAL at 17:34

## 2019-09-22 RX ADMIN — GLYCERIN, PETROLATUM, PHENYLEPHRINE HCL, PRAMOXINE HCL: 144; 2.5; 10; 15 CREAM TOPICAL at 11:28

## 2019-09-22 RX ADMIN — HYOSCYAMINE SULFATE 0.12 MG: 0.12 TABLET ORAL at 09:11

## 2019-09-22 RX ADMIN — ZOLPIDEM TARTRATE 10 MG: 5 TABLET, COATED ORAL at 21:13

## 2019-09-22 RX ADMIN — METRONIDAZOLE 500 MG: 500 INJECTION, SOLUTION INTRAVENOUS at 17:28

## 2019-09-22 RX ADMIN — HYOSCYAMINE SULFATE 0.12 MG: 0.12 TABLET ORAL at 21:13

## 2019-09-22 RX ADMIN — METRONIDAZOLE 500 MG: 500 INJECTION, SOLUTION INTRAVENOUS at 09:03

## 2019-09-22 RX ADMIN — CEFTRIAXONE 1000 MG: 1 INJECTION, POWDER, FOR SOLUTION INTRAMUSCULAR; INTRAVENOUS at 16:48

## 2019-09-22 RX ADMIN — HYDROCORTISONE 2.5%: 25 CREAM TOPICAL at 10:12

## 2019-09-22 RX ADMIN — METHYLPREDNISOLONE SODIUM SUCCINATE 20 MG: 40 INJECTION, POWDER, FOR SOLUTION INTRAMUSCULAR; INTRAVENOUS at 12:25

## 2019-09-22 RX ADMIN — FERROUS SULFATE TAB 325 MG (65 MG ELEMENTAL FE) 325 MG: 325 (65 FE) TAB at 17:28

## 2019-09-22 RX ADMIN — HYOSCYAMINE SULFATE 0.12 MG: 0.12 TABLET ORAL at 17:28

## 2019-09-22 RX ADMIN — METHYLPREDNISOLONE SODIUM SUCCINATE 20 MG: 40 INJECTION, POWDER, FOR SOLUTION INTRAMUSCULAR; INTRAVENOUS at 21:13

## 2019-09-22 RX ADMIN — ENOXAPARIN SODIUM 40 MG: 40 INJECTION SUBCUTANEOUS at 09:09

## 2019-09-22 RX ADMIN — HYOSCYAMINE SULFATE 0.12 MG: 0.12 TABLET ORAL at 04:44

## 2019-09-22 RX ADMIN — ACETAMINOPHEN 650 MG: 325 TABLET, FILM COATED ORAL at 04:43

## 2019-09-22 RX ADMIN — HYOSCYAMINE SULFATE 0.12 MG: 0.12 TABLET ORAL at 12:26

## 2019-09-22 NOTE — ASSESSMENT & PLAN NOTE
· Patient has a baseline hemoglobin around 11 5  · Hemoglobin today is 11 1  · No blood was noticed by the patient with bowel movements  · The patient on Lovenox after exclusion of GI bleed, advised to watch out for any bleeding    · Colonoscopy was done showed severe ulcerated colon no active bleeding    · Repeat CBC

## 2019-09-22 NOTE — PROGRESS NOTES
Progress Note - Martinez Delgado 47 y o  female MRN: [id number]    Unit/Bed#: -01 Encounter: [id number]    Assessment and Plan:   Principal Problem:    Ulcerative pancolitis with rectal bleeding (HCC)  Active Problems:    Asthma, mild intermittent    Leukocytosis    Arthralgia of multiple joints    Anemia    Hemorrhoid    #1  Severe ulcerative colitis flare: slowly improving s/p Remicade on 9/20  -Continue solumedrol 20 mg Q8 hours  -Levsin for cramping  -pain medications and antiemetics as needed  -patient tolerating full liquids, will advance to a soft light diet  -simethicone PRN  -s/p Remicade, will need to plan for office to get approval to continue this as an outpatient  Next dose in 2 weeks   -Continue to monitor stool output and abdominal exam  -consider outpatient referral to tertiary care facility    ----------------------------------------------------------------------------------------------------------------    Subjective:     Improving slowly, slept all night until 5 am without BM, less blood, pain improving    Objective:     Vitals: Blood pressure 117/64, pulse 56, temperature 97 9 °F (36 6 °C), temperature source Oral, resp  rate 16, height 5' 3" (1 6 m), weight 64 4 kg (141 lb 15 6 oz), SpO2 98 %  ,Body mass index is 25 15 kg/m²        Intake/Output Summary (Last 24 hours) at 9/22/2019 1201  Last data filed at 9/22/2019 0743  Gross per 24 hour   Intake 480 ml   Output --   Net 480 ml       Physical Exam:     General Appearance: Alert, appears stated age and cooperative  Lungs: Clear to auscultation bilaterally, no rales or rhonchi, no labored breathing/accessory muscle use  Heart: Regular rate and rhythm, S1, S2 normal, no murmur, click, rub or gallop  Abdomen: Soft, mild diffuse tenderness, non-distended; bowel sounds normal; no masses or no organomegaly  Extremities: No cyanosis, clubbing, or edema    Invasive Devices     Peripheral Intravenous Line            Peripheral IV 09/21/19 Left Forearm 1 day                Lab Results:  Results from last 7 days   Lab Units 09/22/19  1004   WBC Thousand/uL 11 50*   HEMOGLOBIN g/dL 11 1*   HEMATOCRIT % 35 3   PLATELETS Thousands/uL 485*   NEUTROS PCT % 76*   LYMPHS PCT % 15   MONOS PCT % 4   EOS PCT % 0     Results from last 7 days   Lab Units 09/21/19  0545  09/17/19  0509   POTASSIUM mmol/L 3 7   < > 4 2   CHLORIDE mmol/L 107   < > 105   CO2 mmol/L 29   < > 28   BUN mg/dL 8   < > 6   CREATININE mg/dL 0 73   < > 0 71   CALCIUM mg/dL 9 4   < > 9 5   ALK PHOS U/L  --   --  71   ALT U/L  --   --  13   AST U/L  --   --  9    < > = values in this interval not displayed  Imaging Studies: I have personally reviewed pertinent imaging studies  No results found

## 2019-09-22 NOTE — PLAN OF CARE
Problem: PAIN - ADULT  Goal: Verbalizes/displays adequate comfort level or baseline comfort level  Description  Interventions:  - Encourage patient to monitor pain and request assistance  - Assess pain using appropriate pain scale  - Administer analgesics based on type and severity of pain and evaluate response  - Implement non-pharmacological measures as appropriate and evaluate response  - Consider cultural and social influences on pain and pain management  - Notify physician/advanced practitioner if interventions unsuccessful or patient reports new pain  9/21/2019 2040 by Aliza Morris RN  Outcome: Progressing  9/21/2019 2022 by Aliza Morris RN  Outcome: Progressing     Problem: INFECTION - ADULT  Goal: Absence or prevention of progression during hospitalization  Description  INTERVENTIONS:  - Assess and monitor for signs and symptoms of infection  - Monitor lab/diagnostic results  - Monitor all insertion sites, i e  indwelling lines, tubes, and drains  - Monitor endotracheal if appropriate and nasal secretions for changes in amount and color  - [place] appropriate cooling/warming therapies per order  - Administer medications as ordered  - Instruct and encourage patient and family to use good hand hygiene technique  - Identify and instruct in appropriate isolation precautions for identified infection/condition  9/21/2019 2040 by Aliza Morris RN  Outcome: Progressing  9/21/2019 2022 by Aliza Morris RN  Outcome: Progressing     Problem: SAFETY ADULT  Goal: Patient will remain free of falls  Description  INTERVENTIONS:  - Assess patient frequently for physical needs  -  Identify cognitive and physical deficits and behaviors that affect risk of falls    -  [place] fall precautions as indicated by assessment   - Educate patient/family on patient safety including physical limitations  - Instruct patient to call for assistance with activity based on assessment  - Modify environment to reduce risk of injury  - Consider OT/PT consult to assist with strengthening/mobility  9/21/2019 2040 by Betsy Cazares RN  Outcome: Progressing  9/21/2019 2022 by Betsy Cazares RN  Outcome: Progressing     Problem: DISCHARGE PLANNING  Goal: Discharge to home or other facility with appropriate resources  Description  INTERVENTIONS:  - Identify barriers to discharge w/patient and caregiver  - Arrange for needed discharge resources and transportation as appropriate  - Identify discharge learning needs (meds, wound care, etc )  - Arrange for interpretive services to assist at discharge as needed  - Refer to Case Management Department for coordinating discharge planning if the patient needs post-hospital services based on physician/advanced practitioner order or complex needs related to functional status, cognitive ability, or social support system  9/21/2019 2040 by Betsy Cazares RN  Outcome: Progressing  9/21/2019 2022 by Betsy Cazares RN  Outcome: Progressing     Problem: Knowledge Deficit  Goal: Patient/family/caregiver demonstrates understanding of disease process, treatment plan, medications, and discharge instructions  Description  Complete learning assessment and assess knowledge base    Interventions:  - Provide teaching at level of understanding  - Provide teaching via preferred learning methods  9/21/2019 2040 by Betsy Cazares RN  Outcome: Progressing  9/21/2019 2022 by Betsy Cazares RN  Outcome: Progressing     Problem: GASTROINTESTINAL - ADULT  Goal: Minimal or absence of nausea and/or vomiting  Description  INTERVENTIONS:  - Administer IV fluids if ordered to ensure adequate hydration  - Maintain NPO status until nausea and vomiting are resolved  - Nasogastric tube if ordered  - Administer ordered antiemetic medications as needed  - Provide nonpharmacologic comfort measures as appropriate  - Advance diet as tolerated, if ordered  - Consider nutrition services referral to assist patient with adequate nutrition and appropriate food choices  9/21/2019 2040 by Rosana Turpin RN  Outcome: Progressing  9/21/2019 2022 by Rosana Turpin RN  Outcome: Progressing  Goal: Maintains or returns to baseline bowel function  Description  INTERVENTIONS:  - Assess bowel function  - Encourage oral fluids to ensure adequate hydration  - Administer IV fluids if ordered to ensure adequate hydration  - Administer ordered medications as needed  - Encourage mobilization and activity  - Consider nutritional services referral to assist patient with adequate nutrition and appropriate food choices  9/21/2019 2040 by Rosana Turpin RN  Outcome: Progressing  9/21/2019 2022 by Rosana Turpin RN  Outcome: Progressing  Goal: Maintains adequate nutritional intake  Description  INTERVENTIONS:  - Monitor percentage of each meal consumed  - Identify factors contributing to decreased intake, treat as appropriate  - Assist with meals as needed  - Monitor I&O, weight, and lab values if indicated  - Obtain nutrition services referral as needed  9/21/2019 2040 by Rosana Turpin RN  Outcome: Progressing  9/21/2019 2022 by Rosana Turpin RN  Outcome: Progressing     Problem: Nutrition/Hydration-ADULT  Goal: Nutrient/Hydration intake appropriate for improving, restoring or maintaining nutritional needs  Description  Monitor and assess patient's nutrition/hydration status for malnutrition  Collaborate with interdisciplinary team and initiate plan and interventions as ordered  Monitor patient's weight and dietary intake as ordered or per policy  Utilize nutrition screening tool and intervene as necessary  Determine patient's food preferences and provide high-protein, high-caloric foods as appropriate       INTERVENTIONS:  - Monitor oral intake, urinary output, labs, and treatment plans  - Assess nutrition and hydration status and recommend course of action  - Evaluate amount of meals eaten  - Assist patient with eating if necessary   - Allow adequate time for meals  - Recommend/ encourage appropriate diets, oral nutritional supplements, and vitamin/mineral supplements  - Order, calculate, and assess calorie counts as needed  - Recommend, monitor, and adjust tube feedings and TPN/PPN based on assessed needs  - Assess need for intravenous fluids  - Provide specific nutrition/hydration education as appropriate  - Include patient/family/caregiver in decisions related to nutrition  9/21/2019 2040 by Nancy Silverio RN  Outcome: Progressing  9/21/2019 2022 by Nancy Silverio RN  Outcome: Progressing     Problem: Potential for Falls  Goal: Patient will remain free of falls  Description  INTERVENTIONS:  - Assess patient frequently for physical needs  -  Identify cognitive and physical deficits and behaviors that affect risk of falls    -  [place] fall precautions as indicated by assessment   - Educate patient/family on patient safety including physical limitations  - Instruct patient to call for assistance with activity based on assessment  - Modify environment to reduce risk of injury  - Consider OT/PT consult to assist with strengthening/mobility  9/21/2019 2040 by Nancy Silverio RN  Outcome: Progressing  9/21/2019 2022 by Nancy Silverio RN  Outcome: Progressing

## 2019-09-22 NOTE — ASSESSMENT & PLAN NOTE
· Patient complaining of some discomfort due to rectal hemorrhoid would like medication to shrink it at this time  · Continue hydrocortisone 2 5% as BID, added Prep-H rectal cream BID as well

## 2019-09-22 NOTE — ASSESSMENT & PLAN NOTE
· Patient states generalized malaise and myalgia improving this morning  · Supportive care  · The patient was complaining of lower extremity edema, refusing IVF, IVF d/c  · Emphasize on the importance of the IVF given the patient decreased oral intake ,increased bowel movement and her UC flare

## 2019-09-22 NOTE — ASSESSMENT & PLAN NOTE
· The patient reporting improvement on the current regimen notes she had about 2-3 BM between last night and 5 am   · Patient denies stefany blood in the stool, states that the stool remains watery with flakes  · Improvement of the abd distension and the gas   · Continue IV Solu-Medrol ,Flagyl,analgesics,simethicone   · Patient s/p Remicade treatment from 9/20  · GI advanced diet today, patient now on Surgical diet Soft/Lite meal -continue to monitor stool output and abdominal exam

## 2019-09-22 NOTE — PROGRESS NOTES
Progress Note - Tim Kehr 1965, 47 y o  female MRN: [id number]    Unit/Bed#: -01 Encounter: [id number]    Primary Care Provider: Jonathan Goldstein DO   Date and time admitted to hospital: 9/16/2019 11:56 AM        * Ulcerative pancolitis with rectal bleeding [facility])  Assessment & Plan  · The patient reporting improvement on the current regimen notes she had about 2-3 BM between last night and 5 am   · Patient denies stefany blood in the stool, states that the stool remains watery with flakes  · Improvement of the abd distension and the gas   · Continue IV Solu-Medrol ,Flagyl,analgesics,simethicone   · Patient s/p Remicade treatment from 9/20  · GI advanced diet today, patient now on Surgical diet Soft/Lite meal -continue to monitor stool output and abdominal exam    Anemia  Assessment & Plan  · Patient has a baseline hemoglobin around 11 5  · Hemoglobin today is 11 1  · No blood was noticed by the patient with bowel movements  · The patient on Lovenox after exclusion of GI bleed, advised to watch out for any bleeding    · Colonoscopy was done showed severe ulcerated colon no active bleeding    · Repeat CBC     Hemorrhoid  Assessment & Plan  · Patient complaining of some discomfort due to rectal hemorrhoid would like medication to shrink it at this time  · Continue hydrocortisone 2 5% as BID, added Prep-H rectal cream BID as well    Leukocytosis  Assessment & Plan  · WBC elevated this am at 11 5  · Continue to monitor, repeat CBC in am    Asthma, mild intermittent  Assessment & Plan  · Stable  · Respiratory treatments as needed    Arthralgia of multiple joints  Assessment & Plan  · Patient states generalized malaise and myalgia improving this morning  · Supportive care  · The patient was complaining of lower extremity edema, refusing IVF, IVF d/c  · Emphasize on the importance of the IVF given the patient decreased oral intake ,increased bowel movement and her UC flare        VTE Pharmacologic Prophylaxis: Pharmacologic: Enoxaparin (Lovenox)  Mechanical VTE Prophylaxis in Place: Yes    Discussions with Specialists or Other Care Team Provider: GI    Education and Discussions with Family / Patient: Discussed current plan of care with patient, including following GI recommendation concerning advancement of diet  Patient understands and is amenable to POC  Current Length of Stay: 6 day(s)    Current Patient Status: Inpatient     Discharge Plan / Estimated Discharge Date: TBD, based on clinical course    Code Status: Level 1 - Full Code      Subjective:   Patient notes she had about 2-3 BM between last night and 5am this morning  Still having watery diarrhea denies stefany blood in the stool  Endorsing that she would like to eat more solid food at this time  Patient also c/o of some discomfort secondary to rectal hemorrhoid  Objective:     Vitals:   Temp (24hrs), Av 9 °F (36 6 °C), Min:97 5 °F (36 4 °C), Max:98 4 °F (36 9 °C)    Temp:  [97 5 °F (36 4 °C)-98 4 °F (36 9 °C)] 97 9 °F (36 6 °C)  HR:  [56-65] 56  Resp:  [16-18] 16  BP: (108-117)/(54-68) 117/64  SpO2:  [98 %] 98 %  Body mass index is 25 15 kg/m²  Input and Output Summary (last 24 hours): Intake/Output Summary (Last 24 hours) at 2019 1119  Last data filed at 2019 0743  Gross per 24 hour   Intake 480 ml   Output --   Net 480 ml       Physical Exam:     Physical Exam   Constitutional: She is oriented to person, place, and time  She appears well-developed and well-nourished  HENT:   Head: Normocephalic and atraumatic  Eyes: Conjunctivae are normal    Cardiovascular: Normal rate, regular rhythm and normal heart sounds  Pulmonary/Chest: Effort normal and breath sounds normal    Abdominal: Soft  Bowel sounds are normal  She exhibits no distension  Mild tenderness on palpation   Musculoskeletal:   +1 pitting edema of RLE   Neurological: She is alert and oriented to person, place, and time  Skin: Skin is warm and dry  Additional Data:     Labs:    Results from last 7 days   Lab Units 09/22/19  1004   WBC Thousand/uL 11 50*   HEMOGLOBIN g/dL 11 1*   HEMATOCRIT % 35 3   PLATELETS Thousands/uL 485*   NEUTROS PCT % 76*   LYMPHS PCT % 15   MONOS PCT % 4   EOS PCT % 0     Results from last 7 days   Lab Units 09/21/19  0545  09/17/19  0509   POTASSIUM mmol/L 3 7   < > 4 2   CHLORIDE mmol/L 107   < > 105   CO2 mmol/L 29   < > 28   BUN mg/dL 8   < > 6   CREATININE mg/dL 0 73   < > 0 71   CALCIUM mg/dL 9 4   < > 9 5   ALK PHOS U/L  --   --  71   ALT U/L  --   --  13   AST U/L  --   --  9    < > = values in this interval not displayed  * I Have Reviewed All Lab Data Listed Above  * Additional Pertinent Lab Tests Reviewed:  All Labs Within Last 24 Hours Reviewed    Imaging:    Imaging Reports Reviewed Today Include: None  Imaging Personally Reviewed by Myself Includes: None    Recent Cultures (last 7 days):           Last 24 Hours Medication List:     Current Facility-Administered Medications:  acetaminophen 650 mg Oral Q6H PRN Salazar Collins MD    albuterol 2 5 mg Nebulization Q6H PRN Salazar Collins MD    aluminum-magnesium hydroxide-simethicone 30 mL Oral Q6H PRN Salazar Collins MD    carisoprodol 350 mg Oral HS PRN Salazar Collins MD    cefTRIAXone 1,000 mg Intravenous Q24H Alejandro Talon, PA-C Last Rate: 1,000 mg (09/21/19 1736)   enoxaparin 40 mg Subcutaneous Daily Felipe Patterson MD    ferrous sulfate 325 mg Oral QPM Alice Ennis MD    hydrocortisone  Rectal BID Queenie Zaragoza MD    hyoscyamine 0 125 mg Sublingual Q4H Alejandro [place], PA-C    levothyroxine 25 mcg Oral Early Morning Salazar Collins MD    methylPREDNISolone sodium succinate 20 mg Intravenous Q8H Salazar Collins MD    metroNIDAZOLE 500 mg Intravenous Q8H Alejandro [place], PA-C Last Rate: 500 mg (09/22/19 0903)   ondansetron 4 mg Intravenous Q6H PRN Salazar Collins MD    oxyCODONE 5 mg Oral Q4H PRN Gregory Jackson Jean Faustin MD    pramoxine-phenylephrine-glycerin-petrolatum  Rectal BID Yvonne Pino MD    simethicone 80 mg Oral Q6H PRN Abdoul Pineda PA-C    zolpidem 10 mg Oral HS PRN Meg Cooney MD         Today, Patient Was Seen By: Yvonne Pino MD    ** Please Note: This note has been constructed using a voice recognition system   **

## 2019-09-22 NOTE — PLAN OF CARE
Problem: PAIN - ADULT  Goal: Verbalizes/displays adequate comfort level or baseline comfort level  Description  Interventions:  - Encourage patient to monitor pain and request assistance  - Assess pain using appropriate pain scale  - Administer analgesics based on type and severity of pain and evaluate response  - Implement non-pharmacological measures as appropriate and evaluate response  - Consider cultural and social influences on pain and pain management  - Notify physician/advanced practitioner if interventions unsuccessful or patient reports new pain  Outcome: Progressing     Problem: INFECTION - ADULT  Goal: Absence or prevention of progression during hospitalization  Description  INTERVENTIONS:  - Assess and monitor for signs and symptoms of infection  - Monitor lab/diagnostic results  - Monitor all insertion sites, i e  indwelling lines, tubes, and drains  - Monitor endotracheal if appropriate and nasal secretions for changes in amount and color  - [place] appropriate cooling/warming therapies per order  - Administer medications as ordered  - Instruct and encourage patient and family to use good hand hygiene technique  - Identify and instruct in appropriate isolation precautions for identified infection/condition  Outcome: Progressing     Problem: SAFETY ADULT  Goal: Patient will remain free of falls  Description  INTERVENTIONS:  - Assess patient frequently for physical needs  -  Identify cognitive and physical deficits and behaviors that affect risk of falls    -  [place] fall precautions as indicated by assessment   - Educate patient/family on patient safety including physical limitations  - Instruct patient to call for assistance with activity based on assessment  - Modify environment to reduce risk of injury  - Consider OT/PT consult to assist with strengthening/mobility  Outcome: Progressing     Problem: DISCHARGE PLANNING  Goal: Discharge to home or other facility with appropriate resources  Description  INTERVENTIONS:  - Identify barriers to discharge w/patient and caregiver  - Arrange for needed discharge resources and transportation as appropriate  - Identify discharge learning needs (meds, wound care, etc )  - Arrange for interpretive services to assist at discharge as needed  - Refer to Case Management Department for coordinating discharge planning if the patient needs post-hospital services based on physician/advanced practitioner order or complex needs related to functional status, cognitive ability, or social support system  Outcome: Progressing     Problem: Knowledge Deficit  Goal: Patient/family/caregiver demonstrates understanding of disease process, treatment plan, medications, and discharge instructions  Description  Complete learning assessment and assess knowledge base    Interventions:  - Provide teaching at level of understanding  - Provide teaching via preferred learning methods  Outcome: Progressing     Problem: GASTROINTESTINAL - ADULT  Goal: Minimal or absence of nausea and/or vomiting  Description  INTERVENTIONS:  - Administer IV fluids if ordered to ensure adequate hydration  - Maintain NPO status until nausea and vomiting are resolved  - Nasogastric tube if ordered  - Administer ordered antiemetic medications as needed  - Provide nonpharmacologic comfort measures as appropriate  - Advance diet as tolerated, if ordered  - Consider nutrition services referral to assist patient with adequate nutrition and appropriate food choices  Outcome: Progressing  Goal: Maintains or returns to baseline bowel function  Description  INTERVENTIONS:  - Assess bowel function  - Encourage oral fluids to ensure adequate hydration  - Administer IV fluids if ordered to ensure adequate hydration  - Administer ordered medications as needed  - Encourage mobilization and activity  - Consider nutritional services referral to assist patient with adequate nutrition and appropriate food choices  Outcome: Progressing  Goal: Maintains adequate nutritional intake  Description  INTERVENTIONS:  - Monitor percentage of each meal consumed  - Identify factors contributing to decreased intake, treat as appropriate  - Assist with meals as needed  - Monitor I&O, weight, and lab values if indicated  - Obtain nutrition services referral as needed  Outcome: Progressing     Problem: Nutrition/Hydration-ADULT  Goal: Nutrient/Hydration intake appropriate for improving, restoring or maintaining nutritional needs  Description  Monitor and assess patient's nutrition/hydration status for malnutrition  Collaborate with interdisciplinary team and initiate plan and interventions as ordered  Monitor patient's weight and dietary intake as ordered or per policy  Utilize nutrition screening tool and intervene as necessary  Determine patient's food preferences and provide high-protein, high-caloric foods as appropriate       INTERVENTIONS:  - Monitor oral intake, urinary output, labs, and treatment plans  - Assess nutrition and hydration status and recommend course of action  - Evaluate amount of meals eaten  - Assist patient with eating if necessary   - Allow adequate time for meals  - Recommend/ encourage appropriate diets, oral nutritional supplements, and vitamin/mineral supplements  - Order, calculate, and assess calorie counts as needed  - Recommend, monitor, and adjust tube feedings and TPN/PPN based on assessed needs  - Assess need for intravenous fluids  - Provide specific nutrition/hydration education as appropriate  - Include patient/family/caregiver in decisions related to nutrition  Outcome: Progressing

## 2019-09-23 PROCEDURE — 99232 SBSQ HOSP IP/OBS MODERATE 35: CPT | Performed by: INTERNAL MEDICINE

## 2019-09-23 RX ORDER — DIPHENHYDRAMINE HCL 25 MG
25 TABLET ORAL ONCE
Status: CANCELLED | OUTPATIENT
Start: 2019-10-04

## 2019-09-23 RX ORDER — SODIUM CHLORIDE 9 MG/ML
20 INJECTION, SOLUTION INTRAVENOUS ONCE
Status: CANCELLED | OUTPATIENT
Start: 2019-10-04

## 2019-09-23 RX ORDER — METHYLPREDNISOLONE SODIUM SUCCINATE 40 MG/ML
40 INJECTION, POWDER, LYOPHILIZED, FOR SOLUTION INTRAMUSCULAR; INTRAVENOUS ONCE
Status: CANCELLED | OUTPATIENT
Start: 2019-10-04

## 2019-09-23 RX ORDER — ACETAMINOPHEN 325 MG/1
650 TABLET ORAL ONCE
Status: CANCELLED | OUTPATIENT
Start: 2019-10-04

## 2019-09-23 RX ORDER — PREDNISONE 20 MG/1
40 TABLET ORAL DAILY
Status: DISCONTINUED | OUTPATIENT
Start: 2019-09-23 | End: 2019-09-24 | Stop reason: HOSPADM

## 2019-09-23 RX ORDER — SODIUM CHLORIDE 9 MG/ML
20 INJECTION, SOLUTION INTRAVENOUS ONCE
Status: CANCELLED | OUTPATIENT
Start: 2019-09-23

## 2019-09-23 RX ADMIN — PREDNISONE 40 MG: 20 TABLET ORAL at 12:51

## 2019-09-23 RX ADMIN — HYDROCORTISONE 2.5%: 25 CREAM TOPICAL at 09:07

## 2019-09-23 RX ADMIN — METHYLPREDNISOLONE SODIUM SUCCINATE 20 MG: 40 INJECTION, POWDER, FOR SOLUTION INTRAMUSCULAR; INTRAVENOUS at 06:08

## 2019-09-23 RX ADMIN — HYOSCYAMINE SULFATE 0.12 MG: 0.12 TABLET ORAL at 17:30

## 2019-09-23 RX ADMIN — FERROUS SULFATE TAB 325 MG (65 MG ELEMENTAL FE) 325 MG: 325 (65 FE) TAB at 17:30

## 2019-09-23 RX ADMIN — ZOLPIDEM TARTRATE 10 MG: 5 TABLET, COATED ORAL at 20:54

## 2019-09-23 RX ADMIN — SIMETHICONE CHEW TAB 80 MG 80 MG: 80 TABLET ORAL at 20:55

## 2019-09-23 RX ADMIN — METRONIDAZOLE 500 MG: 500 INJECTION, SOLUTION INTRAVENOUS at 00:55

## 2019-09-23 RX ADMIN — HYOSCYAMINE SULFATE 0.12 MG: 0.12 TABLET ORAL at 20:53

## 2019-09-23 RX ADMIN — METRONIDAZOLE 500 MG: 500 INJECTION, SOLUTION INTRAVENOUS at 16:45

## 2019-09-23 RX ADMIN — HYOSCYAMINE SULFATE 0.12 MG: 0.12 TABLET ORAL at 12:52

## 2019-09-23 RX ADMIN — HYOSCYAMINE SULFATE 0.12 MG: 0.12 TABLET ORAL at 00:55

## 2019-09-23 RX ADMIN — GLYCERIN, PETROLATUM, PHENYLEPHRINE HCL, PRAMOXINE HCL: 144; 2.5; 10; 15 CREAM TOPICAL at 09:07

## 2019-09-23 RX ADMIN — METRONIDAZOLE 500 MG: 500 INJECTION, SOLUTION INTRAVENOUS at 09:07

## 2019-09-23 RX ADMIN — ENOXAPARIN SODIUM 40 MG: 40 INJECTION SUBCUTANEOUS at 09:05

## 2019-09-23 RX ADMIN — LEVOTHYROXINE SODIUM 25 MCG: 25 TABLET ORAL at 06:09

## 2019-09-23 RX ADMIN — HYOSCYAMINE SULFATE 0.12 MG: 0.12 TABLET ORAL at 06:09

## 2019-09-23 RX ADMIN — HYOSCYAMINE SULFATE 0.12 MG: 0.12 TABLET ORAL at 09:07

## 2019-09-23 RX ADMIN — GLYCERIN, PETROLATUM, PHENYLEPHRINE HCL, PRAMOXINE HCL: 144; 2.5; 10; 15 CREAM TOPICAL at 17:31

## 2019-09-23 NOTE — TELEPHONE ENCOUNTER
Please schedule patient for hospital follow up with Dr Nahum Herrera in 2 weeks after discharge  If she prefers to be with Dr Gus Bolden at [facility] that is okay   Thank you

## 2019-09-23 NOTE — PROGRESS NOTES
Progress Note - Italo Isaacs 1965, 47 y o  female MRN: [id number]    Unit/Bed#: -01 Encounter: [id number]    Primary Care Provider: Michela Lambert DO   Date and time admitted to hospital: 9/16/2019 11:56 AM    * Ulcerative pancolitis with rectal bleeding [facility])  Assessment & Plan  Initial presentation of hematochezia and abdominal pain  History of ulcerative pancolitis and recurrent flare ups  Outpatient gastroenterology management by Dr Nahum Herrera  Previously on Entyvio  · Appreciate gastroenterology recommendations; transition IV Solu-Medrol to a p o  Prednisone 40 mg in preparation for discharge  No recommendation for ambulatory antibiotics  Recommend continued outpatient GI management and Remicade loading dose in 2 weeks  · Continue IV Flagyl, although per GI, no recommendation at time of discharge  · Continue pain management Tylenol 650 mg and oxycodone 5 mg  · Continue surgical soft diet as tolerated  Hemorrhoid  Assessment & Plan  · Continue Preparation-H max  Anemia  Assessment & Plan  Present hemoglobin 11 1; approximately baseline of 11  · Continue ferrous sulfate 325 mg  Arthralgia of multiple joints  Assessment & Plan  · Continue Tylenol 650 mg     Leukocytosis  Assessment & Plan  Present white blood cell count 11 5; presence of steroid use  · Continue to trend CBC  Asthma, mild intermittent  Assessment & Plan  · Continue pre-prescribed medications  VTE Pharmacologic Prophylaxis:   Pharmacologic: Enoxaparin (Lovenox)  Mechanical VTE Prophylaxis in Place: Yes    Patient Centered Rounds: I have performed bedside rounds with nursing staff today  Discussions with Specialists or Other Care Team Provider:  Gastroenterology and case management  Education and Discussions with Family / Patient:  Patient  Time Spent for Care: 30 minutes  More than 50% of total time spent on counseling and coordination of care as described above      Current Length of Stay: 7 day(s)    Current Patient Status: Inpatient   Certification Statement: The patient will continue to require additional inpatient hospital stay due to Transition IV to p o  Steroids  Discharge Plan:  Possible patient discharge in 24-48 hours  Code Status: Level 1 - Full Code      Subjective:   No acute distress noted at time of exam   She endorsed approximately 4 bowel movements overnight, possessing trace blood throughout  Also, she endorsed abdominal pain, 2/10; this is her approximate baseline  No other complaints stated  Patient stated she is eating well and tolerating diet  Objective:     Vitals:   Temp (24hrs), Av 5 °F (36 9 °C), Min:97 8 °F (36 6 °C), Max:99 5 °F (37 5 °C)    Temp:  [97 8 °F (36 6 °C)-99 5 °F (37 5 °C)] 98 1 °F (36 7 °C)  HR:  [60-74] 60  Resp:  [18] 18  BP: (106-157)/(55-70) 157/70  SpO2:  [98 %] 98 %  Body mass index is 25 15 kg/m²  Input and Output Summary (last 24 hours): Intake/Output Summary (Last 24 hours) at 2019 1252  Last data filed at 2019 0836  Gross per 24 hour   Intake 420 ml   Output --   Net 420 ml       Physical Exam:     Physical Exam   Constitutional: She is oriented to person, place, and time  Vital signs are normal  She appears well-developed and well-nourished  She is cooperative  She does not appear ill  No distress  HENT:   Head: Normocephalic and atraumatic  Eyes: Pupils are equal, round, and reactive to light  EOM are normal    Neck: Normal range of motion  Neck supple  Cardiovascular: Normal rate, regular rhythm, normal heart sounds, intact distal pulses and normal pulses  No murmur heard  Pulmonary/Chest: Effort normal and breath sounds normal  No respiratory distress  She has no decreased breath sounds  Abdominal: Normal appearance and bowel sounds are normal  She exhibits distension  There is generalized tenderness  There is no rigidity and no rebound  Musculoskeletal: Normal range of motion  She exhibits no edema  Neurological: She is alert and oriented to person, place, and time  Skin: Skin is warm, dry and intact  Capillary refill takes less than 2 seconds  She is not diaphoretic  Psychiatric: She has a normal mood and affect  Her speech is normal and behavior is normal  Judgment and thought content normal    Nursing note and vitals reviewed  Additional Data:     Labs:    Results from last 7 days   Lab Units 09/22/19  1004 09/21/19  0545   WBC Thousand/uL 11 50* 7 78   HEMOGLOBIN g/dL 11 1* 10 3*   HEMATOCRIT % 35 3 33 1*   PLATELETS Thousands/uL 485* 475*   BANDS PCT %  --  7   NEUTROS PCT % 76*  --    LYMPHS PCT % 15  --    LYMPHO PCT %  --  24   MONOS PCT % 4  --    MONO PCT %  --  11   EOS PCT % 0 0     Results from last 7 days   Lab Units 09/21/19  0545  09/17/19  0509   SODIUM mmol/L 142   < > 141   POTASSIUM mmol/L 3 7   < > 4 2   CHLORIDE mmol/L 107   < > 105   CO2 mmol/L 29   < > 28   BUN mg/dL 8   < > 6   CREATININE mg/dL 0 73   < > 0 71   ANION GAP mmol/L 6   < > 8   CALCIUM mg/dL 9 4   < > 9 5   ALBUMIN g/dL  --   --  2 2*   TOTAL BILIRUBIN mg/dL  --   --  0 10*   ALK PHOS U/L  --   --  71   ALT U/L  --   --  13   AST U/L  --   --  9   GLUCOSE RANDOM mg/dL 128   < > 160*    < > = values in this interval not displayed  * I Have Reviewed All Lab Data Listed Above  * Additional Pertinent Lab Tests Reviewed: All Labs For Current Hospital Admission Reviewed    Imaging:    Imaging Reports Reviewed Today Include:  None  Imaging Personally Reviewed by Myself Includes:  None      Recent Cultures (last 7 days):           Last 24 Hours Medication List:     Current Facility-Administered Medications:  acetaminophen 650 mg Oral Q6H PRN Ivon Martinez MD    albuterol 2 5 mg Nebulization Q6H PRN Ivon Martinez MD    aluminum-magnesium hydroxide-simethicone 30 mL Oral Q6H PRN Ivon Martinez MD    carisoprodol 350 mg Oral HS PRN Ivon Martinez MD    enoxaparin 40 mg Subcutaneous Daily Amelia Palomino MD    ferrous sulfate 325 mg Oral QPM Alice Ennis MD    hydrocortisone  Rectal BID Matilde Bolden MD    hyoscyamine 0 125 mg Sublingual Q4H Christopher Rose PA-C    levothyroxine 25 mcg Oral Early Morning Isabell Child MD    metroNIDAZOLE 500 mg Intravenous Q8H Christopher Rose PA-C Last Rate: 500 mg (09/23/19 0907)   ondansetron 4 mg Intravenous Q6H PRN Isbaell Child MD    oxyCODONE 5 mg Oral Q4H PRN Amelia Palomino MD    pramoxine-phenylephrine-glycerin-petrolatum  Rectal BID Matilde Bolden MD    predniSONE 40 mg Oral Daily Breezy Pierce PA-C    simethicone 80 mg Oral Q6H PRN Shey Yost PA-C    zolpidem 10 mg Oral HS PRN Isabell Child MD         Today, Patient Was Seen By: Toby Lake Holter, DO    ** Please Note: Dictation voice to text software may have been used in the creation of this document   **

## 2019-09-23 NOTE — TELEPHONE ENCOUNTER
Can someone please change the beacon order? We have a [address] [place] [address] team who will do the authorization with the patients insurance   Please advise

## 2019-09-23 NOTE — PROGRESS NOTES
Progress Note - Leroy Lerma 47 y o  female MRN: [id number]    Unit/Bed#: -01 Encounter: [id number]    Assessment and Plan:   Principal Problem:    Ulcerative pancolitis with rectal bleeding (HCC)  Active Problems:    Asthma, mild intermittent    Leukocytosis    Arthralgia of multiple joints    Anemia    Hemorrhoid    Ulcerative colitis with flare  -unfortunately developed flares on entyvio, did not respond to IV steroids initially inpatient so remicade 10mg/kg was initiated last week  She is now improved  -we will trial transitioning to PO prednisone today, if she continues to do well tomorrow she can be discharged  -Next remicade dose will be in 2 weeks, 10mg/kg dosing  -per Dr Brenda Alfaro to continue 40mg daily until seen by her in the office in 2 weeks  -continue scheduled levsin  -continue IV flagyl while admitted, d/c ceftriaxone  -low residue diet    ----------------------------------------------------------------------------------------------------------------    Subjective:     She feels improved, stools are becoming more formed, less frequent (from every 1-2 hours to about every 4 hours) she is tolerating a solid diet  Abdominal pain with bms has significantly improved    Objective:     Vitals: Blood pressure 157/70, pulse 60, temperature 98 1 °F (36 7 °C), temperature source Oral, resp  rate 18, height 5' 3" (1 6 m), weight 64 4 kg (141 lb 15 6 oz), SpO2 98 %  ,Body mass index is 25 15 kg/m²        Intake/Output Summary (Last 24 hours) at 9/23/2019 1406  Last data filed at 9/23/2019 0836  Gross per 24 hour   Intake 420 ml   Output --   Net 420 ml     Physical Exam:   General Appearance: Alert, appears stated age and cooperative  Lungs: Clear to auscultation bilaterally  Heart: Regular rate and rhythm, S1, S2 normal, no murmur, click, rub or gallop  Abdomen: Soft, mild LLQ tenderness, non-distended; bowel sounds normal  Extremities: No cyanosis, clubbing, or edema    Invasive Devices     Peripheral Intravenous Line            Peripheral IV 09/22/19 Left Arm less than 1 day                Lab Results:  Results from last 7 days   Lab Units 09/22/19  1004   WBC Thousand/uL 11 50*   HEMOGLOBIN g/dL 11 1*   HEMATOCRIT % 35 3   PLATELETS Thousands/uL 485*   NEUTROS PCT % 76*   LYMPHS PCT % 15   MONOS PCT % 4   EOS PCT % 0     Results from last 7 days   Lab Units 09/21/19  0545  09/17/19  0509   POTASSIUM mmol/L 3 7   < > 4 2   CHLORIDE mmol/L 107   < > 105   CO2 mmol/L 29   < > 28   BUN mg/dL 8   < > 6   CREATININE mg/dL 0 73   < > 0 71   CALCIUM mg/dL 9 4   < > 9 5   ALK PHOS U/L  --   --  71   ALT U/L  --   --  13   AST U/L  --   --  9    < > = values in this interval not displayed  Imaging Studies: I have personally reviewed pertinent imaging studies  No results found

## 2019-09-23 NOTE — PLAN OF CARE
Problem: PAIN - ADULT  Goal: Verbalizes/displays adequate comfort level or baseline comfort level  Description  Interventions:  - Encourage patient to monitor pain and request assistance  - Assess pain using appropriate pain scale  - Administer analgesics based on type and severity of pain and evaluate response  - Implement non-pharmacological measures as appropriate and evaluate response  - Consider cultural and social influences on pain and pain management  - Notify physician/advanced practitioner if interventions unsuccessful or patient reports new pain  Outcome: Progressing     Problem: INFECTION - ADULT  Goal: Absence or prevention of progression during hospitalization  Description  INTERVENTIONS:  - Assess and monitor for signs and symptoms of infection  - Monitor lab/diagnostic results  - Monitor all insertion sites, i e  indwelling lines, tubes, and drains  - Monitor endotracheal if appropriate and nasal secretions for changes in amount and color  - [place] appropriate cooling/warming therapies per order  - Administer medications as ordered  - Instruct and encourage patient and family to use good hand hygiene technique  - Identify and instruct in appropriate isolation precautions for identified infection/condition  Outcome: Progressing     Problem: SAFETY ADULT  Goal: Patient will remain free of falls  Description  INTERVENTIONS:  - Assess patient frequently for physical needs  -  Identify cognitive and physical deficits and behaviors that affect risk of falls    -  [place] fall precautions as indicated by assessment   - Educate patient/family on patient safety including physical limitations  - Instruct patient to call for assistance with activity based on assessment  - Modify environment to reduce risk of injury  - Consider OT/PT consult to assist with strengthening/mobility  Outcome: Progressing     Problem: DISCHARGE PLANNING  Goal: Discharge to home or other facility with appropriate resources  Description  INTERVENTIONS:  - Identify barriers to discharge w/patient and caregiver  - Arrange for needed discharge resources and transportation as appropriate  - Identify discharge learning needs (meds, wound care, etc )  - Arrange for interpretive services to assist at discharge as needed  - Refer to Case Management Department for coordinating discharge planning if the patient needs post-hospital services based on physician/advanced practitioner order or complex needs related to functional status, cognitive ability, or social support system  Outcome: Progressing     Problem: Knowledge Deficit  Goal: Patient/family/caregiver demonstrates understanding of disease process, treatment plan, medications, and discharge instructions  Description  Complete learning assessment and assess knowledge base    Interventions:  - Provide teaching at level of understanding  - Provide teaching via preferred learning methods  Outcome: Progressing     Problem: GASTROINTESTINAL - ADULT  Goal: Minimal or absence of nausea and/or vomiting  Description  INTERVENTIONS:  - Administer IV fluids if ordered to ensure adequate hydration  - Maintain NPO status until nausea and vomiting are resolved  - Nasogastric tube if ordered  - Administer ordered antiemetic medications as needed  - Provide nonpharmacologic comfort measures as appropriate  - Advance diet as tolerated, if ordered  - Consider nutrition services referral to assist patient with adequate nutrition and appropriate food choices  Outcome: Progressing  Goal: Maintains or returns to baseline bowel function  Description  INTERVENTIONS:  - Assess bowel function  - Encourage oral fluids to ensure adequate hydration  - Administer IV fluids if ordered to ensure adequate hydration  - Administer ordered medications as needed  - Encourage mobilization and activity  - Consider nutritional services referral to assist patient with adequate nutrition and appropriate food choices  Outcome: Progressing  Goal: Maintains adequate nutritional intake  Description  INTERVENTIONS:  - Monitor percentage of each meal consumed  - Identify factors contributing to decreased intake, treat as appropriate  - Assist with meals as needed  - Monitor I&O, weight, and lab values if indicated  - Obtain nutrition services referral as needed  Outcome: Progressing     Problem: Nutrition/Hydration-ADULT  Goal: Nutrient/Hydration intake appropriate for improving, restoring or maintaining nutritional needs  Description  Monitor and assess patient's nutrition/hydration status for malnutrition  Collaborate with interdisciplinary team and initiate plan and interventions as ordered  Monitor patient's weight and dietary intake as ordered or per policy  Utilize nutrition screening tool and intervene as necessary  Determine patient's food preferences and provide high-protein, high-caloric foods as appropriate  INTERVENTIONS:  - Monitor oral intake, urinary output, labs, and treatment plans  - Assess nutrition and hydration status and recommend course of action  - Evaluate amount of meals eaten  - Assist patient with eating if necessary   - Allow adequate time for meals  - Recommend/ encourage appropriate diets, oral nutritional supplements, and vitamin/mineral supplements  - Order, calculate, and assess calorie counts as needed  - Recommend, monitor, and adjust tube feedings and TPN/PPN based on assessed needs  - Assess need for intravenous fluids  - Provide specific nutrition/hydration education as appropriate  - Include patient/family/caregiver in decisions related to nutrition  Outcome: Progressing     Problem: Potential for Falls  Goal: Patient will remain free of falls  Description  INTERVENTIONS:  - Assess patient frequently for physical needs  -  Identify cognitive and physical deficits and behaviors that affect risk of falls    -  [place] fall precautions as indicated by assessment   - Educate patient/family on patient safety including physical limitations  - Instruct patient to call for assistance with activity based on assessment  - Modify environment to reduce risk of injury  - Consider OT/PT consult to assist with strengthening/mobility  Outcome: Progressing

## 2019-09-23 NOTE — ASSESSMENT & PLAN NOTE
Initial presentation of hematochezia and abdominal pain  History of ulcerative pancolitis and recurrent flare ups  Outpatient gastroenterology management by Dr Zapien Meals  Previously on Entyvio  · Appreciate gastroenterology recommendations; transition IV Solu-Medrol to p o  Prednisone 40 mg in preparation for discharge  No recommendation for ambulatory antibiotics  Recommend continued outpatient GI management and Remicade loading dose in 2 weeks  · Continue IV Flagyl, although per GI, no recommendation to continue at time of discharge  · Continue pain management Tylenol 650 mg and oxycodone 5 mg  · Continue surgical soft diet as tolerated

## 2019-09-23 NOTE — TELEPHONE ENCOUNTER
Dr Nahum Herrera is in the office tomorrow, I received the forms and will have her fill them out and fax them to the number on the form

## 2019-09-23 NOTE — TELEPHONE ENCOUNTER
Can someone please check and make sure she gets set up with the infusion center for a repeat remicade infusion in two weeks from her infusion in the hospital and then 6 weeks after her first infusion in the hospital   Her dose should be 10 mg/kg  Thank you so much  Also please arrange follow up appt with me in 2 weeks in the office  If she prefers to see Dr Lei Phoenix that is fine as well  She also should still be on prednisone 40mg until seeing me in the office

## 2019-09-23 NOTE — TELEPHONE ENCOUNTER
Jessica Hodges,  Could you just make sure she knows to continue prednisone 40mg daily until she sees us in the office?  I took care of the rest  Thank you

## 2019-09-23 NOTE — TELEPHONE ENCOUNTER
I changed the date for every 2 weeks, but the plan is on hold while she is in the hospital Chyrel Bernheim

## 2019-09-24 ENCOUNTER — TELEPHONE (OUTPATIENT)
Dept: GASTROENTEROLOGY | Facility: AMBULARY SURGERY CENTER | Age: 54
End: 2019-09-24

## 2019-09-24 VITALS
DIASTOLIC BLOOD PRESSURE: 63 MMHG | SYSTOLIC BLOOD PRESSURE: 141 MMHG | HEIGHT: 63 IN | OXYGEN SATURATION: 99 % | HEART RATE: 62 BPM | RESPIRATION RATE: 18 BRPM | TEMPERATURE: 98.4 F | WEIGHT: 141.98 LBS | BODY MASS INDEX: 25.16 KG/M2

## 2019-09-24 DIAGNOSIS — K51.011 ULCERATIVE PANCOLITIS WITH RECTAL BLEEDING (HCC): ICD-10-CM

## 2019-09-24 PROCEDURE — 99239 HOSP IP/OBS DSCHRG MGMT >30: CPT | Performed by: HOSPITALIST

## 2019-09-24 RX ORDER — PREDNISONE 20 MG/1
40 TABLET ORAL DAILY
Qty: 28 TABLET | Refills: 0 | Status: SHIPPED | OUTPATIENT
Start: 2019-09-25 | End: 2019-10-09

## 2019-09-24 RX ADMIN — PREDNISONE 40 MG: 20 TABLET ORAL at 08:12

## 2019-09-24 RX ADMIN — GLYCERIN, PETROLATUM, PHENYLEPHRINE HCL, PRAMOXINE HCL: 144; 2.5; 10; 15 CREAM TOPICAL at 08:31

## 2019-09-24 RX ADMIN — METRONIDAZOLE 500 MG: 500 INJECTION, SOLUTION INTRAVENOUS at 01:22

## 2019-09-24 RX ADMIN — HYOSCYAMINE SULFATE 0.12 MG: 0.12 TABLET ORAL at 01:22

## 2019-09-24 RX ADMIN — HYDROCORTISONE 2.5%: 25 CREAM TOPICAL at 08:13

## 2019-09-24 RX ADMIN — HYOSCYAMINE SULFATE 0.12 MG: 0.12 TABLET ORAL at 05:04

## 2019-09-24 RX ADMIN — HYOSCYAMINE SULFATE 0.12 MG: 0.12 TABLET ORAL at 09:49

## 2019-09-24 RX ADMIN — LEVOTHYROXINE SODIUM 25 MCG: 25 TABLET ORAL at 05:05

## 2019-09-24 RX ADMIN — METRONIDAZOLE 500 MG: 500 INJECTION, SOLUTION INTRAVENOUS at 08:12

## 2019-09-24 NOTE — TELEPHONE ENCOUNTER
I'm not sure how to enter this  The only option I have for infusion therapy is appointment request, which I have already done via [employer]  I cannot find an order for referral to infusion therapy and I have never been asked to place this in the past  Do you know the exact order name?

## 2019-09-24 NOTE — TELEPHONE ENCOUNTER
Pt returned your call  Pt asks if the return date on her disability papers are for 10/11/19? Please let her know before faxing the paperwork

## 2019-09-24 NOTE — DISCHARGE SUMMARY
Discharge- Sanjana Willscer 1965, 47 y o  female MRN: [id number]    Unit/Bed#: -01 Encounter: [id number]    Primary Care Provider: Negro Ortiz DO   Date and time admitted to hospital: 9/16/2019 11:56 AM    * Ulcerative pancolitis with rectal bleeding [facility])  Assessment & Plan  Initial presentation of hematochezia and abdominal pain  History of ulcerative pancolitis and recurrent flare ups  Outpatient gastroenterology management by Dr Wayne Hancock  Previously on Entyvio; no relief  · Appreciate gastroenterology recommendations; transition IV Solu-Medrol to p o  Prednisone 40 mg for 2 weeks in preparation for discharge  No recommendation for ambulatory antibiotics  Recommend continued outpatient gastroenterology appointment 2 weeks and initiation of Remicade  · Continue IV Flagyl, although per GI, no recommendation to continue at time of discharge  · Continue pain management Tylenol 650 mg and oxycodone 5 mg  · Continue low residue/low fiber diet as tolerated  Hemorrhoid  Assessment & Plan  · Continue Preparation-H max  Anemia  Assessment & Plan  Present hemoglobin 11 1; approximately baseline of 11  · Continue ferrous sulfate 325 mg  Arthralgia of multiple joints  Assessment & Plan  · Continue Tylenol 650 mg     Leukocytosis  Assessment & Plan  Present white blood cell count 11 5, likely secondary to steroid use  · Continue to trend CBC  Asthma, mild intermittent  Assessment & Plan  · Continue pre-prescribed medications  Discharging Physician / Practitioner: Anabel Cohen DO  PCP: Negro Ortiz DO  Admission Date:   Admission Orders (From admission, onward)     Ordered        09/16/19 1407  Inpatient Admission  Once                   Discharge Date: 09/24/19    Resolved Problems  Date Reviewed: 9/24/2019    None          Consultations During Hospital Stay:  · Gastroenterology  Procedures Performed:   · None      Significant Findings / Test Results:   · CBC-hemoglobin 11 3   · Sedimentation rate-43  · C reactive protein-73  2  · Calprotectin-2210  · C diff toxin-negative  · Stool enteric bacterial panel-negative  · Colonoscopy-hard stool and cecum, severe diffuse ulceration throughout rectum to hepatic flexure  · Colon biopsy, transverse-negative for dysplasia  · Colon biopsy, descending-left-negative for dysplasia  Incidental Findings:   · None  Test Results Pending at Discharge (will require follow up): · None  Outpatient Tests Requested:  · None  Complications:  None  Reason for Admission:  Ulcerative colitis flare  Hospital Course:     Landon Galvez is a 47 y o  female patient, diagnosis of ulcerative colitis in 2012 status post Humira and Entyvio failure in addition to anemia and hypothyroidism, that originally presented to [facility] ED on 9/16/2019 due to hematochezia, abdominal pain and a 20 pound unintentional weight loss  Initial vitals:  Blood pressure 102/50, heart rate 67, temperature 99 6, respirations 18 and is SpO2 94%  Gastroenterology consulted  IV Solu-Medrol 20 mg q 6 hours initiated in addition to IV fluids  Zofran for nausea  Levsin for cramping  Tylenol and oxycodone for pain  Preparation H for external hemorrhoids  NPO diet for colonoscopy  See above for pertinent labs  Despite initial medical intervention, patient endorsed continue diarrhea, possessing blood, and intermittent colicky/cramping abdominal pain  IV ceftriaxone and metronidazole initiated  See above for findings of colonoscopy and biopsy  Fewer loose stools possessing trace blood and diminished abdominal pain endorsed by the patient  Cefepime discontinued  Advancement of diet clear liquids to surgical soft to low-fiber/low residue; tolerated well by patient  IV Solu-Medrol transitioned to p o  Prednisone preparation for discharge  Gastroenterology recommendations include: continuation of p o   Prednisone 40 mg for 2 weeks in addition to recomendation to schedule outpatient gastroenterology appointment in 2 weeks and consideration for additional immunosuppression using Remicade and methotrexate  Also, recommendation for colorectal surgery input if necessary  No ambulatory antibiotics recommended  Patient is amenable to discharge plan and acute gastroenterology recommendations  Please see above list of diagnoses and related plan for additional information  Condition at Discharge: stable     Discharge Day Visit / Exam:     Subjective:  No acute distress noted at time of exam   Patient source diminished abdominal pain a, 2/10, and fewer loose stools overnight  She stated that she is tolerating her diet well  She is amenable to discharge plan-See above  Vitals: Blood Pressure: 141/63 (09/24/19 0700)  Pulse: 62 (09/24/19 0700)  Temperature: 98 4 °F (36 9 °C) (09/24/19 0700)  Temp Source: Oral (09/24/19 0700)  Respirations: 18 (09/24/19 0700)  Height: 5' 3" (160 cm) (09/16/19 1219)  Weight - Scale: 64 4 kg (141 lb 15 6 oz) (09/20/19 0959)  SpO2: 99 % (09/24/19 0700)  Exam:   Physical Exam   Constitutional: She is oriented to person, place, and time  Vital signs are normal  She appears well-developed and well-nourished  She is cooperative  She does not appear ill  No distress  HENT:   Head: Normocephalic and atraumatic  Eyes: Pupils are equal, round, and reactive to light  EOM are normal    Neck: Normal range of motion  Neck supple  Cardiovascular: Normal rate, regular rhythm, normal heart sounds, intact distal pulses and normal pulses  No murmur heard  Pulmonary/Chest: Effort normal and breath sounds normal  No respiratory distress  Abdominal: Soft  She exhibits distension  Bowel sounds are increased  There is generalized tenderness  There is rigidity  There is no rebound  Musculoskeletal: Normal range of motion  She exhibits no edema     Neurological: She is alert and oriented to person, place, and time    Skin: Skin is warm, dry and intact  Capillary refill takes less than 2 seconds  She is not diaphoretic  Psychiatric: She has a normal mood and affect  Her behavior is normal  Judgment and thought content normal    Nursing note and vitals reviewed  Discussion with Family:  Patient's spouse  Discharge instructions/Information to patient and family:   See after visit summary for information provided to patient and family  Provisions for Follow-Up Care:  See after visit summary for information related to follow-up care and any pertinent home health orders  Disposition:     Home    For Discharges to [facility] SNF:   · Not Applicable to this Patient - Not Applicable to this Patient    Planned Readmission:  None  Discharge Statement:  I spent greater than 30 minutes discharging the patient  This time was spent on the day of discharge  I had direct contact with the patient on the day of discharge  Greater than 50% of the total time was spent examining patient, answering all patient questions, arranging and discussing plan of care with patient as well as directly providing post-discharge instructions  Additional time then spent on discharge activities  Discharge Medications:  See after visit summary for reconciled discharge medications provided to patient and family        ** Please Note: This note has been constructed using a voice recognition system **

## 2019-09-24 NOTE — TELEPHONE ENCOUNTER
Sure, no problem  Would that be a referral to the infusion center?  I have never placed a referral for an infusion before

## 2019-09-24 NOTE — DISCHARGE INSTRUCTIONS
Low Fiber Diet   WHAT YOU NEED TO KNOW:   A low-fiber diet limits foods that are high in fiber  Fiber is the part of fruits, vegetables, and grains that is not broken down by your body  You may need to follow this diet after surgery on your intestines  You may also need to follow this diet for certain conditions such as Crohn disease or ulcerative colitis  Ask your healthcare provider or dietitian how much fiber you can have each day  DISCHARGE INSTRUCTIONS:   Foods to include:  Read food labels to check the amount of fiber that are found in foods  Some foods that are low in fiber include the following:  · Grains:  Choose grains that have less than 2 grams of fiber in each serving   Examples include the following:     ¨ Cream of wheat and finely ground grits    ¨ Dry cereal made from rice     ¨ White bread, white pasta, and white rice    ¨ Crackers, bagels, and rolls made from white or refined flour    · Other foods:      ¨ Canned and well-cooked fruit without skins or seeds, and juice without pulp    ¨ Ripe bananas and melons    ¨ Canned and well-cooked vegetables without skins or seeds, and vegetable juice    ¨ Cow's milk, lactose-free milk, soy milk, and rice milk    ¨ Yogurt without nuts, fruit, or granola    ¨ Eggs, poultry (such as chicken and turkey), fish, and tender, ground, well-cooked beef     ¨ Tofu and smooth peanut butter    ¨ Broth and strained soups made of low-fiber foods  Foods to avoid:   · Breads, cereals, crackers, and pasta made with whole wheat or whole grains (such as whole oats)    · [place], wild rice, quinoa, kasha, and barley    · All fresh fruit with skin, except banana and melons     · Dried fruits and fruit juice with pulp    · Canned pineapple    · Raw vegetables    · Nuts, seeds, and popcorn    · Beans, nuts, peas, and lentils    · Tough meats    · Coconut and avocado  What else you should know about a low-fiber diet:  A low-fiber diet can decrease the amount of bowel movements you have  Drink liquids as directed to avoid constipation  Ask how much liquid to drink each day and which liquids are best for you  © 2017 [employer] Information is for End User's use only and may not be sold, redistributed or otherwise used for commercial purposes  All illustrations and images included in CareNotes® are the copyrighted property of [employer]  or Brent [employer]  The above information is an  only  It is not intended as medical advice for individual conditions or treatments  Talk to your doctor, nurse or pharmacist before following any medical regimen to see if it is safe and effective for you

## 2019-09-24 NOTE — TELEPHONE ENCOUNTER
Great I see that now, thank you! The referral in the patients chart in for entyvio, can a new one be placed for remicade?

## 2019-09-24 NOTE — ASSESSMENT & PLAN NOTE
Initial presentation of hematochezia and abdominal pain  History of ulcerative pancolitis and recurrent flare ups  Outpatient gastroenterology management by Dr Bernie Bolaños  Previously on Entyvio; no relief  · Appreciate gastroenterology recommendations; transition IV Solu-Medrol to p o  Prednisone 40 mg for 2 weeks in preparation for discharge  No recommendation for ambulatory antibiotics  Recommend continued outpatient gastroenterology appointment 2 weeks and initiation of Remicade  · Continue IV Flagyl, although per GI, no recommendation to continue at time of discharge  · Continue pain management Tylenol 650 mg and oxycodone 5 mg  · Continue low residue/low fiber diet as tolerated

## 2019-09-24 NOTE — TELEPHONE ENCOUNTER
Hello!  This pt has a therapy plan in beacon for Entyvio, please discontinue/ delete this plan as pt is being switched to Remicade( please place new therapy plan also)    Thank you!!

## 2019-09-24 NOTE — PLAN OF CARE
Problem: PAIN - ADULT  Goal: Verbalizes/displays adequate comfort level or baseline comfort level  Description  Interventions:  - Encourage patient to monitor pain and request assistance  - Assess pain using appropriate pain scale  - Administer analgesics based on type and severity of pain and evaluate response  - Implement non-pharmacological measures as appropriate and evaluate response  - Consider cultural and social influences on pain and pain management  - Notify physician/advanced practitioner if interventions unsuccessful or patient reports new pain  Outcome: Progressing     Problem: INFECTION - ADULT  Goal: Absence or prevention of progression during hospitalization  Description  INTERVENTIONS:  - Assess and monitor for signs and symptoms of infection  - Monitor lab/diagnostic results  - Monitor all insertion sites, i e  indwelling lines, tubes, and drains  - Monitor endotracheal if appropriate and nasal secretions for changes in amount and color  - [place] appropriate cooling/warming therapies per order  - Administer medications as ordered  - Instruct and encourage patient and family to use good hand hygiene technique  - Identify and instruct in appropriate isolation precautions for identified infection/condition  Outcome: Progressing     Problem: SAFETY ADULT  Goal: Patient will remain free of falls  Description  INTERVENTIONS:  - Assess patient frequently for physical needs  -  Identify cognitive and physical deficits and behaviors that affect risk of falls    -  [place] fall precautions as indicated by assessment   - Educate patient/family on patient safety including physical limitations  - Instruct patient to call for assistance with activity based on assessment  - Modify environment to reduce risk of injury  - Consider OT/PT consult to assist with strengthening/mobility  Outcome: Progressing     Problem: DISCHARGE PLANNING  Goal: Discharge to home or other facility with appropriate resources  Description  INTERVENTIONS:  - Identify barriers to discharge w/patient and caregiver  - Arrange for needed discharge resources and transportation as appropriate  - Identify discharge learning needs (meds, wound care, etc )  - Arrange for interpretive services to assist at discharge as needed  - Refer to Case Management Department for coordinating discharge planning if the patient needs post-hospital services based on physician/advanced practitioner order or complex needs related to functional status, cognitive ability, or social support system  Outcome: Progressing     Problem: Knowledge Deficit  Goal: Patient/family/caregiver demonstrates understanding of disease process, treatment plan, medications, and discharge instructions  Description  Complete learning assessment and assess knowledge base    Interventions:  - Provide teaching at level of understanding  - Provide teaching via preferred learning methods  Outcome: Progressing     Problem: GASTROINTESTINAL - ADULT  Goal: Minimal or absence of nausea and/or vomiting  Description  INTERVENTIONS:  - Administer IV fluids if ordered to ensure adequate hydration  - Maintain NPO status until nausea and vomiting are resolved  - Nasogastric tube if ordered  - Administer ordered antiemetic medications as needed  - Provide nonpharmacologic comfort measures as appropriate  - Advance diet as tolerated, if ordered  - Consider nutrition services referral to assist patient with adequate nutrition and appropriate food choices  Outcome: Progressing  Goal: Maintains or returns to baseline bowel function  Description  INTERVENTIONS:  - Assess bowel function  - Encourage oral fluids to ensure adequate hydration  - Administer IV fluids if ordered to ensure adequate hydration  - Administer ordered medications as needed  - Encourage mobilization and activity  - Consider nutritional services referral to assist patient with adequate nutrition and appropriate food choices  Outcome: Progressing  Goal: Maintains adequate nutritional intake  Description  INTERVENTIONS:  - Monitor percentage of each meal consumed  - Identify factors contributing to decreased intake, treat as appropriate  - Assist with meals as needed  - Monitor I&O, weight, and lab values if indicated  - Obtain nutrition services referral as needed  Outcome: Progressing     Problem: Nutrition/Hydration-ADULT  Goal: Nutrient/Hydration intake appropriate for improving, restoring or maintaining nutritional needs  Description  Monitor and assess patient's nutrition/hydration status for malnutrition  Collaborate with interdisciplinary team and initiate plan and interventions as ordered  Monitor patient's weight and dietary intake as ordered or per policy  Utilize nutrition screening tool and intervene as necessary  Determine patient's food preferences and provide high-protein, high-caloric foods as appropriate  INTERVENTIONS:  - Monitor oral intake, urinary output, labs, and treatment plans  - Assess nutrition and hydration status and recommend course of action  - Evaluate amount of meals eaten  - Assist patient with eating if necessary   - Allow adequate time for meals  - Recommend/ encourage appropriate diets, oral nutritional supplements, and vitamin/mineral supplements  - Order, calculate, and assess calorie counts as needed  - Recommend, monitor, and adjust tube feedings and TPN/PPN based on assessed needs  - Assess need for intravenous fluids  - Provide specific nutrition/hydration education as appropriate  - Include patient/family/caregiver in decisions related to nutrition  Outcome: Progressing     Problem: Potential for Falls  Goal: Patient will remain free of falls  Description  INTERVENTIONS:  - Assess patient frequently for physical needs  -  Identify cognitive and physical deficits and behaviors that affect risk of falls    -  [place] fall precautions as indicated by assessment   - Educate patient/family on patient safety including physical limitations  - Instruct patient to call for assistance with activity based on assessment  - Modify environment to reduce risk of injury  - Consider OT/PT consult to assist with strengthening/mobility  Outcome: Progressing

## 2019-09-24 NOTE — TELEPHONE ENCOUNTER
Left message on voice mail that disability paper work is filled out  She is scheduled for follow up office visit 10/9/19

## 2019-09-24 NOTE — TELEPHONE ENCOUNTER
Infusion appt request would work, but could it be specific to remicade?  When I look in the pts referrals in appt desk, I see one but for entyvio, therefore the therapy plan and referral cannot be linked    Sorry for any confusion

## 2019-09-24 NOTE — TELEPHONE ENCOUNTER
Okay, I tried reordering an appointment request in beacon  The option for infusion referral does not give me an option to select a biologic and this is not in beacon, which I was told everything was through now  It also asks for the number of minutes, which we usually do not have to choose   Yes, I apologize, I've never had an issue with this with prior infusion orders

## 2019-09-24 NOTE — TELEPHONE ENCOUNTER
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

## 2019-09-25 ENCOUNTER — TRANSCRIBE ORDERS (OUTPATIENT)
Dept: GASTROENTEROLOGY | Facility: CLINIC | Age: 54
End: 2019-09-25

## 2019-09-25 RX ORDER — HYOSCYAMINE SULFATE 0.125 MG
TABLET ORAL
Qty: 30 TABLET | Refills: 0 | Status: SHIPPED | OUTPATIENT
Start: 2019-09-25 | End: 2022-06-27 | Stop reason: ALTCHOICE

## 2019-09-26 ENCOUNTER — TRANSITIONAL CARE MANAGEMENT (OUTPATIENT)
Dept: FAMILY MEDICINE CLINIC | Facility: CLINIC | Age: 54
End: 2019-09-26

## 2019-10-02 DIAGNOSIS — F51.04 CHRONIC INSOMNIA: ICD-10-CM

## 2019-10-02 RX ORDER — ZOLPIDEM TARTRATE 10 MG/1
TABLET ORAL
Qty: 30 TABLET | Refills: 0 | Status: SHIPPED | OUTPATIENT
Start: 2019-10-02 | End: 2019-10-09 | Stop reason: SDUPTHER

## 2019-10-04 ENCOUNTER — HOSPITAL ENCOUNTER (OUTPATIENT)
Dept: INFUSION CENTER | Facility: HOSPITAL | Age: 54
Discharge: HOME | End: 2019-10-04
Attending: INTERNAL MEDICINE
Payer: COMMERCIAL

## 2019-10-04 VITALS
RESPIRATION RATE: 18 BRPM | BODY MASS INDEX: 25.27 KG/M2 | DIASTOLIC BLOOD PRESSURE: 60 MMHG | SYSTOLIC BLOOD PRESSURE: 112 MMHG | HEART RATE: 65 BPM | TEMPERATURE: 98 F | WEIGHT: 142.64 LBS

## 2019-10-04 DIAGNOSIS — K51.011 ULCERATIVE PANCOLITIS WITH RECTAL BLEEDING (HCC): Primary | ICD-10-CM

## 2019-10-04 PROCEDURE — 96413 CHEMO IV INFUSION 1 HR: CPT

## 2019-10-04 PROCEDURE — 96415 CHEMO IV INFUSION ADDL HR: CPT

## 2019-10-04 PROCEDURE — 96375 TX/PRO/DX INJ NEW DRUG ADDON: CPT

## 2019-10-04 RX ORDER — METHYLPREDNISOLONE SODIUM SUCCINATE 40 MG/ML
40 INJECTION, POWDER, LYOPHILIZED, FOR SOLUTION INTRAMUSCULAR; INTRAVENOUS ONCE
Status: CANCELLED | OUTPATIENT
Start: 2019-11-01

## 2019-10-04 RX ORDER — DIPHENHYDRAMINE HCL 25 MG
25 TABLET ORAL ONCE
Status: DISCONTINUED | OUTPATIENT
Start: 2019-10-04 | End: 2019-10-07 | Stop reason: HOSPADM

## 2019-10-04 RX ORDER — ACETAMINOPHEN 325 MG/1
650 TABLET ORAL ONCE
Status: CANCELLED | OUTPATIENT
Start: 2019-11-01

## 2019-10-04 RX ORDER — DIPHENHYDRAMINE HCL 25 MG
25 TABLET ORAL ONCE
Status: CANCELLED | OUTPATIENT
Start: 2019-11-01

## 2019-10-04 RX ORDER — METHYLPREDNISOLONE SODIUM SUCCINATE 40 MG/ML
40 INJECTION, POWDER, LYOPHILIZED, FOR SOLUTION INTRAMUSCULAR; INTRAVENOUS ONCE
Status: COMPLETED | OUTPATIENT
Start: 2019-10-04 | End: 2019-10-04

## 2019-10-04 RX ORDER — SODIUM CHLORIDE 9 MG/ML
20 INJECTION, SOLUTION INTRAVENOUS ONCE
Status: CANCELLED | OUTPATIENT
Start: 2019-11-01

## 2019-10-04 RX ORDER — ACETAMINOPHEN 325 MG/1
650 TABLET ORAL ONCE
Status: COMPLETED | OUTPATIENT
Start: 2019-10-04 | End: 2019-10-04

## 2019-10-04 RX ORDER — SODIUM CHLORIDE 9 MG/ML
20 INJECTION, SOLUTION INTRAVENOUS ONCE
Status: COMPLETED | OUTPATIENT
Start: 2019-10-04 | End: 2019-10-04

## 2019-10-04 RX ADMIN — METHYLPREDNISOLONE SODIUM SUCCINATE 40 MG: 40 INJECTION, POWDER, FOR SOLUTION INTRAMUSCULAR; INTRAVENOUS at 12:10

## 2019-10-04 RX ADMIN — INFLIXIMAB 644 MG: 100 INJECTION, POWDER, LYOPHILIZED, FOR SOLUTION INTRAVENOUS at 12:53

## 2019-10-04 RX ADMIN — ACETAMINOPHEN 650 MG: 325 TABLET ORAL at 12:09

## 2019-10-04 RX ADMIN — SODIUM CHLORIDE 20 ML/HR: 0.9 INJECTION, SOLUTION INTRAVENOUS at 12:52

## 2019-10-04 NOTE — PLAN OF CARE
Problem: Potential for Falls  Goal: Patient will remain free of falls  Description  INTERVENTIONS:  - Assess patient frequently for physical needs  -  Identify cognitive and physical deficits and behaviors that affect risk of falls  -  [place] fall precautions as indicated by assessment   - Educate patient/family on patient safety including physical limitations  - Instruct patient to call for assistance with activity based on assessment  - Modify environment to reduce risk of injury  - Consider OT/PT consult to assist with strengthening/mobility  Outcome: Progressing     Problem: Knowledge Deficit  Goal: Patient/family/caregiver demonstrates understanding of disease process, treatment plan, medications, and discharge instructions  Description  Complete learning assessment and assess knowledge base    Interventions:  - Provide teaching at level of understanding  - Provide teaching via preferred learning methods  Outcome: Progressing

## 2019-10-09 ENCOUNTER — OFFICE VISIT (OUTPATIENT)
Dept: GASTROENTEROLOGY | Facility: CLINIC | Age: 54
End: 2019-10-09
Payer: COMMERCIAL

## 2019-10-09 ENCOUNTER — APPOINTMENT (OUTPATIENT)
Dept: LAB | Facility: HOSPITAL | Age: 54
End: 2019-10-09
Attending: INTERNAL MEDICINE
Payer: COMMERCIAL

## 2019-10-09 VITALS
TEMPERATURE: 99.9 F | WEIGHT: 141 LBS | SYSTOLIC BLOOD PRESSURE: 126 MMHG | BODY MASS INDEX: 24.98 KG/M2 | DIASTOLIC BLOOD PRESSURE: 80 MMHG | HEIGHT: 63 IN | HEART RATE: 74 BPM

## 2019-10-09 DIAGNOSIS — K51.011 ULCERATIVE PANCOLITIS WITH RECTAL BLEEDING (HCC): Primary | ICD-10-CM

## 2019-10-09 DIAGNOSIS — K51.011 ULCERATIVE PANCOLITIS WITH RECTAL BLEEDING (HCC): ICD-10-CM

## 2019-10-09 LAB
25(OH)D3 SERPL-MCNC: 45.6 NG/ML (ref 30–100)
BASOPHILS # BLD AUTO: 0.03 THOUSANDS/ΜL (ref 0–0.1)
BASOPHILS NFR BLD AUTO: 0 % (ref 0–1)
CRP SERPL QL: <3 MG/L
EOSINOPHIL # BLD AUTO: 0.08 THOUSAND/ΜL (ref 0–0.61)
EOSINOPHIL NFR BLD AUTO: 1 % (ref 0–6)
ERYTHROCYTE [DISTWIDTH] IN BLOOD BY AUTOMATED COUNT: 16.3 % (ref 11.6–15.1)
FERRITIN SERPL-MCNC: 59 NG/ML (ref 8–388)
HCT VFR BLD AUTO: 37.6 % (ref 34.8–46.1)
HGB BLD-MCNC: 11.5 G/DL (ref 11.5–15.4)
IMM GRANULOCYTES # BLD AUTO: 0.07 THOUSAND/UL (ref 0–0.2)
IMM GRANULOCYTES NFR BLD AUTO: 1 % (ref 0–2)
IRON SATN MFR SERPL: 14 %
IRON SERPL-MCNC: 38 UG/DL (ref 50–170)
LYMPHOCYTES # BLD AUTO: 1.2 THOUSANDS/ΜL (ref 0.6–4.47)
LYMPHOCYTES NFR BLD AUTO: 9 % (ref 14–44)
MCH RBC QN AUTO: 30 PG (ref 26.8–34.3)
MCHC RBC AUTO-ENTMCNC: 30.6 G/DL (ref 31.4–37.4)
MCV RBC AUTO: 98 FL (ref 82–98)
MONOCYTES # BLD AUTO: 0.2 THOUSAND/ΜL (ref 0.17–1.22)
MONOCYTES NFR BLD AUTO: 2 % (ref 4–12)
NEUTROPHILS # BLD AUTO: 11.95 THOUSANDS/ΜL (ref 1.85–7.62)
NEUTS SEG NFR BLD AUTO: 87 % (ref 43–75)
NRBC BLD AUTO-RTO: 0 /100 WBCS
PLATELET # BLD AUTO: 395 THOUSANDS/UL (ref 149–390)
PMV BLD AUTO: 10.1 FL (ref 8.9–12.7)
RBC # BLD AUTO: 3.83 MILLION/UL (ref 3.81–5.12)
TIBC SERPL-MCNC: 265 UG/DL (ref 250–450)
WBC # BLD AUTO: 13.53 THOUSAND/UL (ref 4.31–10.16)

## 2019-10-09 PROCEDURE — 82542 COL CHROMOTOGRAPHY QUAL/QUAN: CPT

## 2019-10-09 PROCEDURE — 83540 ASSAY OF IRON: CPT

## 2019-10-09 PROCEDURE — 83550 IRON BINDING TEST: CPT

## 2019-10-09 PROCEDURE — 36415 COLL VENOUS BLD VENIPUNCTURE: CPT

## 2019-10-09 PROCEDURE — 82306 VITAMIN D 25 HYDROXY: CPT

## 2019-10-09 PROCEDURE — 85025 COMPLETE CBC W/AUTO DIFF WBC: CPT

## 2019-10-09 PROCEDURE — 82728 ASSAY OF FERRITIN: CPT

## 2019-10-09 PROCEDURE — 86140 C-REACTIVE PROTEIN: CPT

## 2019-10-09 PROCEDURE — 99214 OFFICE O/P EST MOD 30 MIN: CPT | Performed by: INTERNAL MEDICINE

## 2019-10-09 PROCEDURE — 86480 TB TEST CELL IMMUN MEASURE: CPT

## 2019-10-09 NOTE — PROGRESS NOTES
[facility] - Outpatient Follow-up Note  Mahendra Graham 47 y o  female MRN: [id number]  Encounter: [id number]    ASSESSMENT AND PLAN:    Mahendra Graham is a 48 y o  female is here for a follow up regarding her history of Ulcerative Colitis  1  Ulcerative pancolitis with rectal bleeding: Patient discontinued use of Entyvio and is currently on Remicade after a recent inpt stay  She rates her stool as a 4 on the [facility] Stool chart, however, she does have a 5 or a 6 at times too  She reports about 3 BMs a day  She has responded clinically to remicade at this point  This is her third biologic  We will assess CT SBE to ensure no small bowel disease which would mean she has Crohn's if this is found  Will refer her to [facility] to establish care there in case she fails medical therapy with remicade as the only other option currently FDA approved is xeljanz and possibly stelara which is about to get FDA approval for UC  Will also add on imuran once TMPT level is resulted  -     Ambulatory referral to Colorectal Surgery; Future  -     CT small bowel enterography; Future  -     MISCELLANEOUS LAB TEST; Future  -     Vitamin D 25 hydroxy; Future  -     CBC and differential; Future  -     Iron Panel (Includes Ferritin, Iron Sat%, Iron, and TIBC); Future  -     C-reactive protein; Future  -     Calprotectin,Fecal; Future  -     Quantiferon TB Gold Plus; Future  -     TPMT ENZYME ACTIVITY,BLOOD; Future    2  Other iron deficiency anemia:  Continue daily ferrous sulfate 65 mg daily    3  Vitamin D deficiency:  Continue 1000 units daily  Follow up in 1 month   ______________________________________________________________________    SUBJECTIVE:  Mahendra Graham is a 48 y o  female is here for a follow up regarding her history of Ulcerative Colitis  Patient is not longer using Entyvio because it was not working  She is currently using Remicade  Patient feels more hopeful   She rates her stool as a 4 on the [facility] Stool chart, however, she does have a 5 or a 6 at times too  She reports about 3 BMs a day  She is currently on prednisone 20 mg BID since 19  She was previously Vitamin D deficient as well as Iron deficient which I prescribed 1,000 unit dosage of Vitamin D as well as Ferrous sulfate 65 mg  Her CBC from 19 shows her hemoglobin at 11 1  Patient's last colonoscopy was on 18 which revealed severe ulcerative colitis from the hepatic flexure on distally with ulcerations, diffuse inflammatory changes  Patient complains of an external hemorrhoid that showed up after this colonoscopy  She complains of bright red blood coming from it  Patient had a prior colonoscopy In 2016 performed by Dr Mancia Judson showed panulcerative colitis, more severe in the rectum  REVIEW OF SYSTEMS IS OTHERWISE NEGATIVE        Historical Information   Past Medical History:   Diagnosis Date    Adjustment disorder     last assessed 12    Anemia     HX of    Asthma     mild - intermittent    Bilateral leg edema     Blepharitis     last assessed 16    Carpal tunnel syndrome     Unspecified laterality    Colitis, acute     Dyspareunia in female     Edema     last assessed 06/22/15    Ganglion     Herniated cervical disc     History of transfusion     Hypokalemia     Hypothyroidism     Hypothyroidism     Insomnia     Lumps on the skin     last assessed 14    Mouth ulcers     last assessed 06/22/15    Nontraumatic tear of left tibialis posterior tendon     Traumatic teart     Polyarthritis     last assessed 16    Raynaud's disease     [facility]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 [facility] [address] [facility]     Temporomandibular disorder     Joint    Thyroid disease     Ulcerative colitis ([address]     Ulcerative colitis ([facility] [address] [facility]      Past Surgical History:   Procedure Laterality Date    ANKLE SURGERY Left     Tendon repair    CARPAL TUNNEL RELEASE Bilateral      SECTION, LOW TRANSVERSE      COLONOSCOPY      COLONOSCOPY N/A 2018    Procedure: COLONOSCOPY;  Surgeon: Iftikhar Burger MD;  [facility]; Service: Gastroenterology    HERNIA REPAIR      umbilical    HYSTERECTOMY      KNEE ARTHROSCOPY Right     LIPECTOMY      Multipe lipoma removals    LIPOMA RESECTION      NJ COLONOSCOPY FLX DX W/COLLJ SPEC WHEN PFRMD N/A 2016    Procedure: COLONOSCOPY;  Surgeon: Reyna Phipps DO;  Location: [facility];   Service: Gastroenterology    NJ REPAIR FLEX LEG TENDON,SECOND,EA Left 2016    Procedure: REPAIR OF LEFT POSTERIOR TIBIAL TENDON WITH GRAFT, EXPLORATION LEFT ANKLE ;  Surgeon: Rancho Crane DPM;  [facility];  Service: Podiatry    SHOULDER SURGERY Left     bicep tendon and labrum repair    TONSILLECTOMY      TOOTH EXTRACTION  2018     Social History   Social History     Substance and Sexual Activity   Alcohol Use No    Comment: social 1 drink per weeek     Social History     Substance and Sexual Activity   Drug Use No     Social History     Tobacco Use   Smoking Status Former Smoker    Types: Cigarettes    Last attempt to quit: 2003    Years since quittin 5   Smokeless Tobacco Never Used   Tobacco Comment    Quit , rare use for 2 years     Family History   Problem Relation Age of Onset    Parkinsonism Mother     Rheum arthritis Mother     Heart attack Father     Hypertension Father     Osteoporosis Father     Prostate cancer Father     Hashimoto's thyroiditis Sister     Cancer Paternal Grandfather         Penile    Prostate cancer Paternal Grandfather     Crohn's disease Family     Osteoarthritis Family     Rheum arthritis Family     Crohn's disease Other     Crohn's disease Maternal Uncle     Psoriasis Maternal Uncle     Ulcerative colitis Maternal Uncle     Rheum arthritis Maternal Aunt     Ulcerative colitis Family        Meds/Allergies       Current Outpatient Medications:     albuterol (ACCUNEB) 1 25 MG/3ML nebulizer solution    Ascorbic Acid (VITAMIN C) 1000 MG tablet    budesonide (UCERIS) 9 mg TB24    carisoprodol (SOMA) 250 MG    carisoprodol (SOMA) 350 mg tablet    cholecalciferol (VITAMIN D3) 1,000 units tablet    Cholecalciferol 77369 units TABS    Cholecalciferol 97613 units TABS    ferrous sulfate 324 (65 Fe) mg    HYDROcodone-acetaminophen (NORCO) 5-325 mg per tablet    hyoscyamine (ANASPAZ,LEVSIN) 0 125 MG tablet    levothyroxine 25 mcg tablet    levothyroxine 25 mcg tablet    ondansetron (ZOFRAN-ODT) 4 mg disintegrating tablet    predniSONE 20 mg tablet    zolpidem (AMBIEN) 10 mg tablet    zolpidem (AMBIEN) 10 mg tablet    No Known Allergies        Objective     There were no vitals taken for this visit  There is no height or weight on file to calculate BMI  PHYSICAL EXAM:      General Appearance:   Alert, cooperative, no distress   HEENT:   Normocephalic, atraumatic, anicteric      Neck:  Supple, symmetrical, trachea midline   Lungs:   Clear to auscultation bilaterally; no rales, rhonchi or wheezing; respirations unlabored    Heart[de-identified]   Regular rate and rhythm; no murmur, rub, or gallop  Abdomen:   Soft, non-tender, non-distended; normal bowel sounds; no masses, no organomegaly    Genitalia:   Deferred    Rectal:   Deferred    Extremities:  No cyanosis, clubbing or edema    Pulses:  2+ and symmetric    Skin:  No jaundice, rashes, or lesions    Lymph nodes:  No palpable cervical lymphadenopathy        Lab Results:   Hepatitis B Immunity  Radiology Results:   No results found  Attestation:   By signing my name below, IPaige, attest that this documentation has been prepared under the direction and in the presence of VANDANA Lynn Caro  Electronically Signed: Sherry Rosenthal  10/9/19      Leah BATES Caro, personally performed the services described in this documentation  All medical record entries made by the sherry were at my direction and in my presence   I have reviewed the chart and discharge instructions and agree that the record reflects my personal performance and is accurate and complete   Frankey Spillers, D O  10/9/19

## 2019-10-10 NOTE — PATIENT INSTRUCTIONS
Ct-scan scheduled at [facility] on 10/15/2019 at 5:45pm  Madelin Kenney aware of prior auth

## 2019-10-11 LAB
GAMMA INTERFERON BACKGROUND BLD IA-ACNC: 0.02 IU/ML
M TB IFN-G BLD-IMP: ABNORMAL
M TB IFN-G CD4+ BCKGRND COR BLD-ACNC: 0 IU/ML
M TB IFN-G CD4+ BCKGRND COR BLD-ACNC: 0 IU/ML
MITOGEN IGNF BCKGRD COR BLD-ACNC: <0.1 IU/ML

## 2019-10-12 ENCOUNTER — APPOINTMENT (OUTPATIENT)
Dept: LAB | Facility: HOSPITAL | Age: 54
End: 2019-10-12
Attending: INTERNAL MEDICINE
Payer: COMMERCIAL

## 2019-10-12 PROCEDURE — 83993 ASSAY FOR CALPROTECTIN FECAL: CPT

## 2019-10-14 LAB — CALPROTECTIN STL-MCNT: 641 UG/G (ref 0–120)

## 2019-10-15 ENCOUNTER — HOSPITAL ENCOUNTER (OUTPATIENT)
Dept: RADIOLOGY | Facility: HOSPITAL | Age: 54
Discharge: HOME | End: 2019-10-15
Attending: INTERNAL MEDICINE
Payer: COMMERCIAL

## 2019-10-15 ENCOUNTER — TELEPHONE (OUTPATIENT)
Dept: GASTROENTEROLOGY | Facility: AMBULARY SURGERY CENTER | Age: 54
End: 2019-10-15

## 2019-10-15 DIAGNOSIS — K51.011 ULCERATIVE PANCOLITIS WITH RECTAL BLEEDING (HCC): ICD-10-CM

## 2019-10-15 PROCEDURE — 74177 CT ABD & PELVIS W/CONTRAST: CPT

## 2019-10-15 RX ADMIN — IOHEXOL 100 ML: 350 INJECTION, SOLUTION INTRAVENOUS at 18:01

## 2019-10-15 NOTE — TELEPHONE ENCOUNTER
----- Message from Estrella Campoverde DO sent at 10/15/2019  7:42 AM EDT -----  Please let patient know that fecal calprotectin is going down greatly! It was 2,210 and is now 641!

## 2019-10-15 NOTE — TELEPHONE ENCOUNTER
Yes ok to reschedule pt with me on her prior scheduled time if I am in the office at that time      Thanks so much,  Rigo Michelle

## 2019-10-15 NOTE — TELEPHONE ENCOUNTER
Patient is aware fecal calprotectin level results  Reports felling well  Patient thought she had follow up visit scheduled 11/12/19, but it was cancelled She is not sure why appointment was cancelled  * clerical please contact patient to reschedule office visit    Thank you

## 2019-10-18 ENCOUNTER — TELEPHONE (OUTPATIENT)
Dept: GASTROENTEROLOGY | Facility: MEDICAL CENTER | Age: 54
End: 2019-10-18

## 2019-10-18 DIAGNOSIS — E55.9 VITAMIN D DEFICIENCY: ICD-10-CM

## 2019-10-18 RX ORDER — MELATONIN
1000 DAILY
Qty: 90 TABLET | Refills: 5 | Status: SHIPPED | OUTPATIENT
Start: 2019-10-18 | End: 2019-10-18

## 2019-10-18 NOTE — TELEPHONE ENCOUNTER
Patients GI provider:  Dr Jennifer Lechuga    Number to return call: [phone]    Reason for call: Stacy Sommers from [address] calling to get clarification on Vitamin D refill    Scheduled procedure/appointment date if applicable: Apt/procedure

## 2019-10-18 NOTE — TELEPHONE ENCOUNTER
Please let her know that I sent the vitamin-D to the pharmacy, we are waiting on the blood test result to start the Imuran which is not back yet

## 2019-10-18 NOTE — TELEPHONE ENCOUNTER
Patients GI provider:  Dr Qian Redman    Number to return call: [phone]    Reason for call: Pt calling requesting to speak with Dr Qian Redman regarding Vitamin D that was not called in yet and another medication name Imuran she wants her to start taking      Scheduled procedure/appointment date if applicable: Apt/procedure

## 2019-10-19 LAB
REF LAB TEST METHOD: NORMAL
TEST INTERPRETATION: NORMAL
TPMT RBC-CCNC: 22.8 UNITS/ML RBC

## 2019-10-22 ENCOUNTER — TELEPHONE (OUTPATIENT)
Dept: GASTROENTEROLOGY | Facility: MEDICAL CENTER | Age: 54
End: 2019-10-22

## 2019-10-22 ENCOUNTER — TELEPHONE (OUTPATIENT)
Dept: GASTROENTEROLOGY | Facility: AMBULARY SURGERY CENTER | Age: 54
End: 2019-10-22

## 2019-10-22 DIAGNOSIS — K51.011 ULCERATIVE PANCOLITIS WITH RECTAL BLEEDING (HCC): Primary | ICD-10-CM

## 2019-10-22 RX ORDER — AZATHIOPRINE 100 MG/1
100 TABLET ORAL DAILY
Qty: 90 TABLET | Refills: 3 | Status: SHIPPED | OUTPATIENT
Start: 2019-10-22 | End: 2020-02-17

## 2019-10-22 NOTE — TELEPHONE ENCOUNTER
Reviewed CT enterography - inflammation in colon/ no small bowel disease  tpmt level normal so Imuran will be ordered as well as CBC every 2 weeks x 3 months after starting Imuran

## 2019-10-23 ENCOUNTER — TELEPHONE (OUTPATIENT)
Dept: GASTROENTEROLOGY | Facility: AMBULARY SURGERY CENTER | Age: 54
End: 2019-10-23

## 2019-10-23 ENCOUNTER — TELEPHONE (OUTPATIENT)
Dept: GASTROENTEROLOGY | Facility: CLINIC | Age: 54
End: 2019-10-23

## 2019-10-23 NOTE — TELEPHONE ENCOUNTER
Patient is aware Imuran script sent to [facility]  Confirmed with [facility] pharmacist Imuran 50 mg two tablets daily (100 mg)  She will complete CBC two weeks after starting medication

## 2019-10-23 NOTE — TELEPHONE ENCOUNTER
Patients GI provider:  Dr Mitchell Common     Number to return call: [phone]    Reason for call: Please call the pharmacy to clarify the script for Imuran, they would like to know if 100mg is correct and if so, is it ok to give make it 50mg 2x daily because this med only comes in 50mg       Scheduled procedure/appointment date if applicable: Apt/procedure - n/a

## 2019-10-23 NOTE — TELEPHONE ENCOUNTER
----- Message from Bridget Vazquez DO sent at 10/22/2019  4:10 PM EDT -----  Please let patient know her tpmt level was normal   This means we can safely give her imuran  I have ordered 100 mg daily to be given to her pharmacy  She needs to get CBC's done every two weeks for 6 times  This is ordered as a standing order

## 2019-10-30 DIAGNOSIS — F51.04 CHRONIC INSOMNIA: ICD-10-CM

## 2019-10-30 DIAGNOSIS — E03.9 ACQUIRED HYPOTHYROIDISM: ICD-10-CM

## 2019-11-01 ENCOUNTER — TELEPHONE (OUTPATIENT)
Dept: GASTROENTEROLOGY | Facility: AMBULARY SURGERY CENTER | Age: 54
End: 2019-11-01

## 2019-11-01 ENCOUNTER — HOSPITAL ENCOUNTER (OUTPATIENT)
Dept: INFUSION CENTER | Facility: HOSPITAL | Age: 54
Discharge: HOME | End: 2019-11-01
Attending: INTERNAL MEDICINE
Payer: COMMERCIAL

## 2019-11-01 VITALS
DIASTOLIC BLOOD PRESSURE: 63 MMHG | BODY MASS INDEX: 26.05 KG/M2 | TEMPERATURE: 97.6 F | HEART RATE: 60 BPM | SYSTOLIC BLOOD PRESSURE: 130 MMHG | WEIGHT: 147.05 LBS | RESPIRATION RATE: 16 BRPM

## 2019-11-01 DIAGNOSIS — K51.011 ULCERATIVE PANCOLITIS WITH RECTAL BLEEDING (HCC): Primary | ICD-10-CM

## 2019-11-01 PROCEDURE — 96375 TX/PRO/DX INJ NEW DRUG ADDON: CPT

## 2019-11-01 PROCEDURE — 96413 CHEMO IV INFUSION 1 HR: CPT

## 2019-11-01 PROCEDURE — 96415 CHEMO IV INFUSION ADDL HR: CPT

## 2019-11-01 RX ORDER — METHYLPREDNISOLONE SODIUM SUCCINATE 40 MG/ML
40 INJECTION, POWDER, LYOPHILIZED, FOR SOLUTION INTRAMUSCULAR; INTRAVENOUS ONCE
Status: DISCONTINUED | OUTPATIENT
Start: 2019-11-01 | End: 2019-11-01 | Stop reason: SDUPTHER

## 2019-11-01 RX ORDER — SODIUM CHLORIDE 9 MG/ML
20 INJECTION, SOLUTION INTRAVENOUS ONCE
Status: DISCONTINUED | OUTPATIENT
Start: 2019-11-01 | End: 2019-11-04 | Stop reason: HOSPADM

## 2019-11-01 RX ORDER — METHYLPREDNISOLONE SODIUM SUCCINATE 40 MG/ML
40 INJECTION, POWDER, LYOPHILIZED, FOR SOLUTION INTRAMUSCULAR; INTRAVENOUS ONCE
Status: CANCELLED | OUTPATIENT
Start: 2019-11-01

## 2019-11-01 RX ORDER — SODIUM CHLORIDE 9 MG/ML
20 INJECTION, SOLUTION INTRAVENOUS ONCE
Status: CANCELLED | OUTPATIENT
Start: 2019-11-01

## 2019-11-01 RX ORDER — ACETAMINOPHEN 325 MG/1
650 TABLET ORAL ONCE
Status: CANCELLED | OUTPATIENT
Start: 2019-12-30

## 2019-11-01 RX ORDER — ACETAMINOPHEN 325 MG/1
650 TABLET ORAL ONCE
Status: CANCELLED | OUTPATIENT
Start: 2019-11-01

## 2019-11-01 RX ORDER — DIPHENHYDRAMINE HCL 25 MG
25 TABLET ORAL ONCE
Status: DISCONTINUED | OUTPATIENT
Start: 2019-11-01 | End: 2019-11-04 | Stop reason: HOSPADM

## 2019-11-01 RX ORDER — LEVOTHYROXINE SODIUM 0.03 MG/1
TABLET ORAL
Qty: 90 TABLET | Refills: 0 | Status: SHIPPED | OUTPATIENT
Start: 2019-11-01 | End: 2020-01-20

## 2019-11-01 RX ORDER — DIPHENHYDRAMINE HCL 25 MG
25 TABLET ORAL ONCE
Status: CANCELLED | OUTPATIENT
Start: 2019-12-30

## 2019-11-01 RX ORDER — DIPHENHYDRAMINE HCL 25 MG
25 TABLET ORAL ONCE
Status: CANCELLED | OUTPATIENT
Start: 2019-11-01

## 2019-11-01 RX ORDER — SODIUM CHLORIDE 9 MG/ML
20 INJECTION, SOLUTION INTRAVENOUS ONCE
Status: CANCELLED | OUTPATIENT
Start: 2019-12-30

## 2019-11-01 RX ORDER — ACETAMINOPHEN 325 MG/1
650 TABLET ORAL ONCE
Status: COMPLETED | OUTPATIENT
Start: 2019-11-01 | End: 2019-11-01

## 2019-11-01 RX ORDER — DIPHENHYDRAMINE HCL 25 MG
25 TABLET ORAL ONCE
Status: DISCONTINUED | OUTPATIENT
Start: 2019-11-01 | End: 2019-11-01 | Stop reason: SDUPTHER

## 2019-11-01 RX ORDER — METHYLPREDNISOLONE SODIUM SUCCINATE 40 MG/ML
40 INJECTION, POWDER, LYOPHILIZED, FOR SOLUTION INTRAMUSCULAR; INTRAVENOUS ONCE
Status: CANCELLED | OUTPATIENT
Start: 2019-12-30

## 2019-11-01 RX ORDER — METHYLPREDNISOLONE SODIUM SUCCINATE 40 MG/ML
40 INJECTION, POWDER, LYOPHILIZED, FOR SOLUTION INTRAMUSCULAR; INTRAVENOUS ONCE
Status: COMPLETED | OUTPATIENT
Start: 2019-11-01 | End: 2019-11-01

## 2019-11-01 RX ORDER — ZOLPIDEM TARTRATE 10 MG/1
TABLET ORAL
Qty: 30 TABLET | Refills: 0 | Status: SHIPPED | OUTPATIENT
Start: 2019-11-01 | End: 2019-12-02 | Stop reason: SDUPTHER

## 2019-11-01 RX ORDER — ACETAMINOPHEN 325 MG/1
650 TABLET ORAL ONCE
Status: DISCONTINUED | OUTPATIENT
Start: 2019-11-01 | End: 2019-11-01 | Stop reason: SDUPTHER

## 2019-11-01 RX ORDER — SODIUM CHLORIDE 9 MG/ML
20 INJECTION, SOLUTION INTRAVENOUS ONCE
Status: COMPLETED | OUTPATIENT
Start: 2019-11-01 | End: 2019-11-01

## 2019-11-01 RX ADMIN — ACETAMINOPHEN 650 MG: 325 TABLET ORAL at 09:25

## 2019-11-01 RX ADMIN — SODIUM CHLORIDE 20 ML/HR: 0.9 INJECTION, SOLUTION INTRAVENOUS at 09:17

## 2019-11-01 RX ADMIN — METHYLPREDNISOLONE SODIUM SUCCINATE 40 MG: 40 INJECTION, POWDER, FOR SOLUTION INTRAMUSCULAR; INTRAVENOUS at 09:26

## 2019-11-01 RX ADMIN — INFLIXIMAB 649 MG: 100 INJECTION, POWDER, LYOPHILIZED, FOR SOLUTION INTRAVENOUS at 10:02

## 2019-11-01 NOTE — TELEPHONE ENCOUNTER
Patients GI provider:  Dr Al Restrepo    Number to return call:     Reason for call: Infusion center calling to have her order for infusion entered in [employer] so she can schedule her December appointment    Scheduled procedure/appointment date if applicable: Apt/procedure  na

## 2019-11-01 NOTE — TELEPHONE ENCOUNTER
I entered order for Remicade in [facility] - maintenance dosing 10 mg/kg every 8 weeks  Please call patient and let her know she can call the infusion center to schedule her next Remicade infusion on 12/30

## 2019-11-01 NOTE — PLAN OF CARE
Problem: Potential for Falls  Goal: Patient will remain free of falls  Description  INTERVENTIONS:  - Assess patient frequently for physical needs  -  Identify cognitive and physical deficits and behaviors that affect risk of falls    -  [place] fall precautions as indicated by assessment   - Educate patient/family on patient safety including physical limitations  - Instruct patient to call for assistance with activity based on assessment  - Modify environment to reduce risk of injury  - Consider OT/PT consult to assist with strengthening/mobility  Outcome: Progressing

## 2019-11-07 ENCOUNTER — DOCUMENTATION (OUTPATIENT)
Dept: GASTROENTEROLOGY | Facility: MEDICAL CENTER | Age: 54
End: 2019-11-07

## 2019-11-07 ENCOUNTER — TELEPHONE (OUTPATIENT)
Dept: GASTROENTEROLOGY | Facility: CLINIC | Age: 54
End: 2019-11-07

## 2019-11-07 NOTE — TELEPHONE ENCOUNTER
I called [employer] [facility] back, she stated they do not do retro authorizations, they only do authorizations starting on the date it is sent in  If a denial is received, calling the main number and requesting for a claims reconsideration/claims appeal would be the only other option  I sent an e-mail to [facility] from our [facility] team to inform her of this information

## 2019-11-07 NOTE — TELEPHONE ENCOUNTER
KELLY    I called [facility] and spoke with Harris Godfrey who stated that I needed to fill out a request for claim reconsideration and mail it into the [employer] [facility]  The form was found on their website  I printed and filled it out  Mailed to:  Claims [address], [address] [place] [address]    I also scanned a copy to be placed into the patients chart

## 2019-11-07 NOTE — TELEPHONE ENCOUNTER
Patients GI provider:  Dr Kev Owen    Number to return call: [phone]    Reason for call: Naomi Peterson from [facility] called requesting to speak to you and can be reached at the above number    Scheduled procedure/appointment date if applicable: NA

## 2019-11-09 ENCOUNTER — APPOINTMENT (OUTPATIENT)
Dept: LAB | Facility: HOSPITAL | Age: 54
End: 2019-11-09
Attending: INTERNAL MEDICINE
Payer: COMMERCIAL

## 2019-11-09 LAB
BASOPHILS # BLD AUTO: 0.06 THOUSANDS/ΜL (ref 0–0.1)
BASOPHILS NFR BLD AUTO: 1 % (ref 0–1)
EOSINOPHIL # BLD AUTO: 0.19 THOUSAND/ΜL (ref 0–0.61)
EOSINOPHIL NFR BLD AUTO: 3 % (ref 0–6)
ERYTHROCYTE [DISTWIDTH] IN BLOOD BY AUTOMATED COUNT: 13.6 % (ref 11.6–15.1)
HCT VFR BLD AUTO: 39.5 % (ref 34.8–46.1)
HGB BLD-MCNC: 12 G/DL (ref 11.5–15.4)
IMM GRANULOCYTES # BLD AUTO: 0.02 THOUSAND/UL (ref 0–0.2)
IMM GRANULOCYTES NFR BLD AUTO: 0 % (ref 0–2)
LYMPHOCYTES # BLD AUTO: 3.42 THOUSANDS/ΜL (ref 0.6–4.47)
LYMPHOCYTES NFR BLD AUTO: 45 % (ref 14–44)
MCH RBC QN AUTO: 30.5 PG (ref 26.8–34.3)
MCHC RBC AUTO-ENTMCNC: 30.4 G/DL (ref 31.4–37.4)
MCV RBC AUTO: 101 FL (ref 82–98)
MONOCYTES # BLD AUTO: 0.8 THOUSAND/ΜL (ref 0.17–1.22)
MONOCYTES NFR BLD AUTO: 11 % (ref 4–12)
NEUTROPHILS # BLD AUTO: 2.96 THOUSANDS/ΜL (ref 1.85–7.62)
NEUTS SEG NFR BLD AUTO: 40 % (ref 43–75)
NRBC BLD AUTO-RTO: 0 /100 WBCS
PLATELET # BLD AUTO: 332 THOUSANDS/UL (ref 149–390)
PMV BLD AUTO: 9.9 FL (ref 8.9–12.7)
RBC # BLD AUTO: 3.93 MILLION/UL (ref 3.81–5.12)
WBC # BLD AUTO: 7.45 THOUSAND/UL (ref 4.31–10.16)

## 2019-11-09 PROCEDURE — 85025 COMPLETE CBC W/AUTO DIFF WBC: CPT | Performed by: INTERNAL MEDICINE

## 2019-11-09 PROCEDURE — 36415 COLL VENOUS BLD VENIPUNCTURE: CPT | Performed by: INTERNAL MEDICINE

## 2019-11-19 ENCOUNTER — CLINICAL SUPPORT (OUTPATIENT)
Dept: FAMILY MEDICINE CLINIC | Facility: CLINIC | Age: 54
End: 2019-11-19
Payer: COMMERCIAL

## 2019-11-19 DIAGNOSIS — Z23 NEED FOR VACCINATION: Primary | ICD-10-CM

## 2019-11-19 PROCEDURE — 90471 IMMUNIZATION ADMIN: CPT

## 2019-11-19 PROCEDURE — 90732 PPSV23 VACC 2 YRS+ SUBQ/IM: CPT

## 2019-11-20 ENCOUNTER — OFFICE VISIT (OUTPATIENT)
Dept: GASTROENTEROLOGY | Facility: CLINIC | Age: 54
End: 2019-11-20
Payer: COMMERCIAL

## 2019-11-20 VITALS
WEIGHT: 143.5 LBS | RESPIRATION RATE: 18 BRPM | HEART RATE: 71 BPM | HEIGHT: 63 IN | SYSTOLIC BLOOD PRESSURE: 110 MMHG | TEMPERATURE: 98.7 F | BODY MASS INDEX: 25.43 KG/M2 | DIASTOLIC BLOOD PRESSURE: 73 MMHG

## 2019-11-20 DIAGNOSIS — Z79.899 LONG-TERM USE OF IMMUNOSUPPRESSANT MEDICATION: ICD-10-CM

## 2019-11-20 DIAGNOSIS — K51.011 ULCERATIVE PANCOLITIS WITH RECTAL BLEEDING (HCC): Primary | ICD-10-CM

## 2019-11-20 PROCEDURE — 99214 OFFICE O/P EST MOD 30 MIN: CPT | Performed by: INTERNAL MEDICINE

## 2019-11-20 NOTE — TELEPHONE ENCOUNTER
Called patient for reminder to complete ordered CBC 11/23/19 11/9/19 CBC results reviewed with patient by Flaquito Marinelli

## 2019-11-20 NOTE — PROGRESS NOTES
[facility] - Outpatient Follow-up Note  Tuan Cole 47 y o  female MRN: [id number]  Encounter: [id number]          ASSESSMENT AND PLAN:    Tuan Cole is a 47 y o  female here for a follow up regarding her ulcerative colitis  1  Ulcerative pancolitis with rectal bleeding:  Patient was last seen in office on 10/9/19 where patient discontinued use of Entyvio and is currently on Remicade after a recent inpt stay  She has also failed humira  She rates her stool as a 4 on the [facility] Stool chart, however, she does have a 5 or a 6 at times too  She reports about 3 BMs a day  she denies rectal bleeding  She has responded clinically to remicade at this point  Patient was also on Prednisone 20 mg BID  Today, patient's lab showed that her fecal calprotectin has decreased from 2,210 to 641  Patient's CRP has decreased from 73 in September 2019 to less than 3 in October 2019  Patient is feeling well and is doing clinically better on dual therapy, Remicade and Imuran  Patient is up to date with flu shots and pneumonia shots  Patient reports that she has began to wean the prednisone down from 20 mg to 15 mg  Patient is clinically doing better  Continue Remicade and Imuran regimen  Continue weaning down the Prednisone, but if symptoms return, immediately return to 20 mg  We also discussed taking probiotics  2  Long-term use of immunosuppressant medication  Patient is up to date on influenza shot and pneumonia shots  Follow up in 2 months   ______________________________________________________________________    SUBJECTIVE:  Tuan Cole is a 47 y o  female here for a follow up regarding her ulcerative colitis  Patient was last seen in office on 10/9/19 where patient discontinued use of Entyvio and is currently on Remicade after a recent inpt stay  She rates her stool as a 4 on the [facility] Stool chart, however, she does have a 5 or a 6 at times too   She reports about 3 BMs a day   She has responded clinically to remicade at this point  This was her third biologic  Patient was also on Prednisone 20 mg BID  Today, patient's lab showed that her fecal calprotectin has decreased from 2,210 to 641  Patient's CRP has decreased from 73 in 2019 to less than 3 in 2019  Patient is feeling well and is doing clinically better on dual therapy, Remicade and Imuran  Patient is up to date with flu shots and pneumonia shots  Patient reports that she has began to wean the prednisone down from 20 mg to 15 mg  REVIEW OF SYSTEMS IS OTHERWISE NEGATIVE  Historical Information   Past Medical History:   Diagnosis Date    Adjustment disorder     last assessed 12    Anemia     HX of    Asthma     mild - intermittent    Bilateral leg edema     Blepharitis     last assessed 16    Carpal tunnel syndrome     Unspecified laterality    Colitis, acute     Dyspareunia in female     Edema     last assessed 06/22/15    Ganglion     Herniated cervical disc     History of transfusion     Hypokalemia     Hypothyroidism     Hypothyroidism     Insomnia     Lumps on the skin     last assessed 14    Mouth ulcers     last assessed 06/22/15    Nontraumatic tear of left tibialis posterior tendon     Traumatic teart     Polyarthritis     last assessed 16    Raynaud's disease     Rayna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 ([address]     Temporomandibular disorder     Joint    Thyroid disease     Ulcerative colitis ([address]     Ulcerative colitis ([address]      Past Surgical History:   Procedure Laterality Date    ANKLE SURGERY Left     Tendon repair    CARPAL TUNNEL RELEASE Bilateral      SECTION, LOW TRANSVERSE      COLONOSCOPY      COLONOSCOPY N/A 2018    Procedure: COLONOSCOPY;  Surgeon: Jerri Alejandra MD;  [facility];   Service: Gastroenterology    HERNIA REPAIR      umbilical    HYSTERECTOMY      KNEE ARTHROSCOPY Right     LIPECTOMY Multipe lipoma removals    LIPOMA RESECTION      FL COLONOSCOPY FLX DX W/COLLJ SPEC WHEN PFRMD N/A 2016    Procedure: COLONOSCOPY;  Surgeon: Lucas Zhang DO;  Location: [facility];   Service: Gastroenterology    FL REPAIR FLEX LEG TENDON,SECOND,EA Left 2016    Procedure: REPAIR OF LEFT POSTERIOR TIBIAL TENDON WITH GRAFT, EXPLORATION LEFT ANKLE ;  Surgeon: Bjorn Sosa DPM;  Location: [facility] OR;  Service: Podiatry    SHOULDER SURGERY Left     bicep tendon and labrum repair    TONSILLECTOMY      TOOTH EXTRACTION  2018     Social History   Social History     Substance and Sexual Activity   Alcohol Use No    Comment: social 1 drink per weeek     Social History     Substance and Sexual Activity   Drug Use No     Social History     Tobacco Use   Smoking Status Former Smoker    Types: Cigarettes    Last attempt to quit: 2003    Years since quittin 6   Smokeless Tobacco Never Used   Tobacco Comment    Quit , rare use for 2 years     Family History   Problem Relation Age of Onset    Parkinsonism Mother     Rheum arthritis Mother     Heart attack Father     Hypertension Father     Osteoporosis Father     Prostate cancer Father     Hashimoto's thyroiditis Sister     Cancer Paternal Grandfather         Penile    Prostate cancer Paternal Grandfather     Crohn's disease Family     Osteoarthritis Family     Rheum arthritis Family     Crohn's disease Other     Crohn's disease Maternal Uncle     Psoriasis Maternal Uncle     Ulcerative colitis Maternal Uncle     Rheum arthritis Maternal Aunt     Ulcerative colitis Family        Meds/Allergies       Current Outpatient Medications:     albuterol (ACCUNEB) 1 25 MG/3ML nebulizer solution    Ascorbic Acid (VITAMIN C) 1000 MG tablet    azaTHIOprine (IMURAN) 100 MG tablet    carisoprodol (SOMA) 250 MG    Cholecalciferol 97251 units TABS    ferrous sulfate 324 (65 Fe) mg    HYDROcodone-acetaminophen (NORCO) 5-325 mg per tablet    hyoscyamine (ANASPAZ,LEVSIN) 0 125 MG tablet    levothyroxine 25 mcg tablet    ondansetron (ZOFRAN-ODT) 4 mg disintegrating tablet    zolpidem (AMBIEN) 10 mg tablet    zolpidem (AMBIEN) 10 mg tablet    No Known Allergies        Objective     There were no vitals taken for this visit  There is no height or weight on file to calculate BMI  PHYSICAL EXAM:      General Appearance:   Alert, cooperative, no distress   HEENT:   Normocephalic, atraumatic, anicteric      Neck:  Supple, symmetrical, trachea midline   Lungs:   Clear to auscultation bilaterally; no rales, rhonchi or wheezing; respirations unlabored    Heart[de-identified]   Regular rate and rhythm; no murmur, rub, or gallop  Abdomen:   Soft, non-tender, non-distended; normal bowel sounds; no masses, no organomegaly    Genitalia:   Deferred    Rectal:   Deferred    Extremities:  No cyanosis, clubbing or edema    Pulses:  2+ and symmetric    Skin:  No jaundice, rashes, or lesions    Lymph nodes:  No palpable cervical lymphadenopathy        Lab Results:   No visits with results within 1 Day(s) from this visit     Latest known visit with results is:   Orders Only on 10/22/2019   Component Date Value    WBC 11/09/2019 7 45     RBC 11/09/2019 3 93     Hemoglobin 11/09/2019 12 0     Hematocrit 11/09/2019 39 5     MCV 11/09/2019 101*    MCH 11/09/2019 30 5     MCHC 11/09/2019 30 4*    RDW 11/09/2019 13 6     MPV 11/09/2019 9 9     Platelets 74/35/5129 332     nRBC 11/09/2019 0     Neutrophils Relative 11/09/2019 40*    Immat GRANS % 11/09/2019 0     Lymphocytes Relative 11/09/2019 45*    Monocytes Relative 11/09/2019 11     Eosinophils Relative 11/09/2019 3     Basophils Relative 11/09/2019 1     Neutrophils Absolute 11/09/2019 2 96     Immature Grans Absolute 11/09/2019 0 02     Lymphocytes Absolute 11/09/2019 3 42     Monocytes Absolute 11/09/2019 0 80     Eosinophils Absolute 11/09/2019 0 19     Basophils Absolute 11/09/2019 0 06 Radiology Results:   No results found  Attestation:   By signing my name below, I, Paige Garcia, attest that this documentation has been prepared under the direction and in the presence of VANDANA Lynn Caro  Electronically Signed: Cristina Rosenthal  11/20/19        I, Leah Beasley, personally performed the services described in this documentation  All medical record entries made by the scribe were at my direction and in my presence  I have reviewed the chart and discharge instructions and agree that the record reflects my personal performance and is accurate and complete   VANDANA Lynn Caro  11/20/19

## 2019-11-23 ENCOUNTER — APPOINTMENT (OUTPATIENT)
Dept: LAB | Facility: HOSPITAL | Age: 54
End: 2019-11-23
Attending: INTERNAL MEDICINE
Payer: COMMERCIAL

## 2019-11-23 DIAGNOSIS — F51.04 CHRONIC INSOMNIA: ICD-10-CM

## 2019-12-02 DIAGNOSIS — F51.04 CHRONIC INSOMNIA: ICD-10-CM

## 2019-12-02 RX ORDER — ZOLPIDEM TARTRATE 10 MG/1
TABLET ORAL
Qty: 30 TABLET | Refills: 0 | OUTPATIENT
Start: 2019-12-02

## 2019-12-03 RX ORDER — ZOLPIDEM TARTRATE 10 MG/1
TABLET ORAL
Qty: 30 TABLET | Refills: 0 | Status: SHIPPED | OUTPATIENT
Start: 2019-12-03 | End: 2019-12-28 | Stop reason: SDUPTHER

## 2019-12-07 ENCOUNTER — APPOINTMENT (OUTPATIENT)
Dept: LAB | Facility: HOSPITAL | Age: 54
End: 2019-12-07
Attending: INTERNAL MEDICINE
Payer: COMMERCIAL

## 2019-12-16 ENCOUNTER — TELEPHONE (OUTPATIENT)
Dept: GASTROENTEROLOGY | Facility: AMBULARY SURGERY CENTER | Age: 54
End: 2019-12-16

## 2019-12-21 ENCOUNTER — APPOINTMENT (OUTPATIENT)
Dept: LAB | Facility: HOSPITAL | Age: 54
End: 2019-12-21
Attending: INTERNAL MEDICINE
Payer: COMMERCIAL

## 2019-12-21 DIAGNOSIS — K51.011 ULCERATIVE PANCOLITIS WITH RECTAL BLEEDING (HCC): ICD-10-CM

## 2019-12-21 PROCEDURE — 82397 CHEMILUMINESCENT ASSAY: CPT

## 2019-12-21 PROCEDURE — 80299 QUANTITATIVE ASSAY DRUG: CPT

## 2019-12-27 LAB — MISCELLANEOUS LAB TEST RESULT: NORMAL

## 2019-12-28 DIAGNOSIS — F51.04 CHRONIC INSOMNIA: ICD-10-CM

## 2019-12-28 DIAGNOSIS — K50.10 CROHN'S DISEASE OF COLON WITHOUT COMPLICATION (HCC): Primary | ICD-10-CM

## 2019-12-29 RX ORDER — ZOLPIDEM TARTRATE 10 MG/1
TABLET ORAL
Qty: 30 TABLET | Refills: 0 | Status: SHIPPED | OUTPATIENT
Start: 2019-12-29 | End: 2020-01-27

## 2019-12-30 ENCOUNTER — HOSPITAL ENCOUNTER (OUTPATIENT)
Dept: INFUSION CENTER | Facility: HOSPITAL | Age: 54
Discharge: HOME | End: 2019-12-30
Attending: INTERNAL MEDICINE
Payer: COMMERCIAL

## 2019-12-30 VITALS
BODY MASS INDEX: 26.72 KG/M2 | RESPIRATION RATE: 16 BRPM | SYSTOLIC BLOOD PRESSURE: 122 MMHG | HEIGHT: 63 IN | WEIGHT: 150.79 LBS | HEART RATE: 80 BPM | DIASTOLIC BLOOD PRESSURE: 57 MMHG | TEMPERATURE: 98.5 F

## 2019-12-30 DIAGNOSIS — K51.011 ULCERATIVE PANCOLITIS WITH RECTAL BLEEDING (HCC): Primary | ICD-10-CM

## 2019-12-30 PROCEDURE — 96413 CHEMO IV INFUSION 1 HR: CPT

## 2019-12-30 PROCEDURE — 96415 CHEMO IV INFUSION ADDL HR: CPT

## 2019-12-30 RX ORDER — SODIUM CHLORIDE 9 MG/ML
20 INJECTION, SOLUTION INTRAVENOUS ONCE
Status: CANCELLED | OUTPATIENT
Start: 2020-02-24

## 2019-12-30 RX ORDER — SODIUM CHLORIDE 9 MG/ML
20 INJECTION, SOLUTION INTRAVENOUS ONCE
Status: COMPLETED | OUTPATIENT
Start: 2019-12-30 | End: 2019-12-30

## 2019-12-30 RX ORDER — DIPHENHYDRAMINE HCL 25 MG
25 TABLET ORAL ONCE
Status: DISCONTINUED | OUTPATIENT
Start: 2019-12-30 | End: 2020-01-02 | Stop reason: HOSPADM

## 2019-12-30 RX ORDER — ACETAMINOPHEN 325 MG/1
650 TABLET ORAL ONCE
Status: CANCELLED | OUTPATIENT
Start: 2020-02-24

## 2019-12-30 RX ORDER — METHYLPREDNISOLONE SODIUM SUCCINATE 40 MG/ML
40 INJECTION, POWDER, LYOPHILIZED, FOR SOLUTION INTRAMUSCULAR; INTRAVENOUS ONCE
Status: DISCONTINUED | OUTPATIENT
Start: 2019-12-30 | End: 2020-01-02 | Stop reason: HOSPADM

## 2019-12-30 RX ORDER — ACETAMINOPHEN 325 MG/1
650 TABLET ORAL ONCE
Status: COMPLETED | OUTPATIENT
Start: 2019-12-30 | End: 2019-12-30

## 2019-12-30 RX ORDER — METHYLPREDNISOLONE SODIUM SUCCINATE 40 MG/ML
40 INJECTION, POWDER, LYOPHILIZED, FOR SOLUTION INTRAMUSCULAR; INTRAVENOUS ONCE
Status: CANCELLED | OUTPATIENT
Start: 2020-02-24

## 2019-12-30 RX ORDER — METHOCARBAMOL 750 MG/1
TABLET ORAL
Qty: 12 CAPSULE | Refills: 0 | Status: SHIPPED | OUTPATIENT
Start: 2019-12-30 | End: 2020-01-06

## 2019-12-30 RX ORDER — DIPHENHYDRAMINE HCL 25 MG
25 TABLET ORAL ONCE
Status: CANCELLED | OUTPATIENT
Start: 2020-02-24

## 2019-12-30 RX ADMIN — SODIUM CHLORIDE 20 ML/HR: 0.9 INJECTION, SOLUTION INTRAVENOUS at 09:23

## 2019-12-30 RX ADMIN — INFLIXIMAB 651 MG: 100 INJECTION, POWDER, LYOPHILIZED, FOR SOLUTION INTRAVENOUS at 10:08

## 2019-12-30 RX ADMIN — ACETAMINOPHEN 650 MG: 325 TABLET ORAL at 09:25

## 2019-12-30 NOTE — PLAN OF CARE
Problem: Potential for Falls  Goal: Patient will remain free of falls  Description  INTERVENTIONS:  - Assess patient frequently for physical needs  -  Identify cognitive and physical deficits and behaviors that affect risk of falls    -  [place] fall precautions as indicated by assessment   - Educate patient/family on patient safety including physical limitations  - Instruct patient to call for assistance with activity based on assessment  - Modify environment to reduce risk of injury  - Consider OT/PT consult to assist with strengthening/mobility  Outcome: Progressing

## 2020-01-04 ENCOUNTER — APPOINTMENT (OUTPATIENT)
Dept: LAB | Facility: HOSPITAL | Age: 55
End: 2020-01-04
Attending: INTERNAL MEDICINE
Payer: COMMERCIAL

## 2020-01-04 DIAGNOSIS — K50.10 CROHN'S DISEASE OF COLON WITHOUT COMPLICATION (HCC): ICD-10-CM

## 2020-01-04 DIAGNOSIS — F51.04 CHRONIC INSOMNIA: ICD-10-CM

## 2020-01-06 RX ORDER — ZOLPIDEM TARTRATE 10 MG/1
TABLET ORAL
Qty: 30 TABLET | Refills: 0 | OUTPATIENT
Start: 2020-01-06

## 2020-01-06 RX ORDER — METHOCARBAMOL 750 MG/1
TABLET ORAL
Qty: 12 CAPSULE | Refills: 0 | Status: SHIPPED | OUTPATIENT
Start: 2020-01-06 | End: 2020-04-06 | Stop reason: SDUPTHER

## 2020-01-08 DIAGNOSIS — K51.00 ULCERATIVE PANCOLITIS WITHOUT COMPLICATION (HCC): Primary | ICD-10-CM

## 2020-01-14 DIAGNOSIS — K51.011 ULCERATIVE PANCOLITIS WITH RECTAL BLEEDING (HCC): Primary | ICD-10-CM

## 2020-01-18 ENCOUNTER — APPOINTMENT (OUTPATIENT)
Dept: LAB | Facility: HOSPITAL | Age: 55
End: 2020-01-18
Attending: INTERNAL MEDICINE
Payer: COMMERCIAL

## 2020-01-18 DIAGNOSIS — K51.011 ULCERATIVE PANCOLITIS WITH RECTAL BLEEDING (HCC): ICD-10-CM

## 2020-01-18 DIAGNOSIS — K51.00 ULCERATIVE PANCOLITIS WITHOUT COMPLICATION (HCC): ICD-10-CM

## 2020-01-18 LAB
25(OH)D3 SERPL-MCNC: 49.6 NG/ML (ref 30–100)
ALBUMIN SERPL BCP-MCNC: 3.6 G/DL (ref 3.5–5)
ALP SERPL-CCNC: 61 U/L (ref 46–116)
ALT SERPL W P-5'-P-CCNC: 17 U/L (ref 12–78)
ANION GAP SERPL CALCULATED.3IONS-SCNC: 6 MMOL/L (ref 4–13)
AST SERPL W P-5'-P-CCNC: 15 U/L (ref 5–45)
BASOPHILS # BLD AUTO: 0.05 THOUSANDS/ΜL (ref 0–0.1)
BASOPHILS NFR BLD AUTO: 1 % (ref 0–1)
BILIRUB SERPL-MCNC: 0.22 MG/DL (ref 0.2–1)
BUN SERPL-MCNC: 14 MG/DL (ref 5–25)
CALCIUM ALBUM COR SERPL-MCNC: 10.5 MG/DL (ref 8.3–10.1)
CALCIUM SERPL-MCNC: 10.2 MG/DL (ref 8.3–10.1)
CHLORIDE SERPL-SCNC: 108 MMOL/L (ref 100–108)
CO2 SERPL-SCNC: 28 MMOL/L (ref 21–32)
CREAT SERPL-MCNC: 0.7 MG/DL (ref 0.6–1.3)
EOSINOPHIL # BLD AUTO: 0.6 THOUSAND/ΜL (ref 0–0.61)
EOSINOPHIL NFR BLD AUTO: 13 % (ref 0–6)
ERYTHROCYTE [DISTWIDTH] IN BLOOD BY AUTOMATED COUNT: 12.3 % (ref 11.6–15.1)
GFR SERPL CREATININE-BSD FRML MDRD: 99 ML/MIN/1.73SQ M
GLUCOSE P FAST SERPL-MCNC: 96 MG/DL (ref 65–99)
HCT VFR BLD AUTO: 37.5 % (ref 34.8–46.1)
HGB BLD-MCNC: 11.8 G/DL (ref 11.5–15.4)
IMM GRANULOCYTES # BLD AUTO: 0 THOUSAND/UL (ref 0–0.2)
IMM GRANULOCYTES NFR BLD AUTO: 0 % (ref 0–2)
LYMPHOCYTES # BLD AUTO: 2.07 THOUSANDS/ΜL (ref 0.6–4.47)
LYMPHOCYTES NFR BLD AUTO: 45 % (ref 14–44)
MCH RBC QN AUTO: 30.7 PG (ref 26.8–34.3)
MCHC RBC AUTO-ENTMCNC: 31.5 G/DL (ref 31.4–37.4)
MCV RBC AUTO: 98 FL (ref 82–98)
MONOCYTES # BLD AUTO: 0.48 THOUSAND/ΜL (ref 0.17–1.22)
MONOCYTES NFR BLD AUTO: 10 % (ref 4–12)
NEUTROPHILS # BLD AUTO: 1.44 THOUSANDS/ΜL (ref 1.85–7.62)
NEUTS SEG NFR BLD AUTO: 31 % (ref 43–75)
NRBC BLD AUTO-RTO: 0 /100 WBCS
PLATELET # BLD AUTO: 277 THOUSANDS/UL (ref 149–390)
PMV BLD AUTO: 10 FL (ref 8.9–12.7)
POTASSIUM SERPL-SCNC: 4 MMOL/L (ref 3.5–5.3)
PROT SERPL-MCNC: 6.5 G/DL (ref 6.4–8.2)
RBC # BLD AUTO: 3.84 MILLION/UL (ref 3.81–5.12)
SODIUM SERPL-SCNC: 142 MMOL/L (ref 136–145)
WBC # BLD AUTO: 4.64 THOUSAND/UL (ref 4.31–10.16)

## 2020-01-18 PROCEDURE — 82306 VITAMIN D 25 HYDROXY: CPT

## 2020-01-18 PROCEDURE — 80053 COMPREHEN METABOLIC PANEL: CPT

## 2020-01-18 PROCEDURE — 36415 COLL VENOUS BLD VENIPUNCTURE: CPT

## 2020-01-18 PROCEDURE — 85025 COMPLETE CBC W/AUTO DIFF WBC: CPT

## 2020-01-20 ENCOUNTER — PROCEDURE VISIT (OUTPATIENT)
Dept: PODIATRY | Facility: CLINIC | Age: 55
End: 2020-01-20
Payer: COMMERCIAL

## 2020-01-20 VITALS
BODY MASS INDEX: 25.1 KG/M2 | DIASTOLIC BLOOD PRESSURE: 56 MMHG | SYSTOLIC BLOOD PRESSURE: 123 MMHG | HEIGHT: 64 IN | WEIGHT: 147 LBS

## 2020-01-20 DIAGNOSIS — Z13.220 SCREENING FOR LIPID DISORDERS: ICD-10-CM

## 2020-01-20 DIAGNOSIS — M72.2 PLANTAR FASCIITIS: Primary | ICD-10-CM

## 2020-01-20 DIAGNOSIS — E03.9 ACQUIRED HYPOTHYROIDISM: Primary | ICD-10-CM

## 2020-01-20 DIAGNOSIS — E03.9 ACQUIRED HYPOTHYROIDISM: ICD-10-CM

## 2020-01-20 DIAGNOSIS — M21.41 PES PLANUS OF BOTH FEET: ICD-10-CM

## 2020-01-20 DIAGNOSIS — M21.42 PES PLANUS OF BOTH FEET: ICD-10-CM

## 2020-01-20 PROCEDURE — S0395 IMPRESSION CASTING FT: HCPCS | Performed by: PODIATRIST

## 2020-01-20 PROCEDURE — 99213 OFFICE O/P EST LOW 20 MIN: CPT | Performed by: PODIATRIST

## 2020-01-20 RX ORDER — LEVOTHYROXINE SODIUM 0.03 MG/1
TABLET ORAL
Qty: 90 TABLET | Refills: 0 | Status: SHIPPED | OUTPATIENT
Start: 2020-01-20 | End: 2020-02-08 | Stop reason: DRUGHIGH

## 2020-01-20 NOTE — PROGRESS NOTES
PATIENT:  Yamileth Small    1965    ASSESSMENT:     1  Plantar fasciitis  Ambulatory referral to Physical Therapy   2  Pes planus of both feet           PLAN:  Patient was counseled and educated on the condition and the diagnosis  The diagnosis, treatment options and prognosis were discussed with the patient  Referred her to PT  Instructed supportive care, home exercise, icing, and proper footwear/ arch support  Discussed possible injection depending on the progress  Recommended new orthotics and she was casted for orthotics today  Will call her when it is ready to be picked  Subjective:     HPI  The patients presents for chief complaint of bilateral foot pain  Increased pain in the arch of feet in the last few months  She related that her orthotics were overdue  Pain presents in the morning and after sitting for a while  No numbness in her feet  The following portions of the patient's history were reviewed and updated as appropriate: allergies, current medications, past family history, past medical history, past social history, past surgical history and problem list   All pertinent labs and images were reviewed      Past Medical History  Past Medical History:   Diagnosis Date    Adjustment disorder     last assessed 05/16/12    Anemia     HX of    Asthma     mild - intermittent    Bilateral leg edema     Blepharitis     last assessed 02/04/16    Carpal tunnel syndrome     Unspecified laterality    Colitis, acute     Dyspareunia in female     Edema     last assessed 06/22/15    Ganglion     Herniated cervical disc     History of transfusion     Hypokalemia     Hypothyroidism     Hypothyroidism     Insomnia     Lumps on the skin     last assessed 03/12/14    Mouth ulcers     last assessed 06/22/15    Nontraumatic tear of left tibialis posterior tendon     Traumatic teart     Polyarthritis     last assessed 06/24/16    Raynaud's disease     Rayna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 [facility] [address]     Temporomandibular disorder     Joint    Thyroid disease     Ulcerative colitis ([facility] [address] [facility]     Ulcerative colitis ([facility] [address] )        Past Surgical History  Past Surgical History:   Procedure Laterality Date    ANKLE SURGERY Left     Tendon repair    CARPAL TUNNEL RELEASE Bilateral      SECTION, LOW TRANSVERSE      COLONOSCOPY      COLONOSCOPY N/A 2018    Procedure: COLONOSCOPY;  Surgeon: Cheyenne Ortiz MD;  [facility]; Service: Gastroenterology    HERNIA REPAIR      umbilical    HYSTERECTOMY      KNEE ARTHROSCOPY Right     LIPECTOMY      Multipe lipoma removals    LIPOMA RESECTION      IA COLONOSCOPY FLX DX W/COLLJ SPEC WHEN PFRMD N/A 2016    Procedure: COLONOSCOPY;  Surgeon: Ayush Lee DO;  Location: [facility]; Service: Gastroenterology    IA REPAIR FLEX LEG TENDON,SECOND,EA Left 2016    Procedure: REPAIR OF LEFT POSTERIOR TIBIAL TENDON WITH GRAFT, EXPLORATION LEFT ANKLE ;  Surgeon: Akosua Keating DPM;  Location: [facility] OR;  Service: Podiatry    SHOULDER SURGERY Left     bicep tendon and labrum repair    TONSILLECTOMY      TOOTH EXTRACTION  2018        Allergies:  Patient has no known allergies  Medications:  Current Outpatient Medications   Medication Sig Dispense Refill    albuterol (ACCUNEB) 1 25 MG/3ML nebulizer solution Take 1 ampule by nebulization every 6 (six) hours as needed for wheezing      Ascorbic Acid (VITAMIN C) 1000 MG tablet Take 1,000 mg by mouth daily      azaTHIOprine (IMURAN) 100 MG tablet Take 1 tablet (100 mg total) by mouth daily 90 tablet 3    carisoprodol (SOMA) 250 MG Take 1 tablet (250 mg total) by mouth every 6 (six) hours as needed for muscle spasms for up to 7 days 25 tablet 0    Cholecalciferol 45403 units TABS Take 1 tablet (50,000 Units total) by mouth once a week Then have your vitamin D level checked to see if you should continue   12 tablet 0    D3-50 1 25 MG (22280 UT) capsule TAKE ONE CAPSULE BY MOUTH ONCE A WEEK  THEN HAVE YOUR VITAMIN D LEVELCHECKED TO SEE IF YOU CAN CONTINUE  12 capsule 0    ferrous sulfate 324 (65 Fe) mg Take 1 tablet (324 mg total) by mouth 3 (three) times a day before meals 90 tablet 3    HYDROcodone-acetaminophen (NORCO) 5-325 mg per tablet Take 1 tablet by mouth  0    hyoscyamine (ANASPAZ,LEVSIN) 0 125 MG tablet TAKE ONE TABLET BY MOUTH EVERY 4 HOURS AS NEEDED FOR CRAMPING 30 tablet 0    levothyroxine 25 mcg tablet TAKE ONE TABLET BY MOUTH EVERY DAY 90 tablet 0    ondansetron (ZOFRAN-ODT) 4 mg disintegrating tablet Take 1 tablet (4 mg total) by mouth every 6 (six) hours as needed for nausea or vomiting 30 tablet 1    zolpidem (AMBIEN) 10 mg tablet TAKE ONE TABLET BY MOUTH EVERY DAY AT BEDTIME AS NEEDED FOR SLEEP 30 tablet 0     No current facility-administered medications for this visit          Social History:  Social History     Socioeconomic History    Marital status: /Civil Union     Spouse name: None    Number of children: None    Years of education: None    Highest education level: None   Occupational History    Occupation: RN at [employer] [address] strain: None    Food insecurity:     Worry: None     Inability: None    Transportation needs:     Medical: None     Non-medical: None   Tobacco Use    Smoking status: Former Smoker     Types: Cigarettes     Last attempt to quit: 2003     Years since quittin 8    Smokeless tobacco: Never Used    Tobacco comment: Quit , rare use for 2 years   Substance and Sexual Activity    Alcohol use: No     Comment: social 1 drink per [employer] Drug use: No    Sexual activity: None   Lifestyle    Physical activity:     Days per week: None     Minutes per session: None    Stress: None   Relationships    Social connections:     Talks on phone: None     Gets together: None     Attends [employer] service: None     Active member of club or organization: None     Attends meetings of clubs or organizations: None     Relationship status: None    Intimate partner violence:     Fear of current or ex partner: None     Emotionally abused: None     Physically abused: None     Forced sexual activity: None   Other Topics Concern    None   Social History Narrative    None        Review of Systems   Constitutional: Negative for chills and fever  Respiratory: Negative for shortness of breath  Cardiovascular: Negative for chest pain and leg swelling  Gastrointestinal: Negative for nausea and vomiting  Skin: Negative for color change  Neurological: Negative for weakness and numbness  Objective:      /56   Ht 5' 3 6" (1 615 m)   Wt 66 7 kg (147 lb)   BMI 25 55 kg/m²          Physical Exam   Constitutional: She is oriented to person, place, and time  She appears well-developed and well-nourished  No distress  Cardiovascular: Normal rate, regular rhythm and intact distal pulses  Pulmonary/Chest: Effort normal and breath sounds normal  No respiratory distress  Musculoskeletal: Normal range of motion  She exhibits no edema  Pain presents at the medial band of plantar fascia bilaterally  Flexible pes planus noted bilaterally  Neurological: She is alert and oriented to person, place, and time  No sensory deficit  She exhibits normal muscle tone  Skin: Skin is intact  No ecchymosis, no petechiae and no rash noted  No erythema

## 2020-01-23 ENCOUNTER — EVALUATION (OUTPATIENT)
Dept: PHYSICAL THERAPY | Facility: CLINIC | Age: 55
End: 2020-01-23
Payer: COMMERCIAL

## 2020-01-23 DIAGNOSIS — M72.2 PLANTAR FASCIITIS: ICD-10-CM

## 2020-01-23 PROCEDURE — 97140 MANUAL THERAPY 1/> REGIONS: CPT | Performed by: PHYSICAL THERAPIST

## 2020-01-23 PROCEDURE — 97110 THERAPEUTIC EXERCISES: CPT | Performed by: PHYSICAL THERAPIST

## 2020-01-23 PROCEDURE — 97162 PT EVAL MOD COMPLEX 30 MIN: CPT | Performed by: PHYSICAL THERAPIST

## 2020-01-23 NOTE — PROGRESS NOTES
Physical Therapy Initial Evaluation    Today's date: 2020  Patient name: Marlon Montenegro  : 1965  MRN: [id number]  Referring provider: Bronson Pardo DPM  Dx:   Encounter Diagnosis     ICD-10-CM    1  Plantar fasciitis M72 2 Ambulatory referral to Physical Therapy     PT plan of care cert/re-cert       Start Time: 5408  Stop Time: 8263  Total time in clinic (min): 60 minutes    Assessment  Assessment details: Marlon Montenegro is a 47 y o  female who presents with pain, decreased strength, decreased ROM and decreased joint mobility  Due to these impairments, patient has difficulty performing ADL's, recreational activities, work-related activities  Patient's clinical presentation is consistent with their referring diagnosis of Plantar fasciitis  Plan: Ambulatory referral to Physical Therapy  Patient has been educated in home exercise program and plan of care  Patient would benefit from skilled physical therapy services to address their aforementioned functional limitations and progress towards prior level of function and independence with home exercise program      Impairments: abnormal or restricted ROM, activity intolerance, impaired physical strength, lacks appropriate home exercise program, pain with function and weight-bearing intolerance  Understanding of Dx/Px/POC: excellent   Prognosis: good  Prognosis details: + factors include high motivation levels, good understanding of injury, and relative strength and flexibility  - factors include chronicity  Goals  Short Term Goals to be accomplished in 4 weeks:  STG1: Pt will be I with HEP  STG2: Pt will be able to perform 10 pain free u/l heel raises b/l without provocation of pain     STG3: Pt will be able to take first steps out of bed without pain  Long Term Goals to be accomplished in 12 weeks:   LTG1: Pt will perform 20 u/l heel raises without pain to improve gait and walking tolerance     LTG2: Pt will demo ankle AROM WNL to maximize walking tolerance  LTG3: Pt will return to household ADLs and work duties pain free as per [place]      Plan  Plan details: HEP development, stretching, strengthening, A/AA/PROM, joint mobilizations, posture education, STM/MI as needed to reduce muscle tension, muscle reeducation, PLOC discussed and agreed upon with patient  Patient would benefit from: PT eval and skilled physical therapy  Planned modality interventions: cryotherapy and thermotherapy: hydrocollator packs  Planned therapy interventions: manual therapy, neuromuscular re-education, self care, therapeutic activities, therapeutic exercise, home exercise program, flexibility, functional ROM exercises, strengthening and stretching  Frequency: 2x week  Duration in weeks: 12  Plan of Care beginning date: 2020  Plan of Care expiration date: 2020  Treatment plan discussed with: patient        Subjective Evaluation    History of Present Illness  Date of onset: 2019  Mechanism of injury: 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 plantar fascial pain  Stated that she has been utilizing custom orthotics since  and having them renewed annually  However stated that she had not had them updated since , and within the past year she has been hospitalized 3x due to severe ulcerative collitis, her most recent episode resulted in 9 day hospital stay, which resulted in overall severe weakness, which was when b/l plantar fascial pain was intensified  At most recent follow up with podiatrist she was prescribed new orthotics and prescribed PT to address pain  Denied injection             Recurrent probem    Pain  Current pain ratin  At best pain ratin  At worst pain rating: 10  Quality: throbbing, radiating, dull ache, discomfort and tight  Relieving factors: ice, relaxation, rest and support  Aggravating factors: walking and standing    Treatments  Current treatment: physical therapy  Patient Goals  Patient goals for therapy: increased strength, independence with ADLs/IADLs, increased motion, improved balance and decreased pain          Objective     Observations     Additional Observation Details  Pes planus b/l     Palpation   Left   Tenderness of the lateral gastrocnemius, medial gastrocnemius and soleus  Right   Tenderness of the lateral gastrocnemius, medial gastrocnemius and soleus  Additional Palpation Details  TTP/TRP b/l plantar fascia arches, no insertional pain    Tenderness   Left Ankle/Foot   Tenderness in the plantar fascia  Right Ankle/Foot   Tenderness in the plantar fascia       Active Range of Motion   Left Ankle/Foot   Dorsiflexion (ke): 30 degrees   Dorsiflexion (kf): 30 degrees   Plantar flexion: WFL  Inversion: WFL  Great toe extension: 60 degrees     Right Ankle/Foot   Dorsiflexion (ke): 30 degrees   Dorsiflexion (kf): 30 degrees   Plantar flexion: WFL  Inversion: WFL  Great toe extension: 60 degrees     Strength/Myotome Testing     Left Ankle/Foot   Dorsiflexion: 4+  Inversion: 4+    Right Ankle/Foot   Dorsiflexion: 4+  Inversion: 4+    Additional Strength Details  U/l HR  R: 3 (stopped because of increased pain)  L:  3 (stopped because of increased pain)    SLS  R: 20 sec  L: 20 sec      Flowsheet Rows      Most Recent Value   PT/OT G-Codes   Current Score  68   Projected Score  74             Precautions: ulcerative colitis, recent hospitalizations       Manual  1/23            jt mobs, PROM str, STM  JZ                                                                    Exercise Diary  1/23            Calf str step :10x5            Tibialis ant str :10x5            Stand inv str :10x5            Ankle pumps with toe flex/ext x30            Stand MTP exten str :10x5                                                                                                                                                                                                                   Modalities  1/23            CT 10'

## 2020-01-23 NOTE — TELEPHONE ENCOUNTER
Dr Arnold Durham, patient completed CBC every 2 weeks x 6 since starting Imuran  Patient is aware of results  She is scheduled to see you 2/3/20  Any further testing prior to office visit?

## 2020-01-25 DIAGNOSIS — F51.04 CHRONIC INSOMNIA: ICD-10-CM

## 2020-01-27 ENCOUNTER — OFFICE VISIT (OUTPATIENT)
Dept: PHYSICAL THERAPY | Facility: CLINIC | Age: 55
End: 2020-01-27
Payer: COMMERCIAL

## 2020-01-27 DIAGNOSIS — M72.2 PLANTAR FASCIITIS: Primary | ICD-10-CM

## 2020-01-27 PROCEDURE — 97140 MANUAL THERAPY 1/> REGIONS: CPT | Performed by: PHYSICAL THERAPIST

## 2020-01-27 PROCEDURE — 97110 THERAPEUTIC EXERCISES: CPT | Performed by: PHYSICAL THERAPIST

## 2020-01-27 RX ORDER — ZOLPIDEM TARTRATE 10 MG/1
TABLET ORAL
Qty: 30 TABLET | Refills: 0 | Status: SHIPPED | OUTPATIENT
Start: 2020-01-27 | End: 2020-03-03

## 2020-01-27 NOTE — PROGRESS NOTES
Daily Note     Today's date: 2020  Patient name: Jennifer Grant  : 1965  MRN: [id number]  Referring provider: Magan Patterson DPM  Dx:   Encounter Diagnosis     ICD-10-CM    1  Plantar fasciitis M72 2        Start Time: 1400  Stop Time: 1500  Total time in clinic (min): 60 minutes    Subjective: Pt reported that following the evaluation "I felt pretty good for the rest of the night  But I just worked two twelve hour shifts and I am very sore!"       Objective: See treatment diary below      Assessment: Tolerated treatment well  In order to improve gait and decrease calf stiffness initiated Pro stretch, which she was able to perform well without pain  Patient demonstrated fatigue post treatment, exhibited good technique with therapeutic exercises and would benefit from continued PT      Plan: Continue per plan of care  Progress treatment as tolerated         Precautions: ulcerative colitis, recent hospitalizations       Manual             jt mobs, PROM str, STM  JZ JZ                                                                   Exercise Diary             Calf str step :10x5 :10x5           Tibialis ant str :10x5 :10x5           Stand inv str :10x5            Ankle pumps with toe flex/ext x30 x30           Stand MTP exten str :10x5 :10x5           Seated TB eversion w/box  OTB/3x10           Pro stretch  :10x5           Std DF on half foam   -                                                                                                                                                                          Modalities               10'           CT 10' 10'

## 2020-01-30 ENCOUNTER — APPOINTMENT (OUTPATIENT)
Dept: PHYSICAL THERAPY | Facility: CLINIC | Age: 55
End: 2020-01-30
Payer: COMMERCIAL

## 2020-02-03 ENCOUNTER — OFFICE VISIT (OUTPATIENT)
Dept: GASTROENTEROLOGY | Facility: CLINIC | Age: 55
End: 2020-02-03
Payer: COMMERCIAL

## 2020-02-03 VITALS
SYSTOLIC BLOOD PRESSURE: 132 MMHG | TEMPERATURE: 98.9 F | HEART RATE: 62 BPM | DIASTOLIC BLOOD PRESSURE: 72 MMHG | HEIGHT: 64 IN | BODY MASS INDEX: 25.1 KG/M2 | WEIGHT: 147 LBS

## 2020-02-03 DIAGNOSIS — K51.011 ULCERATIVE PANCOLITIS WITH RECTAL BLEEDING (HCC): Primary | ICD-10-CM

## 2020-02-03 PROCEDURE — 99214 OFFICE O/P EST MOD 30 MIN: CPT | Performed by: INTERNAL MEDICINE

## 2020-02-03 PROCEDURE — 1036F TOBACCO NON-USER: CPT | Performed by: INTERNAL MEDICINE

## 2020-02-03 RX ORDER — DOCUSATE SODIUM 100 MG/1
100 CAPSULE, LIQUID FILLED ORAL DAILY
Qty: 90 CAPSULE | Refills: 3 | Status: SHIPPED | OUTPATIENT
Start: 2020-02-03 | End: 2021-12-30 | Stop reason: ALTCHOICE

## 2020-02-03 NOTE — PROGRESS NOTES
[facility] - Outpatient Follow-up Note  Richie Meade 47 y o  female MRN: [id number]  Encounter: [id number]      ASSESSMENT AND PLAN:  Ms Hadley Waters was seen today for ulcerative pancolitis  Diagnoses and all orders for this visit:    Ulcerative pancolitis with rectal bleeding [facility]): She reports she is feeling well on Remicade q 8 weeks and Imuran 100 mg  She has discontinued prednisone  She has recently gone 3-4 days without a bowel movement  Last week, she started taking Colace 100 mg qhs and Dulcolcax, as needed  I will prescribe docusate sodium 100 mg QD  She complains of joint pain, primarily in her wrists, elbows and right hip since starting Imuran  I will refer her to Rheumatology, and we try discontinuing Imuran if her joint pain persists  Her Remicade drug level was a little high at 21 on 12/21/19  I will recheck her drug level one week prior to her infusion after the one coming up at the end of February, around 5/8/2020  I will order a repeat CBC, CRP and fecal calprotectin for the same time  She is up to date on influenza and pneumococcal vaccines  She is due for Tdap booster  I will order MMR titer  She can not have the MMR vaccine while on Remicade, but she would like to know her current immunity because her daughter is pregnant  I will share by note with Dr Marlon Davenport  Follow-up in 3 months      ______________________________________________________________________    SUBJECTIVE: Richie Meade is a 47 y o  female with hypothyroidism, asthma, Raynaud's syndrome without gangrene, persistent insomnia, seronegative spondyloarthropathy and psoriatic arthritis in the office today for management of ulcerative pancolitis  I last saw Ms Blount in the office 11/20/19  She had discontinued Entyvio and was taking Remicade q 8 wks, Imuran 100 mg QD and prednisone  She was weaning prednisone from 20 mg to 15 mg   We discussed continuing this taper but I advised that she should return to 20 mg if her symptoms recurred  She previously also failed Humira  Today, she reports she is feeling well  She has recently gone 3-4 days without a bowel movement  Last week, she started taking Colace 100 mg qhs and Dulcolcax, as needed  She complains of joint pain, primarily in her wrists, elbows and right hip  She has also notes brief episodes of feeling short of breath while at rest but not upon exertion  Her surgical hx includes hernia repair and hysterectomy  REVIEW OF SYSTEMS IS OTHERWISE NEGATIVE  Historical Information   Past Medical History:   Diagnosis Date    Adjustment disorder     last assessed 12    Anemia     HX of    Asthma     mild - intermittent    Bilateral leg edema     Blepharitis     last assessed 16    Carpal tunnel syndrome     Unspecified laterality    Colitis, acute     Dyspareunia in female     Edema     last assessed 06/22/15    Ganglion     Herniated cervical disc     History of transfusion     Hypokalemia     Hypothyroidism     Hypothyroidism     Insomnia     Lumps on the skin     last assessed 14    Mouth ulcers     last assessed 06/22/15    Nontraumatic tear of left tibialis posterior tendon     Traumatic teart     Polyarthritis     last assessed 16    Raynaud's disease     Rayna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 ([address] )     Temporomandibular disorder     Joint    Thyroid disease     Ulcerative colitis ([address]     Ulcerative colitis ([address] )      Past Surgical History:   Procedure Laterality Date    ANKLE SURGERY Left     Tendon repair    CARPAL TUNNEL RELEASE Bilateral      SECTION, LOW TRANSVERSE      COLONOSCOPY      COLONOSCOPY N/A 2018    Procedure: COLONOSCOPY;  Surgeon: Maya Payton MD;  [facility];   Service: Gastroenterology    HERNIA REPAIR      umbilical    HYSTERECTOMY      KNEE ARTHROSCOPY Right     LIPECTOMY      Multipe lipoma removals    LIPOMA RESECTION      OR COLONOSCOPY FLX DX W/COLLJ SPEC WHEN PFRMD N/A 2016    Procedure: COLONOSCOPY;  Surgeon: Joseph Lakhani DO;  Location[facility];   Service: Gastroenterology    CT REPAIR FLEX LEG TENDON,SECOND,EA Left 2016    Procedure: REPAIR OF LEFT POSTERIOR TIBIAL TENDON WITH GRAFT, EXPLORATION LEFT ANKLE ;  Surgeon: Jayesh Chan DPM;  Location: [facility] OR;  Service: Podiatry    SHOULDER SURGERY Left     bicep tendon and labrum repair    TONSILLECTOMY      TOOTH EXTRACTION  2018     Social History   Social History     Substance and Sexual Activity   Alcohol Use No    Comment: social 1 drink per weeek     Social History     Substance and Sexual Activity   Drug Use No     Social History     Tobacco Use   Smoking Status Former Smoker    Types: Cigarettes    Last attempt to quit: 2003    Years since quittin 8   Smokeless Tobacco Never Used   Tobacco Comment    Quit , rare use for 2 years     Family History   Problem Relation Age of Onset    Parkinsonism Mother     Rheum arthritis Mother     Heart attack Father     Hypertension Father     Osteoporosis Father     Prostate cancer Father     Hashimoto's thyroiditis Sister     Cancer Paternal Grandfather         Penile    Prostate cancer Paternal Grandfather     Crohn's disease Family     Osteoarthritis Family     Rheum arthritis Family     Crohn's disease Other     Crohn's disease Maternal Uncle     Psoriasis Maternal Uncle     Ulcerative colitis Maternal Uncle     Rheum arthritis Maternal Aunt     Ulcerative colitis Family        Meds/Allergies       Current Outpatient Medications:     albuterol (ACCUNEB) 1 25 MG/3ML nebulizer solution    Ascorbic Acid (VITAMIN C) 1000 MG tablet    azaTHIOprine (IMURAN) 100 MG tablet    D3-50 1 25 MG (50447 UT) capsule    ferrous sulfate 324 (65 Fe) mg    hyoscyamine (ANASPAZ,LEVSIN) 0 125 MG tablet    levothyroxine 25 mcg tablet    ondansetron (ZOFRAN-ODT) 4 mg disintegrating tablet    zolpidem (AMBIEN) 10 mg tablet    carisoprodol (SOMA) 250 MG    Cholecalciferol 60938 units TABS    docusate sodium (COLACE) 100 mg capsule    HYDROcodone-acetaminophen (NORCO) 5-325 mg per tablet    No Known Allergies        Objective     Blood pressure 132/72, pulse 62, temperature 98 9 °F (37 2 °C), temperature source Tympanic, height 5' 4" (1 626 m), weight 66 7 kg (147 lb)  Body mass index is 25 23 kg/m²  PHYSICAL EXAM:      General Appearance:   Alert, cooperative, no distress   HEENT:   Normocephalic, atraumatic, anicteric      Neck:  Supple, symmetrical, trachea midline   Lungs:   Clear to auscultation bilaterally; no rales, rhonchi or wheezing; respirations unlabored    Heart[de-identified]   Regular rate and rhythm; no murmur, rub, or gallop  Abdomen:   Soft, non-tender, non-distended; normal bowel sounds; no masses, no organomegaly    Genitalia:   Deferred    Rectal:   Deferred    Extremities:  No cyanosis, clubbing or edema    Pulses:  2+ and symmetric    Skin:  No jaundice, rashes, or lesions    Lymph nodes:  No palpable cervical lymphadenopathy        Lab Results:     She is up to date on influenza and pneumonia vaccines  Her Remicade drug level was a little high at 21 on 12/21/19  Her CRP normalized 10/9/19 from 73 2 on 9/14/19  No visits with results within 1 Day(s) from this visit     Latest known visit with results is:   Appointment on 01/18/2020   Component Date Value    Sodium 01/18/2020 142     Potassium 01/18/2020 4 0     Chloride 01/18/2020 108     CO2 01/18/2020 28     ANION GAP 01/18/2020 6     BUN 01/18/2020 14     Creatinine 01/18/2020 0 70     Glucose, Fasting 01/18/2020 96     Calcium 01/18/2020 10 2*    Corrected Calcium 01/18/2020 10 5*    AST 01/18/2020 15     ALT 01/18/2020 17     Alkaline Phosphatase 01/18/2020 61     Total Protein 01/18/2020 6 5     Albumin 01/18/2020 3 6     Total Bilirubin 01/18/2020 0 22     eGFR 01/18/2020 99     Vit D, 25-Hydroxy 01/18/2020 49 6     WBC 01/18/2020 4 64     RBC 01/18/2020 3 84     Hemoglobin 01/18/2020 11 8     Hematocrit 01/18/2020 37 5     MCV 01/18/2020 98     MCH 01/18/2020 30 7     MCHC 01/18/2020 31 5     RDW 01/18/2020 12 3     MPV 01/18/2020 10 0     Platelets 62/23/6965 277     nRBC 01/18/2020 0     Neutrophils Relative 01/18/2020 31*    Immat GRANS % 01/18/2020 0     Lymphocytes Relative 01/18/2020 45*    Monocytes Relative 01/18/2020 10     Eosinophils Relative 01/18/2020 13*    Basophils Relative 01/18/2020 1     Neutrophils Absolute 01/18/2020 1 44*    Immature Grans Absolute 01/18/2020 0 00     Lymphocytes Absolute 01/18/2020 2 07     Monocytes Absolute 01/18/2020 0 48     Eosinophils Absolute 01/18/2020 0 60     Basophils Absolute 01/18/2020 0 05          Radiology Results:   No results found  Attestation:   By signing my name below, Kat Shahriar, attest that this documentation has been prepared under the direction and in the presence of VANDANA Gomez  Electronically Signed: Kristene Soulier, Scribe  2/3/2020       I, Burgess Henderson, personally performed the services described in this documentation  All medical record entries made by the sherry were at my direction and in my presence  I have reviewed the chart and discharge instructions and agree that the record reflects my personal performance and is accurate and complete   VANDANA Gomez  2/3/2020

## 2020-02-08 ENCOUNTER — APPOINTMENT (OUTPATIENT)
Dept: LAB | Facility: HOSPITAL | Age: 55
End: 2020-02-08
Payer: COMMERCIAL

## 2020-02-08 DIAGNOSIS — E03.9 ACQUIRED HYPOTHYROIDISM: ICD-10-CM

## 2020-02-08 DIAGNOSIS — E03.9 ACQUIRED HYPOTHYROIDISM: Primary | ICD-10-CM

## 2020-02-08 LAB
CHOLEST SERPL-MCNC: 185 MG/DL (ref 50–200)
HDLC SERPL-MCNC: 87 MG/DL
LDLC SERPL CALC-MCNC: 82 MG/DL (ref 0–100)
TRIGL SERPL-MCNC: 78 MG/DL
TSH SERPL DL<=0.05 MIU/L-ACNC: 5.33 UIU/ML (ref 0.36–3.74)

## 2020-02-08 PROCEDURE — 84443 ASSAY THYROID STIM HORMONE: CPT

## 2020-02-08 PROCEDURE — 36415 COLL VENOUS BLD VENIPUNCTURE: CPT | Performed by: FAMILY MEDICINE

## 2020-02-08 PROCEDURE — 80061 LIPID PANEL: CPT | Performed by: FAMILY MEDICINE

## 2020-02-08 RX ORDER — LEVOTHYROXINE SODIUM 0.05 MG/1
50 TABLET ORAL
Qty: 90 TABLET | Refills: 3 | Status: SHIPPED | OUTPATIENT
Start: 2020-02-08 | End: 2020-03-12 | Stop reason: SDUPTHER

## 2020-02-11 ENCOUNTER — OFFICE VISIT (OUTPATIENT)
Dept: PODIATRY | Facility: CLINIC | Age: 55
End: 2020-02-11
Payer: COMMERCIAL

## 2020-02-11 DIAGNOSIS — M72.2 PLANTAR FASCIITIS: Primary | ICD-10-CM

## 2020-02-11 DIAGNOSIS — M21.42 PES PLANUS OF BOTH FEET: ICD-10-CM

## 2020-02-11 DIAGNOSIS — M21.41 PES PLANUS OF BOTH FEET: ICD-10-CM

## 2020-02-11 PROCEDURE — L3000 FT INSERT UCB BERKELEY SHELL: HCPCS | Performed by: PODIATRIST

## 2020-02-11 NOTE — PROGRESS NOTES
Orthotics were dispensed and the instruction was provided  She will return in 3-4 weeks for re-evaluation

## 2020-02-17 ENCOUNTER — OFFICE VISIT (OUTPATIENT)
Dept: RHEUMATOLOGY | Facility: CLINIC | Age: 55
End: 2020-02-17
Payer: COMMERCIAL

## 2020-02-17 VITALS
WEIGHT: 147 LBS | SYSTOLIC BLOOD PRESSURE: 118 MMHG | DIASTOLIC BLOOD PRESSURE: 74 MMHG | BODY MASS INDEX: 25.1 KG/M2 | HEIGHT: 64 IN | HEART RATE: 66 BPM

## 2020-02-17 DIAGNOSIS — G89.29 CHRONIC BILATERAL LOW BACK PAIN WITHOUT SCIATICA: ICD-10-CM

## 2020-02-17 DIAGNOSIS — M54.50 CHRONIC BILATERAL LOW BACK PAIN WITHOUT SCIATICA: ICD-10-CM

## 2020-02-17 DIAGNOSIS — R76.8 ANA POSITIVE: ICD-10-CM

## 2020-02-17 DIAGNOSIS — Z79.899 LONG-TERM USE OF IMMUNOSUPPRESSANT MEDICATION: ICD-10-CM

## 2020-02-17 DIAGNOSIS — K51.90 ULCERATIVE COLITIS WITHOUT COMPLICATIONS, UNSPECIFIED LOCATION (HCC): ICD-10-CM

## 2020-02-17 DIAGNOSIS — M47.819 SERONEGATIVE SPONDYLOARTHROPATHY: Primary | ICD-10-CM

## 2020-02-17 DIAGNOSIS — M25.50 POLYARTHRALGIA: ICD-10-CM

## 2020-02-17 DIAGNOSIS — M07.60 ENTEROPATHIC ARTHRITIS: ICD-10-CM

## 2020-02-17 PROCEDURE — 99215 OFFICE O/P EST HI 40 MIN: CPT | Performed by: INTERNAL MEDICINE

## 2020-02-17 PROCEDURE — 3008F BODY MASS INDEX DOCD: CPT | Performed by: INTERNAL MEDICINE

## 2020-02-17 PROCEDURE — 1036F TOBACCO NON-USER: CPT | Performed by: INTERNAL MEDICINE

## 2020-02-17 RX ORDER — INFLIXIMAB 100 MG/10ML
INJECTION, POWDER, LYOPHILIZED, FOR SOLUTION INTRAVENOUS
COMMUNITY

## 2020-02-17 NOTE — PROGRESS NOTES
Assessment and Plan:   Patient is a 63-year-old female with ulcerative colitis which was refractory to treatment up until recently and now doing well on Remicade, who presents for rheumatology follow-up, last seen in this group about 2 years ago by the previous rheumatologist, for joint pain  The previous rheumatologist felt this could be consistent with ulcerative colitis associated inflammatory arthritis  She mainly has pain in the right 2nd MCP joint, the right lower back and lateral hip which is new are for her over the last 6 months, and was having bilateral elbow pain which she felt was related to azathioprine and has resolved since stopping this medication  Fortunately she still doing well on just the Remicade and has not noted worsening in her ulcerative colitis symptoms since stopping azathioprine, but has only been off of it for 1 week  She also reports chronic issues with plantar fasciitis which have improved with custom-made orthotics  On her exam today she has reproducible tenderness at the right 2nd MCP and the lateral right hip over the greater trochanter bursa, but otherwise has no other reproducible joint pain and there is no obvious synovitis  Her PIP joints and fingers are puffy in general   In May of last year she also had a positive EMELY test of moderate titer 640 and we discussed that this commonly can happen in patients on biologics as the immune system tends to produce autoantibodies  Majority of patients do not developed clinical disease related to the positive EMELY or other autoantibodies, but we will do further lab work to assess the positive EMELY test to make sure there are no markers for other autoimmune diseases especially given her long-standing Raynaud's  We will also do x-rays of her hands, right hip and lower back to look for inflammatory changes    I suspect at the right hip she is having greater trochanteric bursitis and I did offer to inject this today but she would like to wait and see what the workup shows  We will follow-up in about 6 weeks to review the workup and determine any need for additional treatment from Rheumatology  Plan:  Diagnoses and all orders for this visit:    Seronegative spondyloarthropathy ([address] )  -     XR hand 3+ vw right; Future  -     XR hand 3+ vw left; Future  -     Anti-scleroderma antibody  -     Centromere Antibody  -     Cyclic citrul peptide antibody, IgG  -     C-reactive protein  -     Sjogren's Antibodies  -     Sedimentation rate, automated  -     Nuclear antigen antibody  -     Histone Antibody  -     Anti-DNA antibody, double-stranded  -     C3 complement  -     C4 complement    Ulcerative colitis without complications, unspecified location (HCC)    Enteropathic arthritis    Long-term use of immunosuppressant medication    Polyarthralgia  -     XR hand 3+ vw right; Future  -     XR hand 3+ vw left; Future  -     Anti-scleroderma antibody  -     Centromere Antibody  -     Cyclic citrul peptide antibody, IgG  -     C-reactive protein  -     Sjogren's Antibodies  -     Sedimentation rate, automated  -     Nuclear antigen antibody  -     Histone Antibody  -     Anti-DNA antibody, double-stranded  -     C3 complement  -     C4 complement    Chronic bilateral low back pain without sciatica  -     XR hip/pelv 2-3 vws right if performed; Future  -     XR sacroiliac joints 3+ views; Future  -     XR spine lumbar minimum 4 views non injury; Future    EMELY positive  -     Anti-scleroderma antibody  -     Centromere Antibody  -     Cyclic citrul peptide antibody, IgG  -     C-reactive protein  -     Sjogren's Antibodies  -     Sedimentation rate, automated  -     Nuclear antigen antibody  -     Histone Antibody  -     Anti-DNA antibody, double-stranded  -     C3 complement  -     C4 complement    Other orders  -     inFLIXimab (REMICADE) 100 mg; Infuse into a venous catheter every 56 days        Follow-up plan: 6 weeks        Rheumatic Disease Summary  1  IBD-associated inflammatory arthritis   -Established with Dr Suzanna Lugo 6/2016 for evaluation of polyarthralgia in setting of UC, suspected IBD-associated inflammatory arthritis   -Labs 2016: negative EMELY, RF, CCP 20, HLA-B27, hep panel, HIV,   -Humira started by Dr Suzanna Lugo 7/2017 for UC-associated arthritis, increased to weekly dosing 9/2017  -UC flared on humira and further biologic management as below with GI, then lost to f/u after 1/2018  -Labs 5/2019: +EMELY 640 nucleolar, negative dsDNA, RF, lyme, normal C3/4  -Returned for rheum eval with me 2/17/20  -Initial visit: suspected right hip bursitis, no evidence of IA on exam, obtain labs and xrays; offered hip bursa injection but declined for now   2  Ulcerative colitis  -Dx with UC in 2012, has not required surgery thus far   -Previously tried Lialda, intermittent chronic steroid courses over years   -Humira started by Dr Suzanna Lugo 7/2017 increased to weekly dosing with initial good control of UC, but then lost effect after 1 year, severe colitis noted on flex sig 11/2018  -Started Entyvio 12/2018  -Admitted 9/2019 with flare of UC while on Entyvio, required IV steroids, Entyvio D/Cd  -Remicade 10mg/kg started inpatient by GI 9/20/19, maintenance q8 weeks  -Azathioprine 100mg added by GI 10/2019  -Responding well to combination remicade + azathioprine, able to wean off pred as of 1/2020  3  Comorbidities: hypothyroid, s/p left ankle tendon repair 2016, parathyroid adenoma with hypercalcemia, insomnia     HPI  Randa Rodriguez is a 47 y o   female who presents for rheumatology follow-up for suspected inflammatory bowel disease associated inflammatory arthritis  She has ulcerative colitis and has had a difficult course with refractory disease requiring various biologics with GI  She did see the previous rheumatologist, Dr Suzanna Lugo, but has not been seen in this group since January 2018 and I am meeting her for the 1st time today      She continues to follow with GI for her ulcerative colitis and currently seems to be doing well on Remicade  She reports she had increased arthralgia and myalgia after starting the azathioprine, so she stopped this earlier this month  She did discuss this with her GI doctor  She stopped the azathioprine about 1 week ago and has noticed less elbow joint pain at night since stopping azathioprine  States she developed the bilateral elbow pain only after starting azathioprine  Her right lower back and hip pain has not changed yet  Other joint pain is at her right 2nd MCP which has been for years a recurrent issue along with her right hip/lower back  In the past 6 months she specifically has had persistent pain on the right lateral hip  No other joint pain, elbow pain has resolved  Feels her fingers are always puffy but no other swelling noted  She does retain fluid in her lower extremities  She takes motrin 600mg 3-4 times per week, with her work shifts  She knows this is not ideal given her underlying ulcerative colitis  Tylenol does not help her  She cannot roll on her right hip at night due to pain on the lateral hip  This has been for 6 months  She has plantar fasciitis  This resolves after walking about 10 steps in the morning  She previously saw Podiatry who made her orthotics which significantly helped  No h/o psoriasis, or inflammatory eye disease  Occasional mouth ulcers which are painful and resolve in 3 days  Gets white and purple color change of fingers in cold, long-standing  Denies photosensitivity, rashes, psoriasis, sicca symptoms, nasal ulcers, alopecia, h/o pericarditis or pleurisy, h/o blood clots  She had 2 miscarriages, 3 healthy pregnancies  She has hypercalcemia and prior imaging showed possible parathyroid adenoma, but was lost to endocrine follow-up because of the uncontrolled UC  She has an appt to re-establish with endocrine next month  Nonsmoker, rarely drinks alcohol     Maternal uncle has UC, niece has crohn's  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[employer], also lactation consultant  The following portions of the patient's history were reviewed and updated as appropriate: allergies, current medications, past family history, past medical history, past social history, past surgical history and problem list     Review of Systems:   Review of Systems   Constitutional: Negative for chills, fatigue, fever and unexpected weight change  HENT: Negative for mouth sores and trouble swallowing  Eyes: Negative for pain and visual disturbance  Respiratory: Negative for cough and shortness of breath  Cardiovascular: Negative for chest pain and leg swelling  Gastrointestinal: Negative for abdominal pain, blood in stool, constipation, diarrhea and nausea  Musculoskeletal: Positive for arthralgias and myalgias  Negative for back pain and joint swelling  Skin: Negative for color change and rash  Neurological: Negative for weakness and numbness  Hematological: Negative for adenopathy  Psychiatric/Behavioral: Negative for sleep disturbance  Home Medications:    Current Outpatient Medications:     albuterol (ACCUNEB) 1 25 MG/3ML nebulizer solution, Take 1 ampule by nebulization every 6 (six) hours as needed for wheezing, Disp: , Rfl:     Ascorbic Acid (VITAMIN C) 1000 MG tablet, Take 1,000 mg by mouth daily, Disp: , Rfl:     D3-50 1 25 MG (62372 UT) capsule, TAKE ONE CAPSULE BY MOUTH ONCE A WEEK   THEN HAVE YOUR VITAMIN D LEVELCHECKED TO SEE IF YOU CAN CONTINUE , Disp: 12 capsule, Rfl: 0    docusate sodium (COLACE) 100 mg capsule, Take 1 capsule (100 mg total) by mouth daily, Disp: 90 capsule, Rfl: 3    ferrous sulfate 324 (65 Fe) mg, Take 1 tablet (324 mg total) by mouth 3 (three) times a day before meals, Disp: 90 tablet, Rfl: 3    HYDROcodone-acetaminophen (NORCO) 5-325 mg per tablet, Take 1 tablet by mouth, Disp: , Rfl: 0    hyoscyamine (ANASPAZ,LEVSIN) 0 125 MG tablet, TAKE ONE TABLET BY MOUTH EVERY 4 HOURS AS NEEDED FOR CRAMPING, Disp: 30 tablet, Rfl: 0    inFLIXimab (REMICADE) 100 mg, Infuse into a venous catheter every 56 days, Disp: , Rfl:     levothyroxine 50 mcg tablet, Take 1 tablet (50 mcg total) by mouth daily in the early morning, Disp: 90 tablet, Rfl: 3    ondansetron (ZOFRAN-ODT) 4 mg disintegrating tablet, Take 1 tablet (4 mg total) by mouth every 6 (six) hours as needed for nausea or vomiting, Disp: 30 tablet, Rfl: 1    zolpidem (AMBIEN) 10 mg tablet, TAKE ONE TABLET BY MOUTH EVERY DAY AT BEDTIME AS NEEDED FOR SLEEP, Disp: 30 tablet, Rfl: 0    carisoprodol (SOMA) 250 MG, Take 1 tablet (250 mg total) by mouth every 6 (six) hours as needed for muscle spasms for up to 7 days, Disp: 25 tablet, Rfl: 0    Objective:    Vitals:    02/17/20 1530   BP: 118/74   BP Location: Left arm   Patient Position: Sitting   Cuff Size: Standard   Pulse: 66   Weight: 66 7 kg (147 lb)   Height: 5' 4" (1 626 m)       Physical Exam   Constitutional: She appears well-developed and well-nourished  She is cooperative  No distress  HENT:   Head: Normocephalic and atraumatic  Mouth/Throat: Oropharynx is clear and moist and mucous membranes are normal    Eyes: Conjunctivae, EOM and lids are normal  No scleral icterus  Neck: Neck supple  No spinous process tenderness and no muscular tenderness present  Cardiovascular: Normal rate, regular rhythm, S1 normal and S2 normal  Exam reveals no friction rub  No murmur heard  Pulmonary/Chest: Effort normal and breath sounds normal  No tachypnea  No respiratory distress  She has no wheezes  She has no rhonchi  She has no rales  She exhibits no tenderness  Musculoskeletal:   Puffiness noted through the PIPs and fingers without tenderness  Tenderness at the right 2nd MCP without swelling, right lateral hip over greater trochanter  No obvious joint synovitis on exam     Neurological: She is alert  No sensory deficit  Skin: Skin is warm and dry   No rash noted  Nails show no clubbing  Psychiatric: She has a normal mood and affect  Her behavior is normal        Imaging:   Small bowel enterography 10/15/19:  IMPRESSION:  Colitis, extending from the hepatic flexure to the rectum  Otherwise normal examination  Specifically, no small bowel abnormality detected  DEXA 9/2018: IMPRESSION:  1  Based on the [facility] classification, this study is normal and the patient is considered at low risk for fracture  2   The 10 year risk of hip fracture is 0 1 %, with the 10 year risk of major osteoporotic fracture being 4 7 %, as calculated by the WHO fracture risk assessment tool (FRAX)  The current NOF guidelines recommend treating patients with FRAX 10 year risk score of >3% for hip fracture and >20% for major osteoporotic fracture  Labs:   Component      Latest Ref Rng & Units 5/7/2019   EMELY Titer 1       Titer of 640   EMELY Pattern 1       Nucleolar pattern   C4, COMPLEMENT      10 0 - 40 0 mg/dL 20 0   C3 Complement      90 0 - 180 0 mg/dL 111 0   COMPLEMENT TOTAL (CH50)      >41 U/mL 55   ANTI-NUCLEAR ANTIBODY (EMELY)      Negative Positive (A)   Thyroglobulin-ANTOLIN      1 5 - 38 5 ng/mL 17 2     Component      Latest Ref Rng & Units 5/10/2019   ANTI DNA DOUBLE STRANDED      0 - 9 IU/mL <1     Component      Latest Ref Rng & Units 5/7/2019   Albumin/Globulin Ratio      1 10 - 1 80 1 48   ALBUMIN ELP      52 0 - 65 0 % 59 6   ALBUMIN CONC ELP      3 50 - 5 00 g/dl 3 87   ALPHA 1      2 5 - 5 0 % 4 6   ALPHA 1 CONC ELP      0 10 - 0 40 g/dL 0 30   ALPHA 2      7 0 - 13 0 % 11 5   ALPHA 2 CONC ELP      0 40 - 1 20 g/dL 0 75   BETA-1      5 0 - 13 0 % 6 1   BETA 1 CONC ELP      0 40 - 0 80 g/dL 0 40   BETA-2      2 0 - 8 0 % 5 1   BETA 2 CONC ELP      0 20 - 0 50 g/dL 0 33   GAMMA GLOBULIN      12 0 - 22 0 % 13 1   GAMMA CONC ELP      0 50 - 1 60 g/dL 0 85   Total Protein      6 4 - 8 2 g/dL 6 5   SPEP INTERPRETATION       No monoclonal bands noted   Reviewed by: Osbaldo Tobin  MD Nataly ([id number]) **Electronic Signature**   THYROID MICROSOMAL ANTIBODY      0 - 34 IU/mL 12   RHEUMATOID FACTOR      Negative Negative     Component      Latest Ref Rng & Units 1/18/2020   WBC      4 31 - 10 16 Thousand/uL 4 64   Red Blood Cell Count      3 81 - 5 12 Million/uL 3 84   Hemoglobin      11 5 - 15 4 g/dL 11 8   HCT      34 8 - 46 1 % 37 5   MCV      82 - 98 fL 98   MCH      26 8 - 34 3 pg 30 7   MCHC      31 4 - 37 4 g/dL 31 5   RDW      11 6 - 15 1 % 12 3   MPV      8 9 - 12 7 fL 10 0   Platelet Count      032 - 390 Thousands/uL 277   nRBC      /100 WBCs 0   Neutrophils %      43 - 75 % 31 (L)   Immat GRANS %      0 - 2 % 0   Lymphocytes Relative      14 - 44 % 45 (H)   Monocytes Relative      4 - 12 % 10   Eosinophils      0 - 6 % 13 (H)   Basophils Relative      0 - 1 % 1   Absolute Neutrophils      1 85 - 7 62 Thousands/µL 1 44 (L)   Immature Grans Absolute      0 00 - 0 20 Thousand/uL 0 00   Lymphocytes Absolute      0 60 - 4 47 Thousands/µL 2 07   Absolute Monocytes      0 17 - 1 22 Thousand/µL 0 48   Absolute Eosinophils      0 00 - 0 61 Thousand/µL 0 60   Basophils Absolute      0 00 - 0 10 Thousands/µL 0 05   Sodium      136 - 145 mmol/L 142   Potassium      3 5 - 5 3 mmol/L 4 0   Chloride      100 - 108 mmol/L 108   CO2      21 - 32 mmol/L 28   Anion Gap      4 - 13 mmol/L 6   BUN      5 - 25 mg/dL 14   Creatinine      0 60 - 1 30 mg/dL 0 70   GLUCOSE FASTING      65 - 99 mg/dL 96   Calcium      8 3 - 10 1 mg/dL 10 2 (H)   CORRECTED CALCIUM      8 3 - 10 1 mg/dL 10 5 (H)   AST      5 - 45 U/L 15   ALT      12 - 78 U/L 17   Alkaline Phosphatase      46 - 116 U/L 61   Total Protein      6 4 - 8 2 g/dL 6 5   Albumin      3 5 - 5 0 g/dL 3 6   TOTAL BILIRUBIN      0 20 - 1 00 mg/dL 0 22   eGFR      ml/min/1 73sq m 99   Vit D, 25-Hydroxy      30 0 - 100 0 ng/mL 49 6     Component      Latest Ref Rng & Units 10/9/2019   QFT Nil      0 - 8 0 IU/ml 0 02   QFT TB1-NIL      IU/ml 0 00   QFT TB2-NIL      IU/ml 0 00   QFT Mitogen-NIL      IU/ml <0 10   QFT Final Interpretation      Negative Indeterminate (A)   TPMT ACTIVITY      Units/mL RBC 22 8   Interpretation       Comment   METHODOLOGY       Comment   C-REACTIVE PROTEIN      <3 0 mg/L <3 0     Component      Latest Ref Rng & Units 9/14/2019   C-REACTIVE PROTEIN      <3 0 mg/L 73 2 (H)     Component      Latest Ref Rng & Units 2/4/2016   RHEUMATOID FACTOR      Negative Negative   ANTI-NUCLEAR ANTIBODY (EMELY)      Negative Negative     Component      Latest Ref Rng & Units 6/24/2016   HEPATITIS B SURFACE ANTIGEN      Non-reactive, NonReactive - Confirmed Non-reactive   HEPATITIS C ANTIBODY      Non-reactive Non-reactive   HEPATITIS B CORE IGM ANTIBODY      Non-reactive Non-reactive   HEPATITIS B CORE TOTAL ANTIBODY      Non-reactive Non-reactive   SSA (RO) ANTIBODY      0 0 - 0 9 AI <0 2   SSB (LA) ANTIBODY      0 0 - 0 9 AI <0 2     Component      Latest Ref Rng & Units 5/75/3572   CYCLIC CITRULLINATED PEPTIDE ANTIBODY      0 - 19 units 20 (H)   HIV-1/2 AB-AG      Non-Reactive Non-Reactive   HLA B27       Negative

## 2020-02-19 ENCOUNTER — APPOINTMENT (OUTPATIENT)
Dept: LAB | Facility: HOSPITAL | Age: 55
End: 2020-02-19
Payer: COMMERCIAL

## 2020-02-19 ENCOUNTER — HOSPITAL ENCOUNTER (OUTPATIENT)
Dept: RADIOLOGY | Facility: HOSPITAL | Age: 55
Discharge: HOME | End: 2020-02-19

## 2020-02-19 DIAGNOSIS — M47.819 SERONEGATIVE SPONDYLOARTHROPATHY: ICD-10-CM

## 2020-02-19 DIAGNOSIS — M25.50 POLYARTHRALGIA: ICD-10-CM

## 2020-02-19 DIAGNOSIS — G89.29 CHRONIC BILATERAL LOW BACK PAIN WITHOUT SCIATICA: ICD-10-CM

## 2020-02-19 DIAGNOSIS — M54.50 CHRONIC BILATERAL LOW BACK PAIN WITHOUT SCIATICA: ICD-10-CM

## 2020-02-19 LAB
C3 SERPL-MCNC: 95.9 MG/DL (ref 90–180)
C4 SERPL-MCNC: 15 MG/DL (ref 10–40)
CRP SERPL QL: <3 MG/L
ERYTHROCYTE [SEDIMENTATION RATE] IN BLOOD: 7 MM/HOUR (ref 0–20)

## 2020-02-19 PROCEDURE — 72110 X-RAY EXAM L-2 SPINE 4/>VWS: CPT

## 2020-02-19 PROCEDURE — 36415 COLL VENOUS BLD VENIPUNCTURE: CPT | Performed by: INTERNAL MEDICINE

## 2020-02-19 PROCEDURE — 86140 C-REACTIVE PROTEIN: CPT | Performed by: INTERNAL MEDICINE

## 2020-02-19 PROCEDURE — 72202 X-RAY EXAM SI JOINTS 3/> VWS: CPT

## 2020-02-19 PROCEDURE — 86225 DNA ANTIBODY NATIVE: CPT | Performed by: INTERNAL MEDICINE

## 2020-02-19 PROCEDURE — 86200 CCP ANTIBODY: CPT | Performed by: INTERNAL MEDICINE

## 2020-02-19 PROCEDURE — 86235 NUCLEAR ANTIGEN ANTIBODY: CPT | Performed by: INTERNAL MEDICINE

## 2020-02-19 PROCEDURE — 85652 RBC SED RATE AUTOMATED: CPT | Performed by: INTERNAL MEDICINE

## 2020-02-19 PROCEDURE — 86160 COMPLEMENT ANTIGEN: CPT | Performed by: INTERNAL MEDICINE

## 2020-02-19 PROCEDURE — 73502 X-RAY EXAM HIP UNI 2-3 VIEWS: CPT

## 2020-02-19 PROCEDURE — 73130 X-RAY EXAM OF HAND: CPT

## 2020-02-20 LAB
CENTROMERE B AB SER-ACNC: <0.2 AI (ref 0–0.9)
DSDNA AB SER-ACNC: <1 IU/ML (ref 0–9)
ENA RNP AB SER-ACNC: <0.2 AI (ref 0–0.9)
ENA SCL70 AB SER-ACNC: <0.2 AI (ref 0–0.9)
ENA SM AB SER-ACNC: <0.2 AI (ref 0–0.9)
ENA SS-A AB SER-ACNC: <0.2 AI (ref 0–0.9)
ENA SS-B AB SER-ACNC: <0.2 AI (ref 0–0.9)

## 2020-02-21 ENCOUNTER — PATIENT MESSAGE (OUTPATIENT)
Dept: RHEUMATOLOGY | Facility: CLINIC | Age: 55
End: 2020-02-21

## 2020-02-21 LAB
CCP IGA+IGG SERPL IA-ACNC: 12 UNITS (ref 0–19)
HISTONE IGG SER IA-ACNC: 1.8 UNITS (ref 0–0.9)

## 2020-02-24 ENCOUNTER — HOSPITAL ENCOUNTER (OUTPATIENT)
Dept: INFUSION CENTER | Facility: HOSPITAL | Age: 55
Discharge: HOME | End: 2020-02-24
Attending: INTERNAL MEDICINE
Payer: COMMERCIAL

## 2020-02-24 ENCOUNTER — TELEPHONE (OUTPATIENT)
Dept: GASTROENTEROLOGY | Facility: CLINIC | Age: 55
End: 2020-02-24

## 2020-02-24 ENCOUNTER — TELEPHONE (OUTPATIENT)
Dept: GASTROENTEROLOGY | Facility: AMBULARY SURGERY CENTER | Age: 55
End: 2020-02-24

## 2020-02-24 VITALS
DIASTOLIC BLOOD PRESSURE: 61 MMHG | TEMPERATURE: 98.8 F | HEART RATE: 70 BPM | BODY MASS INDEX: 26.8 KG/M2 | HEIGHT: 63 IN | WEIGHT: 151.24 LBS | SYSTOLIC BLOOD PRESSURE: 130 MMHG | RESPIRATION RATE: 18 BRPM

## 2020-02-24 DIAGNOSIS — K51.00 ULCERATIVE PANCOLITIS WITHOUT COMPLICATION (HCC): ICD-10-CM

## 2020-02-24 DIAGNOSIS — K51.011 ULCERATIVE PANCOLITIS WITH RECTAL BLEEDING (HCC): Primary | ICD-10-CM

## 2020-02-24 PROCEDURE — 96415 CHEMO IV INFUSION ADDL HR: CPT

## 2020-02-24 PROCEDURE — 96413 CHEMO IV INFUSION 1 HR: CPT

## 2020-02-24 PROCEDURE — 96375 TX/PRO/DX INJ NEW DRUG ADDON: CPT

## 2020-02-24 RX ORDER — METHYLPREDNISOLONE SODIUM SUCCINATE 40 MG/ML
40 INJECTION, POWDER, LYOPHILIZED, FOR SOLUTION INTRAMUSCULAR; INTRAVENOUS ONCE
Status: COMPLETED | OUTPATIENT
Start: 2020-02-24 | End: 2020-02-24

## 2020-02-24 RX ORDER — ACETAMINOPHEN 325 MG/1
650 TABLET ORAL ONCE
Status: CANCELLED | OUTPATIENT
Start: 2020-04-20

## 2020-02-24 RX ORDER — ACETAMINOPHEN 325 MG/1
650 TABLET ORAL ONCE
Status: COMPLETED | OUTPATIENT
Start: 2020-02-24 | End: 2020-02-24

## 2020-02-24 RX ORDER — DIPHENHYDRAMINE HCL 25 MG
25 TABLET ORAL ONCE
Status: DISCONTINUED | OUTPATIENT
Start: 2020-02-24 | End: 2020-02-27 | Stop reason: HOSPADM

## 2020-02-24 RX ORDER — DIPHENHYDRAMINE HCL 25 MG
25 TABLET ORAL ONCE
Status: CANCELLED | OUTPATIENT
Start: 2020-04-20

## 2020-02-24 RX ORDER — SODIUM CHLORIDE 9 MG/ML
20 INJECTION, SOLUTION INTRAVENOUS ONCE
Status: COMPLETED | OUTPATIENT
Start: 2020-02-24 | End: 2020-02-24

## 2020-02-24 RX ORDER — METHYLPREDNISOLONE SODIUM SUCCINATE 40 MG/ML
40 INJECTION, POWDER, LYOPHILIZED, FOR SOLUTION INTRAMUSCULAR; INTRAVENOUS ONCE
Status: CANCELLED | OUTPATIENT
Start: 2020-04-20

## 2020-02-24 RX ORDER — SODIUM CHLORIDE 9 MG/ML
20 INJECTION, SOLUTION INTRAVENOUS ONCE
Status: CANCELLED | OUTPATIENT
Start: 2020-04-20

## 2020-02-24 RX ADMIN — METHYLPREDNISOLONE SODIUM SUCCINATE 40 MG: 40 INJECTION, POWDER, FOR SOLUTION INTRAMUSCULAR; INTRAVENOUS at 09:22

## 2020-02-24 RX ADMIN — ACETAMINOPHEN 650 MG: 325 TABLET ORAL at 09:21

## 2020-02-24 RX ADMIN — INFLIXIMAB 700 MG: 100 INJECTION, POWDER, LYOPHILIZED, FOR SOLUTION INTRAVENOUS at 10:16

## 2020-02-24 RX ADMIN — SODIUM CHLORIDE 20 ML/HR: 0.9 INJECTION, SOLUTION INTRAVENOUS at 09:25

## 2020-02-24 NOTE — TELEPHONE ENCOUNTER
E mail from Lien Almeida    Patient received Remicade infusion today  Her next infusion is scheduled 4/20/20  As per 2/3/20 office note -recheck drug level, cbc,crp and fecal calprotectin one week prior to infusion  Confirming 4/13/20 is ok to complete labs  I will enter and contact patient if appropriate

## 2020-02-24 NOTE — TELEPHONE ENCOUNTER
Patient came into the office today with questions about her upcoming labs  She states that she is to have them done 1 week prior to her next infusion, which is 04/20/2020  But she states that the labs were ordered for May  Should the lab orders be changed? Please reach out to patient, thanks

## 2020-02-24 NOTE — PLAN OF CARE
Problem: Potential for Falls  Goal: Patient will remain free of falls  Description  INTERVENTIONS:  - Assess patient frequently for physical needs  -  Identify cognitive and physical deficits and behaviors that affect risk of falls    -  [place] fall precautions as indicated by assessment   - Educate patient/family on patient safety including physical limitations  - Instruct patient to call for assistance with activity based on assessment  - Modify environment to reduce risk of injury  - Consider OT/PT consult to assist with strengthening/mobility  Outcome: Progressing

## 2020-02-24 NOTE — PLAN OF CARE
Problem: Potential for Falls  Goal: Patient will remain free of falls  Description  INTERVENTIONS:  - Assess patient frequently for physical needs  -  Identify cognitive and physical deficits and behaviors that affect risk of falls    -  [place] fall precautions as indicated by assessment   - Educate patient/family on patient safety including physical limitations  - Instruct patient to call for assistance with activity based on assessment  - Modify environment to reduce risk of injury  - Consider OT/PT consult to assist with strengthening/mobility  Outcome: Progressing

## 2020-02-25 ENCOUNTER — DOCUMENTATION (OUTPATIENT)
Dept: PODIATRY | Facility: CLINIC | Age: 55
End: 2020-02-25

## 2020-02-25 ENCOUNTER — TELEPHONE (OUTPATIENT)
Dept: GASTROENTEROLOGY | Facility: CLINIC | Age: 55
End: 2020-02-25

## 2020-02-25 DIAGNOSIS — K51.011 ULCERATIVE PANCOLITIS WITH RECTAL BLEEDING (HCC): Primary | ICD-10-CM

## 2020-02-25 NOTE — PROGRESS NOTES
Naima Martino called to cancel her March appt with Dr Tabby Richards because her pain is gone due to PT and her new orthotics

## 2020-02-25 NOTE — TELEPHONE ENCOUNTER
Please let pt know I updated her infliximab drug and antibody level to be done on April 13th, 2020 one week prior to her next infusion  She does not have to take the benadryl  Ideally she will take the steroid but if she can't tolerate it that is ok for her to refuse it        Thanks,  Rigo Michelle

## 2020-02-25 NOTE — TELEPHONE ENCOUNTER
Blood work to be completed 1 week prior to 4/20/20 remicade infusion -patient aware  Patient inquiring about infusion pre medication  She does not take the Benadryl due to side effects  She is asking if the steroid is mandatory prior to infusion  She said she was unable to sleep

## 2020-02-25 NOTE — TELEPHONE ENCOUNTER
Patient will complete ordered blood work 1 week prior to next remicade infusion  She is aware she does not have to take benadryl premed  The steroid is recommended prior to infusion, but not required if she can not tolerate

## 2020-02-25 NOTE — TELEPHONE ENCOUNTER
Patients GI provider:  Dr Radha Brambila    Number to return call: [phone]  Reason for call: Pt calling requesting to speak with nurse Umesh Whitt regarding her labs    Scheduled procedure/appointment date if applicable: Apt/procedure 6/19/20

## 2020-02-27 DIAGNOSIS — M79.644 PAIN IN FINGER OF RIGHT HAND: ICD-10-CM

## 2020-02-27 DIAGNOSIS — M25.551 RIGHT HIP PAIN: Primary | ICD-10-CM

## 2020-03-02 ENCOUNTER — CONSULT (OUTPATIENT)
Dept: ENDOCRINOLOGY | Facility: CLINIC | Age: 55
End: 2020-03-02
Payer: COMMERCIAL

## 2020-03-02 ENCOUNTER — TELEPHONE (OUTPATIENT)
Dept: FAMILY MEDICINE CLINIC | Facility: CLINIC | Age: 55
End: 2020-03-02

## 2020-03-02 VITALS — SYSTOLIC BLOOD PRESSURE: 110 MMHG | DIASTOLIC BLOOD PRESSURE: 78 MMHG | HEART RATE: 64 BPM

## 2020-03-02 DIAGNOSIS — E03.9 HYPOTHYROIDISM, UNSPECIFIED TYPE: ICD-10-CM

## 2020-03-02 DIAGNOSIS — E04.2 MULTIPLE THYROID NODULES: ICD-10-CM

## 2020-03-02 DIAGNOSIS — E55.9 VITAMIN D DEFICIENCY: ICD-10-CM

## 2020-03-02 DIAGNOSIS — F51.04 CHRONIC INSOMNIA: ICD-10-CM

## 2020-03-02 DIAGNOSIS — E83.52 HYPERCALCEMIA: Primary | ICD-10-CM

## 2020-03-02 PROCEDURE — 99244 OFF/OP CNSLTJ NEW/EST MOD 40: CPT | Performed by: INTERNAL MEDICINE

## 2020-03-02 NOTE — TELEPHONE ENCOUNTER
Patient will need to have her thyroid tests put into system  She is going for labs for other doctors and wants her Thyroid levels checked

## 2020-03-02 NOTE — ASSESSMENT & PLAN NOTE
She has had mild intermittent hypercalcemia dating back to 2015-no history of severe hypercalcemia, kidney stones, fragility fractures  PTH has ranged between 40s to 60s with  mild hypercalcemia-which points towords a PTH mediated hypercalcemia  In the past-ultrasound and sestamibi scan has pointed to words a left-sided adenoma where as a CT scan showed a possible right-sided adenoma  For now I a m  going to repeat labs including calcium, PTH, check phosphorus as well as 24 hour urine calcium    Further management will depend on repeat labs

## 2020-03-02 NOTE — PROGRESS NOTES
Quinn Ramos 47 y o  female MRN: [id number]    Encounter: [id number]      Assessment/Plan     Problem List Items Addressed This Visit        Endocrine    Hypothyroidism    Multiple thyroid nodules    Relevant Orders    US thyroid       Other    Hypercalcemia - Primary     She has had mild intermittent hypercalcemia dating back to 2015-no history of severe hypercalcemia, kidney stones, fragility fractures  PTH has ranged between 40s to 60s with  mild hypercalcemia-which points towords a PTH mediated hypercalcemia  In the past-ultrasound and sestamibi scan has pointed to words a left-sided adenoma where as a CT scan showed a possible right-sided adenoma  For now I a m  going to repeat labs including calcium, PTH, check phosphorus as well as 24 hour urine calcium  Further management will depend on repeat labs             Relevant Orders    Comprehensive metabolic panel Lab Collect    PTH, intact Lab Collect Lab Collect    Phosphorus Lab Collect    Creatinine, urine, 24 hour Lab Collect    Calcium, urine, 24 hour Lab Collect    Vitamin D 25 hydroxy Lab Collect    Vitamin D deficiency     Continue supplementations         Relevant Orders    Vitamin D 25 hydroxy Lab Collect        CC:    hypercalcemia    History of Present Illness     HPI:  26-year-old woman referred here for evaluation of hypercalcemia-she states  Hypercalcemia discovered a few years back-review of the labs showed intermittent mild hypercalcemia dating back to 2015-highest calcium was 11 in 2017     no history of kidney stones   No fragility fractures   C/o polyuria , polydipsia   C/o constipation   No FH of hypercalcemia , no history of stomach ulcers     C/o joint pain , feeling sluggish , occasional myalgias     History of ulcerative colitis- diagnosed in 2012  Currently  on remicade -prior to that was on multiple different biologics        For vitamin-D deficiency she takes Takes Drisdol once a week-  No calcium supplementations   1-2 serving of dairy a day     For Hypothyroidism - on LT4 50 mcg daily - dose increased recently as TSH was slightly elevated    Review of Systems   Constitutional: Positive for fatigue  Negative for unexpected weight change  Respiratory: Negative for cough and shortness of breath  Cardiovascular: Negative for palpitations and leg swelling  Gastrointestinal: Negative for constipation, diarrhea, nausea and vomiting  Musculoskeletal: Positive for arthralgias and myalgias  Skin: Negative for pallor, rash and wound  Psychiatric/Behavioral: Positive for sleep disturbance  Negative for confusion  The patient is not nervous/anxious  All other systems reviewed and are negative  Historical Information   Past Medical History:   Diagnosis Date    Adjustment disorder     last assessed 12    Anemia     HX of    Asthma     mild - intermittent    Bilateral leg edema     Blepharitis     last assessed 16    Carpal tunnel syndrome     Unspecified laterality    Colitis, acute     Dyspareunia in female     Edema     last assessed 06/22/15    Ganglion     Herniated cervical disc     History of transfusion     Hypokalemia     Hypothyroidism     Hypothyroidism     Insomnia     Lumps on the skin     last assessed 14    Mouth ulcers     last assessed 06/22/15    Nontraumatic tear of left tibialis posterior tendon     Traumatic teart     Polyarthritis     last assessed 16    Raynaud's disease     [facility]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 [facility] [address]     Temporomandibular disorder     Joint    Thyroid disease     Ulcerative colitis [facility] [address]     Ulcerative colitis ([facility] [address] [facility]      Past Surgical History:   Procedure Laterality Date    ANKLE SURGERY Left     Tendon repair    CARPAL TUNNEL RELEASE Bilateral      SECTION, LOW TRANSVERSE      COLONOSCOPY      COLONOSCOPY N/A 2018    Procedure: COLONOSCOPY;  Surgeon: Jesus Lamar MD;  [facility];   Service: Gastroenterology    HERNIA REPAIR umbilical    HYSTERECTOMY      KNEE ARTHROSCOPY Right     LIPECTOMY      Multipe lipoma removals    LIPOMA RESECTION      MS COLONOSCOPY FLX DX W/COLLJ SPEC WHEN PFRMD N/A 2016    Procedure: COLONOSCOPY;  Surgeon: Romana Barth, DO;  Location: [facility];   Service: Gastroenterology    MS REPAIR FLEX LEG TENDON,SECOND,EA Left 2016    Procedure: REPAIR OF LEFT POSTERIOR TIBIAL TENDON WITH GRAFT, EXPLORATION LEFT ANKLE ;  Surgeon: Forde Ganser, DPM;  Location: [facility] OR;  Service: Podiatry    SHOULDER SURGERY Left     bicep tendon and labrum repair    TONSILLECTOMY      TOOTH EXTRACTION  2018     Social History   Social History     Substance and Sexual Activity   Alcohol Use Not Currently    Comment: social 1 drink per weeek     Social History     Substance and Sexual Activity   Drug Use No     Social History     Tobacco Use   Smoking Status Former Smoker    Types: Cigarettes    Last attempt to quit: 2003    Years since quittin 9   Smokeless Tobacco Never Used   Tobacco Comment    Quit , rare use for 2 years     Family History:   Family History   Problem Relation Age of Onset    Parkinsonism Mother     Rheum arthritis Mother     Thyroid disease unspecified Mother     Hypothyroidism Mother     Heart attack Father     Hypertension Father     Osteoporosis Father     Prostate cancer Father     Nephrolithiasis Father     Hashimoto's thyroiditis Sister     Thyroid disease unspecified Sister     Hypothyroidism Sister     Cancer Paternal Grandfather         Penile    Prostate cancer Paternal Grandfather     Crohn's disease Family     Osteoarthritis Family     Rheum arthritis Family     Crohn's disease Other     Crohn's disease Maternal Uncle     Psoriasis Maternal Uncle     Ulcerative colitis Maternal Uncle     Rheum arthritis Maternal Uncle     Rheum arthritis Maternal Aunt     Thyroid disease unspecified Maternal Aunt     Hypothyroidism Maternal Aunt  Ulcerative colitis Family        Meds/Allergies   Current Outpatient Medications   Medication Sig Dispense Refill    Ascorbic Acid (VITAMIN C) 1000 MG tablet Take 1,000 mg by mouth daily      D3-50 1 25 MG (03826 UT) capsule TAKE ONE CAPSULE BY MOUTH ONCE A WEEK  THEN HAVE YOUR VITAMIN D LEVELCHECKED TO SEE IF YOU CAN CONTINUE  12 capsule 0    docusate sodium (COLACE) 100 mg capsule Take 1 capsule (100 mg total) by mouth daily 90 capsule 3    ferrous sulfate 324 (65 Fe) mg Take 1 tablet (324 mg total) by mouth 3 (three) times a day before meals 90 tablet 3    hyoscyamine (ANASPAZ,LEVSIN) 0 125 MG tablet TAKE ONE TABLET BY MOUTH EVERY 4 HOURS AS NEEDED FOR CRAMPING 30 tablet 0    inFLIXimab (REMICADE) 100 mg Infuse into a venous catheter every 56 days      levothyroxine 50 mcg tablet Take 1 tablet (50 mcg total) by mouth daily in the early morning 90 tablet 3    ondansetron (ZOFRAN-ODT) 4 mg disintegrating tablet Take 1 tablet (4 mg total) by mouth every 6 (six) hours as needed for nausea or vomiting 30 tablet 1    zolpidem (AMBIEN) 10 mg tablet TAKE ONE TABLET BY MOUTH EVERY DAY AT BEDTIME AS NEEDED FOR SLEEP 30 tablet 0    albuterol (ACCUNEB) 1 25 MG/3ML nebulizer solution Take 1 ampule by nebulization every 6 (six) hours as needed for wheezing      carisoprodol (SOMA) 250 MG Take 1 tablet (250 mg total) by mouth every 6 (six) hours as needed for muscle spasms for up to 7 days (Patient not taking: Reported on 3/2/2020) 25 tablet 0    HYDROcodone-acetaminophen (NORCO) 5-325 mg per tablet Take 1 tablet by mouth  0     No current facility-administered medications for this visit  No Known Allergies    Objective   Vitals: Blood pressure 110/78, pulse 64  Physical Exam   Constitutional: She is oriented to person, place, and time  She appears well-developed and well-nourished  No distress  HENT:   Head: Normocephalic and atraumatic  Eyes: EOM are normal  No scleral icterus     Neck: Normal range of motion  Neck supple  No thyromegaly present  Cardiovascular: Normal rate, regular rhythm and normal heart sounds  No murmur heard  Pulmonary/Chest: Effort normal and breath sounds normal  No stridor  No respiratory distress  She has no wheezes  Abdominal: Soft  Bowel sounds are normal  She exhibits no distension  There is no tenderness  There is no guarding  Musculoskeletal: Normal range of motion  She exhibits no edema or deformity  Lymphadenopathy:     She has no cervical adenopathy  Neurological: She is alert and oriented to person, place, and time  Skin: Skin is warm and dry  Psychiatric: She has a normal mood and affect  Her behavior is normal  Judgment and thought content normal    Vitals reviewed  The history was obtained from the review of the chart, patient      Lab Results:   Lab Results   Component Value Date/Time    Potassium 4 0 01/18/2020 06:59 AM    Potassium 3 7 09/21/2019 05:45 AM    Potassium 3 7 09/20/2019 05:34 AM    Chloride 108 01/18/2020 06:59 AM    Chloride 107 09/21/2019 05:45 AM    Chloride 105 09/20/2019 05:34 AM    CO2 28 01/18/2020 06:59 AM    CO2 29 09/21/2019 05:45 AM    CO2 29 09/20/2019 05:34 AM    BUN 14 01/18/2020 06:59 AM    BUN 8 09/21/2019 05:45 AM    BUN 8 09/20/2019 05:34 AM    Creatinine 0 70 01/18/2020 06:59 AM    Creatinine 0 73 09/21/2019 05:45 AM    Creatinine 0 81 09/20/2019 05:34 AM    Glucose, Fasting 96 01/18/2020 06:59 AM    Glucose, Fasting 103 (H) 09/14/2019 07:16 AM    Glucose, Fasting 84 05/07/2019 10:25 AM    Calcium 10 2 (H) 01/18/2020 06:59 AM    Calcium 9 4 09/21/2019 05:45 AM    Calcium 9 5 09/20/2019 05:34 AM    eGFR 99 01/18/2020 06:59 AM    eGFR 94 09/21/2019 05:45 AM    eGFR 83 09/20/2019 05:34 AM    TSH 3RD GENERATON 5 325 (H) 02/08/2020 07:23 AM    Vit D, 25-Hydroxy 49 6 01/18/2020 06:59 AM    Vit D, 25-Hydroxy 45 6 10/09/2019 12:36 PM    Vit D, 25-Hydroxy 39 0 06/28/2019 02:07 PM         Imaging Studies:            Results for orders placed during the hospital encounter of 09/18/18   DXA bone density spine hip and pelvis    Impression 1  Based on the [facility] classification, this study is normal and the patient is considered at low risk for fracture  2   The 10 year risk of hip fracture is 0 1 %, with the 10 year risk of major osteoporotic fracture being 4 7 %, as calculated by the WHO fracture risk assessment tool (FRAX)  The current NOF guidelines recommend treating patients with FRAX 10 year risk   score of >3% for hip fracture and >20% for major osteoporotic fracture  3   Since the prior study, there has been a significant decrease in BMD in the lumbar spine of 0 159 Gm/cm2 or 11 2%  In the left hip, there has been a significant decrease in BMD of 0 137 gm/cm2 or 11 8%  These changes do exceed our own least   significant change and, therefore, are statistically significant within 95% confidence level  4   A daily intake of calcium of at least 1200 mg and vitamin D, 800-1000 IU, as well as weight bearing and muscle strengthening exercise, fall prevention and avoidance of tobacco and excessive alcohol intake  5   Repeat DXA  in 18 - 24 months, on the same machine, as clinically indicated  WHO CLASSIFICATION:  Normal (a T-score of -1 0 or higher)  Low bone mineral density (a T-score of less than -1 0 but higher than -2 5)  Osteoporosis (a T-score of -2 5 or less)  Severe osteoporosis (a T-score of -2 5 or less with a fragility fracture)                Workstation performed: [id number]            Study Result     CT  NECK  WITHOUT AND WITH CONTRAST FROM KATIE TO SKULL BASE     INDICATION: Hyperparathyroidism   Sestamibi and ultrasound suggesting a lesion located within the lower pole of the thyroid on the left      COMPARISON:  Sestamibi and ultrasound, no previous CT      TECHNIQUE:This examination, like all CT scans performed in the [facility], was performed utilizing techniques to minimize radiation dose exposure, including the use of iterative reconstruction and automated exposure control      Both noncontrast, contrast, and post contrast delayed images were performed according to standard parathyroid protocol (4D technique) Coronal and sagittal reconstructions were performed  3D reconstructions were performed on an independent workstation,   and are supplied for review      85 ml of Omnipaque 350 was injected intravenously without immediate consequence      FINDINGS:     SERIAL NONCONTRAST, POST CONTRAST AND DELAYS: Within the right neck, posterior to the inferior aspect of the right thyroid lobe, there is a 3 8 x 4 2 x 3 mm lesion which meets the CT enhancement criteria suspicious for parathyroid adenoma  The lesion is   sitting directly lateral to the trachea, and less than 1 cm posterior to the right thyroid lobe  The lesion is best seen on series 4 images 166 through 162      With regard to the lesion identified by sestamibi and ultrasound, there is a nodule located in the lower pole of the left thyroid lobe, but this lesion does not follow the pattern of enhancement typically seen for adenomas, and is more likely to   represent a thyroid nodule  The lesion measures approximately 6 mm      No other suspicious lesions are identified      VASCULAR STRUCTURES:  This study was not performed for the evaluation of the carotid arteries, and therefore Nascet criteria do not apply  No vascular lesion is identified      VISUALIZED PARANASAL SINUSES:  There is mucosal thickening in the right maxillary sinus, consistent with chronic sinusitis    No fluid level is demonstrated      NASAL CAVITY AND NASOPHARYNX:  Normal      SUPRAHYOID NECK:  Normal oropharynx, oral cavity, parapharyngeal and retropharyngeal spaces      INFRAHYOID NECK:  Normal oropharynx, oral cavity, parapharyngeal and retropharyngeal spaces      THYROID GLAND:  With the exception of the visualized nodule on the left, the thyroid gland is normal in appearance      LYMPH NODES:  No pathologic or enlarged adenopathy      MEDIASTINUM:  Unremarkable      BONY STRUCTURES  Normal      LUNG APICES:  Unremarkable      IMPRESSION:     1   3 8 x 4 2 x 3 mm lesion meeting the CT enhancement criteria suspicious for parathyroid adenoma identified in the right neck, sitting posterior to the inferior right thyroid lobe, and immediately lateral to the trachea  Please see above for   additional details  2   Lesion identified by sestamibi and ultrasound located in the inferior left thyroid lobe is most consistent with a thyroid nodule  3   Evidence of chronic sinusitis in involving the right maxillary sinus       Study Result     THYROID ULTRASOUND     INDICATION:    E55 9: Vitamin D deficiency, unspecified  E21 3: Hyperparathyroidism, unspecified  E83 52: Hypercalcemia  Recent nuclear medicine scan parathyroid identifying subtle focus in the left inferior neck      COMPARISON:  Nuclear medicine parathyroid scan with on September 28, 2018      TECHNIQUE:   Ultrasound of the thyroid was performed with a high frequency linear transducer in transverse and sagittal planes including volumetric imaging sweeps as well as traditional still imaging technique      FINDINGS:  Thyroid parenchyma is diffusely heterogeneous in echotexture      Right lobe:  5 x 1 6 x 1 4 cm  Left lobe:  4 x 1 3 x 1 3 cm  Isthmus:  0 2 cm      Nodule #1  Image 1024  RIGHT lower pole nodule measuring 0 7 x 0 3 x 0 6 cm  No priors for comparison  COMPOSITION:  2 points, solid or almost completely solid   ECHOGENICITY:  2 points, hypoechoic  SHAPE:  0 points, wider-than-tall  MARGIN: 0 points, smooth  ECHOGENIC FOCI:  0 points, none or large comet-tail artifacts  TI-RADS Classification: TR 4 (4-6 points), Moderately suspicious  FNA if > 1 5 cm  Follow if > 1cm      Nodule #2  Image 1792  RIGHT lower pole nodule measuring 0 8 x 0 5 x 0 7 cm  No priors for comparison     COMPOSITION:  2 points, solid or almost completely solid   ECHOGENICITY:  1 point, hyperechoic or isoechoic  SHAPE:  0 points, wider-than-tall  MARGIN: 0 points, ill-defined  ECHOGENIC FOCI:  0 points, none or large comet-tail artifacts  TI-RADS Classification: TR 3 (3 points), Mildly suspicious  FNA if >2 5 cm  Follow if >1 5 cm      Nodule #3  Image 6656  LEFT lower pole nodule measuring 0 8 x 0 4 x 0 7 cm  No priors for comparison  COMPOSITION:  2 points, solid or almost completely solid   ECHOGENICITY:  3 points, very hypoechoic  SHAPE:  0 points, wider-than-tall  MARGIN: 0 points, smooth  ECHOGENIC FOCI:  0 points, none or large comet-tail artifacts  TI-RADS Classification: TR 4 (4-6 points), Moderately suspicious  FNA if > 1 5 cm  Follow if > 1cm  (This nodule does correspond to the location of the uptake on the nuclear medicine parathyroid scan, and given its marked hypoechoic and also   hypervascular appearance, could potentially be a intrathyroid parathyroid adenoma )     Nodule #4  Image 7680  LEFT lower pole nodule measuring 1 2 x 0 4 x 0 6 cm  No priors for comparison  COMPOSITION:  2 points, solid or almost completely solid   ECHOGENICITY:  1 point, hyperechoic or isoechoic  SHAPE:  0 points, wider-than-tall  MARGIN: 0 points, ill-defined  ECHOGENIC FOCI:  0 points, none or large comet-tail artifacts  TI-RADS Classification: TR 3 (3 points), Mildly suspicious  FNA if >2 5 cm  Follow if >1 5 cm      There are no extra-thyroid nodules         IMPRESSION:     Diffusely heterogeneous thyroid gland with several small thyroid nodules as above    No nodule meets current ACR criteria for requiring biopsy or followup ultrasounds      Please note that nodule #3, a 0 8 x 0 4 x 0 7 cm left thyroid lower pole nodule (image 6416), does correspond to the location of the uptake on the nuclear medicine parathyroid scan, and given its marked hypoechoic and also hypervascular appearance, could potentially be a intrathyroid parathyroid adenoma      Study Result     PARATHYROID SCAN     INDICATION:  E21 3: Hyperparathyroidism, unspecified , elevated parathyroid hormone levels and calcium levels     COMPARISON:  None      TECHNIQUE:   Following the intravenous administration of 25 5 mCi Tc-99m Cardiolite, anterior and bilateral anterior oblique projection images of the neck and mediastinum were obtained at approximately 10 minutes post injection followed at 2 hours post   injection by static anterior and bilateral oblique projections as well as SPECT images in coronal, sagittal and axial projections       FINDINGS:     There is a subtle focus of retained activity suspected within the left inferior neck adjacent to the inferior pole of the left thyroid  Although this area is difficult to discern with certainty on the SPECT images, it is noticable on the delayed planar   images      IMPRESSION:     Although only appreciated on planar delayed views, there is a subtle focus of activity identified in the left inferior neck which may represent the location of a parathyroid adenoma  Would recommend correlation with neck ultrasonography to determine if   a corresponding nodule can be seen in this location                                I have personally reviewed pertinent reports  Portions of the record may have been created with voice recognition software  Occasional wrong word or "sound a like" substitutions may have occurred due to the inherent limitations of voice recognition software  Read the chart carefully and recognize, using context, where substitutions have occurred

## 2020-03-02 NOTE — TELEPHONE ENCOUNTER
Requesting refills on ambien  Refill not advised as patient has not been seen in 6 months   Please advise

## 2020-03-02 NOTE — PROGRESS NOTES
Pt reported that she received her orthotics from her physician and no longer requires PT due to all goals being achieved

## 2020-03-03 DIAGNOSIS — E03.9 HYPOTHYROIDISM, UNSPECIFIED TYPE: Primary | ICD-10-CM

## 2020-03-03 RX ORDER — ZOLPIDEM TARTRATE 10 MG/1
TABLET ORAL
Qty: 30 TABLET | Refills: 0 | Status: SHIPPED | OUTPATIENT
Start: 2020-03-03 | End: 2020-04-09

## 2020-03-10 ENCOUNTER — HOSPITAL ENCOUNTER (OUTPATIENT)
Dept: ULTRASOUND IMAGING | Facility: HOSPITAL | Age: 55
Discharge: HOME | End: 2020-03-10
Attending: INTERNAL MEDICINE
Payer: COMMERCIAL

## 2020-03-10 ENCOUNTER — TELEPHONE (OUTPATIENT)
Dept: FAMILY MEDICINE CLINIC | Facility: CLINIC | Age: 55
End: 2020-03-10

## 2020-03-10 ENCOUNTER — APPOINTMENT (OUTPATIENT)
Dept: LAB | Facility: CLINIC | Age: 55
End: 2020-03-10
Payer: COMMERCIAL

## 2020-03-10 ENCOUNTER — LAB (OUTPATIENT)
Dept: LAB | Facility: CLINIC | Age: 55
End: 2020-03-10
Payer: COMMERCIAL

## 2020-03-10 ENCOUNTER — TRANSCRIBE ORDERS (OUTPATIENT)
Dept: LAB | Facility: CLINIC | Age: 55
End: 2020-03-10

## 2020-03-10 DIAGNOSIS — E83.52 HYPERCALCEMIA: ICD-10-CM

## 2020-03-10 DIAGNOSIS — Z00.8 HEALTH EXAMINATION IN POPULATION SURVEY: Primary | ICD-10-CM

## 2020-03-10 DIAGNOSIS — E55.9 VITAMIN D DEFICIENCY: ICD-10-CM

## 2020-03-10 DIAGNOSIS — S33.5XXA LUMBAR SPRAIN, INITIAL ENCOUNTER: ICD-10-CM

## 2020-03-10 DIAGNOSIS — Z00.8 HEALTH EXAMINATION IN POPULATION SURVEY: ICD-10-CM

## 2020-03-10 DIAGNOSIS — E04.2 MULTIPLE THYROID NODULES: ICD-10-CM

## 2020-03-10 DIAGNOSIS — E03.9 HYPOTHYROIDISM, UNSPECIFIED TYPE: ICD-10-CM

## 2020-03-10 LAB
25(OH)D3 SERPL-MCNC: 65.4 NG/ML (ref 30–100)
ALBUMIN SERPL BCP-MCNC: 3.8 G/DL (ref 3.5–5)
ALP SERPL-CCNC: 81 U/L (ref 46–116)
ALT SERPL W P-5'-P-CCNC: 21 U/L (ref 12–78)
ANION GAP SERPL CALCULATED.3IONS-SCNC: 7 MMOL/L (ref 4–13)
AST SERPL W P-5'-P-CCNC: 23 U/L (ref 5–45)
BILIRUB SERPL-MCNC: 0.34 MG/DL (ref 0.2–1)
BUN SERPL-MCNC: 15 MG/DL (ref 5–25)
CALCIUM 24H UR-MCNC: 521.7 MG/24 HRS (ref 42–353)
CALCIUM ALBUM COR SERPL-MCNC: 10.6 MG/DL (ref 8.3–10.1)
CALCIUM SERPL-MCNC: 10.4 MG/DL (ref 8.3–10.1)
CHLORIDE SERPL-SCNC: 108 MMOL/L (ref 100–108)
CHOLEST SERPL-MCNC: 180 MG/DL (ref 50–200)
CO2 SERPL-SCNC: 28 MMOL/L (ref 21–32)
CREAT 24H UR-MRATE: 1.3 G/24HR (ref 0.6–1.8)
CREAT SERPL-MCNC: 0.78 MG/DL (ref 0.6–1.3)
EST. AVERAGE GLUCOSE BLD GHB EST-MCNC: 100 MG/DL
GFR SERPL CREATININE-BSD FRML MDRD: 86 ML/MIN/1.73SQ M
GLUCOSE P FAST SERPL-MCNC: 90 MG/DL (ref 65–99)
HBA1C MFR BLD: 5.1 %
HDLC SERPL-MCNC: 86 MG/DL
LDLC SERPL CALC-MCNC: 87 MG/DL (ref 0–100)
NONHDLC SERPL-MCNC: 94 MG/DL
PHOSPHATE SERPL-MCNC: 3.1 MG/DL (ref 2.7–4.5)
POTASSIUM SERPL-SCNC: 4 MMOL/L (ref 3.5–5.3)
PROT SERPL-MCNC: 6.8 G/DL (ref 6.4–8.2)
PTH-INTACT SERPL-MCNC: 55 PG/ML (ref 18.4–80.1)
SODIUM SERPL-SCNC: 143 MMOL/L (ref 136–145)
SPECIMEN VOL UR: 3700 ML
SPECIMEN VOL UR: 3700 ML
T3FREE SERPL-MCNC: 2.34 PG/ML (ref 2.3–4.2)
T4 FREE SERPL-MCNC: 1.04 NG/DL (ref 0.76–1.46)
TRIGL SERPL-MCNC: 36 MG/DL
TSH SERPL DL<=0.05 MIU/L-ACNC: 1.73 UIU/ML (ref 0.36–3.74)

## 2020-03-10 PROCEDURE — 82340 ASSAY OF CALCIUM IN URINE: CPT

## 2020-03-10 PROCEDURE — 80053 COMPREHEN METABOLIC PANEL: CPT

## 2020-03-10 PROCEDURE — 84443 ASSAY THYROID STIM HORMONE: CPT

## 2020-03-10 PROCEDURE — 82570 ASSAY OF URINE CREATININE: CPT

## 2020-03-10 PROCEDURE — 83036 HEMOGLOBIN GLYCOSYLATED A1C: CPT

## 2020-03-10 PROCEDURE — 82306 VITAMIN D 25 HYDROXY: CPT

## 2020-03-10 PROCEDURE — 80061 LIPID PANEL: CPT

## 2020-03-10 PROCEDURE — 83970 ASSAY OF PARATHORMONE: CPT

## 2020-03-10 PROCEDURE — 84481 FREE ASSAY (FT-3): CPT

## 2020-03-10 PROCEDURE — 36415 COLL VENOUS BLD VENIPUNCTURE: CPT

## 2020-03-10 PROCEDURE — 84439 ASSAY OF FREE THYROXINE: CPT

## 2020-03-10 PROCEDURE — 76536 US EXAM OF HEAD AND NECK: CPT

## 2020-03-10 PROCEDURE — 84100 ASSAY OF PHOSPHORUS: CPT

## 2020-03-10 RX ORDER — CARISOPRODOL 250 MG/1
TABLET ORAL
Qty: 25 TABLET | Refills: 0 | Status: SHIPPED | OUTPATIENT
Start: 2020-03-10 | End: 2020-03-11 | Stop reason: SDUPTHER

## 2020-03-10 RX ORDER — CARISOPRODOL 250 MG/1
TABLET ORAL
Qty: 25 TABLET | Refills: 0 | Status: SHIPPED | OUTPATIENT
Start: 2020-03-10 | End: 2020-03-10 | Stop reason: SDUPTHER

## 2020-03-10 NOTE — TELEPHONE ENCOUNTER
Patient called stating she is having very bad muscle spasms in her back and is wondering if you would be willing to call in [address] [place] [address] S for her to [facility]  Stated you have done this for her in the past  Please advise

## 2020-03-10 NOTE — LETTER
[address] [place] [address]  [address] [facility] [address]      March 17, 2020    MRN: [id number]     Phone: [phone]     Dear Ms Blount,    You recently had a(n) Ultrasound performed on 3/10/2020 at  [address] [place] [address] that was requested by Jean Carlos Fairchild MD  The study was reviewed by a radiologist, which is a physician who specializes in medical imaging  The radiologist issued a report describing his or her findings  In that report there was a finding that the radiologist felt warranted further discussion with your health care provider and that discussion would be beneficial to you  The results were sent to Jean Carlos Fairchild MD on 3/13/2020  We recommend that you contact Jean Carlos Fairchild MD at [phone] or set up an appointment to discuss the results of the imaging test  If you have already heard from Jean Carlos Faircihld MD regarding the results of your study, you can disregard this letter  This letter is not meant to alarm you, but intended to encourage you to follow-up on your results with the provider that sent you for the imaging study  In addition, we have enclosed answers to frequently asked questions by other patients who have also received a letter to review results with their health care provider (see page two)  Thank you for choosing [address] [place] [address] for your medical imaging needs  FREQUENTLY ASKED QUESTIONS    1  Why am I receiving this letter? [facility] requires us to notify patients who have findings on imaging exams that may require more testing or follow-up with a health professional within the next 3 months  2  How serious is the finding on the imaging test?  This letter is sent to all patients who may need follow-up or more testing within the next 3 months  Receiving this letter does not necessarily mean you have a life-threatening imaging finding or disease  Recommendations in the radiologists imaging report are general in nature and it is up to your healthcare provider to say whether those recommendations make sense for your situation  You are strongly encouraged to talk to your health care provider about the results and ask whether additional steps need to be taken  3  Where can I get a copy of the final report for my recent radiology exam?  To get a full copy of the report you can access your records online at [email or web address] or please contact [address] [place] [address] Records Department at [phone] Monday through Friday between 8 am and 6 pm          4  What do I need to do now? Please contact your health care provider who requested the imaging study to discuss what further actions (if any) are needed  You may have already heard from (your ordering provider) in regard to this test in which case you can disregard this letter  NOTICE IN ACCORDANCE WITH THE [facility] PATIENT TEST RESULT INFORMATION ACT OF 2018    You are receiving this notice as a result of a determination by your diagnostic imaging service that further discussions of your test results are warranted and would be beneficial to you  The complete results of your test or tests have been or will be sent to the health care practitioner that ordered the test or tests  It is recommended that you contact your health care practitioner to discuss your results as soon as possible

## 2020-03-11 DIAGNOSIS — S33.5XXA LUMBAR SPRAIN, INITIAL ENCOUNTER: ICD-10-CM

## 2020-03-11 RX ORDER — CARISOPRODOL 250 MG/1
TABLET ORAL
Qty: 25 TABLET | Refills: 0 | Status: SHIPPED | OUTPATIENT
Start: 2020-03-11 | End: 2020-03-12

## 2020-03-11 NOTE — TELEPHONE ENCOUNTER
[facility]  Only 5 pills where approved per Dr: Stanton Sood until pt gets RX [facility]  Verbal order given

## 2020-03-11 NOTE — TELEPHONE ENCOUNTER
[facility]'s  Can you call [facility] and order up to 5 pills of the 350's  [facility]  And they wont get for a few days

## 2020-03-12 ENCOUNTER — TELEPHONE (OUTPATIENT)
Dept: ENDOCRINOLOGY | Facility: CLINIC | Age: 55
End: 2020-03-12

## 2020-03-12 ENCOUNTER — TELEPHONE (OUTPATIENT)
Dept: FAMILY MEDICINE CLINIC | Facility: CLINIC | Age: 55
End: 2020-03-12

## 2020-03-12 DIAGNOSIS — E03.9 ACQUIRED HYPOTHYROIDISM: ICD-10-CM

## 2020-03-12 RX ORDER — LEVOTHYROXINE SODIUM 0.05 MG/1
50 TABLET ORAL
Qty: 90 TABLET | Refills: 3 | Status: SHIPPED | OUTPATIENT
Start: 2020-03-12 | End: 2020-12-15 | Stop reason: SDUPTHER

## 2020-03-12 RX ORDER — LEVOTHYROXINE SODIUM 0.05 MG/1
50 TABLET ORAL
Qty: 90 TABLET | Refills: 3 | Status: SHIPPED | OUTPATIENT
Start: 2020-03-12 | End: 2020-03-12 | Stop reason: SDUPTHER

## 2020-03-12 RX ORDER — CARISOPRODOL 250 MG/1
TABLET ORAL
Qty: 25 TABLET | Refills: 0 | Status: SHIPPED | OUTPATIENT
Start: 2020-03-12 | End: 2020-05-29 | Stop reason: SDUPTHER

## 2020-03-12 NOTE — RESULT ENCOUNTER NOTE
Please call the patient regarding labs - calcium slightly elevated but stable-urine is high  Thyroid function test within normal range    Will discuss further management on upcoming visit

## 2020-03-12 NOTE — TELEPHONE ENCOUNTER
----- Message from Radha Ahumada MD sent at 3/12/2020 10:00 AM EDT -----  Please call the patient regarding labs - calcium slightly elevated but stable-urine is high  Thyroid function test within normal range    Will discuss further management on upcoming visit

## 2020-03-12 NOTE — TELEPHONE ENCOUNTER
I sent the soma to [facility] and thyroid to [facility]

## 2020-03-12 NOTE — TELEPHONE ENCOUNTER
Pt asked if you can please send the soma 250mg to [facility]  They will order in for patient  Pt would also like her thyroid med [facility]

## 2020-03-16 ENCOUNTER — TELEPHONE (OUTPATIENT)
Dept: ENDOCRINOLOGY | Facility: CLINIC | Age: 55
End: 2020-03-16

## 2020-03-16 NOTE — TELEPHONE ENCOUNTER
----- Message from Regina Nina MD sent at 3/16/2020  1:58 PM EDT -----  Please call the patient regarding thyroid ultrasound-small thyroid nodules-will continue to monitor and repeat a thyroid ultrasound in 1-2 years

## 2020-03-16 NOTE — RESULT ENCOUNTER NOTE
Please call the patient regarding thyroid ultrasound-small thyroid nodules-will continue to monitor and repeat a thyroid ultrasound in 1-2 years

## 2020-03-25 ENCOUNTER — TELEPHONE (OUTPATIENT)
Dept: GASTROENTEROLOGY | Facility: AMBULARY SURGERY CENTER | Age: 55
End: 2020-03-25

## 2020-03-25 NOTE — TELEPHONE ENCOUNTER
Patient called to ask if she can get a note to support her wearing her personal N95 mask at work due to her compromised immune system  She is aware we can provide a work excuse if she wants to take PTO  She declined because she is weekend option and does not have PTO

## 2020-03-25 NOTE — TELEPHONE ENCOUNTER
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

## 2020-03-30 ENCOUNTER — TELEPHONE (OUTPATIENT)
Dept: ENDOCRINOLOGY | Facility: CLINIC | Age: 55
End: 2020-03-30

## 2020-03-30 ENCOUNTER — TELEPHONE (OUTPATIENT)
Dept: GASTROENTEROLOGY | Facility: AMBULARY SURGERY CENTER | Age: 55
End: 2020-03-30

## 2020-03-30 NOTE — TELEPHONE ENCOUNTER
Mitcheal Drain has appt on 04/16  She would like to know if it would be ok to re-schedule a little bit farther out instead of doing video/telemed visit  She feels like it isn't an urgent matter

## 2020-03-30 NOTE — TELEPHONE ENCOUNTER
Patients GI provider:  Dr Angelika Chapman    Number to return call: [phone]    Reason for call: Pt calling because she wants to know if she needs to continue to take the vit D tabs   If so please send [facility] mail order [facility]

## 2020-04-06 DIAGNOSIS — K50.10 CROHN'S DISEASE OF COLON WITHOUT COMPLICATION (HCC): ICD-10-CM

## 2020-04-06 NOTE — TELEPHONE ENCOUNTER
Patients GI provider:  Dr Gina Echeverria    Number to return call: [phone]    Reason for call: Pt calling requesting to speak with someone regarding message for Vit D and if she should get it over the counter       Scheduled procedure/appointment date if applicable: Apt/procedure n/a

## 2020-04-06 NOTE — TELEPHONE ENCOUNTER
I called her back, she has been taking 19446 units weekly for the past 2 years roughly and her level most recently was within normal limits, slightly higher than in the past   We discussed that she can continue on the current dose of vitamin D for 3 months and then we should recheck the level  If it is going to high, we can decrease to 5000 units daily

## 2020-04-09 DIAGNOSIS — F51.04 CHRONIC INSOMNIA: ICD-10-CM

## 2020-04-09 RX ORDER — ZOLPIDEM TARTRATE 10 MG/1
TABLET ORAL
Qty: 30 TABLET | Refills: 0 | Status: SHIPPED | OUTPATIENT
Start: 2020-04-09 | End: 2020-05-11 | Stop reason: SDUPTHER

## 2020-04-13 ENCOUNTER — TELEPHONE (OUTPATIENT)
Dept: GASTROENTEROLOGY | Facility: AMBULARY SURGERY CENTER | Age: 55
End: 2020-04-13

## 2020-04-13 ENCOUNTER — APPOINTMENT (OUTPATIENT)
Dept: LAB | Facility: HOSPITAL | Age: 55
End: 2020-04-13
Payer: COMMERCIAL

## 2020-04-13 DIAGNOSIS — K51.011 ULCERATIVE PANCOLITIS WITH RECTAL BLEEDING (HCC): ICD-10-CM

## 2020-04-13 DIAGNOSIS — K50.10 CROHN'S DISEASE OF COLON WITHOUT COMPLICATION (HCC): ICD-10-CM

## 2020-04-13 LAB
BASOPHILS # BLD AUTO: 0.06 THOUSANDS/ΜL (ref 0–0.1)
BASOPHILS NFR BLD AUTO: 1 % (ref 0–1)
CRP SERPL QL: <3 MG/L
EOSINOPHIL # BLD AUTO: 0.73 THOUSAND/ΜL (ref 0–0.61)
EOSINOPHIL NFR BLD AUTO: 13 % (ref 0–6)
ERYTHROCYTE [DISTWIDTH] IN BLOOD BY AUTOMATED COUNT: 12.1 % (ref 11.6–15.1)
HCT VFR BLD AUTO: 38.4 % (ref 34.8–46.1)
HGB BLD-MCNC: 12.2 G/DL (ref 11.5–15.4)
IMM GRANULOCYTES # BLD AUTO: 0.01 THOUSAND/UL (ref 0–0.2)
IMM GRANULOCYTES NFR BLD AUTO: 0 % (ref 0–2)
LYMPHOCYTES # BLD AUTO: 1.92 THOUSANDS/ΜL (ref 0.6–4.47)
LYMPHOCYTES NFR BLD AUTO: 35 % (ref 14–44)
MCH RBC QN AUTO: 31 PG (ref 26.8–34.3)
MCHC RBC AUTO-ENTMCNC: 31.8 G/DL (ref 31.4–37.4)
MCV RBC AUTO: 98 FL (ref 82–98)
MONOCYTES # BLD AUTO: 0.48 THOUSAND/ΜL (ref 0.17–1.22)
MONOCYTES NFR BLD AUTO: 9 % (ref 4–12)
NEUTROPHILS # BLD AUTO: 2.28 THOUSANDS/ΜL (ref 1.85–7.62)
NEUTS SEG NFR BLD AUTO: 42 % (ref 43–75)
NRBC BLD AUTO-RTO: 0 /100 WBCS
PLATELET # BLD AUTO: 296 THOUSANDS/UL (ref 149–390)
PMV BLD AUTO: 10.7 FL (ref 8.9–12.7)
RBC # BLD AUTO: 3.94 MILLION/UL (ref 3.81–5.12)
WBC # BLD AUTO: 5.48 THOUSAND/UL (ref 4.31–10.16)

## 2020-04-13 PROCEDURE — 80230 DRUG ASSAY INFLIXIMAB: CPT

## 2020-04-13 PROCEDURE — 86762 RUBELLA ANTIBODY: CPT

## 2020-04-13 PROCEDURE — 86765 RUBEOLA ANTIBODY: CPT

## 2020-04-13 PROCEDURE — 85025 COMPLETE CBC W/AUTO DIFF WBC: CPT

## 2020-04-13 PROCEDURE — 86735 MUMPS ANTIBODY: CPT

## 2020-04-13 PROCEDURE — 36415 COLL VENOUS BLD VENIPUNCTURE: CPT

## 2020-04-13 PROCEDURE — 86140 C-REACTIVE PROTEIN: CPT

## 2020-04-13 PROCEDURE — 83993 ASSAY FOR CALPROTECTIN FECAL: CPT

## 2020-04-13 PROCEDURE — 82652 VIT D 1 25-DIHYDROXY: CPT

## 2020-04-13 PROCEDURE — 82397 CHEMILUMINESCENT ASSAY: CPT

## 2020-04-14 LAB — CALPROTECTIN STL-MCNT: 26 UG/G (ref 0–120)

## 2020-04-15 LAB — 1,25(OH)2D3 SERPL-MCNC: 46.3 PG/ML (ref 19.9–79.3)

## 2020-04-16 ENCOUNTER — TELEPHONE (OUTPATIENT)
Dept: GASTROENTEROLOGY | Facility: AMBULARY SURGERY CENTER | Age: 55
End: 2020-04-16

## 2020-04-19 LAB
INFLIXIMAB AB SERPL-MCNC: <22 NG/ML
INFLIXIMAB SERPL-MCNC: 13 UG/ML

## 2020-04-20 ENCOUNTER — HOSPITAL ENCOUNTER (OUTPATIENT)
Dept: INFUSION CENTER | Facility: HOSPITAL | Age: 55
Discharge: HOME | End: 2020-04-20
Attending: INTERNAL MEDICINE
Payer: COMMERCIAL

## 2020-04-20 VITALS
BODY MASS INDEX: 27.16 KG/M2 | TEMPERATURE: 97.2 F | SYSTOLIC BLOOD PRESSURE: 132 MMHG | HEART RATE: 68 BPM | WEIGHT: 155.2 LBS | DIASTOLIC BLOOD PRESSURE: 62 MMHG | RESPIRATION RATE: 18 BRPM

## 2020-04-20 DIAGNOSIS — K51.011 ULCERATIVE PANCOLITIS WITH RECTAL BLEEDING (HCC): Primary | ICD-10-CM

## 2020-04-20 PROCEDURE — 96415 CHEMO IV INFUSION ADDL HR: CPT

## 2020-04-20 PROCEDURE — 96413 CHEMO IV INFUSION 1 HR: CPT

## 2020-04-20 RX ORDER — ACETAMINOPHEN 325 MG/1
650 TABLET ORAL ONCE
Status: COMPLETED | OUTPATIENT
Start: 2020-04-20 | End: 2020-04-20

## 2020-04-20 RX ORDER — DIPHENHYDRAMINE HCL 25 MG
25 TABLET ORAL ONCE
Status: DISCONTINUED | OUTPATIENT
Start: 2020-04-20 | End: 2020-04-23 | Stop reason: HOSPADM

## 2020-04-20 RX ORDER — SODIUM CHLORIDE 9 MG/ML
20 INJECTION, SOLUTION INTRAVENOUS ONCE
Status: CANCELLED | OUTPATIENT
Start: 2020-06-15

## 2020-04-20 RX ORDER — SODIUM CHLORIDE 9 MG/ML
20 INJECTION, SOLUTION INTRAVENOUS ONCE
Status: COMPLETED | OUTPATIENT
Start: 2020-04-20 | End: 2020-04-20

## 2020-04-20 RX ORDER — DIPHENHYDRAMINE HCL 25 MG
25 TABLET ORAL ONCE
Status: CANCELLED | OUTPATIENT
Start: 2020-06-15

## 2020-04-20 RX ORDER — METHYLPREDNISOLONE SODIUM SUCCINATE 40 MG/ML
40 INJECTION, POWDER, LYOPHILIZED, FOR SOLUTION INTRAMUSCULAR; INTRAVENOUS ONCE
Status: DISCONTINUED | OUTPATIENT
Start: 2020-04-20 | End: 2020-04-23 | Stop reason: HOSPADM

## 2020-04-20 RX ORDER — METHYLPREDNISOLONE SODIUM SUCCINATE 40 MG/ML
40 INJECTION, POWDER, LYOPHILIZED, FOR SOLUTION INTRAMUSCULAR; INTRAVENOUS ONCE
Status: CANCELLED | OUTPATIENT
Start: 2020-06-15

## 2020-04-20 RX ORDER — ACETAMINOPHEN 325 MG/1
650 TABLET ORAL ONCE
Status: CANCELLED | OUTPATIENT
Start: 2020-06-15

## 2020-04-20 RX ADMIN — ACETAMINOPHEN 650 MG: 325 TABLET ORAL at 09:26

## 2020-04-20 RX ADMIN — INFLIXIMAB 700 MG: 100 INJECTION, POWDER, LYOPHILIZED, FOR SOLUTION INTRAVENOUS at 09:52

## 2020-04-20 RX ADMIN — SODIUM CHLORIDE 20 ML/HR: 0.9 INJECTION, SOLUTION INTRAVENOUS at 09:23

## 2020-04-27 ENCOUNTER — TELEPHONE (OUTPATIENT)
Dept: GASTROENTEROLOGY | Facility: CLINIC | Age: 55
End: 2020-04-27

## 2020-04-28 ENCOUNTER — TELEPHONE (OUTPATIENT)
Dept: PODIATRY | Facility: CLINIC | Age: 55
End: 2020-04-28

## 2020-04-29 ENCOUNTER — TELEPHONE (OUTPATIENT)
Dept: OBGYN CLINIC | Facility: OTHER | Age: 55
End: 2020-04-29

## 2020-04-29 ENCOUNTER — TELEPHONE (OUTPATIENT)
Dept: GASTROENTEROLOGY | Facility: CLINIC | Age: 55
End: 2020-04-29

## 2020-05-09 DIAGNOSIS — F51.04 CHRONIC INSOMNIA: ICD-10-CM

## 2020-05-11 DIAGNOSIS — F51.04 CHRONIC INSOMNIA: ICD-10-CM

## 2020-05-11 RX ORDER — ZOLPIDEM TARTRATE 10 MG/1
TABLET ORAL
Qty: 90 TABLET | Refills: 0 | Status: SHIPPED | OUTPATIENT
Start: 2020-05-11 | End: 2020-08-03

## 2020-05-11 RX ORDER — ZOLPIDEM TARTRATE 10 MG/1
TABLET ORAL
Qty: 30 TABLET | Refills: 0 | OUTPATIENT
Start: 2020-05-11

## 2020-05-15 ENCOUNTER — TELEMEDICINE (OUTPATIENT)
Dept: FAMILY MEDICINE CLINIC | Facility: CLINIC | Age: 55
End: 2020-05-15
Payer: COMMERCIAL

## 2020-05-15 VITALS — HEIGHT: 63 IN | BODY MASS INDEX: 26.05 KG/M2 | WEIGHT: 147 LBS

## 2020-05-15 DIAGNOSIS — J45.20 MILD INTERMITTENT ASTHMA WITHOUT COMPLICATION: ICD-10-CM

## 2020-05-15 DIAGNOSIS — F51.01 PERSISTENT INSOMNIA OF NON-ORGANIC ORIGIN: ICD-10-CM

## 2020-05-15 DIAGNOSIS — K51.011 ULCERATIVE PANCOLITIS WITH RECTAL BLEEDING (HCC): Primary | ICD-10-CM

## 2020-05-15 DIAGNOSIS — M62.838 MUSCLE SPASM: ICD-10-CM

## 2020-05-15 PROBLEM — N30.01 ACUTE CYSTITIS WITH HEMATURIA: Status: RESOLVED | Noted: 2018-12-20 | Resolved: 2020-05-15

## 2020-05-15 PROBLEM — R50.9 FEBRILE ILLNESS: Status: RESOLVED | Noted: 2019-04-17 | Resolved: 2020-05-15

## 2020-05-15 PROCEDURE — 99214 OFFICE O/P EST MOD 30 MIN: CPT | Performed by: FAMILY MEDICINE

## 2020-05-15 RX ORDER — TIZANIDINE 4 MG/1
4 TABLET ORAL
Qty: 90 TABLET | Refills: 0 | Status: SHIPPED | OUTPATIENT
Start: 2020-05-15 | End: 2020-05-29 | Stop reason: ALTCHOICE

## 2020-05-15 RX ORDER — AZATHIOPRINE 100 MG/1
50 TABLET ORAL
COMMUNITY
End: 2021-10-11

## 2020-05-29 ENCOUNTER — TELEMEDICINE (OUTPATIENT)
Dept: FAMILY MEDICINE CLINIC | Facility: CLINIC | Age: 55
End: 2020-05-29
Payer: COMMERCIAL

## 2020-05-29 VITALS — HEIGHT: 63 IN | WEIGHT: 147 LBS | BODY MASS INDEX: 26.05 KG/M2

## 2020-05-29 DIAGNOSIS — S33.5XXD LUMBAR SPRAIN, SUBSEQUENT ENCOUNTER: ICD-10-CM

## 2020-05-29 DIAGNOSIS — Z12.39 BREAST CANCER SCREENING: ICD-10-CM

## 2020-05-29 DIAGNOSIS — G89.29 CHRONIC RIGHT-SIDED LOW BACK PAIN WITH RIGHT-SIDED SCIATICA: Primary | ICD-10-CM

## 2020-05-29 DIAGNOSIS — M54.41 CHRONIC RIGHT-SIDED LOW BACK PAIN WITH RIGHT-SIDED SCIATICA: Primary | ICD-10-CM

## 2020-05-29 PROBLEM — S33.5XXA LUMBAR SPRAIN: Status: ACTIVE | Noted: 2020-05-29

## 2020-05-29 PROCEDURE — 99213 OFFICE O/P EST LOW 20 MIN: CPT | Performed by: FAMILY MEDICINE

## 2020-05-29 RX ORDER — CARISOPRODOL 250 MG/1
TABLET ORAL
Qty: 90 TABLET | Refills: 0 | Status: SHIPPED | OUTPATIENT
Start: 2020-05-29 | End: 2020-06-11 | Stop reason: DRUGHIGH

## 2020-06-02 DIAGNOSIS — F41.8 SITUATIONAL ANXIETY: Primary | ICD-10-CM

## 2020-06-02 RX ORDER — LORAZEPAM 0.5 MG/1
TABLET ORAL
Qty: 2 TABLET | Refills: 0 | Status: SHIPPED | OUTPATIENT
Start: 2020-06-02 | End: 2020-06-08

## 2020-06-05 ENCOUNTER — HOSPITAL ENCOUNTER (OUTPATIENT)
Dept: MRI IMAGING | Facility: HOSPITAL | Age: 55
Discharge: HOME | End: 2020-06-05
Payer: COMMERCIAL

## 2020-06-05 DIAGNOSIS — M54.41 CHRONIC RIGHT-SIDED LOW BACK PAIN WITH RIGHT-SIDED SCIATICA: ICD-10-CM

## 2020-06-05 DIAGNOSIS — G89.29 CHRONIC RIGHT-SIDED LOW BACK PAIN WITH RIGHT-SIDED SCIATICA: ICD-10-CM

## 2020-06-05 PROCEDURE — 72148 MRI LUMBAR SPINE W/O DYE: CPT

## 2020-06-08 ENCOUNTER — TELEMEDICINE (OUTPATIENT)
Dept: ENDOCRINOLOGY | Facility: CLINIC | Age: 55
End: 2020-06-08
Payer: COMMERCIAL

## 2020-06-08 DIAGNOSIS — E04.2 MULTIPLE THYROID NODULES: Primary | ICD-10-CM

## 2020-06-08 DIAGNOSIS — E83.52 HYPERCALCEMIA: ICD-10-CM

## 2020-06-08 DIAGNOSIS — E55.9 VITAMIN D DEFICIENCY: ICD-10-CM

## 2020-06-08 DIAGNOSIS — Z78.0 POSTMENOPAUSAL: ICD-10-CM

## 2020-06-08 DIAGNOSIS — E21.3 HYPERPARATHYROIDISM (HCC): ICD-10-CM

## 2020-06-08 DIAGNOSIS — E03.9 HYPOTHYROIDISM, UNSPECIFIED TYPE: ICD-10-CM

## 2020-06-08 PROCEDURE — 99214 OFFICE O/P EST MOD 30 MIN: CPT | Performed by: INTERNAL MEDICINE

## 2020-06-10 ENCOUNTER — TELEPHONE (OUTPATIENT)
Dept: FAMILY MEDICINE CLINIC | Facility: CLINIC | Age: 55
End: 2020-06-10

## 2020-06-11 DIAGNOSIS — R10.9 ACUTE RIGHT FLANK PAIN: Primary | ICD-10-CM

## 2020-06-11 RX ORDER — CARISOPRODOL 350 MG/1
1 TABLET ORAL
COMMUNITY
Start: 2020-05-29 | End: 2020-06-25 | Stop reason: SDUPTHER

## 2020-06-15 ENCOUNTER — HOSPITAL ENCOUNTER (OUTPATIENT)
Dept: INFUSION CENTER | Facility: HOSPITAL | Age: 55
Discharge: HOME | End: 2020-06-15
Attending: INTERNAL MEDICINE
Payer: COMMERCIAL

## 2020-06-15 VITALS
WEIGHT: 153.22 LBS | TEMPERATURE: 98.5 F | RESPIRATION RATE: 20 BRPM | BODY MASS INDEX: 26.81 KG/M2 | SYSTOLIC BLOOD PRESSURE: 132 MMHG | HEART RATE: 57 BPM | DIASTOLIC BLOOD PRESSURE: 58 MMHG

## 2020-06-15 DIAGNOSIS — K51.011 ULCERATIVE PANCOLITIS WITH RECTAL BLEEDING (HCC): Primary | ICD-10-CM

## 2020-06-15 PROCEDURE — 96415 CHEMO IV INFUSION ADDL HR: CPT

## 2020-06-15 PROCEDURE — 96413 CHEMO IV INFUSION 1 HR: CPT

## 2020-06-15 PROCEDURE — 96375 TX/PRO/DX INJ NEW DRUG ADDON: CPT

## 2020-06-15 RX ORDER — SODIUM CHLORIDE 9 MG/ML
20 INJECTION, SOLUTION INTRAVENOUS ONCE
Status: CANCELLED | OUTPATIENT
Start: 2020-08-10

## 2020-06-15 RX ORDER — DIPHENHYDRAMINE HCL 25 MG
25 TABLET ORAL ONCE
Status: CANCELLED | OUTPATIENT
Start: 2020-08-10

## 2020-06-15 RX ORDER — METHYLPREDNISOLONE SODIUM SUCCINATE 40 MG/ML
40 INJECTION, POWDER, LYOPHILIZED, FOR SOLUTION INTRAMUSCULAR; INTRAVENOUS ONCE
Status: COMPLETED | OUTPATIENT
Start: 2020-06-15 | End: 2020-06-15

## 2020-06-15 RX ORDER — SODIUM CHLORIDE 9 MG/ML
20 INJECTION, SOLUTION INTRAVENOUS ONCE
Status: COMPLETED | OUTPATIENT
Start: 2020-06-15 | End: 2020-06-15

## 2020-06-15 RX ORDER — DIPHENHYDRAMINE HCL 25 MG
25 TABLET ORAL ONCE
Status: DISCONTINUED | OUTPATIENT
Start: 2020-06-15 | End: 2020-06-18 | Stop reason: HOSPADM

## 2020-06-15 RX ORDER — METHYLPREDNISOLONE SODIUM SUCCINATE 40 MG/ML
40 INJECTION, POWDER, LYOPHILIZED, FOR SOLUTION INTRAMUSCULAR; INTRAVENOUS ONCE
Status: CANCELLED | OUTPATIENT
Start: 2020-08-10

## 2020-06-15 RX ORDER — ACETAMINOPHEN 325 MG/1
650 TABLET ORAL ONCE
Status: COMPLETED | OUTPATIENT
Start: 2020-06-15 | End: 2020-06-15

## 2020-06-15 RX ORDER — ACETAMINOPHEN 325 MG/1
650 TABLET ORAL ONCE
Status: CANCELLED | OUTPATIENT
Start: 2020-08-10

## 2020-06-15 RX ADMIN — METHYLPREDNISOLONE SODIUM SUCCINATE 40 MG: 40 INJECTION, POWDER, FOR SOLUTION INTRAMUSCULAR; INTRAVENOUS at 09:23

## 2020-06-15 RX ADMIN — SODIUM CHLORIDE 20 ML/HR: 0.9 INJECTION, SOLUTION INTRAVENOUS at 09:15

## 2020-06-15 RX ADMIN — INFLIXIMAB 700 MG: 100 INJECTION, POWDER, LYOPHILIZED, FOR SOLUTION INTRAVENOUS at 09:50

## 2020-06-15 RX ADMIN — ACETAMINOPHEN 650 MG: 325 TABLET ORAL at 09:22

## 2020-06-17 ENCOUNTER — HOSPITAL ENCOUNTER (OUTPATIENT)
Dept: CT IMAGING | Facility: HOSPITAL | Age: 55
Discharge: HOME | End: 2020-06-17
Payer: COMMERCIAL

## 2020-06-17 DIAGNOSIS — R10.9 ACUTE RIGHT FLANK PAIN: ICD-10-CM

## 2020-06-17 PROCEDURE — 74176 CT ABD & PELVIS W/O CONTRAST: CPT

## 2020-06-25 DIAGNOSIS — M62.838 MUSCLE SPASM: Primary | ICD-10-CM

## 2020-06-25 RX ORDER — CARISOPRODOL 350 MG/1
350 TABLET ORAL
Qty: 30 TABLET | Refills: 2 | Status: SHIPPED | OUTPATIENT
Start: 2020-06-25 | End: 2020-12-08

## 2020-06-29 ENCOUNTER — OFFICE VISIT (OUTPATIENT)
Dept: GASTROENTEROLOGY | Facility: CLINIC | Age: 55
End: 2020-06-29
Payer: COMMERCIAL

## 2020-06-29 VITALS
HEART RATE: 54 BPM | SYSTOLIC BLOOD PRESSURE: 139 MMHG | WEIGHT: 149 LBS | TEMPERATURE: 97.1 F | DIASTOLIC BLOOD PRESSURE: 74 MMHG | BODY MASS INDEX: 25.44 KG/M2 | HEIGHT: 64 IN

## 2020-06-29 DIAGNOSIS — K51.011 ULCERATIVE PANCOLITIS WITH RECTAL BLEEDING (HCC): Primary | ICD-10-CM

## 2020-06-29 DIAGNOSIS — Z79.899 IMMUNOSUPPRESSION DUE TO DRUG THERAPY (HCC): ICD-10-CM

## 2020-06-29 DIAGNOSIS — D84.821 IMMUNOSUPPRESSION DUE TO DRUG THERAPY (HCC): ICD-10-CM

## 2020-06-29 PROCEDURE — 99214 OFFICE O/P EST MOD 30 MIN: CPT | Performed by: INTERNAL MEDICINE

## 2020-06-29 PROCEDURE — 1036F TOBACCO NON-USER: CPT | Performed by: INTERNAL MEDICINE

## 2020-06-29 PROCEDURE — 3008F BODY MASS INDEX DOCD: CPT | Performed by: INTERNAL MEDICINE

## 2020-06-29 NOTE — H&P (VIEW-ONLY)
[facility] - Outpatient Follow-up Note  Lis Barker 47 y o  female MRN: [id number]  Encounter: [id number]          ASSESSMENT AND PLAN:  47year old female here for follow up  1  Ulcerative pancolitis with rectal bleeding ([address] )  -will repeat colonoscopy to assess for healing,  She has been on remicade 10 mg/kg dosing since August   Also on 100 mg of imuran  If colon is improved, may decrease imuran to 50 mg  Pt requesting note for work to be able to park in garage as employee parking is too far from a bathroom  2  Immunosuppression due to drug therapy  -as above  -she is immune to Hep B  -due for repeat quant in Oct   -will plan repeat infliximab levels in 6 months, last had in April which showed drug level of 13 without any antibodies       Follow up in 4 months time       __________________________________________________________________    SUBJECTIVE:  Pt here for follow up  Been under a lot of stress lately due to work  Pt is currently on Imuran 100 mg daily and Remicade 10 mg/kg and feeling well without any bleeding  Did have some diarrhea last week but no blood, thinks it was due to stress at work  It has went away  Is having back pain and had imaging suggestive of disc disease  REVIEW OF SYSTEMS IS OTHERWISE NEGATIVE        Historical Information   Past Medical History:   Diagnosis Date    Adjustment disorder     last assessed 05/16/12    Anemia     HX of    Asthma     mild - intermittent    Bilateral leg edema     Blepharitis     last assessed 02/04/16    Carpal tunnel syndrome     Unspecified laterality    Colitis, acute     Dyspareunia in female     Edema     last assessed 06/22/15    Ganglion     Herniated cervical disc     History of transfusion     Hypokalemia     Hypothyroidism     Hypothyroidism     Insomnia     Lumps on the skin     last assessed 03/12/14    Mouth ulcers     last assessed 06/22/15    Nontraumatic tear of left tibialis posterior tendon     Traumatic teart     Polyarthritis     last assessed 16    Raynaud's disease     [address]na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 [facility] [address]     Temporomandibular disorder     Joint    Thyroid disease     Ulcerative colitis ([facility] [address]     Ulcerative colitis ([facility] [address] )      Past Surgical History:   Procedure Laterality Date    ANKLE SURGERY Left     Tendon repair    CARPAL TUNNEL RELEASE Bilateral      SECTION, LOW TRANSVERSE      COLONOSCOPY      COLONOSCOPY N/A 2018    Procedure: COLONOSCOPY;  Surgeon: Rhea Yang MD;  [facility]; Service: Gastroenterology    HERNIA REPAIR      umbilical    HYSTERECTOMY      KNEE ARTHROSCOPY Right     LIPECTOMY      Multipe lipoma removals    LIPOMA RESECTION      MO COLONOSCOPY FLX DX W/COLLJ SPEC WHEN PFRMD N/A 2016    Procedure: COLONOSCOPY;  Surgeon: Jorge Luis Tafoya DO;  Location: [facility];   Service: Gastroenterology    MO REPAIR FLEX LEG TENDON,SECOND,EA Left 2016    Procedure: REPAIR OF LEFT POSTERIOR TIBIAL TENDON WITH GRAFT, EXPLORATION LEFT ANKLE ;  Surgeon: Veronica Goff DPM;  Location: [facility] OR;  Service: Podiatry    SHOULDER SURGERY Left     bicep tendon and labrum repair    TONSILLECTOMY      TOOTH EXTRACTION  2018     Social History   Social History     Substance and Sexual Activity   Alcohol Use Not Currently    Comment: social 1 drink per weeek     Social History     Substance and Sexual Activity   Drug Use No     Social History     Tobacco Use   Smoking Status Former Smoker    Types: Cigarettes    Last attempt to quit: 2003    Years since quittin 2   Smokeless Tobacco Never Used   Tobacco Comment    Quit , rare use for 2 years     Family History   Problem Relation Age of Onset    Parkinsonism Mother     Rheum arthritis Mother     Thyroid disease unspecified Mother     Hypothyroidism Mother     Heart attack Father     Hypertension Father    [facility] Osteoporosis Father     Prostate cancer Father     Nephrolithiasis Father     Hashimoto's thyroiditis Sister     Thyroid disease unspecified Sister     Hypothyroidism Sister     Cancer Paternal Grandfather         Penile    Prostate cancer Paternal Grandfather     Crohn's disease Family     Osteoarthritis Family     Rheum arthritis Family     Crohn's disease Other     Crohn's disease Maternal Uncle     Psoriasis Maternal Uncle     Ulcerative colitis Maternal Uncle     Rheum arthritis Maternal Uncle     Rheum arthritis Maternal Aunt     Thyroid disease unspecified Maternal Aunt     Hypothyroidism Maternal Aunt     Ulcerative colitis Family      Meds/Allergies       Current Outpatient Medications:     albuterol (ACCUNEB) 1 25 MG/3ML nebulizer solution    Ascorbic Acid (VITAMIN C) 1000 MG tablet    azaTHIOprine (IMURAN) 100 MG tablet    carisoprodol (SOMA) 350 mg tablet    Cholecalciferol (D3-50) 1 25 MG (75368 UT) capsule    docusate sodium (COLACE) 100 mg capsule    ferrous sulfate 324 (65 Fe) mg    hyoscyamine (ANASPAZ,LEVSIN) 0 125 MG tablet    inFLIXimab (REMICADE) 100 mg    levothyroxine 50 mcg tablet    ondansetron (ZOFRAN-ODT) 4 mg disintegrating tablet    zolpidem (AMBIEN) 10 mg tablet    No Known Allergies      Objective     Blood pressure 139/74, pulse (!) 54, temperature (!) 97 1 °F (36 2 °C), temperature source Tympanic, height 5' 4" (1 626 m), weight 67 6 kg (149 lb)  Body mass index is 25 58 kg/m²  PHYSICAL EXAM:      General Appearance:   Alert, cooperative, no distress   HEENT:   Normocephalic, atraumatic, anicteric      Neck:  Supple, symmetrical, trachea midline   Lungs:   Clear to auscultation bilaterally; no rales, rhonchi or wheezing; respirations unlabored    Heart[de-identified]   Regular rate and rhythm; no murmur, rub, or gallop     Abdomen:   Soft, non-tender, non-distended; normal bowel sounds; no masses, no organomegaly    Genitalia:   Deferred    Rectal:   Deferred  Extremities:  No cyanosis, clubbing or edema    Pulses:  2+ and symmetric    Skin:  No jaundice, rashes, or lesions    Lymph nodes:  No palpable cervical lymphadenopathy        Lab Results:   No visits with results within 1 Day(s) from this visit  Latest known visit with results is:   Appointment on 04/13/2020   Component Date Value    CRP 04/13/2020 <3 0     WBC 04/13/2020 5 48     RBC 04/13/2020 3 94     Hemoglobin 04/13/2020 12 2     Hematocrit 04/13/2020 38 4     MCV 04/13/2020 98     MCH 04/13/2020 31 0     MCHC 04/13/2020 31 8     RDW 04/13/2020 12 1     MPV 04/13/2020 10 7     Platelets 54/94/2113 296     nRBC 04/13/2020 0     Neutrophils Relative 04/13/2020 42*    Immat GRANS % 04/13/2020 0     Lymphocytes Relative 04/13/2020 35     Monocytes Relative 04/13/2020 9     Eosinophils Relative 04/13/2020 13*    Basophils Relative 04/13/2020 1     Neutrophils Absolute 04/13/2020 2 28     Immature Grans Absolute 04/13/2020 0 01     Lymphocytes Absolute 04/13/2020 1 92     Monocytes Absolute 04/13/2020 0 48     Eosinophils Absolute 04/13/2020 0 73*    Basophils Absolute 04/13/2020 0 06     Calprotectin 04/13/2020 26     INFLIXIMAB DRUG LEVEL (A* 04/13/2020 13     ANTI-INFLIXIMAB ANTIBODY 04/13/2020 <22     Vit D, 1,25-Dihydroxy 04/13/2020 46 3          Radiology Results:   Ct Abdomen Pelvis Wo Contrast    Result Date: 6/19/2020  Narrative: CT ABDOMEN AND PELVIS WITHOUT IV CONTRAST INDICATION:   R10 9: Unspecified abdominal pain  Allen Odor note 5/29/2020 and 6/12/2020 reviewed, back pain, concern for renal stone COMPARISON:  CT 10/15/19 TECHNIQUE:  CT examination of the abdomen and pelvis was performed without intravenous contrast   Axial, sagittal, and coronal 2D reformatted images were created from the source data and submitted for interpretation  Radiation dose length product (DLP) for this visit:  252 89 mGy-cm     This examination, like all CT scans performed in the [facility], was performed utilizing techniques to minimize radiation dose exposure, including the use of iterative  reconstruction and automated exposure control  Enteric contrast was not administered  FINDINGS: ABDOMEN LOWER CHEST:  No clinically significant abnormality identified in the visualized lower chest  LIVER/BILIARY TREE:  Unremarkable  GALLBLADDER:  No calcified gallstones  No pericholecystic inflammatory change  SPLEEN:  Unremarkable  PANCREAS:  Unremarkable  ADRENAL GLANDS:  Unremarkable  KIDNEYS/URETERS:  Unremarkable  No hydronephrosis  STOMACH AND BOWEL:  Nonobstructed  There is mild mucosal tiny infiltration throughout the colon  No evidence of adjacent fat stranding  APPENDIX:  No findings to suggest appendicitis  ABDOMINOPELVIC CAVITY:  No ascites  No pneumoperitoneum  No lymphadenopathy  VESSELS:  Unremarkable for patient's age  PELVIS REPRODUCTIVE ORGANS:  Unremarkable for patient's age  URINARY BLADDER:  Unremarkable  ABDOMINAL WALL/INGUINAL REGIONS:  There is a small fat-containing umbilical hernia  OSSEOUS STRUCTURES:  No acute fracture or destructive osseous lesion  Impression: No renal system stone or acute abnormality Workstation performed: [id number]     Mri Lumbar Spine Wo Contrast    Result Date: 6/8/2020  Narrative: MRI LUMBAR SPINE WITHOUT CONTRAST INDICATION: M54 41: Lumbago with sciatica, right side G89 29: Other chronic pain  COMPARISON:  X-ray 2/19/2020 TECHNIQUE:  Sagittal T1, sagittal T2, sagittal inversion recovery, axial T1 and axial T2, coronal T2   IMAGE QUALITY:  Diagnostic FINDINGS: VERTEBRAL BODIES:  There are 5 lumbar type vertebral bodies  Mild straightening of normal osteoporosis without spondylolysis or spondylolisthesis  No scoliosis  Normal marrow signal is identified within the visualized bony structures  No discrete marrow lesion  SACRUM:  Normal signal within the sacrum  No evidence of insufficiency or stress fracture   DISTAL CORD AND CONUS: Normal size and signal within the distal cord and conus  Conus terminates at the L1 level  PARASPINAL SOFT TISSUES:  Paraspinal soft tissues are unremarkable  LOWER THORACIC DISC SPACES:  Normal disc height and signal   No disc herniation, canal stenosis or foraminal narrowing  LUMBAR DISC SPACES: L1-L2:  Normal  L2-L3:  Decreased disc height, marginal osteophytes minor circumferential bulge and slight facet arthrosis, but no critical stenosis  L3-L4:  Very minor bulge, slight facet arthrosis L4-L5:  Minor bulge, slight facet arthrosis  L5-S1:  Normal      Impression: Minor, noncompressive degenerative changes of the lumbar spine most advanced, at the L2-3 level  No significant stenosis   Workstation performed: [id number]

## 2020-07-14 ENCOUNTER — ANESTHESIA EVENT (OUTPATIENT)
Dept: ANESTHESIOLOGY | Facility: HOSPITAL | Age: 55
End: 2020-07-14

## 2020-07-14 ENCOUNTER — PREP FOR PROCEDURE (OUTPATIENT)
Dept: GASTROENTEROLOGY | Facility: CLINIC | Age: 55
End: 2020-07-14

## 2020-07-14 ENCOUNTER — ANESTHESIA (OUTPATIENT)
Dept: ANESTHESIOLOGY | Facility: HOSPITAL | Age: 55
End: 2020-07-14

## 2020-07-14 DIAGNOSIS — Z20.822 ENCOUNTER FOR LABORATORY TESTING FOR COVID-19 VIRUS: Primary | ICD-10-CM

## 2020-07-15 ENCOUNTER — CONSULT (OUTPATIENT)
Dept: PAIN MEDICINE | Facility: CLINIC | Age: 55
End: 2020-07-15
Payer: COMMERCIAL

## 2020-07-15 VITALS
TEMPERATURE: 98.1 F | DIASTOLIC BLOOD PRESSURE: 64 MMHG | WEIGHT: 150 LBS | SYSTOLIC BLOOD PRESSURE: 111 MMHG | BODY MASS INDEX: 25.75 KG/M2 | HEART RATE: 57 BPM

## 2020-07-15 DIAGNOSIS — M51.16 INTERVERTEBRAL DISC DISORDER WITH RADICULOPATHY OF LUMBAR REGION: Primary | ICD-10-CM

## 2020-07-15 DIAGNOSIS — Z20.822 ENCOUNTER FOR LABORATORY TESTING FOR COVID-19 VIRUS: ICD-10-CM

## 2020-07-15 PROCEDURE — U0003 INFECTIOUS AGENT DETECTION BY NUCLEIC ACID (DNA OR RNA); SEVERE ACUTE RESPIRATORY SYNDROME CORONAVIRUS 2 (SARS-COV-2) (CORONAVIRUS DISEASE [COVID-19]), AMPLIFIED PROBE TECHNIQUE, MAKING USE OF HIGH THROUGHPUT TECHNOLOGIES AS DESCRIBED BY CMS-2020-01-R: HCPCS | Performed by: OBSTETRICS & GYNECOLOGY

## 2020-07-15 PROCEDURE — 99244 OFF/OP CNSLTJ NEW/EST MOD 40: CPT | Performed by: ANESTHESIOLOGY

## 2020-07-15 RX ORDER — GABAPENTIN 100 MG/1
CAPSULE ORAL
Qty: 60 CAPSULE | Refills: 1 | Status: SHIPPED | OUTPATIENT
Start: 2020-07-15 | End: 2020-09-02

## 2020-07-15 NOTE — PROGRESS NOTES
Assessment  1  Intervertebral disc disorder with radiculopathy of lumbar region        Plan  The patient's symptoms, history/physical are consistent with pain that is multifactorial in origin but predominantly the result of the right L2-3 disc protrusion which is leading to radicular symptoms into the thigh  She also has significant tenderness over the right sacroiliac joint with positive provocative maneuvers that is likely contributing to the symptoms as well  At this time, I discussed treatment that will be multimodal in approach  She will be scheduled for right L2-3 transforaminal epidural steroid injection to help reduce swelling and inflammation which is leading to her pain symptoms  She was apprised of the most common risks and would like to proceed  She will be scheduled for an upcoming Tuesday or Thursday under fluoroscopic guidance  In addition, I will start on gabapentin 100 mg at bedtime to help with radicular symptoms  She was apprised of the most common side effects including sleepiness and dizziness  Complete risks and benefits including bleeding, infection, tissue reaction, nerve injury and allergic reaction were discussed  The approach was demonstrated using models and literature was provided  Verbal and written consent was obtained  My impressions and treatment recommendations were discussed in detail with the patient who verbalized understanding and had no further questions  Discharge instructions were provided  I personally saw and examined the patient and I agree with the above discussed plan of care  Orders Placed This Encounter   Procedures    FL spine and pain procedure     Standing Status:   Future     Standing Expiration Date:   7/15/2024     Order Specific Question:   Reason for Exam:     Answer:   Right L2-3 TF LETICIA     Order Specific Question:   Is the patient pregnant? Answer:   No     Order Specific Question:   Anticoagulant hold needed? Answer:    No New Medications Ordered This Visit   Medications    gabapentin (NEURONTIN) 100 mg capsule     Sig: Take 1 tablet at bedtime  May increase to 2 tablets after 3 days     Dispense:  60 capsule     Refill:  1       History of Present Illness    Dmitriy Bowers is a 47 y o  female who presents for consultation in regards to lower back and right-sided hip groin and leg pain  Symptoms have been present for 4 to 6 months without any precipitating injury or trauma  Pain is moderate to severe rated 4-10/10 on numeric rating scale and felt nearly constantly  Symptoms are worse in the morning and nighttime  Pain is described to be pressure-like, sharp and shooting with weakness felt in the right leg  Pain is aggravated with lying down at night, bending, sitting, exercise, sneezing and decreased with standing  Treatment history has included heat/ice which has provided no relief  She is on Soma which helps with sleep  She has been referred by her family physician Dr Mis Garcia for further treatment  I have personally reviewed and/or updated the patient's past medical history, past surgical history, family history, social history, current medications, allergies, and vital signs today  Review of Systems   Constitutional: Negative for fever and unexpected weight change  HENT: Negative for trouble swallowing  Eyes: Negative for visual disturbance  Respiratory: Negative for shortness of breath and wheezing  Cardiovascular: Positive for leg swelling  Negative for chest pain and palpitations  Gastrointestinal: Negative for constipation, diarrhea, nausea and vomiting  Endocrine: Negative for cold intolerance, heat intolerance and polydipsia  Genitourinary: Positive for frequency  Negative for difficulty urinating  Musculoskeletal: Positive for joint swelling  Negative for arthralgias, gait problem and myalgias  Skin: Negative for rash     Neurological: Negative for dizziness, seizures, syncope, weakness and headaches  Hematological: Does not bruise/bleed easily  Psychiatric/Behavioral: Negative for dysphoric mood  All other systems reviewed and are negative        Patient Active Problem List   Diagnosis    Seronegative spondyloarthropathy    Psoriatic arthritis ([facility] [address] )    Raynaud's syndrome without gangrene    Hypothyroidism    Asthma, mild intermittent    Ganglion, tendon sheath    Hypercalcemia    Persistent insomnia of non-organic origin    Iron deficiency anemia    Mild protein-calorie malnutrition (HCC)    Leukocytosis    Bilateral leg edema    Hypokalemia    Ulcerative pancolitis with rectal bleeding (HCC)    Other long term (current) drug therapy    Arthralgia of multiple joints    Other fatigue    Exanthem    Screening for breast cancer    BMI 25 0-25 9,adult    Anemia    Hemorrhoid    Vitamin D deficiency    Multiple thyroid nodules    Chronic right-sided low back pain with right-sided sciatica    Lumbar sprain    Breast cancer screening       Past Medical History:   Diagnosis Date    Adjustment disorder     last assessed 05/16/12    Anemia     HX of    Asthma     mild - intermittent    Bilateral leg edema     Blepharitis     last assessed 02/04/16    Carpal tunnel syndrome     Unspecified laterality    Colitis, acute     Dyspareunia in female     Edema     last assessed 06/22/15    Ganglion     Herniated cervical disc     History of transfusion     Hypokalemia     Hypothyroidism     Hypothyroidism     Insomnia     Lumps on the skin     last assessed 03/12/14    Mouth ulcers     last assessed 06/22/15    Nontraumatic tear of left tibialis posterior tendon     Traumatic teart     Polyarthritis     last assessed 06/24/16    Raynaud's disease     Rayna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 ([facility] [address] )     Temporomandibular disorder     Joint    Thyroid disease     Ulcerative colitis ([facility] [address] )     Ulcerative colitis ([facility] [address] )        Past Surgical History:   Procedure Laterality Date    ANKLE SURGERY Left     Tendon repair    CARPAL TUNNEL RELEASE Bilateral      SECTION, LOW TRANSVERSE      COLONOSCOPY      COLONOSCOPY N/A 2018    Procedure: COLONOSCOPY;  Surgeon: Randi Neri MD;  [facility]: [facility]; Service: Gastroenterology    HERNIA REPAIR      umbilical    HYSTERECTOMY      KNEE ARTHROSCOPY Right     LIPECTOMY      Multipe lipoma removals    LIPOMA RESECTION      ND COLONOSCOPY FLX DX W/COLLJ SPEC WHEN PFRMD N/A 2016    Procedure: COLONOSCOPY;  Surgeon: Jose Elias Leon DO;  Location: [facility];   Service: Gastroenterology    ND REPAIR FLEX LEG TENDON,SECOND,EA Left 2016    Procedure: REPAIR OF LEFT POSTERIOR TIBIAL TENDON WITH GRAFT, EXPLORATION LEFT ANKLE ;  Surgeon: Saul Hale DPM;  Location: [facility];  Service: Podiatry    SHOULDER SURGERY Left     bicep tendon and labrum repair    TONSILLECTOMY      TOOTH EXTRACTION  2018       Family History   Problem Relation Age of Onset    Parkinsonism Mother     Rheum arthritis Mother     Thyroid disease unspecified Mother     Hypothyroidism Mother     Heart attack Father     Hypertension Father     Osteoporosis Father     Prostate cancer Father     Nephrolithiasis Father     Hashimoto's thyroiditis Sister     Thyroid disease unspecified Sister     Hypothyroidism Sister     Cancer Paternal Grandfather         Penile    Prostate cancer Paternal Grandfather     Crohn's disease Family     Osteoarthritis Family     Rheum arthritis Family     Crohn's disease Other     Crohn's disease Maternal Uncle     Psoriasis Maternal Uncle     Ulcerative colitis Maternal Uncle     Rheum arthritis Maternal Uncle     Rheum arthritis Maternal Aunt     Thyroid disease unspecified Maternal Aunt     Hypothyroidism Maternal Aunt     Ulcerative colitis Family        Social History     Occupational History    Occupation: RN at [employer]   Tobacco Use    Smoking status: Former Smoker Types: Cigarettes     Last attempt to quit: 2003     Years since quittin 2    Smokeless tobacco: Never Used    Tobacco comment: Quit , rare use for 2 years   Substance and Sexual Activity    Alcohol use: Not Currently     Comment: social 1 drink per weeek    Drug use: No    Sexual activity: Not on file       Current Outpatient Medications on File Prior to Visit   Medication Sig    albuterol (ACCUNEB) 1 25 MG/3ML nebulizer solution Take 1 ampule by nebulization every 6 (six) hours as needed for wheezing    Ascorbic Acid (VITAMIN C) 1000 MG tablet Take 1,000 mg by mouth daily    azaTHIOprine (IMURAN) 100 MG tablet Take 100 mg by mouth daily at bedtime    carisoprodol (SOMA) 350 mg tablet Take 1 tablet (350 mg total) by mouth daily at bedtime    Cholecalciferol (D3-50) 1 25 MG (00133 UT) capsule Take 1 capsule (50,000 Units total) by mouth every 7 days    docusate sodium (COLACE) 100 mg capsule Take 1 capsule (100 mg total) by mouth daily    ferrous sulfate 324 (65 Fe) mg Take 1 tablet (324 mg total) by mouth 3 (three) times a day before meals    hyoscyamine (ANASPAZ,LEVSIN) 0 125 MG tablet TAKE ONE TABLET BY MOUTH EVERY 4 HOURS AS NEEDED FOR CRAMPING    inFLIXimab (REMICADE) 100 mg Infuse into a venous catheter every 56 days    levothyroxine 50 mcg tablet Take 1 tablet (50 mcg total) by mouth daily in the early morning    ondansetron (ZOFRAN-ODT) 4 mg disintegrating tablet Take 1 tablet (4 mg total) by mouth every 6 (six) hours as needed for nausea or vomiting    zolpidem (AMBIEN) 10 mg tablet Take one tablet by mouth every day at bedtime as needed for sleep    Na Sulfate-K Sulfate-Mg Sulf (Suprep Bowel Prep Kit) 17 5-3 13-1 6 GM/177ML SOLN Take 1 Bottle by mouth once for 1 dose     No current facility-administered medications on file prior to visit          No Known Allergies    Physical Exam    /64   Pulse 57   Temp 98 1 °F (36 7 °C) (Temporal)   Wt 68 kg (150 lb)   BMI 25 75 kg/m²     Constitutional: normal, well developed, well nourished, alert, in no distress and non-toxic and no overt pain behavior  Eyes: anicteric  HEENT: grossly intact  Neck: supple, symmetric, trachea midline and no masses   Pulmonary:even and unlabored  Cardiovascular:No edema or pitting edema present  Skin:Normal without rashes or lesions and well hydrated  Psychiatric:Mood and affect appropriate  Neurologic:Cranial Nerves II-XII grossly intact  Musculoskeletal:normal     Lumbar Spine Exam  Appearance:  Normal lordosis  Palpation/Tenderness:  right lumbar paraspinal tenderness  right sacroiliac joint tenderness  Range of Motion:  Flexion:  No limitation  with pain  Extension:  No limitation  with pain  Lateral Flexion - Left:  No limitation  without pain  Lateral Flexion - Right:  No limitation  with pain  Rotation - Left:  No limitation  without pain  Rotation - Right:  No limitation  without pain  Motor Strength:  Left hip flexion:  5/5  Left hip extension:  5/5  Right hip flexion:  5/5  Right hip extension:  5/5  Left knee flexion:  5/5  Left knee extension:  5/5  Right knee flexion:  5/5  Right knee extension:  5/5  Left foot dorsiflexion:  5/5  Left foot plantar flexion:  5/5  Right foot dorsiflexion:  5/5  Right foot plantar flexion:  5/5  Reflexes:  Left Patellar:  2+   Right Patellar:  2+   Left Achilles:  2+   Right Achilles:  2+   Special Tests:  Left Straight Leg Test:  negative  Right Straight Leg Test:  positive  Left Dimas's Maneuver:  negative  Right Dimas's Maneuver:  positive  Left Gaenslen's Test:  negative  Right Gaenslen's Test:  positive    Imaging    MRI LUMBAR SPINE WITHOUT CONTRAST (6/5/2020)     INDICATION: M54 41: Lumbago with sciatica, right side  G89 29:  Other chronic pain      COMPARISON:  X-ray 2/19/2020     TECHNIQUE:  Sagittal T1, sagittal T2, sagittal inversion recovery, axial T1 and axial T2, coronal T2     IMAGE QUALITY:  Diagnostic     FINDINGS:     VERTEBRAL BODIES: There are 5 lumbar type vertebral bodies  Mild straightening of normal osteoporosis without spondylolysis or spondylolisthesis  No scoliosis  Normal marrow signal is identified within the visualized bony structures  No discrete   marrow lesion      SACRUM:  Normal signal within the sacrum  No evidence of insufficiency or stress fracture      DISTAL CORD AND CONUS:  Normal size and signal within the distal cord and conus  Conus terminates at the L1 level      PARASPINAL SOFT TISSUES:  Paraspinal soft tissues are unremarkable      LOWER THORACIC DISC SPACES:  Normal disc height and signal   No disc herniation, canal stenosis or foraminal narrowing      LUMBAR DISC SPACES:     L1-L2:  Normal      L2-L3:  Decreased disc height, marginal osteophytes minor circumferential bulge and slight facet arthrosis, but no critical stenosis      L3-L4:  Very minor bulge, slight facet arthrosis     L4-L5:  Minor bulge, slight facet arthrosis      L5-S1:  Normal      IMPRESSION:     Minor, noncompressive degenerative changes of the lumbar spine most advanced, at the L2-3 level  No significant stenosis

## 2020-07-15 NOTE — PATIENT INSTRUCTIONS
What is the epidural space? The covering over the nerve roots in the spine is call the dura  The space surrounding this dura is call the epidural space  This space is commonly used to deliver medications to the spine  Nerves travel through the epidural space before they travel down into your legs  The nerves leave the spine from small nerve holes, foramen  Inflammation of these nerve roots may cause pain in your neck regions including shoulders and arms, and lower back regions including hip, buttocks and legs  These nerve roots may become inflamed due to irritation from a damaged disc or from contact with the bone spur  What is an epidural steroid injection and why that helps? An epidural steroid injection places anti-inflammatory medicine into the epidural space to stop nerve root inflammation, therefore hopefully reducing hip, buttock and leg pain  By stopping or limiting nerve root inflammation we may be able to reduce your pain  The epidural injection may assist the injury to heal by reducing inflammation  Although not always helpful, it usually reduces pain within 3 to 7 days and can take up to 2 weeks for full affect  It may provide permanent relief or provide a period of relief that will allow other treatments like physical therapy to be more effective  What happens during the procedure? While laying face down on an x-ray table your skin will be well cleaned  The physician will numb a small area of skin in your back which may sting for a few seconds  Next, the physician will use x-ray guidance to direct a small needle into the epidural space  He will then inject contrast dye to confirm that the medicine spreads to the affected nerve root in the epidural space  After this, the physician will inject the combination of numbing medicine and anti-inflammatory steroid          What happens after the procedure? A dressing may be applied to the injection site  Your will remain in the procedure suite for about 15 to 20 minutes and the nurse will monitor blood pressure and pulse  The nurse will review your discharge instructions and you will be able to go home  You may experience numbness or weakness to the affected limb for few hours after the procedure  If this happens do not walk without assistance  Your physician may refer you to a physical therapist or chiropractor immediately afterwards while the numbing medicine is effective and over the next two weeks while the cortisone is working  It may be beneficial to repeat the procedure in about 2 to 4 weeks  A series of treatments off improved more helpful than a single injection  Core Strengthening Exercises   WHAT YOU NEED TO KNOW:   What do I need to know about core strengthening exercises? Core strengthening exercises help heal and strengthen muscles of your hips, back, and abdomen to prevent reinjury  They are beginning exercises to help support your spine  Ask your healthcare provider if you need to see a physical therapist for more advanced exercises  · Do the exercises on a mat or firm surface  (not on a bed) to support your spine and avoid low back pain  · Do the exercises in the same order every time  to train your muscles to work together  Your healthcare provider will show you how to perform these exercises  Do them every day, or as directed by your healthcare provider  · Move slowly and smoothly  Avoid fast or jerky motions  · Stop if you feel pain  It is normal to feel some discomfort at first  Regular exercise will help decrease your discomfort over time  How do I perform core strengthening exercises safely? Hold each exercise for 5 seconds  When you can do the exercise without pain for 5 seconds, increase your hold to 10 to 15 seconds   When you can do the exercise without pain for 10 to 15 seconds, add the next exercise  Increase the time you hold each exercise, or repeat the exercises as directed  As you do each exercise, breathe normally  Do not hold your breath  · Abdominal bracing:  Lie on your back with your knees bent and feet flat on the floor  Place your arms in a relaxed position beside your body  Pull your belly button in toward your spine  Do not flatten or arch your back  Tighten the abdominal muscles below your belly button  Hold for 5 seconds  Begin all of your exercises with abdominal bracing  You can also practice abdominal bracing throughout the day while you are sitting or standing  · Bridging:  Lie on your back with your knees bent and feet flat on the floor  Rest your arms at your side  Tighten your buttocks, and then lift your hips 1 inch off the floor  Hold for 5 seconds  When you can do this exercise without pain for 10 seconds, increase the distance you lift your hips  A good goal is to be able to lift your hips so that your shoulders, hips, and knees are in a straight line  · Curl up:  Lie on your back with your knees bent and feet flat on the floor  Place your hands, palms down, underneath the curve in your lower back  Next, with your elbows on the floor, lift your shoulders and chest 2 to 3 inches  Keep your head in line with your shoulders  Hold this position for 5 seconds  When you can do this exercise without pain for 10 to 15 seconds, you may add a rotation  While your shoulders and chest are lifted off the ground, turn slightly to the left and hold  Repeat on the other side  · Dead bug:  Lie on your back with your knees bent and feet flat on the floor  Place your arms in a relaxed position beside your body  Begin with abdominal bracing  Next, raise one leg, keeping your knee bent  Hold for 5 seconds  Repeat with the other leg  When you can do this exercise without pain for 10 to 15 seconds, you may raise one straight leg and hold  Repeat with the other leg  · Quadruped:  Place your hands and knees on the floor  Keep your wrists directly below your shoulders and your knees directly below your hips  Pull your belly button in toward your spine  Do not flatten or arch your back  Tighten your abdominal muscles below your belly button  Hold for 5 seconds  When you can do this exercise without pain for 10 to 15 seconds, you may extend one arm and hold  Repeat on the other side  · Side Bridge:      ¨ Standing side bridge:  Stand next to a wall and extend one arm toward the wall  Place your palm flat on the wall with your fingers pointing upward  Begin with abdominal bracing  Next, without moving your feet, slowly bend your arm to 90 degrees  Hold for 5 seconds  Repeat on the other side  When you can do this exercise without pain for 10 to 15 seconds, you may do the bent leg side bridge on the floor  ¨ Bent leg side bridge:  Lie on one side with your legs, hips, and shoulders in a straight line  Prop yourself up onto your forearm so your elbow is directly below your shoulder  Bend your knees back to 90 degrees  Begin with abdominal bracing  Next, lift your hips and balance yourself on your forearm and knees  Hold for 5 seconds  Repeat on the other side  When you can do this exercise without pain for 10 to 15 seconds, you may do the straight leg side bridge on the floor  ¨ Straight leg side bridge:  Lie on one side with your legs, hips, and shoulders in a straight line  Prop yourself up onto your forearm so your elbow is directly below your shoulder  Begin with abdominal bracing  Lift your hips off the floor and balance yourself on your forearm and the outside of your flexed foot  Do not let your ankle bend sideways  Hold for 5 seconds  Repeat on the other side  When you can do this exercise without pain for 10 to 15 seconds, ask your healthcare provider for more advanced exercises  When should I contact my healthcare provider?    · Your pain becomes worse  · You have new pain  · You have questions or concerns about your condition, care, or exercise program   CARE AGREEMENT:   You have the right to help plan your care  Learn about your health condition and how it may be treated  Discuss treatment options with your caregivers to decide what care you want to receive  You always have the right to refuse treatment  The above information is an  only  It is not intended as medical advice for individual conditions or treatments  Talk to your doctor, nurse or pharmacist before following any medical regimen to see if it is safe and effective for you  © 2016 [employer] is for End User's use only and may not be sold, redistributed or otherwise used for commercial purposes  All illustrations and images included in CareNotes® are the copyrighted property of A [employer]  or [employer]

## 2020-07-16 LAB
INPATIENT: NORMAL
SARS-COV-2 RNA SPEC QL NAA+PROBE: NOT DETECTED

## 2020-07-23 ENCOUNTER — PREP FOR PROCEDURE (OUTPATIENT)
Dept: GASTROENTEROLOGY | Facility: CLINIC | Age: 55
End: 2020-07-23

## 2020-07-23 DIAGNOSIS — Z20.822 ENCOUNTER FOR LABORATORY TESTING FOR COVID-19 VIRUS: Primary | ICD-10-CM

## 2020-07-23 DIAGNOSIS — Z20.822 ENCOUNTER FOR LABORATORY TESTING FOR COVID-19 VIRUS: ICD-10-CM

## 2020-07-23 PROCEDURE — U0003 INFECTIOUS AGENT DETECTION BY NUCLEIC ACID (DNA OR RNA); SEVERE ACUTE RESPIRATORY SYNDROME CORONAVIRUS 2 (SARS-COV-2) (CORONAVIRUS DISEASE [COVID-19]), AMPLIFIED PROBE TECHNIQUE, MAKING USE OF HIGH THROUGHPUT TECHNOLOGIES AS DESCRIBED BY CMS-2020-01-R: HCPCS

## 2020-07-25 LAB — SARS-COV-2 RNA SPEC QL NAA+PROBE: NOT DETECTED

## 2020-07-27 ENCOUNTER — ANESTHESIA EVENT (OUTPATIENT)
Dept: GASTROENTEROLOGY | Facility: AMBULARY SURGERY CENTER | Age: 55
End: 2020-07-27

## 2020-07-28 ENCOUNTER — ANESTHESIA (OUTPATIENT)
Dept: GASTROENTEROLOGY | Facility: AMBULARY SURGERY CENTER | Age: 55
End: 2020-07-28

## 2020-07-28 ENCOUNTER — HOSPITAL ENCOUNTER (OUTPATIENT)
Dept: GASTROENTEROLOGY | Facility: AMBULARY SURGERY CENTER | Age: 55
Setting detail: OUTPATIENT SURGERY
Discharge: HOME | End: 2020-07-28
Attending: INTERNAL MEDICINE
Payer: COMMERCIAL

## 2020-07-28 VITALS
OXYGEN SATURATION: 100 % | SYSTOLIC BLOOD PRESSURE: 114 MMHG | HEIGHT: 63 IN | TEMPERATURE: 97.4 F | WEIGHT: 154 LBS | HEART RATE: 59 BPM | RESPIRATION RATE: 18 BRPM | DIASTOLIC BLOOD PRESSURE: 62 MMHG | BODY MASS INDEX: 27.29 KG/M2

## 2020-07-28 DIAGNOSIS — K51.011 ULCERATIVE PANCOLITIS WITH RECTAL BLEEDING (HCC): ICD-10-CM

## 2020-07-28 DIAGNOSIS — D84.821 IMMUNOSUPPRESSION DUE TO DRUG THERAPY (HCC): ICD-10-CM

## 2020-07-28 DIAGNOSIS — Z79.899 IMMUNOSUPPRESSION DUE TO DRUG THERAPY (HCC): ICD-10-CM

## 2020-07-28 PROCEDURE — 45385 COLONOSCOPY W/LESION REMOVAL: CPT | Performed by: INTERNAL MEDICINE

## 2020-07-28 PROCEDURE — 88305 TISSUE EXAM BY PATHOLOGIST: CPT | Performed by: PATHOLOGY

## 2020-07-28 PROCEDURE — 45380 COLONOSCOPY AND BIOPSY: CPT | Performed by: INTERNAL MEDICINE

## 2020-07-28 RX ORDER — PROPOFOL 10 MG/ML
INJECTION, EMULSION INTRAVENOUS AS NEEDED
Status: DISCONTINUED | OUTPATIENT
Start: 2020-07-28 | End: 2020-07-28 | Stop reason: SURG

## 2020-07-28 RX ORDER — SODIUM CHLORIDE, SODIUM LACTATE, POTASSIUM CHLORIDE, CALCIUM CHLORIDE 600; 310; 30; 20 MG/100ML; MG/100ML; MG/100ML; MG/100ML
125 INJECTION, SOLUTION INTRAVENOUS CONTINUOUS
Status: DISCONTINUED | OUTPATIENT
Start: 2020-07-28 | End: 2020-08-01 | Stop reason: HOSPADM

## 2020-07-28 RX ORDER — LIDOCAINE HYDROCHLORIDE 10 MG/ML
INJECTION, SOLUTION EPIDURAL; INFILTRATION; INTRACAUDAL; PERINEURAL AS NEEDED
Status: DISCONTINUED | OUTPATIENT
Start: 2020-07-28 | End: 2020-07-28 | Stop reason: SURG

## 2020-07-28 RX ADMIN — SODIUM CHLORIDE, SODIUM LACTATE, POTASSIUM CHLORIDE, AND CALCIUM CHLORIDE: .6; .31; .03; .02 INJECTION, SOLUTION INTRAVENOUS at 08:25

## 2020-07-28 RX ADMIN — PROPOFOL 40 MG: 10 INJECTION, EMULSION INTRAVENOUS at 08:34

## 2020-07-28 RX ADMIN — PROPOFOL 20 MG: 10 INJECTION, EMULSION INTRAVENOUS at 08:36

## 2020-07-28 RX ADMIN — PROPOFOL 80 MG: 10 INJECTION, EMULSION INTRAVENOUS at 08:28

## 2020-07-28 RX ADMIN — LIDOCAINE HYDROCHLORIDE 50 MG: 10 INJECTION, SOLUTION EPIDURAL; INFILTRATION; INTRACAUDAL; PERINEURAL at 08:28

## 2020-07-28 RX ADMIN — PROPOFOL 20 MG: 10 INJECTION, EMULSION INTRAVENOUS at 08:42

## 2020-07-28 RX ADMIN — PROPOFOL 20 MG: 10 INJECTION, EMULSION INTRAVENOUS at 08:30

## 2020-07-28 RX ADMIN — PROPOFOL 30 MG: 10 INJECTION, EMULSION INTRAVENOUS at 08:47

## 2020-07-28 RX ADMIN — PROPOFOL 40 MG: 10 INJECTION, EMULSION INTRAVENOUS at 08:32

## 2020-07-28 RX ADMIN — PROPOFOL 30 MG: 10 INJECTION, EMULSION INTRAVENOUS at 08:39

## 2020-07-28 RX ADMIN — PROPOFOL 20 MG: 10 INJECTION, EMULSION INTRAVENOUS at 08:44

## 2020-07-28 RX ADMIN — PROPOFOL 30 MG: 10 INJECTION, EMULSION INTRAVENOUS at 08:50

## 2020-07-28 RX ADMIN — PROPOFOL 20 MG: 10 INJECTION, EMULSION INTRAVENOUS at 08:45

## 2020-07-28 NOTE — ANESTHESIA PREPROCEDURE EVALUATION
Review of Systems/Medical History          Cardiovascular  Exercise tolerance (METS): >4,     Pulmonary  Smoker ex-smoker  , Asthma , well controlled/ stable ,        GI/Hepatic      Comment: U Colitis with Rectal Bleeding          Endo/Other  History of thyroid disease , hypothyroidism,      GYN    Hysterectomy,        Hematology  Anemia ,     Musculoskeletal    Comment: Raynaud's Arthritis     Neurology   Psychology           Physical Exam    Airway    Mallampati score: I  TM Distance: >3 FB  Neck ROM: full     Dental   Comment: Lower Front Permanent Bridge,     Cardiovascular      Pulmonary      Other Findings        Anesthesia Plan  ASA Score- 3     Anesthesia Type- IV sedation with anesthesia with ASA Monitors  Additional Monitors:   Airway Plan:         Plan Factors-Patient not instructed to abstain from smoking on day of procedure       Induction- intravenous  Postoperative Plan-     Informed Consent- Anesthetic plan and risks discussed with patient  I personally reviewed this patient with the CRNA  Discussed and agreed on the Anesthesia Plan with the HARDY Bright

## 2020-07-28 NOTE — ANESTHESIA POSTPROCEDURE EVALUATION
Post-Op Assessment Note    CV Status:  Stable  Pain Score: 0    Pain management: adequate     Mental Status:  Alert and awake   Hydration Status:  Euvolemic   PONV Controlled:  Controlled   Airway Patency:  Patent   Post Op Vitals Reviewed: Yes      Staff: CRNA           /63 (07/28/20 0856)    Temp (!) 97 4 °F (36 3 °C) (07/28/20 0856)    Pulse (!) 47 (07/28/20 0856)   Resp 18 (07/28/20 0856)    SpO2 98 % (07/28/20 0856)

## 2020-07-28 NOTE — INTERVAL H&P NOTE
H&P reviewed  After examining the patient I find no changes in the patients condition since the H&P had been written      Vitals:    07/28/20 0758   BP: 136/71   Pulse: (!) 47   Resp: 18   Temp: 98 4 °F (36 9 °C)   SpO2: 99%

## 2020-08-01 DIAGNOSIS — F51.04 CHRONIC INSOMNIA: ICD-10-CM

## 2020-08-03 ENCOUNTER — TELEPHONE (OUTPATIENT)
Dept: GASTROENTEROLOGY | Facility: CLINIC | Age: 55
End: 2020-08-03

## 2020-08-03 DIAGNOSIS — F51.04 CHRONIC INSOMNIA: ICD-10-CM

## 2020-08-03 RX ORDER — ZOLPIDEM TARTRATE 10 MG/1
TABLET ORAL
Qty: 30 TABLET | Refills: 0 | Status: SHIPPED | OUTPATIENT
Start: 2020-08-03 | End: 2020-09-08

## 2020-08-03 NOTE — TELEPHONE ENCOUNTER
Refill    zolpidem (AMBIEN) 10 mg tablet    Allergies: none    [facility]  [phone]

## 2020-08-03 NOTE — TELEPHONE ENCOUNTER
----- Message from Abel Piedra DO sent at 8/3/2020  8:41 AM EDT -----  No those were the pseudopolyps that I biopsies, these form due to her prior long standing uncontrolled inlfammation but overall the colon looks so much bettter!

## 2020-08-03 NOTE — TELEPHONE ENCOUNTER
Requested medication(s) are due for refill today: Yes  Patient has already received a courtesy refill: Yes  Other reason request has been forwarded to provider:

## 2020-08-05 RX ORDER — ZOLPIDEM TARTRATE 10 MG/1
TABLET ORAL
Qty: 30 TABLET | Refills: 0 | OUTPATIENT
Start: 2020-08-05

## 2020-08-10 ENCOUNTER — HOSPITAL ENCOUNTER (OUTPATIENT)
Dept: INFUSION CENTER | Facility: HOSPITAL | Age: 55
Discharge: HOME | End: 2020-08-10
Attending: INTERNAL MEDICINE
Payer: COMMERCIAL

## 2020-08-10 VITALS
SYSTOLIC BLOOD PRESSURE: 132 MMHG | TEMPERATURE: 98.6 F | RESPIRATION RATE: 18 BRPM | HEART RATE: 71 BPM | WEIGHT: 156.53 LBS | BODY MASS INDEX: 27.73 KG/M2 | DIASTOLIC BLOOD PRESSURE: 67 MMHG

## 2020-08-10 DIAGNOSIS — K51.011 ULCERATIVE PANCOLITIS WITH RECTAL BLEEDING (HCC): Primary | ICD-10-CM

## 2020-08-10 PROCEDURE — 96415 CHEMO IV INFUSION ADDL HR: CPT

## 2020-08-10 PROCEDURE — 96413 CHEMO IV INFUSION 1 HR: CPT

## 2020-08-10 RX ORDER — SODIUM CHLORIDE 9 MG/ML
20 INJECTION, SOLUTION INTRAVENOUS ONCE
Status: CANCELLED | OUTPATIENT
Start: 2020-10-05

## 2020-08-10 RX ORDER — ACETAMINOPHEN 325 MG/1
650 TABLET ORAL ONCE
Status: CANCELLED | OUTPATIENT
Start: 2020-10-05

## 2020-08-10 RX ORDER — ACETAMINOPHEN 325 MG/1
650 TABLET ORAL ONCE
Status: COMPLETED | OUTPATIENT
Start: 2020-08-10 | End: 2020-08-10

## 2020-08-10 RX ORDER — DIPHENHYDRAMINE HCL 25 MG
25 TABLET ORAL ONCE
Status: DISCONTINUED | OUTPATIENT
Start: 2020-08-10 | End: 2020-08-13 | Stop reason: HOSPADM

## 2020-08-10 RX ORDER — SODIUM CHLORIDE 9 MG/ML
20 INJECTION, SOLUTION INTRAVENOUS ONCE
Status: COMPLETED | OUTPATIENT
Start: 2020-08-10 | End: 2020-08-10

## 2020-08-10 RX ORDER — METHYLPREDNISOLONE SODIUM SUCCINATE 40 MG/ML
40 INJECTION, POWDER, LYOPHILIZED, FOR SOLUTION INTRAMUSCULAR; INTRAVENOUS ONCE
Status: DISCONTINUED | OUTPATIENT
Start: 2020-08-10 | End: 2020-08-13 | Stop reason: HOSPADM

## 2020-08-10 RX ORDER — METHYLPREDNISOLONE SODIUM SUCCINATE 40 MG/ML
40 INJECTION, POWDER, LYOPHILIZED, FOR SOLUTION INTRAMUSCULAR; INTRAVENOUS ONCE
Status: CANCELLED | OUTPATIENT
Start: 2020-10-05

## 2020-08-10 RX ORDER — DIPHENHYDRAMINE HCL 25 MG
25 TABLET ORAL ONCE
Status: CANCELLED | OUTPATIENT
Start: 2020-10-05

## 2020-08-10 RX ADMIN — SODIUM CHLORIDE 20 ML/HR: 0.9 INJECTION, SOLUTION INTRAVENOUS at 09:16

## 2020-08-10 RX ADMIN — ACETAMINOPHEN 650 MG: 325 TABLET, FILM COATED ORAL at 09:16

## 2020-08-10 RX ADMIN — INFLIXIMAB 700 MG: 100 INJECTION, POWDER, LYOPHILIZED, FOR SOLUTION INTRAVENOUS at 09:53

## 2020-08-10 NOTE — PLAN OF CARE
Problem: Potential for Falls  Goal: Patient will remain free of falls  Description: INTERVENTIONS:  - Assess patient frequently for physical needs  -  Identify cognitive and physical deficits and behaviors that affect risk of falls    -  [place] fall precautions as indicated by assessment   - Educate patient/family on patient safety including physical limitations  - Instruct patient to call for assistance with activity based on assessment  - Modify environment to reduce risk of injury  - Consider OT/PT consult to assist with strengthening/mobility  Outcome: Progressing

## 2020-08-11 ENCOUNTER — HOSPITAL ENCOUNTER (OUTPATIENT)
Dept: RADIOLOGY | Facility: CLINIC | Age: 55
Discharge: HOME | End: 2020-08-11
Attending: ANESTHESIOLOGY
Payer: COMMERCIAL

## 2020-08-11 VITALS
SYSTOLIC BLOOD PRESSURE: 133 MMHG | RESPIRATION RATE: 20 BRPM | OXYGEN SATURATION: 98 % | HEART RATE: 70 BPM | DIASTOLIC BLOOD PRESSURE: 67 MMHG | TEMPERATURE: 98.3 F

## 2020-08-11 DIAGNOSIS — M51.16 INTERVERTEBRAL DISC DISORDER WITH RADICULOPATHY OF LUMBAR REGION: ICD-10-CM

## 2020-08-11 PROCEDURE — 64483 NJX AA&/STRD TFRM EPI L/S 1: CPT | Performed by: ANESTHESIOLOGY

## 2020-08-11 PROCEDURE — 64484 NJX AA&/STRD TFRM EPI L/S EA: CPT | Performed by: ANESTHESIOLOGY

## 2020-08-11 RX ORDER — BUPIVACAINE HCL/PF 2.5 MG/ML
10 VIAL (ML) INJECTION ONCE
Status: COMPLETED | OUTPATIENT
Start: 2020-08-11 | End: 2020-08-11

## 2020-08-11 RX ORDER — METHYLPREDNISOLONE ACETATE 80 MG/ML
80 INJECTION, SUSPENSION INTRA-ARTICULAR; INTRALESIONAL; INTRAMUSCULAR; PARENTERAL; SOFT TISSUE ONCE
Status: COMPLETED | OUTPATIENT
Start: 2020-08-11 | End: 2020-08-11

## 2020-08-11 RX ORDER — 0.9 % SODIUM CHLORIDE 0.9 %
10 VIAL (ML) INJECTION ONCE
Status: COMPLETED | OUTPATIENT
Start: 2020-08-11 | End: 2020-08-11

## 2020-08-11 RX ADMIN — Medication 4 ML: at 08:17

## 2020-08-11 RX ADMIN — BUPIVACAINE HYDROCHLORIDE 2 ML: 2.5 INJECTION, SOLUTION EPIDURAL; INFILTRATION; INTRACAUDAL at 08:19

## 2020-08-11 RX ADMIN — SODIUM CHLORIDE 4 ML: 9 INJECTION, SOLUTION INTRAMUSCULAR; INTRAVENOUS; SUBCUTANEOUS at 08:17

## 2020-08-11 RX ADMIN — METHYLPREDNISOLONE ACETATE 80 MG: 80 INJECTION, SUSPENSION INTRA-ARTICULAR; INTRALESIONAL; INTRAMUSCULAR; PARENTERAL; SOFT TISSUE at 08:19

## 2020-08-11 RX ADMIN — IOHEXOL 1 ML: 300 INJECTION, SOLUTION INTRAVENOUS at 08:20

## 2020-08-11 NOTE — DISCHARGE INSTR - LAB
Epidural Steroid Injection   WHAT YOU NEED TO KNOW:   An epidural steroid injection (LETICIA) is a procedure to inject steroid medicine into the epidural space  The epidural space is between your spinal cord and vertebrae  Steroids reduce inflammation and fluid buildup in your spine that may be causing pain  You may be given pain medicine along with the steroids  ACTIVITY  · Do not drive or operate machinery today  · No strenuous activity today - bending, lifting, etc   · You may resume normal activites starting tomorrow - start slowly and as tolerated  · You may shower today, but no tub baths or hot tubs  · You may have numbness for several hours from the local anesthetic  Please use caution and common sense, especially with weight-bearing activities  CARE OF THE INJECTION SITE  · If you have soreness or pain, apply ice to the area today (20 minutes on/20 minutes off)  · Starting tomorrow, you may use warm, moist heat or ice if needed  · You may have an increase or change in your discomfort for 36-48 hours after your treatment  · Apply ice and continue with any pain medication you have been prescribed  · [facility]ify [facility] if you have any of the following: redness, drainage, swelling, headache, stiff neck or fever above 100°F     SPECIAL INSTRUCTIONS  · Our office will contact you in approximately 7 days for a progress report  MEDICATIONS  · Continue to take all routine medications  · Our office may have instructed you to hold some medications  If you have a problem specifically related to your procedure, please call our office at [phone]  Problems not related to your procedure should be directed to your primary care physician

## 2020-08-11 NOTE — H&P
History of Present Illness: The patient is a 47 y o  female who presents with complaints of right lower back and hip pain secondary to lumbar disc herniation and is here today for right L2-3 transforaminal epidural steroid injection      Patient Active Problem List   Diagnosis    Seronegative spondyloarthropathy    [facility]soriatic arthritis [facility] [address]    Raynaud's syndrome without gangrene    Hypothyroidism    Asthma, mild intermittent    Ganglion, tendon sheath    Hypercalcemia    Persistent insomnia of non-organic origin    Iron deficiency anemia    Mild protein-calorie malnutrition (HCC)    Leukocytosis    Bilateral leg edema    Hypokalemia    Ulcerative pancolitis with rectal bleeding (HCC)    Other long term (current) drug therapy    Arthralgia of multiple joints    Other fatigue    Exanthem    Screening for breast cancer    BMI 25 0-25 9,adult    Anemia    Hemorrhoid    Vitamin D deficiency    Multiple thyroid nodules    Chronic right-sided low back pain with right-sided sciatica    Lumbar sprain    Breast cancer screening       Past Medical History:   Diagnosis Date    Adjustment disorder     last assessed 05/16/12    Anemia     HX of    Asthma     mild - intermittent    Bilateral leg edema     Blepharitis     last assessed 02/04/16    Bulging lumbar disc     Carpal tunnel syndrome     Unspecified laterality    Colitis, acute     Dyspareunia in female     Edema     last assessed 06/22/15    Ganglion     Herniated cervical disc     History of transfusion     Hypokalemia     Hypothyroidism     Hypothyroidism     Insomnia     Lumps on the skin     last assessed 03/12/14    Mouth ulcers     last assessed 06/22/15    Nontraumatic tear of left tibialis posterior tendon     Traumatic teart     Polyarthritis     last assessed 06/24/16    Raynaud's disease     Rayna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 ([facility] [address] [facility]     Temporomandibular disorder     Joint    Thyroid disease     Ulcerative colitis [facility] [address]     Ulcerative colitis ([facility] [address] [facility]        Past Surgical History:   Procedure Laterality Date    ANKLE SURGERY Left     Tendon repair    CARPAL TUNNEL RELEASE Bilateral      SECTION, LOW TRANSVERSE      COLONOSCOPY      COLONOSCOPY N/A 2018    Procedure: COLONOSCOPY;  Surgeon: Rnady Teran MD;  [facility]; Service: Gastroenterology    HERNIA REPAIR      umbilical    HYSTERECTOMY      KNEE ARTHROSCOPY Right     LIPECTOMY      Multipe lipoma removals    LIPOMA RESECTION      NH COLONOSCOPY FLX DX W/COLLJ SPEC WHEN PFRMD N/A 2016    Procedure: COLONOSCOPY;  Surgeon: Amish Taylor DO;  Location: [facility];   Service: Gastroenterology    NH REPAIR FLEX LEG TENDON,SECOND,EA Left 2016    Procedure: REPAIR OF LEFT POSTERIOR TIBIAL TENDON WITH GRAFT, EXPLORATION LEFT ANKLE ;  Surgeon: Mundo Morse DPM;  Location: [facility];  Service: Podiatry    SHOULDER SURGERY Left     bicep tendon and labrum repair    TONSILLECTOMY      TOOTH EXTRACTION  2018         Current Outpatient Medications:     albuterol (ACCUNEB) 1 25 MG/3ML nebulizer solution, Take 1 ampule by nebulization every 6 (six) hours as needed for wheezing, Disp: , Rfl:     Ascorbic Acid (VITAMIN C) 1000 MG tablet, Take 1,000 mg by mouth daily, Disp: , Rfl:     azaTHIOprine (IMURAN) 100 MG tablet, Take 100 mg by mouth daily at bedtime, Disp: , Rfl:     carisoprodol (SOMA) 350 mg tablet, Take 1 tablet (350 mg total) by mouth daily at bedtime, Disp: 30 tablet, Rfl: 2    Cholecalciferol (D3-50) 1 25 MG (87142 UT) capsule, Take 1 capsule (50,000 Units total) by mouth every 7 days, Disp: 12 capsule, Rfl: 0    docusate sodium (COLACE) 100 mg capsule, Take 1 capsule (100 mg total) by mouth daily, Disp: 90 capsule, Rfl: 3    ferrous sulfate 324 (65 Fe) mg, Take 1 tablet (324 mg total) by mouth 3 (three) times a day before meals, Disp: 90 tablet, Rfl: 3    gabapentin (NEURONTIN) 100 mg capsule, Take 1 tablet at bedtime  May increase to 2 tablets after 3 days, Disp: 60 capsule, Rfl: 1    hyoscyamine (ANASPAZ,LEVSIN) 0 125 MG tablet, TAKE ONE TABLET BY MOUTH EVERY 4 HOURS AS NEEDED FOR CRAMPING, Disp: 30 tablet, Rfl: 0    inFLIXimab (REMICADE) 100 mg, Infuse into a venous catheter every 56 days, Disp: , Rfl:     levothyroxine 50 mcg tablet, Take 1 tablet (50 mcg total) by mouth daily in the early morning, Disp: 90 tablet, Rfl: 3    ondansetron (ZOFRAN-ODT) 4 mg disintegrating tablet, Take 1 tablet (4 mg total) by mouth every 6 (six) hours as needed for nausea or vomiting, Disp: 30 tablet, Rfl: 1    zolpidem (AMBIEN) 10 mg tablet, TAKE ONE TABLET BY MOUTH AT BEDTIME AS NEEDED FOR SLEEP, Disp: 30 tablet, Rfl: 0    Current Facility-Administered Medications:     bupivacaine (PF) (MARCAINE) 0 25 % injection 10 mL, 10 mL, Epidural, Once, Freda Glass MD    iohexol (OMNIPAQUE) 300 mg/mL injection 50 mL, 50 mL, Epidural, Once, Freda Glass MD    lidocaine (PF) (XYLOCAINE-MPF) 2 % injection 5 mL, 5 mL, Infiltration, Once, Freda Glass MD    methylPREDNISolone acetate (DEPO-MEDROL) injection 80 mg, 80 mg, Epidural, Once, Freda Glass MD    sodium chloride (PF) 0 9 % injection 10 mL, 10 mL, Infiltration, Once, Freda Glass MD    Facility-Administered Medications Ordered in Other Encounters:     diphenhydrAMINE (BENADRYL) tablet 25 mg, 25 mg, Oral, Once, Willian Lemon, DO    methylPREDNISolone sodium succinate (Solu-MEDROL) injection 40 mg, 40 mg, Intravenous, Once, Willian Aviles, DO    No Known Allergies    Physical Exam: There were no vitals filed for this visit  General: Awake, Alert, Oriented x 3, Mood and affect appropriate  Respiratory: Respirations even and unlabored  Cardiovascular: Peripheral pulses intact; no edema  Musculoskeletal Exam:     Right lower back tenderness    ASA Score: 2         Assessment:   1   Intervertebral disc disorder with radiculopathy of lumbar region Plan: Right L2-3 TF LETICIA

## 2020-08-18 ENCOUNTER — TELEPHONE (OUTPATIENT)
Dept: PAIN MEDICINE | Facility: CLINIC | Age: 55
End: 2020-08-18

## 2020-08-28 DIAGNOSIS — K51.011 ULCERATIVE PANCOLITIS WITH RECTAL BLEEDING (HCC): Primary | ICD-10-CM

## 2020-08-28 NOTE — TELEPHONE ENCOUNTER
There has been a change in her symptoms so would like her scheduled for re-eval back in the office next week

## 2020-08-28 NOTE — TELEPHONE ENCOUNTER
Patient called stating she's experiencing "buckling of her right knee / tingling sensation; Not pins & needles "  Her pain level 4/10  Patient is currently taking Motrin because she refuses to take Gabapentin 100 mg due to its side effects  She would like to discuss her status with a nurse   Please advise, steffi    Call back# [phone] or cell # [phone]

## 2020-08-28 NOTE — TELEPHONE ENCOUNTER
Pt reports some changes have developed: her pain used to radiate from the LB to the Rt hip, groin and only to the mid thigh and now it goes down to the calf, her Rt knee is buckling 2-3 times a day and she has intermittent tingling of the Rt foot with walking  She is having to take 600 mg of Motrin  Pain level 4/10  She never started the gabapentin b/c of s/e she read on it and her mother had hx of Gianni body Dementia, she's not willing to start it  She is not able to be as active during the day b/c if she tries to bend it hurts  Pt asked if Dr Kreg Spatz could be added to her list of providers under My Chart so she can message him in the future  Dallas Enriquez will add FQ to pt's My Chart  I told pt I will forward update to FQ for rec for the next step

## 2020-09-02 ENCOUNTER — HOSPITAL ENCOUNTER (OUTPATIENT)
Dept: MRI IMAGING | Facility: HOSPITAL | Age: 55
Discharge: HOME | End: 2020-09-02
Attending: ANESTHESIOLOGY
Payer: COMMERCIAL

## 2020-09-02 ENCOUNTER — OFFICE VISIT (OUTPATIENT)
Dept: PAIN MEDICINE | Facility: CLINIC | Age: 55
End: 2020-09-02
Payer: COMMERCIAL

## 2020-09-02 ENCOUNTER — HOSPITAL ENCOUNTER (OUTPATIENT)
Dept: RADIOLOGY | Facility: HOSPITAL | Age: 55
Discharge: HOME | End: 2020-09-02
Attending: ANESTHESIOLOGY
Payer: COMMERCIAL

## 2020-09-02 VITALS
BODY MASS INDEX: 27.28 KG/M2 | TEMPERATURE: 98.1 F | DIASTOLIC BLOOD PRESSURE: 98 MMHG | SYSTOLIC BLOOD PRESSURE: 142 MMHG | WEIGHT: 154 LBS | HEART RATE: 60 BPM

## 2020-09-02 DIAGNOSIS — M51.16 INTERVERTEBRAL DISC DISORDER WITH RADICULOPATHY OF LUMBAR REGION: ICD-10-CM

## 2020-09-02 DIAGNOSIS — M51.16 INTERVERTEBRAL DISC DISORDER WITH RADICULOPATHY OF LUMBAR REGION: Primary | ICD-10-CM

## 2020-09-02 DIAGNOSIS — M25.551 RIGHT HIP PAIN: ICD-10-CM

## 2020-09-02 PROCEDURE — 73502 X-RAY EXAM HIP UNI 2-3 VIEWS: CPT

## 2020-09-02 PROCEDURE — G1004 CDSM NDSC: HCPCS

## 2020-09-02 PROCEDURE — 72148 MRI LUMBAR SPINE W/O DYE: CPT

## 2020-09-02 PROCEDURE — 99214 OFFICE O/P EST MOD 30 MIN: CPT | Performed by: ANESTHESIOLOGY

## 2020-09-02 RX ORDER — TRAMADOL HYDROCHLORIDE 50 MG/1
50 TABLET ORAL 2 TIMES DAILY PRN
Qty: 30 TABLET | Refills: 1 | Status: SHIPPED | OUTPATIENT
Start: 2020-09-02 | End: 2020-12-18

## 2020-09-02 NOTE — PROGRESS NOTES
Assessment:  1  Intervertebral disc disorder with radiculopathy of lumbar region    2  Right hip pain        Plan:  The patient is experiencing worsening right lower back hip and leg pain as well as weakness following the epidural steroid injection that was performed last month  At this time, I will order an updated MRI of the lumbar spine to evaluate further  I advised we will call with results and discuss treatment moving forward  I will also order x-rays of the right hip and pelvis as she has significant pain with right hip range of motion as well  If the MRI lumbar spine is normal will likely order an MRI arthrogram of the right hip to evaluate for labral tear  [place] Prescription Drug Monitoring Program report was reviewed and was appropriate     My impressions and treatment recommendations were discussed in detail with the patient who verbalized understanding and had no further questions  Discharge instructions were provided  I personally saw and examined the patient and I agree with the above discussed plan of care  Orders Placed This Encounter   Procedures    MRI lumbar spine without contrast     Standing Status:   Future     Standing Expiration Date:   9/2/2024     Scheduling Instructions: There is no preparation for this test  Please leave your jewelry and valuables at home, wedding rings are the exception  Magnetic nail polish must be removed prior to arrival for your test  Please bring your insurance cards, a form of photo ID and a list of your medications with you  Arrive 15 minutes prior to your appointment time in order to register  Please bring any prior CT or MRI studies of this area that were not performed at a [facility]  To schedule this appointment, please contact Central Scheduling at [phone]  Prior to your appointment, please make sure you complete the MRI Screening Form when you e-Check in for your appointment   This will be available starting 7 days before your appointment in [address]  You may receive an e-mail with an activation code if you do not have a [employer] account  If you do not have access to a device, we will complete your screening at your appointment  Order Specific Question:   Is the patient pregnant? Answer:   No     Order Specific Question:   What is the patient's sedation requirement? Answer:   No Sedation    XR hip/pelv 2-3 vws right if performed     Standing Status:   Future     Standing Expiration Date:   9/2/2024     Scheduling Instructions:      Bring along any outside films relating to this procedure  Order Specific Question:   Is the patient pregnant? Answer:   No     New Medications Ordered This Visit   Medications    traMADol (ULTRAM) 50 mg tablet     Sig: Take 1 tablet (50 mg total) by mouth 2 (two) times a day as needed for moderate pain     Dispense:  30 tablet     Refill:  1       History of Present Illness:  Lisa Nunn is a 47 y o  female who presents for a follow up office visit in regards to Leg Pain (radiates into the right leg); Back Pain; and Knee Pain ('james")  The patient has a history of lumbar disc disorder with radiculopathy is status post right L2-3 transforaminal epidural steroid injection on he 8/11/2020  This did provide relief for about 2 weeks but then symptoms worsened and have changed  She is now experiencing worsening right lower back as well as radiation to the hip and groin as well as pain radiating down the entire leg with numbness and paresthesias  She feels like her leg will give out  She did not take the gabapentin that was prescribed due to the side effect profile  She has been using Motrin sparingly because she also has also have colitis  She has been also using some leftover tramadol which has been helpful      I have personally reviewed and/or updated the patient's past medical history, past surgical history, family history, social history, current medications, allergies, and vital signs today  Review of Systems   Respiratory: Negative for shortness of breath  Cardiovascular: Negative for chest pain  Gastrointestinal: Negative for constipation, diarrhea, nausea and vomiting  Musculoskeletal: Positive for back pain, gait problem and joint swelling  Negative for arthralgias and myalgias  Skin: Negative for rash  Neurological: Positive for weakness  Negative for dizziness and seizures  All other systems reviewed and are negative        Patient Active Problem List   Diagnosis    Seronegative spondyloarthropathy    [facility]atic arthritis ([facility] [address]    Raynaud's syndrome without gangrene    Hypothyroidism    Asthma, mild intermittent    Ganglion, tendon sheath    Hypercalcemia    Persistent insomnia of non-organic origin    Iron deficiency anemia    Mild protein-calorie malnutrition (HCC)    Leukocytosis    Bilateral leg edema    Hypokalemia    Ulcerative pancolitis with rectal bleeding (HCC)    Other long term (current) drug therapy    Arthralgia of multiple joints    Other fatigue    Exanthem    Screening for breast cancer    BMI 25 0-25 9,adult    Anemia    Hemorrhoid    Vitamin D deficiency    Multiple thyroid nodules    Chronic right-sided low back pain with right-sided sciatica    Lumbar sprain    Breast cancer screening    Intervertebral disc disorder with radiculopathy of lumbar region       Past Medical History:   Diagnosis Date    Adjustment disorder     last assessed 05/16/12    Anemia     HX of    Asthma     mild - intermittent    Bilateral leg edema     Blepharitis     last assessed 02/04/16    Bulging lumbar disc     Carpal tunnel syndrome     Unspecified laterality    Colitis, acute     Dyspareunia in female     Edema     last assessed 06/22/15    Ganglion     Herniated cervical disc     History of transfusion     Hypokalemia     Hypothyroidism     Hypothyroidism     Insomnia     Lumps on the skin     last assessed 14    Mouth ulcers     last assessed 06/22/15    Nontraumatic tear of left tibialis posterior tendon     Traumatic teart     Polyarthritis     last assessed 16    Raynaud's disease     [facility]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 [facility] [address]     Temporomandibular disorder     Joint    Thyroid disease     Ulcerative colitis ([facility] [address] [facility]     Ulcerative colitis ([facility] [address] [facility]        Past Surgical History:   Procedure Laterality Date    ANKLE SURGERY Left     Tendon repair    CARPAL TUNNEL RELEASE Bilateral      SECTION, LOW TRANSVERSE      COLONOSCOPY      COLONOSCOPY N/A 2018    Procedure: COLONOSCOPY;  Surgeon: Bennie Bellamy MD;  [facility]; Service: Gastroenterology    HERNIA REPAIR      umbilical    HYSTERECTOMY      KNEE ARTHROSCOPY Right     LIPECTOMY      Multipe lipoma removals    LIPOMA RESECTION      MD COLONOSCOPY FLX DX W/COLLJ SPEC WHEN PFRMD N/A 2016    Procedure: COLONOSCOPY;  Surgeon: Renee Figueredo DO;  Location: [facility];   Service: Gastroenterology    MD REPAIR FLEX LEG TENDON,SECOND,EA Left 2016    Procedure: REPAIR OF LEFT POSTERIOR TIBIAL TENDON WITH GRAFT, EXPLORATION LEFT ANKLE ;  Surgeon: Lis Pena DPM;  Location[facility];  Service: Podiatry    SHOULDER SURGERY Left     bicep tendon and labrum repair    TONSILLECTOMY      TOOTH EXTRACTION  2018       Family History   Problem Relation Age of Onset    Parkinsonism Mother     Rheum arthritis Mother     Thyroid disease unspecified Mother     Hypothyroidism Mother     Heart attack Father    Latanya Courser Hypertension Father     Osteoporosis Father     Prostate cancer Father     Nephrolithiasis Father     Hashimoto's thyroiditis Sister     Thyroid disease unspecified Sister     Hypothyroidism Sister     Cancer Paternal Grandfather         Penile    Prostate cancer Paternal Grandfather     Crohn's disease Family     Osteoarthritis Family     Rheum arthritis Family     Crohn's disease Other     Crohn's disease Maternal Uncle     Psoriasis Maternal Uncle     Ulcerative colitis Maternal Uncle     Rheum arthritis Maternal Uncle     Rheum arthritis Maternal Aunt     Thyroid disease unspecified Maternal Aunt     Hypothyroidism Maternal Aunt     Ulcerative colitis Family        Social History     Occupational History    Occupation: RN at [employer]   Tobacco Use    Smoking status: Former Smoker     Types: Cigarettes     Last attempt to quit: 2003     Years since quittin 4    Smokeless tobacco: Never Used    Tobacco comment: Quit , rare use for 2 years   Substance and Sexual Activity    Alcohol use: Not Currently     Comment: social 1 drink per [employer] Drug use: No    Sexual activity: Not on file       Current Outpatient Medications on File Prior to Visit   Medication Sig    albuterol (ACCUNEB) 1 25 MG/3ML nebulizer solution Take 1 ampule by nebulization every 6 (six) hours as needed for wheezing    Ascorbic Acid (VITAMIN C) 1000 MG tablet Take 1,000 mg by mouth daily    azaTHIOprine (IMURAN) 100 MG tablet Take 50 mg by mouth daily at bedtime     carisoprodol (SOMA) 350 mg tablet Take 1 tablet (350 mg total) by mouth daily at bedtime    Cholecalciferol (D3-50) 1 25 MG (66068 UT) capsule Take 1 capsule (50,000 Units total) by mouth every 7 days    docusate sodium (COLACE) 100 mg capsule Take 1 capsule (100 mg total) by mouth daily    ferrous sulfate 324 (65 Fe) mg Take 1 tablet (324 mg total) by mouth 3 (three) times a day before meals    hyoscyamine (ANASPAZ,LEVSIN) 0 125 MG tablet TAKE ONE TABLET BY MOUTH EVERY 4 HOURS AS NEEDED FOR CRAMPING    inFLIXimab (REMICADE) 100 mg Infuse into a venous catheter every 56 days    levothyroxine 50 mcg tablet Take 1 tablet (50 mcg total) by mouth daily in the early morning    ondansetron (ZOFRAN-ODT) 4 mg disintegrating tablet Take 1 tablet (4 mg total) by mouth every 6 (six) hours as needed for nausea or vomiting    zolpidem (AMBIEN) 10 mg tablet TAKE ONE TABLET BY MOUTH AT BEDTIME AS NEEDED FOR SLEEP    [DISCONTINUED] gabapentin (NEURONTIN) 100 mg capsule Take 1 tablet at bedtime  May increase to 2 tablets after 3 days (Patient not taking: Reported on 8/11/2020)     No current facility-administered medications on file prior to visit  No Known Allergies    Physical Exam:    /98   Pulse 60   Temp 98 1 °F (36 7 °C) (Temporal)   Wt 69 9 kg (154 lb)   BMI 27 28 kg/m²     Constitutional:normal, well developed, well nourished, alert, in no distress and non-toxic and no overt pain behavior  Eyes:anicteric  HEENT:grossly intact  Neck:supple, symmetric, trachea midline and no masses   Pulmonary:even and unlabored  Cardiovascular:No edema or pitting edema present  Skin:Normal without rashes or lesions and well hydrated  Psychiatric:Mood and affect appropriate  Neurologic:Cranial Nerves II-XII grossly intact  Musculoskeletal:normal     Lumbar Spine Exam  Appearance:  Normal lordosis  Palpation/Tenderness:  right lumbar paraspinal tenderness  right sacroiliac joint tenderness  right piriformis tenderness  Range of Motion:  Flexion:   Moderately limited  with pain  Extension:  Minimally limited  without pain  Lateral Flexion - Left:  No limitation  without pain  Lateral Flexion - Right:  Moderately limited  with pain  Motor Strength:  Left hip flexion:  5/5  Left hip extension:  5/5  Right hip flexion:  4/5  Right hip extension:  5/5  Left knee flexion:  5/5  Left knee extension:  5/5  Right knee flexion:  5/5  Right knee extension:  4/5  Left foot dorsiflexion:  5/5  Left foot plantar flexion:  5/5  Right foot dorsiflexion:  4/5  Right foot plantar flexion:  5/5  Reflexes:  Left Patellar:  2+   Right Patellar:  2+   Left Achilles:  2+   Right Achilles:  2+   Special Tests:  Left Straight Leg Test:  negative  Right Straight Leg Test:  positive    Imaging    MRI LUMBAR SPINE WITHOUT CONTRAST (6/5/2020)     INDICATION: M54 41: Lumbago with sciatica, right side  G89 29: Other chronic pain      COMPARISON:  X-ray 2/19/2020     TECHNIQUE:  Sagittal T1, sagittal T2, sagittal inversion recovery, axial T1 and axial T2, coronal T2     IMAGE QUALITY:  Diagnostic     FINDINGS:     VERTEBRAL BODIES:  There are 5 lumbar type vertebral bodies  Mild straightening of normal osteoporosis without spondylolysis or spondylolisthesis  No scoliosis  Normal marrow signal is identified within the visualized bony structures  No discrete   marrow lesion      SACRUM:  Normal signal within the sacrum  No evidence of insufficiency or stress fracture      DISTAL CORD AND CONUS:  Normal size and signal within the distal cord and conus    Conus terminates at the L1 level      PARASPINAL SOFT TISSUES:  Paraspinal soft tissues are unremarkable      LOWER THORACIC DISC SPACES:  Normal disc height and signal   No disc herniation, canal stenosis or foraminal narrowing      LUMBAR DISC SPACES:     L1-L2:  Normal      L2-L3:  Decreased disc height, marginal osteophytes minor circumferential bulge and slight facet arthrosis, but no critical stenosis      L3-L4:  Very minor bulge, slight facet arthrosis     L4-L5:  Minor bulge, slight facet arthrosis      L5-S1:  Normal

## 2020-09-03 ENCOUNTER — TELEPHONE (OUTPATIENT)
Dept: RADIOLOGY | Facility: CLINIC | Age: 55
End: 2020-09-03

## 2020-09-03 ENCOUNTER — TRANSCRIBE ORDERS (OUTPATIENT)
Dept: ADMINISTRATIVE | Facility: HOSPITAL | Age: 55
End: 2020-09-03

## 2020-09-03 DIAGNOSIS — M25.559 HIP PAIN: Primary | ICD-10-CM

## 2020-09-03 DIAGNOSIS — M25.551 RIGHT HIP PAIN: Primary | ICD-10-CM

## 2020-09-03 NOTE — TELEPHONE ENCOUNTER
Discussed MRI L-spine results with patient which shows very minimal disc bulging and arthrosis at L2-3 as well as very mild joint space narrowing in her hip  At this time, recommend proceeding with MRI arthrogram of the right hip as a suspect that there may be a labral tear that is contributing to the symptoms    Order placed

## 2020-09-08 DIAGNOSIS — F51.04 CHRONIC INSOMNIA: ICD-10-CM

## 2020-09-08 RX ORDER — ZOLPIDEM TARTRATE 10 MG/1
TABLET ORAL
Qty: 30 TABLET | Refills: 0 | Status: SHIPPED | OUTPATIENT
Start: 2020-09-08 | End: 2020-10-08 | Stop reason: SDUPTHER

## 2020-09-11 ENCOUNTER — TELEPHONE (OUTPATIENT)
Dept: PAIN MEDICINE | Facility: MEDICAL CENTER | Age: 55
End: 2020-09-11

## 2020-09-14 ENCOUNTER — TRANSCRIBE ORDERS (OUTPATIENT)
Dept: PAIN MEDICINE | Facility: CLINIC | Age: 55
End: 2020-09-14

## 2020-09-15 ENCOUNTER — HOSPITAL ENCOUNTER (OUTPATIENT)
Dept: MRI IMAGING | Facility: HOSPITAL | Age: 55
Discharge: HOME | End: 2020-09-15
Attending: ANESTHESIOLOGY
Payer: COMMERCIAL

## 2020-09-15 ENCOUNTER — HOSPITAL ENCOUNTER (OUTPATIENT)
Dept: RADIOLOGY | Facility: HOSPITAL | Age: 55
Discharge: HOME | End: 2020-09-15
Attending: ANESTHESIOLOGY | Admitting: RADIOLOGY
Payer: COMMERCIAL

## 2020-09-15 ENCOUNTER — TELEPHONE (OUTPATIENT)
Dept: PAIN MEDICINE | Facility: CLINIC | Age: 55
End: 2020-09-15

## 2020-09-15 DIAGNOSIS — M25.559 HIP PAIN: ICD-10-CM

## 2020-09-15 DIAGNOSIS — M25.551 RIGHT HIP PAIN: ICD-10-CM

## 2020-09-15 DIAGNOSIS — S73.191A TEAR OF RIGHT ACETABULAR LABRUM, INITIAL ENCOUNTER: Primary | ICD-10-CM

## 2020-09-15 PROCEDURE — 73722 MRI JOINT OF LWR EXTR W/DYE: CPT

## 2020-09-15 PROCEDURE — 27095 INJECTION FOR HIP X-RAY: CPT

## 2020-09-15 PROCEDURE — A9585 GADOBUTROL INJECTION: HCPCS | Performed by: RADIOLOGY

## 2020-09-15 PROCEDURE — G1004 CDSM NDSC: HCPCS

## 2020-09-15 PROCEDURE — 77002 NEEDLE LOCALIZATION BY XRAY: CPT

## 2020-09-15 RX ORDER — LIDOCAINE HYDROCHLORIDE 10 MG/ML
30 INJECTION, SOLUTION EPIDURAL; INFILTRATION; INTRACAUDAL; PERINEURAL ONCE
Status: COMPLETED | OUTPATIENT
Start: 2020-09-15 | End: 2020-09-15

## 2020-09-15 RX ADMIN — LIDOCAINE HYDROCHLORIDE 9 ML: 10 INJECTION, SOLUTION EPIDURAL; INFILTRATION; INTRACAUDAL; PERINEURAL at 09:14

## 2020-09-15 RX ADMIN — GADOBUTROL 0.2 ML: 604.72 INJECTION INTRAVENOUS at 09:14

## 2020-09-15 RX ADMIN — IOHEXOL 2 ML: 300 INJECTION, SOLUTION INTRAVENOUS at 09:15

## 2020-09-15 NOTE — TELEPHONE ENCOUNTER
Discussed MRI hip results with patient which shows labral tear    Will refer to Dr Shirin Mckeon for further evaluation order place

## 2020-09-21 ENCOUNTER — TELEPHONE (OUTPATIENT)
Dept: ENDOCRINOLOGY | Facility: CLINIC | Age: 55
End: 2020-09-21

## 2020-09-21 ENCOUNTER — LAB (OUTPATIENT)
Dept: LAB | Facility: HOSPITAL | Age: 55
End: 2020-09-21
Attending: INTERNAL MEDICINE
Payer: COMMERCIAL

## 2020-09-21 ENCOUNTER — HOSPITAL ENCOUNTER (OUTPATIENT)
Dept: BONE DENSITY | Facility: IMAGING CENTER | Age: 55
Discharge: HOME | End: 2020-09-21
Payer: COMMERCIAL

## 2020-09-21 DIAGNOSIS — E03.9 HYPOTHYROIDISM, UNSPECIFIED TYPE: ICD-10-CM

## 2020-09-21 DIAGNOSIS — E83.52 HYPERCALCEMIA: Primary | ICD-10-CM

## 2020-09-21 DIAGNOSIS — E55.9 VITAMIN D DEFICIENCY: ICD-10-CM

## 2020-09-21 DIAGNOSIS — K51.011 ULCERATIVE PANCOLITIS WITH RECTAL BLEEDING (HCC): ICD-10-CM

## 2020-09-21 DIAGNOSIS — E04.2 MULTIPLE THYROID NODULES: ICD-10-CM

## 2020-09-21 DIAGNOSIS — E83.52 HYPERCALCEMIA: ICD-10-CM

## 2020-09-21 DIAGNOSIS — E21.3 HYPERPARATHYROIDISM (HCC): ICD-10-CM

## 2020-09-21 LAB
25(OH)D3 SERPL-MCNC: 52.5 NG/ML (ref 30–100)
ALBUMIN SERPL BCP-MCNC: 2.7 G/DL (ref 3.5–5)
ALP SERPL-CCNC: 89 U/L (ref 46–116)
ALT SERPL W P-5'-P-CCNC: 28 U/L (ref 12–78)
ANION GAP SERPL CALCULATED.3IONS-SCNC: 3 MMOL/L (ref 4–13)
AST SERPL W P-5'-P-CCNC: 22 U/L (ref 5–45)
BASOPHILS # BLD AUTO: 0.05 THOUSANDS/ΜL (ref 0–0.1)
BASOPHILS NFR BLD AUTO: 1 % (ref 0–1)
BILIRUB SERPL-MCNC: 0.55 MG/DL (ref 0.2–1)
BUN SERPL-MCNC: 12 MG/DL (ref 5–25)
CALCIUM ALBUM COR SERPL-MCNC: 12.1 MG/DL (ref 8.3–10.1)
CALCIUM SERPL-MCNC: 11.1 MG/DL (ref 8.3–10.1)
CHLORIDE SERPL-SCNC: 111 MMOL/L (ref 100–108)
CO2 SERPL-SCNC: 29 MMOL/L (ref 21–32)
CREAT SERPL-MCNC: 0.72 MG/DL (ref 0.6–1.3)
EOSINOPHIL # BLD AUTO: 0.38 THOUSAND/ΜL (ref 0–0.61)
EOSINOPHIL NFR BLD AUTO: 7 % (ref 0–6)
ERYTHROCYTE [DISTWIDTH] IN BLOOD BY AUTOMATED COUNT: 12.1 % (ref 11.6–15.1)
GFR SERPL CREATININE-BSD FRML MDRD: 95 ML/MIN/1.73SQ M
GLUCOSE P FAST SERPL-MCNC: 86 MG/DL (ref 65–99)
HCT VFR BLD AUTO: 41.3 % (ref 34.8–46.1)
HGB BLD-MCNC: 13.1 G/DL (ref 11.5–15.4)
IMM GRANULOCYTES # BLD AUTO: 0.01 THOUSAND/UL (ref 0–0.2)
IMM GRANULOCYTES NFR BLD AUTO: 0 % (ref 0–2)
LYMPHOCYTES # BLD AUTO: 1.8 THOUSANDS/ΜL (ref 0.6–4.47)
LYMPHOCYTES NFR BLD AUTO: 34 % (ref 14–44)
MCH RBC QN AUTO: 31.4 PG (ref 26.8–34.3)
MCHC RBC AUTO-ENTMCNC: 31.7 G/DL (ref 31.4–37.4)
MCV RBC AUTO: 99 FL (ref 82–98)
MONOCYTES # BLD AUTO: 0.53 THOUSAND/ΜL (ref 0.17–1.22)
MONOCYTES NFR BLD AUTO: 10 % (ref 4–12)
NEUTROPHILS # BLD AUTO: 2.57 THOUSANDS/ΜL (ref 1.85–7.62)
NEUTS SEG NFR BLD AUTO: 48 % (ref 43–75)
NRBC BLD AUTO-RTO: 0 /100 WBCS
PHOSPHATE SERPL-MCNC: 2.8 MG/DL (ref 2.7–4.5)
PLATELET # BLD AUTO: 281 THOUSANDS/UL (ref 149–390)
PMV BLD AUTO: 10.9 FL (ref 8.9–12.7)
POTASSIUM SERPL-SCNC: 4.3 MMOL/L (ref 3.5–5.3)
PROT SERPL-MCNC: 6.6 G/DL (ref 6.4–8.2)
PTH-INTACT SERPL-MCNC: 66.4 PG/ML (ref 18.4–80.1)
RBC # BLD AUTO: 4.17 MILLION/UL (ref 3.81–5.12)
SODIUM SERPL-SCNC: 143 MMOL/L (ref 136–145)
T4 FREE SERPL-MCNC: 1.04 NG/DL (ref 0.76–1.46)
TSH SERPL DL<=0.05 MIU/L-ACNC: 1.58 UIU/ML (ref 0.36–3.74)
WBC # BLD AUTO: 5.34 THOUSAND/UL (ref 4.31–10.16)

## 2020-09-21 PROCEDURE — 84439 ASSAY OF FREE THYROXINE: CPT

## 2020-09-21 PROCEDURE — 80053 COMPREHEN METABOLIC PANEL: CPT

## 2020-09-21 PROCEDURE — 84100 ASSAY OF PHOSPHORUS: CPT

## 2020-09-21 PROCEDURE — 84443 ASSAY THYROID STIM HORMONE: CPT

## 2020-09-21 PROCEDURE — 85025 COMPLETE CBC W/AUTO DIFF WBC: CPT

## 2020-09-21 PROCEDURE — 36415 COLL VENOUS BLD VENIPUNCTURE: CPT

## 2020-09-21 PROCEDURE — 82306 VITAMIN D 25 HYDROXY: CPT

## 2020-09-21 PROCEDURE — 83970 ASSAY OF PARATHORMONE: CPT

## 2020-09-21 PROCEDURE — 77080 DXA BONE DENSITY AXIAL: CPT

## 2020-09-21 RX ORDER — CINACALCET 30 MG/1
30 TABLET, FILM COATED ORAL DAILY
Qty: 30 TABLET | Refills: 1 | Status: SHIPPED | OUTPATIENT
Start: 2020-09-21 | End: 2020-11-16

## 2020-09-21 NOTE — TELEPHONE ENCOUNTER
Calcium much higher then before - is she having any symptoms of excessive urination/thirst ?  Stop all MVI  Calcium , vitamin D ,   Is she on any new medications ?

## 2020-09-21 NOTE — TELEPHONE ENCOUNTER
She always has  symptoms of excessive urination/thirst, no new medication and she is only taking Drisdol 50,000 weekly on Mondays but did not take it today because of blood work

## 2020-09-22 ENCOUNTER — OFFICE VISIT (OUTPATIENT)
Dept: OBGYN CLINIC | Facility: MEDICAL CENTER | Age: 55
End: 2020-09-22
Payer: COMMERCIAL

## 2020-09-22 ENCOUNTER — TELEPHONE (OUTPATIENT)
Dept: ENDOCRINOLOGY | Facility: CLINIC | Age: 55
End: 2020-09-22

## 2020-09-22 ENCOUNTER — TELEPHONE (OUTPATIENT)
Dept: PAIN MEDICINE | Facility: CLINIC | Age: 55
End: 2020-09-22

## 2020-09-22 VITALS
HEIGHT: 63 IN | DIASTOLIC BLOOD PRESSURE: 72 MMHG | TEMPERATURE: 99.5 F | SYSTOLIC BLOOD PRESSURE: 133 MMHG | BODY MASS INDEX: 27.29 KG/M2 | WEIGHT: 154 LBS | HEART RATE: 66 BPM

## 2020-09-22 DIAGNOSIS — E83.52 HYPERCALCEMIA: ICD-10-CM

## 2020-09-22 DIAGNOSIS — M54.16 RADICULOPATHY, LUMBAR REGION: Primary | ICD-10-CM

## 2020-09-22 DIAGNOSIS — M25.551 RIGHT HIP PAIN: Primary | ICD-10-CM

## 2020-09-22 DIAGNOSIS — S73.191A TEAR OF RIGHT ACETABULAR LABRUM, INITIAL ENCOUNTER: ICD-10-CM

## 2020-09-22 PROCEDURE — 99243 OFF/OP CNSLTJ NEW/EST LOW 30: CPT | Performed by: ORTHOPAEDIC SURGERY

## 2020-09-22 NOTE — LETTER
2020     Joanne Milks, MD [place]    Patient: Sneha Manning   YOB: 1965   Date of Visit: 2020       Dear Dr Xena Arita:    Thank you for referring Shaina Finnegan to me for evaluation  Below are my notes for this consultation  If you have questions, please do not hesitate to call me  I look forward to following your patient along with you  Sincerely,        Mya Martinez MD        CC: No Recipients  Mya Martinez MD  2020  1:52 PM  Signed  Orthopaedic Surgery - Office Note  Sneha Manning (54 y o  female)   : 1965   MRN: [id number]  Encounter Date: 2020    Chief Complaint   Patient presents with    Right Hip - Pain       Assessment / Plan  Right hip labral tear, impingement syndrome  Lumbar radiculopathy     · Dr Ezio Pérez for right hip IA injection (patient instructed to pay attention to pain relief even if only temporary)   · Begin outpatient PT for right hip and lumbar spine   · If symptoms persist we can consider surgical intervention   Return in about 6 weeks (around 11/3/2020)  History of Present Illness  Sneha Manning is a 54 y o  female who presents chief complaint of right hip pain  The pain began 9 month(s) ago and is not associated with an acute injury  Patient states her pain started as lumbar pain  Pain was central lumbar spine that radiated to the right buttock and lateral hip  PCP placed the patient on muscle relaxers without benefit  She was later referred to Dr Ezio Pérez who provided the patient with L2/3 LETICIA without relief  Ezio Pérez ordered the right hip MRI arthrogram and referred the patient here today for follow up  The patient describes the pain as aching, dull and sharp in intensity,  constant in timing, and localizes the pain to the lumbar spine, right buttock, lateral hip, right groin  She states the entire leg feels weak  She states the entire leg hurts    She states the knee james on her  The pain is worse with sitting, movement and weight bearing and relieved by rest and posture with sitting  The pain is associated with numbness and tingling in the base of the toes  The patient is awoken at night by the pain  The patient has not had PT  Review of Systems  Pertinent items are noted in HPI  All other systems were reviewed and are negative  Physical Exam  /72   Pulse 66   Temp 99 5 °F (37 5 °C)   Ht 5' 3" (1 6 m)   Wt 69 9 kg (154 lb)   BMI 27 28 kg/m²   Cons: Appears well  No apparent distress  Psych: Alert  Oriented x3  Mood and affect normal   Eyes: PERRLA, EOMI  Resp: Normal effort  No audible wheezing or stridor  CV: Palpable pulse  No discernable arrhythmia  No LE edema  Lymph:  No palpable cervical, axillary, or inguinal lymphadenopathy  Skin: Warm  No palpable masses  No visible lesions  Neuro: Normal muscle tone  Normal and symmetric DTR's  Right Hip Exam  Alignment / Posture:  Normal lumbar alignment  Inspection:  No swelling  No edema  No erythema  No ecchymosis  No muscle atrophy  Palpation:  Positive TTP anterior hip, lumbar spine, SI joint  ROM:  Hip Flexion 115  Hip ER 60  Hip IR 30  left lateral lumbar flexion causes increased pain  Strength:  5/5 hip flexors and abductors  Stability:  No objective hip instability  Tests:  (+) Stinchfield  (+) ADIR  Positive C-sign (-) SAYRA  Neurovascular:  Sensation intact in DP/SP/Jackson/Sa/T nerve distributions  2+ DP & PT pulses  Gait:  Antalgic  Studies Reviewed  MRI arthrogram of right hip - Anterosuperior acetabular labral tear    Procedures  No procedures today  Medical, Surgical, Family, and Social History  The patient's medical history, family history, and social history, were reviewed and updated as appropriate      Past Medical History:   Diagnosis Date    Adjustment disorder     last assessed 05/16/12    Anemia     HX of    Asthma     mild - intermittent  Bilateral leg edema     Blepharitis     last assessed 16    Bulging lumbar disc     Carpal tunnel syndrome     Unspecified laterality    Colitis, acute     Dyspareunia in female     Edema     last assessed 06/22/15    Ganglion     Herniated cervical disc     History of transfusion     Hypokalemia     Hypothyroidism     Hypothyroidism     Insomnia     Lumps on the skin     last assessed 14    Mouth ulcers     last assessed 06/22/15    Nontraumatic tear of left tibialis posterior tendon     Traumatic teart     Polyarthritis     last assessed 16    Raynaud's disease     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 [address]     Temporomandibular disorder     Joint    Thyroid disease     Ulcerative colitis ([facility] [address] [facility]     Ulcerative colitis ([facility] [address] [facility]        Past Surgical History:   Procedure Laterality Date    ANKLE SURGERY Left     Tendon repair    CARPAL TUNNEL RELEASE Bilateral      SECTION, LOW TRANSVERSE      COLONOSCOPY      COLONOSCOPY N/A 2018    Procedure: COLONOSCOPY;  Surgeon: Cherylene Needy, MD;  [facility]; Service: Gastroenterology    [facility] INJECTION [facility] HIP (ARTHROGRAM)  9/15/2020    HERNIA REPAIR      umbilical    HYSTERECTOMY      KNEE ARTHROSCOPY Right     LIPECTOMY      Multipe lipoma removals    LIPOMA RESECTION      CO COLONOSCOPY FLX DX W/COLLJ SPEC WHEN PFRMD N/A 2016    Procedure: COLONOSCOPY;  Surgeon: Mary Hsu DO;  Location[facility];   Service: Gastroenterology    CO REPAIR FLEX LEG TENDON,SECOND,EA Left 2016    Procedure: REPAIR OF LEFT POSTERIOR TIBIAL TENDON WITH GRAFT, EXPLORATION LEFT ANKLE ;  Surgeon: Princess Roberson DPM;  Location: [facility];  Service: Podiatry    SHOULDER SURGERY Left     bicep tendon and labrum repair    TONSILLECTOMY      TOOTH EXTRACTION  2018       Family History   Problem Relation Age of Onset    Parkinsonism Mother     Rheum arthritis Mother     Thyroid disease unspecified Mother  Hypothyroidism Mother     Heart attack Father     Hypertension Father     Osteoporosis Father     Prostate cancer Father     Nephrolithiasis Father     Hashimoto's thyroiditis Sister     Thyroid disease unspecified Sister     Hypothyroidism Sister     Cancer Paternal Grandfather         Penile    Prostate cancer Paternal Grandfather     Crohn's disease Family     Osteoarthritis Family     Rheum arthritis Family     Crohn's disease Other     Crohn's disease Maternal Uncle     Psoriasis Maternal Uncle     Ulcerative colitis Maternal Uncle     Rheum arthritis Maternal Uncle     Rheum arthritis Maternal Aunt     Thyroid disease unspecified Maternal Aunt     Hypothyroidism Maternal Aunt     Ulcerative colitis Family        Social History     Occupational History    Occupation: RN at [employer]   Tobacco Use    Smoking status: Former Smoker     Types: Cigarettes     Last attempt to quit: 2003     Years since quittin 4    Smokeless tobacco: Never Used    Tobacco comment: Quit , rare use for 2 years   Substance and Sexual Activity    Alcohol use: Not Currently     Comment: social 1 drink per [employer] Drug use: No    Sexual activity: Not on file       No Known Allergies      Current Outpatient Medications:     albuterol (ACCUNEB) 1 25 MG/3ML nebulizer solution, Take 1 ampule by nebulization every 6 (six) hours as needed for wheezing, Disp: , Rfl:     Ascorbic Acid (VITAMIN C) 1000 MG tablet, Take 1,000 mg by mouth daily, Disp: , Rfl:     azaTHIOprine (IMURAN) 100 MG tablet, Take 50 mg by mouth daily at bedtime , Disp: , Rfl:     carisoprodol (SOMA) 350 mg tablet, Take 1 tablet (350 mg total) by mouth daily at bedtime, Disp: 30 tablet, Rfl: 2    Cholecalciferol (D3-50) 1 25 MG (45031 UT) capsule, Take 1 capsule (50,000 Units total) by mouth every 7 days, Disp: 12 capsule, Rfl: 0    cinacalcet (SENSIPAR) 30 mg tablet, Take 1 tablet (30 mg total) by mouth daily, Disp: 30 tablet, Rfl: 1    docusate sodium (COLACE) 100 mg capsule, Take 1 capsule (100 mg total) by mouth daily, Disp: 90 capsule, Rfl: 3    ferrous sulfate 324 (65 Fe) mg, Take 1 tablet (324 mg total) by mouth 3 (three) times a day before meals, Disp: 90 tablet, Rfl: 3    hyoscyamine (ANASPAZ,LEVSIN) 0 125 MG tablet, TAKE ONE TABLET BY MOUTH EVERY 4 HOURS AS NEEDED FOR CRAMPING, Disp: 30 tablet, Rfl: 0    inFLIXimab (REMICADE) 100 mg, Infuse into a venous catheter every 56 days, Disp: , Rfl:     levothyroxine 50 mcg tablet, Take 1 tablet (50 mcg total) by mouth daily in the early morning, Disp: 90 tablet, Rfl: 3    ondansetron (ZOFRAN-ODT) 4 mg disintegrating tablet, Take 1 tablet (4 mg total) by mouth every 6 (six) hours as needed for nausea or vomiting, Disp: 30 tablet, Rfl: 1    traMADol (ULTRAM) 50 mg tablet, Take 1 tablet (50 mg total) by mouth 2 (two) times a day as needed for moderate pain, Disp: 30 tablet, Rfl: 1    zolpidem (AMBIEN) 10 mg tablet, TAKE ONE TABLET BY MOUTH AT BEDTIME AS NEEDED FOR SLEEP, Disp: 30 tablet, Rfl: 0      [place]     I,:   Luis am acting as a scribe while in the presence of the attending physician :        I,:   Clarissa Reyes MD personally performed the services described in this documentation    as scribed in my presence :

## 2020-09-22 NOTE — PROGRESS NOTES
Orthopaedic Surgery - Office Note  Daniel Ortega (89 y o  female)   : 1965   MRN: [id number]  Encounter Date: 2020    Chief Complaint   Patient presents with    Right Hip - Pain       Assessment / Plan  Right hip labral tear, impingement syndrome  Lumbar radiculopathy     · Dr Lyn Guerra for right hip IA injection (patient instructed to pay attention to pain relief even if only temporary)   · Begin outpatient PT for right hip and lumbar spine   · If symptoms persist we can consider surgical intervention   Return in about 6 weeks (around 11/3/2020)  History of Present Illness  Daniel Ortega is a 54 y o  female who presents chief complaint of right hip pain  The pain began 9 month(s) ago and is not associated with an acute injury  Patient states her pain started as lumbar pain  Pain was central lumbar spine that radiated to the right buttock and lateral hip  PCP placed the patient on muscle relaxers without benefit  She was later referred to Dr Lyn Guerra who provided the patient with L2/3 LETICIA without relief  Lyn Guerra ordered the right hip MRI arthrogram and referred the patient here today for follow up  The patient describes the pain as aching, dull and sharp in intensity,  constant in timing, and localizes the pain to the lumbar spine, right buttock, lateral hip, right groin  She states the entire leg feels weak  She states the entire leg hurts  She states the knee james on her  The pain is worse with sitting, movement and weight bearing and relieved by rest and posture with sitting  The pain is associated with numbness and tingling in the base of the toes  The patient is awoken at night by the pain  The patient has not had PT  Review of Systems  Pertinent items are noted in HPI  All other systems were reviewed and are negative      Physical Exam  /72   Pulse 66   Temp 99 5 °F (37 5 °C)   Ht 5' 3" (1 6 m)   Wt 69 9 kg (154 lb)   BMI 27 28 kg/m²   Cons: Appears well   No apparent distress  Psych: Alert  Oriented x3  Mood and affect normal   Eyes: PERRLA, EOMI  Resp: Normal effort  No audible wheezing or stridor  CV: Palpable pulse  No discernable arrhythmia  No LE edema  Lymph:  No palpable cervical, axillary, or inguinal lymphadenopathy  Skin: Warm  No palpable masses  No visible lesions  Neuro: Normal muscle tone  Normal and symmetric DTR's  Right Hip Exam  Alignment / Posture:  Normal lumbar alignment  Inspection:  No swelling  No edema  No erythema  No ecchymosis  No muscle atrophy  Palpation:  Positive TTP anterior hip, lumbar spine, SI joint  ROM:  Hip Flexion 115  Hip ER 60  Hip IR 30  left lateral lumbar flexion causes increased pain  Strength:  5/5 hip flexors and abductors  Stability:  No objective hip instability  Tests:  (+) Ana  (+) ADIR  Positive C-sign (-) SAYRA  Neurovascular:  Sensation intact in DP/SP/Jackson/Sa/T nerve distributions  2+ DP & PT pulses  Gait:  Antalgic  Studies Reviewed  MRI arthrogram of right hip - Anterosuperior acetabular labral tear    Procedures  No procedures today  Medical, Surgical, Family, and Social History  The patient's medical history, family history, and social history, were reviewed and updated as appropriate      Past Medical History:   Diagnosis Date    Adjustment disorder     last assessed 05/16/12    Anemia     HX of    Asthma     mild - intermittent    Bilateral leg edema     Blepharitis     last assessed 02/04/16    Bulging lumbar disc     Carpal tunnel syndrome     Unspecified laterality    Colitis, acute     Dyspareunia in female     Edema     last assessed 06/22/15    Ganglion     Herniated cervical disc     History of transfusion     Hypokalemia     Hypothyroidism     Hypothyroidism     Insomnia     Lumps on the skin     last assessed 03/12/14    Mouth ulcers     last assessed 06/22/15    Nontraumatic tear of left tibialis posterior tendon     Traumatic teart     Polyarthritis     last assessed 16    Raynaud's disease     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 [address]     Temporomandibular disorder     Joint    Thyroid disease     Ulcerative colitis ([facility] [address]     Ulcerative colitis ([facility] [address]        Past Surgical History:   Procedure Laterality Date    ANKLE SURGERY Left     Tendon repair    CARPAL TUNNEL RELEASE Bilateral      SECTION, LOW TRANSVERSE      COLONOSCOPY      COLONOSCOPY N/A 2018    Procedure: COLONOSCOPY;  Surgeon: Cherylene Needy, MD;  [facility]; Service: Gastroenterology    [facility] INJECTION RIGHT HIP (ARTHROGRAM)  9/15/2020    HERNIA REPAIR      umbilical    HYSTERECTOMY      KNEE ARTHROSCOPY Right     LIPECTOMY      Multipe lipoma removals    LIPOMA RESECTION      MT COLONOSCOPY FLX DX W/COLLJ SPEC WHEN PFRMD N/A 2016    Procedure: COLONOSCOPY;  Surgeon: Mary Hsu DO;  Location[facility];   Service: Gastroenterology    MT REPAIR FLEX LEG TENDON,SECOND,EA Left 2016    Procedure: REPAIR OF LEFT POSTERIOR TIBIAL TENDON WITH GRAFT, EXPLORATION LEFT ANKLE ;  Surgeon: Princess Roberson DPM;  Location: [facility] OR;  Service: Podiatry    SHOULDER SURGERY Left     bicep tendon and labrum repair    TONSILLECTOMY      TOOTH EXTRACTION  2018       Family History   Problem Relation Age of Onset    Parkinsonism Mother     Rheum arthritis Mother     Thyroid disease unspecified Mother     Hypothyroidism Mother     Heart attack Father     Hypertension Father     Osteoporosis Father     Prostate cancer Father     Nephrolithiasis Father     Hashimoto's thyroiditis Sister     Thyroid disease unspecified Sister     Hypothyroidism Sister     Cancer Paternal Grandfather         Penile    Prostate cancer Paternal Grandfather     Crohn's disease Family     Osteoarthritis Family     Rheum arthritis Family     Crohn's disease Other     Crohn's disease Maternal Uncle     Psoriasis Maternal Uncle     Ulcerative colitis Maternal Uncle     Rheum arthritis Maternal Uncle     Rheum arthritis Maternal Aunt     Thyroid disease unspecified Maternal Aunt     Hypothyroidism Maternal Aunt     Ulcerative colitis Family        Social History     Occupational History    Occupation: RN at [employer]   Tobacco Use    Smoking status: Former Smoker     Types: Cigarettes     Last attempt to quit: 2003     Years since quittin 4    Smokeless tobacco: Never Used    Tobacco comment: Quit , rare use for 2 years   Substance and Sexual Activity    Alcohol use: Not Currently     Comment: social 1 drink per [employer] Drug use: No    Sexual activity: Not on file       No Known Allergies      Current Outpatient Medications:     albuterol (ACCUNEB) 1 25 MG/3ML nebulizer solution, Take 1 ampule by nebulization every 6 (six) hours as needed for wheezing, Disp: , Rfl:     Ascorbic Acid (VITAMIN C) 1000 MG tablet, Take 1,000 mg by mouth daily, Disp: , Rfl:     azaTHIOprine (IMURAN) 100 MG tablet, Take 50 mg by mouth daily at bedtime , Disp: , Rfl:     carisoprodol (SOMA) 350 mg tablet, Take 1 tablet (350 mg total) by mouth daily at bedtime, Disp: 30 tablet, Rfl: 2    Cholecalciferol (D3-50) 1 25 MG (58663 UT) capsule, Take 1 capsule (50,000 Units total) by mouth every 7 days, Disp: 12 capsule, Rfl: 0    cinacalcet (SENSIPAR) 30 mg tablet, Take 1 tablet (30 mg total) by mouth daily, Disp: 30 tablet, Rfl: 1    docusate sodium (COLACE) 100 mg capsule, Take 1 capsule (100 mg total) by mouth daily, Disp: 90 capsule, Rfl: 3    ferrous sulfate 324 (65 Fe) mg, Take 1 tablet (324 mg total) by mouth 3 (three) times a day before meals, Disp: 90 tablet, Rfl: 3    hyoscyamine (ANASPAZ,LEVSIN) 0 125 MG tablet, TAKE ONE TABLET BY MOUTH EVERY 4 HOURS AS NEEDED FOR CRAMPING, Disp: 30 tablet, Rfl: 0    inFLIXimab (REMICADE) 100 mg, Infuse into a venous catheter every 56 days, Disp: , Rfl:     levothyroxine 50 mcg tablet, Take 1 tablet (50 mcg total) by mouth daily in the early morning, Disp: 90 tablet, Rfl: 3    ondansetron (ZOFRAN-ODT) 4 mg disintegrating tablet, Take 1 tablet (4 mg total) by mouth every 6 (six) hours as needed for nausea or vomiting, Disp: 30 tablet, Rfl: 1    traMADol (ULTRAM) 50 mg tablet, Take 1 tablet (50 mg total) by mouth 2 (two) times a day as needed for moderate pain, Disp: 30 tablet, Rfl: 1    zolpidem (AMBIEN) 10 mg tablet, TAKE ONE TABLET BY MOUTH AT BEDTIME AS NEEDED FOR SLEEP, Disp: 30 tablet, Rfl: 0      [facility]    I,:   [employer] am acting as a scribe while in the presence of the attending physician :        I,:   Rachel Cota MD personally performed the services described in this documentation    as scribed in my presence :

## 2020-09-22 NOTE — RESULT ENCOUNTER NOTE
Please call the patient regarding labs - spoke with patient calcium significantly elevated , will start sensipar and repeat labs in 2 weeks

## 2020-09-22 NOTE — TELEPHONE ENCOUNTER
Patient is being referred back to you from [facility] for injection in right hip      Please advise    Thank you     [phone] Home     [phone] mobile

## 2020-09-22 NOTE — TELEPHONE ENCOUNTER
----- Message from Isael Posey MD sent at 9/21/2020  8:31 PM EDT -----  Please call the patient regarding labs - spoke with patient calcium significantly elevated , will start sensipar and repeat labs in 2 weeks

## 2020-09-23 ENCOUNTER — TELEPHONE (OUTPATIENT)
Dept: ENDOCRINOLOGY | Facility: CLINIC | Age: 55
End: 2020-09-23

## 2020-09-23 NOTE — TELEPHONE ENCOUNTER
Patient wanted to clarify that she has been taking Tums about 3-4 times per week due to heartburn  Patient would like to wait before starting medication and just wants to have repeat blood work instead

## 2020-09-23 NOTE — TELEPHONE ENCOUNTER
Left message stating that this probably did not have any effect on her lab results     To call if any questions

## 2020-09-23 NOTE — TELEPHONE ENCOUNTER
----- Message from Dionisio Rodrigez MD sent at 9/22/2020  1:37 PM EDT -----  Please call the patient regarding dexa scan - shows osteopenia , will discuss further next visit

## 2020-09-24 DIAGNOSIS — E83.52 HYPERCALCEMIA: Primary | ICD-10-CM

## 2020-09-28 ENCOUNTER — LAB (OUTPATIENT)
Dept: LAB | Facility: HOSPITAL | Age: 55
End: 2020-09-28
Attending: INTERNAL MEDICINE
Payer: COMMERCIAL

## 2020-09-28 DIAGNOSIS — E83.52 HYPERCALCEMIA: ICD-10-CM

## 2020-09-28 LAB
ALBUMIN SERPL BCP-MCNC: 3.9 G/DL (ref 3.5–5)
ALP SERPL-CCNC: 84 U/L (ref 46–116)
ALT SERPL W P-5'-P-CCNC: 23 U/L (ref 12–78)
ANION GAP SERPL CALCULATED.3IONS-SCNC: 5 MMOL/L (ref 4–13)
AST SERPL W P-5'-P-CCNC: 18 U/L (ref 5–45)
BILIRUB SERPL-MCNC: 0.29 MG/DL (ref 0.2–1)
BUN SERPL-MCNC: 13 MG/DL (ref 5–25)
CALCIUM SERPL-MCNC: 11 MG/DL (ref 8.3–10.1)
CHLORIDE SERPL-SCNC: 114 MMOL/L (ref 100–108)
CO2 SERPL-SCNC: 24 MMOL/L (ref 21–32)
CREAT SERPL-MCNC: 0.68 MG/DL (ref 0.6–1.3)
GFR SERPL CREATININE-BSD FRML MDRD: 99 ML/MIN/1.73SQ M
GLUCOSE P FAST SERPL-MCNC: 83 MG/DL (ref 65–99)
PHOSPHATE SERPL-MCNC: 2.9 MG/DL (ref 2.7–4.5)
POTASSIUM SERPL-SCNC: 4.3 MMOL/L (ref 3.5–5.3)
PROT SERPL-MCNC: 6.8 G/DL (ref 6.4–8.2)
SODIUM SERPL-SCNC: 143 MMOL/L (ref 136–145)

## 2020-09-28 PROCEDURE — 84100 ASSAY OF PHOSPHORUS: CPT

## 2020-09-28 PROCEDURE — 80053 COMPREHEN METABOLIC PANEL: CPT

## 2020-09-28 PROCEDURE — 36415 COLL VENOUS BLD VENIPUNCTURE: CPT

## 2020-09-29 ENCOUNTER — EVALUATION (OUTPATIENT)
Dept: PHYSICAL THERAPY | Facility: CLINIC | Age: 55
End: 2020-09-29
Payer: COMMERCIAL

## 2020-09-29 DIAGNOSIS — S73.191A TEAR OF RIGHT ACETABULAR LABRUM, INITIAL ENCOUNTER: Primary | ICD-10-CM

## 2020-09-29 DIAGNOSIS — M54.16 RADICULOPATHY, LUMBAR REGION: ICD-10-CM

## 2020-09-29 PROCEDURE — 97110 THERAPEUTIC EXERCISES: CPT | Performed by: PHYSICAL THERAPIST

## 2020-09-29 PROCEDURE — 97162 PT EVAL MOD COMPLEX 30 MIN: CPT | Performed by: PHYSICAL THERAPIST

## 2020-09-29 NOTE — PROGRESS NOTES
PT Evaluation     Today's date: 2020  Patient name: Roshan Allan  : 1965  MRN: [id number]  Referring provider: Uriel Ratliff MD  Dx:   Encounter Diagnosis     ICD-10-CM    1  Tear of right acetabular labrum, initial encounter  S73 191A    2  Radiculopathy, lumbar region  M54 16                   Assessment  Assessment details: Roshan Allan is a 54 y o  female who presents to physical therapy with diagnosis of right hip pain and LBP   [facility] presents with pain, abnormal gait, and limitations in range of motion, strength, joint mobility, flexibility, and functional ability  The current limitations are affecting Leilani's ability to function at prior level  She would benefit from skilled physical therapy to address the current impairments and functional limitations to enable her to return to daily activities at maximal level  Thank you for the referral     Impairments: abnormal gait, abnormal or restricted ROM, activity intolerance, impaired balance, impaired physical strength, lacks appropriate home exercise program, weight-bearing intolerance and poor posture     Symptom irritability: lowUnderstanding of Dx/Px/POC: good   Prognosis: good    Goals  STG  Patient will decrease pain at worst to 5/10  Patient will report 50% improvement in her symptoms  Patient will improve lumbar AROM to moderately limited in flexion and extension  Patient will be independent with basic HEP    LTG  Patient will decrease pain at worst to 2/10  Patient will report 80% improvement in her symptoms  Patient will improve LE strength 1/2 grade in all deficit planes  Patient will report ability to wash her hair without pain    Patient will be independent with comprehensive HEP    Plan  Patient would benefit from: skilled physical therapy  Planned modality interventions: cryotherapy and thermotherapy: hydrocollator packs  Planned therapy interventions: manual therapy, neuromuscular re-education, patient education, therapeutic activities, therapeutic exercise, therapeutic training and home exercise program  Frequency: 2x week  Duration in weeks: 6  Treatment plan discussed with: patient        Subjective Evaluation    History of Present Illness  Mechanism of injury: Deloris Gowers is a 54 y o  female presenting to physical therapy on 20 with primary complaints of R hip pain low back pain  She mentions it started in /feb  She wanted medication again and the pain got worse  Xray was normal  MRI shows bulging dics, she received lumbar injections 2 weeks later and it still hurt and got worse  She reports the pain is in her back and into her hip/groin  She mentions she randomly will get a tingle in her foot at the ball of her foot that is there for a second and then it is gone  She does not get it everyday but will randomly get it  She went back to the doctor who looked at her hip, xray showed nothing and received an arthrogram which showed a labral tear  She mentions they want to do a hip injection right away but she wants to do a couple weeks of PT first  She reports she has hypoparathyroidism with a small tumor on her parathyroid and her calcium has been elevated  She believes it is all related and that if her calcium levels decrease then her pain levels could go away  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 and not being able to exercise or stretch is terrible for her  She states she is scheduled for the hip injection 10/22  Pain when she is leaning to either direction, painful in the shower leaning back to wash her hair, pain to lift her leg to shave her legs, pain while vacumming, no pain if she stays in a rigid posture  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 and has pain while doing her job  She states her sleep is horrible she can barely sleep because of pain  She states her Endocrinologist started her on sensiper and her follow up is 10/16       Pain  Current pain ratin  At best pain ratin  At worst pain rating: 10 (gets her out of bed )  Location: R Low Back and R hip  Quality: sharp, dull ache and radiating  Relieving factors: medications, heat and rest    Social Support  Steps to enter house: yes (2-3)  Stairs in house: yes (13-20)   Lives with: spouse and young children    Employment status: working  Exercise history: exercised 3x a week plus walking dog every day      Diagnostic Tests  X-ray: normal  MRI studies: abnormal  Treatments  Previous treatment: injection treatment (Injections in back)  Patient Goals  Patient goals for therapy: decreased pain, increased strength, return to sport/leisure activities and increased motion          Objective    Gait:  Normal, no AD  Posture: good, pain when she slouches  AROM  Right Hip:  - Flexion 110, 98 degrees  - Abduction 45, 25   - Extension: WFL bilaterally      Lumbar AROM  - Flexion: mod-max limited, right sided low back pain  - Extension: max limited, right sided low back pain, sharp  - R SB: min limited, no pain  - L SB: mod limited, pain in R low back and around to the front of the hip  - R Rotation: min limited, no pain  - L Rotation: min limited no pain    Strength:  Right Hip   - Flexion 3+/5 P!  - Abduction 3/5  - Extension 4-/5  - IR 4/5  - ER 4/5    Right Knee  - Flexion 4/5  - Extension 4/5    Left Hip  - Flexion 4/5   - Abduction 3/5  - Extension 4-/5  - IR 5/5  - ER 5/5    Left Knee  - Flexion 4/5  - Extension 4/5    Palpation:  Tenderness along R lumbar paraspinals as well as R piriformis  Special Tests:  - Slump negative bilaterally    Flexibility:  - mod tightness bilateral hamstrings, gastroc, and ITB  Unable to assess piriformis secondary to groin pain with stretching       Precautions: Ulcerative Colitis, Thyroid Disease, Raynaud's Disease, Hypothyroidism, hypokalemia, asthma      Manuals             STM R paraspinals & piriformis                                                    Neuro Re-Ed             PPT 5"x10                                                                                          Ther Ex             bike             clamshell 5"x10            SLR x10            Standing Abduction x10            Pball Rollouts 3 way (if tolerated)             Bridge             Gentle LTR                          Ther Activity             Step Ups                          Gait Training                                       Modalities             MHP PRN             CP PRN

## 2020-10-05 ENCOUNTER — HOSPITAL ENCOUNTER (OUTPATIENT)
Dept: INFUSION CENTER | Facility: HOSPITAL | Age: 55
Discharge: HOME | End: 2020-10-05
Attending: INTERNAL MEDICINE
Payer: COMMERCIAL

## 2020-10-05 VITALS
TEMPERATURE: 98.1 F | HEART RATE: 82 BPM | DIASTOLIC BLOOD PRESSURE: 74 MMHG | RESPIRATION RATE: 18 BRPM | WEIGHT: 154.76 LBS | SYSTOLIC BLOOD PRESSURE: 140 MMHG | BODY MASS INDEX: 27.42 KG/M2

## 2020-10-05 DIAGNOSIS — K51.011 ULCERATIVE PANCOLITIS WITH RECTAL BLEEDING (HCC): Primary | ICD-10-CM

## 2020-10-05 PROCEDURE — 96413 CHEMO IV INFUSION 1 HR: CPT

## 2020-10-05 PROCEDURE — 96415 CHEMO IV INFUSION ADDL HR: CPT

## 2020-10-05 RX ORDER — DIPHENHYDRAMINE HCL 25 MG
25 TABLET ORAL ONCE
Status: CANCELLED | OUTPATIENT
Start: 2020-11-30

## 2020-10-05 RX ORDER — METHYLPREDNISOLONE SODIUM SUCCINATE 40 MG/ML
40 INJECTION, POWDER, LYOPHILIZED, FOR SOLUTION INTRAMUSCULAR; INTRAVENOUS ONCE
Status: DISCONTINUED | OUTPATIENT
Start: 2020-10-05 | End: 2020-10-08 | Stop reason: HOSPADM

## 2020-10-05 RX ORDER — METHYLPREDNISOLONE SODIUM SUCCINATE 40 MG/ML
40 INJECTION, POWDER, LYOPHILIZED, FOR SOLUTION INTRAMUSCULAR; INTRAVENOUS ONCE
Status: CANCELLED | OUTPATIENT
Start: 2020-11-30

## 2020-10-05 RX ORDER — SODIUM CHLORIDE 9 MG/ML
20 INJECTION, SOLUTION INTRAVENOUS ONCE
Status: COMPLETED | OUTPATIENT
Start: 2020-10-05 | End: 2020-10-05

## 2020-10-05 RX ORDER — SODIUM CHLORIDE 9 MG/ML
20 INJECTION, SOLUTION INTRAVENOUS ONCE
Status: CANCELLED | OUTPATIENT
Start: 2020-11-30

## 2020-10-05 RX ORDER — ACETAMINOPHEN 325 MG/1
650 TABLET ORAL ONCE
Status: CANCELLED | OUTPATIENT
Start: 2020-11-30

## 2020-10-05 RX ORDER — DIPHENHYDRAMINE HCL 25 MG
25 TABLET ORAL ONCE
Status: DISCONTINUED | OUTPATIENT
Start: 2020-10-05 | End: 2020-10-08 | Stop reason: HOSPADM

## 2020-10-05 RX ORDER — ACETAMINOPHEN 325 MG/1
650 TABLET ORAL ONCE
Status: COMPLETED | OUTPATIENT
Start: 2020-10-05 | End: 2020-10-05

## 2020-10-05 RX ADMIN — SODIUM CHLORIDE 20 ML/HR: 0.9 INJECTION, SOLUTION INTRAVENOUS at 09:18

## 2020-10-05 RX ADMIN — ACETAMINOPHEN 650 MG: 325 TABLET ORAL at 09:18

## 2020-10-05 RX ADMIN — INFLIXIMAB 700 MG: 100 INJECTION, POWDER, LYOPHILIZED, FOR SOLUTION INTRAVENOUS at 09:35

## 2020-10-07 ENCOUNTER — OFFICE VISIT (OUTPATIENT)
Dept: PHYSICAL THERAPY | Facility: CLINIC | Age: 55
End: 2020-10-07
Payer: COMMERCIAL

## 2020-10-07 DIAGNOSIS — S73.191A TEAR OF RIGHT ACETABULAR LABRUM, INITIAL ENCOUNTER: Primary | ICD-10-CM

## 2020-10-07 DIAGNOSIS — M54.16 RADICULOPATHY, LUMBAR REGION: ICD-10-CM

## 2020-10-07 PROCEDURE — 97110 THERAPEUTIC EXERCISES: CPT | Performed by: PHYSICAL THERAPIST

## 2020-10-07 PROCEDURE — 97112 NEUROMUSCULAR REEDUCATION: CPT | Performed by: PHYSICAL THERAPIST

## 2020-10-07 PROCEDURE — 97140 MANUAL THERAPY 1/> REGIONS: CPT | Performed by: PHYSICAL THERAPIST

## 2020-10-08 DIAGNOSIS — F51.04 CHRONIC INSOMNIA: ICD-10-CM

## 2020-10-08 RX ORDER — ZOLPIDEM TARTRATE 10 MG/1
TABLET ORAL
Qty: 30 TABLET | Refills: 0 | OUTPATIENT
Start: 2020-10-08

## 2020-10-08 RX ORDER — ZOLPIDEM TARTRATE 10 MG/1
10 TABLET ORAL
Qty: 30 TABLET | Refills: 0 | Status: SHIPPED | OUTPATIENT
Start: 2020-10-08 | End: 2020-11-16

## 2020-10-15 ENCOUNTER — OFFICE VISIT (OUTPATIENT)
Dept: PHYSICAL THERAPY | Facility: CLINIC | Age: 55
End: 2020-10-15
Payer: COMMERCIAL

## 2020-10-15 DIAGNOSIS — M54.16 RADICULOPATHY, LUMBAR REGION: ICD-10-CM

## 2020-10-15 DIAGNOSIS — S73.191A TEAR OF RIGHT ACETABULAR LABRUM, INITIAL ENCOUNTER: Primary | ICD-10-CM

## 2020-10-15 PROCEDURE — 97140 MANUAL THERAPY 1/> REGIONS: CPT | Performed by: PHYSICAL THERAPIST

## 2020-10-15 PROCEDURE — 97112 NEUROMUSCULAR REEDUCATION: CPT | Performed by: PHYSICAL THERAPIST

## 2020-10-15 PROCEDURE — 97110 THERAPEUTIC EXERCISES: CPT | Performed by: PHYSICAL THERAPIST

## 2020-10-16 ENCOUNTER — TELEPHONE (OUTPATIENT)
Dept: HEMATOLOGY ONCOLOGY | Facility: CLINIC | Age: 55
End: 2020-10-16

## 2020-10-16 ENCOUNTER — OFFICE VISIT (OUTPATIENT)
Dept: ENDOCRINOLOGY | Facility: CLINIC | Age: 55
End: 2020-10-16
Payer: COMMERCIAL

## 2020-10-16 VITALS — TEMPERATURE: 98 F | SYSTOLIC BLOOD PRESSURE: 118 MMHG | DIASTOLIC BLOOD PRESSURE: 80 MMHG | HEART RATE: 60 BPM

## 2020-10-16 DIAGNOSIS — E03.9 HYPOTHYROIDISM, UNSPECIFIED TYPE: ICD-10-CM

## 2020-10-16 DIAGNOSIS — E04.2 MULTIPLE THYROID NODULES: ICD-10-CM

## 2020-10-16 DIAGNOSIS — E21.3 HYPERPARATHYROIDISM (HCC): Primary | ICD-10-CM

## 2020-10-16 DIAGNOSIS — E55.9 VITAMIN D DEFICIENCY: ICD-10-CM

## 2020-10-16 DIAGNOSIS — E83.52 HYPERCALCEMIA: ICD-10-CM

## 2020-10-16 PROCEDURE — 99214 OFFICE O/P EST MOD 30 MIN: CPT | Performed by: INTERNAL MEDICINE

## 2020-10-20 ENCOUNTER — TELEPHONE (OUTPATIENT)
Dept: GASTROENTEROLOGY | Facility: CLINIC | Age: 55
End: 2020-10-20

## 2020-10-21 ENCOUNTER — TELEPHONE (OUTPATIENT)
Dept: GASTROENTEROLOGY | Facility: CLINIC | Age: 55
End: 2020-10-21

## 2020-10-21 DIAGNOSIS — K51.011 ULCERATIVE PANCOLITIS WITH RECTAL BLEEDING (HCC): Primary | ICD-10-CM

## 2020-10-22 ENCOUNTER — LAB (OUTPATIENT)
Dept: LAB | Facility: CLINIC | Age: 55
End: 2020-10-22
Payer: COMMERCIAL

## 2020-10-22 ENCOUNTER — HOSPITAL ENCOUNTER (OUTPATIENT)
Dept: RADIOLOGY | Facility: CLINIC | Age: 55
Discharge: HOME | End: 2020-10-22
Attending: ANESTHESIOLOGY | Admitting: ANESTHESIOLOGY
Payer: COMMERCIAL

## 2020-10-22 VITALS
RESPIRATION RATE: 20 BRPM | DIASTOLIC BLOOD PRESSURE: 78 MMHG | OXYGEN SATURATION: 98 % | SYSTOLIC BLOOD PRESSURE: 118 MMHG | TEMPERATURE: 99.7 F | HEART RATE: 61 BPM

## 2020-10-22 DIAGNOSIS — E21.3 HYPERPARATHYROIDISM (HCC): ICD-10-CM

## 2020-10-22 DIAGNOSIS — M25.551 RIGHT HIP PAIN: ICD-10-CM

## 2020-10-22 DIAGNOSIS — K51.011 ULCERATIVE PANCOLITIS WITH RECTAL BLEEDING (HCC): ICD-10-CM

## 2020-10-22 LAB
ALBUMIN SERPL BCP-MCNC: 3.8 G/DL (ref 3.5–5)
ALP SERPL-CCNC: 92 U/L (ref 46–116)
ALT SERPL W P-5'-P-CCNC: 28 U/L (ref 12–78)
ANION GAP SERPL CALCULATED.3IONS-SCNC: 6 MMOL/L (ref 4–13)
AST SERPL W P-5'-P-CCNC: 18 U/L (ref 5–45)
BASOPHILS # BLD AUTO: 0.04 THOUSANDS/ΜL (ref 0–0.1)
BASOPHILS NFR BLD AUTO: 1 % (ref 0–1)
BILIRUB SERPL-MCNC: 0.35 MG/DL (ref 0.2–1)
BUN SERPL-MCNC: 11 MG/DL (ref 5–25)
CALCIUM SERPL-MCNC: 10.5 MG/DL (ref 8.3–10.1)
CHLORIDE SERPL-SCNC: 108 MMOL/L (ref 100–108)
CO2 SERPL-SCNC: 28 MMOL/L (ref 21–32)
CREAT SERPL-MCNC: 0.77 MG/DL (ref 0.6–1.3)
CRP SERPL QL: <3 MG/L
EOSINOPHIL # BLD AUTO: 0.25 THOUSAND/ΜL (ref 0–0.61)
EOSINOPHIL NFR BLD AUTO: 5 % (ref 0–6)
ERYTHROCYTE [DISTWIDTH] IN BLOOD BY AUTOMATED COUNT: 12.1 % (ref 11.6–15.1)
GFR SERPL CREATININE-BSD FRML MDRD: 87 ML/MIN/1.73SQ M
GLUCOSE P FAST SERPL-MCNC: 97 MG/DL (ref 65–99)
HCT VFR BLD AUTO: 38.4 % (ref 34.8–46.1)
HGB BLD-MCNC: 12.5 G/DL (ref 11.5–15.4)
IMM GRANULOCYTES # BLD AUTO: 0.01 THOUSAND/UL (ref 0–0.2)
IMM GRANULOCYTES NFR BLD AUTO: 0 % (ref 0–2)
LYMPHOCYTES # BLD AUTO: 1.93 THOUSANDS/ΜL (ref 0.6–4.47)
LYMPHOCYTES NFR BLD AUTO: 40 % (ref 14–44)
MCH RBC QN AUTO: 31.9 PG (ref 26.8–34.3)
MCHC RBC AUTO-ENTMCNC: 32.6 G/DL (ref 31.4–37.4)
MCV RBC AUTO: 98 FL (ref 82–98)
MONOCYTES # BLD AUTO: 0.5 THOUSAND/ΜL (ref 0.17–1.22)
MONOCYTES NFR BLD AUTO: 10 % (ref 4–12)
NEUTROPHILS # BLD AUTO: 2.16 THOUSANDS/ΜL (ref 1.85–7.62)
NEUTS SEG NFR BLD AUTO: 44 % (ref 43–75)
NRBC BLD AUTO-RTO: 0 /100 WBCS
PHOSPHATE SERPL-MCNC: 2.7 MG/DL (ref 2.7–4.5)
PLATELET # BLD AUTO: 295 THOUSANDS/UL (ref 149–390)
PMV BLD AUTO: 10 FL (ref 8.9–12.7)
POTASSIUM SERPL-SCNC: 4.3 MMOL/L (ref 3.5–5.3)
PROT SERPL-MCNC: 7 G/DL (ref 6.4–8.2)
PTH-INTACT SERPL-MCNC: 58.6 PG/ML (ref 18.4–80.1)
RBC # BLD AUTO: 3.92 MILLION/UL (ref 3.81–5.12)
SODIUM SERPL-SCNC: 142 MMOL/L (ref 136–145)
WBC # BLD AUTO: 4.89 THOUSAND/UL (ref 4.31–10.16)

## 2020-10-22 PROCEDURE — 20610 DRAIN/INJ JOINT/BURSA W/O US: CPT | Performed by: ANESTHESIOLOGY

## 2020-10-22 PROCEDURE — 85025 COMPLETE CBC W/AUTO DIFF WBC: CPT | Performed by: PHYSICIAN ASSISTANT

## 2020-10-22 PROCEDURE — 84100 ASSAY OF PHOSPHORUS: CPT

## 2020-10-22 PROCEDURE — 83970 ASSAY OF PARATHORMONE: CPT

## 2020-10-22 PROCEDURE — 77002 NEEDLE LOCALIZATION BY XRAY: CPT | Performed by: ANESTHESIOLOGY

## 2020-10-22 PROCEDURE — 77002 NEEDLE LOCALIZATION BY XRAY: CPT

## 2020-10-22 PROCEDURE — 86140 C-REACTIVE PROTEIN: CPT

## 2020-10-22 PROCEDURE — 36415 COLL VENOUS BLD VENIPUNCTURE: CPT | Performed by: PHYSICIAN ASSISTANT

## 2020-10-22 PROCEDURE — 83993 ASSAY FOR CALPROTECTIN FECAL: CPT | Performed by: PHYSICIAN ASSISTANT

## 2020-10-22 PROCEDURE — 80053 COMPREHEN METABOLIC PANEL: CPT | Performed by: PHYSICIAN ASSISTANT

## 2020-10-22 RX ORDER — METHYLPREDNISOLONE ACETATE 80 MG/ML
80 INJECTION, SUSPENSION INTRA-ARTICULAR; INTRALESIONAL; INTRAMUSCULAR; PARENTERAL; SOFT TISSUE ONCE
Status: COMPLETED | OUTPATIENT
Start: 2020-10-22 | End: 2020-10-22

## 2020-10-22 RX ORDER — BUPIVACAINE HCL/PF 2.5 MG/ML
10 VIAL (ML) INJECTION ONCE
Status: COMPLETED | OUTPATIENT
Start: 2020-10-22 | End: 2020-10-22

## 2020-10-22 RX ORDER — 0.9 % SODIUM CHLORIDE 0.9 %
10 VIAL (ML) INJECTION ONCE
Status: COMPLETED | OUTPATIENT
Start: 2020-10-22 | End: 2020-10-22

## 2020-10-22 RX ADMIN — Medication 3 ML: at 09:03

## 2020-10-22 RX ADMIN — SODIUM CHLORIDE 3 ML: 9 INJECTION, SOLUTION INTRAMUSCULAR; INTRAVENOUS; SUBCUTANEOUS at 09:03

## 2020-10-22 RX ADMIN — IOHEXOL 1 ML: 300 INJECTION, SOLUTION INTRAVENOUS at 09:04

## 2020-10-22 RX ADMIN — METHYLPREDNISOLONE ACETATE 80 MG: 80 INJECTION, SUSPENSION INTRA-ARTICULAR; INTRALESIONAL; INTRAMUSCULAR; PARENTERAL; SOFT TISSUE at 09:04

## 2020-10-22 RX ADMIN — BUPIVACAINE HYDROCHLORIDE 4 ML: 2.5 INJECTION, SOLUTION EPIDURAL; INFILTRATION; INTRACAUDAL at 09:04

## 2020-10-27 ENCOUNTER — HOSPITAL ENCOUNTER (OUTPATIENT)
Dept: NUCLEAR MEDICINE | Facility: HOSPITAL | Age: 55
Discharge: HOME | End: 2020-10-27
Attending: INTERNAL MEDICINE
Payer: COMMERCIAL

## 2020-10-27 ENCOUNTER — TELEPHONE (OUTPATIENT)
Dept: RADIOLOGY | Facility: CLINIC | Age: 55
End: 2020-10-27

## 2020-10-27 ENCOUNTER — APPOINTMENT (OUTPATIENT)
Dept: PHYSICAL THERAPY | Facility: CLINIC | Age: 55
End: 2020-10-27
Payer: COMMERCIAL

## 2020-10-27 DIAGNOSIS — E21.3 HYPERPARATHYROIDISM (HCC): ICD-10-CM

## 2020-10-27 DIAGNOSIS — M79.18 MYOFASCIAL PAIN: Primary | ICD-10-CM

## 2020-10-27 LAB — CALPROTECTIN STL-MCNT: 79 UG/G (ref 0–120)

## 2020-10-27 PROCEDURE — G1004 CDSM NDSC: HCPCS

## 2020-10-27 PROCEDURE — A9500 TC99M SESTAMIBI: HCPCS

## 2020-10-27 PROCEDURE — 78071 PARATHYRD PLANAR W/WO SUBTRJ: CPT

## 2020-10-27 RX ORDER — METHOCARBAMOL 750 MG/1
750 TABLET, FILM COATED ORAL
Qty: 30 TABLET | Refills: 1 | Status: SHIPPED | OUTPATIENT
Start: 2020-10-27 | End: 2021-11-05 | Stop reason: SDUPTHER

## 2020-10-28 ENCOUNTER — TELEPHONE (OUTPATIENT)
Dept: GASTROENTEROLOGY | Facility: AMBULARY SURGERY CENTER | Age: 55
End: 2020-10-28

## 2020-10-29 ENCOUNTER — TELEPHONE (OUTPATIENT)
Dept: PAIN MEDICINE | Facility: CLINIC | Age: 55
End: 2020-10-29

## 2020-11-03 ENCOUNTER — TELEPHONE (OUTPATIENT)
Dept: PAIN MEDICINE | Facility: CLINIC | Age: 55
End: 2020-11-03

## 2020-11-03 ENCOUNTER — OFFICE VISIT (OUTPATIENT)
Dept: OBGYN CLINIC | Facility: MEDICAL CENTER | Age: 55
End: 2020-11-03
Payer: COMMERCIAL

## 2020-11-03 VITALS
WEIGHT: 154 LBS | HEIGHT: 63 IN | DIASTOLIC BLOOD PRESSURE: 63 MMHG | TEMPERATURE: 95.6 F | BODY MASS INDEX: 27.29 KG/M2 | HEART RATE: 73 BPM | SYSTOLIC BLOOD PRESSURE: 128 MMHG

## 2020-11-03 DIAGNOSIS — S73.191A TEAR OF RIGHT ACETABULAR LABRUM, INITIAL ENCOUNTER: Primary | ICD-10-CM

## 2020-11-03 DIAGNOSIS — M53.3 SACROILIAC JOINT PAIN: ICD-10-CM

## 2020-11-03 PROBLEM — K64.9 HEMORRHOID: Status: RESOLVED | Noted: 2019-09-22 | Resolved: 2020-11-03

## 2020-11-03 PROBLEM — S33.5XXA LUMBAR SPRAIN: Status: RESOLVED | Noted: 2020-05-29 | Resolved: 2020-11-03

## 2020-11-03 PROBLEM — R53.83 OTHER FATIGUE: Status: RESOLVED | Noted: 2019-04-17 | Resolved: 2020-11-03

## 2020-11-03 PROBLEM — Z79.899 OTHER LONG TERM (CURRENT) DRUG THERAPY: Status: RESOLVED | Noted: 2019-04-01 | Resolved: 2020-11-03

## 2020-11-03 PROBLEM — E21.3 HYPERPARATHYROIDISM (HCC): Status: ACTIVE | Noted: 2020-11-03

## 2020-11-03 PROBLEM — D72.829 LEUKOCYTOSIS: Status: RESOLVED | Noted: 2019-01-13 | Resolved: 2020-11-03

## 2020-11-03 PROCEDURE — 99213 OFFICE O/P EST LOW 20 MIN: CPT | Performed by: ORTHOPAEDIC SURGERY

## 2020-11-04 ENCOUNTER — TELEPHONE (OUTPATIENT)
Dept: PAIN MEDICINE | Facility: CLINIC | Age: 55
End: 2020-11-04

## 2020-11-04 DIAGNOSIS — M46.1 SACROILIITIS (HCC): Primary | ICD-10-CM

## 2020-11-05 ENCOUNTER — TELEPHONE (OUTPATIENT)
Dept: SURGICAL ONCOLOGY | Facility: CLINIC | Age: 55
End: 2020-11-05

## 2020-11-06 ENCOUNTER — CONSULT (OUTPATIENT)
Dept: SURGICAL ONCOLOGY | Facility: CLINIC | Age: 55
End: 2020-11-06
Payer: COMMERCIAL

## 2020-11-06 VITALS
DIASTOLIC BLOOD PRESSURE: 74 MMHG | HEART RATE: 59 BPM | RESPIRATION RATE: 16 BRPM | HEIGHT: 63 IN | SYSTOLIC BLOOD PRESSURE: 110 MMHG | BODY MASS INDEX: 27.28 KG/M2 | TEMPERATURE: 98.1 F

## 2020-11-06 DIAGNOSIS — E21.3 HYPERPARATHYROIDISM (HCC): Primary | ICD-10-CM

## 2020-11-06 DIAGNOSIS — E83.52 HYPERCALCEMIA: ICD-10-CM

## 2020-11-06 PROCEDURE — 99243 OFF/OP CNSLTJ NEW/EST LOW 30: CPT | Performed by: SURGERY

## 2020-11-13 ENCOUNTER — OFFICE VISIT (OUTPATIENT)
Dept: GASTROENTEROLOGY | Facility: CLINIC | Age: 55
End: 2020-11-13
Payer: COMMERCIAL

## 2020-11-13 VITALS
WEIGHT: 155 LBS | TEMPERATURE: 97.9 F | SYSTOLIC BLOOD PRESSURE: 110 MMHG | DIASTOLIC BLOOD PRESSURE: 68 MMHG | BODY MASS INDEX: 27.46 KG/M2 | HEIGHT: 63 IN

## 2020-11-13 DIAGNOSIS — F51.04 CHRONIC INSOMNIA: ICD-10-CM

## 2020-11-13 DIAGNOSIS — D84.821 IMMUNOSUPPRESSION DUE TO DRUG THERAPY (HCC): ICD-10-CM

## 2020-11-13 DIAGNOSIS — K51.011 ULCERATIVE PANCOLITIS WITH RECTAL BLEEDING (HCC): Primary | ICD-10-CM

## 2020-11-13 DIAGNOSIS — Z79.899 IMMUNOSUPPRESSION DUE TO DRUG THERAPY (HCC): ICD-10-CM

## 2020-11-13 PROCEDURE — 99213 OFFICE O/P EST LOW 20 MIN: CPT | Performed by: INTERNAL MEDICINE

## 2020-11-16 ENCOUNTER — HOSPITAL ENCOUNTER (OUTPATIENT)
Dept: CT IMAGING | Facility: HOSPITAL | Age: 55
Discharge: HOME | End: 2020-11-16
Attending: SURGERY
Payer: COMMERCIAL

## 2020-11-16 DIAGNOSIS — F51.04 CHRONIC INSOMNIA: ICD-10-CM

## 2020-11-16 DIAGNOSIS — E83.52 HYPERCALCEMIA: ICD-10-CM

## 2020-11-16 DIAGNOSIS — E21.3 HYPERPARATHYROIDISM (HCC): ICD-10-CM

## 2020-11-16 PROCEDURE — 70492 CT SFT TSUE NCK W/O & W/DYE: CPT

## 2020-11-16 PROCEDURE — G1004 CDSM NDSC: HCPCS

## 2020-11-16 RX ORDER — CINACALCET 30 MG/1
TABLET, FILM COATED ORAL
Qty: 30 TABLET | Refills: 0 | Status: SHIPPED | OUTPATIENT
Start: 2020-11-16 | End: 2020-12-22

## 2020-11-16 RX ORDER — ZOLPIDEM TARTRATE 10 MG/1
TABLET ORAL
Qty: 30 TABLET | Refills: 0 | Status: SHIPPED | OUTPATIENT
Start: 2020-11-16 | End: 2020-11-16

## 2020-11-16 RX ORDER — ZOLPIDEM TARTRATE 10 MG/1
TABLET ORAL
Qty: 30 TABLET | Refills: 0 | Status: SHIPPED | OUTPATIENT
Start: 2020-11-16 | End: 2020-12-15 | Stop reason: SDUPTHER

## 2020-11-16 RX ADMIN — IOHEXOL 85 ML: 350 INJECTION, SOLUTION INTRAVENOUS at 18:12

## 2020-11-17 ENCOUNTER — HOSPITAL ENCOUNTER (OUTPATIENT)
Dept: RADIOLOGY | Facility: CLINIC | Age: 55
Discharge: HOME | End: 2020-11-17
Attending: ANESTHESIOLOGY
Payer: COMMERCIAL

## 2020-11-17 VITALS
HEART RATE: 58 BPM | TEMPERATURE: 99.3 F | SYSTOLIC BLOOD PRESSURE: 125 MMHG | RESPIRATION RATE: 20 BRPM | OXYGEN SATURATION: 92 % | DIASTOLIC BLOOD PRESSURE: 80 MMHG

## 2020-11-17 DIAGNOSIS — M46.1 SACROILIITIS (HCC): ICD-10-CM

## 2020-11-17 PROCEDURE — 27096 INJECT SACROILIAC JOINT: CPT | Performed by: ANESTHESIOLOGY

## 2020-11-17 RX ORDER — METHYLPREDNISOLONE ACETATE 80 MG/ML
80 INJECTION, SUSPENSION INTRA-ARTICULAR; INTRALESIONAL; INTRAMUSCULAR; PARENTERAL; SOFT TISSUE ONCE
Status: COMPLETED | OUTPATIENT
Start: 2020-11-17 | End: 2020-11-17

## 2020-11-17 RX ORDER — BUPIVACAINE HCL/PF 2.5 MG/ML
10 VIAL (ML) INJECTION ONCE
Status: COMPLETED | OUTPATIENT
Start: 2020-11-17 | End: 2020-11-17

## 2020-11-17 RX ORDER — 0.9 % SODIUM CHLORIDE 0.9 %
10 VIAL (ML) INJECTION ONCE
Status: COMPLETED | OUTPATIENT
Start: 2020-11-17 | End: 2020-11-17

## 2020-11-17 RX ADMIN — BUPIVACAINE HYDROCHLORIDE 3 ML: 2.5 INJECTION, SOLUTION EPIDURAL; INFILTRATION; INTRACAUDAL at 11:55

## 2020-11-17 RX ADMIN — IOHEXOL 1 ML: 300 INJECTION, SOLUTION INTRAVENOUS at 11:55

## 2020-11-17 RX ADMIN — Medication 3 ML: at 11:54

## 2020-11-17 RX ADMIN — SODIUM CHLORIDE 3 ML: 9 INJECTION, SOLUTION INTRAMUSCULAR; INTRAVENOUS; SUBCUTANEOUS at 11:54

## 2020-11-17 RX ADMIN — METHYLPREDNISOLONE ACETATE 80 MG: 80 INJECTION, SUSPENSION INTRA-ARTICULAR; INTRALESIONAL; INTRAMUSCULAR; PARENTERAL; SOFT TISSUE at 11:55

## 2020-11-19 ENCOUNTER — TELEPHONE (OUTPATIENT)
Dept: SURGICAL ONCOLOGY | Facility: CLINIC | Age: 55
End: 2020-11-19

## 2020-11-23 ENCOUNTER — OFFICE VISIT (OUTPATIENT)
Dept: SURGICAL ONCOLOGY | Facility: CLINIC | Age: 55
End: 2020-11-23
Payer: COMMERCIAL

## 2020-11-23 VITALS
TEMPERATURE: 98.7 F | RESPIRATION RATE: 16 BRPM | HEIGHT: 64 IN | HEART RATE: 66 BPM | WEIGHT: 151 LBS | SYSTOLIC BLOOD PRESSURE: 100 MMHG | BODY MASS INDEX: 25.78 KG/M2 | DIASTOLIC BLOOD PRESSURE: 56 MMHG

## 2020-11-23 DIAGNOSIS — E21.3 HYPERPARATHYROIDISM (HCC): Primary | ICD-10-CM

## 2020-11-23 PROCEDURE — 99213 OFFICE O/P EST LOW 20 MIN: CPT | Performed by: SURGERY

## 2020-11-24 ENCOUNTER — TELEPHONE (OUTPATIENT)
Dept: PAIN MEDICINE | Facility: CLINIC | Age: 55
End: 2020-11-24

## 2020-11-30 ENCOUNTER — HOSPITAL ENCOUNTER (OUTPATIENT)
Dept: INFUSION CENTER | Facility: HOSPITAL | Age: 55
Discharge: HOME | End: 2020-11-30
Attending: INTERNAL MEDICINE
Payer: COMMERCIAL

## 2020-11-30 VITALS
TEMPERATURE: 97.8 F | DIASTOLIC BLOOD PRESSURE: 70 MMHG | BODY MASS INDEX: 26.83 KG/M2 | SYSTOLIC BLOOD PRESSURE: 130 MMHG | WEIGHT: 153.88 LBS | HEART RATE: 80 BPM | RESPIRATION RATE: 18 BRPM

## 2020-11-30 DIAGNOSIS — K51.011 ULCERATIVE PANCOLITIS WITH RECTAL BLEEDING (HCC): Primary | ICD-10-CM

## 2020-11-30 PROCEDURE — 96415 CHEMO IV INFUSION ADDL HR: CPT

## 2020-11-30 PROCEDURE — 96413 CHEMO IV INFUSION 1 HR: CPT

## 2020-11-30 RX ORDER — DIPHENHYDRAMINE HCL 25 MG
25 TABLET ORAL ONCE
Status: DISCONTINUED | OUTPATIENT
Start: 2020-11-30 | End: 2020-12-03 | Stop reason: HOSPADM

## 2020-11-30 RX ORDER — ACETAMINOPHEN 325 MG/1
650 TABLET ORAL ONCE
Status: COMPLETED | OUTPATIENT
Start: 2020-11-30 | End: 2020-11-30

## 2020-11-30 RX ORDER — ACETAMINOPHEN 325 MG/1
650 TABLET ORAL ONCE
Status: CANCELLED | OUTPATIENT
Start: 2021-01-25

## 2020-11-30 RX ORDER — DIPHENHYDRAMINE HCL 25 MG
25 TABLET ORAL ONCE
Status: CANCELLED | OUTPATIENT
Start: 2021-01-25

## 2020-11-30 RX ORDER — METHYLPREDNISOLONE SODIUM SUCCINATE 40 MG/ML
40 INJECTION, POWDER, LYOPHILIZED, FOR SOLUTION INTRAMUSCULAR; INTRAVENOUS ONCE
Status: CANCELLED | OUTPATIENT
Start: 2021-01-25

## 2020-11-30 RX ORDER — SODIUM CHLORIDE 9 MG/ML
20 INJECTION, SOLUTION INTRAVENOUS ONCE
Status: CANCELLED | OUTPATIENT
Start: 2021-01-25

## 2020-11-30 RX ORDER — METHYLPREDNISOLONE SODIUM SUCCINATE 40 MG/ML
40 INJECTION, POWDER, LYOPHILIZED, FOR SOLUTION INTRAMUSCULAR; INTRAVENOUS ONCE
Status: DISCONTINUED | OUTPATIENT
Start: 2020-11-30 | End: 2020-12-03 | Stop reason: HOSPADM

## 2020-11-30 RX ORDER — SODIUM CHLORIDE 9 MG/ML
20 INJECTION, SOLUTION INTRAVENOUS ONCE
Status: COMPLETED | OUTPATIENT
Start: 2020-11-30 | End: 2020-11-30

## 2020-11-30 RX ADMIN — INFLIXIMAB 700 MG: 100 INJECTION, POWDER, LYOPHILIZED, FOR SOLUTION INTRAVENOUS at 09:39

## 2020-11-30 RX ADMIN — ACETAMINOPHEN 650 MG: 325 TABLET, FILM COATED ORAL at 09:01

## 2020-11-30 RX ADMIN — SODIUM CHLORIDE 20 ML/HR: 0.9 INJECTION, SOLUTION INTRAVENOUS at 09:02

## 2020-12-08 ENCOUNTER — TELEPHONE (OUTPATIENT)
Dept: RADIOLOGY | Facility: CLINIC | Age: 55
End: 2020-12-08

## 2020-12-08 DIAGNOSIS — M46.1 SACROILIITIS (HCC): Primary | ICD-10-CM

## 2020-12-08 DIAGNOSIS — M62.838 MUSCLE SPASM: ICD-10-CM

## 2020-12-08 RX ORDER — CARISOPRODOL 350 MG/1
TABLET ORAL
Qty: 30 TABLET | Refills: 0 | Status: SHIPPED | OUTPATIENT
Start: 2020-12-08 | End: 2021-11-05

## 2020-12-15 DIAGNOSIS — F51.04 CHRONIC INSOMNIA: ICD-10-CM

## 2020-12-15 DIAGNOSIS — E03.9 ACQUIRED HYPOTHYROIDISM: ICD-10-CM

## 2020-12-15 RX ORDER — ZOLPIDEM TARTRATE 10 MG/1
10 TABLET ORAL
Qty: 30 TABLET | Refills: 0 | Status: SHIPPED | OUTPATIENT
Start: 2020-12-15 | End: 2021-01-29

## 2020-12-15 RX ORDER — LEVOTHYROXINE SODIUM 0.05 MG/1
50 TABLET ORAL
Qty: 90 TABLET | Refills: 2 | Status: SHIPPED | OUTPATIENT
Start: 2020-12-15 | End: 2021-09-10

## 2020-12-15 RX ORDER — LEVOTHYROXINE SODIUM 0.05 MG/1
TABLET ORAL
Qty: 90 TABLET | Refills: 2 | OUTPATIENT
Start: 2020-12-15

## 2020-12-15 RX ORDER — ZOLPIDEM TARTRATE 10 MG/1
TABLET ORAL
Qty: 30 TABLET | Refills: 0 | OUTPATIENT
Start: 2020-12-15

## 2020-12-15 NOTE — TELEPHONE ENCOUNTER
Called pt for ambien med f/u, pt refused to scheduled and said she will contact you through [employer] to get refills without schedule an appt

## 2020-12-18 DIAGNOSIS — M51.16 INTERVERTEBRAL DISC DISORDER WITH RADICULOPATHY OF LUMBAR REGION: ICD-10-CM

## 2020-12-18 DIAGNOSIS — M25.551 RIGHT HIP PAIN: ICD-10-CM

## 2020-12-18 RX ORDER — TRAMADOL HYDROCHLORIDE 50 MG/1
TABLET ORAL
Qty: 30 TABLET | Refills: 1 | Status: SHIPPED | OUTPATIENT
Start: 2020-12-18 | End: 2022-05-19 | Stop reason: SDUPTHER

## 2020-12-22 DIAGNOSIS — E83.52 HYPERCALCEMIA: ICD-10-CM

## 2020-12-22 RX ORDER — CINACALCET 30 MG/1
TABLET, FILM COATED ORAL
Qty: 30 TABLET | Refills: 0 | Status: SHIPPED | OUTPATIENT
Start: 2020-12-22 | End: 2021-01-05 | Stop reason: SDUPTHER

## 2021-01-05 ENCOUNTER — TELEPHONE (OUTPATIENT)
Dept: RADIOLOGY | Facility: CLINIC | Age: 56
End: 2021-01-05

## 2021-01-05 DIAGNOSIS — M46.1 SACROILIITIS (HCC): Primary | ICD-10-CM

## 2021-01-05 DIAGNOSIS — E83.52 HYPERCALCEMIA: ICD-10-CM

## 2021-01-05 RX ORDER — CINACALCET 30 MG/1
30 TABLET, FILM COATED ORAL DAILY
Qty: 30 TABLET | Refills: 2 | Status: SHIPPED | OUTPATIENT
Start: 2021-01-05 | End: 2021-07-16

## 2021-01-05 RX ORDER — DICLOFENAC SODIUM 75 MG/1
75 TABLET, DELAYED RELEASE ORAL 2 TIMES DAILY
Qty: 30 TABLET | Refills: 0 | Status: SHIPPED | OUTPATIENT
Start: 2021-01-05 | End: 2021-01-29 | Stop reason: SDUPTHER

## 2021-01-05 RX ORDER — OMEPRAZOLE 20 MG/1
20 CAPSULE, DELAYED RELEASE ORAL DAILY
Qty: 30 CAPSULE | Refills: 1 | Status: SHIPPED | OUTPATIENT
Start: 2021-01-05 | End: 2021-02-24

## 2021-01-08 ENCOUNTER — TELEPHONE (OUTPATIENT)
Dept: PAIN MEDICINE | Facility: CLINIC | Age: 56
End: 2021-01-08

## 2021-01-08 NOTE — TELEPHONE ENCOUNTER
Spoke to patient and advised that P2P is not an option insurance sppecifically stated she would have to wait 3 months    Scheduled pt for Rt SIJ for 2/18/21  Went over pre-procedure instructions below:  Nothing to eat or drink 1 hr prior to procedure  Need to arrange transportation  Proper clothing for procedure  If ill or placed on antibiotics please call to reschedule  Covid//travel and vaccine instructions

## 2021-01-08 NOTE — TELEPHONE ENCOUNTER
I called the insurance company to see if we could try the P2P and they set it up  There Dr Sujey Adams is set up for this afternoon window 1:30-3:30    If they dont call then back up date Monday 12-1:00    I provided your direct line  Case reference#[id number]

## 2021-01-08 NOTE — TELEPHONE ENCOUNTER
----- Message from Gloriann Severe, MD sent at 1/7/2021  3:54 PM EST -----  Hi,  Do you have denial info for the SIJ injection?   I will do peer 2 peer for her  Dr Russell Rodrigues

## 2021-01-08 NOTE — TELEPHONE ENCOUNTER
I dont think  P2P will be necessaey I just need to resubmit because they denies due to her having to wait 3 monts   I will take care of today

## 2021-01-11 NOTE — TELEPHONE ENCOUNTER
Scheduled pt for Rt SIJ for 1/12/21  Went over pre-procedure instructions below:  Nothing to eat or drink 1 hr prior to procedure  Need to arrange transportation  Proper clothing for procedure  If ill or placed on antibiotics please call to reschedule  Covid/travel/ and vaccine instructions

## 2021-01-12 ENCOUNTER — HOSPITAL ENCOUNTER (OUTPATIENT)
Dept: RADIOLOGY | Facility: CLINIC | Age: 56
Discharge: HOME | End: 2021-01-12
Attending: ANESTHESIOLOGY | Admitting: ANESTHESIOLOGY
Payer: COMMERCIAL

## 2021-01-12 VITALS
HEART RATE: 51 BPM | SYSTOLIC BLOOD PRESSURE: 147 MMHG | RESPIRATION RATE: 20 BRPM | OXYGEN SATURATION: 100 % | DIASTOLIC BLOOD PRESSURE: 79 MMHG | TEMPERATURE: 98.8 F

## 2021-01-12 DIAGNOSIS — M46.1 SACROILIITIS, NOT ELSEWHERE CLASSIFIED (HCC): ICD-10-CM

## 2021-01-12 PROCEDURE — 27096 INJECT SACROILIAC JOINT: CPT | Performed by: ANESTHESIOLOGY

## 2021-01-12 RX ORDER — METHYLPREDNISOLONE ACETATE 80 MG/ML
80 INJECTION, SUSPENSION INTRA-ARTICULAR; INTRALESIONAL; INTRAMUSCULAR; PARENTERAL; SOFT TISSUE ONCE
Status: COMPLETED | OUTPATIENT
Start: 2021-01-12 | End: 2021-01-12

## 2021-01-12 RX ORDER — 0.9 % SODIUM CHLORIDE 0.9 %
10 VIAL (ML) INJECTION ONCE
Status: COMPLETED | OUTPATIENT
Start: 2021-01-12 | End: 2021-01-12

## 2021-01-12 RX ORDER — BUPIVACAINE HCL/PF 2.5 MG/ML
10 VIAL (ML) INJECTION ONCE
Status: COMPLETED | OUTPATIENT
Start: 2021-01-12 | End: 2021-01-12

## 2021-01-12 RX ADMIN — IOHEXOL 1 ML: 300 INJECTION, SOLUTION INTRAVENOUS at 11:13

## 2021-01-12 RX ADMIN — Medication 4 ML: at 11:12

## 2021-01-12 RX ADMIN — METHYLPREDNISOLONE ACETATE 80 MG: 80 INJECTION, SUSPENSION INTRA-ARTICULAR; INTRALESIONAL; INTRAMUSCULAR; PARENTERAL; SOFT TISSUE at 11:17

## 2021-01-12 RX ADMIN — SODIUM CHLORIDE 4 ML: 9 INJECTION, SOLUTION INTRAMUSCULAR; INTRAVENOUS; SUBCUTANEOUS at 11:12

## 2021-01-12 RX ADMIN — BUPIVACAINE HYDROCHLORIDE 3 ML: 2.5 INJECTION, SOLUTION EPIDURAL; INFILTRATION; INTRACAUDAL at 11:17

## 2021-01-12 NOTE — H&P
History of Present Illness:  The patient is a 54 y o  female who presents with complaints of right lower back pain secondary to sacroiliitis and is here today for right-sided sacroiliac joint injection    Patient Active Problem List   Diagnosis    Seronegative spondyloarthropathy    Psoriatic arthritis ([address] )    Raynaud's syndrome without gangrene    Hypothyroidism    Asthma, mild intermittent    Hypercalcemia    Persistent insomnia of non-organic origin    Iron deficiency anemia    Mild protein-calorie malnutrition (HCC)    Bilateral leg edema    Hypokalemia    Ulcerative pancolitis with rectal bleeding (HCC)    Arthralgia of multiple joints    Exanthem    Screening for breast cancer    BMI 25 0-25 9,adult    Anemia    Vitamin D deficiency    Multiple thyroid nodules    Chronic right-sided low back pain with right-sided sciatica    Breast cancer screening    Intervertebral disc disorder with radiculopathy of lumbar region    Right hip pain    Hyperparathyroidism ([address] )    Sacroiliitis ([address] )       Past Medical History:   Diagnosis Date    Adjustment disorder     last assessed 05/16/12    Anemia     HX of    Asthma     mild - intermittent    Bilateral leg edema     Blepharitis     last assessed 02/04/16    Bulging lumbar disc     Carpal tunnel syndrome     Unspecified laterality    Colitis, acute     Dyspareunia in female     Edema     last assessed 06/22/15    Ganglion     Herniated cervical disc     History of transfusion     Hypokalemia     Hypothyroidism     Hypothyroidism     Insomnia     Lumps on the skin     last assessed 03/12/14    Mouth ulcers     last assessed 06/22/15    Nontraumatic tear of left tibialis posterior tendon     Traumatic teart     Polyarthritis     last assessed 06/24/16    Raynaud's disease     Rayna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 ([address] )     Temporomandibular disorder     Joint    Thyroid disease     Ulcerative colitis ([address] )     Ulcerative colitis [facility])        Past Surgical History:   Procedure Laterality Date    ANKLE SURGERY Left     Tendon repair    CARPAL TUNNEL RELEASE Bilateral      SECTION, LOW TRANSVERSE      COLONOSCOPY      COLONOSCOPY N/A 2018    Procedure: COLONOSCOPY;  Surgeon: Cristo Aceves MD;  [facility]; Service: Gastroenterology    [facility] INJECTION RIGHT HIP (ARTHROGRAM)  9/15/2020    HERNIA REPAIR      umbilical    HYSTERECTOMY      KNEE ARTHROSCOPY Right     LIPECTOMY      Multipe lipoma removals    LIPOMA RESECTION      MD COLONOSCOPY FLX DX W/COLLJ SPEC WHEN PFRMD N/A 2016    Procedure: COLONOSCOPY;  Surgeon: Nadia Rocha DO;  Location: [facility];   Service: Gastroenterology    MD REPAIR FLEX LEG TENDON,SECOND,EA Left 2016    Procedure: REPAIR OF LEFT POSTERIOR TIBIAL TENDON WITH GRAFT, EXPLORATION LEFT ANKLE ;  Surgeon: Stanislav Lakhani DPM;  Location: [facility] OR;  Service: Podiatry    SHOULDER SURGERY Left     bicep tendon and labrum repair    TONSILLECTOMY      TOOTH EXTRACTION  2018         Current Outpatient Medications:     albuterol (ACCUNEB) 1 25 MG/3ML nebulizer solution, Take 1 ampule by nebulization every 6 (six) hours as needed for wheezing, Disp: , Rfl:     Ascorbic Acid (VITAMIN C) 1000 MG tablet, Take 1,000 mg by mouth daily, Disp: , Rfl:     azaTHIOprine (IMURAN) 100 MG tablet, Take 50 mg by mouth daily at bedtime , Disp: , Rfl:     carisoprodol (SOMA) 350 mg tablet, TAKE 1 TABLET BY MOUTH DAILY AT BEDTIME, Disp: 30 tablet, Rfl: 0    cinacalcet (SENSIPAR) 30 mg tablet, Take 1 tablet (30 mg total) by mouth daily, Disp: 30 tablet, Rfl: 2    diclofenac (VOLTAREN) 75 mg EC tablet, Take 1 tablet (75 mg total) by mouth 2 (two) times a day, Disp: 30 tablet, Rfl: 0    docusate sodium (COLACE) 100 mg capsule, Take 1 capsule (100 mg total) by mouth daily, Disp: 90 capsule, Rfl: 3    ferrous sulfate 324 (65 Fe) mg, Take 1 tablet (324 mg total) by mouth 3 (three) times a day before meals, Disp: 90 tablet, Rfl: 3    hyoscyamine (ANASPAZ,LEVSIN) 0 125 MG tablet, TAKE ONE TABLET BY MOUTH EVERY 4 HOURS AS NEEDED FOR CRAMPING, Disp: 30 tablet, Rfl: 0    inFLIXimab (REMICADE) 100 mg, Infuse into a venous catheter every 56 days, Disp: , Rfl:     levothyroxine 50 mcg tablet, Take 1 tablet (50 mcg total) by mouth daily in the early morning, Disp: 90 tablet, Rfl: 2    methocarbamol (ROBAXIN) 750 mg tablet, Take 1 tablet (750 mg total) by mouth daily at bedtime as needed for muscle spasms, Disp: 30 tablet, Rfl: 1    omeprazole (PriLOSEC) 20 mg delayed release capsule, Take 1 capsule (20 mg total) by mouth daily, Disp: 30 capsule, Rfl: 1    ondansetron (ZOFRAN-ODT) 4 mg disintegrating tablet, Take 1 tablet (4 mg total) by mouth every 6 (six) hours as needed for nausea or vomiting, Disp: 30 tablet, Rfl: 1    traMADol (ULTRAM) 50 mg tablet, TAKE ONE TABLET BY MOUTH 2 TIMES A DAY AS NEEDED FOR MODERATE PAIN, Disp: 30 tablet, Rfl: 1    zolpidem (AMBIEN) 10 mg tablet, Take 1 tablet (10 mg total) by mouth daily at bedtime as needed for sleep, Disp: 30 tablet, Rfl: 0  No current facility-administered medications for this encounter  No Known Allergies    Physical Exam:   Vitals:    01/12/21 1058   BP: 138/78   Pulse: 79   Resp: 20   Temp: 98 8 °F (37 1 °C)   SpO2: 100%     General: Awake, Alert, Oriented x 3, Mood and affect appropriate  Respiratory: Respirations even and unlabored  Cardiovascular: Peripheral pulses intact; no edema  Musculoskeletal Exam:  Right lower back pain    ASA Score: 2    Patient/Chart Verification  Patient ID Verified: Verbal  Consents Confirmed: To be obtained in the Pre-Procedure area  H&P( within 30 days) Verified: To be obtained in the Pre-Procedure area  Allergies Reviewed: Yes  Anticoag/NSAID held?: NA  Currently on antibiotics?: No    Assessment:   1   Sacroiliitis, not elsewhere classified [facility]        Plan: Rt SIJ

## 2021-01-12 NOTE — DISCHARGE INSTR - LAB
Steroid Joint Injection   WHAT YOU NEED TO KNOW:   A steroid joint injection is a procedure to inject steroid medicine into a joint  Steroid medicine decreases pain and inflammation  The injection may also contain an anesthetic (numbing medicine) to decrease pain  It may be done to treat conditions such as arthritis, gout, or carpal tunnel syndrome  The injections may be given in your knee, ankle, shoulder, elbow, wrist, ankle or sacroiliac joint  1  Do not apply heat to any area that is numb  If you have discomfort or soreness at the injection site, you may apply ice today, 20 minutes on and 20 minutes off  Tomorrow you may use ice or warm, moist heat  Do not apply ice or heat directly to the skin  2  You may have an increase or change in the discomfort for 36-48 hours after your treatment  Apply ice and continue with any pain medicine you have been prescribed  3  Do not do anything strenuous today  You may shower, but no tub baths or hot tubs today  You may resume your normal activities tomorrow, but do not overdo it  Resume normal activities slowly when you are feeling better  4  If you experience redness, drainage or swelling at the injection site, or if you develop a fever above 100 degrees, please call [facility] at [phone] or go to the Emergency Room  5  Continue to take all routine medicines prescribed by your primary care physician unless otherwise instructed by our staff  Most blood thinners should be started again according to your regularly scheduled dosing  If you have any questions, please give our office a call  If you have a problem specifically related to your procedure, please call our office at [phone]  Problems not related to your procedure should be directed to your primary care physician

## 2021-01-15 DIAGNOSIS — E83.52 HYPERCALCEMIA: Primary | ICD-10-CM

## 2021-01-15 DIAGNOSIS — E21.3 HYPERPARATHYROIDISM (HCC): ICD-10-CM

## 2021-01-15 DIAGNOSIS — E55.9 VITAMIN D DEFICIENCY: ICD-10-CM

## 2021-01-16 ENCOUNTER — LAB (OUTPATIENT)
Dept: LAB | Facility: HOSPITAL | Age: 56
End: 2021-01-16
Attending: INTERNAL MEDICINE
Payer: COMMERCIAL

## 2021-01-16 DIAGNOSIS — E21.3 HYPERPARATHYROIDISM (HCC): ICD-10-CM

## 2021-01-16 DIAGNOSIS — E55.9 VITAMIN D DEFICIENCY: ICD-10-CM

## 2021-01-16 DIAGNOSIS — E83.52 HYPERCALCEMIA: ICD-10-CM

## 2021-01-16 LAB
25(OH)D3 SERPL-MCNC: 22.1 NG/ML (ref 30–100)
ALBUMIN SERPL BCP-MCNC: 4.3 G/DL (ref 3.5–5)
ALP SERPL-CCNC: 94 U/L (ref 46–116)
ALT SERPL W P-5'-P-CCNC: 39 U/L (ref 12–78)
ANION GAP SERPL CALCULATED.3IONS-SCNC: 7 MMOL/L (ref 4–13)
AST SERPL W P-5'-P-CCNC: 24 U/L (ref 5–45)
BILIRUB SERPL-MCNC: 0.22 MG/DL (ref 0.2–1)
BUN SERPL-MCNC: 16 MG/DL (ref 5–25)
CALCIUM SERPL-MCNC: 10.4 MG/DL (ref 8.3–10.1)
CHLORIDE SERPL-SCNC: 102 MMOL/L (ref 100–108)
CO2 SERPL-SCNC: 29 MMOL/L (ref 21–32)
CREAT SERPL-MCNC: 0.71 MG/DL (ref 0.6–1.3)
GFR SERPL CREATININE-BSD FRML MDRD: 96 ML/MIN/1.73SQ M
GLUCOSE P FAST SERPL-MCNC: 78 MG/DL (ref 65–99)
PHOSPHATE SERPL-MCNC: 3.1 MG/DL (ref 2.7–4.5)
POTASSIUM SERPL-SCNC: 4.5 MMOL/L (ref 3.5–5.3)
PROT SERPL-MCNC: 8.3 G/DL (ref 6.4–8.2)
PTH-INTACT SERPL-MCNC: 81.1 PG/ML (ref 18.4–80.1)
SODIUM SERPL-SCNC: 138 MMOL/L (ref 136–145)

## 2021-01-16 PROCEDURE — 83970 ASSAY OF PARATHORMONE: CPT

## 2021-01-16 PROCEDURE — 82306 VITAMIN D 25 HYDROXY: CPT

## 2021-01-16 PROCEDURE — 80053 COMPREHEN METABOLIC PANEL: CPT

## 2021-01-16 PROCEDURE — 84100 ASSAY OF PHOSPHORUS: CPT

## 2021-01-16 PROCEDURE — 36415 COLL VENOUS BLD VENIPUNCTURE: CPT

## 2021-01-18 ENCOUNTER — TELEPHONE (OUTPATIENT)
Dept: PODIATRY | Facility: CLINIC | Age: 56
End: 2021-01-18

## 2021-01-18 NOTE — TELEPHONE ENCOUNTER
Called pt back and said that she needs orthotics  She would like Dr Gabriel Beltran to do a peer to peer with the insurance to see if they will cover  She has a long hx of wearing these so she thinks that if the doctor calls, they will cover it   She had another doctor get an injection covered by doing this Yes

## 2021-01-18 NOTE — TELEPHONE ENCOUNTER
Andra England called, she wants to get a new pair of orthotics  I told her before I can schedule her we have to check into coverage first   She has [employer] and she said she is sure that she read the packet and it said orthotics are covered  I told her Lamberto Smith will check and call her back

## 2021-01-19 ENCOUNTER — TELEPHONE (OUTPATIENT)
Dept: ENDOCRINOLOGY | Facility: CLINIC | Age: 56
End: 2021-01-19

## 2021-01-19 ENCOUNTER — TELEPHONE (OUTPATIENT)
Dept: PODIATRY | Facility: CLINIC | Age: 56
End: 2021-01-19

## 2021-01-19 ENCOUNTER — TELEPHONE (OUTPATIENT)
Dept: PAIN MEDICINE | Facility: CLINIC | Age: 56
End: 2021-01-19

## 2021-01-19 NOTE — TELEPHONE ENCOUNTER
Damon Baum was denied for orthotics from [facility] with [facility] because she is not diabetic  She called and asked for an appeal and was told she has to write a letter and they also need a letter of medical necessity from Dr Tabby Richards  The letter will need the diagnosis and the code and all the reasons you feel she needs them  She is giving you permission to review her chart and see all her issues that may help you with the letter  After you write the letter, have the staff call her @ [phone] and she will come and pick it up so that she can send both letters together

## 2021-01-19 NOTE — RESULT ENCOUNTER NOTE
Please call the patient regarding labs - calcium mildly elevated but stable , will discuss further on upcoming visit

## 2021-01-19 NOTE — TELEPHONE ENCOUNTER
----- Message from Lady Alvares MD sent at 1/18/2021 10:30 PM EST -----  Please call the patient regarding labs - calcium mildly elevated but stable , will discuss further on upcoming visit

## 2021-01-20 ENCOUNTER — OFFICE VISIT (OUTPATIENT)
Dept: ENDOCRINOLOGY | Facility: CLINIC | Age: 56
End: 2021-01-20
Payer: COMMERCIAL

## 2021-01-20 VITALS
HEIGHT: 64 IN | BODY MASS INDEX: 26.83 KG/M2 | SYSTOLIC BLOOD PRESSURE: 158 MMHG | DIASTOLIC BLOOD PRESSURE: 82 MMHG | HEART RATE: 64 BPM

## 2021-01-20 DIAGNOSIS — E03.9 HYPOTHYROIDISM, UNSPECIFIED TYPE: ICD-10-CM

## 2021-01-20 DIAGNOSIS — E04.2 MULTIPLE THYROID NODULES: ICD-10-CM

## 2021-01-20 DIAGNOSIS — M85.80 OSTEOPENIA, UNSPECIFIED LOCATION: ICD-10-CM

## 2021-01-20 DIAGNOSIS — E21.3 HYPERPARATHYROIDISM (HCC): Primary | ICD-10-CM

## 2021-01-20 DIAGNOSIS — E55.9 VITAMIN D DEFICIENCY: ICD-10-CM

## 2021-01-20 PROCEDURE — 99214 OFFICE O/P EST MOD 30 MIN: CPT | Performed by: INTERNAL MEDICINE

## 2021-01-20 NOTE — PROGRESS NOTES
Beata Fuentes 54 y o  female MRN: [id number]    Encounter: [id number]      Assessment/Plan     Problem List Items Addressed This Visit        Endocrine    Hyperparathyroidism [facility]) - Primary     She has had mild intermittent hypercalcemia dating back to 2015-no history of severe hypercalcemia, kidney stones, fragility fractures  PTH has ranged between 40s to 60s with  mild hypercalcemia-which points towords a PTH mediated hypercalcemia  Started on sensipar in the past 6 months as calcium was consistently above 11 - she has noticed  Fresh blood in stools intermittently since then   In the past-ultrasound and sestamibi scan has pointed to words a left-sided adenoma where as a CT scan showed a possible right-sided adenoma  recent imaging has also been non localizing - she may have hyperplasia however I would like her to get a second opinion from Dr Sukumar Osorio to see if she is a surgical candidate              Relevant Orders    Comprehensive metabolic panel Lab Collect    PTH, intact Lab Collect Lab Collect    Phosphorus Lab Collect    Ambulatory referral to Surgical Oncology    Hypothyroidism     Continue levothyroxine          Relevant Orders    TSH, 3rd generation Lab Collect    T4, free Lab Collect    Follicle stimulating hormone Lab Collect    Estrogens, Fractionated, LC/MS/MS    Luteinizing hormone Lab Collect    Multiple thyroid nodules       Musculoskeletal and Integument    Osteopenia     Focus on dietary calcium   Vitamin D supplementations             Other    Vitamin D deficiency        CC:   Primary hyperparathyroidism    History of Present Illness     HPI:  49-year-old female with history of mild intermittent hypercalcemia since 2015-in the past year calcium has been above 11 so she was started on Sensipar  She denies any history of fragility fractures, denies any history of kidney stones      History of Vitamin D deficiency- not taking supplementations     History of subcentimeter thyroid nodules which are being monitored    Since starting Sensipar she has noticed occasional blood in her stools  She has had recent GI evaluation to and they  do not think that it is related to her ulcerative colitis  Review of Systems   Constitutional: Negative for unexpected weight change  Eyes: Negative for visual disturbance  Respiratory: Negative for cough and shortness of breath  Cardiovascular: Negative for palpitations and leg swelling  Gastrointestinal: Negative for nausea and vomiting  Musculoskeletal: Negative for gait problem  Skin: Negative for wound  Neurological: Negative for tremors and weakness  Psychiatric/Behavioral: Negative for sleep disturbance  All other systems reviewed and are negative        Historical Information   Past Medical History:   Diagnosis Date    Adjustment disorder     last assessed 12    Anemia     HX of    Asthma     mild - intermittent    Bilateral leg edema     Blepharitis     last assessed 16    Bulging lumbar disc     Carpal tunnel syndrome     Unspecified laterality    Colitis, acute     Dyspareunia in female     Edema     last assessed 06/22/15    Ganglion     Herniated cervical disc     History of transfusion     Hypokalemia     Hypothyroidism     Hypothyroidism     Insomnia     Lumps on the skin     last assessed 14    Mouth ulcers     last assessed 06/22/15    Nontraumatic tear of left tibialis posterior tendon     Traumatic teart     Polyarthritis     last assessed 16    Raynaud's disease     [facility]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 [facility] [address]     Temporomandibular disorder     Joint    Thyroid disease     Ulcerative colitis ([facility] [address] [facility]     Ulcerative colitis ([address]      Past Surgical History:   Procedure Laterality Date    ANKLE SURGERY Left     Tendon repair    CARPAL TUNNEL RELEASE Bilateral      SECTION, LOW TRANSVERSE      COLONOSCOPY      COLONOSCOPY N/A 2018    Procedure: COLONOSCOPY;  Surgeon: Jessica Rodrigues Anita Wilhelm MD;  [facility]; Service: Gastroenterology    [facility] INJECTION RIGHT HIP (ARTHROGRAM)  9/15/2020    HERNIA REPAIR      umbilical    HYSTERECTOMY      KNEE ARTHROSCOPY Right     LIPECTOMY      Multipe lipoma removals    LIPOMA RESECTION      DE COLONOSCOPY FLX DX W/COLLJ SPEC WHEN PFRMD N/A 2016    Procedure: COLONOSCOPY;  Surgeon: Adam Yu DO;  Location: [facility];   Service: Gastroenterology    DE REPAIR FLEX LEG TENDON,SECOND,EA Left 2016    Procedure: REPAIR OF LEFT POSTERIOR TIBIAL TENDON WITH GRAFT, EXPLORATION LEFT ANKLE ;  Surgeon: Yael Sprague DPM;  Location[facility];  Service: Podiatry    SHOULDER SURGERY Left     bicep tendon and labrum repair    TONSILLECTOMY      TOOTH EXTRACTION  2018     Social History   Social History     Substance and Sexual Activity   Alcohol Use Not Currently    Comment: social 1 drink per weeek     Social History     Substance and Sexual Activity   Drug Use No     Social History     Tobacco Use   Smoking Status Former Smoker    Types: Cigarettes    Quit date: 2003    Years since quittin 8   Smokeless Tobacco Never Used   Tobacco Comment    Quit , rare use for 2 years     Family History:   Family History   Problem Relation Age of Onset    Parkinsonism Mother     Rheum arthritis Mother     Thyroid disease unspecified Mother     Hypothyroidism Mother     Heart attack Father     Hypertension Father     Osteoporosis Father     Prostate cancer Father     Nephrolithiasis Father     Hashimoto's thyroiditis Sister     Thyroid disease unspecified Sister     Hypothyroidism Sister     Cancer Paternal Grandfather         Penile    Prostate cancer Paternal Grandfather     Crohn's disease Family     Osteoarthritis Family     Rheum arthritis Family     Crohn's disease Other     Crohn's disease Maternal Uncle     Psoriasis Maternal Uncle     Ulcerative colitis Maternal Uncle     Rheum arthritis Maternal Uncle  Rheum arthritis Maternal Aunt     Thyroid disease unspecified Maternal Aunt     Hypothyroidism Maternal Aunt     Ulcerative colitis Family        Meds/Allergies   Current Outpatient Medications   Medication Sig Dispense Refill    albuterol (ACCUNEB) 1 25 MG/3ML nebulizer solution Take 1 ampule by nebulization every 6 (six) hours as needed for wheezing      Ascorbic Acid (VITAMIN C) 1000 MG tablet Take 1,000 mg by mouth daily      azaTHIOprine (IMURAN) 100 MG tablet Take 50 mg by mouth daily at bedtime       carisoprodol (SOMA) 350 mg tablet TAKE 1 TABLET BY MOUTH DAILY AT BEDTIME 30 tablet 0    cinacalcet (SENSIPAR) 30 mg tablet Take 1 tablet (30 mg total) by mouth daily 30 tablet 2    docusate sodium (COLACE) 100 mg capsule Take 1 capsule (100 mg total) by mouth daily 90 capsule 3    ferrous sulfate 324 (65 Fe) mg Take 1 tablet (324 mg total) by mouth 3 (three) times a day before meals 90 tablet 3    hyoscyamine (ANASPAZ,LEVSIN) 0 125 MG tablet TAKE ONE TABLET BY MOUTH EVERY 4 HOURS AS NEEDED FOR CRAMPING 30 tablet 0    inFLIXimab (REMICADE) 100 mg Infuse into a venous catheter every 56 days      levothyroxine 50 mcg tablet Take 1 tablet (50 mcg total) by mouth daily in the early morning 90 tablet 2    methocarbamol (ROBAXIN) 750 mg tablet Take 1 tablet (750 mg total) by mouth daily at bedtime as needed for muscle spasms 30 tablet 1    omeprazole (PriLOSEC) 20 mg delayed release capsule Take 1 capsule (20 mg total) by mouth daily 30 capsule 1    ondansetron (ZOFRAN-ODT) 4 mg disintegrating tablet Take 1 tablet (4 mg total) by mouth every 6 (six) hours as needed for nausea or vomiting 30 tablet 1    traMADol (ULTRAM) 50 mg tablet TAKE ONE TABLET BY MOUTH 2 TIMES A DAY AS NEEDED FOR MODERATE PAIN 30 tablet 1    diclofenac (VOLTAREN) 75 mg EC tablet Take 1 tablet (75 mg total) by mouth 2 (two) times a day 60 tablet 1    zolpidem (AMBIEN) 10 mg tablet TAKE ONE TABLET BY MOUTH EVERY DAY AT BEDTIME AS NEEDED FOR SLEEP 30 tablet 0     No current facility-administered medications for this visit  No Known Allergies    Objective   Vitals: Blood pressure 158/82, pulse 64, height 5' 3 5" (1 613 m)  Physical Exam    The history was obtained from the review of the chart, patient and family   Lab Results:   Lab Results   Component Value Date/Time    Potassium 4 5 01/16/2021 07:14 AM    Potassium 4 3 10/22/2020 08:08 AM    Potassium 4 3 09/28/2020 08:36 AM    Chloride 102 01/16/2021 07:14 AM    Chloride 108 10/22/2020 08:08 AM    Chloride 114 (H) 09/28/2020 08:36 AM    CO2 29 01/16/2021 07:14 AM    CO2 28 10/22/2020 08:08 AM    CO2 24 09/28/2020 08:36 AM    BUN 16 01/16/2021 07:14 AM    BUN 11 10/22/2020 08:08 AM    BUN 13 09/28/2020 08:36 AM    Creatinine 0 71 01/16/2021 07:14 AM    Creatinine 0 77 10/22/2020 08:08 AM    Creatinine 0 68 09/28/2020 08:36 AM    Glucose, Fasting 78 01/16/2021 07:14 AM    Glucose, Fasting 97 10/22/2020 08:08 AM    Glucose, Fasting 83 09/28/2020 08:36 AM    Calcium 10 4 (H) 01/16/2021 07:14 AM    Calcium 10 5 (H) 10/22/2020 08:08 AM    Calcium 11 0 (H) 09/28/2020 08:36 AM    eGFR 96 01/16/2021 07:14 AM    eGFR 87 10/22/2020 08:08 AM    eGFR 99 09/28/2020 08:36 AM    TSH 3RD GENERATON 1 580 09/21/2020 09:04 AM    TSH 3RD GENERATON 1 730 03/10/2020 10:11 AM    Free T4 1 04 09/21/2020 09:04 AM    Free T4 1 04 03/10/2020 10:11 AM    PTH 81 1 (H) 01/16/2021 07:14 AM    PTH 58 6 10/22/2020 08:08 AM    PTH 66 4 09/21/2020 09:04 AM    Vit D, 25-Hydroxy 22 1 (L) 01/16/2021 07:14 AM    Vit D, 25-Hydroxy 52 5 09/21/2020 09:04 AM    Vit D, 25-Hydroxy 65 4 03/10/2020 10:11 AM       Calcium, urine, 24 hour Lab Collect     Ref Range & Units 3/10/20 10:06 AM 6/14/18 10:08 AM   24H Urine Volume mL 3,700  3,000    Calcium, 24H Urine 42 - 353 mg/24 hrs 521  7High   363High              Imaging Studies:            Results for orders placed during the hospital encounter of 09/21/20   DXA bone density spine hip and pelvis    Impression 1  Low bone mass (osteopenia)  [Based on the left femoral neck]    2  Since a DXA study from 9/18/2018,  there has been a [address] in bone mineral density  in the lumbar spine, forearm and hip  3   The 10 year risk of hip fracture is 0 6% with the 10 year risk of major osteoporotic fracture being 9 4% as calculated by the [place]/WHO fracture risk assessment tool (FRAX)  4   The current NOF guidelines recommend treating patients with a T-score of -2 5 or less in the lumbar spine or hips, or in post-menopausal women and men over the age of 48 with low bone mass (osteopenia) and a FRAX 10 year risk score of >3% for hip   fracture and/or >20% for major osteoporotic fracture  5   The NOF recommends follow-up DXA in 1-2 years after initiating therapy for osteoporosis and every 2 years thereafter  More frequent evaluation is appropriate for patients with conditions associated with rapid bone loss, such as glucocorticoid   therapy  The interval between DXA screenings may be longer for individuals without major risk factors and initial T-score in the normal or upper low bone mass range  The FRAX algorithm has certain limitations:  -FRAX has not been validated in patients currently or previously treated with pharmacotherapy for osteoporosis  In such patients, clinical judgment must be exercised in interpreting FRAX scores  -Prior hip, vertebral and humeral fragility fractures appear to confer greater risk of subsequent fracture than fractures at other sites (this is especially true for individuals with severe vertebral fractures), but quantification of this incremental   risk is not possible with FRAX  -FRAX underestimates fracture risk in patients with history of multiple fragility fractures    -FRAX may underestimate fracture risk in patients with history of frequent falls   -It is not appropriate to use FRAX to monitor treatment response  WHO CLASSIFICATION:  Normal (a T-score of -1 0 or higher)  Low bone mineral density (a T-score of less than -1 0 but higher than -2 5)  Osteoporosis (a T-score of -2 5 or less)  Severe osteoporosis (a T-score of -2 5 or less with a fragility fracture)      LEAST SIGNIFICANT CHANGE (AT 95% C  I):  Lumbar spine: 0 025 g/cm2; 2 8%  Total hip: 0 025 g/cm2; 3 7%  Forearm: 0 012 g/cm2; 1 9%                Workstation performed: [id number]            PARATHYROID SCAN     INDICATION:  E21 3: Hyperparathyroidism, unspecified     COMPARISON:  Parathyroid scan 9/28/2018, CT parathyroid study 11/2/2018     TECHNIQUE:   Following the intravenous administration of 26 9 mCi Tc-99m Cardiolite, anterior and bilateral anterior oblique projection images of the neck and mediastinum were obtained at approximately 10 minutes post injection followed at 2 hours post   injection by static anterior and bilateral oblique projections as well as SPECT images in coronal, sagittal and axial projections       FINDINGS:     Early images demonstrate fairly symmetric radiotracer uptake in the thyroid gland  Small focus radiotracer uptake noted in the left lower pole region  This is more apparent on the delayed washout images      SPECT images again demonstrate focal radiotracer uptake in the left lower pole region  This was noted to correspond to a thyroid nodule on the CT parathyroid exam        On prior CT parathyroid exam, there was previously a small soft tissue density in the right neck close to the right inferior thyroid  No suspicious radiotracer uptake here but this may be too small to assess      Otherwise, no findings for parathyroid adenoma      IMPRESSION:     1  No scintigraphic findings for parathyroid adenoma  2  On prior CT parathyroid exam, there was previously a small soft tissue density in the right neck close to the right inferior thyroid    No suspicious radiotracer uptake here but this may be too small to assess  3   Focal radiotracer uptake again noted in the left lower pole region but this was noted to correspond to a thyroid nodule on prior CT        CT  NECK  WITHOUT AND WITH CONTRAST FROM KATIE TO SKULL BASE     INDICATION: Hyperparathyroidism  Negative sestamibi      COMPARISON:  Previous CT parathyroid dated November 2, 2018      TECHNIQUE:This examination, like all CT scans performed in the [facility], was performed utilizing techniques to minimize radiation dose exposure, including the use of iterative reconstruction and automated exposure control      Both noncontrast, contrast, and post contrast delayed images were performed according to standard parathyroid protocol (4D technique) Coronal and sagittal reconstructions were performed  3D reconstructions were performed on an independent workstation,   and are supplied for review      85 mL of iohexol (OMNIPAQUE) was injected intravenously without immediate consequence      Radiation dose: 1146 mGy-cm      FINDINGS:     SERIAL NONCONTRAST, POST CONTRAST AND DELAYS: There is no mass lesion demonstrated which meets the enhancement pattern suspicious for parathyroid adenoma      VASCULAR STRUCTURES:  The arch and great vessels are normal in appearance  There is no carotid stenosis by Nascet criteria      VISUALIZED PARANASAL SINUSES:  Mucosal thickening is noted in the right maxillary sinus      NASAL CAVITY AND NASOPHARYNX:  Normal      SUPRAHYOID NECK:  Normal oropharynx, oral cavity, parapharyngeal and retropharyngeal spaces      INFRAHYOID NECK:  Normal oropharynx, oral cavity, parapharyngeal and retropharyngeal spaces      THYROID GLAND:  Normal      LYMPH NODES:  No pathologic or enlarged adenopathy      MEDIASTINUM:  Unremarkable      BONY STRUCTURES  Diffuse mild osteopenia      LUNG APICES:  Unremarkable      IMPRESSION:     1  No suspicious parathyroid lesion identified    2   Diffuse mild osteopenia        I have personally reviewed pertinent reports  Portions of the record may have been created with voice recognition software  Occasional wrong word or "sound a like" substitutions may have occurred due to the inherent limitations of voice recognition software  Read the chart carefully and recognize, using context, where substitutions have occurred

## 2021-01-25 ENCOUNTER — HOSPITAL ENCOUNTER (OUTPATIENT)
Dept: INFUSION CENTER | Facility: HOSPITAL | Age: 56
Discharge: HOME | End: 2021-01-25
Attending: INTERNAL MEDICINE
Payer: COMMERCIAL

## 2021-01-25 VITALS
BODY MASS INDEX: 27.25 KG/M2 | RESPIRATION RATE: 18 BRPM | TEMPERATURE: 98.1 F | DIASTOLIC BLOOD PRESSURE: 70 MMHG | WEIGHT: 156.31 LBS | HEART RATE: 76 BPM | SYSTOLIC BLOOD PRESSURE: 110 MMHG

## 2021-01-25 DIAGNOSIS — K51.011 ULCERATIVE PANCOLITIS WITH RECTAL BLEEDING (HCC): Primary | ICD-10-CM

## 2021-01-25 PROCEDURE — 96413 CHEMO IV INFUSION 1 HR: CPT

## 2021-01-25 PROCEDURE — 96415 CHEMO IV INFUSION ADDL HR: CPT

## 2021-01-25 RX ORDER — ACETAMINOPHEN 325 MG/1
650 TABLET ORAL ONCE
Status: CANCELLED | OUTPATIENT
Start: 2021-03-22

## 2021-01-25 RX ORDER — DIPHENHYDRAMINE HCL 25 MG
25 TABLET ORAL ONCE
Status: DISCONTINUED | OUTPATIENT
Start: 2021-01-25 | End: 2021-01-28 | Stop reason: HOSPADM

## 2021-01-25 RX ORDER — METHYLPREDNISOLONE SODIUM SUCCINATE 40 MG/ML
40 INJECTION, POWDER, LYOPHILIZED, FOR SOLUTION INTRAMUSCULAR; INTRAVENOUS ONCE
Status: CANCELLED | OUTPATIENT
Start: 2021-03-22

## 2021-01-25 RX ORDER — SODIUM CHLORIDE 9 MG/ML
20 INJECTION, SOLUTION INTRAVENOUS ONCE
Status: COMPLETED | OUTPATIENT
Start: 2021-01-25 | End: 2021-01-25

## 2021-01-25 RX ORDER — DIPHENHYDRAMINE HCL 25 MG
25 TABLET ORAL ONCE
Status: CANCELLED | OUTPATIENT
Start: 2021-03-22

## 2021-01-25 RX ORDER — METHYLPREDNISOLONE SODIUM SUCCINATE 40 MG/ML
40 INJECTION, POWDER, LYOPHILIZED, FOR SOLUTION INTRAMUSCULAR; INTRAVENOUS ONCE
Status: DISCONTINUED | OUTPATIENT
Start: 2021-01-25 | End: 2021-01-28 | Stop reason: HOSPADM

## 2021-01-25 RX ORDER — ACETAMINOPHEN 325 MG/1
650 TABLET ORAL ONCE
Status: COMPLETED | OUTPATIENT
Start: 2021-01-25 | End: 2021-01-25

## 2021-01-25 RX ORDER — SODIUM CHLORIDE 9 MG/ML
20 INJECTION, SOLUTION INTRAVENOUS ONCE
Status: CANCELLED | OUTPATIENT
Start: 2021-03-22

## 2021-01-25 RX ADMIN — ACETAMINOPHEN 650 MG: 325 TABLET, FILM COATED ORAL at 09:23

## 2021-01-25 RX ADMIN — INFLIXIMAB 700 MG: 100 INJECTION, POWDER, LYOPHILIZED, FOR SOLUTION INTRAVENOUS at 09:57

## 2021-01-25 RX ADMIN — SODIUM CHLORIDE 20 ML/HR: 0.9 INJECTION, SOLUTION INTRAVENOUS at 09:23

## 2021-01-28 DIAGNOSIS — F51.04 CHRONIC INSOMNIA: ICD-10-CM

## 2021-01-29 ENCOUNTER — TELEPHONE (OUTPATIENT)
Dept: PAIN MEDICINE | Facility: CLINIC | Age: 56
End: 2021-01-29

## 2021-01-29 DIAGNOSIS — M46.1 SACROILIITIS (HCC): ICD-10-CM

## 2021-01-29 RX ORDER — DICLOFENAC SODIUM 75 MG/1
75 TABLET, DELAYED RELEASE ORAL 2 TIMES DAILY
Qty: 60 TABLET | Refills: 1 | Status: SHIPPED | OUTPATIENT
Start: 2021-01-29 | End: 2021-03-04 | Stop reason: ALTCHOICE

## 2021-01-29 RX ORDER — ZOLPIDEM TARTRATE 10 MG/1
TABLET ORAL
Qty: 30 TABLET | Refills: 0 | Status: SHIPPED | OUTPATIENT
Start: 2021-01-29 | End: 2021-04-08

## 2021-02-09 ENCOUNTER — TELEPHONE (OUTPATIENT)
Dept: PODIATRY | Facility: CLINIC | Age: 56
End: 2021-02-09

## 2021-02-09 NOTE — TELEPHONE ENCOUNTER
Called pt back today and left a message that she would need to be seen before he could submit a letter           cm Bright DPM  You 4 days ago     Will need to see her in the office for evaluation to report support document before submitting the letter       Message text       Asmita Breen DPM 2 weeks ago        I called pt and explained this  She is aware  I will call her back when the letter is complete  Documentation       Josie Rivera DPM  You 2 weeks ago     Most likely it would not go through because there are other options including injections, PT/OT   I would not do peer to peer review, but I will work on a letter  Ina Rao will try to get it done in the next few days   Let her know we will work on the letter       Message text       You routed conversation to Josie Rivera DPM 3 weeks ago      You 3 weeks ago        Called pt back and said that she needs orthotics  She would like Dr Luiz Bright to do a peer to peer with the insurance to see if they will cover  She has a long hx of wearing these so she thinks that if the doctor calls, they will cover it   She had another doctor get an injection covered by doing this

## 2021-02-10 ENCOUNTER — OFFICE VISIT (OUTPATIENT)
Dept: PODIATRY | Facility: CLINIC | Age: 56
End: 2021-02-10
Payer: COMMERCIAL

## 2021-02-10 VITALS
DIASTOLIC BLOOD PRESSURE: 78 MMHG | HEART RATE: 61 BPM | HEIGHT: 64 IN | BODY MASS INDEX: 26.12 KG/M2 | SYSTOLIC BLOOD PRESSURE: 131 MMHG | WEIGHT: 153 LBS

## 2021-02-10 DIAGNOSIS — M21.41 PES PLANUS OF BOTH FEET: ICD-10-CM

## 2021-02-10 DIAGNOSIS — M21.42 PES PLANUS OF BOTH FEET: ICD-10-CM

## 2021-02-10 DIAGNOSIS — M72.2 PLANTAR FASCIITIS: Primary | ICD-10-CM

## 2021-02-10 PROCEDURE — 99214 OFFICE O/P EST MOD 30 MIN: CPT | Performed by: PODIATRIST

## 2021-02-10 NOTE — ASSESSMENT & PLAN NOTE
She has had mild intermittent hypercalcemia dating back to 2015-no history of severe hypercalcemia, kidney stones, fragility fractures  PTH has ranged between 40s to 60s with  mild hypercalcemia-which points towords a PTH mediated hypercalcemia  Started on sensipar in the past 6 months as calcium was consistently above 11 - she has noticed  Fresh blood in stools intermittently since then   In the past-ultrasound and sestamibi scan has pointed to words a left-sided adenoma where as a CT scan showed a possible right-sided adenoma  recent imaging has also been non localizing - she may have hyperplasia however I would like her to get a second opinion from Dr Coby Puente to see if she is a surgical candidate

## 2021-02-10 NOTE — LETTER
February 11, 2021     Patient: Jordi Aguirre   YOB: 1965   Date of Visit: 2/10/2021       To Whom it May Concern:    Bayron Tierney is under my professional care  She was seen in my office on 2/10/2021  She has chronic plantar fasciitis with pain in medial band  She has been wearing custom made orthotics in the last several years now  Orthotics have been very effective on controlling her symptoms  Other treatment modalities including physicla therapy and NSAIDs have only given her temporary relief of her symptoms  I feel orthotics are medically necessary to manage her chronic condition  If you have any questions or concerns, please don't hesitate to call           Sincerely,      Melvi Little DPM        CC: No Recipients

## 2021-02-10 NOTE — PROGRESS NOTES
PATIENT:  Marlon Montenegro    1965    ASSESSMENT:     1  Plantar fasciitis     2  Pes planus of both feet         PLAN:  1  Patient was counseled and educated on the condition and the diagnosis  2   Reviewed notes including PT and pain management  3  The diagnosis, treatment options and prognosis were discussed with the patient  4   No definitive option other than possible surgical treatment at this time  Orthotics were very effective in the past and I would recommend them  5   Instructed to continue supportive care, home exercise, icing, and orthotics  6  Possible orthotic casting in the next few weeks  Subjective:     HPI  The patients presents for chief complaint of bilateral foot pain, right worse than left  She has chronic plantar fasciitis  She feels orthotics have been very effective, but is getting worn out  Pain increased in the last few weeks  She has increased pain in the morning  No edema or redness  She also has flexible pes planus  She tried PT last year  It helped while she was being treated  She is also on Voltaren  She feels there is no other things that keep her symptoms under control  She also has some SI joint problems and had a couple of injections  She also wonder SI joint problem is related to chronic foot pain  The following portions of the patient's history were reviewed and updated as appropriate: allergies, current medications, past family history, past medical history, past social history, past surgical history and problem list   All pertinent labs and images were reviewed      Past Medical History  Past Medical History:   Diagnosis Date    Adjustment disorder     last assessed 05/16/12    Anemia     HX of    Asthma     mild - intermittent    Bilateral leg edema     Blepharitis     last assessed 02/04/16    Bulging lumbar disc     Carpal tunnel syndrome     Unspecified laterality    Colitis, acute     Dyspareunia in female     Edema last assessed 06/22/15    Ganglion     Herniated cervical disc     History of transfusion     Hypokalemia     Hypothyroidism     Hypothyroidism     Insomnia     Lumps on the skin     last assessed 14    Mouth ulcers     last assessed 06/22/15    Nontraumatic tear of left tibialis posterior tendon     Traumatic teart     Polyarthritis     last assessed 16    Raynaud's disease     Rayna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 ([facility] [address]     Temporomandibular disorder     Joint    Thyroid disease     Ulcerative colitis ([facility] [address] [facility]     Ulcerative colitis ([facility] [address] )        Past Surgical History  Past Surgical History:   Procedure Laterality Date    ANKLE SURGERY Left     Tendon repair    CARPAL TUNNEL RELEASE Bilateral      SECTION, LOW TRANSVERSE      COLONOSCOPY      COLONOSCOPY N/A 2018    Procedure: COLONOSCOPY;  Surgeon: Orlin Abdi MD;  [facility]; Service: Gastroenterology    [facility] INJECTION [facility] HIP [facility]THROGRAM)  9/15/2020    HERNIA REPAIR      umbilical    HYSTERECTOMY      KNEE ARTHROSCOPY Right     LIPECTOMY      Multipe lipoma removals    LIPOMA RESECTION      DC COLONOSCOPY FLX DX W/COLLJ SPEC WHEN PFRMD N/A 2016    Procedure: COLONOSCOPY;  Surgeon: Valentina Ramirez DO;  Location: [facility]; Service: Gastroenterology    DC REPAIR FLEX LEG TENDON,SECOND,EA Left 2016    Procedure: REPAIR OF LEFT POSTERIOR TIBIAL TENDON WITH GRAFT, EXPLORATION LEFT ANKLE ;  Surgeon: Godfrey Kaufman DPM;  Location: [facility];  Service: Podiatry    SHOULDER SURGERY Left     bicep tendon and labrum repair    TONSILLECTOMY      TOOTH EXTRACTION  2018        Allergies:  Patient has no known allergies      Medications:  Current Outpatient Medications   Medication Sig Dispense Refill    albuterol (ACCUNEB) 1 25 MG/3ML nebulizer solution Take 1 ampule by nebulization every 6 (six) hours as needed for wheezing      Ascorbic Acid (VITAMIN C) 1000 MG tablet Take 1,000 mg by mouth daily      azaTHIOprine (IMURAN) 100 MG tablet Take 50 mg by mouth daily at bedtime       carisoprodol (SOMA) 350 mg tablet TAKE 1 TABLET BY MOUTH DAILY AT BEDTIME 30 tablet 0    cinacalcet (SENSIPAR) 30 mg tablet Take 1 tablet (30 mg total) by mouth daily 30 tablet 2    diclofenac (VOLTAREN) 75 mg EC tablet Take 1 tablet (75 mg total) by mouth 2 (two) times a day 60 tablet 1    docusate sodium (COLACE) 100 mg capsule Take 1 capsule (100 mg total) by mouth daily 90 capsule 3    ferrous sulfate 324 (65 Fe) mg Take 1 tablet (324 mg total) by mouth 3 (three) times a day before meals 90 tablet 3    hyoscyamine (ANASPAZ,LEVSIN) 0 125 MG tablet TAKE ONE TABLET BY MOUTH EVERY 4 HOURS AS NEEDED FOR CRAMPING 30 tablet 0    inFLIXimab (REMICADE) 100 mg Infuse into a venous catheter every 56 days      levothyroxine 50 mcg tablet Take 1 tablet (50 mcg total) by mouth daily in the early morning 90 tablet 2    omeprazole (PriLOSEC) 20 mg delayed release capsule Take 1 capsule (20 mg total) by mouth daily 30 capsule 1    ondansetron (ZOFRAN-ODT) 4 mg disintegrating tablet Take 1 tablet (4 mg total) by mouth every 6 (six) hours as needed for nausea or vomiting 30 tablet 1    traMADol (ULTRAM) 50 mg tablet TAKE ONE TABLET BY MOUTH 2 TIMES A DAY AS NEEDED FOR MODERATE PAIN 30 tablet 1    zolpidem (AMBIEN) 10 mg tablet TAKE ONE TABLET BY MOUTH EVERY DAY AT BEDTIME AS NEEDED FOR SLEEP 30 tablet 0    methocarbamol (ROBAXIN) 750 mg tablet Take 1 tablet (750 mg total) by mouth daily at bedtime as needed for muscle spasms 30 tablet 1     No current facility-administered medications for this visit          Social History:  Social History     Socioeconomic History    Marital status: /Civil Union     Spouse name: None    Number of children: None    Years of education: None    Highest education level: None   Occupational History    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: None   Narinder-Danay insecurity     Worry: None     Inability: None    Transportation needs     Medical: None     Non-medical: None   Tobacco Use    Smoking status: Former Smoker     Types: Cigarettes     Quit date: 2003     Years since quittin 8    Smokeless tobacco: Never Used    Tobacco comment: Quit , rare use for 2 years   Substance and Sexual Activity    Alcohol use: Not Currently     Comment: social 1 drink per [employer] Drug use: No    Sexual activity: None   Lifestyle    Physical activity     Days per week: None     Minutes per session: None    Stress: None   Relationships    Social connections     Talks on phone: None     Gets together: None     Attends [employer] service: None     Active member of club or organization: None     Attends meetings of clubs or organizations: None     Relationship status: None    Intimate partner violence     Fear of current or ex partner: None     Emotionally abused: None     Physically abused: None     Forced sexual activity: None   Other Topics Concern    None   Social History Narrative    None        Review of Systems   Constitutional: Negative for chills and fever  Respiratory: Negative for shortness of breath  Cardiovascular: Negative for chest pain and leg swelling  Gastrointestinal: Negative for nausea and vomiting  Skin: Negative for color change  Neurological: Negative for weakness and numbness  Objective:      /78   Pulse 61   Ht 5' 3 5" (1 613 m)   Wt 69 4 kg (153 lb) Comment: per pt  BMI 26 68 kg/m²          Physical Exam  Constitutional:       General: She is not in acute distress  Appearance: Normal appearance  She is well-developed  She is not ill-appearing  Cardiovascular:      Rate and Rhythm: Normal rate and regular rhythm  Pulses: Normal pulses  Dorsalis pedis pulses are 2+ on the right side and 2+ on the left side  Posterior tibial pulses are 2+ on the right side and 2+ on the left side  Comments: No ischemia  CRT WNL  Pulmonary:      Effort: Pulmonary effort is normal  No respiratory distress  Musculoskeletal:         General: Tenderness present  No swelling or signs of injury  Right lower leg: No edema  Left lower leg: No edema  Right foot: Normal range of motion  No foot drop  Left foot: Normal range of motion  No foot drop  Comments: Pain presents at the medial band of plantar fascia, right worse than left  No acute edema  Flexible pes planus noted bilaterally  Skin:     General: Skin is warm  Capillary Refill: Capillary refill takes less than 2 seconds  Coloration: Skin is not cyanotic  Findings: No ecchymosis, erythema, petechiae, rash or wound  Rash is not purpuric  Nails: There is no clubbing  Neurological:      General: No focal deficit present  Mental Status: She is alert and oriented to person, place, and time  Cranial Nerves: No cranial nerve deficit  Sensory: No sensory deficit  Motor: No weakness or abnormal muscle tone  Coordination: Coordination normal       Gait: Gait normal    Psychiatric:         Mood and Affect: Mood normal          Behavior: Behavior normal          Thought Content:  Thought content normal          Judgment: Judgment normal

## 2021-02-13 ENCOUNTER — LAB (OUTPATIENT)
Dept: LAB | Facility: HOSPITAL | Age: 56
End: 2021-02-13
Attending: SURGERY
Payer: COMMERCIAL

## 2021-02-13 DIAGNOSIS — E21.3 HYPERPARATHYROIDISM (HCC): ICD-10-CM

## 2021-02-13 DIAGNOSIS — E03.9 HYPOTHYROIDISM, UNSPECIFIED TYPE: ICD-10-CM

## 2021-02-13 LAB
ALBUMIN SERPL BCP-MCNC: 3.8 G/DL (ref 3.5–5)
ALP SERPL-CCNC: 87 U/L (ref 46–116)
ALT SERPL W P-5'-P-CCNC: 36 U/L (ref 12–78)
ANION GAP SERPL CALCULATED.3IONS-SCNC: 6 MMOL/L (ref 4–13)
AST SERPL W P-5'-P-CCNC: 24 U/L (ref 5–45)
BILIRUB SERPL-MCNC: 0.18 MG/DL (ref 0.2–1)
BUN SERPL-MCNC: 14 MG/DL (ref 5–25)
CALCIUM SERPL-MCNC: 10.4 MG/DL (ref 8.3–10.1)
CHLORIDE SERPL-SCNC: 105 MMOL/L (ref 100–108)
CO2 SERPL-SCNC: 28 MMOL/L (ref 21–32)
CREAT SERPL-MCNC: 0.74 MG/DL (ref 0.6–1.3)
FSH SERPL-ACNC: 74.2 MIU/ML
GFR SERPL CREATININE-BSD FRML MDRD: 91 ML/MIN/1.73SQ M
GLUCOSE P FAST SERPL-MCNC: 92 MG/DL (ref 65–99)
LH SERPL-ACNC: 33.9 MIU/ML
PHOSPHATE SERPL-MCNC: 2.8 MG/DL (ref 2.7–4.5)
POTASSIUM SERPL-SCNC: 4 MMOL/L (ref 3.5–5.3)
PROT SERPL-MCNC: 7.3 G/DL (ref 6.4–8.2)
PTH-INTACT SERPL-MCNC: 66.7 PG/ML (ref 18.4–80.1)
SODIUM SERPL-SCNC: 139 MMOL/L (ref 136–145)
T4 FREE SERPL-MCNC: 1.22 NG/DL (ref 0.76–1.46)
TSH SERPL DL<=0.05 MIU/L-ACNC: 2.73 UIU/ML (ref 0.36–3.74)

## 2021-02-13 PROCEDURE — 83001 ASSAY OF GONADOTROPIN (FSH): CPT

## 2021-02-13 PROCEDURE — 84439 ASSAY OF FREE THYROXINE: CPT

## 2021-02-13 PROCEDURE — 82671 ASSAY OF ESTROGENS: CPT

## 2021-02-13 PROCEDURE — 36415 COLL VENOUS BLD VENIPUNCTURE: CPT

## 2021-02-13 PROCEDURE — 84443 ASSAY THYROID STIM HORMONE: CPT

## 2021-02-13 PROCEDURE — 80053 COMPREHEN METABOLIC PANEL: CPT

## 2021-02-13 PROCEDURE — 83002 ASSAY OF GONADOTROPIN (LH): CPT

## 2021-02-13 PROCEDURE — 83970 ASSAY OF PARATHORMONE: CPT

## 2021-02-13 PROCEDURE — 84100 ASSAY OF PHOSPHORUS: CPT

## 2021-02-15 ENCOUNTER — TELEPHONE (OUTPATIENT)
Dept: ENDOCRINOLOGY | Facility: CLINIC | Age: 56
End: 2021-02-15

## 2021-02-15 NOTE — TELEPHONE ENCOUNTER
----- Message from Darleen Corona MD sent at 2/15/2021  2:04 PM EST -----  Please call the patient regarding labs - calcium mildly elevated but stable , FSH appears to be in postmenopausal range

## 2021-02-15 NOTE — RESULT ENCOUNTER NOTE
Please call the patient regarding labs - calcium mildly elevated but stable , FSH appears to be in postmenopausal range

## 2021-02-17 ENCOUNTER — OFFICE VISIT (OUTPATIENT)
Dept: PAIN MEDICINE | Facility: CLINIC | Age: 56
End: 2021-02-17
Payer: COMMERCIAL

## 2021-02-17 VITALS
BODY MASS INDEX: 26.68 KG/M2 | DIASTOLIC BLOOD PRESSURE: 70 MMHG | SYSTOLIC BLOOD PRESSURE: 126 MMHG | HEART RATE: 73 BPM | WEIGHT: 153 LBS

## 2021-02-17 DIAGNOSIS — M47.816 LUMBAR SPONDYLOSIS: Primary | ICD-10-CM

## 2021-02-17 DIAGNOSIS — M46.1 SACROILIITIS (HCC): ICD-10-CM

## 2021-02-17 PROCEDURE — 99214 OFFICE O/P EST MOD 30 MIN: CPT | Performed by: ANESTHESIOLOGY

## 2021-02-17 NOTE — PROGRESS NOTES
Assessment:  1  Lumbar spondylosis    2  Sacroiliitis ([facility] [address] )        Plan:    The patient has ongoing pain in her lower back continuing over the right sacroiliac joint but also over the L4-5 and L5-S1 facet joints on the right  At this time, I discussed performing diagnostic right L3-S1 medial branch blocks to diagnose facet mediated pain  If she gets significant relief, she would be candidate for medial branch radiofrequency ablation  She would like to proceed  This will be scheduled for an upcoming Tuesday or Thursday under fluoroscopic guidance  Complete risks and benefits including bleeding, infection, tissue reaction, nerve injury and allergic reaction were discussed  The approach was demonstrated using models and literature was provided  Verbal and written consent was obtained  My impressions and treatment recommendations were discussed in detail with the patient who verbalized understanding and had no further questions  Discharge instructions were provided  I personally saw and examined the patient and I agree with the above discussed plan of care  Orders Placed This Encounter   Procedures    FL spine and pain procedure     Standing Status:   Future     Standing Expiration Date:   2/17/2025     Order Specific Question:   Reason for Exam:     Answer:   Right L3-S1 MBB     Order Specific Question:   Is the patient pregnant? Answer:   No     Order Specific Question:   Anticoagulant hold needed? Answer:   No     No orders of the defined types were placed in this encounter  History of Present Illness:  Dennis Saha is a 54 y o  female who presents for a follow up office visit in regards to Hip Pain (re-eval right SIJ)  The patient is status post right-sided sacroiliac joint injection on 01/12/2021  She reports significant relief for about 2 weeks but then symptoms recurred  She is experiencing pain in the right lower back radiating into the groin and hip region    Symptoms are specially worse during work hours and afterwards  Pain is dull /aching and throbbing rated at times 8/10 on a numeric rating scale  I have personally reviewed and/or updated the patient's past medical history, past surgical history, family history, social history, current medications, allergies, and vital signs today  Review of Systems   Respiratory: Negative for shortness of breath  Cardiovascular: Negative for chest pain  Gastrointestinal: Negative for constipation, diarrhea, nausea and vomiting  Musculoskeletal: Positive for gait problem and joint swelling  Negative for arthralgias and myalgias  Skin: Negative for rash  Neurological: Negative for dizziness, seizures and weakness  All other systems reviewed and are negative        Patient Active Problem List   Diagnosis    Seronegative spondyloarthropathy    Psoriatic arthritis ([address] )    Raynaud's syndrome without gangrene    Hypothyroidism    Asthma, mild intermittent    Hypercalcemia    Persistent insomnia of non-organic origin    Iron deficiency anemia    Mild protein-calorie malnutrition (HCC)    Bilateral leg edema    Hypokalemia    Ulcerative pancolitis with rectal bleeding (HCC)    Arthralgia of multiple joints    Exanthem    Screening for breast cancer    BMI 25 0-25 9,adult    Anemia    Vitamin D deficiency    Multiple thyroid nodules    Chronic right-sided low back pain with right-sided sciatica    Breast cancer screening    Intervertebral disc disorder with radiculopathy of lumbar region    Right hip pain    Hyperparathyroidism ([address]    Sacroiliitis ([address]    Osteopenia       Past Medical History:   Diagnosis Date    Adjustment disorder     last assessed 05/16/12    Anemia     HX of    Asthma     mild - intermittent    Bilateral leg edema     Blepharitis     last assessed 02/04/16    Bulging lumbar disc     Carpal tunnel syndrome     Unspecified laterality    Colitis, acute     Dyspareunia in female  Edema     last assessed 06/22/15    Ganglion     Herniated cervical disc     History of transfusion     Hypokalemia     Hypothyroidism     Hypothyroidism     Insomnia     Lumps on the skin     last assessed 14    Mouth ulcers     last assessed 06/22/15    Nontraumatic tear of left tibialis posterior tendon     Traumatic teart     Polyarthritis     last assessed 16    Raynaud's disease     Rayna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 ([facility] [address]     Temporomandibular disorder     Joint    Thyroid disease     Ulcerative colitis ([facility] [address]     Ulcerative colitis ([facility] [address] )        Past Surgical History:   Procedure Laterality Date    ANKLE SURGERY Left     Tendon repair    CARPAL TUNNEL RELEASE Bilateral      SECTION, LOW TRANSVERSE      COLONOSCOPY      COLONOSCOPY N/A 2018    Procedure: COLONOSCOPY;  Surgeon: Unique Moore MD;  [facility]; Service: Gastroenterology    [facility] INJECTION RIGHT HIP (ARTHROGRAM)  9/15/2020    HERNIA REPAIR      umbilical    HYSTERECTOMY      KNEE ARTHROSCOPY Right     LIPECTOMY      Multipe lipoma removals    LIPOMA RESECTION      CO COLONOSCOPY FLX DX W/COLLJ SPEC WHEN PFRMD N/A 2016    Procedure: COLONOSCOPY;  Surgeon: Pollo Naranjo DO;  Location: [facility];   Service: Gastroenterology    CO REPAIR FLEX LEG TENDON,SECOND,EA Left 2016    Procedure: REPAIR OF LEFT POSTERIOR TIBIAL TENDON WITH GRAFT, EXPLORATION LEFT ANKLE ;  Surgeon: Spike James DPM;  Location: [facility] OR;  Service: Podiatry    SHOULDER SURGERY Left     bicep tendon and labrum repair    TONSILLECTOMY      TOOTH EXTRACTION  2018       Family History   Problem Relation Age of Onset    Parkinsonism Mother     Rheum arthritis Mother     Thyroid disease unspecified Mother     Hypothyroidism Mother     Heart attack Father     Hypertension Father     Osteoporosis Father     Prostate cancer Father     Nephrolithiasis Father     Hashimoto's thyroiditis Sister  Thyroid disease unspecified Sister     Hypothyroidism Sister     Cancer Paternal Grandfather         Penile    Prostate cancer Paternal Grandfather     Crohn's disease Family     Osteoarthritis Family     Rheum arthritis Family     Crohn's disease Other     Crohn's disease Maternal Uncle     Psoriasis Maternal Uncle     Ulcerative colitis Maternal Uncle     Rheum arthritis Maternal Uncle     Rheum arthritis Maternal Aunt     Thyroid disease unspecified Maternal Aunt     Hypothyroidism Maternal Aunt     Ulcerative colitis Family        Social History     Occupational History    Occupation: RN at [employer]   Tobacco Use    Smoking status: Former Smoker     Types: Cigarettes     Quit date: 2003     Years since quittin 8    Smokeless tobacco: Never Used    Tobacco comment: Quit , rare use for 2 years   Substance and Sexual Activity    Alcohol use: Not Currently     Comment: social 1 drink per [employer] Drug use: No    Sexual activity: Not on file       Current Outpatient Medications on File Prior to Visit   Medication Sig    Ascorbic Acid (VITAMIN C) 1000 MG tablet Take 1,000 mg by mouth daily    azaTHIOprine (IMURAN) 100 MG tablet Take 50 mg by mouth daily at bedtime     carisoprodol (SOMA) 350 mg tablet TAKE 1 TABLET BY MOUTH DAILY AT BEDTIME    diclofenac (VOLTAREN) 75 mg EC tablet Take 1 tablet (75 mg total) by mouth 2 (two) times a day    docusate sodium (COLACE) 100 mg capsule Take 1 capsule (100 mg total) by mouth daily    ferrous sulfate 324 (65 Fe) mg Take 1 tablet (324 mg total) by mouth 3 (three) times a day before meals    inFLIXimab (REMICADE) 100 mg Infuse into a venous catheter every 56 days    levothyroxine 50 mcg tablet Take 1 tablet (50 mcg total) by mouth daily in the early morning    omeprazole (PriLOSEC) 20 mg delayed release capsule Take 1 capsule (20 mg total) by mouth daily    ondansetron (ZOFRAN-ODT) 4 mg disintegrating tablet Take 1 tablet (4 mg total) by mouth every 6 (six) hours as needed for nausea or vomiting    traMADol (ULTRAM) 50 mg tablet TAKE ONE TABLET BY MOUTH 2 TIMES A DAY AS NEEDED FOR MODERATE PAIN    zolpidem (AMBIEN) 10 mg tablet TAKE ONE TABLET BY MOUTH EVERY DAY AT BEDTIME AS NEEDED FOR SLEEP    albuterol (ACCUNEB) 1 25 MG/3ML nebulizer solution Take 1 ampule by nebulization every 6 (six) hours as needed for wheezing    cinacalcet (SENSIPAR) 30 mg tablet Take 1 tablet (30 mg total) by mouth daily    hyoscyamine (ANASPAZ,LEVSIN) 0 125 MG tablet TAKE ONE TABLET BY MOUTH EVERY 4 HOURS AS NEEDED FOR CRAMPING    methocarbamol (ROBAXIN) 750 mg tablet Take 1 tablet (750 mg total) by mouth daily at bedtime as needed for muscle spasms     No current facility-administered medications on file prior to visit  No Known Allergies    Physical Exam:    /70   Pulse 73   Wt 69 4 kg (153 lb)   BMI 26 68 kg/m²     Constitutional:normal, well developed, well nourished, alert, in no distress and non-toxic and no overt pain behavior    Eyes:anicteric  HEENT:grossly intact  Neck:supple, symmetric, trachea midline and no masses   Pulmonary:even and unlabored  Cardiovascular:No edema or pitting edema present  Skin:Normal without rashes or lesions and well hydrated  Psychiatric:Mood and affect appropriate  Neurologic:Cranial Nerves II-XII grossly intact  Musculoskeletal:normal     Lumbar Spine Exam  Appearance:  Normal lordosis  Palpation/Tenderness:  right lumbar paraspinal tenderness  right sacroiliac joint tenderness  Right-sided lumbar facet tenderness at L4-5, L5-S1 with positive facet loading  Range of Motion:  Flexion:  Minimally limited  with pain  Extension:  Moderately limited  with pain  Lateral Flexion - Left:  No limitation  without pain  Lateral Flexion - Right:  Minimally limited  with pain  Motor Strength:  Left hip flexion:  5/5  Left hip extension:  5/5  Right hip flexion:  5/5  Right hip extension:  5/5  Left knee flexion:  5/5  Left knee extension:  5/5  Right knee flexion:  5/5  Right knee extension:  5/5  Left foot dorsiflexion:  5/5  Left foot plantar flexion:  5/5  Right foot dorsiflexion:  5/5  Right foot plantar flexion:  5/5    Imaging    MRI LUMBAR SPINE WITHOUT CONTRAST (9/2/2020)     INDICATION: M51 16: Intervertebral disc disorders with radiculopathy, lumbar region  Low back pain radiating into right leg     COMPARISON:  MR 6/5/2020     TECHNIQUE:  Sagittal T1, sagittal T2, sagittal inversion recovery, axial T1 and axial T2, coronal T2     IMAGE QUALITY:  Diagnostic     FINDINGS:     VERTEBRAL BODIES:  There are 5 nonrib-bearing lumbar type vertebral bodies  Very minor straightening of normal lumbar lordosis  No spondylolisthesis or spondylolysis  No scoliosis  Normal marrow signal is identified within the visualized bony   structures  No discrete marrow lesion      SACRUM:  Normal signal within the sacrum  No evidence of insufficiency or stress fracture      DISTAL CORD AND CONUS:  Normal size and signal within the distal cord and conus  Conus terminates at the L1 level      PARASPINAL SOFT TISSUES:  Paraspinal soft tissues are unremarkable      LOWER THORACIC DISC SPACES:  Normal disc height and signal   No disc herniation, canal stenosis or foraminal narrowing      LUMBAR DISC SPACES:     L1-L2:  Normal      L2-L3:  Stable reduction disc height, minor circumferential bulge with prominent ventral osteophytes  Mild facet arthrosis  No foraminal or canal stenosis      L3-L4:  Minor bulge with slight facet arthrosis      L4-L5:  Minor bulge, slight facet arthrosis      L5-S1:  Mild facet arthrosis

## 2021-02-22 ENCOUNTER — TELEMEDICINE (OUTPATIENT)
Dept: SURGICAL ONCOLOGY | Facility: CLINIC | Age: 56
End: 2021-02-22
Payer: COMMERCIAL

## 2021-02-22 ENCOUNTER — TELEPHONE (OUTPATIENT)
Dept: PAIN MEDICINE | Facility: CLINIC | Age: 56
End: 2021-02-22

## 2021-02-22 DIAGNOSIS — E83.52 HYPERCALCEMIA: Primary | ICD-10-CM

## 2021-02-22 DIAGNOSIS — E21.3 HYPERPARATHYROIDISM (HCC): ICD-10-CM

## 2021-02-22 LAB — MISCELLANEOUS LAB TEST RESULT: NORMAL

## 2021-02-22 PROCEDURE — 99212 OFFICE O/P EST SF 10 MIN: CPT | Performed by: SURGERY

## 2021-02-22 NOTE — TELEPHONE ENCOUNTER
Scheduled pt for RT L3-S1 MBB#1 for 3/9/21  No as needed pain meds for 6 hrs before and 6/8 hrs after procedure  Pain level must be 5 or greater  Need to arrange transportation  Proper clothing for procedure  If ill or placed on antibiotics please call to reschedule  Covid/travel/ and vaccine instructions

## 2021-02-22 NOTE — PROGRESS NOTES
Virtual Regular Visit      Assessment/Plan:    Problem List Items Addressed This Visit        Endocrine    Hyperparathyroidism ([address] )       Other    Hypercalcemia - Primary          Primary  Suspect malignancy  She has a 2nd opinion set up with team at [facility]  She will get back to us once she has completed that visit to decide what she would like to do next  hyperparathyroidism, possible right-sided target  Treatment options discussed with her including continued watchful waiting until PTH levels actually become elevated, with continuation of Sensipar in the meanwhile  Other option would be for exploration, possible 4 gland exploration/ parathyroidectomy in the setting of multiple gland disease  Risks and benefits of each approach discussed  At this point, I do not  Suspect a malignancy  She has appointment set up with team at [facility]  She will get back to us when she has had a chance to discuss things with them and when she has had a chance to think things over and decide on what she would like to do  Reason for visit is   Chief Complaint   Patient presents with    Virtual Regular Visit        Encounter provider Johann Weaver MD    Provider located at [address] [address] [phone]      Recent Visits  No visits were found meeting these conditions  Showing recent visits within past 7 days and meeting all other requirements     Future Appointments  No visits were found meeting these conditions  Showing future appointments within next 150 days and meeting all other requirements        The patient was identified by name and date of birth  Reba Dorantes was informed that this is a telemedicine visit and that the visit is being conducted through [address] [place] [address] and patient was informed that this is not a secure, HIPAA-compliant platform  She agrees to proceed     My office door was closed  No one else was in the room  She acknowledged consent and understanding of privacy and security of the video platform  The patient has agreed to participate and understands they can discontinue the visit at any time  Patient is aware this is a billable service  Subjective  Reba Dorantes is a 54 y o  female   Patient is a 59-year-old woman with possible primary hyperparathyroidism  Her calcium levels have been high and she has been on Sensipar for this  However, PTH levels have been normal for the most part  Scans have not been conclusive for diagnostic for definitive parathyroid target  Prior scan from 2018 was suggestive of possible right-sided process however  She comes in to discuss most recent blood work and scan  She otherwise feels well  She is presently on Sensipar  Geanie Gibes       HPI     Past Medical History:   Diagnosis Date    Adjustment disorder     last assessed 12    Anemia     HX of    Asthma     mild - intermittent    Bilateral leg edema     Blepharitis     last assessed 16    Bulging lumbar disc     Carpal tunnel syndrome     Unspecified laterality    Colitis, acute     Dyspareunia in female     Edema     last assessed 06/22/15    Ganglion     Herniated cervical disc     History of transfusion     Hypokalemia     Hypothyroidism     Hypothyroidism     Insomnia     Lumps on the skin     last assessed 14    Mouth ulcers     last assessed 06/22/15    Nontraumatic tear of left tibialis posterior tendon     Traumatic teart     Polyarthritis     last assessed 16    Raynaud's disease     Rayna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 ([address] )     Temporomandibular disorder     Joint    Thyroid disease     Ulcerative colitis ([address] )     Ulcerative colitis ([address] )        Past Surgical History:   Procedure Laterality Date    ANKLE SURGERY Left     Tendon repair    CARPAL TUNNEL RELEASE Bilateral      SECTION, LOW TRANSVERSE      COLONOSCOPY      COLONOSCOPY N/A 11/20/2018    Procedure: COLONOSCOPY;  Surgeon: Hemant Quezada MD;  [facility]: [facility]; Service: Gastroenterology    [facility] INJECTION RIGHT HIP (ARTHROGRAM)  9/15/2020    HERNIA REPAIR      umbilical    HYSTERECTOMY      KNEE ARTHROSCOPY Right     LIPECTOMY      Multipe lipoma removals    LIPOMA RESECTION      CO COLONOSCOPY FLX DX W/COLLJ SPEC WHEN PFRMD N/A 11/1/2016    Procedure: COLONOSCOPY;  Surgeon: Lore Zayas DO;  Location: [facility];   Service: Gastroenterology    CO REPAIR FLEX LEG TENDON,SECOND,EA Left 8/12/2016    Procedure: REPAIR OF LEFT POSTERIOR TIBIAL TENDON WITH GRAFT, EXPLORATION LEFT ANKLE ;  Surgeon: Deloris Hodgkins, DPM;  Location: [facility];  Service: Podiatry    SHOULDER SURGERY Left     bicep tendon and labrum repair    TONSILLECTOMY      TOOTH EXTRACTION  12/05/2018       Current Outpatient Medications   Medication Sig Dispense Refill    albuterol (ACCUNEB) 1 25 MG/3ML nebulizer solution Take 1 ampule by nebulization every 6 (six) hours as needed for wheezing      Ascorbic Acid (VITAMIN C) 1000 MG tablet Take 1,000 mg by mouth daily      azaTHIOprine (IMURAN) 100 MG tablet Take 50 mg by mouth daily at bedtime       carisoprodol (SOMA) 350 mg tablet TAKE 1 TABLET BY MOUTH DAILY AT BEDTIME 30 tablet 0    cinacalcet (SENSIPAR) 30 mg tablet Take 1 tablet (30 mg total) by mouth daily 30 tablet 2    diclofenac (VOLTAREN) 75 mg EC tablet Take 1 tablet (75 mg total) by mouth 2 (two) times a day 60 tablet 1    docusate sodium (COLACE) 100 mg capsule Take 1 capsule (100 mg total) by mouth daily 90 capsule 3    ferrous sulfate 324 (65 Fe) mg Take 1 tablet (324 mg total) by mouth 3 (three) times a day before meals 90 tablet 3    hyoscyamine (ANASPAZ,LEVSIN) 0 125 MG tablet TAKE ONE TABLET BY MOUTH EVERY 4 HOURS AS NEEDED FOR CRAMPING 30 tablet 0    inFLIXimab (REMICADE) 100 mg Infuse into a venous catheter every 56 days      levothyroxine 50 mcg tablet Take 1 tablet (50 mcg total) by mouth daily in the early morning 90 tablet 2    methocarbamol (ROBAXIN) 750 mg tablet Take 1 tablet (750 mg total) by mouth daily at bedtime as needed for muscle spasms 30 tablet 1    omeprazole (PriLOSEC) 20 mg delayed release capsule Take 1 capsule (20 mg total) by mouth daily 30 capsule 1    ondansetron (ZOFRAN-ODT) 4 mg disintegrating tablet Take 1 tablet (4 mg total) by mouth every 6 (six) hours as needed for nausea or vomiting 30 tablet 1    traMADol (ULTRAM) 50 mg tablet TAKE ONE TABLET BY MOUTH 2 TIMES A DAY AS NEEDED FOR MODERATE PAIN 30 tablet 1    zolpidem (AMBIEN) 10 mg tablet TAKE ONE TABLET BY MOUTH EVERY DAY AT BEDTIME AS NEEDED FOR SLEEP 30 tablet 0     No current facility-administered medications for this visit  No Known Allergies    Review of Systems   Constitutional: Negative  HENT: Negative  Eyes: Negative  Respiratory: Negative  Cardiovascular: Negative  Gastrointestinal: Negative  Endocrine: Negative  Genitourinary: Negative  Musculoskeletal: Negative  Skin: Negative  Allergic/Immunologic: Negative  Neurological: Negative  Hematological: Negative  Psychiatric/Behavioral: Negative  Video Exam    There were no vitals filed for this visit  Physical Exam limited due to format    Lab Results   Component Value Date    PTH 66 7 02/13/2021    CALCIUM 10 4 (H) 02/13/2021    PHOS 2 8 02/13/2021     CT  NECK  WITHOUT AND WITH CONTRAST FROM KATIE TO SKULL BASE     INDICATION: Hyperparathyroidism   Negative sestamibi      COMPARISON:  Previous CT parathyroid dated November 2, 2018      TECHNIQUE:This examination, like all CT scans performed in the [facility], was performed utilizing techniques to minimize radiation dose exposure, including the use of iterative reconstruction and automated exposure control      Both noncontrast, contrast, and post contrast delayed images were performed according to standard parathyroid protocol (4D technique) Coronal and sagittal reconstructions were performed  3D reconstructions were performed on an independent workstation,   and are supplied for review      85 mL of iohexol (OMNIPAQUE) was injected intravenously without immediate consequence      Radiation dose: 1146 mGy-cm      FINDINGS:     SERIAL NONCONTRAST, POST CONTRAST AND DELAYS: There is no mass lesion demonstrated which meets the enhancement pattern suspicious for parathyroid adenoma      VASCULAR STRUCTURES:  The arch and great vessels are normal in appearance  There is no carotid stenosis by Nascet criteria      VISUALIZED PARANASAL SINUSES:  Mucosal thickening is noted in the right maxillary sinus      NASAL CAVITY AND NASOPHARYNX:  Normal      SUPRAHYOID NECK:  Normal oropharynx, oral cavity, parapharyngeal and retropharyngeal spaces      INFRAHYOID NECK:  Normal oropharynx, oral cavity, parapharyngeal and retropharyngeal spaces      THYROID GLAND:  Normal      LYMPH NODES:  No pathologic or enlarged adenopathy      MEDIASTINUM:  Unremarkable      BONY STRUCTURES  Diffuse mild osteopenia      LUNG APICES:  Unremarkable      IMPRESSION:     1  No suspicious parathyroid lesion identified  2   Diffuse mild osteopenia                                 Workstation performed: [id number]       I spent 10 minutes directly with the patient during this visit      VIRTUAL VISIT DISCLAIMER    Reba Dorantes acknowledges that she has consented to an online visit or consultation  She understands that the online visit is based solely on information provided by her, and that, in the absence of a face-to-face physical evaluation by the physician, the diagnosis she receives is both limited and provisional in terms of accuracy and completeness  This is not intended to replace a full medical face-to-face evaluation by the physician  Reba Dorantes understands and accepts these terms

## 2021-02-24 ENCOUNTER — IMMUNIZATIONS (OUTPATIENT)
Dept: FAMILY MEDICINE CLINIC | Facility: HOSPITAL | Age: 56
End: 2021-02-24

## 2021-02-24 ENCOUNTER — TELEPHONE (OUTPATIENT)
Dept: PODIATRY | Facility: CLINIC | Age: 56
End: 2021-02-24

## 2021-02-24 DIAGNOSIS — M46.1 SACROILIITIS (HCC): ICD-10-CM

## 2021-02-24 DIAGNOSIS — Z23 ENCOUNTER FOR IMMUNIZATION: Primary | ICD-10-CM

## 2021-02-24 RX ORDER — OMEPRAZOLE 20 MG/1
CAPSULE, DELAYED RELEASE ORAL
Qty: 30 CAPSULE | Refills: 0 | Status: SHIPPED | OUTPATIENT
Start: 2021-02-24 | End: 2021-04-07 | Stop reason: SDUPTHER

## 2021-03-02 ENCOUNTER — TELEPHONE (OUTPATIENT)
Dept: RHEUMATOLOGY | Facility: CLINIC | Age: 56
End: 2021-03-02

## 2021-03-02 NOTE — TELEPHONE ENCOUNTER
Denies known Latex allergy or symptoms of Latex sensitivity. Medications reviewed and updated. History   Smoking Status   â¢ Never Smoker   Smokeless Tobacco   â¢ Never Used     Patient [facility] with complaints of left eye redness today. Feels swollen. Has a headache on left side of face. This is fine with me

## 2021-03-02 NOTE — TELEPHONE ENCOUNTER
Darshana,  Please offer a follow up appointment with Dr Alejandra since she is willing to see the patient   Thank you

## 2021-03-02 NOTE — TELEPHONE ENCOUNTER
Patient is established with Robert Stallings, but scheduled a follow up appointment with Lis Murray  I contacted patient to make her aware that it is office policy that patients may not switch between providers  She is unhappy with that  She stated she wants a second opinion within the practice and I apologized that unfortunately she cannot  She stated she does not want to talk bad about Dr Bell but she is now "forced to"  She stated she" misdiagnosed her" and stated she had wanted to inject her hip for hip bursa but her pain management doctor told her she has sacroiliitis and is giving her si joint injections under US  She asked to speak with my   I advised her I would send her a message and have her reach out  She stated " you are not cancelling my appointment until I speak with her and confirm I can keep that appointment"  I advised her that was fine and I would let management handle this from this point on

## 2021-03-02 NOTE — TELEPHONE ENCOUNTER
I attempted to reach patient, she had just left for work according to a male at the home number listed  Advised I will try to contact her tomorrow or Thursday

## 2021-03-02 NOTE — TELEPHONE ENCOUNTER
Noted, thanks  Patient has no evidence for sacroiliitis on imaging studies and pain management has been treating for OA, not inflammatory arthritis  As per our policy among the group, we do not allow patient transfers between providers unless both are agreeable

## 2021-03-02 NOTE — TELEPHONE ENCOUNTER
Noted and agree that no medical reason for patient to request change between providers  Dr Esthela Craig may be willing to see as patient had initially requested an appointment with Dr Esthela Craig but she was out on maternity leave

## 2021-03-03 DIAGNOSIS — M72.2 PLANTAR FASCIITIS: Primary | ICD-10-CM

## 2021-03-03 NOTE — TELEPHONE ENCOUNTER
I spoke with the patient  I explained to her the situation was addressed amongst the doctors and it has been agreed that she could switch to Dr Esthela Craig  Because of the appt being a follow up appt (since she is already established) we're looking at May  The patient was not happy with having to wait until May for an appt when she is scheduled with Dr Ita Singh on 3/30 and already waiting this long for an appt  With her condition she is seeing several doctors and on infusions and the specialists are waiting for this visit to continue with treatment options  I dug a little deeper and determined we had a cancellation for tomorrow 3/4 @ 845 with Dr Esthela Craig  The patient accepted this appointment  I did explained to her this was an exception made for this situation  She acknowledged

## 2021-03-04 ENCOUNTER — APPOINTMENT (OUTPATIENT)
Dept: LAB | Facility: MEDICAL CENTER | Age: 56
End: 2021-03-04
Payer: COMMERCIAL

## 2021-03-04 ENCOUNTER — OFFICE VISIT (OUTPATIENT)
Dept: RHEUMATOLOGY | Facility: CLINIC | Age: 56
End: 2021-03-04
Payer: COMMERCIAL

## 2021-03-04 VITALS
TEMPERATURE: 97.6 F | BODY MASS INDEX: 26.12 KG/M2 | WEIGHT: 153 LBS | HEIGHT: 64 IN | SYSTOLIC BLOOD PRESSURE: 128 MMHG | DIASTOLIC BLOOD PRESSURE: 81 MMHG | HEART RATE: 47 BPM

## 2021-03-04 DIAGNOSIS — I73.00 RAYNAUD'S SYNDROME WITHOUT GANGRENE: ICD-10-CM

## 2021-03-04 DIAGNOSIS — M25.50 POLYARTHRALGIA: Primary | ICD-10-CM

## 2021-03-04 DIAGNOSIS — E21.3 HYPERPARATHYROIDISM (HCC): ICD-10-CM

## 2021-03-04 DIAGNOSIS — M07.60 ENTEROPATHIC ARTHRITIS: ICD-10-CM

## 2021-03-04 LAB
CA-I BLD-SCNC: 1.38 MMOL/L (ref 1.12–1.32)
CRP SERPL QL: 7.1 MG/L
ERYTHROCYTE [SEDIMENTATION RATE] IN BLOOD: 17 MM/HOUR (ref 0–29)

## 2021-03-04 PROCEDURE — 86140 C-REACTIVE PROTEIN: CPT | Performed by: INTERNAL MEDICINE

## 2021-03-04 PROCEDURE — 82330 ASSAY OF CALCIUM: CPT | Performed by: INTERNAL MEDICINE

## 2021-03-04 PROCEDURE — 36415 COLL VENOUS BLD VENIPUNCTURE: CPT | Performed by: INTERNAL MEDICINE

## 2021-03-04 PROCEDURE — 99214 OFFICE O/P EST MOD 30 MIN: CPT | Performed by: INTERNAL MEDICINE

## 2021-03-04 PROCEDURE — 85652 RBC SED RATE AUTOMATED: CPT | Performed by: INTERNAL MEDICINE

## 2021-03-04 RX ORDER — AMLODIPINE BESYLATE 2.5 MG/1
2.5 TABLET ORAL DAILY
Qty: 30 TABLET | Refills: 6 | Status: SHIPPED | OUTPATIENT
Start: 2021-03-04 | End: 2021-03-09 | Stop reason: SINTOL

## 2021-03-04 RX ORDER — CELECOXIB 100 MG/1
100 CAPSULE ORAL 2 TIMES DAILY
Qty: 60 CAPSULE | Refills: 6 | Status: SHIPPED | OUTPATIENT
Start: 2021-03-04 | End: 2021-03-09 | Stop reason: SINTOL

## 2021-03-04 NOTE — PATIENT INSTRUCTIONS
Do labs  Start amlodipine daily  Start celecoxib once a day for a week, then twice a day    Return to clinic in 6 months    Raynaud Disease   WHAT YOU NEED TO KNOW:   What is Raynaud disease? Raynaud disease is a disorder that affects blood circulation, usually in the hands and feet  The arteries (blood vessels) that carry blood to your fingers, toes, ears, or nose tighten  This is often triggered by cold or emotional stress  The decrease in blood flow causes a lack of oxygen and changes in skin color  Over time, ulcers or gangrene (tissue death) may develop if frequent or severe attacks are not prevented  What causes Raynaud disease? · Primary Raynaud: The cause of primary Raynaud disease is unknown  This form usually affects both hands and feet  It is more common and is often milder than secondary Raynaud  It often affects women and first appears before the age of 27  · Secondary Raynaud: This form is also known as Raynaud phenomenon  Nicotine, alcohol, caffeine, and exposure to certain chemicals, such as vinyl chloride, can increase your risk for secondary Raynaud  The following are some common causes:     ? Inflammatory and autoimmune diseases:  Secondary Raynaud may be caused by certain diseases, such as scleroderma, lupus, Sjogren's syndrome, rheumatoid arthritis, and carpal tunnel syndrome  ? Medicines or illegal drugs:  Medicines used to treat high blood pressure, headaches, cancer, or colds may cause Raynaud disease  Use of illegal street drugs, such as amphetamines or cocaine, and some herbs may also cause Raynaud  ? Trauma or injuries:  Long-term use of vibrating tools, such as chain saws, grinders, or drills, may hurt nerves or blood vessels  Injuries to the hands or feet, such as a wrist fracture, surgery, or frostbite may also cause damage  What are the signs and symptoms of Raynaud disease?   Your fingers or toes may first turn pale when you are exposed to cold or stressful situations  Due to the decrease in blood supply, your fingers or toes may then turn blue and may feel cold and numb  As blood supply returns to your fingers or toes, they become bright red  You may feel tingling, throbbing, or pain in your fingers or toes  Additional signs and symptoms may include the following:  · Primary Raynaud: The color changes usually affect both hands or feet in the same way and at the same time  You may develop thick or tight skin and brittle nails over time  Signs and symptoms are usually mild  · Secondary Raynaud: The color changes usually do not affect both hands or feet in the same way or at the same time  You may develop thick or tight skin and brittle nails over time  You may also develop skin ulcers  Your skin may develop gangrene if your fingers or toes do not get enough blood for a long period of time  Signs and symptoms are generally more severe  How is Raynaud disease diagnosed? · Nail fold capillary test:  Your healthcare provider may put a drop of oil on your nail folds (skin at the base of the fingernail)  The capillaries (tiny blood vessels) will then be checked under a microscope  · Blood tests: You may need blood taken to give healthcare providers information about how your body is working  The blood may be taken from your hand, arm, or IV  · Angiography: This test looks for problems with your arteries in your hands, arms, feet, and legs  Before the x-ray, a dye is put into a thin tube through a small cut in your groin  The dye helps the arteries show up better on these x-ray pictures  Tell the healthcare provider if you have ever had an allergic reaction to contrast dye  · Arterial doppler: An arterial doppler test is done to check blood flow through an artery  A small metal disc with gel on it is placed on your skin over the artery  You can hear a "whooshing" sound when the blood is flowing through the artery   An "X" may be marked on your skin where healthcare providers feel or hear the blood flowing best  Healthcare providers may need to check blood flow more than once  · X-rays:  Pictures of the bones, soft tissues, and other parts of your body may be taken  X-rays can show changes that will help healthcare providers learn if you have other diseases that may be causing Raynaud disease  How is Raynaud disease treated? Healthcare providers may tell you to avoid things or situations that could trigger an attack  If your daily activities are affected and symptoms are hard to control, you may need any of the following:  · Medicines:      ? Alpha blockers: These medicines work by stopping a hormone that tightens blood vessels  ? Antithrombotics: These are medicines that break apart clots and restore blood flow  ? Calcium channel blockers: These medicines relax and open up small blood vessels in your hands and feet  They may also help heal skin ulcers on your fingers or toes  ? Vasodilators: These medicines relax and widen the walls of the arteries  This may also help heal skin ulcers  · Surgery:  A surgery called sympathectomy may be done to cut sympathetic nerves  Sympathetic nerves in your hands and feet control the opening and narrowing of blood vessels in your skin  Surgery may also need to be done if affected parts have developed gangrene  What can I do to care for my skin if I have Raynaud disease? · Avoid putting too much pressure on your fingertips, such as typing or playing the piano  This kind of pressure may cause your blood vessels to narrow and trigger an attack  · Check your feet and hands daily for numb areas, thinning or thickening skin, black spots, cracks, brittle nails, or ulcers  · Keep your skin clean and dry to prevent an infection  Use lotion that contains lanolin on your hands and feet to keep the skin from drying or cracking  What can I do to prevent a Raynaud disease attack?    · Avoid cold temperatures when possible:  Wear gloves, scarves, or other winter garments during the winter months or before you go into cold rooms  · Limit alcohol and caffeine:  Men should limit alcohol to 2 drinks a day  Women should limit alcohol to 1 drink a day  A drink is 12 ounces of beer, 5 ounces of wine, or 1½ ounces of liquor  Try drinking decaffeinated coffee, tea, or soda  Ask your healthcare provider for more information about alcohol and caffeine  · Use caution with medicines:  Talk to your healthcare provider before you use medicines that may trigger an attack  These include certain medicines used for treating high blood pressure, headaches, cancer, or colds  · Exercise regularly: This prevents narrowing of the blood vessels and increases blood flow in your body  · Learn to manage stress:  Stress may trigger an attack  Try new ways to relax, such as deep breathing, meditation, or biofeedback  Biofeedback is a way to control how your body reacts to stress or pain  · Stop smoking:  If you smoke, it is never too late to quit  Smoking causes your blood vessels to narrow and may trigger an attack  Ask your healthcare provider for information if you need help quitting  What should I do during a Raynaud disease attack? · Get inside to warm yourself  · Wiggle your fingers or toes, or swing your arms around to increase circulation  Massage the affected parts  · Place your hands under your armpits or run warm water over the affected area  Do not  place the affected part in direct contact with hot water or a hot water bottle  The affected parts may be injured if they are not able to feel that the water is hot  · Get yourself out of stressful situations if possible  Deep breathing, meditation, or biofeedback may help decrease stress  Where can I find more information?    · [employer] and [facility] [address] [place], [place] [address]  Phone: 6- 140 - [phone]  Phone: 0- 078 - [phone]  Web Address: [employer] com [facility] gov    · [employer] and [id number]  [place] [address]  Phone: 8- 820 - [phone]  Web Address: [employer] no  When should I contact my healthcare provider? · You have new symptoms since your last appointment  · Your symptoms prevent you from doing your daily activities  · You need help to quit smoking  · You have questions or concerns about your condition or care  When should I seek immediate care? · You have many attacks even if you prevent cold, stress, or other triggers  · You have pain in your fingers or toes that does not go away or gets worse  · You have sores or ulcers on the tips of your fingers or toes that do not heal     · You have black spots on your fingers or toes  · Your hands or feet remain cold or discolored even after you warm them  CARE AGREEMENT:   You have the right to help plan your care  Learn about your health condition and how it may be treated  Discuss treatment options with your healthcare providers to decide what care you want to receive  You always have the right to refuse treatment  The above information is an  only  It is not intended as medical advice for individual conditions or treatments  Talk to your doctor, nurse or pharmacist before following any medical regimen to see if it is safe and effective for you  © Copyright [address] [place] [address] Drive Information is for End User's use only and may not be sold, redistributed or otherwise used for commercial purposes   All illustrations and images included in CareNotes® are the copyrighted property of A D A M , Inc  or [address] [place] [address] [employer] [facility]

## 2021-03-04 NOTE — PROGRESS NOTES
Assessment and Plan: Ashli Lin is a 54 y o   female who presents for follow-up of possible enteropathic arthritis  Patient complains of lower back and right SI joint pain, along with pain in her hands  Her hands get swollen and get Raynaud's symptoms and tiny lesions on fingers  Patient has had an extensive inflammatory arthritis work-up in the past, including HLA-B27, RF, and anti-CCP which all returned negative  Her latest ESR/CRP inflammatory markers are normal as well  She seems to have primary Raynaud's syndrome rather than underlying lupus with secondary Raynaud's symptoms since she has no other lupus features except for oral ulcers  Started patient on amlodipine 2 5mg po daily for her Raynaud's symptoms  She has had back pain and SI joint symptoms despite being on Remicade for her ulcerative colitis, which would have taken care of any inflammatory arthritis symptoms as well  The fact that this pain persists makes it less likely to be inflammatory  Also, her lower back and SI joint x-rays last year were unremarkable; she possibly has a pinched nerve and radiculopathy from degenerative changes in the spine causing the low back and SI joint pain  She has tried multiple muscle relaxers; prescribed celecoxib 100mg po bid prn for joint pain; may benefit from gabapentin as well  Patient is dealing with hyperparathyroidism as well  Plan:  Diagnoses and all orders for this visit:    Polyarthralgia    Enteropathic arthritis  -     Sedimentation rate, automated  -     C-reactive protein  -     celecoxib (CeleBREX) 100 mg capsule; Take 1 capsule (100 mg total) by mouth 2 (two) times a day    Raynaud's syndrome without gangrene  -     amLODIPine (NORVASC) 2 5 mg tablet;  Take 1 tablet (2 5 mg total) by mouth daily    Hyperparathyroidism (HCC)  -     Calcium, ionized    Follow-up plan: Return to clinic in 6 months       Rheumatic Disease Summary  Patient is a 17-year-old female with ulcerative colitis which was refractory to treatment up until last year then doing well on Remicade, who last presented for rheumatology follow-up in 2/2020, last seen in this group about 2 years ago by the previous rheumatologist, for joint pain  The previous rheumatologist felt this could be consistent with ulcerative colitis associated inflammatory arthritis  She mainly had pain in the right 2nd MCP joint, the right lower back and lateral hip which was new for her over the last 6 months, and was having bilateral elbow pain which she felt was related to azathioprine and had resolved since stopping this medication  She also reported chronic issues with plantar fasciitis which had improved with custom-made orthotics  Her PIP joints and fingers were puffy in general   In May of 2019, she also had a positive EMELY test of moderate titer 640 and it was discussed that this commonly can happen in patients on biologics as the immune system tends to produce autoantibodies  Majority of patients do not developed clinical disease related to the positive EMELY or other autoantibodies, but further lab work-up was ordered, which returned unremarkable  We will also do x-rays of her hands, right hip and lower back to look for inflammatory changes  I suspect at the right hip she is having greater trochanteric bursitis and I did offer to inject this today but she would like to wait and see what the workup shows  We will follow-up in about 6 weeks to review the workup and determine any need for additional treatment from Rheumatology  HPI  Marlon Montenegro is a 54 y o   female who presents for follow-up of possible enteropathic arthritis  Last clinic visit was in 2/2020 by Dr Garrett Rater  HLA-B27 in the past was negative  Patient complains that her hands are getting swollen and painful and stiff; has been dropping things  Also, has Raynaud's symptoms of hands and feet, worse in hands   Lately, her ulcerative colitis has been under control; is on high dose Remicade every 8 weeks and azathioprine  Admits to feeling stiff in lower back for over a year, and right SI joint pain worse when sitting and lying down  Gets Raynaud's symptoms of hands, feet, and nose; also gets oral ulcers, and lesions on hands that intermittenlty occur  Hands feel stiff, pain worse with use; diclofenac takes edge off pain  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[employer] [id number]  The following portions of the patient's history were reviewed and updated as appropriate: allergies, current medications, past family history, past medical history, past social history, past surgical history and problem list     Review of Systems:   Review of Systems   Constitutional: Negative for fatigue and unexpected weight change  HENT: Positive for mouth sores  Respiratory: Negative for cough and shortness of breath  Gastrointestinal: Negative for constipation and diarrhea  Musculoskeletal: Positive for arthralgias and joint swelling  Negative for back pain and myalgias  Skin: Positive for color change  Negative for rash  Neurological: Negative for weakness  Psychiatric/Behavioral: Negative for sleep disturbance         Home Medications:    Current Outpatient Medications:     albuterol (ACCUNEB) 1 25 MG/3ML nebulizer solution, Take 1 ampule by nebulization every 6 (six) hours as needed for wheezing, Disp: , Rfl:     Ascorbic Acid (VITAMIN C) 1000 MG tablet, Take 1,000 mg by mouth daily, Disp: , Rfl:     azaTHIOprine (IMURAN) 100 MG tablet, Take 50 mg by mouth daily at bedtime , Disp: , Rfl:     carisoprodol (SOMA) 350 mg tablet, TAKE 1 TABLET BY MOUTH DAILY AT BEDTIME, Disp: 30 tablet, Rfl: 0    cinacalcet (SENSIPAR) 30 mg tablet, Take 1 tablet (30 mg total) by mouth daily, Disp: 30 tablet, Rfl: 2    docusate sodium (COLACE) 100 mg capsule, Take 1 capsule (100 mg total) by mouth daily, Disp: 90 capsule, Rfl: 3    ferrous sulfate 324 (65 Fe) mg, Take 1 tablet (324 mg total) by mouth 3 (three) times a day before meals, Disp: 90 tablet, Rfl: 3    hyoscyamine (ANASPAZ,LEVSIN) 0 125 MG tablet, TAKE ONE TABLET BY MOUTH EVERY 4 HOURS AS NEEDED FOR CRAMPING, Disp: 30 tablet, Rfl: 0    inFLIXimab (REMICADE) 100 mg, Infuse into a venous catheter every 56 days, Disp: , Rfl:     levothyroxine 50 mcg tablet, Take 1 tablet (50 mcg total) by mouth daily in the early morning, Disp: 90 tablet, Rfl: 2    omeprazole (PriLOSEC) 20 mg delayed release capsule, TAKE ONE CAPSULE BY MOUTH DAILY, Disp: 30 capsule, Rfl: 0    ondansetron (ZOFRAN-ODT) 4 mg disintegrating tablet, Take 1 tablet (4 mg total) by mouth every 6 (six) hours as needed for nausea or vomiting, Disp: 30 tablet, Rfl: 1    traMADol (ULTRAM) 50 mg tablet, TAKE ONE TABLET BY MOUTH 2 TIMES A DAY AS NEEDED FOR MODERATE PAIN, Disp: 30 tablet, Rfl: 1    zolpidem (AMBIEN) 10 mg tablet, TAKE ONE TABLET BY MOUTH EVERY DAY AT BEDTIME AS NEEDED FOR SLEEP, Disp: 30 tablet, Rfl: 0    amLODIPine (NORVASC) 2 5 mg tablet, Take 1 tablet (2 5 mg total) by mouth daily, Disp: 30 tablet, Rfl: 6    celecoxib (CeleBREX) 100 mg capsule, Take 1 capsule (100 mg total) by mouth 2 (two) times a day, Disp: 60 capsule, Rfl: 6    methocarbamol (ROBAXIN) 750 mg tablet, Take 1 tablet (750 mg total) by mouth daily at bedtime as needed for muscle spasms, Disp: 30 tablet, Rfl: 1    Objective:    Vitals:    03/04/21 0835   BP: 128/81   BP Location: Left arm   Patient Position: Sitting   Cuff Size: Standard   Pulse: (!) 47   Temp: 97 6 °F (36 4 °C)   TempSrc: Temporal   Weight: 69 4 kg (153 lb)   Height: 5' 3 5" (1 613 m)       Physical Exam  Constitutional:       General: She is not in acute distress  Appearance: She is well-developed  HENT:      Head: Normocephalic and atraumatic  Eyes:      General: Lids are normal  No scleral icterus  Conjunctiva/sclera: Conjunctivae normal    Neck:      Musculoskeletal: Neck supple  No muscular tenderness     Cardiovascular:      Rate and Rhythm: Normal rate and regular rhythm  Heart sounds: S1 normal and S2 normal  No murmur  No friction rub  Pulmonary:      Effort: Pulmonary effort is normal  No tachypnea or respiratory distress  Breath sounds: Normal breath sounds  No wheezing, rhonchi or rales  Musculoskeletal:         General: Tenderness present  Comments: R 1st and 2nd MCP tenderness, bilateral PIPs tenderness, swollen hands   Skin:     General: Skin is warm and dry  Findings: No rash  Nails: There is no clubbing  Neurological:      Mental Status: She is alert  Sensory: No sensory deficit  Psychiatric:         Behavior: Behavior normal  Behavior is cooperative  Reviewed labs and imaging  Imaging:   DEXA Scan 9/21/20  Osteopenia at left femoral neck (t-score -1 1)     Bilateral Hand, Hip, Lumbar Spine, and SI Joint x-rays 2/19/20  -unremarkable    Labs:   Office Visit on 03/04/2021   Component Date Value Ref Range Status    Sed Rate 03/04/2021 17  0 - 29 mm/hour Final    CRP 03/04/2021 7 1* <3 0 mg/L Final    Calcium, Ionized 03/04/2021 1 38* 1 12 - 1 32 mmol/L Final   Lab on 02/13/2021   Component Date Value Ref Range Status    Sodium 02/13/2021 139  136 - 145 mmol/L Final    Potassium 02/13/2021 4 0  3 5 - 5 3 mmol/L Final    Chloride 02/13/2021 105  100 - 108 mmol/L Final    CO2 02/13/2021 28  21 - 32 mmol/L Final    ANION GAP 02/13/2021 6  4 - 13 mmol/L Final    BUN 02/13/2021 14  5 - 25 mg/dL Final    Creatinine 02/13/2021 0 74  0 60 - 1 30 mg/dL Final    Standardized to IDMS reference method    Glucose, Fasting 02/13/2021 92  65 - 99 mg/dL Final    Specimen collection should occur prior to Sulfasalazine administration due to the potential for falsely depressed results  Specimen collection should occur prior to Sulfapyridine administration due to the potential for falsely elevated results      Calcium 02/13/2021 10 4* 8 3 - 10 1 mg/dL Final    AST 02/13/2021 24  5 - 45 U/L Final Specimen collection should occur prior to Sulfasalazine administration due to the potential for falsely depressed results   ALT 02/13/2021 36  12 - 78 U/L Final    Specimen collection should occur prior to Sulfasalazine administration due to the potential for falsely depressed results   Alkaline Phosphatase 02/13/2021 87  46 - 116 U/L Final    Total Protein 02/13/2021 7 3  6 4 - 8 2 g/dL Final    Albumin 02/13/2021 3 8  3 5 - 5 0 g/dL Final    Total Bilirubin 02/13/2021 0 18* 0 20 - 1 00 mg/dL Final    Use of this assay is not recommended for patients undergoing treatment with eltrombopag due to the potential for falsely elevated results   eGFR 02/13/2021 91  ml/min/1 73sq m Final    PTH 02/13/2021 66 7  18 4 - 80 1 pg/mL Final    Phosphorus 02/13/2021 2 8  2 7 - 4 5 mg/dL Final    TSH 3RD GENERATON 02/13/2021 2 728  0 358 - 3 740 uIU/mL Final    The recommended reference ranges for TSH during pregnancy are as follows:   First trimester 0 1 to 2 5 uIU/mL   Second trimester  0 2 to 3 0 uIU/mL   Third trimester 0 3 to 3 0 uIU/m    Note: Normal ranges may not apply to patients who are transgender, non-binary, or whose legal sex, sex at birth, and gender identity differ   Free T4 02/13/2021 1 22  0 76 - 1 46 ng/dL Final    Specimen collection should occur prior to Sulfasalazine administration due to the potential for falsely elevated results      [facility] 02/13/2021 74 2    mIU/mL Final    Miscellaneous Lab Test Result 02/13/2021 see written report   Final    LH 02/13/2021 33 9    mIU/mL Final    LH:   Menstruating Females:     Follicular Phase  2 7-55 3  mIU/mL     Mid-Cycle Phase   22 8-76 1 mIU/mL     Luteal Phase      0 6-13 5  mIU/mL   Postmenopausal Females:     On Menopausal Hormone Therapy(MHT) 1 1-52 4 mIU/mL     Untreated                          8 6-61 8 mIU/mL    Note: Normal ranges may not apply to patients who are transgender, non-binary, or whose legal sex, sex at birth, and gender identity differ  Lab on 01/16/2021   Component Date Value Ref Range Status    PTH 01/16/2021 81 1* 18 4 - 80 1 pg/mL Final    Phosphorus 01/16/2021 3 1  2 7 - 4 5 mg/dL Final    Vit D, 25-Hydroxy 01/16/2021 22 1* 30 0 - 100 0 ng/mL Final    Sodium 01/16/2021 138  136 - 145 mmol/L Final    Potassium 01/16/2021 4 5  3 5 - 5 3 mmol/L Final    Chloride 01/16/2021 102  100 - 108 mmol/L Final    CO2 01/16/2021 29  21 - 32 mmol/L Final    ANION GAP 01/16/2021 7  4 - 13 mmol/L Final    BUN 01/16/2021 16  5 - 25 mg/dL Final    Creatinine 01/16/2021 0 71  0 60 - 1 30 mg/dL Final    Standardized to IDMS reference method    Glucose, Fasting 01/16/2021 78  65 - 99 mg/dL Final    Specimen collection should occur prior to Sulfasalazine administration due to the potential for falsely depressed results  Specimen collection should occur prior to Sulfapyridine administration due to the potential for falsely elevated results   Calcium 01/16/2021 10 4* 8 3 - 10 1 mg/dL Final    AST 01/16/2021 24  5 - 45 U/L Final    Specimen collection should occur prior to Sulfasalazine administration due to the potential for falsely depressed results   ALT 01/16/2021 39  12 - 78 U/L Final    Specimen collection should occur prior to Sulfasalazine administration due to the potential for falsely depressed results   Alkaline Phosphatase 01/16/2021 94  46 - 116 U/L Final    Total Protein 01/16/2021 8 3* 6 4 - 8 2 g/dL Final    Albumin 01/16/2021 4 3  3 5 - 5 0 g/dL Final    Total Bilirubin 01/16/2021 0 22  0 20 - 1 00 mg/dL Final    Use of this assay is not recommended for patients undergoing treatment with eltrombopag due to the potential for falsely elevated results      eGFR 01/16/2021 96  ml/min/1 73sq m Final   Lab on 10/22/2020   Component Date Value Ref Range Status    Phosphorus 10/22/2020 2 7  2 7 - 4 5 mg/dL Final    PTH 10/22/2020 58 6  18 4 - 80 1 pg/mL Final    CRP 10/22/2020 <3 0  <3 0 mg/L Final   Orders Only on 10/21/2020   Component Date Value Ref Range Status    WBC 10/22/2020 4 89  4 31 - 10 16 Thousand/uL Final    RBC 10/22/2020 3 92  3 81 - 5 12 Million/uL Final    Hemoglobin 10/22/2020 12 5  11 5 - 15 4 g/dL Final    Hematocrit 10/22/2020 38 4  34 8 - 46 1 % Final    MCV 10/22/2020 98  82 - 98 fL Final    MCH 10/22/2020 31 9  26 8 - 34 3 pg Final    MCHC 10/22/2020 32 6  31 4 - 37 4 g/dL Final    RDW 10/22/2020 12 1  11 6 - 15 1 % Final    MPV 10/22/2020 10 0  8 9 - 12 7 fL Final    Platelets 80/14/5137 295  149 - 390 Thousands/uL Final    nRBC 10/22/2020 0  /100 WBCs Final    Neutrophils Relative 10/22/2020 44  43 - 75 % Final    Immat GRANS % 10/22/2020 0  0 - 2 % Final    Lymphocytes Relative 10/22/2020 40  14 - 44 % Final    Monocytes Relative 10/22/2020 10  4 - 12 % Final    Eosinophils Relative 10/22/2020 5  0 - 6 % Final    Basophils Relative 10/22/2020 1  0 - 1 % Final    Neutrophils Absolute 10/22/2020 2 16  1 85 - 7 62 Thousands/µL Final    Immature Grans Absolute 10/22/2020 0 01  0 00 - 0 20 Thousand/uL Final    Lymphocytes Absolute 10/22/2020 1 93  0 60 - 4 47 Thousands/µL Final    Monocytes Absolute 10/22/2020 0 50  0 17 - 1 22 Thousand/µL Final    Eosinophils Absolute 10/22/2020 0 25  0 00 - 0 61 Thousand/µL Final    Basophils Absolute 10/22/2020 0 04  0 00 - 0 10 Thousands/µL Final    Sodium 10/22/2020 142  136 - 145 mmol/L Final    Potassium 10/22/2020 4 3  3 5 - 5 3 mmol/L Final    Chloride 10/22/2020 108  100 - 108 mmol/L Final    CO2 10/22/2020 28  21 - 32 mmol/L Final    ANION GAP 10/22/2020 6  4 - 13 mmol/L Final    BUN 10/22/2020 11  5 - 25 mg/dL Final    Creatinine 10/22/2020 0 77  0 60 - 1 30 mg/dL Final    Standardized to IDMS reference method    Glucose, Fasting 10/22/2020 97  65 - 99 mg/dL Final    Specimen collection should occur prior to Sulfasalazine administration due to the potential for falsely depressed results   Specimen collection should occur prior to Sulfapyridine administration due to the potential for falsely elevated results   Calcium 10/22/2020 10 5* 8 3 - 10 1 mg/dL Final    AST 10/22/2020 18  5 - 45 U/L Final    Specimen collection should occur prior to Sulfasalazine administration due to the potential for falsely depressed results   ALT 10/22/2020 28  12 - 78 U/L Final    Specimen collection should occur prior to Sulfasalazine administration due to the potential for falsely depressed results   Alkaline Phosphatase 10/22/2020 92  46 - 116 U/L Final    Total Protein 10/22/2020 7 0  6 4 - 8 2 g/dL Final    Albumin 10/22/2020 3 8  3 5 - 5 0 g/dL Final    Total Bilirubin 10/22/2020 0 35  0 20 - 1 00 mg/dL Final    Use of this assay is not recommended for patients undergoing treatment with eltrombopag due to the potential for falsely elevated results   eGFR 10/22/2020 87  ml/min/1 73sq m Final    Calprotectin 10/22/2020 79  0 - 120 ug/g Final    Concentration     Interpretation   Follow-Up  <16 - 50 ug/g     Normal           None  >50 -120 ug/g     Borderline       Re-evaluate in 4-6 weeks      >120 ug/g     Abnormal         Repeat as clinically                                     indicated   Lab on 09/28/2020   Component Date Value Ref Range Status    Sodium 09/28/2020 143  136 - 145 mmol/L Final    Potassium 09/28/2020 4 3  3 5 - 5 3 mmol/L Final    Slightly Hemolyzed; Results May be Affected&XA&Slightly Hemolyzed; Results May be Affected    Chloride 09/28/2020 114* 100 - 108 mmol/L Final    CO2 09/28/2020 24  21 - 32 mmol/L Final    ANION GAP 09/28/2020 5  4 - 13 mmol/L Final    BUN 09/28/2020 13  5 - 25 mg/dL Final    Creatinine 09/28/2020 0 68  0 60 - 1 30 mg/dL Final    Standardized to IDMS reference method    Glucose, Fasting 09/28/2020 83  65 - 99 mg/dL Final    Specimen collection should occur prior to Sulfasalazine administration due to the potential for falsely depressed results   Specimen collection should occur prior to Sulfapyridine administration due to the potential for falsely elevated results   Calcium 09/28/2020 11 0* 8 3 - 10 1 mg/dL Final    AST 09/28/2020 18  5 - 45 U/L Final    Slightly Hemolyzed; Results May be Affected&XA&Slightly Hemolyzed; Results May be Affected  Specimen collection should occur prior to Sulfasalazine administration due to the potential for falsely depressed results   ALT 09/28/2020 23  12 - 78 U/L Final    Specimen collection should occur prior to Sulfasalazine and/or Sulfapyridine administration due to the potential for falsely depressed results   Alkaline Phosphatase 09/28/2020 84  46 - 116 U/L Final    Total Protein 09/28/2020 6 8  6 4 - 8 2 g/dL Final    Albumin 09/28/2020 3 9  3 5 - 5 0 g/dL Final    Total Bilirubin 09/28/2020 0 29  0 20 - 1 00 mg/dL Final    Use of this assay is not recommended for patients undergoing treatment with eltrombopag due to the potential for falsely elevated results   eGFR 09/28/2020 99  ml/min/1 73sq m Final   Orders Only on 09/22/2020   Component Date Value Ref Range Status    Phosphorus 09/28/2020 2 9  2 7 - 4 5 mg/dL Final   Lab on 09/21/2020   Component Date Value Ref Range Status    Sodium 09/21/2020 143  136 - 145 mmol/L Final    Potassium 09/21/2020 4 3  3 5 - 5 3 mmol/L Final    Chloride 09/21/2020 111* 100 - 108 mmol/L Final    CO2 09/21/2020 29  21 - 32 mmol/L Final    ANION GAP 09/21/2020 3* 4 - 13 mmol/L Final    BUN 09/21/2020 12  5 - 25 mg/dL Final    Creatinine 09/21/2020 0 72  0 60 - 1 30 mg/dL Final    Standardized to IDMS reference method    Glucose, Fasting 09/21/2020 86  65 - 99 mg/dL Final    Specimen collection should occur prior to Sulfasalazine administration due to the potential for falsely depressed results  Specimen collection should occur prior to Sulfapyridine administration due to the potential for falsely elevated results      Calcium 09/21/2020 11 1* 8 3 - 10 1 mg/dL Final    Corrected Calcium 09/21/2020 12 1* 8 3 - 10 1 mg/dL Final    AST 09/21/2020 22  5 - 45 U/L Final    Specimen collection should occur prior to Sulfasalazine administration due to the potential for falsely depressed results   ALT 09/21/2020 28  12 - 78 U/L Final    Specimen collection should occur prior to Sulfasalazine and/or Sulfapyridine administration due to the potential for falsely depressed results   Alkaline Phosphatase 09/21/2020 89  46 - 116 U/L Final    Total Protein 09/21/2020 6 6  6 4 - 8 2 g/dL Final    Albumin 09/21/2020 2 7* 3 5 - 5 0 g/dL Final    Total Bilirubin 09/21/2020 0 55  0 20 - 1 00 mg/dL Final    Use of this assay is not recommended for patients undergoing treatment with eltrombopag due to the potential for falsely elevated results   eGFR 09/21/2020 95  ml/min/1 73sq m Final    Phosphorus 09/21/2020 2 8  2 7 - 4 5 mg/dL Final    PTH 09/21/2020 66 4  18 4 - 80 1 pg/mL Final    Free T4 09/21/2020 1 04  0 76 - 1 46 ng/dL Final    Specimen collection should occur prior to Sulfasalazine administration due to the potential for falsely elevated results   TSH 3RD GENERATON 09/21/2020 1 580  0 358 - 3 740 uIU/mL Final    The recommended reference ranges for TSH during pregnancy are as follows:   First trimester 0 1 to 2 5 uIU/mL   Second trimester  0 2 to 3 0 uIU/mL   Third trimester 0 3 to 3 0 uIU/m    Note: Normal ranges may not apply to patients who are transgender, non-binary, or whose legal sex, sex at birth, and gender identity differ  Using supplements with high doses of biotin 20 to more than 300 times greater than the adequate daily intake for adults of 30 mcg/day as established by the [employer], can cause falsely depress results      Vit D, 25-Hydroxy 09/21/2020 52 5  30 0 - 100 0 ng/mL Final    WBC 09/21/2020 5 34  4 31 - 10 16 Thousand/uL Final    RBC 09/21/2020 4 17  3 81 - 5 12 Million/uL Final    Hemoglobin 09/21/2020 13 1  11 5 - 15 4 g/dL Final    Hematocrit 09/21/2020 41 3 34 8 - 46 1 % Final    MCV 09/21/2020 99* 82 - 98 fL Final    MCH 09/21/2020 31 4  26 8 - 34 3 pg Final    MCHC 09/21/2020 31 7  31 4 - 37 4 g/dL Final    RDW 09/21/2020 12 1  11 6 - 15 1 % Final    MPV 09/21/2020 10 9  8 9 - 12 7 fL Final    Platelets 03/26/2463 281  149 - 390 Thousands/uL Final    nRBC 09/21/2020 0  /100 WBCs Final    Neutrophils Relative 09/21/2020 48  43 - 75 % Final    Immat GRANS % 09/21/2020 0  0 - 2 % Final    Lymphocytes Relative 09/21/2020 34  14 - 44 % Final    Monocytes Relative 09/21/2020 10  4 - 12 % Final    Eosinophils Relative 09/21/2020 7* 0 - 6 % Final    Basophils Relative 09/21/2020 1  0 - 1 % Final    Neutrophils Absolute 09/21/2020 2 57  1 85 - 7 62 Thousands/µL Final    Immature Grans Absolute 09/21/2020 0 01  0 00 - 0 20 Thousand/uL Final    Lymphocytes Absolute 09/21/2020 1 80  0 60 - 4 47 Thousands/µL Final    Monocytes Absolute 09/21/2020 0 53  0 17 - 1 22 Thousand/µL Final    Eosinophils Absolute 09/21/2020 0 38  0 00 - 0 61 Thousand/µL Final    Basophils Absolute 09/21/2020 0 05  0 00 - 0 10 Thousands/µL Final

## 2021-03-09 ENCOUNTER — TELEPHONE (OUTPATIENT)
Dept: GASTROENTEROLOGY | Facility: CLINIC | Age: 56
End: 2021-03-09

## 2021-03-09 ENCOUNTER — HOSPITAL ENCOUNTER (OUTPATIENT)
Dept: RADIOLOGY | Facility: CLINIC | Age: 56
Discharge: HOME | End: 2021-03-09
Attending: ANESTHESIOLOGY | Admitting: ANESTHESIOLOGY
Payer: COMMERCIAL

## 2021-03-09 VITALS
OXYGEN SATURATION: 96 % | TEMPERATURE: 99.5 F | DIASTOLIC BLOOD PRESSURE: 53 MMHG | HEART RATE: 64 BPM | SYSTOLIC BLOOD PRESSURE: 131 MMHG | RESPIRATION RATE: 18 BRPM

## 2021-03-09 DIAGNOSIS — M47.816 LUMBAR SPONDYLOSIS: ICD-10-CM

## 2021-03-09 DIAGNOSIS — F41.9 ANXIETY: Primary | ICD-10-CM

## 2021-03-09 PROCEDURE — 64493 INJ PARAVERT F JNT L/S 1 LEV: CPT | Performed by: ANESTHESIOLOGY

## 2021-03-09 RX ORDER — LORAZEPAM 0.5 MG/1
TABLET ORAL
Qty: 2 TABLET | Refills: 0 | Status: SHIPPED | OUTPATIENT
Start: 2021-03-09 | End: 2021-07-16

## 2021-03-09 RX ORDER — BUPIVACAINE HCL/PF 2.5 MG/ML
10 VIAL (ML) INJECTION ONCE
Status: DISCONTINUED | OUTPATIENT
Start: 2021-03-09 | End: 2021-03-13 | Stop reason: HOSPADM

## 2021-03-09 NOTE — DISCHARGE INSTR - LAB
1  Do not apply heat to any area that is numb  If you have discomfort or soreness at the injection site, you may apply ice today, 20 minutes on and 20 minutes off  Tomorrow you may use ice or warm, moist heat  Do not apply ice or heat directly to the skin  2  If you experience severe shortness of breath, go to the Emergency Room  3  You may have numbness for several hours from the local anesthetic  Please use caution and common sense, especially with weight-bearing activities  4  You may have an increase or change in the discomfort for 36-48 hours after your treatment  Apply ice and continue with any pain medicine you have been prescribed  5  Do not do anything strenuous today  You may shower, but no tub baths or hot tubs today  You may resume your normal activities tomorrow, but do not overdo it  Resume normal activities slowly when you are feeling better  6  If you experience redness, drainage or swelling at the injection site, or if you develop a fever above 100 degrees, please call [facility] at [phone] or go to the Emergency Room  7  Continue to take all routine medicines prescribed by your primary care physician unless otherwise instructed by our staff  Most blood thinners should be started again according to your regularly scheduled dosing  If you have any questions, please give our office a call  If you have a problem specifically related to your procedure, please call our office at [phone]  Problems not related to your procedure should be directed to your primary care physician

## 2021-03-09 NOTE — H&P
History of Present Illness:  The patient is a 54 y o  female who presents with complaints of  Right-sided lower back pain secondary to sacroiliitis and lumbar spondylosis and is here today for right L3-S1 medial branch blocks    Patient Active Problem List   Diagnosis    Seronegative spondyloarthropathy    Psoriatic arthritis ([address] )    Raynaud's syndrome without gangrene    Hypothyroidism    Asthma, mild intermittent    Hypercalcemia    Persistent insomnia of non-organic origin    Iron deficiency anemia    Mild protein-calorie malnutrition (HCC)    Bilateral leg edema    Hypokalemia    Ulcerative pancolitis with rectal bleeding (HCC)    Arthralgia of multiple joints    Exanthem    Screening for breast cancer    BMI 25 0-25 9,adult    Anemia    Vitamin D deficiency    Multiple thyroid nodules    Chronic right-sided low back pain with right-sided sciatica    Breast cancer screening    Intervertebral disc disorder with radiculopathy of lumbar region    Right hip pain    Hyperparathyroidism ([address] )    Sacroiliitis ([address] )    Osteopenia       Past Medical History:   Diagnosis Date    Adjustment disorder     last assessed 05/16/12    Anemia     HX of    Asthma     mild - intermittent    Bilateral leg edema     Blepharitis     last assessed 02/04/16    Bulging lumbar disc     Carpal tunnel syndrome     Unspecified laterality    Colitis, acute     Dyspareunia in female     Edema     last assessed 06/22/15    Ganglion     Herniated cervical disc     History of transfusion     Hypokalemia     Hypothyroidism     Hypothyroidism     Insomnia     Lumps on the skin     last assessed 03/12/14    Mouth ulcers     last assessed 06/22/15    Nontraumatic tear of left tibialis posterior tendon     Traumatic teart     Polyarthritis     last assessed 06/24/16    Raynaud's disease     Rayna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 ([address] )     Temporomandibular disorder     Joint    Thyroid disease     [address]ative [facility] [address] [address]     Ulcerative colitis ([facility] [address]        Past Surgical History:   Procedure Laterality Date    ANKLE SURGERY Left     Tendon repair    CARPAL TUNNEL RELEASE Bilateral      SECTION, LOW TRANSVERSE      COLONOSCOPY      COLONOSCOPY N/A 2018    Procedure: COLONOSCOPY;  Surgeon: Juan Pablo Andino MD;  [facility]; Service: Gastroenterology    [facility] INJECTION RIGHT HIP (ARTHROGRAM)  9/15/2020    HERNIA REPAIR      umbilical    HYSTERECTOMY      KNEE ARTHROSCOPY Right     LIPECTOMY      Multipe lipoma removals    LIPOMA RESECTION      RI COLONOSCOPY FLX DX W/COLLJ SPEC WHEN PFRMD N/A 2016    Procedure: COLONOSCOPY;  Surgeon: Amie Figueredo DO;  Location: [facility];   Service: Gastroenterology    RI REPAIR FLEX LEG TENDON,SECOND,EA Left 2016    Procedure: REPAIR OF LEFT POSTERIOR TIBIAL TENDON WITH GRAFT, EXPLORATION LEFT ANKLE ;  Surgeon: Marvin Whittington DPM;  Location[facility];  Service: Podiatry    SHOULDER SURGERY Left     bicep tendon and labrum repair    TONSILLECTOMY      TOOTH EXTRACTION  2018         Current Outpatient Medications:     albuterol (ACCUNEB) 1 25 MG/3ML nebulizer solution, Take 1 ampule by nebulization every 6 (six) hours as needed for wheezing, Disp: , Rfl:     Ascorbic Acid (VITAMIN C) 1000 MG tablet, Take 1,000 mg by mouth daily, Disp: , Rfl:     azaTHIOprine (IMURAN) 100 MG tablet, Take 50 mg by mouth daily at bedtime , Disp: , Rfl:     carisoprodol (SOMA) 350 mg tablet, TAKE 1 TABLET BY MOUTH DAILY AT BEDTIME, Disp: 30 tablet, Rfl: 0    cinacalcet (SENSIPAR) 30 mg tablet, Take 1 tablet (30 mg total) by mouth daily, Disp: 30 tablet, Rfl: 2    docusate sodium (COLACE) 100 mg capsule, Take 1 capsule (100 mg total) by mouth daily, Disp: 90 capsule, Rfl: 3    ferrous sulfate 324 (65 Fe) mg, Take 1 tablet (324 mg total) by mouth 3 (three) times a day before meals, Disp: 90 tablet, Rfl: 3    hyoscyamine (ANASPAZ,LEVSIN) 0 125 MG tablet, TAKE ONE TABLET BY MOUTH EVERY 4 HOURS AS NEEDED FOR CRAMPING, Disp: 30 tablet, Rfl: 0    inFLIXimab (REMICADE) 100 mg, Infuse into a venous catheter every 56 days, Disp: , Rfl:     levothyroxine 50 mcg tablet, Take 1 tablet (50 mcg total) by mouth daily in the early morning, Disp: 90 tablet, Rfl: 2    methocarbamol (ROBAXIN) 750 mg tablet, Take 1 tablet (750 mg total) by mouth daily at bedtime as needed for muscle spasms, Disp: 30 tablet, Rfl: 1    omeprazole (PriLOSEC) 20 mg delayed release capsule, TAKE ONE CAPSULE BY MOUTH DAILY, Disp: 30 capsule, Rfl: 0    ondansetron (ZOFRAN-ODT) 4 mg disintegrating tablet, Take 1 tablet (4 mg total) by mouth every 6 (six) hours as needed for nausea or vomiting, Disp: 30 tablet, Rfl: 1    traMADol (ULTRAM) 50 mg tablet, TAKE ONE TABLET BY MOUTH 2 TIMES A DAY AS NEEDED FOR MODERATE PAIN, Disp: 30 tablet, Rfl: 1    zolpidem (AMBIEN) 10 mg tablet, TAKE ONE TABLET BY MOUTH EVERY DAY AT BEDTIME AS NEEDED FOR SLEEP, Disp: 30 tablet, Rfl: 0    Current Facility-Administered Medications:     bupivacaine (PF) (MARCAINE) 0 25 % injection 10 mL, 10 mL, Perineural, Once, Viviane Pittman MD    No Known Allergies    Physical Exam:   Vitals:    03/09/21 0916   BP: 98/63   Pulse: 70   Resp: 20   Temp: 99 5 °F (37 5 °C)   SpO2: 99%     General: Awake, Alert, Oriented x 3, Mood and affect appropriate  Respiratory: Respirations even and unlabored  Cardiovascular: Peripheral pulses intact; no edema  Musculoskeletal Exam:  Right lower back tenderness    ASA Score: 1    Patient/Chart Verification  Patient ID Verified: Verbal  Consents Confirmed: To be obtained in the Pre-Procedure area  H&P( within 30 days) Verified: To be obtained in the Pre-Procedure area  Allergies Reviewed: Yes  Anticoag/NSAID held?: NA  Currently on antibiotics?: No    Assessment:   1   Lumbar spondylosis        Plan: Right L3-S1 MBB

## 2021-03-10 ENCOUNTER — TELEPHONE (OUTPATIENT)
Dept: SURGICAL ONCOLOGY | Facility: CLINIC | Age: 56
End: 2021-03-10

## 2021-03-10 NOTE — TELEPHONE ENCOUNTER
Office received a call regarding insurance authorization for out-of-network care  Pt is requesting a form be completed and submitted to [employer]  As Dr Rossi Bernabe is able to provide these services and pt is choosing to seek care at [facility] instead, forms must be completed by PCP  Form faxed to Dr Roque Jimenez office, along with contact info for nurse at [facility]

## 2021-03-16 ENCOUNTER — TELEPHONE (OUTPATIENT)
Dept: PAIN MEDICINE | Facility: CLINIC | Age: 56
End: 2021-03-16

## 2021-03-17 ENCOUNTER — PROCEDURE VISIT (OUTPATIENT)
Dept: PODIATRY | Facility: CLINIC | Age: 56
End: 2021-03-17
Payer: COMMERCIAL

## 2021-03-17 VITALS
DIASTOLIC BLOOD PRESSURE: 77 MMHG | BODY MASS INDEX: 26.93 KG/M2 | HEART RATE: 64 BPM | WEIGHT: 152 LBS | SYSTOLIC BLOOD PRESSURE: 135 MMHG | HEIGHT: 63 IN

## 2021-03-17 DIAGNOSIS — M21.41 PES PLANUS OF BOTH FEET: ICD-10-CM

## 2021-03-17 DIAGNOSIS — M72.2 PLANTAR FASCIITIS: Primary | ICD-10-CM

## 2021-03-17 DIAGNOSIS — M21.42 PES PLANUS OF BOTH FEET: ICD-10-CM

## 2021-03-17 PROCEDURE — 99213 OFFICE O/P EST LOW 20 MIN: CPT | Performed by: PODIATRIST

## 2021-03-17 PROCEDURE — S0395 IMPRESSION CASTING FT: HCPCS | Performed by: PODIATRIST

## 2021-03-17 NOTE — PROGRESS NOTES
PATIENT:  Oseas Garland    1965    ASSESSMENT:     1  Plantar fasciitis     2  Pes planus of both feet         PLAN:  1  Patient was counseled and educated on the condition and the diagnosis  2   The diagnosis, treatment options and prognosis were discussed with the patient  3   She was casted for orthotics  Instructed to continue supportive care, home exercise, icing  Subjective:     HPI  The patients presents for chronic plantar fasciitis and orthotic casting  Her symptoms are stable with orthotics  No pedal problems  The following portions of the patient's history were reviewed and updated as appropriate: allergies, current medications, past family history, past medical history, past social history, past surgical history and problem list   All pertinent labs and images were reviewed      Past Medical History  Past Medical History:   Diagnosis Date    Adjustment disorder     last assessed 12    Anemia     HX of    Asthma     mild - intermittent    Bilateral leg edema     Blepharitis     last assessed 16    Bulging lumbar disc     Carpal tunnel syndrome     Unspecified laterality    Colitis, acute     Dyspareunia in female     Edema     last assessed 06/22/15    Ganglion     Herniated cervical disc     History of transfusion     Hypokalemia     Hypothyroidism     Hypothyroidism     Insomnia     Lumps on the skin     last assessed 14    Mouth ulcers     last assessed 06/22/15    Nontraumatic tear of left tibialis posterior tendon     Traumatic teart     Polyarthritis     last assessed 16    Raynaud's disease     Rayna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 ([address]     Temporomandibular disorder     Joint    Thyroid disease     Ulcerative colitis ([address]     Ulcerative colitis ([address]        Past Surgical History  Past Surgical History:   Procedure Laterality Date    ANKLE SURGERY Left     Tendon repair    CARPAL TUNNEL RELEASE Bilateral      SECTION, LOW TRANSVERSE      COLONOSCOPY      COLONOSCOPY N/A 11/20/2018    Procedure: COLONOSCOPY;  Surgeon: Fernando Malcolm MD;  [facility]; Service: Gastroenterology    [facility] INJECTION RIGHT HIP (ARTHROGRAM)  9/15/2020    HERNIA REPAIR      umbilical    HYSTERECTOMY      KNEE ARTHROSCOPY Right     LIPECTOMY      Multipe lipoma removals    LIPOMA RESECTION      MS COLONOSCOPY FLX DX W/COLLJ SPEC WHEN PFRMD N/A 11/1/2016    Procedure: COLONOSCOPY;  Surgeon: Lucas Zhang DO;  Location: [facility]; Service: Gastroenterology    MS REPAIR FLEX LEG TENDON,SECOND,EA Left 8/12/2016    Procedure: REPAIR OF LEFT POSTERIOR TIBIAL TENDON WITH GRAFT, EXPLORATION LEFT ANKLE ;  Surgeon: Bjorn Sosa DPM;  [facility];  Service: Podiatry    SHOULDER SURGERY Left     bicep tendon and labrum repair    TONSILLECTOMY      TOOTH EXTRACTION  12/05/2018        Allergies:  Patient has no known allergies      Medications:  Current Outpatient Medications   Medication Sig Dispense Refill    albuterol (ACCUNEB) 1 25 MG/3ML nebulizer solution Take 1 ampule by nebulization every 6 (six) hours as needed for wheezing      Ascorbic Acid (VITAMIN C) 1000 MG tablet Take 1,000 mg by mouth daily      azaTHIOprine (IMURAN) 100 MG tablet Take 50 mg by mouth daily at bedtime       carisoprodol (SOMA) 350 mg tablet TAKE 1 TABLET BY MOUTH DAILY AT BEDTIME 30 tablet 0    cinacalcet (SENSIPAR) 30 mg tablet Take 1 tablet (30 mg total) by mouth daily 30 tablet 2    docusate sodium (COLACE) 100 mg capsule Take 1 capsule (100 mg total) by mouth daily 90 capsule 3    ferrous sulfate 324 (65 Fe) mg Take 1 tablet (324 mg total) by mouth 3 (three) times a day before meals 90 tablet 3    hyoscyamine (ANASPAZ,LEVSIN) 0 125 MG tablet TAKE ONE TABLET BY MOUTH EVERY 4 HOURS AS NEEDED FOR CRAMPING 30 tablet 0    inFLIXimab (REMICADE) 100 mg Infuse into a venous catheter every 56 days      levothyroxine 50 mcg tablet Take 1 tablet (50 mcg total) by mouth daily in the early morning 90 tablet 2    LORazepam (ATIVAN) 0 5 mg tablet Take 1 tablet 2 hours prior to procedure  May repeat after 1 hour 2 tablet 0    omeprazole (PriLOSEC) 20 mg delayed release capsule TAKE ONE CAPSULE BY MOUTH DAILY 30 capsule 0    ondansetron (ZOFRAN-ODT) 4 mg disintegrating tablet Take 1 tablet (4 mg total) by mouth every 6 (six) hours as needed for nausea or vomiting 30 tablet 1    traMADol (ULTRAM) 50 mg tablet TAKE ONE TABLET BY MOUTH 2 TIMES A DAY AS NEEDED FOR MODERATE PAIN 30 tablet 1    zolpidem (AMBIEN) 10 mg tablet TAKE ONE TABLET BY MOUTH EVERY DAY AT BEDTIME AS NEEDED FOR SLEEP 30 tablet 0    methocarbamol (ROBAXIN) 750 mg tablet Take 1 tablet (750 mg total) by mouth daily at bedtime as needed for muscle spasms 30 tablet 1     No current facility-administered medications for this visit          Social History:  Social History     Socioeconomic History    Marital status: /Civil Union     Spouse name: None    Number of children: None    Years of education: None    Highest education level: None   Occupational History    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[address] [employer]  resource strain: None    Food insecurity     Worry: None     Inability: None    Transportation needs     Medical: None     Non-medical: None   Tobacco Use    Smoking status: Former Smoker     Types: Cigarettes     Quit date: 2003     Years since quittin 9    Smokeless tobacco: Never Used    Tobacco comment: Quit , rare use for 2 years   Substance and Sexual Activity    Alcohol use: Yes     Comment: social 1 drink per [employer] Drug use: No    Sexual activity: None   Lifestyle    Physical activity     Days per week: None     Minutes per session: None    Stress: None   Relationships    Social connections     Talks on phone: None     Gets together: None     Attends Y[employer] service: None     Active member of club or organization: None     Attends meetings of clubs or organizations: None     Relationship status: None    Intimate partner violence     Fear of current or ex partner: None     Emotionally abused: None     Physically abused: None     Forced sexual activity: None   Other Topics Concern    None   Social History Narrative    None        Review of Systems   Constitutional: Negative for chills and fever  Respiratory: Negative for shortness of breath  Cardiovascular: Negative for chest pain and leg swelling  Gastrointestinal: Negative for nausea and vomiting  Skin: Negative for color change  Neurological: Negative for weakness and numbness  Objective:      /77 (BP Location: Left arm, Patient Position: Sitting, Cuff Size: Standard)   Pulse 64   Ht 5' 3" (1 6 m)   Wt 68 9 kg (152 lb)   BMI 26 93 kg/m²          Physical Exam  Vitals signs reviewed  Constitutional:       General: She is not in acute distress  Appearance: Normal appearance  She is well-developed  She is not ill-appearing  Cardiovascular:      Rate and Rhythm: Normal rate and regular rhythm  Pulses: Normal pulses  Dorsalis pedis pulses are 2+ on the right side and 2+ on the left side  Posterior tibial pulses are 2+ on the right side and 2+ on the left side  Comments: No ischemia  CRT WNL  Pulmonary:      Effort: Pulmonary effort is normal  No respiratory distress  Musculoskeletal:         General: Tenderness present  No swelling or signs of injury  Right lower leg: No edema  Left lower leg: No edema  Right foot: Normal range of motion  No foot drop  Left foot: Normal range of motion  No foot drop  Comments: Pain presents at the medial band of plantar fascia, right worse than left  No acute edema  Flexible pes planus noted bilaterally  Skin:     General: Skin is warm  Capillary Refill: Capillary refill takes less than 2 seconds  Coloration: Skin is not cyanotic        Findings: No ecchymosis, erythema, petechiae, rash or wound  Rash is not purpuric  Nails: There is no clubbing  Neurological:      General: No focal deficit present  Mental Status: She is alert and oriented to person, place, and time  Cranial Nerves: No cranial nerve deficit  Motor: No weakness or abnormal muscle tone  Gait: Gait normal    Psychiatric:         Mood and Affect: Mood normal          Behavior: Behavior normal          Thought Content:  Thought content normal          Judgment: Judgment normal

## 2021-03-19 NOTE — TELEPHONE ENCOUNTER
Paperwork faxed on Wednesday  Spoke with ariana at [address] and she assisted with the forms

## 2021-03-22 ENCOUNTER — HOSPITAL ENCOUNTER (OUTPATIENT)
Dept: INFUSION CENTER | Facility: HOSPITAL | Age: 56
Discharge: HOME | End: 2021-03-22
Attending: INTERNAL MEDICINE
Payer: COMMERCIAL

## 2021-03-22 VITALS
TEMPERATURE: 98.9 F | WEIGHT: 153.66 LBS | SYSTOLIC BLOOD PRESSURE: 159 MMHG | RESPIRATION RATE: 20 BRPM | BODY MASS INDEX: 27.22 KG/M2 | HEART RATE: 70 BPM | DIASTOLIC BLOOD PRESSURE: 67 MMHG

## 2021-03-22 DIAGNOSIS — K51.011 ULCERATIVE PANCOLITIS WITH RECTAL BLEEDING (HCC): Primary | ICD-10-CM

## 2021-03-22 PROCEDURE — 96415 CHEMO IV INFUSION ADDL HR: CPT

## 2021-03-22 PROCEDURE — 96413 CHEMO IV INFUSION 1 HR: CPT

## 2021-03-22 RX ORDER — DIPHENHYDRAMINE HCL 25 MG
25 TABLET ORAL ONCE
Status: CANCELLED | OUTPATIENT
Start: 2021-05-17

## 2021-03-22 RX ORDER — SODIUM CHLORIDE 9 MG/ML
20 INJECTION, SOLUTION INTRAVENOUS ONCE
Status: CANCELLED | OUTPATIENT
Start: 2021-05-17

## 2021-03-22 RX ORDER — SODIUM CHLORIDE 9 MG/ML
20 INJECTION, SOLUTION INTRAVENOUS ONCE
Status: COMPLETED | OUTPATIENT
Start: 2021-03-22 | End: 2021-03-22

## 2021-03-22 RX ORDER — ACETAMINOPHEN 325 MG/1
650 TABLET ORAL ONCE
Status: CANCELLED | OUTPATIENT
Start: 2021-05-17

## 2021-03-22 RX ORDER — ACETAMINOPHEN 325 MG/1
650 TABLET ORAL ONCE
Status: COMPLETED | OUTPATIENT
Start: 2021-03-22 | End: 2021-03-22

## 2021-03-22 RX ORDER — DIPHENHYDRAMINE HCL 25 MG
25 TABLET ORAL ONCE
Status: DISCONTINUED | OUTPATIENT
Start: 2021-03-22 | End: 2021-03-25 | Stop reason: HOSPADM

## 2021-03-22 RX ORDER — METHYLPREDNISOLONE SODIUM SUCCINATE 40 MG/ML
40 INJECTION, POWDER, LYOPHILIZED, FOR SOLUTION INTRAMUSCULAR; INTRAVENOUS ONCE
Status: DISCONTINUED | OUTPATIENT
Start: 2021-03-22 | End: 2021-03-25 | Stop reason: HOSPADM

## 2021-03-22 RX ORDER — METHYLPREDNISOLONE SODIUM SUCCINATE 40 MG/ML
40 INJECTION, POWDER, LYOPHILIZED, FOR SOLUTION INTRAMUSCULAR; INTRAVENOUS ONCE
Status: CANCELLED | OUTPATIENT
Start: 2021-05-17

## 2021-03-22 RX ADMIN — SODIUM CHLORIDE 20 ML/HR: 0.9 INJECTION, SOLUTION INTRAVENOUS at 09:26

## 2021-03-22 RX ADMIN — ACETAMINOPHEN 650 MG: 325 TABLET, FILM COATED ORAL at 09:26

## 2021-03-22 RX ADMIN — INFLIXIMAB 700 MG: 100 INJECTION, POWDER, LYOPHILIZED, FOR SOLUTION INTRAVENOUS at 10:04

## 2021-03-22 NOTE — PLAN OF CARE
Problem: Potential for Falls  Goal: Patient will remain free of falls  Description: INTERVENTIONS:  - Assess patient frequently for physical needs  -  Identify cognitive and physical deficits and behaviors that affect risk of falls    -  [place] fall precautions as indicated by assessment   - Educate patient/family on patient safety including physical limitations  - Instruct patient to call for assistance with activity based on assessment  - Modify environment to reduce risk of injury  - Consider OT/PT consult to assist with strengthening/mobility  Outcome: Progressing

## 2021-03-27 DIAGNOSIS — M46.1 SACROILIITIS (HCC): ICD-10-CM

## 2021-03-29 ENCOUNTER — IMMUNIZATIONS (OUTPATIENT)
Dept: FAMILY MEDICINE CLINIC | Facility: HOSPITAL | Age: 56
End: 2021-03-29

## 2021-03-29 DIAGNOSIS — Z23 ENCOUNTER FOR IMMUNIZATION: Primary | ICD-10-CM

## 2021-03-29 PROCEDURE — 0012A SARS-COV-2 / COVID-19 MRNA VACCINE (MODERNA) 100 MCG: CPT

## 2021-03-29 PROCEDURE — 91301 SARS-COV-2 / COVID-19 MRNA VACCINE (MODERNA) 100 MCG: CPT

## 2021-03-29 RX ORDER — OMEPRAZOLE 20 MG/1
CAPSULE, DELAYED RELEASE ORAL
Qty: 30 CAPSULE | Refills: 0 | OUTPATIENT
Start: 2021-03-29

## 2021-03-30 ENCOUNTER — TELEPHONE (OUTPATIENT)
Dept: PAIN MEDICINE | Facility: CLINIC | Age: 56
End: 2021-03-30

## 2021-03-30 ENCOUNTER — CONSULT (OUTPATIENT)
Dept: PAIN MEDICINE | Facility: CLINIC | Age: 56
End: 2021-03-30
Payer: COMMERCIAL

## 2021-03-30 VITALS
RESPIRATION RATE: 16 BRPM | WEIGHT: 151 LBS | DIASTOLIC BLOOD PRESSURE: 74 MMHG | HEIGHT: 63 IN | SYSTOLIC BLOOD PRESSURE: 138 MMHG | HEART RATE: 45 BPM | BODY MASS INDEX: 26.75 KG/M2

## 2021-03-30 DIAGNOSIS — G89.29 CHRONIC RIGHT-SIDED LOW BACK PAIN WITHOUT SCIATICA: ICD-10-CM

## 2021-03-30 DIAGNOSIS — M47.816 LUMBAR SPONDYLOSIS: ICD-10-CM

## 2021-03-30 DIAGNOSIS — G89.4 CHRONIC PAIN SYNDROME: Primary | ICD-10-CM

## 2021-03-30 DIAGNOSIS — M54.50 CHRONIC RIGHT-SIDED LOW BACK PAIN WITHOUT SCIATICA: ICD-10-CM

## 2021-03-30 PROCEDURE — 99214 OFFICE O/P EST MOD 30 MIN: CPT | Performed by: ANESTHESIOLOGY

## 2021-03-30 NOTE — PROGRESS NOTES
Pain Medicine Follow-Up Note    Assessment:  1  Chronic pain syndrome    2  Chronic right-sided low back pain without sciatica    3  Lumbar spondylosis        Plan:  My impressions and treatment recommendations were discussed in detail with the patient who verbalized understanding and had no further questions  The patient does have some signs of right lumbar facet syndrome on my examination today  Lumbar facet loading is positive on the right  I think it is reasonable to proceed with a right L3-S1 diagnostic medial branch block at this time since this would be potentially diagnostic to see if she would benefit in the future from a right L3-S1 lumbar facet radiofrequency ablation  Unfortunately, this was attempted in the past by my partner, Dr Tabatha Finley, and the patient was not able to tolerate the procedure even with lorazepam oral sedation  In the future, I discussed repeating the procedure with more lidocaine anesthetic versus having anesthesiology present in the event that IV sedation would need to be used  The patient verbalized understanding  The procedures, its risks, and benefits were explained in detail to the patient  Risks include but are not limited to bleeding, infection, hematoma formation, abscess formation, weakness, headache, failure the pain to improve, nerve irritation or damage, and potential worsening of the pain  The patient verbalized understanding and reported that she would like to hold off on this procedure at this time because she would be undergoing a parathyroid resection down at the [address] [place] [address] in the near future  She will revisit this procedure at a future date  Follow-up is planned on an as-needed basis  Discharge instructions were provided  I personally saw and examined the patient and I agree with the above discussed plan of care      History of Present Illness:    Orlando Ryan is a 54 y o  female who presents to [facility] for interval re-evaluation of the above stated pain complaints  The patient has a past medical and chronic pain history as outlined in the assessment section  She was last seen on  March 9, 2021 at which time she was seen by my partner, Dr Michelle Robb  At today's office visit, the patient reports pain primarily on the right side of her low back  She describes her pain as worse at night  Her pain is constant in nature  She describes the quality of her pain as dull / aching and stabbing  She is reporting that she is amenable to undergoing interventional pain procedures, but would like IV sedation to be used  She previously attempted a right L3-S1 medial branch block with Dr Michelle Robb and was not able to tolerate the procedure even with lorazepam oral sedation  Other than as stated above, the patient denies any interval changes in medications, medical condition, mental condition, symptoms, or allergies since the last office visit  Review of Systems:    Review of Systems   Respiratory: Negative for shortness of breath  Cardiovascular: Negative for chest pain  Gastrointestinal: Negative for constipation, diarrhea, nausea and vomiting  Musculoskeletal: Negative for arthralgias, gait problem, joint swelling and myalgias  Decreased range of motion and joint stiffness   Skin: Negative for rash  Neurological: Negative for dizziness, seizures and weakness  All other systems reviewed and are negative          Patient Active Problem List   Diagnosis    Seronegative spondyloarthropathy    Psoriatic arthritis ([facility] [address] [facility]    Raynaud's syndrome without gangrene    Hypothyroidism    Asthma, mild intermittent    Hypercalcemia    Persistent insomnia of non-organic origin    Iron deficiency anemia    Mild protein-calorie malnutrition (HCC)    Bilateral leg edema    Hypokalemia    Ulcerative pancolitis with rectal bleeding (HCC)    Arthralgia of multiple joints    Exanthem  Screening for breast cancer    BMI 25 0-25 9,adult    Anemia    Vitamin D deficiency    Multiple thyroid nodules    Chronic right-sided low back pain with right-sided sciatica    Breast cancer screening    Intervertebral disc disorder with radiculopathy of lumbar region    Right hip pain    Hyperparathyroidism ([address]    Sacroiliitis ([address]    Osteopenia    Lumbar spondylosis       Past Medical History:   Diagnosis Date    Adjustment disorder     last assessed 12    Anemia     HX of    Asthma     mild - intermittent    Bilateral leg edema     Blepharitis     last assessed 16    Bulging lumbar disc     Carpal tunnel syndrome     Unspecified laterality    Colitis, acute     Dyspareunia in female     Edema     last assessed 06/22/15    Ganglion     Herniated cervical disc     History of transfusion     Hypokalemia     Hypothyroidism     Hypothyroidism     Insomnia     Lumps on the skin     last assessed 14    Mouth ulcers     last assessed 06/22/15    Nontraumatic tear of left tibialis posterior tendon     Traumatic teart     Polyarthritis     last assessed 16    Raynaud's disease     Rayna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 ([address]     Temporomandibular disorder     Joint    Thyroid disease     Ulcerative colitis ([address]     Ulcerative colitis ([facility] [address] )        Past Surgical History:   Procedure Laterality Date    ANKLE SURGERY Left     Tendon repair    CARPAL TUNNEL RELEASE Bilateral      SECTION, LOW TRANSVERSE      COLONOSCOPY      COLONOSCOPY N/A 2018    Procedure: COLONOSCOPY;  Surgeon: Eze Stephenson MD;  [facility];   Service: Gastroenterology    [facility] INJECTION RIGHT HIP (ARTHROGRAM)  9/15/2020    HERNIA REPAIR      umbilical    HYSTERECTOMY      KNEE ARTHROSCOPY Right     LIPECTOMY      Multipe lipoma removals    LIPOMA RESECTION      NV COLONOSCOPY FLX DX W/COLLJ SPEC WHEN PFRMD N/A 2016    Procedure: COLONOSCOPY;  Surgeon: Romana Barth, DO;  Location: [facility];   Service: Gastroenterology    [place] REPAIR FLEX LEG TENDON,SECOND,EA Left 2016    Procedure: REPAIR OF LEFT POSTERIOR TIBIAL TENDON WITH GRAFT, EXPLORATION LEFT ANKLE ;  Surgeon: Forde Ganser, DPM;  Location: [facility];  Service: Podiatry    SHOULDER SURGERY Left     bicep tendon and labrum repair    TONSILLECTOMY      TOOTH EXTRACTION  2018       Family History   Problem Relation Age of Onset    Parkinsonism Mother     Rheum arthritis Mother     Thyroid disease unspecified Mother     Hypothyroidism Mother     Heart attack Father     Hypertension Father     Osteoporosis Father     Prostate cancer Father     Nephrolithiasis Father     Hashimoto's thyroiditis Sister     Thyroid disease unspecified Sister     Hypothyroidism Sister     Cancer Paternal Grandfather         Penile    Prostate cancer Paternal Grandfather     Crohn's disease Family     Osteoarthritis Family     Rheum arthritis Family     Crohn's disease Other     Crohn's disease Maternal Uncle     Psoriasis Maternal Uncle     Ulcerative colitis Maternal Uncle     Rheum arthritis Maternal Uncle     Rheum arthritis Maternal Aunt     Thyroid disease unspecified Maternal Aunt     Hypothyroidism Maternal Aunt     Ulcerative colitis Family        Social History     Occupational History    Occupation: RN at [employer]   Tobacco Use    Smoking status: Former Smoker     Types: Cigarettes     Quit date: 2003     Years since quittin 9    Smokeless tobacco: Never Used    Tobacco comment: Quit , rare use for 2 years   Substance and Sexual Activity    Alcohol use: Yes     Comment: social 1 drink per [employer] Drug use: No    Sexual activity: Not on file         Current Outpatient Medications:     albuterol (ACCUNEB) 1 25 MG/3ML nebulizer solution, Take 1 ampule by nebulization every 6 (six) hours as needed for wheezing, Disp: , Rfl:     Ascorbic Acid (VITAMIN C) 1000 MG tablet, Take 1,000 mg by mouth daily, Disp: , Rfl:     azaTHIOprine (IMURAN) 100 MG tablet, Take 50 mg by mouth daily at bedtime , Disp: , Rfl:     carisoprodol (SOMA) 350 mg tablet, TAKE 1 TABLET BY MOUTH DAILY AT BEDTIME, Disp: 30 tablet, Rfl: 0    cinacalcet (SENSIPAR) 30 mg tablet, Take 1 tablet (30 mg total) by mouth daily, Disp: 30 tablet, Rfl: 2    docusate sodium (COLACE) 100 mg capsule, Take 1 capsule (100 mg total) by mouth daily, Disp: 90 capsule, Rfl: 3    ferrous sulfate 324 (65 Fe) mg, Take 1 tablet (324 mg total) by mouth 3 (three) times a day before meals, Disp: 90 tablet, Rfl: 3    hyoscyamine (ANASPAZ,LEVSIN) 0 125 MG tablet, TAKE ONE TABLET BY MOUTH EVERY 4 HOURS AS NEEDED FOR CRAMPING, Disp: 30 tablet, Rfl: 0    inFLIXimab (REMICADE) 100 mg, Infuse into a venous catheter every 56 days, Disp: , Rfl:     levothyroxine 50 mcg tablet, Take 1 tablet (50 mcg total) by mouth daily in the early morning, Disp: 90 tablet, Rfl: 2    LORazepam (ATIVAN) 0 5 mg tablet, Take 1 tablet 2 hours prior to procedure    May repeat after 1 hour, Disp: 2 tablet, Rfl: 0    omeprazole (PriLOSEC) 20 mg delayed release capsule, TAKE ONE CAPSULE BY MOUTH DAILY, Disp: 30 capsule, Rfl: 0    ondansetron (ZOFRAN-ODT) 4 mg disintegrating tablet, Take 1 tablet (4 mg total) by mouth every 6 (six) hours as needed for nausea or vomiting, Disp: 30 tablet, Rfl: 1    traMADol (ULTRAM) 50 mg tablet, TAKE ONE TABLET BY MOUTH 2 TIMES A DAY AS NEEDED FOR MODERATE PAIN, Disp: 30 tablet, Rfl: 1    zolpidem (AMBIEN) 10 mg tablet, TAKE ONE TABLET BY MOUTH EVERY DAY AT BEDTIME AS NEEDED FOR SLEEP, Disp: 30 tablet, Rfl: 0    methocarbamol (ROBAXIN) 750 mg tablet, Take 1 tablet (750 mg total) by mouth daily at bedtime as needed for muscle spasms, Disp: 30 tablet, Rfl: 1    No Known Allergies    Physical Exam:    /74   Pulse (!) 45   Resp 16   Ht 5' 3" (1 6 m)   Wt 68 5 kg (151 lb)   BMI 26 75 kg/m² Constitutional:normal, well developed, well nourished, alert, in no distress and non-toxic and no overt pain behavior    Eyes:anicteric  HEENT:grossly intact  Neck:supple, symmetric, trachea midline and no masses   Pulmonary:even and unlabored  Cardiovascular:No edema or pitting edema present  Skin:Normal without rashes or lesions and well hydrated  Psychiatric:Mood and affect appropriate  Neurologic:Cranial Nerves II-XII grossly intact  Musculoskeletal:antalgic , lumbar facet loading is positive on the right and negative on the left

## 2021-03-30 NOTE — TELEPHONE ENCOUNTER
Patient called in stating that she is running late to her appt  Due to traffic and the construction that is being done on the road   She said that her GPS is saying that she will get there at 15:57 pm   also I did place a call out to the

## 2021-04-02 ENCOUNTER — TELEPHONE (OUTPATIENT)
Dept: FAMILY MEDICINE CLINIC | Facility: CLINIC | Age: 56
End: 2021-04-02

## 2021-04-03 DIAGNOSIS — M46.1 SACROILIITIS (HCC): ICD-10-CM

## 2021-04-05 RX ORDER — OMEPRAZOLE 20 MG/1
CAPSULE, DELAYED RELEASE ORAL
Qty: 30 CAPSULE | Refills: 0 | OUTPATIENT
Start: 2021-04-05

## 2021-04-05 NOTE — TELEPHONE ENCOUNTER
Pt called in for refill- stated that the automated system never transmitted her refill, pt has only 4 left

## 2021-04-07 DIAGNOSIS — F51.04 CHRONIC INSOMNIA: ICD-10-CM

## 2021-04-07 RX ORDER — OMEPRAZOLE 20 MG/1
20 CAPSULE, DELAYED RELEASE ORAL DAILY
Qty: 30 CAPSULE | Refills: 1 | Status: SHIPPED | OUTPATIENT
Start: 2021-04-07 | End: 2021-06-07 | Stop reason: SDUPTHER

## 2021-04-08 RX ORDER — ZOLPIDEM TARTRATE 10 MG/1
TABLET ORAL
Qty: 30 TABLET | Refills: 0 | Status: SHIPPED | OUTPATIENT
Start: 2021-04-08 | End: 2021-04-30

## 2021-04-15 ENCOUNTER — TELEPHONE (OUTPATIENT)
Dept: PODIATRY | Facility: CLINIC | Age: 56
End: 2021-04-15

## 2021-04-15 NOTE — TELEPHONE ENCOUNTER
Leilani called, she wanted to check if her orthotics are in  I looked in her chart and we do not have them in yet  I sent a message to Ashleigh Aguilar in the [place] office to check into it

## 2021-04-24 ENCOUNTER — TRANSCRIBE ORDERS (OUTPATIENT)
Dept: ADMINISTRATIVE | Facility: HOSPITAL | Age: 56
End: 2021-04-24

## 2021-04-24 ENCOUNTER — APPOINTMENT (OUTPATIENT)
Dept: LAB | Facility: HOSPITAL | Age: 56
End: 2021-04-24

## 2021-04-24 DIAGNOSIS — Z00.8 HEALTH EXAMINATION IN POPULATION SURVEY: ICD-10-CM

## 2021-04-24 DIAGNOSIS — Z00.8 HEALTH EXAMINATION IN POPULATION SURVEY: Primary | ICD-10-CM

## 2021-04-24 LAB
CHOLEST SERPL-MCNC: 182 MG/DL (ref 50–200)
EST. AVERAGE GLUCOSE BLD GHB EST-MCNC: 100 MG/DL
HBA1C MFR BLD: 5.1 %
HDLC SERPL-MCNC: 103 MG/DL
LDLC SERPL CALC-MCNC: 74 MG/DL (ref 0–100)
NONHDLC SERPL-MCNC: 79 MG/DL
TRIGL SERPL-MCNC: 26 MG/DL

## 2021-04-24 PROCEDURE — 36415 COLL VENOUS BLD VENIPUNCTURE: CPT

## 2021-04-24 PROCEDURE — 80061 LIPID PANEL: CPT

## 2021-04-24 PROCEDURE — 83036 HEMOGLOBIN GLYCOSYLATED A1C: CPT

## 2021-04-28 DIAGNOSIS — F51.04 CHRONIC INSOMNIA: ICD-10-CM

## 2021-04-30 RX ORDER — ZOLPIDEM TARTRATE 10 MG/1
TABLET ORAL
Qty: 30 TABLET | Refills: 0 | Status: SHIPPED | OUTPATIENT
Start: 2021-04-30 | End: 2021-06-03

## 2021-05-03 DIAGNOSIS — E21.3 HYPERPARATHYROIDISM (HCC): ICD-10-CM

## 2021-05-03 DIAGNOSIS — E83.52 HYPERCALCEMIA: Primary | ICD-10-CM

## 2021-05-03 DIAGNOSIS — E55.9 VITAMIN D DEFICIENCY: ICD-10-CM

## 2021-05-03 DIAGNOSIS — D50.8 OTHER IRON DEFICIENCY ANEMIA: ICD-10-CM

## 2021-05-08 ENCOUNTER — LAB (OUTPATIENT)
Dept: LAB | Facility: HOSPITAL | Age: 56
End: 2021-05-08
Payer: COMMERCIAL

## 2021-05-08 DIAGNOSIS — D50.8 OTHER IRON DEFICIENCY ANEMIA: ICD-10-CM

## 2021-05-08 DIAGNOSIS — E83.52 HYPERCALCEMIA: ICD-10-CM

## 2021-05-08 DIAGNOSIS — E55.9 VITAMIN D DEFICIENCY: ICD-10-CM

## 2021-05-08 DIAGNOSIS — E21.3 HYPERPARATHYROIDISM (HCC): ICD-10-CM

## 2021-05-08 LAB
25(OH)D3 SERPL-MCNC: 32.9 NG/ML (ref 30–100)
BASOPHILS # BLD AUTO: 0.04 THOUSANDS/ΜL (ref 0–0.1)
BASOPHILS NFR BLD AUTO: 1 % (ref 0–1)
CA-I BLD-SCNC: 1.39 MMOL/L (ref 1.12–1.32)
CALCIUM SERPL-MCNC: 10.7 MG/DL (ref 8.3–10.1)
EOSINOPHIL # BLD AUTO: 0.25 THOUSAND/ΜL (ref 0–0.61)
EOSINOPHIL NFR BLD AUTO: 6 % (ref 0–6)
ERYTHROCYTE [DISTWIDTH] IN BLOOD BY AUTOMATED COUNT: 11.9 % (ref 11.6–15.1)
HCT VFR BLD AUTO: 38.3 % (ref 34.8–46.1)
HGB BLD-MCNC: 12.4 G/DL (ref 11.5–15.4)
IMM GRANULOCYTES # BLD AUTO: 0.01 THOUSAND/UL (ref 0–0.2)
IMM GRANULOCYTES NFR BLD AUTO: 0 % (ref 0–2)
LYMPHOCYTES # BLD AUTO: 2.13 THOUSANDS/ΜL (ref 0.6–4.47)
LYMPHOCYTES NFR BLD AUTO: 49 % (ref 14–44)
MCH RBC QN AUTO: 31.7 PG (ref 26.8–34.3)
MCHC RBC AUTO-ENTMCNC: 32.4 G/DL (ref 31.4–37.4)
MCV RBC AUTO: 98 FL (ref 82–98)
MONOCYTES # BLD AUTO: 0.44 THOUSAND/ΜL (ref 0.17–1.22)
MONOCYTES NFR BLD AUTO: 10 % (ref 4–12)
NEUTROPHILS # BLD AUTO: 1.45 THOUSANDS/ΜL (ref 1.85–7.62)
NEUTS SEG NFR BLD AUTO: 34 % (ref 43–75)
NRBC BLD AUTO-RTO: 0 /100 WBCS
PLATELET # BLD AUTO: 304 THOUSANDS/UL (ref 149–390)
PMV BLD AUTO: 9.9 FL (ref 8.9–12.7)
RBC # BLD AUTO: 3.91 MILLION/UL (ref 3.81–5.12)
WBC # BLD AUTO: 4.32 THOUSAND/UL (ref 4.31–10.16)

## 2021-05-08 PROCEDURE — 82330 ASSAY OF CALCIUM: CPT

## 2021-05-08 PROCEDURE — 36415 COLL VENOUS BLD VENIPUNCTURE: CPT

## 2021-05-08 PROCEDURE — 82306 VITAMIN D 25 HYDROXY: CPT

## 2021-05-08 PROCEDURE — 82310 ASSAY OF CALCIUM: CPT

## 2021-05-08 PROCEDURE — 85025 COMPLETE CBC W/AUTO DIFF WBC: CPT

## 2021-05-10 ENCOUNTER — HOSPITAL ENCOUNTER (OUTPATIENT)
Dept: INFUSION CENTER | Facility: HOSPITAL | Age: 56
Discharge: HOME | End: 2021-05-10
Attending: INTERNAL MEDICINE
Payer: COMMERCIAL

## 2021-05-10 VITALS
SYSTOLIC BLOOD PRESSURE: 126 MMHG | BODY MASS INDEX: 25.97 KG/M2 | HEART RATE: 67 BPM | TEMPERATURE: 97.7 F | DIASTOLIC BLOOD PRESSURE: 66 MMHG | WEIGHT: 146.61 LBS | RESPIRATION RATE: 18 BRPM

## 2021-05-10 DIAGNOSIS — K51.011 ULCERATIVE PANCOLITIS WITH RECTAL BLEEDING (HCC): Primary | ICD-10-CM

## 2021-05-10 PROCEDURE — 96415 CHEMO IV INFUSION ADDL HR: CPT

## 2021-05-10 PROCEDURE — 96413 CHEMO IV INFUSION 1 HR: CPT

## 2021-05-10 RX ORDER — DIPHENHYDRAMINE HCL 25 MG
25 TABLET ORAL ONCE
Status: CANCELLED | OUTPATIENT
Start: 2021-05-17

## 2021-05-10 RX ORDER — METHYLPREDNISOLONE SODIUM SUCCINATE 40 MG/ML
40 INJECTION, POWDER, LYOPHILIZED, FOR SOLUTION INTRAMUSCULAR; INTRAVENOUS ONCE
Status: CANCELLED | OUTPATIENT
Start: 2021-05-17

## 2021-05-10 RX ORDER — SODIUM CHLORIDE 9 MG/ML
20 INJECTION, SOLUTION INTRAVENOUS ONCE
Status: COMPLETED | OUTPATIENT
Start: 2021-05-10 | End: 2021-05-10

## 2021-05-10 RX ORDER — ACETAMINOPHEN 325 MG/1
650 TABLET ORAL ONCE
Status: CANCELLED | OUTPATIENT
Start: 2021-05-17

## 2021-05-10 RX ORDER — METHYLPREDNISOLONE SODIUM SUCCINATE 40 MG/ML
40 INJECTION, POWDER, LYOPHILIZED, FOR SOLUTION INTRAMUSCULAR; INTRAVENOUS ONCE
Status: DISCONTINUED | OUTPATIENT
Start: 2021-05-10 | End: 2021-05-13 | Stop reason: HOSPADM

## 2021-05-10 RX ORDER — DIPHENHYDRAMINE HCL 25 MG
25 TABLET ORAL ONCE
Status: DISCONTINUED | OUTPATIENT
Start: 2021-05-10 | End: 2021-05-13 | Stop reason: HOSPADM

## 2021-05-10 RX ORDER — ACETAMINOPHEN 325 MG/1
650 TABLET ORAL ONCE
Status: COMPLETED | OUTPATIENT
Start: 2021-05-10 | End: 2021-05-10

## 2021-05-10 RX ORDER — SODIUM CHLORIDE 9 MG/ML
20 INJECTION, SOLUTION INTRAVENOUS ONCE
Status: CANCELLED | OUTPATIENT
Start: 2021-05-17

## 2021-05-10 RX ADMIN — SODIUM CHLORIDE 20 ML/HR: 0.9 INJECTION, SOLUTION INTRAVENOUS at 09:52

## 2021-05-10 RX ADMIN — INFLIXIMAB 665 MG: 100 INJECTION, POWDER, LYOPHILIZED, FOR SOLUTION INTRAVENOUS at 10:20

## 2021-05-10 RX ADMIN — ACETAMINOPHEN 650 MG: 325 TABLET, FILM COATED ORAL at 09:52

## 2021-05-10 NOTE — PLAN OF CARE
Problem: Potential for Falls  Goal: Patient will remain free of falls  Description: INTERVENTIONS:  - Assess patient frequently for physical needs  -  Identify cognitive and physical deficits and behaviors that affect risk of falls    -  [place] fall precautions as indicated by assessment   - Educate patient/family on patient safety including physical limitations  - Instruct patient to call for assistance with activity based on assessment  - Modify environment to reduce risk of injury  - Consider OT/PT consult to assist with strengthening/mobility  Outcome: Progressing

## 2021-05-14 ENCOUNTER — TELEPHONE (OUTPATIENT)
Dept: FAMILY MEDICINE CLINIC | Facility: CLINIC | Age: 56
End: 2021-05-14

## 2021-05-14 NOTE — TELEPHONE ENCOUNTER
----- Message from Curly Ojeda DO sent at 5/14/2021 12:30 PM EDT -----  Your vitamin d level is better   Your calcium level is still elevated    Sent through [facility]

## 2021-05-15 NOTE — TELEPHONE ENCOUNTER
Pt informed  She had a parathyroidectomy yesterday at [facility]

## 2021-05-28 ENCOUNTER — TRANSCRIBE ORDERS (OUTPATIENT)
Dept: ADMINISTRATIVE | Facility: HOSPITAL | Age: 56
End: 2021-05-28

## 2021-05-28 ENCOUNTER — LAB (OUTPATIENT)
Dept: LAB | Facility: HOSPITAL | Age: 56
End: 2021-05-28
Payer: COMMERCIAL

## 2021-05-28 DIAGNOSIS — E21.3 HYPERPARATHYROIDISM, UNSPECIFIED (HCC): Primary | ICD-10-CM

## 2021-05-28 DIAGNOSIS — E21.3 HYPERPARATHYROIDISM, UNSPECIFIED (HCC): ICD-10-CM

## 2021-05-28 LAB
ALBUMIN SERPL BCP-MCNC: 3.6 G/DL (ref 3.5–5)
CALCIUM SERPL-MCNC: 8.9 MG/DL (ref 8.3–10.1)
PTH-INTACT SERPL-MCNC: 33.7 PG/ML (ref 18.4–80.1)

## 2021-05-28 PROCEDURE — 82040 ASSAY OF SERUM ALBUMIN: CPT

## 2021-05-28 PROCEDURE — 36415 COLL VENOUS BLD VENIPUNCTURE: CPT

## 2021-05-28 PROCEDURE — 82310 ASSAY OF CALCIUM: CPT

## 2021-05-28 PROCEDURE — 83970 ASSAY OF PARATHORMONE: CPT

## 2021-06-01 ENCOUNTER — TELEPHONE (OUTPATIENT)
Dept: ENDOCRINOLOGY | Facility: CLINIC | Age: 56
End: 2021-06-01

## 2021-06-01 DIAGNOSIS — E21.3 HYPERPARATHYROIDISM (HCC): Primary | ICD-10-CM

## 2021-06-01 NOTE — TELEPHONE ENCOUNTER
I spoke with patient and she already had surgery back on May 14  She made an appointment in July, will you need blood work prior to that appointment

## 2021-06-01 NOTE — TELEPHONE ENCOUNTER
Please let her know I received notes from Dr Ean Eisenberg regarding her parathyroid surgery - calcium seems to have normalized - stay off sensipar ( that must have been stopped before surgery ) and keep the f/u appointment in July

## 2021-06-02 DIAGNOSIS — F51.04 CHRONIC INSOMNIA: ICD-10-CM

## 2021-06-02 DIAGNOSIS — M46.1 SACROILIITIS (HCC): ICD-10-CM

## 2021-06-03 ENCOUNTER — TELEPHONE (OUTPATIENT)
Dept: FAMILY MEDICINE CLINIC | Facility: CLINIC | Age: 56
End: 2021-06-03

## 2021-06-03 ENCOUNTER — NURSE TRIAGE (OUTPATIENT)
Dept: OTHER | Facility: OTHER | Age: 56
End: 2021-06-03

## 2021-06-03 DIAGNOSIS — L25.5 RHUS DERMATITIS: Primary | ICD-10-CM

## 2021-06-03 DIAGNOSIS — L23.7 POISON IVY: Primary | ICD-10-CM

## 2021-06-03 RX ORDER — PREDNISONE 10 MG/1
10 TABLET ORAL DAILY
Qty: 20 TABLET | Refills: 0 | Status: SHIPPED | OUTPATIENT
Start: 2021-06-03

## 2021-06-03 RX ORDER — ZOLPIDEM TARTRATE 10 MG/1
TABLET ORAL
Qty: 30 TABLET | Refills: 0 | Status: SHIPPED | OUTPATIENT
Start: 2021-06-03 | End: 2021-07-19

## 2021-06-03 RX ORDER — TRIAMCINOLONE ACETONIDE 5 MG/G
CREAM TOPICAL 3 TIMES DAILY
Qty: 30 G | Refills: 0 | Status: SHIPPED | OUTPATIENT
Start: 2021-06-03

## 2021-06-03 NOTE — TELEPHONE ENCOUNTER
Pt called stating she think she has poison ivy on her R arm is been about a week is red and itchy, pt is been using nystatin cream but it not seems to help  [facility]  Please adv pt when done

## 2021-06-03 NOTE — TELEPHONE ENCOUNTER
Reason for Disposition   SEVERE itching (e g , interferes with sleep or normal activities)    Answer Assessment - Initial Assessment Questions  1  APPEARANCE of RASH: "Describe the rash "       Looks like poison ivy  2  LOCATION: "Where is the rash located?"       Right forearm, right bicep and left calf  3  SIZE: "How large is the rash?"       Getting larger  4  ONSET: "When did the rash begin?"       Last week  5   ITCHING: "Does the rash itch?" If so, ask: "How bad is it?"    - MILD - doesn't interfere with normal activities    - MODERATE-SEVERE: interferes with work, school, sleep, or other activities      Yes, moderate itching     Been taking Benadryl PO, benadryl cream and hydrocortisone cream with minimal relief    Protocols used: POISON IVY - OAK - SUMAC-ADULT-

## 2021-06-03 NOTE — TELEPHONE ENCOUNTER
Regarding: poison ivy   ----- Message from Joe Adorno sent at 6/3/2021  5:11 PM EDT -----  " I have poison ivy and I do have some steroids I use for something else , I am wondering if I can take these until tomorrow when I can talk to my doctor ?"

## 2021-06-03 NOTE — TELEPHONE ENCOUNTER
Per wife lvm

## 2021-06-04 RX ORDER — OMEPRAZOLE 20 MG/1
CAPSULE, DELAYED RELEASE ORAL
Qty: 60 CAPSULE | Refills: 0 | OUTPATIENT
Start: 2021-06-04

## 2021-06-05 DIAGNOSIS — M46.1 SACROILIITIS (HCC): ICD-10-CM

## 2021-06-07 RX ORDER — OMEPRAZOLE 20 MG/1
20 CAPSULE, DELAYED RELEASE ORAL DAILY
Qty: 30 CAPSULE | Refills: 0 | Status: SHIPPED | OUTPATIENT
Start: 2021-06-07 | End: 2021-07-06 | Stop reason: SDUPTHER

## 2021-06-07 RX ORDER — OMEPRAZOLE 20 MG/1
CAPSULE, DELAYED RELEASE ORAL
Qty: 60 CAPSULE | Refills: 0 | OUTPATIENT
Start: 2021-06-07

## 2021-06-07 NOTE — TELEPHONE ENCOUNTER
RN s/w pt and advised her that [facility] said she should contact her PCP for RF for omeprazole  Pt asked if you could send one more refill and then she will ask her GI doctor to take over prescribing  She said FQ ordered it b/c he has her on oral voltaren and she has hx of ulcerative colitis and needed something to protect her gut while taking the NSAID  Pt said she only has 1 pill left and has upcoming appt with GI on 7/16 and will have them take over script  Pls send to [facility]

## 2021-06-19 ENCOUNTER — LAB (OUTPATIENT)
Dept: LAB | Facility: HOSPITAL | Age: 56
End: 2021-06-19
Attending: INTERNAL MEDICINE
Payer: COMMERCIAL

## 2021-06-19 DIAGNOSIS — E21.3 HYPERPARATHYROIDISM (HCC): ICD-10-CM

## 2021-06-19 LAB
ALBUMIN SERPL BCP-MCNC: 3.7 G/DL (ref 3.5–5)
ALP SERPL-CCNC: 71 U/L (ref 46–116)
ALT SERPL W P-5'-P-CCNC: 31 U/L (ref 12–78)
ANION GAP SERPL CALCULATED.3IONS-SCNC: 7 MMOL/L (ref 4–13)
AST SERPL W P-5'-P-CCNC: 20 U/L (ref 5–45)
BILIRUB SERPL-MCNC: 0.26 MG/DL (ref 0.2–1)
BUN SERPL-MCNC: 17 MG/DL (ref 5–25)
CALCIUM SERPL-MCNC: 9.1 MG/DL (ref 8.3–10.1)
CHLORIDE SERPL-SCNC: 107 MMOL/L (ref 100–108)
CO2 SERPL-SCNC: 30 MMOL/L (ref 21–32)
CREAT SERPL-MCNC: 0.74 MG/DL (ref 0.6–1.3)
GFR SERPL CREATININE-BSD FRML MDRD: 91 ML/MIN/1.73SQ M
GLUCOSE P FAST SERPL-MCNC: 87 MG/DL (ref 65–99)
PHOSPHATE SERPL-MCNC: 4 MG/DL (ref 2.7–4.5)
POTASSIUM SERPL-SCNC: 4.2 MMOL/L (ref 3.5–5.3)
PROT SERPL-MCNC: 7 G/DL (ref 6.4–8.2)
PTH-INTACT SERPL-MCNC: 30.7 PG/ML (ref 18.4–80.1)
SODIUM SERPL-SCNC: 144 MMOL/L (ref 136–145)

## 2021-06-19 PROCEDURE — 83970 ASSAY OF PARATHORMONE: CPT

## 2021-06-19 PROCEDURE — 84100 ASSAY OF PHOSPHORUS: CPT

## 2021-06-19 PROCEDURE — 80053 COMPREHEN METABOLIC PANEL: CPT

## 2021-06-19 PROCEDURE — 36415 COLL VENOUS BLD VENIPUNCTURE: CPT

## 2021-06-21 ENCOUNTER — TELEPHONE (OUTPATIENT)
Dept: ENDOCRINOLOGY | Facility: CLINIC | Age: 56
End: 2021-06-21

## 2021-06-21 NOTE — TELEPHONE ENCOUNTER
----- Message from Dionisio Rodrigez MD sent at 6/20/2021 10:45 PM EDT -----  Please call the patient regarding labs - PTH and calcium normalized , will discuss further on upcoming visit

## 2021-06-21 NOTE — RESULT ENCOUNTER NOTE
Please call the patient regarding labs - PTH and calcium normalized , will discuss further on upcoming visit

## 2021-07-06 DIAGNOSIS — K21.9 GASTROESOPHAGEAL REFLUX DISEASE WITHOUT ESOPHAGITIS: Primary | ICD-10-CM

## 2021-07-06 DIAGNOSIS — M46.1 SACROILIITIS (HCC): ICD-10-CM

## 2021-07-06 RX ORDER — OMEPRAZOLE 20 MG/1
20 CAPSULE, DELAYED RELEASE ORAL DAILY
Qty: 30 CAPSULE | Refills: 0 | Status: SHIPPED | OUTPATIENT
Start: 2021-07-06 | End: 2021-10-11

## 2021-07-06 NOTE — TELEPHONE ENCOUNTER
GI Physician: Dr Mauricio Keller for Medication: Prilosec    Dose: 20 mg    Quantity: 30    Pharmacy and Location: [address] [place] [address]

## 2021-07-06 NOTE — TELEPHONE ENCOUNTER
Pt of Dr Young Florentino last seen in office 11/13/2020   Future virtual visit 7/16/2021    HX: ulcerative pancolitis w/ rectal bleeding     Spoke with pt to understand indication for request of refill of Prilosec  Pt informed me she was seeing a pain specialist for sacroiliitis who had her on on a NSAID which required clearance from Dr Venancio Sampson  This clearance was received with the addition of Prilosec taken in adjunct  The pt is no longer on pain medication, however, the pt noticed their prior heartburn had halted while taking the Prilosec and would like to continue to take it to help with symptoms  Since the pt is no longer seeing their pain specialist, they would like their GI specialist to manage the Prilosec  Pt only has 4 pills left and would like refill  She has upcoming appt

## 2021-07-12 ENCOUNTER — HOSPITAL ENCOUNTER (OUTPATIENT)
Dept: INFUSION CENTER | Facility: HOSPITAL | Age: 56
Discharge: HOME | End: 2021-07-12
Attending: INTERNAL MEDICINE
Payer: COMMERCIAL

## 2021-07-12 VITALS
TEMPERATURE: 99.2 F | SYSTOLIC BLOOD PRESSURE: 140 MMHG | HEART RATE: 73 BPM | DIASTOLIC BLOOD PRESSURE: 73 MMHG | RESPIRATION RATE: 18 BRPM | WEIGHT: 150.35 LBS | BODY MASS INDEX: 26.63 KG/M2

## 2021-07-12 DIAGNOSIS — K51.011 ULCERATIVE PANCOLITIS WITH RECTAL BLEEDING (HCC): Primary | ICD-10-CM

## 2021-07-12 PROCEDURE — 96413 CHEMO IV INFUSION 1 HR: CPT

## 2021-07-12 PROCEDURE — 96415 CHEMO IV INFUSION ADDL HR: CPT

## 2021-07-12 RX ORDER — SODIUM CHLORIDE 9 MG/ML
20 INJECTION, SOLUTION INTRAVENOUS ONCE
Status: CANCELLED | OUTPATIENT
Start: 2021-08-30

## 2021-07-12 RX ORDER — SODIUM CHLORIDE 9 MG/ML
20 INJECTION, SOLUTION INTRAVENOUS ONCE
Status: COMPLETED | OUTPATIENT
Start: 2021-07-12 | End: 2021-07-12

## 2021-07-12 RX ORDER — ACETAMINOPHEN 325 MG/1
650 TABLET ORAL ONCE
Status: COMPLETED | OUTPATIENT
Start: 2021-07-12 | End: 2021-07-12

## 2021-07-12 RX ORDER — ACETAMINOPHEN 325 MG/1
650 TABLET ORAL ONCE
Status: CANCELLED | OUTPATIENT
Start: 2021-08-30

## 2021-07-12 RX ORDER — DIPHENHYDRAMINE HCL 25 MG
25 TABLET ORAL ONCE
Status: CANCELLED | OUTPATIENT
Start: 2021-08-30

## 2021-07-12 RX ORDER — DIPHENHYDRAMINE HCL 25 MG
25 TABLET ORAL ONCE
Status: DISCONTINUED | OUTPATIENT
Start: 2021-07-12 | End: 2021-07-15 | Stop reason: HOSPADM

## 2021-07-12 RX ORDER — METHYLPREDNISOLONE SODIUM SUCCINATE 40 MG/ML
40 INJECTION, POWDER, LYOPHILIZED, FOR SOLUTION INTRAMUSCULAR; INTRAVENOUS ONCE
Status: DISCONTINUED | OUTPATIENT
Start: 2021-07-12 | End: 2021-07-15 | Stop reason: HOSPADM

## 2021-07-12 RX ORDER — METHYLPREDNISOLONE SODIUM SUCCINATE 40 MG/ML
40 INJECTION, POWDER, LYOPHILIZED, FOR SOLUTION INTRAMUSCULAR; INTRAVENOUS ONCE
Status: CANCELLED | OUTPATIENT
Start: 2021-08-30

## 2021-07-12 RX ADMIN — INFLIXIMAB 700 MG: 100 INJECTION, POWDER, LYOPHILIZED, FOR SOLUTION INTRAVENOUS at 10:48

## 2021-07-12 RX ADMIN — ACETAMINOPHEN 650 MG: 325 TABLET, FILM COATED ORAL at 10:16

## 2021-07-12 RX ADMIN — SODIUM CHLORIDE 20 ML/HR: 0.9 INJECTION, SOLUTION INTRAVENOUS at 10:17

## 2021-07-14 NOTE — TELEPHONE ENCOUNTER
Pt Emeka is a 77 y/o individual with a h/o Payan's esophagus with h/o esophagitis, who is here for f/u of his condition. . He takes reglan, Carafate, and omeprazole. . Previously on 8/20/2019, he reports that he has no sxs of reflux.  No nausea, or vomiting.  No abdominal pain or dysphagia.   . Previously on 11/22/2019, pt reports that his throat sxs, abd pain and now constipation has started after stopping his acid therapy.  He takes lot of fiber, 2-tab magnesium, lot of water and exercise.  Has not a BM for about 3 days.  He is on Nexium 40mg once daily. . Previously on 9/29/2020, pt reports that things have been going well overall.  He has been having a sore throat.  He fell down in Spring 2020, but has had persistent abdominal pain since then.  Pain also radiates to his right side as well.  Pain is severe in nature, nothing makes it better or worse. He has lost 24lbs of weight via special diet (Isogenic diet), no relation of new diet to his pain.  He has sore throat as well.   . Previously on 10/21/2020, pt reports that he continues have LLQ pain, severe, persistent and nothing makes it better or worse.  Had prostate surgery recently, had some complications from this (bleeding and pain). , Previously on 11/6/2020, pt continues to have the abdominal pain, LLQ in nature, nothing making it better or worse.  Did not get better with the antibiotics.  Pain was there prior to his prostate surgery. . Previously on 12/15/2020, pt reports that he has constant pain in his abdomen on the left and right side.  He is taking Isoflush (which has magnesium supplement in it).  Also taking citrcel 2 tab daily.  He was given a 6 days steroid pack, which helped his pain somewhat.  Was also given Tramadol for the pain.  (Did not want to take narcotic given the constipation). . Previously on 1/19/2021, pt reports that he injured his back last week (saw PCP and started on muscle relaxer and pain pill).  He completed the 2 weeks of antibiotics course in 12/2020, did not see much improvement in his abdominal pain.  Also seeing some pain in the right side of the abdomen, feels like sore muscles in the rib cage.  Persistent sore throat, which has been linked to his sinus drainage.  Nothing makes the abd pain better or worse.  He has been on magnesium for many years, isoflush as well and fiber one cereal.  Having daily BM. . Previously on 3/8/2021, pt reports that his abd pain is persistent but varies throughout the day, worse with activity.  Feels like muscle pain (had recent fall, but present for 1 year).  Having 2 BM per day with the high bran diet, isoflush, 2 Magoxide 400mg, 2 citrcels per day.  .  Took nortryptyline, 2 tab per day, which did not help much for the pain (but did help his back pain). . Previously on 4/9/2021, pt reports that he had colonoscopy performed that revealed pan-diverticulosis.  Continues to have the abdominal pain, varies based on activity level, severe and diffuse.  Having good daily BM.  no improvement on elavil. . Today on 7/14/2021, pt reports that after the EGD/Colonoscopy, he got off of omeprazole, but the burning/gerd came back.  He then restarted the omeprazole, which improved the sxs.  The abdominal pain has come back again, in the LLQ, from bottom edge of rib cage to groin; hurts all day/every day.  It worse with activity.  The BM are fine, 3 per day, loose in nature; takes isoflush supplement.  . 3/2021: - Diverticulosis in the recto-sigmoid colon, in the sigmoid colon and in the descending colon. - The entire examined colon is normal on direct and retroflexion views. EGD: - Normal esophagus. - Normal examined duodenum. - Erythematous mucosa in the stomach. Biopsied. - Normal esophagus. . CT ABD  11/2020: 1. Possible distal sigmoid colonic wall thickening was seen on a CT performed on October 7, 2020, but no definite colonic wall thickening is identified today. Correlation with recent colonoscopy findings is recommended. 2. Diffuse stool within the colon, concerning for constipation. 3. Stable severe compression fracture of L2. . 10/2020: Possible distal sigmoid wall thickening, differential includes neoplasm or short segment colitis. Severe a compression of vertebra. Large Colonic fecal load. Heterogeneous prostate . 12/2019: CT abd: diverticulosis, urinary retention, small renal cysts (no further f/u nescssary)   3/2021: Stomach, Biopsy: NO SIGNIFICANT ABNORMALITY.  Diverticulosis in the recto-sigmoid colon, in the sigmoid colon and in the descending colon. - The entire examined colon is normal on direct and retroflexion views. . . 2019: A. Gastric , Biopsy: -Minimal chronic non-active gastritis. -Alcian blue/PAS stain is negative for intestinal metaplasia. -Giemsa stain negative for Helicobacter pylori organisms. Immunohistochemical stain confirmatory. B. GE Junction , Biopsy: -Gastric cardia type mucosa with chronic inflammation and intestinal metaplasia (see comment). -Squamous mucosa with reflux associated changes. -Alcian blue/PAS stain confirms the presence of intestinal metaplasia. -Giemsa stain is negative for Helicobacter pylori organisms. -Negative for dysplasia or malignancy. B. If this biopsy is derived from endoscopically abnormal mucosa in the tubular esophagus, then the  findings meet the [facility] diagnostic criteria of Payan's esophagus. Otherwise, this is considered to be  intestinal metaplasia at the GE junction. . 2018 - Esophageal mucosal changes secondary to established short-segment Payan's disease.  Biopsied. - Medium-sized hiatal hernia. - Clear gastric fluid. - Normal stomach. - Normal examined duodenum . - The examined portion of the ileum was normal. - One 5 mm polyp in the ascending colon, removed with a cold biopsy forceps.  Resected and retrieved. - Diverticulosis in the entire examined colon. - Internal hemorrhoids. - Tortuous colon. . A. Distal Esophagus , Biopsy: -Squamous and gastric cardia mucosa with no diagnostic abnormality. -Separate fragments of colonic mucosa with prominent lymphoid aggregates.  -The features of eosinophilic esophagitis are not present.  -Alcian blue/PAS stain is negative for intestinal metaplasia in the gastric mucosa; no fungal organisms are seen. B. Ascending Colon Polyp, Polypectomy: -Fragments of benign squamous mucosa.  -Scant unremarkable gastric epithelium.  -Colonic tissue is not present.  . PMH: HTN (on meds) FH: Colon cancer . Unsure if they came by fax ???    Spoke with patient  She fill send to me by email and I will print and give to you to complete for patient

## 2021-07-15 DIAGNOSIS — F51.04 CHRONIC INSOMNIA: ICD-10-CM

## 2021-07-16 ENCOUNTER — TELEMEDICINE (OUTPATIENT)
Dept: GASTROENTEROLOGY | Facility: CLINIC | Age: 56
End: 2021-07-16
Payer: COMMERCIAL

## 2021-07-16 DIAGNOSIS — K51.011 ULCERATIVE PANCOLITIS WITH RECTAL BLEEDING (HCC): Primary | ICD-10-CM

## 2021-07-16 DIAGNOSIS — K21.9 GASTROESOPHAGEAL REFLUX DISEASE WITHOUT ESOPHAGITIS: ICD-10-CM

## 2021-07-16 PROCEDURE — 99213 OFFICE O/P EST LOW 20 MIN: CPT | Performed by: PHYSICIAN ASSISTANT

## 2021-07-16 RX ORDER — POLYETHYLENE GLYCOL 3350 17 G/17G
POWDER, FOR SOLUTION ORAL
Qty: 238 G | Refills: 0 | Status: SHIPPED | OUTPATIENT
Start: 2021-07-16 | End: 2021-12-30 | Stop reason: ALTCHOICE

## 2021-07-16 RX ORDER — FAMOTIDINE 20 MG/1
20 TABLET, FILM COATED ORAL
Qty: 90 TABLET | Refills: 3 | Status: SHIPPED | OUTPATIENT
Start: 2021-07-16 | End: 2022-01-31

## 2021-07-16 NOTE — PROGRESS NOTES
Virtual Regular Visit    Verification of patient location:    Patient is currently located in the state of [place]  Patient is currently located in a state in which I am licensed    Assessment/Plan:    1  Ulcerative pancolitis with rectal bleeding ([address] )  She has been maintained on Remicade every 8 weeks since 2019  She stopped taking Imuran a few months ago due to concerns about serious side effects (cancer)  She feels much better overall - her severe diarrhea and rectal bleeding has resolved  Most recent colonoscopy from July 2020 showed scarring and pseudopolyps, and biopsies did confirm endoscopic remission     - Continue Remicade infusions every 8 weeks  - Update TB and hepatitis B serologies   -She had total hysterectomy so no indication for yearly pap smear  - Discussed seeing Dermatology for yearly skin checks due to increased risk of skin cancer  - She is due for Colonoscopy; Future  - polyethylene glycol (GLYCOLAX) 17 GM/SCOOP powder; Take as directed by the office  Dispense: 238 g; Refill: 0  - bisacodyl (DULCOLAX) 5 mg EC tablet; Take as directed by the office  Dispense: 2 tablet; Refill: 0    2  Gastroesophageal reflux disease without esophagitis  She has osteopenia so would prefer to avoid PPI therapy if possible  Recommend starting Pepcid 20 mg daily at bedtime     - famotidine (PEPCID) 20 mg tablet; Take 1 tablet (20 mg total) by mouth daily at bedtime  Dispense: 90 tablet; Refill: 3    Follow-up in 6 months       Reason for visit is f/up UC    Encounter provider Giovanny Ham PA-C    Provider located at [address] [place] [address] 76  [phone]      Recent Visits  No visits were found meeting these conditions  Showing recent visits within past 7 days and meeting all other requirements  Future Appointments  No visits were found meeting these conditions    Showing future appointments within next 150 days and meeting all other requirements       The patient was identified by name and date of birth  Landon Galvez was informed that this is a telemedicine visit and that the visit is being conducted through [address] Now and patient was informed that this is a secure, HIPAA-compliant platform  She agrees to proceed     My office door was closed  No one else was in the room  She acknowledged consent and understanding of privacy and security of the video platform  The patient has agreed to participate and understands they can discontinue the visit at any time  Patient is aware this is a billable service  Subjective  Landon Galvez is a 54 y o  female with history of ulcerative pancolitis and hyperparathyroidism status post parathyroidectomy in May 2021 presenting for office follow-up  She is doing very well overall  Her joint pain has resolved with surgery and her calcium levels have returned to normal  She continues to have Remicade infusions every 8 weeks  She did wean herself off on Imuran and has been off Imuran for the past few months  Her symptoms are still well controlled  She has alternating bowel habits but no longer sees blood in her stool  No significant abdominal pain, nausea, vomiting  She was started on omeprazole months ago because she was using voltaren for joint pain  She has since stopped voltaren but noticed her reflux and throat discomfort significant improved with omeprazole  She would like to continue omeprazole or switch to another maintenance medication      Past Medical History:   Diagnosis Date    Adjustment disorder     last assessed 05/16/12    Anemia     HX of    Asthma     mild - intermittent    Bilateral leg edema     Blepharitis     last assessed 02/04/16    Bulging lumbar disc     Carpal tunnel syndrome     Unspecified laterality    Colitis, acute     Dyspareunia in female     Edema     last assessed 06/22/15    Ganglion     Herniated cervical disc     History of transfusion     Hypokalemia     Hypothyroidism     Hypothyroidism     Insomnia     Lumps on the skin     last assessed 14    Mouth ulcers     last assessed 06/22/15    Nontraumatic tear of left tibialis posterior tendon     Traumatic teart     Polyarthritis     last assessed 16    Raynaud's disease     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     Temporomandibular disorder     Joint    Thyroid disease     Ulcerative colitis [facility]     Ulcerative colitis ([facility] )        Past Surgical History:   Procedure Laterality Date    ANKLE SURGERY Left     Tendon repair    CARPAL TUNNEL RELEASE Bilateral      SECTION, LOW TRANSVERSE      COLONOSCOPY      COLONOSCOPY N/A 2018    Procedure: COLONOSCOPY;  Surgeon: Saturnino Loo MD;  [facility]; Service: Gastroenterology    [facility] INJECTION RIGHT HIP (ARTHROGRAM)  9/15/2020    HERNIA REPAIR      umbilical    HYSTERECTOMY      KNEE ARTHROSCOPY Right     LIPECTOMY      Multipe lipoma removals    LIPOMA RESECTION      WI COLONOSCOPY FLX DX W/COLLJ SPEC WHEN PFRMD N/A 2016    Procedure: COLONOSCOPY;  Surgeon: Negar Gonzalez DO;  Location: [facility];   Service: Gastroenterology    WI REPAIR FLEX LEG TENDON,SECOND,EA Left 2016    Procedure: REPAIR OF LEFT POSTERIOR TIBIAL TENDON WITH GRAFT, EXPLORATION LEFT ANKLE ;  Surgeon: Laura Jackson DPM;  Location: [facility];  Service: Podiatry    SHOULDER SURGERY Left     bicep tendon and labrum repair    TONSILLECTOMY      TOOTH EXTRACTION  2018       Current Outpatient Medications   Medication Sig Dispense Refill    albuterol (ACCUNEB) 1 25 MG/3ML nebulizer solution Take 1 ampule by nebulization every 6 (six) hours as needed for wheezing      Ascorbic Acid (VITAMIN C) 1000 MG tablet Take 1,000 mg by mouth daily      azaTHIOprine (IMURAN) 100 MG tablet Take 50 mg by mouth daily at bedtime  (Patient not taking: Reported on 2021)      bisacodyl (DULCOLAX) 5 mg EC tablet Take as directed by the office  2 tablet 0    carisoprodol (SOMA) 350 mg tablet TAKE 1 TABLET BY MOUTH DAILY AT BEDTIME 30 tablet 0    docusate sodium (COLACE) 100 mg capsule Take 1 capsule (100 mg total) by mouth daily 90 capsule 3    famotidine (PEPCID) 20 mg tablet Take 1 tablet (20 mg total) by mouth daily at bedtime 90 tablet 3    hyoscyamine (ANASPAZ,LEVSIN) 0 125 MG tablet TAKE ONE TABLET BY MOUTH EVERY 4 HOURS AS NEEDED FOR CRAMPING 30 tablet 0    inFLIXimab (REMICADE) 100 mg Infuse into a venous catheter every 56 days      levothyroxine 50 mcg tablet Take 1 tablet (50 mcg total) by mouth daily in the early morning 90 tablet 2    methocarbamol (ROBAXIN) 750 mg tablet Take 1 tablet (750 mg total) by mouth daily at bedtime as needed for muscle spasms 30 tablet 1    omeprazole (PriLOSEC) 20 mg delayed release capsule Take 1 capsule (20 mg total) by mouth daily (Patient not taking: Reported on 7/16/2021) 30 capsule 0    ondansetron (ZOFRAN-ODT) 4 mg disintegrating tablet Take 1 tablet (4 mg total) by mouth every 6 (six) hours as needed for nausea or vomiting 30 tablet 1    polyethylene glycol (GLYCOLAX) 17 GM/SCOOP powder Take as directed by the office  238 g 0    predniSONE 10 mg tablet Take 1 tablet (10 mg total) by mouth daily Pred 10mg, 4 tabs for 2 days , 3 tabs for 2 days, 2 tabs for 2 days and 1 tab for 2 days  # 20  20 tablet 0    traMADol (ULTRAM) 50 mg tablet TAKE ONE TABLET BY MOUTH 2 TIMES A DAY AS NEEDED FOR MODERATE PAIN 30 tablet 1    triamcinolone (KENALOG) 0 5 % cream Apply topically 3 (three) times a day 30 g 0    zolpidem (AMBIEN) 10 mg tablet TAKE ONE TABLET BY MOUTH EVERY DAY AT BEDTIME AS NEEDED FOR SLEEP 30 tablet 0     No current facility-administered medications for this visit  No Known Allergies    REVIEW OF SYSTEMS:    CONSTITUTIONAL: Denies any fever, chills, rigors, and weight loss  HEENT: No earache or tinnitus   Denies hearing loss or visual disturbances  CARDIOVASCULAR: No chest pain or palpitations  RESPIRATORY: Denies any cough, hemoptysis, shortness of breath or dyspnea on exertion  GASTROINTESTINAL: As noted in the History of Present Illness  GENITOURINARY: No problems with urination  Denies any hematuria or dysuria  NEUROLOGIC: No dizziness or vertigo, denies headaches  MUSCULOSKELETAL: Denies any muscle or joint pain  SKIN: Denies skin rashes or itching  ENDOCRINE: Denies excessive thirst  Denies intolerance to heat or cold  PSYCHOSOCIAL: Denies depression or anxiety  Denies any recent memory loss  VIDEO EXAMINATION:  Appearance and vitals taken from home devices    General Appearance:   Alert, cooperative, no distress   HEENT:  Normocephalic, atraumatic, anicteric  Neck supple, symmetrical, trachea midline  Lungs:   Equal chest rise and unlabored breathing, normal effort, no coughing  Cardiovascular:   No visualized JVD  Abdomen:   No abdominal distension  Skin:   No jaundice, rashes, or lesions  Musculoskeletal:   Normal range of motion visualized  Psych:  Normal affect and normal insight  Neuro:  Alert and appropriate  There were no vitals filed for this visit  I spent 15 minutes directly with the patient during this visit    VIRTUAL VISIT [address] [employer] verbally agrees to participate in [employer]  Pt is aware that [employer] could be limited without vital signs or the ability to perform a full hands-on physical exam  Leilani NAY Blount understands she or the provider may request at any time to terminate the video visit and request the patient to seek care or treatment in person

## 2021-07-16 NOTE — LETTER
July 16, 2021     [place], [address] [facility] [address]    Patient: Rosa Niño   YOB: 1965   Date of Visit: 7/16/2021       Dear Dr Pooja Richey: Thank you for referring Mc Mckeon to me for evaluation  Below are my notes for this consultation  If you have questions, please do not hesitate to call me  I look forward to following your patient along with you  Sincerely,        Giovanny Ham PA-C        CC: DO Giovanny Champion PA-C  7/16/2021 12:21 PM  Sign when Signing Visit  Virtual Regular Visit    Verification of patient location:    Patient is currently located in the state of [place]  Patient is currently located in a state in which I am licensed    Assessment/Plan:    1  Ulcerative pancolitis with rectal bleeding ([facility] )  She has been maintained on Remicade every 8 weeks since 2019  She stopped taking Imuran a few months ago due to concerns about serious side effects (cancer)  She feels much better overall - her severe diarrhea and rectal bleeding has resolved  Most recent colonoscopy from July 2020 showed scarring and pseudopolyps, and biopsies did confirm endoscopic remission     - Continue Remicade infusions every 8 weeks  - Update TB and hepatitis B serologies   -She had total hysterectomy so no indication for yearly pap smear  - Discussed seeing Dermatology for yearly skin checks due to increased risk of skin cancer  - She is due for Colonoscopy; Future  - polyethylene glycol (GLYCOLAX) 17 GM/SCOOP powder; Take as directed by the office  Dispense: 238 g; Refill: 0  - bisacodyl (DULCOLAX) 5 mg EC tablet; Take as directed by the office  Dispense: 2 tablet; Refill: 0    2  Gastroesophageal reflux disease without esophagitis  She has osteopenia so would prefer to avoid PPI therapy if possible  Recommend starting Pepcid 20 mg daily at bedtime     - famotidine (PEPCID) 20 mg tablet;  Take 1 tablet (20 mg total) by mouth daily at bedtime Dispense: 90 tablet; Refill: 3    Follow-up in 6 months       Reason for visit is f/up UC    Encounter provider Mike Ortiz PA-C    Provider located at [address] [place], [place] [address]  [phone]      Recent Visits  No visits were found meeting these conditions  Showing recent visits within past 7 days and meeting all other requirements  Future Appointments  No visits were found meeting these conditions  Showing future appointments within next 150 days and meeting all other requirements       The patient was identified by name and date of birth  Priti Ureña was informed that this is a telemedicine visit and that the visit is being conducted through [address] [place] [address] Now and patient was informed that this is a secure, HIPAA-compliant platform  She agrees to proceed     My office door was closed  No one else was in the room  She acknowledged consent and understanding of privacy and security of the video platform  The patient has agreed to participate and understands they can discontinue the visit at any time  Patient is aware this is a billable service  Subjective  Priti Ureña is a 54 y o  female with history of ulcerative pancolitis and hyperparathyroidism status post parathyroidectomy in May 2021 presenting for office follow-up  She is doing very well overall  Her joint pain has resolved with surgery and her calcium levels have returned to normal  She continues to have Remicade infusions every 8 weeks  She did wean herself off on Imuran and has been off Imuran for the past few months  Her symptoms are still well controlled  She has alternating bowel habits but no longer sees blood in her stool  No significant abdominal pain, nausea, vomiting  She was started on omeprazole months ago because she was using voltaren for joint pain   She has since stopped voltaren but noticed her reflux and throat discomfort significant improved with omeprazole  She would like to continue omeprazole or switch to another maintenance medication  Past Medical History:   Diagnosis Date    Adjustment disorder     last assessed 12    Anemia     HX of    Asthma     mild - intermittent    Bilateral leg edema     Blepharitis     last assessed 16    Bulging lumbar disc     Carpal tunnel syndrome     Unspecified laterality    Colitis, acute     Dyspareunia in female     Edema     last assessed 06/22/15    Ganglion     Herniated cervical disc     History of transfusion     Hypokalemia     Hypothyroidism     Hypothyroidism     Insomnia     Lumps on the skin     last assessed 14    Mouth ulcers     last assessed 06/22/15    Nontraumatic tear of left tibialis posterior tendon     Traumatic teart     Polyarthritis     last assessed 16    Raynaud's disease     Rayna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 [facility]     Temporomandibular disorder     Joint    Thyroid disease     Ulcerative colitis ([facility] )     Ulcerative colitis ([facility] )        Past Surgical History:   Procedure Laterality Date    ANKLE SURGERY Left     Tendon repair    CARPAL TUNNEL RELEASE Bilateral      SECTION, LOW TRANSVERSE      COLONOSCOPY      COLONOSCOPY N/A 2018    Procedure: COLONOSCOPY;  Surgeon: Yoav Trotter MD;  [facility]; Service: Gastroenterology    [place] INJECTION RIGHT HIP (ARTHROGRAM)  9/15/2020    HERNIA REPAIR      umbilical    HYSTERECTOMY      KNEE ARTHROSCOPY Right     LIPECTOMY      Multipe lipoma removals    LIPOMA RESECTION      MS COLONOSCOPY FLX DX W/COLLJ SPEC WHEN PFRMD N/A 2016    Procedure: COLONOSCOPY;  Surgeon: Louretta Apgar, DO;  Location: [facility];   Service: Gastroenterology    MS REPAIR FLEX LEG TENDON,SECOND,EA Left 2016    Procedure: REPAIR OF LEFT POSTERIOR TIBIAL TENDON WITH GRAFT, EXPLORATION LEFT ANKLE ;  Surgeon: Jassi Novak DPM;  Location: [facility] OR; Service: Podiatry    SHOULDER SURGERY Left     bicep tendon and labrum repair    TONSILLECTOMY      TOOTH EXTRACTION  12/05/2018       Current Outpatient Medications   Medication Sig Dispense Refill    albuterol (ACCUNEB) 1 25 MG/3ML nebulizer solution Take 1 ampule by nebulization every 6 (six) hours as needed for wheezing      Ascorbic Acid (VITAMIN C) 1000 MG tablet Take 1,000 mg by mouth daily      azaTHIOprine (IMURAN) 100 MG tablet Take 50 mg by mouth daily at bedtime  (Patient not taking: Reported on 7/16/2021)      bisacodyl (DULCOLAX) 5 mg EC tablet Take as directed by the office  2 tablet 0    carisoprodol (SOMA) 350 mg tablet TAKE 1 TABLET BY MOUTH DAILY AT BEDTIME 30 tablet 0    docusate sodium (COLACE) 100 mg capsule Take 1 capsule (100 mg total) by mouth daily 90 capsule 3    famotidine (PEPCID) 20 mg tablet Take 1 tablet (20 mg total) by mouth daily at bedtime 90 tablet 3    hyoscyamine (ANASPAZ,LEVSIN) 0 125 MG tablet TAKE ONE TABLET BY MOUTH EVERY 4 HOURS AS NEEDED FOR CRAMPING 30 tablet 0    inFLIXimab (REMICADE) 100 mg Infuse into a venous catheter every 56 days      levothyroxine 50 mcg tablet Take 1 tablet (50 mcg total) by mouth daily in the early morning 90 tablet 2    methocarbamol (ROBAXIN) 750 mg tablet Take 1 tablet (750 mg total) by mouth daily at bedtime as needed for muscle spasms 30 tablet 1    omeprazole (PriLOSEC) 20 mg delayed release capsule Take 1 capsule (20 mg total) by mouth daily (Patient not taking: Reported on 7/16/2021) 30 capsule 0    ondansetron (ZOFRAN-ODT) 4 mg disintegrating tablet Take 1 tablet (4 mg total) by mouth every 6 (six) hours as needed for nausea or vomiting 30 tablet 1    polyethylene glycol (GLYCOLAX) 17 GM/SCOOP powder Take as directed by the office  238 g 0    predniSONE 10 mg tablet Take 1 tablet (10 mg total) by mouth daily Pred 10mg, 4 tabs for 2 days , 3 tabs for 2 days, 2 tabs for 2 days and 1 tab for 2 days   # 20  20 tablet 0    traMADol (ULTRAM) 50 mg tablet TAKE ONE TABLET BY MOUTH 2 TIMES A DAY AS NEEDED FOR MODERATE PAIN 30 tablet 1    triamcinolone (KENALOG) 0 5 % cream Apply topically 3 (three) times a day 30 g 0    zolpidem (AMBIEN) 10 mg tablet TAKE ONE TABLET BY MOUTH EVERY DAY AT BEDTIME AS NEEDED FOR SLEEP 30 tablet 0     No current facility-administered medications for this visit  No Known Allergies    REVIEW OF SYSTEMS:    CONSTITUTIONAL: Denies any fever, chills, rigors, and weight loss  HEENT: No earache or tinnitus  Denies hearing loss or visual disturbances  CARDIOVASCULAR: No chest pain or palpitations  RESPIRATORY: Denies any cough, hemoptysis, shortness of breath or dyspnea on exertion  GASTROINTESTINAL: As noted in the History of Present Illness  GENITOURINARY: No problems with urination  Denies any hematuria or dysuria  NEUROLOGIC: No dizziness or vertigo, denies headaches  MUSCULOSKELETAL: Denies any muscle or joint pain  SKIN: Denies skin rashes or itching  ENDOCRINE: Denies excessive thirst  Denies intolerance to heat or cold  PSYCHOSOCIAL: Denies depression or anxiety  Denies any recent memory loss  VIDEO EXAMINATION:  Appearance and vitals taken from home devices    General Appearance:   Alert, cooperative, no distress   HEENT:  Normocephalic, atraumatic, anicteric  Neck supple, symmetrical, trachea midline  Lungs:   Equal chest rise and unlabored breathing, normal effort, no coughing  Cardiovascular:   No visualized JVD  Abdomen:   No abdominal distension  Skin:   No jaundice, rashes, or lesions  Musculoskeletal:   Normal range of motion visualized  Psych:  Normal affect and normal insight  Neuro:  Alert and appropriate  There were no vitals filed for this visit  I spent 15 minutes directly with the patient during this visit    VIRTUAL VISIT [address] [employer] verbally agrees to participate in [employer]   Pt is aware that [facility] Services could be limited without vital signs or the ability to perform a full hands-on physical exam  Leilani Blount understands she or the provider may request at any time to terminate the video visit and request the patient to seek care or treatment in person

## 2021-07-17 DIAGNOSIS — F51.04 CHRONIC INSOMNIA: ICD-10-CM

## 2021-07-17 RX ORDER — ZOLPIDEM TARTRATE 10 MG/1
TABLET ORAL
Qty: 30 TABLET | Refills: 0 | Status: CANCELLED | OUTPATIENT
Start: 2021-07-17

## 2021-07-19 RX ORDER — ZOLPIDEM TARTRATE 10 MG/1
TABLET ORAL
Qty: 30 TABLET | Refills: 0 | Status: SHIPPED | OUTPATIENT
Start: 2021-07-19 | End: 2021-08-13

## 2021-07-19 NOTE — TELEPHONE ENCOUNTER
Patient states she is taking her last Sallyanne Sit tonight and wanted to make sure they got refilled  I see they are pended  Are you able to send to [facility] today?
Yes, I sent the ambien to [facility]
No

## 2021-07-20 ENCOUNTER — TELEPHONE (OUTPATIENT)
Dept: ENDOCRINOLOGY | Facility: CLINIC | Age: 56
End: 2021-07-20

## 2021-07-20 NOTE — TELEPHONE ENCOUNTER
Patient is asking if you would like labs ordered for October appt  She is unable to keep the July appt and this is the soonest that you have    Thank you

## 2021-07-21 ENCOUNTER — TELEPHONE (OUTPATIENT)
Dept: GASTROENTEROLOGY | Facility: CLINIC | Age: 56
End: 2021-07-21

## 2021-07-21 DIAGNOSIS — E21.3 HYPERPARATHYROIDISM (HCC): Primary | ICD-10-CM

## 2021-07-21 DIAGNOSIS — E55.9 VITAMIN D DEFICIENCY: ICD-10-CM

## 2021-07-21 DIAGNOSIS — E83.52 HYPERCALCEMIA: ICD-10-CM

## 2021-07-24 ENCOUNTER — LAB (OUTPATIENT)
Dept: LAB | Facility: HOSPITAL | Age: 56
End: 2021-07-24
Payer: COMMERCIAL

## 2021-07-24 DIAGNOSIS — E55.9 VITAMIN D DEFICIENCY: ICD-10-CM

## 2021-07-24 DIAGNOSIS — E83.52 HYPERCALCEMIA: ICD-10-CM

## 2021-07-24 DIAGNOSIS — E21.3 HYPERPARATHYROIDISM (HCC): ICD-10-CM

## 2021-07-24 DIAGNOSIS — K51.011 ULCERATIVE PANCOLITIS WITH RECTAL BLEEDING (HCC): ICD-10-CM

## 2021-07-24 LAB
25(OH)D3 SERPL-MCNC: 38 NG/ML (ref 30–100)
ALBUMIN SERPL BCP-MCNC: 3.9 G/DL (ref 3.5–5)
ALP SERPL-CCNC: 76 U/L (ref 46–116)
ALT SERPL W P-5'-P-CCNC: 37 U/L (ref 12–78)
ANION GAP SERPL CALCULATED.3IONS-SCNC: 7 MMOL/L (ref 4–13)
AST SERPL W P-5'-P-CCNC: 24 U/L (ref 5–45)
BILIRUB SERPL-MCNC: 0.27 MG/DL (ref 0.2–1)
BUN SERPL-MCNC: 20 MG/DL (ref 5–25)
CALCIUM SERPL-MCNC: 8.7 MG/DL (ref 8.3–10.1)
CHLORIDE SERPL-SCNC: 105 MMOL/L (ref 100–108)
CO2 SERPL-SCNC: 28 MMOL/L (ref 21–32)
CREAT SERPL-MCNC: 0.78 MG/DL (ref 0.6–1.3)
GFR SERPL CREATININE-BSD FRML MDRD: 86 ML/MIN/1.73SQ M
GLUCOSE P FAST SERPL-MCNC: 92 MG/DL (ref 65–99)
HBV CORE AB SER QL: NORMAL
HBV SURFACE AB SER-ACNC: 888.71 MIU/ML
HBV SURFACE AG SER QL: NORMAL
PHOSPHATE SERPL-MCNC: 3.9 MG/DL (ref 2.7–4.5)
POTASSIUM SERPL-SCNC: 4.1 MMOL/L (ref 3.5–5.3)
PROT SERPL-MCNC: 7.6 G/DL (ref 6.4–8.2)
PTH-INTACT SERPL-MCNC: 40.8 PG/ML (ref 18.4–80.1)
SODIUM SERPL-SCNC: 140 MMOL/L (ref 136–145)

## 2021-07-24 PROCEDURE — 86480 TB TEST CELL IMMUN MEASURE: CPT

## 2021-07-24 PROCEDURE — 84100 ASSAY OF PHOSPHORUS: CPT

## 2021-07-24 PROCEDURE — 82306 VITAMIN D 25 HYDROXY: CPT

## 2021-07-24 PROCEDURE — 87340 HEPATITIS B SURFACE AG IA: CPT

## 2021-07-24 PROCEDURE — 80053 COMPREHEN METABOLIC PANEL: CPT

## 2021-07-24 PROCEDURE — 86704 HEP B CORE ANTIBODY TOTAL: CPT

## 2021-07-24 PROCEDURE — 83970 ASSAY OF PARATHORMONE: CPT

## 2021-07-24 PROCEDURE — 36415 COLL VENOUS BLD VENIPUNCTURE: CPT

## 2021-07-24 PROCEDURE — 86706 HEP B SURFACE ANTIBODY: CPT

## 2021-07-26 ENCOUNTER — TELEPHONE (OUTPATIENT)
Dept: ENDOCRINOLOGY | Facility: CLINIC | Age: 56
End: 2021-07-26

## 2021-07-26 NOTE — TELEPHONE ENCOUNTER
----- Message from Marcin Wang sent at 7/26/2021  7:25 AM EDT -----    ----- Message -----  From: Danita Olvera MD  Sent: 7/25/2021   9:27 PM EDT  To: , #    Please call the patient regarding labs - calcium and PTH normal

## 2021-07-28 LAB
GAMMA INTERFERON BACKGROUND BLD IA-ACNC: 0.02 IU/ML
M TB IFN-G BLD-IMP: NEGATIVE
M TB IFN-G CD4+ BCKGRND COR BLD-ACNC: 0 IU/ML
M TB IFN-G CD4+ BCKGRND COR BLD-ACNC: 0.01 IU/ML
MITOGEN IGNF BCKGRD COR BLD-ACNC: >10 IU/ML

## 2021-07-28 NOTE — TELEPHONE ENCOUNTER
Pt called back [facility] requesting a call back  I returned call [facility] to contact my direct EXT

## 2021-07-29 NOTE — TELEPHONE ENCOUNTER
Colon scheduled on 11/4 at [facility] with Dr Pedro BATES gave pt verbal instructions/mailed  Prep-Miralax/Dulcolax

## 2021-08-02 ENCOUNTER — TELEPHONE (OUTPATIENT)
Dept: GASTROENTEROLOGY | Facility: CLINIC | Age: 56
End: 2021-08-02

## 2021-08-02 NOTE — TELEPHONE ENCOUNTER
----- Message from Rio Ruiz DO sent at 8/1/2021  9:43 PM EDT -----  Please ask pt to get labs to assess remicade one week before her next infusion, so on Sept 1st, 2021  Lab is in epic      Thanks,  Nino Castañeda

## 2021-08-05 DIAGNOSIS — F51.04 CHRONIC INSOMNIA: ICD-10-CM

## 2021-08-13 RX ORDER — AZATHIOPRINE 100 MG/1
50 TABLET ORAL
OUTPATIENT
Start: 2021-08-13

## 2021-08-13 RX ORDER — ZOLPIDEM TARTRATE 10 MG/1
TABLET ORAL
Qty: 30 TABLET | Refills: 0 | Status: SHIPPED | OUTPATIENT
Start: 2021-08-13 | End: 2021-09-10

## 2021-09-08 DIAGNOSIS — F51.04 CHRONIC INSOMNIA: ICD-10-CM

## 2021-09-09 DIAGNOSIS — E03.9 ACQUIRED HYPOTHYROIDISM: ICD-10-CM

## 2021-09-10 ENCOUNTER — TELEPHONE (OUTPATIENT)
Dept: FAMILY MEDICINE CLINIC | Facility: CLINIC | Age: 56
End: 2021-09-10

## 2021-09-10 RX ORDER — ZOLPIDEM TARTRATE 10 MG/1
TABLET ORAL
Qty: 30 TABLET | Refills: 0 | Status: SHIPPED | OUTPATIENT
Start: 2021-09-10 | End: 2021-10-08

## 2021-09-10 RX ORDER — LEVOTHYROXINE SODIUM 0.05 MG/1
TABLET ORAL
Qty: 30 TABLET | Refills: 0 | Status: SHIPPED | OUTPATIENT
Start: 2021-09-10 | End: 2022-01-10

## 2021-09-10 NOTE — TELEPHONE ENCOUNTER
Spoke with pt to schedule appt , last office visit was in 2019, last virtual visit was in 2020 over a year ago, one month supply sent to pharmacy, pt states she will schedule for appt with Dr Mere Jackson when she come back from the [place] and check her schedule

## 2021-09-15 ENCOUNTER — HOSPITAL ENCOUNTER (OUTPATIENT)
Dept: INFUSION CENTER | Facility: HOSPITAL | Age: 56
Discharge: HOME | End: 2021-09-15
Attending: INTERNAL MEDICINE
Payer: COMMERCIAL

## 2021-09-15 VITALS
RESPIRATION RATE: 18 BRPM | SYSTOLIC BLOOD PRESSURE: 138 MMHG | WEIGHT: 149.91 LBS | DIASTOLIC BLOOD PRESSURE: 78 MMHG | HEART RATE: 68 BPM | BODY MASS INDEX: 26.56 KG/M2 | TEMPERATURE: 97.9 F

## 2021-09-15 DIAGNOSIS — K51.011 ULCERATIVE PANCOLITIS WITH RECTAL BLEEDING (HCC): Primary | ICD-10-CM

## 2021-09-15 PROCEDURE — 96415 CHEMO IV INFUSION ADDL HR: CPT

## 2021-09-15 PROCEDURE — 96413 CHEMO IV INFUSION 1 HR: CPT

## 2021-09-15 RX ORDER — DIPHENHYDRAMINE HCL 25 MG
25 TABLET ORAL ONCE
Status: CANCELLED | OUTPATIENT
Start: 2021-11-10

## 2021-09-15 RX ORDER — METHYLPREDNISOLONE SODIUM SUCCINATE 40 MG/ML
40 INJECTION, POWDER, LYOPHILIZED, FOR SOLUTION INTRAMUSCULAR; INTRAVENOUS ONCE
Status: CANCELLED | OUTPATIENT
Start: 2021-11-10

## 2021-09-15 RX ORDER — ACETAMINOPHEN 325 MG/1
650 TABLET ORAL ONCE
Status: CANCELLED | OUTPATIENT
Start: 2021-11-10

## 2021-09-15 RX ORDER — SODIUM CHLORIDE 9 MG/ML
20 INJECTION, SOLUTION INTRAVENOUS ONCE
Status: CANCELLED | OUTPATIENT
Start: 2021-11-10

## 2021-09-15 RX ORDER — ACETAMINOPHEN 325 MG/1
650 TABLET ORAL ONCE
Status: COMPLETED | OUTPATIENT
Start: 2021-09-15 | End: 2021-09-15

## 2021-09-15 RX ORDER — SODIUM CHLORIDE 9 MG/ML
20 INJECTION, SOLUTION INTRAVENOUS ONCE
Status: COMPLETED | OUTPATIENT
Start: 2021-09-15 | End: 2021-09-15

## 2021-09-15 RX ORDER — METHYLPREDNISOLONE SODIUM SUCCINATE 40 MG/ML
40 INJECTION, POWDER, LYOPHILIZED, FOR SOLUTION INTRAMUSCULAR; INTRAVENOUS ONCE
Status: DISCONTINUED | OUTPATIENT
Start: 2021-09-15 | End: 2021-09-18 | Stop reason: HOSPADM

## 2021-09-15 RX ORDER — DIPHENHYDRAMINE HCL 25 MG
25 TABLET ORAL ONCE
Status: DISCONTINUED | OUTPATIENT
Start: 2021-09-15 | End: 2021-09-18 | Stop reason: HOSPADM

## 2021-09-15 RX ADMIN — SODIUM CHLORIDE 20 ML/HR: 0.9 INJECTION, SOLUTION INTRAVENOUS at 09:11

## 2021-09-15 RX ADMIN — INFLIXIMAB 700 MG: 100 INJECTION, POWDER, LYOPHILIZED, FOR SOLUTION INTRAVENOUS at 10:01

## 2021-09-15 RX ADMIN — ACETAMINOPHEN 650 MG: 325 TABLET, FILM COATED ORAL at 09:12

## 2021-10-08 DIAGNOSIS — F51.04 CHRONIC INSOMNIA: ICD-10-CM

## 2021-10-08 RX ORDER — ZOLPIDEM TARTRATE 10 MG/1
TABLET ORAL
Qty: 30 TABLET | Refills: 0 | Status: SHIPPED | OUTPATIENT
Start: 2021-10-08 | End: 2021-11-08

## 2021-10-11 ENCOUNTER — OFFICE VISIT (OUTPATIENT)
Dept: ENDOCRINOLOGY | Facility: CLINIC | Age: 56
End: 2021-10-11
Payer: COMMERCIAL

## 2021-10-11 VITALS
WEIGHT: 150 LBS | HEART RATE: 60 BPM | HEIGHT: 63 IN | DIASTOLIC BLOOD PRESSURE: 78 MMHG | BODY MASS INDEX: 26.58 KG/M2 | SYSTOLIC BLOOD PRESSURE: 140 MMHG

## 2021-10-11 DIAGNOSIS — E55.9 VITAMIN D DEFICIENCY: ICD-10-CM

## 2021-10-11 DIAGNOSIS — E03.9 HYPOTHYROIDISM, UNSPECIFIED TYPE: ICD-10-CM

## 2021-10-11 DIAGNOSIS — E21.3 HYPERPARATHYROIDISM (HCC): Primary | ICD-10-CM

## 2021-10-11 DIAGNOSIS — M85.80 OSTEOPENIA, UNSPECIFIED LOCATION: ICD-10-CM

## 2021-10-11 DIAGNOSIS — E04.2 MULTIPLE THYROID NODULES: ICD-10-CM

## 2021-10-11 PROCEDURE — 99214 OFFICE O/P EST MOD 30 MIN: CPT | Performed by: INTERNAL MEDICINE

## 2021-10-15 ENCOUNTER — IMMUNIZATIONS (OUTPATIENT)
Dept: FAMILY MEDICINE CLINIC | Facility: CLINIC | Age: 56
End: 2021-10-15
Payer: COMMERCIAL

## 2021-10-15 DIAGNOSIS — Z23 NEED FOR INFLUENZA VACCINATION: Primary | ICD-10-CM

## 2021-10-15 PROCEDURE — 90471 IMMUNIZATION ADMIN: CPT

## 2021-10-15 PROCEDURE — 90682 RIV4 VACC RECOMBINANT DNA IM: CPT

## 2021-10-25 ENCOUNTER — TELEPHONE (OUTPATIENT)
Dept: GASTROENTEROLOGY | Facility: CLINIC | Age: 56
End: 2021-10-25

## 2021-11-04 DIAGNOSIS — F51.04 CHRONIC INSOMNIA: ICD-10-CM

## 2021-11-05 ENCOUNTER — TELEPHONE (OUTPATIENT)
Dept: FAMILY MEDICINE CLINIC | Facility: CLINIC | Age: 56
End: 2021-11-05

## 2021-11-05 ENCOUNTER — OFFICE VISIT (OUTPATIENT)
Dept: FAMILY MEDICINE CLINIC | Facility: CLINIC | Age: 56
End: 2021-11-05
Payer: COMMERCIAL

## 2021-11-05 VITALS
DIASTOLIC BLOOD PRESSURE: 80 MMHG | TEMPERATURE: 98.1 F | OXYGEN SATURATION: 98 % | SYSTOLIC BLOOD PRESSURE: 132 MMHG | HEART RATE: 68 BPM

## 2021-11-05 DIAGNOSIS — G89.29 CHRONIC BILATERAL LOW BACK PAIN WITHOUT SCIATICA: ICD-10-CM

## 2021-11-05 DIAGNOSIS — F51.04 CHRONIC INSOMNIA: ICD-10-CM

## 2021-11-05 DIAGNOSIS — E87.6 HYPOKALEMIA: ICD-10-CM

## 2021-11-05 DIAGNOSIS — R25.2 CALF CRAMP: ICD-10-CM

## 2021-11-05 DIAGNOSIS — K51.011 ULCERATIVE PANCOLITIS WITH RECTAL BLEEDING (HCC): ICD-10-CM

## 2021-11-05 DIAGNOSIS — E83.52 HYPERCALCEMIA: ICD-10-CM

## 2021-11-05 DIAGNOSIS — R25.2 LEG CRAMPING: ICD-10-CM

## 2021-11-05 DIAGNOSIS — Z12.31 ENCOUNTER FOR SCREENING MAMMOGRAM FOR MALIGNANT NEOPLASM OF BREAST: ICD-10-CM

## 2021-11-05 DIAGNOSIS — N94.10 DYSPAREUNIA IN FEMALE: ICD-10-CM

## 2021-11-05 DIAGNOSIS — E21.3 HYPERPARATHYROIDISM (HCC): Primary | ICD-10-CM

## 2021-11-05 DIAGNOSIS — Z00.00 WELL ADULT EXAM: Primary | ICD-10-CM

## 2021-11-05 DIAGNOSIS — E21.3 HYPERPARATHYROIDISM (HCC): ICD-10-CM

## 2021-11-05 DIAGNOSIS — M79.18 MYOFASCIAL PAIN: ICD-10-CM

## 2021-11-05 DIAGNOSIS — M54.50 CHRONIC BILATERAL LOW BACK PAIN WITHOUT SCIATICA: ICD-10-CM

## 2021-11-05 PROCEDURE — 99214 OFFICE O/P EST MOD 30 MIN: CPT | Performed by: FAMILY MEDICINE

## 2021-11-05 PROCEDURE — 99396 PREV VISIT EST AGE 40-64: CPT | Performed by: FAMILY MEDICINE

## 2021-11-06 PROBLEM — Z90.710 HISTORY OF HYSTERECTOMY: Status: ACTIVE | Noted: 2021-11-06

## 2021-11-06 PROBLEM — M54.50 CHRONIC BILATERAL LOW BACK PAIN WITHOUT SCIATICA: Status: ACTIVE | Noted: 2020-05-29

## 2021-11-06 PROBLEM — R25.2 CALF CRAMP: Status: ACTIVE | Noted: 2021-11-06

## 2021-11-06 PROBLEM — Z00.00 WELL ADULT EXAM: Status: ACTIVE | Noted: 2021-11-06

## 2021-11-06 PROBLEM — N94.10 DYSPAREUNIA IN FEMALE: Status: ACTIVE | Noted: 2021-11-06

## 2021-11-06 PROBLEM — M79.18 MYOFASCIAL PAIN: Status: ACTIVE | Noted: 2021-11-06

## 2021-11-06 RX ORDER — METHOCARBAMOL 500 MG/1
500 TABLET, FILM COATED ORAL EVERY 8 HOURS PRN
Qty: 30 TABLET | Refills: 1 | Status: SHIPPED | OUTPATIENT
Start: 2021-11-06 | End: 2021-11-09 | Stop reason: ALTCHOICE

## 2021-11-06 RX ORDER — CARISOPRODOL 250 MG/1
250 TABLET ORAL
Qty: 30 TABLET | Refills: 0 | Status: SHIPPED | OUTPATIENT
Start: 2021-11-06 | End: 2021-11-09 | Stop reason: SDUPTHER

## 2021-11-06 RX ORDER — TRAZODONE HYDROCHLORIDE 50 MG/1
50 TABLET ORAL
Qty: 90 TABLET | Refills: 0 | Status: SHIPPED | OUTPATIENT
Start: 2021-11-06 | End: 2022-06-27 | Stop reason: ALTCHOICE

## 2021-11-08 ENCOUNTER — TELEPHONE (OUTPATIENT)
Dept: FAMILY MEDICINE CLINIC | Facility: CLINIC | Age: 56
End: 2021-11-08

## 2021-11-08 RX ORDER — ZOLPIDEM TARTRATE 10 MG/1
TABLET ORAL
Qty: 30 TABLET | Refills: 0 | Status: SHIPPED | OUTPATIENT
Start: 2021-11-08 | End: 2021-12-14

## 2021-11-09 ENCOUNTER — TELEPHONE (OUTPATIENT)
Dept: FAMILY MEDICINE CLINIC | Facility: CLINIC | Age: 56
End: 2021-11-09

## 2021-11-09 DIAGNOSIS — M62.838 MUSCLE SPASM: Primary | ICD-10-CM

## 2021-11-09 DIAGNOSIS — M79.18 MYOFASCIAL PAIN: ICD-10-CM

## 2021-11-09 RX ORDER — CARISOPRODOL 350 MG/1
350 TABLET ORAL 3 TIMES DAILY
Qty: 30 TABLET | Refills: 0 | Status: SHIPPED | OUTPATIENT
Start: 2021-11-09 | End: 2022-06-27 | Stop reason: SDUPTHER

## 2021-11-09 RX ORDER — CARISOPRODOL 250 MG/1
250 TABLET ORAL
Qty: 30 TABLET | Refills: 0 | Status: SHIPPED | OUTPATIENT
Start: 2021-11-09 | End: 2021-11-09 | Stop reason: DRUGHIGH

## 2021-11-10 ENCOUNTER — TELEPHONE (OUTPATIENT)
Dept: FAMILY MEDICINE CLINIC | Facility: CLINIC | Age: 56
End: 2021-11-10

## 2021-11-10 DIAGNOSIS — L40.50 PSORIATIC ARTHRITIS (HCC): Primary | ICD-10-CM

## 2021-11-10 RX ORDER — DICLOFENAC SODIUM 75 MG/1
75 TABLET, DELAYED RELEASE ORAL 2 TIMES DAILY
Qty: 60 TABLET | Refills: 2 | Status: SHIPPED | OUTPATIENT
Start: 2021-11-10 | End: 2022-06-27 | Stop reason: SDUPTHER

## 2021-11-10 NOTE — TELEPHONE ENCOUNTER
The pharmacy told us that those medications are not available for your nails  I sent the soma to the pharmacy so you could fill with good RX- 350 mg dose since you said it was cheaper than the 250 dose? Yes, I can send voltaren   Do you want that sent to the [facility] pharmacy

## 2021-11-10 NOTE — TELEPHONE ENCOUNTER
----- Message from Myra [place] sent at 11/7/2021 11:14 AM EST -----  Regarding: Prescription Question  Contact: [phone]  Hi Dr Federica Sanchez, Pharmacy needs clarification for the meds ordered for my nails  Also needs pre-authorization for Soma medication  Also, would you be able to order Voltaren PO, I've been taking it only prn and at most twice a day morning and night  It really helps with my sacroiliac joint pain  Thanks you so much for all your help!  Jabier Kay

## 2021-11-15 ENCOUNTER — TELEPHONE (OUTPATIENT)
Dept: FAMILY MEDICINE CLINIC | Facility: CLINIC | Age: 56
End: 2021-11-15

## 2021-11-15 ENCOUNTER — HOSPITAL ENCOUNTER (OUTPATIENT)
Dept: INFUSION CENTER | Facility: HOSPITAL | Age: 56
Discharge: HOME | End: 2021-11-15
Attending: INTERNAL MEDICINE
Payer: COMMERCIAL

## 2021-11-15 VITALS
TEMPERATURE: 98.1 F | SYSTOLIC BLOOD PRESSURE: 128 MMHG | RESPIRATION RATE: 18 BRPM | WEIGHT: 154.76 LBS | BODY MASS INDEX: 27.42 KG/M2 | DIASTOLIC BLOOD PRESSURE: 84 MMHG | HEART RATE: 64 BPM

## 2021-11-15 DIAGNOSIS — R25.2 CALF CRAMP: ICD-10-CM

## 2021-11-15 DIAGNOSIS — K51.011 ULCERATIVE PANCOLITIS WITH RECTAL BLEEDING (HCC): Primary | ICD-10-CM

## 2021-11-15 LAB
ALBUMIN SERPL BCP-MCNC: 3.9 G/DL (ref 3.5–5)
ALP SERPL-CCNC: 62 U/L (ref 46–116)
ALT SERPL W P-5'-P-CCNC: 27 U/L (ref 12–78)
ANION GAP SERPL CALCULATED.3IONS-SCNC: 4 MMOL/L (ref 4–13)
AST SERPL W P-5'-P-CCNC: 16 U/L (ref 5–45)
BILIRUB SERPL-MCNC: 0.44 MG/DL (ref 0.2–1)
BUN SERPL-MCNC: 14 MG/DL (ref 5–25)
CALCIUM SERPL-MCNC: 9.3 MG/DL (ref 8.3–10.1)
CHLORIDE SERPL-SCNC: 106 MMOL/L (ref 100–108)
CO2 SERPL-SCNC: 27 MMOL/L (ref 21–32)
CREAT SERPL-MCNC: 0.82 MG/DL (ref 0.6–1.3)
GFR SERPL CREATININE-BSD FRML MDRD: 80 ML/MIN/1.73SQ M
GLUCOSE SERPL-MCNC: 93 MG/DL (ref 65–140)
MAGNESIUM SERPL-MCNC: 2.2 MG/DL (ref 1.6–2.6)
POTASSIUM SERPL-SCNC: 4 MMOL/L (ref 3.5–5.3)
PROT SERPL-MCNC: 7.7 G/DL (ref 6.4–8.2)
SODIUM SERPL-SCNC: 137 MMOL/L (ref 136–145)

## 2021-11-15 PROCEDURE — 96413 CHEMO IV INFUSION 1 HR: CPT

## 2021-11-15 PROCEDURE — 83735 ASSAY OF MAGNESIUM: CPT

## 2021-11-15 PROCEDURE — 96415 CHEMO IV INFUSION ADDL HR: CPT

## 2021-11-15 PROCEDURE — 80053 COMPREHEN METABOLIC PANEL: CPT

## 2021-11-15 RX ORDER — ACETAMINOPHEN 325 MG/1
650 TABLET ORAL ONCE
Status: COMPLETED | OUTPATIENT
Start: 2021-11-15 | End: 2021-11-15

## 2021-11-15 RX ORDER — ACETAMINOPHEN 325 MG/1
650 TABLET ORAL ONCE
Status: CANCELLED | OUTPATIENT
Start: 2022-01-10

## 2021-11-15 RX ORDER — SODIUM CHLORIDE 9 MG/ML
20 INJECTION, SOLUTION INTRAVENOUS ONCE
Status: CANCELLED | OUTPATIENT
Start: 2022-01-10

## 2021-11-15 RX ORDER — METHYLPREDNISOLONE SODIUM SUCCINATE 40 MG/ML
40 INJECTION, POWDER, LYOPHILIZED, FOR SOLUTION INTRAMUSCULAR; INTRAVENOUS ONCE
Status: CANCELLED | OUTPATIENT
Start: 2022-01-10

## 2021-11-15 RX ORDER — METHYLPREDNISOLONE SODIUM SUCCINATE 40 MG/ML
40 INJECTION, POWDER, LYOPHILIZED, FOR SOLUTION INTRAMUSCULAR; INTRAVENOUS ONCE
Status: DISCONTINUED | OUTPATIENT
Start: 2021-11-15 | End: 2021-11-18 | Stop reason: HOSPADM

## 2021-11-15 RX ORDER — SODIUM CHLORIDE 9 MG/ML
20 INJECTION, SOLUTION INTRAVENOUS ONCE
Status: COMPLETED | OUTPATIENT
Start: 2021-11-15 | End: 2021-11-15

## 2021-11-15 RX ORDER — DIPHENHYDRAMINE HCL 25 MG
25 TABLET ORAL ONCE
Status: CANCELLED | OUTPATIENT
Start: 2022-01-10

## 2021-11-15 RX ORDER — DIPHENHYDRAMINE HCL 25 MG
25 TABLET ORAL ONCE
Status: DISCONTINUED | OUTPATIENT
Start: 2021-11-15 | End: 2021-11-18 | Stop reason: HOSPADM

## 2021-11-15 RX ADMIN — ACETAMINOPHEN 650 MG: 325 TABLET, FILM COATED ORAL at 10:24

## 2021-11-15 RX ADMIN — INFLIXIMAB 700 MG: 100 INJECTION, POWDER, LYOPHILIZED, FOR SOLUTION INTRAVENOUS at 10:57

## 2021-11-15 RX ADMIN — SODIUM CHLORIDE 20 ML/HR: 0.9 INJECTION, SOLUTION INTRAVENOUS at 10:24

## 2021-12-10 DIAGNOSIS — F51.04 CHRONIC INSOMNIA: ICD-10-CM

## 2021-12-14 RX ORDER — ZOLPIDEM TARTRATE 10 MG/1
TABLET ORAL
Qty: 30 TABLET | Refills: 0 | Status: SHIPPED | OUTPATIENT
Start: 2021-12-14 | End: 2022-01-10

## 2021-12-29 ENCOUNTER — TELEPHONE (OUTPATIENT)
Dept: GASTROENTEROLOGY | Facility: AMBULARY SURGERY CENTER | Age: 56
End: 2021-12-29

## 2021-12-30 ENCOUNTER — ANESTHESIA EVENT (OUTPATIENT)
Dept: GASTROENTEROLOGY | Facility: AMBULARY SURGERY CENTER | Age: 56
End: 2021-12-30

## 2021-12-30 ENCOUNTER — HOSPITAL ENCOUNTER (OUTPATIENT)
Dept: GASTROENTEROLOGY | Facility: AMBULARY SURGERY CENTER | Age: 56
Setting detail: OUTPATIENT SURGERY
Discharge: HOME | End: 2021-12-30
Admitting: INTERNAL MEDICINE
Payer: COMMERCIAL

## 2021-12-30 ENCOUNTER — ANESTHESIA (OUTPATIENT)
Dept: GASTROENTEROLOGY | Facility: AMBULARY SURGERY CENTER | Age: 56
End: 2021-12-30

## 2021-12-30 VITALS
SYSTOLIC BLOOD PRESSURE: 105 MMHG | TEMPERATURE: 97.2 F | OXYGEN SATURATION: 97 % | HEIGHT: 63 IN | BODY MASS INDEX: 26.75 KG/M2 | RESPIRATION RATE: 16 BRPM | WEIGHT: 151 LBS | DIASTOLIC BLOOD PRESSURE: 61 MMHG | HEART RATE: 70 BPM

## 2021-12-30 DIAGNOSIS — K51.011 ULCERATIVE PANCOLITIS WITH RECTAL BLEEDING (HCC): ICD-10-CM

## 2021-12-30 PROCEDURE — 88305 TISSUE EXAM BY PATHOLOGIST: CPT | Performed by: PATHOLOGY

## 2021-12-30 PROCEDURE — 45380 COLONOSCOPY AND BIOPSY: CPT | Performed by: INTERNAL MEDICINE

## 2021-12-30 RX ORDER — LIDOCAINE HYDROCHLORIDE 10 MG/ML
INJECTION, SOLUTION EPIDURAL; INFILTRATION; INTRACAUDAL; PERINEURAL AS NEEDED
Status: DISCONTINUED | OUTPATIENT
Start: 2021-12-30 | End: 2021-12-30

## 2021-12-30 RX ORDER — PROPOFOL 10 MG/ML
INJECTION, EMULSION INTRAVENOUS AS NEEDED
Status: DISCONTINUED | OUTPATIENT
Start: 2021-12-30 | End: 2021-12-30

## 2021-12-30 RX ORDER — SODIUM CHLORIDE, SODIUM LACTATE, POTASSIUM CHLORIDE, CALCIUM CHLORIDE 600; 310; 30; 20 MG/100ML; MG/100ML; MG/100ML; MG/100ML
INJECTION, SOLUTION INTRAVENOUS CONTINUOUS PRN
Status: DISCONTINUED | OUTPATIENT
Start: 2021-12-30 | End: 2021-12-30

## 2021-12-30 RX ADMIN — PROPOFOL 20 MG: 10 INJECTION, EMULSION INTRAVENOUS at 13:25

## 2021-12-30 RX ADMIN — SODIUM CHLORIDE, SODIUM LACTATE, POTASSIUM CHLORIDE, AND CALCIUM CHLORIDE: .6; .31; .03; .02 INJECTION, SOLUTION INTRAVENOUS at 13:28

## 2021-12-30 RX ADMIN — LIDOCAINE HYDROCHLORIDE 50 MG: 10 INJECTION, SOLUTION EPIDURAL; INFILTRATION; INTRACAUDAL at 13:13

## 2021-12-30 RX ADMIN — PROPOFOL 20 MG: 10 INJECTION, EMULSION INTRAVENOUS at 13:19

## 2021-12-30 RX ADMIN — SODIUM CHLORIDE, SODIUM LACTATE, POTASSIUM CHLORIDE, AND CALCIUM CHLORIDE: .6; .31; .03; .02 INJECTION, SOLUTION INTRAVENOUS at 13:09

## 2021-12-30 RX ADMIN — PROPOFOL 20 MG: 10 INJECTION, EMULSION INTRAVENOUS at 13:21

## 2021-12-30 RX ADMIN — PROPOFOL 20 MG: 10 INJECTION, EMULSION INTRAVENOUS at 13:23

## 2021-12-30 RX ADMIN — PROPOFOL 40 MG: 10 INJECTION, EMULSION INTRAVENOUS at 13:17

## 2021-12-30 RX ADMIN — PROPOFOL 40 MG: 10 INJECTION, EMULSION INTRAVENOUS at 13:15

## 2021-12-30 RX ADMIN — PROPOFOL 100 MG: 10 INJECTION, EMULSION INTRAVENOUS at 13:13

## 2021-12-30 NOTE — H&P
History and Physical - [facility]  Daniel Ortega 64 y o  female MRN: [id number]                  HPI: Daniel Ortega is a 64y o  year old female who presents for UC, pancolitis  REVIEW OF SYSTEMS: Per the HPI, and otherwise unremarkable  Historical Information   Past Medical History:   Diagnosis Date    Adjustment disorder     last assessed 12    Anemia     HX of    Asthma     mild - intermittent    Bilateral leg edema     Blepharitis     last assessed 16    Bulging lumbar disc     Carpal tunnel syndrome     Unspecified laterality    Colitis, acute     Colon polyp     Disease of thyroid gland     Dyspareunia in female     Edema     last assessed 06/22/15    Ganglion     Herniated cervical disc     History of transfusion     Hypokalemia     Hypothyroidism     Hypothyroidism     Insomnia     Lumps on the skin     last assessed 14    Mouth ulcers     last assessed 06/22/15    Nontraumatic tear of left tibialis posterior tendon     Traumatic teart     Polyarthritis     last assessed 16    Raynaud's disease     Temporomandibular disorder     Joint    Thyroid disease     Ulcerative colitis ([address]     Ulcerative colitis ([address] )      Past Surgical History:   Procedure Laterality Date    ANKLE SURGERY Left     Tendon repair    CARPAL TUNNEL RELEASE Bilateral      SECTION, LOW TRANSVERSE      COLONOSCOPY      COLONOSCOPY N/A 2018    Procedure: COLONOSCOPY;  Surgeon: Dominic Saez MD;  [facility]; Service: Gastroenterology    [place] INJECTION RIGHT HIP (ARTHROGRAM)  9/15/2020    HERNIA REPAIR      umbilical    HYSTERECTOMY      KNEE ARTHROSCOPY Right     LIPECTOMY      Multipe lipoma removals    LIPOMA RESECTION      PARATHYROIDECTOMY      NH COLONOSCOPY FLX DX W/COLLJ SPEC WHEN PFRMD N/A 2016    Procedure: COLONOSCOPY;  Surgeon: Airam Rodrigez DO;  Location: [facility];   Service: Gastroenterology    NH REPAIR FLEX LEG TENDON,SECOND,EA Left 2016    Procedure: REPAIR OF LEFT POSTERIOR TIBIAL TENDON WITH GRAFT, EXPLORATION LEFT ANKLE ;  Surgeon: Vera Harman DPM;  Location: [facility];  Service: Podiatry    SHOULDER SURGERY Left     bicep tendon and labrum repair    TONSILLECTOMY      TOOTH EXTRACTION  2018     Social History   Social History     Substance and Sexual Activity   Alcohol Use Yes    Comment: social 1 drink per weeek     Social History     Substance and Sexual Activity   Drug Use No     Social History     Tobacco Use   Smoking Status Former Smoker    Types: Cigarettes    Quit date: 2003    Years since quittin 7   Smokeless Tobacco Never Used   Tobacco Comment    Quit , rare use for 2 years     Family History   Problem Relation Age of Onset    Parkinsonism Mother     Rheum arthritis Mother     Thyroid disease unspecified Mother     Hypothyroidism Mother     Heart attack Father     Hypertension Father     Osteoporosis Father     Prostate cancer Father     Nephrolithiasis Father     Hashimoto's thyroiditis Sister     Thyroid disease unspecified Sister     Hypothyroidism Sister     Cancer Paternal Grandfather         Penile    Prostate cancer Paternal Grandfather     Crohn's disease Family     Osteoarthritis Family     Rheum arthritis Family     Crohn's disease Other     Crohn's disease Maternal Uncle     Psoriasis Maternal Uncle     Ulcerative colitis Maternal Uncle     Rheum arthritis Maternal Uncle     Rheum arthritis Maternal Aunt     Thyroid disease unspecified Maternal Aunt     Hypothyroidism Maternal Aunt     Ulcerative colitis Family        Meds/Allergies       Current Outpatient Medications:     Ascorbic Acid (VITAMIN C) 1000 MG tablet    diclofenac (VOLTAREN) 75 mg EC tablet    famotidine (PEPCID) 20 mg tablet    levothyroxine 50 mcg tablet    ondansetron (ZOFRAN-ODT) 4 mg disintegrating tablet    predniSONE 10 mg tablet    traMADol (ULTRAM) 50 mg tablet    zolpidem (AMBIEN) 10 mg tablet    albuterol (ACCUNEB) 1 25 MG/3ML nebulizer solution    carisoprodol (SOMA) 350 mg tablet    hyoscyamine (ANASPAZ,LEVSIN) 0 125 MG tablet    inFLIXimab (REMICADE) 100 mg    traZODone (DESYREL) 50 mg tablet    triamcinolone (KENALOG) 0 5 % cream    No Known Allergies    Objective     /79   Pulse 77   Temp (!) 97 4 °F (36 3 °C) (Temporal)   Resp 16   Ht 5' 3" (1 6 m)   Wt 68 5 kg (151 lb)   SpO2 98%   BMI 26 75 kg/m²       PHYSICAL EXAM    Gen: NAD  Head: NCAT  CV: RRR  CHEST: Clear  ABD: soft, NT/ND  EXT: no edema      ASSESSMENT/PLAN:  This is a 64y o  year old female here for colonoscopy, and she is stable and optimized for her procedure

## 2021-12-30 NOTE — ANESTHESIA PREPROCEDURE EVALUATION
Procedure:  COLONOSCOPY    Relevant Problems   CARDIO   (+) Raynaud's syndrome without gangrene      ENDO   (+) Hyperparathyroidism (HCC)   (+) Hypothyroidism      GYN   (+) History of hysterectomy      HEMATOLOGY   (+) Anemia   (+) Iron deficiency anemia      MUSCULOSKELETAL   (+) Chronic bilateral low back pain without sciatica   (+) Lumbar spondylosis   (+) Psoriatic arthritis (HCC)   (+) Sacroiliitis (HCC)      PULMONARY   (+) Asthma, mild intermittent      Other   (+) Arthralgia of multiple joints   (+) BMI 26 0-26 9,adult   (+) Bilateral leg edema   (+) Calf cramp   (+) Chronic insomnia   (+) Dyspareunia in female   (+) Exanthem   (+) Hypercalcemia   (+) Hypokalemia   (+) Intervertebral disc disorder with radiculopathy of lumbar region   (+) Leg cramping   (+) Mild protein-calorie malnutrition (HCC)   (+) Multiple thyroid nodules   (+) Osteopenia   (+) Persistent insomnia of non-organic origin   (+) Right hip pain   (+) Screening for breast cancer   (+) Seronegative spondyloarthropathy   (+) Ulcerative pancolitis with rectal bleeding (HCC)        Physical Exam    Airway    Mallampati score: II  TM Distance: >3 FB  Neck ROM: full     Dental   No notable dental hx     Cardiovascular      Pulmonary      Other Findings        Anesthesia Plan  ASA Score- 3     Anesthesia Type- IV sedation with anesthesia with ASA Monitors  Additional Monitors:   Airway Plan:           Plan Factors-Exercise tolerance (METS): >4 METS  Chart reviewed  EKG reviewed  Imaging results reviewed  Existing labs reviewed  Patient summary reviewed  Patient is not a current smoker  Patient did not smoke on day of surgery  Induction- intravenous  Postoperative Plan-     Informed Consent- Anesthetic plan and risks discussed with patient and spouse  I personally reviewed this patient with the CRNA  Discussed and agreed on the Anesthesia Plan with the CRNA  Oni Jose

## 2021-12-30 NOTE — ANESTHESIA POSTPROCEDURE EVALUATION
Post-Op Assessment Note    CV Status:  Stable  Pain Score: 0    Pain management: adequate     Mental Status:  Awake and sleepy   Hydration Status:  Euvolemic   PONV Controlled:  Controlled   Airway Patency:  Patent      Post Op Vitals Reviewed: Yes      Staff: CRNA         No complications documented      BP   104/65   Temp  97 2   Pulse 72   Resp 16   SpO2 98

## 2022-01-06 ENCOUNTER — OFFICE VISIT (OUTPATIENT)
Dept: DERMATOLOGY | Facility: CLINIC | Age: 57
End: 2022-01-06
Payer: COMMERCIAL

## 2022-01-06 VITALS — TEMPERATURE: 97.5 F

## 2022-01-06 DIAGNOSIS — L03.011 PARONYCHIA OF FINGER, RIGHT: Primary | ICD-10-CM

## 2022-01-06 DIAGNOSIS — B35.1 ONYCHOMYCOSIS: ICD-10-CM

## 2022-01-06 PROCEDURE — 88312 SPECIAL STAINS GROUP 1: CPT | Performed by: STUDENT IN AN ORGANIZED HEALTH CARE EDUCATION/TRAINING PROGRAM

## 2022-01-06 PROCEDURE — 88305 TISSUE EXAM BY PATHOLOGIST: CPT | Performed by: STUDENT IN AN ORGANIZED HEALTH CARE EDUCATION/TRAINING PROGRAM

## 2022-01-06 PROCEDURE — 99204 OFFICE O/P NEW MOD 45 MIN: CPT | Performed by: STUDENT IN AN ORGANIZED HEALTH CARE EDUCATION/TRAINING PROGRAM

## 2022-01-06 RX ORDER — CLOBETASOL PROPIONATE 0.5 MG/G
CREAM TOPICAL 2 TIMES DAILY
Qty: 30 G | Refills: 0 | Status: SHIPPED | OUTPATIENT
Start: 2022-01-06 | End: 2022-06-27 | Stop reason: ALTCHOICE

## 2022-01-06 RX ORDER — DOXYCYCLINE HYCLATE 100 MG/1
100 CAPSULE ORAL EVERY 12 HOURS SCHEDULED
Qty: 60 CAPSULE | Refills: 0 | Status: SHIPPED | OUTPATIENT
Start: 2022-01-06 | End: 2022-02-05

## 2022-01-06 NOTE — PROGRESS NOTES
[facility] Note     Patient Name: Sneha Manning  Encounter Date: 01/06/22       Have you been cared for by a [facility] Dermatologist in the last 3 years and, if so, which one? No    · Have you traveled outside of the [address] [place] [address] in the past 3 months or outside of the [facility] area in the last 2 weeks? No     May we call your Preferred Phone number to discuss your specific medical information? Yes     May we leave a detailed message that includes your specific medical information? Yes      Today's Chief Concerns:   Concern #1:  Right 3 rd and 4th digit    Concern #2:      Past Medical History:  Have you personally ever had or currently have any of the following? · Skin cancer (such as Melanoma, Basal Cell Carcinoma, Squamous Cell Carcinoma? (If Yes, please provide more detail)- No  · Eczema: No  · Psoriasis: No  · HIV/AIDS: No  · Hepatitis B or C: No  · Tuberculosis: No  · Systemic Immunosuppression such as Diabetes, Biologic or Immunotherapy, Chemotherapy, Organ Transplantation, Bone Marrow Transplantation (If YES, please provide more detail): No  · Radiation Treatment (If YES, please provide more detail): No  · Any other major medical conditions/concerns? 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 Family History:  Have any of your "first degree relatives" (parent, brother, sister, or child) had any of the following       · Skin cancer such as Melanoma or Merkel Cell Carcinoma or Pancreatic Cancer? YES, sister, mother and father - unknown kind   · Eczema, Asthma, Hay Fever or Seasonal Allergies: No  · Psoriasis or Psoriatic Arthritis: No  · Do any other medical conditions seem to run in your family? If Yes, what condition and which relatives?   YES    Current Medications:       Current Outpatient Medications:     albuterol (ACCUNEB) 1 25 MG/3ML nebulizer solution, Take 1 ampule by nebulization as needed for wheezing , Disp: , Rfl:     Ascorbic Acid (VITAMIN C) 1000 MG tablet, Take 1,000 mg by mouth daily, Disp: , Rfl:     carisoprodol (SOMA) 350 mg tablet, Take 1 tablet (350 mg total) by mouth 3 (three) times a day, Disp: 30 tablet, Rfl: 0    diclofenac (VOLTAREN) 75 mg EC tablet, Take 1 tablet (75 mg total) by mouth 2 (two) times a day, Disp: 60 tablet, Rfl: 2    famotidine (PEPCID) 20 mg tablet, Take 1 tablet (20 mg total) by mouth daily at bedtime (Patient taking differently: Take 20 mg by mouth as needed ), Disp: 90 tablet, Rfl: 3    hyoscyamine (ANASPAZ,LEVSIN) 0 125 MG tablet, TAKE ONE TABLET BY MOUTH EVERY 4 HOURS AS NEEDED FOR CRAMPING (Patient taking differently: as needed ), Disp: 30 tablet, Rfl: 0    inFLIXimab (REMICADE) 100 mg, Infuse into a venous catheter every 56 days, Disp: , Rfl:     levothyroxine 50 mcg tablet, TAKE ONE TABLET BY MOUTH EVERY DAY IN THE EARLY MORNING, Disp: 30 tablet, Rfl: 0    ondansetron (ZOFRAN-ODT) 4 mg disintegrating tablet, Take 1 tablet (4 mg total) by mouth every 6 (six) hours as needed for nausea or vomiting (Patient taking differently: Take 4 mg by mouth as needed for nausea or vomiting ), Disp: 30 tablet, Rfl: 1    predniSONE 10 mg tablet, Take 1 tablet (10 mg total) by mouth daily Pred 10mg, 4 tabs for 2 days , 3 tabs for 2 days, 2 tabs for 2 days and 1 tab for 2 days  # 20  (Patient taking differently: Take 10 mg by mouth as needed Pred 10mg, 4 tabs for 2 days , 3 tabs for 2 days, 2 tabs for 2 days and 1 tab for 2 days   # 20 ), Disp: 20 tablet, Rfl: 0    traMADol (ULTRAM) 50 mg tablet, TAKE ONE TABLET BY MOUTH 2 TIMES A DAY AS NEEDED FOR MODERATE PAIN (Patient taking differently: as needed ), Disp: 30 tablet, Rfl: 1    traZODone (DESYREL) 50 mg tablet, Take 1 tablet (50 mg total) by mouth daily at bedtime, Disp: 90 tablet, Rfl: 0    triamcinolone (KENALOG) 0 5 % cream, Apply topically 3 (three) times a day (Patient taking differently: Apply topically as needed ), Disp: 30 g, Rfl: 0    zolpidem (AMBIEN) 10 mg tablet, TAKE ONE TABLET BY MOUTH EVERY DAY AT BEDTIME AS NEEDED FOR SLEEP, Disp: 30 tablet, Rfl: 0      Review of Systems:  Have you recently had or currently have any of the following? If YES, what are you doing for the problem? · Fever, chills or unintended weight loss: No  · Sudden loss or change in your vision: No  · Nausea, vomiting or blood in your stool: No  · Painful or swollen joints: No  · Wheezing or cough: No  · Changing mole or non-healing wound: No  · Nosebleeds: No  · Excessive sweating: No  · Easy or prolonged bleeding? No  · Over the last 2 weeks, how often have you been bothered by the following problems? · Taking little interest or pleasure in doing things: 1 - Not at All  · Feeling down, depressed, or hopeless: 1 - Not at All  · Rapid heartbeat with epinephrine:  No    · FEMALES ONLY:    · Are you pregnant or planning to become pregnant? No  · Are you currently or planning to be nursing or breast feeding? No    · Any known allergies? · No Known Allergies      Physical Exam:     Was a chaperone (Derm Clinical Assistant) present throughout the entire Physical Exam? Yes     Did the Dermatology Team specifically  the patient on the importance of a Full Skin Exam to be sure that nothing is missed clinically? Yes}  o Did the patient ultimately request or accept a Full Skin Exam?  NO    CONSTITUTIONAL:   There were no vitals filed for this visit      PSYCH: Normal mood and affect  EYES: Normal conjunctiva  ENT: Normal lips and oral mucosa  CARDIOVASCULAR: No edema  RESPIRATORY: Normal respirations  HEME/LYMPH/IMMUNO:  No regional lymphadenopathy except as noted below in "ASSESSMENT AND PLAN BY DIAGNOSIS"    SKIN:  FULL ORGAN SYSTEM EXAM   Right Arm/Hand/Fingers Normal except as noted below in Assessment   Left Arm/Hand/Fingers Normal except as noted below in Assessment Assessment and Plan by Diagnosis:    History of Present Condition:     Duration:  How long has this been an issue for you?    o  almost 1 year    Location Affected:  Where on the body is this affecting you?    o  Right 3 rd and 4th digit    Quality:  Is there any bleeding, pain, itch, burning/irritation, or redness associated with the skin lesion? o  pain   Severity:  Describe any bleeding, pain, itch, burning/irritation, or redness on a scale of 1 to 10 (with 10 being the worst)  o  10   Timing:  Does this condition seem to be there pretty constantly or do you notice it more at specific times throughout the day? o  constant    Context:  Have you ever noticed that this condition seems to be associated with specific activities you do?    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    -  hand washing ;   o What have you tried to do to make the condition better? -  Tacrolimus, nystatin and triamcinolone; OTC nail fungus;     Associated Signs and Symptoms:  Does this skin lesion seem to be associated with any of the following:  o  Pain      PARONYCHIA  Physical Exam:   Anatomic Location Affected:  3rd and 4th right hand digits   Morphological Description:  Distal onycholysis with tenderness to palpation of periungual skin   Pertinent Positives:   Pertinent Negatives: Additional History of Present Condition:  Patient reports pain and tenderness  No draining  Assessment and Plan:  Based on a thorough discussion of this condition and the management approach to it (including a comprehensive discussion of the known risks, side effects and potential benefits of treatment), the patient (family) agrees to implement the following specific plan:   Start doxycycline 100 mg twice a day for 30 days  Take with a full meal and a full glass of water     Start clobetasol 0 05% cream  Apply twice a day to involved nail for 1 month   Follow up in 6 weeks

## 2022-01-07 DIAGNOSIS — F51.04 CHRONIC INSOMNIA: ICD-10-CM

## 2022-01-07 DIAGNOSIS — E03.9 ACQUIRED HYPOTHYROIDISM: ICD-10-CM

## 2022-01-10 ENCOUNTER — HOSPITAL ENCOUNTER (OUTPATIENT)
Dept: INFUSION CENTER | Facility: HOSPITAL | Age: 57
Discharge: HOME | End: 2022-01-10
Attending: INTERNAL MEDICINE
Payer: COMMERCIAL

## 2022-01-10 VITALS
SYSTOLIC BLOOD PRESSURE: 115 MMHG | BODY MASS INDEX: 28.59 KG/M2 | DIASTOLIC BLOOD PRESSURE: 66 MMHG | HEART RATE: 63 BPM | TEMPERATURE: 97.9 F | WEIGHT: 161.38 LBS

## 2022-01-10 DIAGNOSIS — K51.011 ULCERATIVE PANCOLITIS WITH RECTAL BLEEDING (HCC): Primary | ICD-10-CM

## 2022-01-10 PROCEDURE — 96413 CHEMO IV INFUSION 1 HR: CPT

## 2022-01-10 PROCEDURE — 96415 CHEMO IV INFUSION ADDL HR: CPT

## 2022-01-10 RX ORDER — LEVOTHYROXINE SODIUM 0.05 MG/1
TABLET ORAL
Qty: 30 TABLET | Refills: 0 | Status: SHIPPED | OUTPATIENT
Start: 2022-01-10 | End: 2022-01-31

## 2022-01-10 RX ORDER — SODIUM CHLORIDE 9 MG/ML
20 INJECTION, SOLUTION INTRAVENOUS ONCE
Status: CANCELLED | OUTPATIENT
Start: 2022-03-07

## 2022-01-10 RX ORDER — METHYLPREDNISOLONE SODIUM SUCCINATE 40 MG/ML
40 INJECTION, POWDER, LYOPHILIZED, FOR SOLUTION INTRAMUSCULAR; INTRAVENOUS ONCE
Status: DISCONTINUED | OUTPATIENT
Start: 2022-01-10 | End: 2022-01-13 | Stop reason: HOSPADM

## 2022-01-10 RX ORDER — ZOLPIDEM TARTRATE 10 MG/1
TABLET ORAL
Qty: 30 TABLET | Refills: 0 | Status: SHIPPED | OUTPATIENT
Start: 2022-01-10 | End: 2022-02-06 | Stop reason: SDUPTHER

## 2022-01-10 RX ORDER — METHYLPREDNISOLONE SODIUM SUCCINATE 40 MG/ML
40 INJECTION, POWDER, LYOPHILIZED, FOR SOLUTION INTRAMUSCULAR; INTRAVENOUS ONCE
Status: CANCELLED | OUTPATIENT
Start: 2022-03-07

## 2022-01-10 RX ORDER — ACETAMINOPHEN 325 MG/1
650 TABLET ORAL ONCE
Status: CANCELLED | OUTPATIENT
Start: 2022-03-07

## 2022-01-10 RX ORDER — DIPHENHYDRAMINE HCL 25 MG
25 TABLET ORAL ONCE
Status: CANCELLED | OUTPATIENT
Start: 2022-03-07

## 2022-01-10 RX ORDER — ACETAMINOPHEN 325 MG/1
650 TABLET ORAL ONCE
Status: COMPLETED | OUTPATIENT
Start: 2022-01-10 | End: 2022-01-10

## 2022-01-10 RX ORDER — SODIUM CHLORIDE 9 MG/ML
20 INJECTION, SOLUTION INTRAVENOUS ONCE
Status: COMPLETED | OUTPATIENT
Start: 2022-01-10 | End: 2022-01-10

## 2022-01-10 RX ORDER — DIPHENHYDRAMINE HCL 25 MG
25 TABLET ORAL ONCE
Status: DISCONTINUED | OUTPATIENT
Start: 2022-01-10 | End: 2022-01-13 | Stop reason: HOSPADM

## 2022-01-10 RX ADMIN — ACETAMINOPHEN 650 MG: 325 TABLET, FILM COATED ORAL at 09:55

## 2022-01-10 RX ADMIN — INFLIXIMAB 700 MG: 100 INJECTION, POWDER, LYOPHILIZED, FOR SOLUTION INTRAVENOUS at 10:16

## 2022-01-10 RX ADMIN — SODIUM CHLORIDE 20 ML/HR: 0.9 INJECTION, SOLUTION INTRAVENOUS at 09:55

## 2022-01-11 ENCOUNTER — TELEPHONE (OUTPATIENT)
Dept: DERMATOLOGY | Facility: CLINIC | Age: 57
End: 2022-01-11

## 2022-01-11 RX ORDER — TERBINAFINE HYDROCHLORIDE 250 MG/1
TABLET ORAL
Qty: 42 TABLET | Refills: 0 | Status: SHIPPED | OUTPATIENT
Start: 2022-01-11 | End: 2022-05-11

## 2022-01-11 NOTE — RESULT ENCOUNTER NOTE
DERMATOPATHOLOGY RESULT NOTE    Results reviewed by ordering physician  Called patient to personally discuss results  Discussed results with patient  Will start oral terbinafine treatment; 250 mg tablet, once daily for 6 weeks for onychomycosis of fingernail      Instructions for Clinical Derm Team:   (remember to route Result Note to appropriate staff):    None    Result & Plan by Specimen:    Specimen A: benign  Plan: prescription for oral terbinafine        Status: Final result    Visible to patient: Yes (seen)    Next appt: 02/07/2022 at 10:15 AM in Dermatology Mahnaz Pressley MD)    Dx: Paronychia of finger, right    0 Result Notes    Component   Case Report  Surgical Pathology Report                         Case: [id number]                                   Authorizing Provider: Jeyson Chua MD          Collected:           01/06/2022 1235              Ordering Location:     [facility]      Received:            01/06/2022 [address] [place] [address]                                                                Pathologist:           Fareed Lewis MD                                                           Specimen:    Nail, Specimen A: right 3rd digit nail, clipping                                         Final Diagnosis  A  Nail, right third digit nail, clipping:     ONYCHOMYCOSIS (see note)      Note: Numerous fungal elements seen on PAS stain  Electronically signed by Fareed Lewis MD on 1/10/2022 at 10:04 AM  Additional Information   All reported additional testing was performed with appropriately reactive controls   These tests were developed and their performance characteristics determined by [address] [place] [address] or appropriate performing facility, though some tests may be performed on tissues which have not been validated for performance characteristics (such as staining performed on alcohol exposed cell blocks and decalcified tissues)   Results should be interpreted with caution and in the context of the patients' clinical condition  These tests may not be cleared or approved by the [place]  Food and Drug Administration, though the FDA has determined that such clearance or approval is not necessary  These tests are used for clinical purposes and they should not be regarded as investigational or for research  This laboratory has been approved by [facility], designated as a high-complexity laboratory and is qualified to perform these tests  Edith Harris Description     A  The specimen is received in formalin, labeled with the patient's name and hospital number, and is designated "right third digit nail clipping"  The specimen consists of multiple white-tan fragments of nail measuring in aggregate 1 2 x 0 4 x 0 1 cm  Entirely submitted  One cassette  In an embedding bag      Note: The estimated total formalin fixation time based upon information provided by the submitting clinician and the standard processing schedule is under 72 hours      [facility]       Clinical Information   Specimen A: right 3rd digit nail, clipping  64 y o  F with paronychia; Rule out nail fungus     [facility]  Resulting Agency [id number]          Specimen Collected: 01/06/22 12:35 PM Last Resulted: 01/10/22 10:04 AM

## 2022-01-11 NOTE — RESULT ENCOUNTER NOTE
Reviewed patient's labs  LFT's normal at baseline  Will repeat after treatment with oral terbinafine for 6 weeks

## 2022-01-11 NOTE — TELEPHONE ENCOUNTER
Patient called regarding nail culture  She was able to see results in Mychart and would like to know final results, and if she should continue with current treatment or if her course of treatment should change  She said that she will have "spotty" service today and it is ok to leave a message  Her call back number is [phone]  If a script would need to be sent in she would like it to go to [facility] in [place]

## 2022-01-13 ENCOUNTER — TELEPHONE (OUTPATIENT)
Dept: FAMILY MEDICINE CLINIC | Facility: CLINIC | Age: 57
End: 2022-01-13

## 2022-01-13 ENCOUNTER — CLINICAL SUPPORT (OUTPATIENT)
Dept: FAMILY MEDICINE CLINIC | Facility: CLINIC | Age: 57
End: 2022-01-13
Payer: COMMERCIAL

## 2022-01-13 DIAGNOSIS — Z11.52 ENCOUNTER FOR SCREENING FOR COVID-19: Primary | ICD-10-CM

## 2022-01-13 PROBLEM — U07.1 COVID-19: Status: ACTIVE | Noted: 2022-01-13

## 2022-01-13 LAB
SARS-COV-2 AG UPPER RESP QL IA: POSITIVE
VALID CONTROL: ABNORMAL

## 2022-01-13 PROCEDURE — 87811 SARS-COV-2 COVID19 W/OPTIC: CPT

## 2022-01-18 ENCOUNTER — TELEPHONE (OUTPATIENT)
Dept: FAMILY MEDICINE CLINIC | Facility: CLINIC | Age: 57
End: 2022-01-18

## 2022-01-18 NOTE — TELEPHONE ENCOUNTER
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

## 2022-01-22 ENCOUNTER — LAB (OUTPATIENT)
Dept: LAB | Facility: HOSPITAL | Age: 57
End: 2022-01-22
Attending: INTERNAL MEDICINE
Payer: COMMERCIAL

## 2022-01-22 DIAGNOSIS — E21.3 HYPERPARATHYROIDISM (HCC): ICD-10-CM

## 2022-01-22 DIAGNOSIS — E03.9 HYPOTHYROIDISM, UNSPECIFIED TYPE: ICD-10-CM

## 2022-01-22 DIAGNOSIS — M85.80 OSTEOPENIA, UNSPECIFIED LOCATION: ICD-10-CM

## 2022-01-22 LAB
25(OH)D3 SERPL-MCNC: 38.4 NG/ML (ref 30–100)
ANION GAP SERPL CALCULATED.3IONS-SCNC: 9 MMOL/L (ref 4–13)
BUN SERPL-MCNC: 13 MG/DL (ref 5–25)
CALCIUM SERPL-MCNC: 8.3 MG/DL (ref 8.3–10.1)
CHLORIDE SERPL-SCNC: 105 MMOL/L (ref 100–108)
CO2 SERPL-SCNC: 26 MMOL/L (ref 21–32)
CREAT SERPL-MCNC: 0.88 MG/DL (ref 0.6–1.3)
GFR SERPL CREATININE-BSD FRML MDRD: 73 ML/MIN/1.73SQ M
GLUCOSE P FAST SERPL-MCNC: 97 MG/DL (ref 65–99)
PHOSPHATE SERPL-MCNC: 3.8 MG/DL (ref 2.7–4.5)
POTASSIUM SERPL-SCNC: 3.7 MMOL/L (ref 3.5–5.3)
PTH-INTACT SERPL-MCNC: 30 PG/ML (ref 18.4–80.1)
SODIUM SERPL-SCNC: 140 MMOL/L (ref 136–145)
T4 FREE SERPL-MCNC: 1.19 NG/DL (ref 0.76–1.46)
TSH SERPL DL<=0.05 MIU/L-ACNC: 3.08 UIU/ML (ref 0.36–3.74)

## 2022-01-22 PROCEDURE — 83970 ASSAY OF PARATHORMONE: CPT

## 2022-01-22 PROCEDURE — 80048 BASIC METABOLIC PNL TOTAL CA: CPT

## 2022-01-22 PROCEDURE — 82306 VITAMIN D 25 HYDROXY: CPT

## 2022-01-22 PROCEDURE — 84443 ASSAY THYROID STIM HORMONE: CPT

## 2022-01-22 PROCEDURE — 84100 ASSAY OF PHOSPHORUS: CPT

## 2022-01-22 PROCEDURE — 84439 ASSAY OF FREE THYROXINE: CPT

## 2022-01-22 PROCEDURE — 36415 COLL VENOUS BLD VENIPUNCTURE: CPT

## 2022-01-31 DIAGNOSIS — K21.9 GASTROESOPHAGEAL REFLUX DISEASE WITHOUT ESOPHAGITIS: ICD-10-CM

## 2022-01-31 RX ORDER — FAMOTIDINE 20 MG/1
TABLET, FILM COATED ORAL
Qty: 90 TABLET | Refills: 0 | Status: SHIPPED | OUTPATIENT
Start: 2022-01-31

## 2022-02-02 ENCOUNTER — TELEPHONE (OUTPATIENT)
Dept: GASTROENTEROLOGY | Facility: CLINIC | Age: 57
End: 2022-02-02

## 2022-02-02 NOTE — TELEPHONE ENCOUNTER
Email from [facility]:      Approval from new insurance thru [facility] - this approval is only valid till 5/4/2022 for transition to preferred drug attached - please review       This patient Is for remicade so the preferred drug is inflectra or renflexis       For Entyvio preferred drug is stelara   cimzia   Laquetta Minor     That is the expectation of the insurance , I also attached that table of preferred drug to this email , for reference

## 2022-02-07 ENCOUNTER — OFFICE VISIT (OUTPATIENT)
Dept: DERMATOLOGY | Facility: CLINIC | Age: 57
End: 2022-02-07
Payer: COMMERCIAL

## 2022-02-07 VITALS — HEIGHT: 63 IN | TEMPERATURE: 97.9 F | BODY MASS INDEX: 28.59 KG/M2

## 2022-02-07 DIAGNOSIS — Z51.81 MEDICATION MONITORING ENCOUNTER: ICD-10-CM

## 2022-02-07 DIAGNOSIS — B35.1 ONYCHOMYCOSIS: Primary | ICD-10-CM

## 2022-02-07 PROCEDURE — 99213 OFFICE O/P EST LOW 20 MIN: CPT | Performed by: DERMATOLOGY

## 2022-02-07 NOTE — PATIENT INSTRUCTIONS
1  ONYCHOMYCOSIS ("FUNGAL NAIL")    Physical Exam:   Anatomic Location Affected:  Right 3rd and 4th fingernails    Assessment and Plan:  Based on a thorough discussion of this condition and the management approach to it (including a comprehensive discussion of the known risks, side effects and potential benefits of treatment), the patient (family) agrees to implement the following specific plan:   Finish taking the oral terbinafine   Hepatic panel ordered to be done after completing the terbinafine    What are fungal nail infections? Fungal infection of the nails is also known as onychomycosis  It is increasingly common with increased age  It rarely affects children  Which organisms cause onychomycosis? Onychomycosis can be due to:  Dermatophytes such as Trichophyton rubrum (T  rubrum), T  interdigitale (tinea unguium)   Yeasts such as Candida albicans   Molds such as Scopulariopsis brevicaulis and Fusarium species  What are the clinical features of onychomycosis? Onychomycosis may affect one or more toenails and fingernails and most often involves the great toenail or the little toenail  It can present in one or several different patterns   Lateral onychomycosis: a white or yellow opaque streak appears at one side of the nail   Subungual hyperkeratosis: scaling occurs under the nail   Distal onycholysis: the end of the nail lifts  The free edge often crumbles   Superficial white onychomycosis: flaky white patches and pits appear on the top of the nail plate   Proximal onychomycosis:yellow spots appear in the half-moon (lunula)   Onychoma or dermatophytoma:a thick localized area of infection in the nail plate    Destruction of the nail    Tinea unguium often results from untreated tinea pedis (feet) or tinea manuum (hand)  It may follow an injury to the nail or inflammatory disease of the nail    Candida infection of the nail plate generally results from paronychia and starts near the nail fold (the cuticle)  The nail fold is swollen and red, lifted off the nail plate  White, yellow, green or black marks appear on the nearby nail and spread  The nail may lift off its bed and is tender if you press on it  Mold infections are similar in appearance to tinea unguium  Onychomycosis must be distinguished from other nail disorders   Bacterial infection especially Pseudomonas aeruginosa, which turns the nail black or green    Psoriasis    Eczema or dermatitis    Lichen planus    Viral warts    Onycholysis    Onychogryphosis (nail thickening and scaling under the nail), common in the elderly    How is the diagnosis of onychomycosis confirmed? Clippings should be taken from crumbling tissue at the end of the infected nail  The discolored surface of the nails can be scraped off  The debris can be scooped out from under the nail  The clippings and scrapings are sent to a mycology laboratory for microscopy and culture  Previous treatment can reduce the chance of growing the fungus successfully in a culture, so it is best to take the clippings before any treatment is commenced:   To confirm the diagnosis -- antifungal treatment will not be successful if there is another explanation for the nail condition    To identify the responsible organism  Molds and yeasts may require different treatment from dermatophyte fungi   Treatment may be required for a prolonged period and is expensive  Partially treated infection may be impossible to prove for many months as antifungal drugs can be detected even a year later  A nail biopsy may also reveal characteristic histopathological features of onychomycosis  What is the treatment of onychomycosis? Fingernail infections are usually cured more quickly and effectively than toenail infections    Mild infections affecting less than 50% of one or two nails may respond to topical antifungal medications, but cure usually requires an oral antifungal medication for several months  Devices used to treat onychomycosis  Recently, non-drug treatment has been developed to treat onychomycosis thus avoiding the side effects and risks of oral antifungal drugs  Lasers emitting infrared radiation are thought to kill fungi by the production of heat within the infected tissue  Laser treatment is reported to safely eradicate nail fungi with one to three, almost painless, sessions  Several lasers have been approved for this purpose by the FDA and other regulatory authorities  However, high-quality studies of efficacy are lacking, and existing studies indicate that laser treatment is less medically effective than topical or oral antifungal agents    Nd:YAG continuous, long or short-pulsed lasers   Ti:Sapphire modelocked laser Diode laser

## 2022-03-07 ENCOUNTER — HOSPITAL ENCOUNTER (OUTPATIENT)
Dept: INFUSION CENTER | Facility: HOSPITAL | Age: 57
Discharge: HOME | End: 2022-03-07
Attending: INTERNAL MEDICINE
Payer: COMMERCIAL

## 2022-03-07 VITALS
HEART RATE: 74 BPM | DIASTOLIC BLOOD PRESSURE: 70 MMHG | SYSTOLIC BLOOD PRESSURE: 117 MMHG | WEIGHT: 160.05 LBS | RESPIRATION RATE: 18 BRPM | BODY MASS INDEX: 28.35 KG/M2

## 2022-03-07 DIAGNOSIS — K51.011 ULCERATIVE PANCOLITIS WITH RECTAL BLEEDING (HCC): Primary | ICD-10-CM

## 2022-03-07 PROCEDURE — 96415 CHEMO IV INFUSION ADDL HR: CPT

## 2022-03-07 PROCEDURE — 96413 CHEMO IV INFUSION 1 HR: CPT

## 2022-03-07 RX ORDER — ACETAMINOPHEN 325 MG/1
650 TABLET ORAL ONCE
Status: CANCELLED | OUTPATIENT
Start: 2022-05-02

## 2022-03-07 RX ORDER — SODIUM CHLORIDE 9 MG/ML
20 INJECTION, SOLUTION INTRAVENOUS ONCE
Status: CANCELLED | OUTPATIENT
Start: 2022-05-02

## 2022-03-07 RX ORDER — METHYLPREDNISOLONE SODIUM SUCCINATE 40 MG/ML
40 INJECTION, POWDER, LYOPHILIZED, FOR SOLUTION INTRAMUSCULAR; INTRAVENOUS ONCE
Status: DISCONTINUED | OUTPATIENT
Start: 2022-03-07 | End: 2022-03-10 | Stop reason: HOSPADM

## 2022-03-07 RX ORDER — METHYLPREDNISOLONE SODIUM SUCCINATE 40 MG/ML
40 INJECTION, POWDER, LYOPHILIZED, FOR SOLUTION INTRAMUSCULAR; INTRAVENOUS ONCE
Status: CANCELLED | OUTPATIENT
Start: 2022-05-02

## 2022-03-07 RX ORDER — DIPHENHYDRAMINE HCL 25 MG
25 TABLET ORAL ONCE
Status: CANCELLED | OUTPATIENT
Start: 2022-05-02

## 2022-03-07 RX ORDER — DIPHENHYDRAMINE HCL 25 MG
25 TABLET ORAL ONCE
Status: DISCONTINUED | OUTPATIENT
Start: 2022-03-07 | End: 2022-03-10 | Stop reason: HOSPADM

## 2022-03-07 RX ORDER — SODIUM CHLORIDE 9 MG/ML
20 INJECTION, SOLUTION INTRAVENOUS ONCE
Status: COMPLETED | OUTPATIENT
Start: 2022-03-07 | End: 2022-03-07

## 2022-03-07 RX ORDER — ACETAMINOPHEN 325 MG/1
650 TABLET ORAL ONCE
Status: DISCONTINUED | OUTPATIENT
Start: 2022-03-07 | End: 2022-03-10 | Stop reason: HOSPADM

## 2022-03-07 RX ADMIN — INFLIXIMAB 700 MG: 100 INJECTION, POWDER, LYOPHILIZED, FOR SOLUTION INTRAVENOUS at 10:10

## 2022-03-07 RX ADMIN — SODIUM CHLORIDE 20 ML/HR: 0.9 INJECTION, SOLUTION INTRAVENOUS at 09:55

## 2022-03-14 DIAGNOSIS — E03.9 ACQUIRED HYPOTHYROIDISM: ICD-10-CM

## 2022-03-14 RX ORDER — LEVOTHYROXINE SODIUM 0.05 MG/1
TABLET ORAL
Qty: 30 TABLET | Refills: 0 | Status: SHIPPED | OUTPATIENT
Start: 2022-03-14 | End: 2022-04-11 | Stop reason: SDUPTHER

## 2022-03-16 DIAGNOSIS — F51.04 CHRONIC INSOMNIA: ICD-10-CM

## 2022-03-16 RX ORDER — ZOLPIDEM TARTRATE 10 MG/1
TABLET ORAL
Qty: 30 TABLET | Refills: 0 | Status: SHIPPED | OUTPATIENT
Start: 2022-03-16 | End: 2022-04-21

## 2022-04-11 DIAGNOSIS — E03.9 ACQUIRED HYPOTHYROIDISM: ICD-10-CM

## 2022-04-11 RX ORDER — LEVOTHYROXINE SODIUM 0.05 MG/1
50 TABLET ORAL DAILY
Qty: 30 TABLET | Refills: 0 | Status: SHIPPED | OUTPATIENT
Start: 2022-04-11 | End: 2022-05-08

## 2022-04-12 ENCOUNTER — APPOINTMENT (OUTPATIENT)
Dept: LAB | Facility: HOSPITAL | Age: 57
End: 2022-04-12
Payer: COMMERCIAL

## 2022-04-12 ENCOUNTER — LAB (OUTPATIENT)
Dept: LAB | Facility: HOSPITAL | Age: 57
End: 2022-04-12
Payer: COMMERCIAL

## 2022-04-12 DIAGNOSIS — Z00.8 HEALTH EXAMINATION IN POPULATION SURVEY: ICD-10-CM

## 2022-04-12 DIAGNOSIS — B35.1 ONYCHOMYCOSIS: ICD-10-CM

## 2022-04-12 DIAGNOSIS — Z51.81 MEDICATION MONITORING ENCOUNTER: ICD-10-CM

## 2022-04-12 LAB
ALBUMIN SERPL BCP-MCNC: 4.1 G/DL (ref 3.5–5)
ALP SERPL-CCNC: 52 U/L (ref 46–116)
ALT SERPL W P-5'-P-CCNC: 35 U/L (ref 12–78)
AST SERPL W P-5'-P-CCNC: 27 U/L (ref 5–45)
BILIRUB DIRECT SERPL-MCNC: 0.1 MG/DL (ref 0–0.2)
BILIRUB SERPL-MCNC: 0.27 MG/DL (ref 0.2–1)
CHOLEST SERPL-MCNC: 188 MG/DL
HDLC SERPL-MCNC: 104 MG/DL
LDLC SERPL CALC-MCNC: 71 MG/DL (ref 0–100)
NONHDLC SERPL-MCNC: 84 MG/DL
PROT SERPL-MCNC: 7.3 G/DL (ref 6.4–8.2)
TRIGL SERPL-MCNC: 63 MG/DL

## 2022-04-12 PROCEDURE — 36415 COLL VENOUS BLD VENIPUNCTURE: CPT

## 2022-04-12 PROCEDURE — 80061 LIPID PANEL: CPT

## 2022-04-12 PROCEDURE — 80076 HEPATIC FUNCTION PANEL: CPT

## 2022-04-12 PROCEDURE — 83036 HEMOGLOBIN GLYCOSYLATED A1C: CPT

## 2022-04-13 LAB
EST. AVERAGE GLUCOSE BLD GHB EST-MCNC: 108 MG/DL
HBA1C MFR BLD: 5.4 %

## 2022-04-20 DIAGNOSIS — F51.04 CHRONIC INSOMNIA: ICD-10-CM

## 2022-04-21 RX ORDER — ZOLPIDEM TARTRATE 10 MG/1
TABLET ORAL
Qty: 30 TABLET | Refills: 0 | Status: SHIPPED | OUTPATIENT
Start: 2022-04-21 | End: 2022-05-24

## 2022-05-02 ENCOUNTER — HOSPITAL ENCOUNTER (OUTPATIENT)
Dept: INFUSION CENTER | Facility: HOSPITAL | Age: 57
Discharge: HOME | End: 2022-05-02
Attending: INTERNAL MEDICINE
Payer: COMMERCIAL

## 2022-05-02 VITALS
WEIGHT: 160.05 LBS | BODY MASS INDEX: 28.35 KG/M2 | SYSTOLIC BLOOD PRESSURE: 137 MMHG | HEART RATE: 60 BPM | DIASTOLIC BLOOD PRESSURE: 81 MMHG | TEMPERATURE: 97.6 F | RESPIRATION RATE: 18 BRPM

## 2022-05-02 DIAGNOSIS — K51.011 ULCERATIVE PANCOLITIS WITH RECTAL BLEEDING (HCC): Primary | ICD-10-CM

## 2022-05-02 PROCEDURE — 96413 CHEMO IV INFUSION 1 HR: CPT

## 2022-05-02 PROCEDURE — 96415 CHEMO IV INFUSION ADDL HR: CPT

## 2022-05-02 RX ORDER — ACETAMINOPHEN 325 MG/1
650 TABLET ORAL ONCE
Status: CANCELLED | OUTPATIENT
Start: 2022-06-27

## 2022-05-02 RX ORDER — SODIUM CHLORIDE 9 MG/ML
20 INJECTION, SOLUTION INTRAVENOUS ONCE
Status: COMPLETED | OUTPATIENT
Start: 2022-05-02 | End: 2022-05-02

## 2022-05-02 RX ORDER — DIPHENHYDRAMINE HCL 25 MG
25 TABLET ORAL ONCE
Status: CANCELLED | OUTPATIENT
Start: 2022-06-27

## 2022-05-02 RX ORDER — METHYLPREDNISOLONE SODIUM SUCCINATE 40 MG/ML
40 INJECTION, POWDER, LYOPHILIZED, FOR SOLUTION INTRAMUSCULAR; INTRAVENOUS ONCE
Status: CANCELLED | OUTPATIENT
Start: 2022-06-27

## 2022-05-02 RX ORDER — SODIUM CHLORIDE 9 MG/ML
20 INJECTION, SOLUTION INTRAVENOUS ONCE
Status: CANCELLED | OUTPATIENT
Start: 2022-06-27

## 2022-05-02 RX ORDER — ACETAMINOPHEN 325 MG/1
650 TABLET ORAL ONCE
Status: DISCONTINUED | OUTPATIENT
Start: 2022-05-02 | End: 2022-05-05 | Stop reason: HOSPADM

## 2022-05-02 RX ORDER — METHYLPREDNISOLONE SODIUM SUCCINATE 40 MG/ML
40 INJECTION, POWDER, LYOPHILIZED, FOR SOLUTION INTRAMUSCULAR; INTRAVENOUS ONCE
Status: DISCONTINUED | OUTPATIENT
Start: 2022-05-02 | End: 2022-05-05 | Stop reason: HOSPADM

## 2022-05-02 RX ORDER — DIPHENHYDRAMINE HCL 25 MG
25 TABLET ORAL ONCE
Status: DISCONTINUED | OUTPATIENT
Start: 2022-05-02 | End: 2022-05-05 | Stop reason: HOSPADM

## 2022-05-02 RX ADMIN — INFLIXIMAB 700 MG: 100 INJECTION, POWDER, LYOPHILIZED, FOR SOLUTION INTRAVENOUS at 10:12

## 2022-05-02 RX ADMIN — SODIUM CHLORIDE 20 ML/HR: 0.9 INJECTION, SOLUTION INTRAVENOUS at 10:12

## 2022-05-08 DIAGNOSIS — E03.9 ACQUIRED HYPOTHYROIDISM: ICD-10-CM

## 2022-05-08 RX ORDER — LEVOTHYROXINE SODIUM 0.05 MG/1
TABLET ORAL
Qty: 30 TABLET | Refills: 0 | Status: SHIPPED | OUTPATIENT
Start: 2022-05-08 | End: 2022-06-12

## 2022-05-18 ENCOUNTER — OFFICE VISIT (OUTPATIENT)
Dept: DERMATOLOGY | Facility: CLINIC | Age: 57
End: 2022-05-18
Payer: COMMERCIAL

## 2022-05-18 VITALS — BODY MASS INDEX: 28.35 KG/M2 | HEIGHT: 63 IN

## 2022-05-18 DIAGNOSIS — B35.1 ONYCHOMYCOSIS: Primary | ICD-10-CM

## 2022-05-18 PROCEDURE — 99213 OFFICE O/P EST LOW 20 MIN: CPT | Performed by: DERMATOLOGY

## 2022-05-18 RX ORDER — EFINACONAZOLE 100 MG/ML
SOLUTION TOPICAL
Qty: 8 ML | Refills: 3 | Status: SHIPPED | OUTPATIENT
Start: 2022-05-18 | End: 2022-06-01

## 2022-05-18 NOTE — PATIENT INSTRUCTIONS
1  ONYCHOMYCOSIS ("FUNGAL NAIL")        Assessment and Plan:  Based on a thorough discussion of this condition and the management approach to it (including a comprehensive discussion of the known risks, side effects and potential benefits of treatment), the patient (family) agrees to implement the following specific plan:  Please start topical Jublia 10% solution; apply topically to affected finger nails daily can take up to 48 weeks   Discussed possible removal of finger nails and other topical antifungal medications   Please let us know if no improvement after treatment      What are fungal nail infections? Fungal infection of the nails is also known as onychomycosis  It is increasingly common with increased age  It rarely affects children  Which organisms cause onychomycosis? Onychomycosis can be due to:  Dermatophytes such as Trichophyton rubrum (T  rubrum), T  interdigitale (tinea unguium)   Yeasts such as Candida albicans   Molds such as Scopulariopsis brevicaulis and Fusarium species  What are the clinical features of onychomycosis? Onychomycosis may affect one or more toenails and fingernails and most often involves the great toenail or the little toenail  It can present in one or several different patterns  Lateral onychomycosis: a white or yellow opaque streak appears at one side of the nail  Subungual hyperkeratosis: scaling occurs under the nail  Distal onycholysis: the end of the nail lifts  The free edge often crumbles  Superficial white onychomycosis: flaky white patches and pits appear on the top of the nail plate  Proximal onychomycosis:yellow spots appear in the half-moon (lunula)  Onychoma or dermatophytoma:a thick localized area of infection in the nail plate   Destruction of the nail     Tinea unguium often results from untreated tinea pedis (feet) or tinea manuum (hand)  It may follow an injury to the nail or inflammatory disease of the nail    Candida infection of the nail plate generally results from paronychia and starts near the nail fold (the cuticle)  The nail fold is swollen and red, lifted off the nail plate  White, yellow, green or black marks appear on the nearby nail and spread  The nail may lift off its bed and is tender if you press on it  Mold infections are similar in appearance to tinea unguium  Onychomycosis must be distinguished from other nail disorders  Bacterial infection especially Pseudomonas aeruginosa, which turns the nail black or green   Psoriasis   Eczema or dermatitis   Lichen planus   Viral warts   Onycholysis   Onychogryphosis (nail thickening and scaling under the nail), common in the elderly     How is the diagnosis of onychomycosis confirmed? Clippings should be taken from crumbling tissue at the end of the infected nail  The discolored surface of the nails can be scraped off  The debris can be scooped out from under the nail  The clippings and scrapings are sent to a mycology laboratory for microscopy and culture  Previous treatment can reduce the chance of growing the fungus successfully in a culture, so it is best to take the clippings before any treatment is commenced: To confirm the diagnosis -- antifungal treatment will not be successful if there is another explanation for the nail condition   To identify the responsible organism  Molds and yeasts may require different treatment from dermatophyte fungi   Treatment may be required for a prolonged period and is expensive  Partially treated infection may be impossible to prove for many months as antifungal drugs can be detected even a year later  A nail biopsy may also reveal characteristic histopathological features of onychomycosis  What is the treatment of onychomycosis? Fingernail infections are usually cured more quickly and effectively than toenail infections    Mild infections affecting less than 50% of one or two nails may respond to topical antifungal medications, but cure usually requires an oral antifungal medication for several months  Devices used to treat onychomycosis  Recently, non-drug treatment has been developed to treat onychomycosis thus avoiding the side effects and risks of oral antifungal drugs  Lasers emitting infrared radiation are thought to kill fungi by the production of heat within the infected tissue  Laser treatment is reported to safely eradicate nail fungi with one to three, almost painless, sessions  Several lasers have been approved for this purpose by the FDA and other regulatory authorities  However, high-quality studies of efficacy are lacking, and existing studies indicate that laser treatment is less medically effective than topical or oral antifungal agents    Nd:YAG continuous, long or short-pulsed lasers   Ti:Sapphire modelocked laser Diode laser

## 2022-05-18 NOTE — PROGRESS NOTES
[facility] Follow Up Note    Patient Name: Shannon Piña  Encounter Date: 05/18/2022    Today's Chief Concerns:  Nestor Kaurcamron Concern #1:  Follow up nail fungus       Current Medications:    Current Outpatient Medications:     albuterol (ACCUNEB) 1 25 MG/3ML nebulizer solution, Take 1 ampule by nebulization as needed for wheezing , Disp: , Rfl:     Ascorbic Acid (VITAMIN C) 1000 MG tablet, Take 1,000 mg by mouth daily, Disp: , Rfl:     carisoprodol (SOMA) 350 mg tablet, Take 1 tablet (350 mg total) by mouth 3 (three) times a day, Disp: 30 tablet, Rfl: 0    diclofenac (VOLTAREN) 75 mg EC tablet, Take 1 tablet (75 mg total) by mouth 2 (two) times a day, Disp: 60 tablet, Rfl: 2    famotidine (PEPCID) 20 mg tablet, TAKE ONE TABLET BY MOUTH EVERY DAY AT BEDTIME, Disp: 90 tablet, Rfl: 0    inFLIXimab (REMICADE) 100 mg, Infuse into a venous catheter every 56 days, Disp: , Rfl:     levothyroxine 50 mcg tablet, TAKE ONE TABLET BY MOUTH DAILY, Disp: 30 tablet, Rfl: 0    ondansetron (ZOFRAN-ODT) 4 mg disintegrating tablet, Take 1 tablet (4 mg total) by mouth every 6 (six) hours as needed for nausea or vomiting (Patient taking differently: Take 4 mg by mouth as needed in the morning for nausea or vomiting ), Disp: 30 tablet, Rfl: 1    predniSONE 10 mg tablet, Take 1 tablet (10 mg total) by mouth daily Pred 10mg, 4 tabs for 2 days , 3 tabs for 2 days, 2 tabs for 2 days and 1 tab for 2 days  # 20  (Patient taking differently: Take 10 mg by mouth as needed in the morning  Pred 10mg, 4 tabs for 2 days , 3 tabs for 2 days, 2 tabs for 2 days and 1 tab for 2 days  # 20   ), Disp: 20 tablet, Rfl: 0    traMADol (ULTRAM) 50 mg tablet, TAKE ONE TABLET BY MOUTH 2 TIMES A DAY AS NEEDED FOR MODERATE PAIN (Patient taking differently: as needed), Disp: 30 tablet, Rfl: 1    triamcinolone (KENALOG) 0 5 % cream, Apply topically 3 (three) times a day (Patient taking differently: Apply topically as needed), Disp: 30 g, Rfl: 0   zolpidem (AMBIEN) 10 mg tablet, TAKE ONE TABLET BY MOUTH EVERY DAY AT BEDTIME AS NEEDED FOR SLEEP, Disp: 30 tablet, Rfl: 0    clobetasol (TEMOVATE) 0 05 % cream, Apply topically 2 (two) times a day To nail for maximum of 30 days (Patient not taking: No sig reported), Disp: 30 g, Rfl: 0    hyoscyamine (ANASPAZ,LEVSIN) 0 125 MG tablet, TAKE ONE TABLET BY MOUTH EVERY 4 HOURS AS NEEDED FOR CRAMPING (Patient not taking: Reported on 5/18/2022), Disp: 30 tablet, Rfl: 0    traZODone (DESYREL) 50 mg tablet, Take 1 tablet (50 mg total) by mouth daily at bedtime (Patient not taking: Reported on 5/18/2022), Disp: 90 tablet, Rfl: 0    CONSTITUTIONAL:   Vitals:    05/18/22 1559   Height: 5' 3" (1 6 m)         Specific Alerts:    Have you been seen by a [facility]ologist in the last 3 years? YES    Are you pregnant or planning to become pregnant? N/A    Are you currently or planning to be nursing or breast feeding? N/A    No Known Allergies    May we call your Preferred Phone number to discuss your specific medical information? YES    May we leave a detailed message that includes your specific medical information? YES    Have you traveled outside of the [facility] in the past 3 months? No    Do you currently have a pacemaker or defibrillator? No    Do you have any artificial heart valves, joints, plates, screws, rods, stents, pins, etc? No   - If Yes, were any placed within the last 2 years? Do you require any medications prior to a surgical procedure? No    Are you taking any medications that cause you to bleed more easily ("blood thinners") No    Have you ever experienced a rapid heartbeat with epinephrine? No    Have you ever been treated with "gold" (gold sodium thiomalate) therapy? No    [facility] can help with wrinkles, "laugh lines," facial volume loss, "double chin," "love handles," age spots, and more   Are you interested in learning today about some of the skin enhancement procedures that we offer? (If Yes, please provide more detail) No    Review of Systems:  Have you recently had or currently have any of the following? · Fever or chills: No  · Night Sweats: YES  · Headaches: No  · Weight Gain: No  · Weight Loss: No  · Blurry Vision: No  · Nausea: No  · Vomiting: No  · Diarrhea: No  · Blood in Stool: No  · Abdominal Pain: No  · Itchy Skin: No  · Painful Joints: YES  · Swollen Joints: YES  · Muscle Pain: YES  · Irregular Mole: No  · Sun Burn: No  · Dry Skin: No  · Skin Color Changes: No  · Scar or Keloid: YES  · Cold Sores/Fever Blisters: No  · Bacterial Infections/MRSA: No  · Anxiety: No  · Depression: No  · Suicidal or Homicidal Thoughts: No      PSYCH: Normal mood and affect  EYES: Normal conjunctiva  ENT: Normal lips and oral mucosa  CARDIOVASCULAR: No edema  RESPIRATORY: Normal respirations      1  ONYCHOMYCOSIS ("FUNGAL NAIL")     Physical Exam:  · Anatomic Location Affected:  Right 3rd and 4th fingernails  · Morphological Description:  See photographs            Additional History of Present Condition:  Patient finished the terbinafine and is still having problems with her nails  Patient also tried Clobetasol Cream with no improvement  Clobetasol cream x 1 month did nothing  Terbinafine did not cure the problem      Assessment and Plan:  Based on a thorough discussion of this condition and the management approach to it (including a comprehensive discussion of the known risks, side effects and potential benefits of treatment), the patient (family) agrees to implement the following specific plan:  · Please start topical Jublia 10% solution; apply topically to affected finger nails daily can take up to 48 weeks   · Discussed possible removal of fingernails and other topical antifungal medications   · Discussed laser nail treatment (perhaps YAG, diode, or CO2) but would probably have to go to [place], [place], or [place]  · Psoriasis unlikely but possible    · Please let us know if no improvement after treatment      What are fungal nail infections? Fungal infection of the nails is also known as onychomycosis  It is increasingly common with increased age  It rarely affects children      Which organisms cause onychomycosis? Onychomycosis can be due to:  Dermatophytes such as Trichophyton rubrum (T  rubrum), T  interdigitale (tinea unguium)   Yeasts such as Candida albicans   Molds such as Scopulariopsis brevicaulis and Fusarium species      What are the clinical features of onychomycosis? Onychomycosis may affect one or more toenails and fingernails and most often involves the great toenail or the little toenail  It can present in one or several different patterns  · Lateral onychomycosis: a white or yellow opaque streak appears at one side of the nail  · Subungual hyperkeratosis: scaling occurs under the nail  · Distal onycholysis: the end of the nail lifts  The free edge often crumbles  · Superficial white onychomycosis: flaky white patches and pits appear on the top of the nail plate  · Proximal onychomycosis:yellow spots appear in the half-moon (lunula)  · Onychoma or dermatophytoma:a thick localized area of infection in the nail plate   · Destruction of the nail     Tinea unguium often results from untreated tinea pedis (feet) or tinea manuum (hand)  It may follow an injury to the nail or inflammatory disease of the nail  Candida infection of the nail plate generally results from paronychia and starts near the nail fold (the cuticle)  The nail fold is swollen and red, lifted off the nail plate  White, yellow, green or black marks appear on the nearby nail and spread  The nail may lift off its bed and is tender if you press on it  Mold infections are similar in appearance to tinea unguium  Onychomycosis must be distinguished from other nail disorders    · Bacterial infection especially Pseudomonas aeruginosa, which turns the nail black or green   · Psoriasis · Eczema or dermatitis   · Lichen planus   · Viral warts   · Onycholysis   · Onychogryphosis (nail thickening and scaling under the nail), common in the elderly     How is the diagnosis of onychomycosis confirmed? Clippings should be taken from crumbling tissue at the end of the infected nail  The discolored surface of the nails can be scraped off  The debris can be scooped out from under the nail  The clippings and scrapings are sent to a mycology laboratory for microscopy and culture  Previous treatment can reduce the chance of growing the fungus successfully in a culture, so it is best to take the clippings before any treatment is commenced:  · To confirm the diagnosis -- antifungal treatment will not be successful if there is another explanation for the nail condition   · To identify the responsible organism  Molds and yeasts may require different treatment from dermatophyte fungi   Treatment may be required for a prolonged period and is expensive  Partially treated infection may be impossible to prove for many months as antifungal drugs can be detected even a year later  A nail biopsy may also reveal characteristic histopathological features of onychomycosis      What is the treatment of onychomycosis? Fingernail infections are usually cured more quickly and effectively than toenail infections  Mild infections affecting less than 50% of one or two nails may respond to topical antifungal medications, but cure usually requires an oral antifungal medication for several months      Devices used to treat onychomycosis  Recently, non-drug treatment has been developed to treat onychomycosis thus avoiding the side effects and risks of oral antifungal drugs  Lasers emitting infrared radiation are thought to kill fungi by the production of heat within the infected tissue  Laser treatment is reported to safely eradicate nail fungi with one to three, almost painless, sessions   Several lasers have been approved for this purpose by the FDA and other regulatory authorities  However, high-quality studies of efficacy are lacking, and existing studies indicate that laser treatment is less medically effective than topical or oral antifungal agents    Nd:YAG continuous, long or short-pulsed lasers   Ti:Sapphire modelocked laser Diode laser      Scribe Attestation    I,:  Crow Bender am acting as a scribe while in the presence of the attending physician :       I,:  Marium Rodrigues MD personally performed the services described in this documentation    as scribed in my presence :

## 2022-05-19 ENCOUNTER — TELEPHONE (OUTPATIENT)
Dept: PAIN MEDICINE | Facility: CLINIC | Age: 57
End: 2022-05-19

## 2022-05-19 DIAGNOSIS — B35.1 ONYCHOMYCOSIS: Primary | ICD-10-CM

## 2022-05-19 DIAGNOSIS — M25.551 RIGHT HIP PAIN: ICD-10-CM

## 2022-05-19 DIAGNOSIS — M51.16 INTERVERTEBRAL DISC DISORDER WITH RADICULOPATHY OF LUMBAR REGION: ICD-10-CM

## 2022-05-19 RX ORDER — TRAMADOL HYDROCHLORIDE 50 MG/1
50 TABLET ORAL DAILY PRN
Qty: 30 TABLET | Refills: 1 | Status: SHIPPED | OUTPATIENT
Start: 2022-05-19

## 2022-05-19 NOTE — TELEPHONE ENCOUNTER
----- Message from Darlin Means RN sent at 5/19/2022  9:33 AM EDT -----  Regarding: FW: Tramadol    ----- Message -----  From: Shannon Piña  Sent: 5/19/2022   9:32 AM EDT  To: [facility]  Subject: Tramadol                                         Hello, I hope this message finds you and your family doing well! ! I would appreciate a refill of tramadol as I only have a few left  I continue to have sacroiliac joint pain but not nearly as severe since the injections  Its intermittent like if I work three days in a row  I would say I use the tramadol two-three times a week and continue to break them in half  Tylenol is not effective and Im not supposed to take NSAIDS because of UC    Thank you, Mariia Mariano

## 2022-05-23 DIAGNOSIS — F51.04 CHRONIC INSOMNIA: ICD-10-CM

## 2022-05-24 RX ORDER — ZOLPIDEM TARTRATE 10 MG/1
TABLET ORAL
Qty: 30 TABLET | Refills: 0 | Status: SHIPPED | OUTPATIENT
Start: 2022-05-24 | End: 2022-06-27

## 2022-06-01 ENCOUNTER — TELEPHONE (OUTPATIENT)
Dept: PAIN MEDICINE | Facility: CLINIC | Age: 57
End: 2022-06-01

## 2022-06-01 DIAGNOSIS — L03.011 PARONYCHIA OF FINGER, RIGHT: ICD-10-CM

## 2022-06-01 DIAGNOSIS — B35.1 ONYCHOMYCOSIS: Primary | ICD-10-CM

## 2022-06-01 RX ORDER — SULCONAZOLE NITRATE 10 MG/ML
SOLUTION TOPICAL 2 TIMES DAILY
Qty: 30 ML | Refills: 0 | Status: SHIPPED | OUTPATIENT
Start: 2022-06-01 | End: 2022-06-27 | Stop reason: ALTCHOICE

## 2022-06-01 NOTE — TELEPHONE ENCOUNTER
----- Message from Shwetha Lopez sent at 6/1/2022 11:19 AM EDT -----  Regarding: Tramadol  Hello, pharmacy has told me this medication needs a pre authorization  Are you able to contact the insurance company?  Thank you, Mariia Mariano

## 2022-06-02 NOTE — TELEPHONE ENCOUNTER
Vamshi Lin from [facility] is requesting to speak to a nurse regarding Tramadol medication prior authorization   Please reach out to him at # [phone]

## 2022-06-02 NOTE — TELEPHONE ENCOUNTER
S/w [employer], they are requesting more information, will fax over request to [facility] ED fax #

## 2022-06-12 DIAGNOSIS — E03.9 ACQUIRED HYPOTHYROIDISM: ICD-10-CM

## 2022-06-12 RX ORDER — LEVOTHYROXINE SODIUM 0.05 MG/1
TABLET ORAL
Qty: 30 TABLET | Refills: 0 | Status: SHIPPED | OUTPATIENT
Start: 2022-06-12 | End: 2022-06-13

## 2022-06-13 ENCOUNTER — TELEPHONE (OUTPATIENT)
Dept: PODIATRY | Facility: CLINIC | Age: 57
End: 2022-06-13

## 2022-06-13 ENCOUNTER — TELEPHONE (OUTPATIENT)
Dept: FAMILY MEDICINE CLINIC | Facility: CLINIC | Age: 57
End: 2022-06-13

## 2022-06-13 DIAGNOSIS — E03.9 ACQUIRED HYPOTHYROIDISM: ICD-10-CM

## 2022-06-13 RX ORDER — LEVOTHYROXINE SODIUM 0.05 MG/1
TABLET ORAL
Qty: 30 TABLET | Refills: 0 | Status: SHIPPED | OUTPATIENT
Start: 2022-06-13 | End: 2022-07-16

## 2022-06-13 NOTE — TELEPHONE ENCOUNTER
Alejandro Boyd called, she needs new orthotics every year  She is a [employer]'s employee  They were not a covered service at the time and Dr Prem Easton had to do a Peer to Peer Review for her  She asked if the [facility] covers them and if so could someone please call her to start the process  She goes to the [address] [place] [address] office

## 2022-06-13 NOTE — TELEPHONE ENCOUNTER
Sid Corrales called out of frustration with her derm appointments and is asking for your advice  She has been following up with derms since you initially looked at her finger  She states it is still very painful and swollen and hasn't really seen a change since any of their treatments  She preferred to wait until you returned to see what your thoughts are  I reached out to Ruy Alvarado at [employer] at [facility] to see if she had any appointments for a second opinion but haven't heard back from her yet  She did a treatment of Lamisil for 6 weeks and it was getting better but came back  She sent them a note on June 1st and still hasn't heard back from them  They ordered Edgar Brown but insurance will not cover this  What are your thoughts on this - do you want to see it again?     Please advise Leilani at [phone]

## 2022-06-16 ENCOUNTER — OFFICE VISIT (OUTPATIENT)
Dept: DERMATOLOGY | Facility: CLINIC | Age: 57
End: 2022-06-16
Payer: COMMERCIAL

## 2022-06-16 VITALS — HEIGHT: 63 IN | BODY MASS INDEX: 27.46 KG/M2 | WEIGHT: 155 LBS | TEMPERATURE: 98.2 F

## 2022-06-16 DIAGNOSIS — L03.011 PARONYCHIA OF FINGER, RIGHT: ICD-10-CM

## 2022-06-16 DIAGNOSIS — B35.1 ONYCHOMYCOSIS: Primary | ICD-10-CM

## 2022-06-16 PROCEDURE — 99213 OFFICE O/P EST LOW 20 MIN: CPT | Performed by: DERMATOLOGY

## 2022-06-16 RX ORDER — FLUCONAZOLE 200 MG/1
200 TABLET ORAL WEEKLY
Qty: 12 TABLET | Refills: 3 | Status: SHIPPED | OUTPATIENT
Start: 2022-06-16 | End: 2023-05-18

## 2022-06-16 NOTE — PROGRESS NOTES
[facility] Follow Up Note    Patient Name: Maryan Allen  Encounter Date: 06/16/2022    Today's Chief Concerns:  Yoel Caal Concern #1:  Follow up Onychomycosis       Current Medications:    Current Outpatient Medications:     albuterol (ACCUNEB) 1 25 MG/3ML nebulizer solution, Take 1 ampule by nebulization as needed for wheezing , Disp: , Rfl:     Ascorbic Acid (VITAMIN C) 1000 MG tablet, Take 1,000 mg by mouth daily, Disp: , Rfl:     carisoprodol (SOMA) 350 mg tablet, Take 1 tablet (350 mg total) by mouth 3 (three) times a day, Disp: 30 tablet, Rfl: 0    diclofenac (VOLTAREN) 75 mg EC tablet, Take 1 tablet (75 mg total) by mouth 2 (two) times a day, Disp: 60 tablet, Rfl: 2    famotidine (PEPCID) 20 mg tablet, TAKE ONE TABLET BY MOUTH EVERY DAY AT BEDTIME, Disp: 90 tablet, Rfl: 0    inFLIXimab (REMICADE) 100 mg, Infuse into a venous catheter every 56 days, Disp: , Rfl:     levothyroxine 50 mcg tablet, TAKE ONE TABLET BY MOUTH DAILY, Disp: 30 tablet, Rfl: 0    ondansetron (ZOFRAN-ODT) 4 mg disintegrating tablet, Take 1 tablet (4 mg total) by mouth every 6 (six) hours as needed for nausea or vomiting (Patient taking differently: Take 4 mg by mouth as needed for nausea or vomiting), Disp: 30 tablet, Rfl: 1    predniSONE 10 mg tablet, Take 1 tablet (10 mg total) by mouth daily Pred 10mg, 4 tabs for 2 days , 3 tabs for 2 days, 2 tabs for 2 days and 1 tab for 2 days  # 20  (Patient taking differently: Take 10 mg by mouth as needed Pred 10mg, 4 tabs for 2 days , 3 tabs for 2 days, 2 tabs for 2 days and 1 tab for 2 days   # 20 ), Disp: 20 tablet, Rfl: 0    sulconazole nitrate 1 % external solution, Apply topically 2 (two) times a day, Disp: 30 mL, Rfl: 0    traMADol (ULTRAM) 50 mg tablet, Take 1 tablet (50 mg total) by mouth as needed in the morning for moderate pain , Disp: 30 tablet, Rfl: 1    triamcinolone (KENALOG) 0 5 % cream, Apply topically 3 (three) times a day (Patient taking differently: Apply topically as needed), Disp: 30 g, Rfl: 0    zolpidem (AMBIEN) 10 mg tablet, TAKE ONE TABLET BY MOUTH EVERY DAY AT BEDTIME AS NEEDED FOR SLEEP, Disp: 30 tablet, Rfl: 0    clobetasol (TEMOVATE) 0 05 % cream, Apply topically 2 (two) times a day To nail for maximum of 30 days (Patient not taking: No sig reported), Disp: 30 g, Rfl: 0    hyoscyamine (ANASPAZ,LEVSIN) 0 125 MG tablet, TAKE ONE TABLET BY MOUTH EVERY 4 HOURS AS NEEDED FOR CRAMPING (Patient not taking: No sig reported), Disp: 30 tablet, Rfl: 0    traZODone (DESYREL) 50 mg tablet, Take 1 tablet (50 mg total) by mouth daily at bedtime (Patient not taking: No sig reported), Disp: 90 tablet, Rfl: 0    CONSTITUTIONAL:   Vitals:    06/16/22 0826   Temp: 98 2 °F (36 8 °C)   TempSrc: Temporal   Weight: 70 3 kg (155 lb)   Height: 5' 3" (1 6 m)       Specific Alerts:    Have you been seen by a [facility] Dermatologist in the last 3 years? YES    Are you pregnant or planning to become pregnant? No    Are you currently or planning to be nursing or breast feeding? No    No Known Allergies    May we call your Preferred Phone number to discuss your specific medical information? YES    May we leave a detailed message that includes your specific medical information? YES    Have you traveled outside of the [facility] in the past 3 months? No    Do you currently have a pacemaker or defibrillator? No    Do you have any artificial heart valves, joints, plates, screws, rods, stents, pins, etc? No   - If Yes, were any placed within the last 2 years? Do you require any medications prior to a surgical procedure? No    Are you taking any medications that cause you to bleed more easily ("blood thinners") No    Have you ever experienced a rapid heartbeat with epinephrine? No    Have you ever been treated with "gold" (gold sodium thiomalate) therapy?  No    [facility] can help with wrinkles, "laugh lines," facial volume loss, "double chin," "love ramona," age spots, and more  Are you interested in learning today about some of the skin enhancement procedures that we offer? (If Yes, please provide more detail) No    Review of Systems:  Have you recently had or currently have any of the following?     · Fever or chills: No  · Night Sweats: YES  · Headaches: No  · Weight Gain: No  · Weight Loss: No  · Blurry Vision: No  · Nausea: No  · Vomiting: No  · Diarrhea: No  · Blood in Stool: No  · Abdominal Pain: No  · Itchy Skin: No  · Painful Joints: YES  · Swollen Joints: YES  · Muscle Pain: YES  · Irregular Mole: No  · Sun Burn: No  · Dry Skin: No  · Skin Color Changes: No  · Scar or Keloid: YES  · Cold Sores/Fever Blisters: No  · Bacterial Infections/MRSA: No  · Anxiety: No  · Depression: No  · Suicidal or Homicidal Thoughts: No      PSYCH: Normal mood and affect  EYES: Normal conjunctiva  ENT: Normal lips and oral mucosa  CARDIOVASCULAR: No edema  RESPIRATORY: Normal respirations  HEME/LYMPH/IMMUNO:  No regional lymphadenopathy except as noted below in ASSESSMENT AND PLAN BY DIAGNOSIS    FULL ORGAN SYSTEM SKIN EXAM (SKIN)   Hair, Scalp, Ears, Face Normal except as noted below in Assessment   Neck, Cervical Chain Nodes Normal except as noted below in Assessment   Right Arm/Hand/Fingers Normal except as noted below in Assessment   Left Arm/Hand/Fingers Normal except as noted below in Assessment   Chest/Breasts/Axillae Viewed areas Normal except as noted below in Assessment   Abdomen, Umbilicus Normal except as noted below in Assessment   Back/Spine Normal except as noted below in Assessment   Groin/Genitalia/Buttocks Viewed areas Normal except as noted below in Assessment   Right Leg, Foot, Toes Normal except as noted below in Assessment   Left Leg, Foot, Toes Normal except as noted below in Assessment         1  ONYCHOMYCOSIS ("FUNGAL NAIL")/ PARONYCHIA     Physical Exam:  · Anatomic Location Affected:  Right 3rd and 4th fingernails  · Morphological Description:  Nail dystrophy, onychauxis and subungual debris  Erythema around the PNF and lateral NFs suggestive of chronic paronychia       Additional History of Present Condition:  Patient with PAS positive onychomycosis (1/6/22) s/p 6 weeks of terbinafine  States when she came back to office, this was not continued due to concern about her LFTs  Most recent LFTs reviewed and appear normal performed 4/12  Nails were just starting to improve then started to recur  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, making use of topicals difficult       Assessment and Plan:  Based on a thorough discussion of this condition and the management approach to it (including a comprehensive discussion of the known risks, side effects and potential benefits of treatment), the patient (family) agrees to implement the following specific plan:  · Lab confirmed onychomycosis with incomplete course of oral therapy  · Discussed options such as Lamisil for 12 weeks or pulse dosing one week/month for one year and Fluconazole weekly  · We opted for Fluconazole 200 mg tablets to target dermatophyte and any possible Candidal paronychia which is not unlikely due to her need to wash hands so often in nursing; take 1 tablet one time weekly  · Once clear will consider the use of a topical medication for suppression  · Can continue to use apple cider vinegar soaks  · Please let us know how you are doing in 2-3 months either via Mychart or coming into the office for a visit with Dr Page Cunha; anticipate will need to extend her therapy past the 3 month sendy  · Counseled at length on need for patience and that regrowth of healthy nail takes an entire year and recurrence rate of up to 50% with onychmycosis     What are fungal nail infections? Fungal infection of the nails is also known as onychomycosis  It is increasingly common with increased age  It rarely affects children      Which organisms cause onychomycosis?   Onychomycosis can be due to:  Dermatophytes such as Trichophyton rubrum (T  rubrum), T  interdigitale (tinea unguium)   Yeasts such as Candida albicans   Molds such as Scopulariopsis brevicaulis and Fusarium species      What are the clinical features of onychomycosis? Onychomycosis may affect one or more toenails and fingernails and most often involves the great toenail or the little toenail  It can present in one or several different patterns  · Lateral onychomycosis: a white or yellow opaque streak appears at one side of the nail  · Subungual hyperkeratosis: scaling occurs under the nail  · Distal onycholysis: the end of the nail lifts  The free edge often crumbles  · Superficial white onychomycosis: flaky white patches and pits appear on the top of the nail plate  · Proximal onychomycosis:yellow spots appear in the half-moon (lunula)  · Onychoma or dermatophytoma:a thick localized area of infection in the nail plate   · Destruction of the nail     Tinea unguium often results from untreated tinea pedis (feet) or tinea manuum (hand)  It may follow an injury to the nail or inflammatory disease of the nail   Candida infection of the nail plate generally results from paronychia and starts near the nail fold (the cuticle)  The nail fold is swollen and red, lifted off the nail plate  White, yellow, green or black marks appear on the nearby nail and spread  The nail may lift off its bed and is tender if you press on it  Mold infections are similar in appearance to tinea unguium   Onychomycosis must be distinguished from other nail disorders  · Bacterial infection especially Pseudomonas aeruginosa, which turns the nail black or green   · Psoriasis   · Eczema or dermatitis   · Lichen planus   · Viral warts   · Onycholysis   · Onychogryphosis (nail thickening and scaling under the nail), common in the elderly     How is the diagnosis of onychomycosis confirmed?   Clippings should be taken from crumbling tissue at the end of the infected nail  The discolored surface of the nails can be scraped off  The debris can be scooped out from under the nail  The clippings and scrapings are sent to a mycology laboratory for microscopy and culture   Previous treatment can reduce the chance of growing the fungus successfully in a culture, so it is best to take the clippings before any treatment is commenced:  · To confirm the diagnosis -- antifungal treatment will not be successful if there is another explanation for the nail condition   · To identify the responsible organism  Molds and yeasts may require different treatment from dermatophyte fungi   Treatment may be required for a prolonged period and is expensive  Partially treated infection may be impossible to prove for many months as antifungal drugs can be detected even a year later  A nail biopsy may also reveal characteristic histopathological features of onychomycosis      What is the treatment of onychomycosis? Fingernail infections are usually cured more quickly and effectively than toenail infections  Mild infections affecting less than 50% of one or two nails may respond to topical antifungal medications, but cure usually requires an oral antifungal medication for several months      Devices used to treat onychomycosis  Recently, non-drug treatment has been developed to treat onychomycosis thus avoiding the side effects and risks of oral antifungal drugs  Lasers emitting infrared radiation are thought to kill fungi by the production of heat within the infected tissue  Laser treatment is reported to safely eradicate nail fungi with one to three, almost painless, sessions  Several lasers have been approved for this purpose by the FDA and other regulatory authorities  However, high-quality studies of efficacy are lacking, and existing studies indicate that laser treatment is less medically effective than topical or oral antifungal agents    Nd:YAG continuous, long or short-pulsed lasers Ti:Sapphire modelocked laser Diode laser           Scribe Attestation    I,:  Jerman Steve am acting as a scribe while in the presence of the attending physician :       I,:  Yazmin Suresh MD personally performed the services described in this documentation    as scribed in my presence :

## 2022-06-16 NOTE — PATIENT INSTRUCTIONS
1  ONYCHOMYCOSIS ("FUNGAL NAIL")/ PARONYCHIA      Assessment and Plan:  Based on a thorough discussion of this condition and the management approach to it (including a comprehensive discussion of the known risks, side effects and potential benefits of treatment), the patient (family) agrees to implement the following specific plan:  Discussed options such as Lamisil for 12 weeks and Fluconazole for a few months  We opted for Fluconazole 200 mg tablets; take 1 tablet one time weekly  Once clear will consider the use of a topical medication   Can continue to use apple cider vinegar   Please let us know how you are doing in 2-3 months either via Mychart or coming into the office for a visit with Dr Davis Bowie      What are fungal nail infections? Fungal infection of the nails is also known as onychomycosis  It is increasingly common with increased age  It rarely affects children  Which organisms cause onychomycosis? Onychomycosis can be due to:  Dermatophytes such as Trichophyton rubrum (T  rubrum), T  interdigitale (tinea unguium)   Yeasts such as Candida albicans   Molds such as Scopulariopsis brevicaulis and Fusarium species  What are the clinical features of onychomycosis? Onychomycosis may affect one or more toenails and fingernails and most often involves the great toenail or the little toenail  It can present in one or several different patterns  Lateral onychomycosis: a white or yellow opaque streak appears at one side of the nail  Subungual hyperkeratosis: scaling occurs under the nail  Distal onycholysis: the end of the nail lifts  The free edge often crumbles  Superficial white onychomycosis: flaky white patches and pits appear on the top of the nail plate  Proximal onychomycosis:yellow spots appear in the half-moon (lunula)     Onychoma or dermatophytoma:a thick localized area of infection in the nail plate   Destruction of the nail     Tinea unguium often results from untreated tinea pedis (feet) or tinea manuum (hand)  It may follow an injury to the nail or inflammatory disease of the nail  Candida infection of the nail plate generally results from paronychia and starts near the nail fold (the cuticle)  The nail fold is swollen and red, lifted off the nail plate  White, yellow, green or black marks appear on the nearby nail and spread  The nail may lift off its bed and is tender if you press on it  Mold infections are similar in appearance to tinea unguium  Onychomycosis must be distinguished from other nail disorders  Bacterial infection especially Pseudomonas aeruginosa, which turns the nail black or green   Psoriasis   Eczema or dermatitis   Lichen planus   Viral warts   Onycholysis   Onychogryphosis (nail thickening and scaling under the nail), common in the elderly     How is the diagnosis of onychomycosis confirmed? Clippings should be taken from crumbling tissue at the end of the infected nail  The discolored surface of the nails can be scraped off  The debris can be scooped out from under the nail  The clippings and scrapings are sent to a mycology laboratory for microscopy and culture  Previous treatment can reduce the chance of growing the fungus successfully in a culture, so it is best to take the clippings before any treatment is commenced: To confirm the diagnosis -- antifungal treatment will not be successful if there is another explanation for the nail condition   To identify the responsible organism  Molds and yeasts may require different treatment from dermatophyte fungi   Treatment may be required for a prolonged period and is expensive  Partially treated infection may be impossible to prove for many months as antifungal drugs can be detected even a year later  A nail biopsy may also reveal characteristic histopathological features of onychomycosis  What is the treatment of onychomycosis?   Fingernail infections are usually cured more quickly and effectively than toenail infections  Mild infections affecting less than 50% of one or two nails may respond to topical antifungal medications, but cure usually requires an oral antifungal medication for several months  Devices used to treat onychomycosis  Recently, non-drug treatment has been developed to treat onychomycosis thus avoiding the side effects and risks of oral antifungal drugs  Lasers emitting infrared radiation are thought to kill fungi by the production of heat within the infected tissue  Laser treatment is reported to safely eradicate nail fungi with one to three, almost painless, sessions  Several lasers have been approved for this purpose by the FDA and other regulatory authorities  However, high-quality studies of efficacy are lacking, and existing studies indicate that laser treatment is less medically effective than topical or oral antifungal agents    Nd:YAG continuous, long or short-pulsed lasers   Ti:Sapphire modelocked laser Diode laser

## 2022-06-18 DIAGNOSIS — F51.04 CHRONIC INSOMNIA: ICD-10-CM

## 2022-06-20 ENCOUNTER — TELEPHONE (OUTPATIENT)
Dept: GASTROENTEROLOGY | Facility: CLINIC | Age: 57
End: 2022-06-20

## 2022-06-20 ENCOUNTER — TELEPHONE (OUTPATIENT)
Dept: OTHER | Facility: OTHER | Age: 57
End: 2022-06-20

## 2022-06-20 NOTE — TELEPHONE ENCOUNTER
Patient last Telemedicine Visit was 7-2021    Patient's Remicade is due for re-auth     I saved a Virtual appt for pt on 6-23-22 with Carmella     Left pt for vm

## 2022-06-20 NOTE — TELEPHONE ENCOUNTER
Pt called, requesting a call back from office at best convenience, regarding upcoming appointment  Pt mentioned that she is willing to attend to the virtual appointment but she is working at that time and it might difficult to attend  Pt, however, mentioned that she is off on Wednesday  She would like to know, if by any chance, there is any availability   Pt mentioned if she is not able to  the call to please leave a detailed message or to call [address], since it's where she works

## 2022-06-21 NOTE — TELEPHONE ENCOUNTER
Hello, patient was returning my call  Michelle Toro will ask her manager if it's okay to step away to complete Virtual visit , if not we can add her on to a fellow , for virtual      There is a previous task open , patient is on Remicade, [place] is pending update clinicals, patient needs appt asap for Remicade Approval  Patient will call us back

## 2022-06-23 ENCOUNTER — LAB (OUTPATIENT)
Dept: LAB | Facility: HOSPITAL | Age: 57
End: 2022-06-23
Payer: COMMERCIAL

## 2022-06-23 ENCOUNTER — APPOINTMENT (OUTPATIENT)
Dept: LAB | Facility: HOSPITAL | Age: 57
End: 2022-06-23
Payer: COMMERCIAL

## 2022-06-23 ENCOUNTER — TELEMEDICINE (OUTPATIENT)
Dept: GASTROENTEROLOGY | Facility: CLINIC | Age: 57
End: 2022-06-23
Payer: COMMERCIAL

## 2022-06-23 DIAGNOSIS — K51.011 ULCERATIVE PANCOLITIS WITH RECTAL BLEEDING (HCC): ICD-10-CM

## 2022-06-23 DIAGNOSIS — K51.011 ULCERATIVE PANCOLITIS WITH RECTAL BLEEDING (HCC): Primary | ICD-10-CM

## 2022-06-23 LAB
25(OH)D3 SERPL-MCNC: 46.2 NG/ML (ref 30–100)
ALBUMIN SERPL BCP-MCNC: 4.4 G/DL (ref 3.5–5)
ALP SERPL-CCNC: 56 U/L (ref 46–116)
ALT SERPL W P-5'-P-CCNC: 42 U/L (ref 12–78)
ANION GAP SERPL CALCULATED.3IONS-SCNC: 2 MMOL/L (ref 4–13)
AST SERPL W P-5'-P-CCNC: 31 U/L (ref 5–45)
BASOPHILS # BLD AUTO: 0.05 THOUSANDS/ΜL (ref 0–0.1)
BASOPHILS NFR BLD AUTO: 1 % (ref 0–1)
BILIRUB SERPL-MCNC: 0.48 MG/DL (ref 0.2–1)
BUN SERPL-MCNC: 15 MG/DL (ref 5–25)
CALCIUM SERPL-MCNC: 9.2 MG/DL (ref 8.3–10.1)
CHLORIDE SERPL-SCNC: 105 MMOL/L (ref 100–108)
CO2 SERPL-SCNC: 30 MMOL/L (ref 21–32)
CREAT SERPL-MCNC: 0.83 MG/DL (ref 0.6–1.3)
CRP SERPL QL: <3 MG/L
EOSINOPHIL # BLD AUTO: 0.21 THOUSAND/ΜL (ref 0–0.61)
EOSINOPHIL NFR BLD AUTO: 3 % (ref 0–6)
ERYTHROCYTE [DISTWIDTH] IN BLOOD BY AUTOMATED COUNT: 12 % (ref 11.6–15.1)
FERRITIN SERPL-MCNC: 53 NG/ML (ref 8–388)
GFR SERPL CREATININE-BSD FRML MDRD: 79 ML/MIN/1.73SQ M
GLUCOSE P FAST SERPL-MCNC: 84 MG/DL (ref 65–99)
HBV CORE IGM SER QL: NORMAL
HBV SURFACE AB SER-ACNC: 770.75 MIU/ML
HBV SURFACE AG SER QL: NORMAL
HCT VFR BLD AUTO: 42.6 % (ref 34.8–46.1)
HGB BLD-MCNC: 13.9 G/DL (ref 11.5–15.4)
IMM GRANULOCYTES # BLD AUTO: 0.01 THOUSAND/UL (ref 0–0.2)
IMM GRANULOCYTES NFR BLD AUTO: 0 % (ref 0–2)
IRON SATN MFR SERPL: 25 % (ref 15–50)
IRON SERPL-MCNC: 87 UG/DL (ref 50–170)
LYMPHOCYTES # BLD AUTO: 3.08 THOUSANDS/ΜL (ref 0.6–4.47)
LYMPHOCYTES NFR BLD AUTO: 45 % (ref 14–44)
MCH RBC QN AUTO: 32 PG (ref 26.8–34.3)
MCHC RBC AUTO-ENTMCNC: 32.6 G/DL (ref 31.4–37.4)
MCV RBC AUTO: 98 FL (ref 82–98)
MONOCYTES # BLD AUTO: 0.49 THOUSAND/ΜL (ref 0.17–1.22)
MONOCYTES NFR BLD AUTO: 7 % (ref 4–12)
NEUTROPHILS # BLD AUTO: 3.06 THOUSANDS/ΜL (ref 1.85–7.62)
NEUTS SEG NFR BLD AUTO: 44 % (ref 43–75)
NRBC BLD AUTO-RTO: 0 /100 WBCS
PLATELET # BLD AUTO: 302 THOUSANDS/UL (ref 149–390)
PMV BLD AUTO: 10.4 FL (ref 8.9–12.7)
POTASSIUM SERPL-SCNC: 3.8 MMOL/L (ref 3.5–5.3)
PROT SERPL-MCNC: 8.3 G/DL (ref 6.4–8.2)
RBC # BLD AUTO: 4.35 MILLION/UL (ref 3.81–5.12)
SODIUM SERPL-SCNC: 137 MMOL/L (ref 136–145)
TIBC SERPL-MCNC: 347 UG/DL (ref 250–450)
WBC # BLD AUTO: 6.9 THOUSAND/UL (ref 4.31–10.16)

## 2022-06-23 PROCEDURE — 86480 TB TEST CELL IMMUN MEASURE: CPT

## 2022-06-23 PROCEDURE — 83540 ASSAY OF IRON: CPT

## 2022-06-23 PROCEDURE — 82397 CHEMILUMINESCENT ASSAY: CPT

## 2022-06-23 PROCEDURE — 80230 DRUG ASSAY INFLIXIMAB: CPT

## 2022-06-23 PROCEDURE — 83550 IRON BINDING TEST: CPT

## 2022-06-23 PROCEDURE — 82306 VITAMIN D 25 HYDROXY: CPT

## 2022-06-23 PROCEDURE — 85025 COMPLETE CBC W/AUTO DIFF WBC: CPT

## 2022-06-23 PROCEDURE — 80053 COMPREHEN METABOLIC PANEL: CPT

## 2022-06-23 PROCEDURE — 86140 C-REACTIVE PROTEIN: CPT

## 2022-06-23 PROCEDURE — 83993 ASSAY FOR CALPROTECTIN FECAL: CPT

## 2022-06-23 PROCEDURE — 82728 ASSAY OF FERRITIN: CPT

## 2022-06-23 PROCEDURE — 86706 HEP B SURFACE ANTIBODY: CPT

## 2022-06-23 PROCEDURE — 87340 HEPATITIS B SURFACE AG IA: CPT

## 2022-06-23 PROCEDURE — 99214 OFFICE O/P EST MOD 30 MIN: CPT | Performed by: PHYSICIAN ASSISTANT

## 2022-06-23 PROCEDURE — 86705 HEP B CORE ANTIBODY IGM: CPT

## 2022-06-23 PROCEDURE — 36415 COLL VENOUS BLD VENIPUNCTURE: CPT

## 2022-06-23 NOTE — PROGRESS NOTES
Virtual Regular Visit    Verification of patient location:    Patient is located in the following state in which I hold an active license [place]      Assessment/Plan:    1  Ulcerative pancolitis  The patient was diagnosed with ulcerative colitis over 10 years ago  She was initially on Humira but she developed antibodies after being on this for a year and was switched to [facility]  While on Entyvio she had poor response with multiple flares and required prednisone despite dose increase  She was then switched to Remicade 9/2019 and has been doing well on this since that time although she does admit to an increase in diarrhea leading up to her next due dose  Last colonoscopy 12/31/21 with a normal terminal ileum and erythematous mucosa with loss of vascular pattern consistent with ulcerative colitis in the transverse colon, descending colon and sigmoid colon  Evidence of pseudopolyps  Biopsies negative for inflammation, with the exception of the pseudopolyps  - obtain CBC, CMP, CRP, fecal dari, hep B serologis, iron panel, Vit D, and remicade drug and antibody levels (patient aware to obtain this just prior to next infusion which is Monday 6/27)  - continue Remicade 100mg every 56 days   - due for colonoscopy 1/2023    Follow up 5 months for routine visit and to schedule colonoscopy         Reason for visit is No chief complaint on file         Encounter provider Anita Cobos PA-C    Provider located at [address] [place] [id number] [phone]      Recent Visits  Date Type Provider Dept   06/20/22 Telephone Miri Rodriguez MA [facility]   Showing recent visits within past 7 days and meeting all other requirements  Today's Visits  Date Type Provider Dept   06/23/22 Telemedicine Anita Cobos PA-C [facility] today's visits and meeting all other requirements  Future Appointments  No visits were found meeting these conditions  Showing future appointments within next 150 days and meeting all other requirements       The patient was identified by name and date of birth  Randa Rodriguez was informed that this is a telemedicine visit and that the visit is being conducted through [employer] and patient was informed that this is not a secure, HIPAA-compliant platform  She agrees to proceed     My office door was closed  No one else was in the room  She acknowledged consent and understanding of privacy and security of the video platform  The patient has agreed to participate and understands they can discontinue the visit at any time  Patient is aware this is a billable service  Subjective  Randa Rodriguez is a 64 y o  female here for follow-up evaluation of ulcerative colitis which was diagnosed with 10+ years ago  The patient is currently maintained on Remicade 100 mg every 56 days  The patient states that she does well on this but she can tell when she is due for her next dose as she will start to have an increase in diarrhea just prior to this  She denies any blood in her stool, abdominal pain, or rectal pain  No other GI symptoms or complaints at this time          Past Medical History:   Diagnosis Date    Adjustment disorder     last assessed 05/16/12    Anemia     HX of    Asthma     mild - intermittent    Bilateral leg edema     Blepharitis     last assessed 02/04/16    Bulging lumbar disc     Carpal tunnel syndrome     Unspecified laterality    Colitis, acute     Colon polyp     Disease of thyroid gland     Dyspareunia in female     Edema     last assessed 06/22/15    Ganglion     Herniated cervical disc     History of transfusion     Hypokalemia     Hypothyroidism     Hypothyroidism     Insomnia     Lumps on the skin     last assessed 03/12/14    Mouth ulcers     last assessed 06/22/15    Nontraumatic tear of left tibialis posterior tendon     Traumatic teart     Polyarthritis last assessed 16    Raynaud's disease     Temporomandibular disorder     Joint    Thyroid disease     Ulcerative colitis ([facility] [address] [facility]     Ulcerative colitis ([facility] [address] [facility]        Past Surgical History:   Procedure Laterality Date    ANKLE SURGERY Left     Tendon repair    CARPAL TUNNEL RELEASE Bilateral      SECTION, LOW TRANSVERSE      COLONOSCOPY      COLONOSCOPY N/A 2018    Procedure: COLONOSCOPY;  Surgeon: Orlin Abdi MD;  [facility]; Service: Gastroenterology    [place] INJECTION RIGHT HIP (ARTHROGRAM)  9/15/2020    HERNIA REPAIR      umbilical    HYSTERECTOMY      KNEE ARTHROSCOPY Right     LIPECTOMY      Multipe lipoma removals    LIPOMA RESECTION      PARATHYROIDECTOMY      TX COLONOSCOPY FLX DX W/COLLJ SPEC WHEN PFRMD N/A 2016    Procedure: COLONOSCOPY;  Surgeon: Valentina Ramirez DO;  Location: [facility];   Service: Gastroenterology    [place] REPAIR FLEX LEG TENDON,SECOND,EA Left 2016    Procedure: REPAIR OF LEFT POSTERIOR TIBIAL TENDON WITH GRAFT, EXPLORATION LEFT ANKLE ;  Surgeon: Godfrey Kaufman DPM;  Location: [facility];  Service: Podiatry    SHOULDER SURGERY Left     bicep tendon and labrum repair    TONSILLECTOMY      TOOTH EXTRACTION  2018       Current Outpatient Medications   Medication Sig Dispense Refill    albuterol (ACCUNEB) 1 25 MG/3ML nebulizer solution Take 1 ampule by nebulization as needed for wheezing       Ascorbic Acid (VITAMIN C) 1000 MG tablet Take 1,000 mg by mouth daily      carisoprodol (SOMA) 350 mg tablet Take 1 tablet (350 mg total) by mouth 3 (three) times a day 30 tablet 0    clobetasol (TEMOVATE) 0 05 % cream Apply topically 2 (two) times a day To nail for maximum of 30 days (Patient not taking: No sig reported) 30 g 0    diclofenac (VOLTAREN) 75 mg EC tablet Take 1 tablet (75 mg total) by mouth 2 (two) times a day 60 tablet 2    famotidine (PEPCID) 20 mg tablet TAKE ONE TABLET BY MOUTH EVERY DAY AT BEDTIME 90 tablet 0    fluconazole (DIFLUCAN) 200 mg tablet Take 1 tablet (200 mg total) by mouth once a week 12 tablet 3    hyoscyamine (ANASPAZ,LEVSIN) 0 125 MG tablet TAKE ONE TABLET BY MOUTH EVERY 4 HOURS AS NEEDED FOR CRAMPING (Patient not taking: No sig reported) 30 tablet 0    inFLIXimab (REMICADE) 100 mg Infuse into a venous catheter every 56 days      levothyroxine 50 mcg tablet TAKE ONE TABLET BY MOUTH DAILY 30 tablet 0    ondansetron (ZOFRAN-ODT) 4 mg disintegrating tablet Take 1 tablet (4 mg total) by mouth every 6 (six) hours as needed for nausea or vomiting (Patient taking differently: Take 4 mg by mouth as needed for nausea or vomiting) 30 tablet 1    predniSONE 10 mg tablet Take 1 tablet (10 mg total) by mouth daily Pred 10mg, 4 tabs for 2 days , 3 tabs for 2 days, 2 tabs for 2 days and 1 tab for 2 days  # 20  (Patient taking differently: Take 10 mg by mouth as needed Pred 10mg, 4 tabs for 2 days , 3 tabs for 2 days, 2 tabs for 2 days and 1 tab for 2 days  # 20 ) 20 tablet 0    sulconazole nitrate 1 % external solution Apply topically 2 (two) times a day 30 mL 0    traMADol (ULTRAM) 50 mg tablet Take 1 tablet (50 mg total) by mouth as needed in the morning for moderate pain  30 tablet 1    traZODone (DESYREL) 50 mg tablet Take 1 tablet (50 mg total) by mouth daily at bedtime (Patient not taking: No sig reported) 90 tablet 0    triamcinolone (KENALOG) 0 5 % cream Apply topically 3 (three) times a day (Patient taking differently: Apply topically as needed) 30 g 0    zolpidem (AMBIEN) 10 mg tablet TAKE ONE TABLET BY MOUTH EVERY DAY AT BEDTIME AS NEEDED FOR SLEEP 30 tablet 0     No current facility-administered medications for this visit  No Known Allergies      Review of Systems   Constitutional: Negative for activity change, appetite change, chills, fatigue, fever and unexpected weight change  Gastrointestinal: Positive for diarrhea   Negative for abdominal distention, abdominal pain, anal bleeding, blood in stool, constipation, nausea, rectal pain and vomiting  Psychiatric/Behavioral: Negative for confusion  Video Exam    There were no vitals filed for this visit  Physical Exam  Constitutional:       Appearance: Normal appearance  She is normal weight  HENT:      Nose: Nose normal    Eyes:      General:         Right eye: No discharge  Left eye: No discharge  Pulmonary:      Effort: Pulmonary effort is normal    Musculoskeletal:      Cervical back: Normal range of motion  Neurological:      Mental Status: She is alert  I spent 20 minutes with patient today in which greater than 50% of the time was spent in counseling/coordination of care regarding IBD    VIRTUAL VISIT DISCLAIMER      Michelle Gusman verbally agrees to participate in [employer]  Pt is aware that [employer] could be limited without vital signs or the ability to perform a full hands-on physical exam  Leilani Blount understands she or the provider may request at any time to terminate the video visit and request the patient to seek care or treatment in person

## 2022-06-23 NOTE — TELEPHONE ENCOUNTER
Think she should follow up with dermatology or get second opinion   Difficult to treat - not sure I would have much more to offer

## 2022-06-25 LAB
GAMMA INTERFERON BACKGROUND BLD IA-ACNC: 0.02 IU/ML
M TB IFN-G BLD-IMP: NEGATIVE
M TB IFN-G CD4+ BCKGRND COR BLD-ACNC: 0 IU/ML
M TB IFN-G CD4+ BCKGRND COR BLD-ACNC: 0 IU/ML
MITOGEN IGNF BCKGRD COR BLD-ACNC: 6.83 IU/ML

## 2022-06-26 LAB — CALPROTECTIN STL-MCNT: 40 UG/G (ref 0–120)

## 2022-06-27 ENCOUNTER — HOSPITAL ENCOUNTER (OUTPATIENT)
Dept: INFUSION CENTER | Facility: HOSPITAL | Age: 57
Discharge: HOME | End: 2022-06-27
Attending: INTERNAL MEDICINE
Payer: COMMERCIAL

## 2022-06-27 ENCOUNTER — TELEMEDICINE (OUTPATIENT)
Dept: FAMILY MEDICINE CLINIC | Facility: CLINIC | Age: 57
End: 2022-06-27
Payer: COMMERCIAL

## 2022-06-27 VITALS
DIASTOLIC BLOOD PRESSURE: 76 MMHG | RESPIRATION RATE: 20 BRPM | TEMPERATURE: 97.4 F | HEART RATE: 72 BPM | SYSTOLIC BLOOD PRESSURE: 118 MMHG | BODY MASS INDEX: 27.96 KG/M2 | WEIGHT: 157.85 LBS

## 2022-06-27 DIAGNOSIS — K51.011 ULCERATIVE PANCOLITIS WITH RECTAL BLEEDING (HCC): ICD-10-CM

## 2022-06-27 DIAGNOSIS — K51.011 ULCERATIVE PANCOLITIS WITH RECTAL BLEEDING (HCC): Primary | ICD-10-CM

## 2022-06-27 DIAGNOSIS — F41.9 ANXIETY: ICD-10-CM

## 2022-06-27 DIAGNOSIS — Z00.00 HEALTH MAINTENANCE EXAMINATION: ICD-10-CM

## 2022-06-27 DIAGNOSIS — M62.838 MUSCLE SPASM: ICD-10-CM

## 2022-06-27 DIAGNOSIS — F51.04 CHRONIC INSOMNIA: Primary | ICD-10-CM

## 2022-06-27 DIAGNOSIS — L40.50 PSORIATIC ARTHRITIS (HCC): ICD-10-CM

## 2022-06-27 DIAGNOSIS — Z79.899 LONG-TERM USE OF HIGH-RISK MEDICATION: ICD-10-CM

## 2022-06-27 DIAGNOSIS — F51.01 PERSISTENT INSOMNIA OF NON-ORGANIC ORIGIN: ICD-10-CM

## 2022-06-27 PROCEDURE — 96413 CHEMO IV INFUSION 1 HR: CPT

## 2022-06-27 PROCEDURE — 96415 CHEMO IV INFUSION ADDL HR: CPT

## 2022-06-27 PROCEDURE — 99214 OFFICE O/P EST MOD 30 MIN: CPT | Performed by: FAMILY MEDICINE

## 2022-06-27 RX ORDER — DIPHENHYDRAMINE HCL 25 MG
25 TABLET ORAL ONCE
OUTPATIENT
Start: 2022-08-22

## 2022-06-27 RX ORDER — CARISOPRODOL 350 MG/1
350 TABLET ORAL 3 TIMES DAILY
Qty: 30 TABLET | Refills: 0 | Status: SHIPPED | OUTPATIENT
Start: 2022-06-27 | End: 2022-06-30 | Stop reason: SDUPTHER

## 2022-06-27 RX ORDER — SODIUM CHLORIDE 9 MG/ML
20 INJECTION, SOLUTION INTRAVENOUS ONCE
OUTPATIENT
Start: 2022-08-22

## 2022-06-27 RX ORDER — ZOLPIDEM TARTRATE 10 MG/1
TABLET ORAL
Qty: 30 TABLET | Refills: 0 | Status: SHIPPED | OUTPATIENT
Start: 2022-06-27 | End: 2022-07-18

## 2022-06-27 RX ORDER — DICLOFENAC SODIUM 75 MG/1
75 TABLET, DELAYED RELEASE ORAL 2 TIMES DAILY PRN
Qty: 60 TABLET | Refills: 2 | Status: SHIPPED | OUTPATIENT
Start: 2022-06-27

## 2022-06-27 RX ORDER — METHYLPREDNISOLONE SODIUM SUCCINATE 40 MG/ML
40 INJECTION, POWDER, LYOPHILIZED, FOR SOLUTION INTRAMUSCULAR; INTRAVENOUS ONCE
OUTPATIENT
Start: 2022-08-22

## 2022-06-27 RX ORDER — SODIUM CHLORIDE 9 MG/ML
20 INJECTION, SOLUTION INTRAVENOUS ONCE
Status: COMPLETED | OUTPATIENT
Start: 2022-06-27 | End: 2022-06-27

## 2022-06-27 RX ORDER — ACETAMINOPHEN 325 MG/1
650 TABLET ORAL ONCE
Status: COMPLETED | OUTPATIENT
Start: 2022-06-27 | End: 2022-06-27

## 2022-06-27 RX ORDER — LORAZEPAM 0.5 MG/1
0.5 TABLET ORAL DAILY PRN
Qty: 30 TABLET | Refills: 0 | Status: SHIPPED | OUTPATIENT
Start: 2022-06-27

## 2022-06-27 RX ORDER — ACETAMINOPHEN 325 MG/1
650 TABLET ORAL ONCE
OUTPATIENT
Start: 2022-08-22

## 2022-06-27 RX ORDER — ASCORBIC ACID 500 MG
500 TABLET ORAL DAILY
Qty: 30 TABLET
Start: 2022-06-27

## 2022-06-27 RX ORDER — METHYLPREDNISOLONE SODIUM SUCCINATE 40 MG/ML
40 INJECTION, POWDER, LYOPHILIZED, FOR SOLUTION INTRAMUSCULAR; INTRAVENOUS ONCE
Status: DISCONTINUED | OUTPATIENT
Start: 2022-06-27 | End: 2022-06-30 | Stop reason: HOSPADM

## 2022-06-27 RX ORDER — NALOXONE HYDROCHLORIDE 4 MG/.1ML
SPRAY NASAL
Qty: 1 EACH | Refills: 1 | Status: SHIPPED | OUTPATIENT
Start: 2022-06-27

## 2022-06-27 RX ORDER — DIPHENHYDRAMINE HCL 25 MG
25 TABLET ORAL ONCE
Status: DISCONTINUED | OUTPATIENT
Start: 2022-06-27 | End: 2022-06-30 | Stop reason: HOSPADM

## 2022-06-27 RX ADMIN — ACETAMINOPHEN 650 MG: 325 TABLET ORAL at 10:10

## 2022-06-27 RX ADMIN — INFLIXIMAB 700 MG: 100 INJECTION, POWDER, LYOPHILIZED, FOR SOLUTION INTRAVENOUS at 10:50

## 2022-06-27 RX ADMIN — SODIUM CHLORIDE 20 ML/HR: 0.9 INJECTION, SOLUTION INTRAVENOUS at 10:10

## 2022-06-27 NOTE — PROGRESS NOTES
Infusion center complete, medications administered as ordered   Pt aware of upcoming appointments and denies AVS

## 2022-06-27 NOTE — PROGRESS NOTES
Virtual Regular Visit    Verification of patient location:    Patient is located in the following state in which I hold an active license [place]      Assessment/Plan:    Problem List Items Addressed This Visit        Digestive    Ulcerative pancolitis with rectal bleeding ([facility] [address] )     Improved on remicade, continue follow up with gi           Relevant Medications    diclofenac (VOLTAREN) 75 mg EC tablet       Nervous and Auditory    Persistent insomnia of non-organic origin     Zolpidem as needed for sleep, trazodone did not help              Musculoskeletal and Integument    Psoriatic arthritis ([facility] [address] )     Requesting renewal of diclofenac- as needed           Relevant Medications    diclofenac (VOLTAREN) 75 mg EC tablet       Other    Chronic insomnia - Primary    Anxiety     Lorazepam as needed for anxiety           Relevant Medications    LORazepam (Ativan) 0 5 mg tablet    Muscle spasm    Relevant Medications    carisoprodol (SOMA) 350 mg tablet      Other Visit Diagnoses     Health maintenance examination        Relevant Medications    vitamin C (VITAMIN C) 500 mg tablet    Long-term use of high-risk medication        Relevant Medications    naloxone (NARCAN) 4 mg/0 1 mL nasal spray          BMI Counseling: There is no height or weight on file to calculate BMI  The BMI is above normal  Nutrition recommendations include decreasing portion sizes  Exercise recommendations include exercising 3-5 times per week  No pharmacotherapy was ordered  Rationale for BMI follow-up plan is due to patient being overweight or obese  Depression Screening and Follow-up Plan: Patient was screened for depression during today's encounter  They screened negative with a PHQ-2 score of 0          Reason for visit is   Chief Complaint   Patient presents with    Follow-up     Med check up    Virtual Regular Visit        Encounter provider Lakesha Bermudez DO    Provider located at [address] [place] [address]/[facility] [place] [id number]      Recent Visits  No visits were found meeting these conditions  Showing recent visits within past 7 days and meeting all other requirements  Today's Visits  Date Type Provider Dept   06/27/22 Telemedicine DO Ramo Cruz   Showing today's visits and meeting all other requirements  Future Appointments  No visits were found meeting these conditions  Showing future appointments within next 150 days and meeting all other requirements       The patient was identified by name and date of birth  John Cantu was informed that this is a telemedicine visit and that the visit is being conducted through [address] [employer] and patient was informed that this is a secure, HIPAA-compliant platform  She agrees to proceed     My office door was closed  No one else was in the room  She acknowledged consent and understanding of privacy and security of the video platform  The patient has agreed to participate and understands they can discontinue the visit at any time  Patient is aware this is a billable service  Subjective  John Cantu is a 64 y o  female is seen for follow up on chronic conditions   Had covid 23 in January  Was Having trouble getting remicade covered   Getting remicade infusion 8 weeks and making a big difference in her symptoms- improvement  Last colonoscopy was good  Pt feeling good  Still with trouble sleeping  Tried trazodone but did not help much with sleep  Pt has been under a lot of tress  Some anxiety   has dementia is getting worse   Requesting anxiety medication she can use as needed          Past Medical History:   Diagnosis Date    Adjustment disorder     last assessed 05/16/12    Anemia     HX of    Asthma     mild - intermittent    Bilateral leg edema     Blepharitis     last assessed 02/04/16    Bulging lumbar disc     Carpal tunnel syndrome     Unspecified laterality    Colitis, acute     Colon polyp     Disease of thyroid gland     Dyspareunia in female     Edema     last assessed 06/22/15    Ganglion     Herniated cervical disc     History of transfusion     Hypokalemia     Hypothyroidism     Hypothyroidism     Insomnia     Lumps on the skin     last assessed 14    Mouth ulcers     last assessed 06/22/15    Nontraumatic tear of left tibialis posterior tendon     Traumatic teart     Polyarthritis     last assessed 16    Raynaud's disease     Temporomandibular disorder     Joint    Thyroid disease     [facility]cerative colitis ([facility] [address] [facility]     Ulcerative colitis ([facility] [address] [facility]        Past Surgical History:   Procedure Laterality Date    ANKLE SURGERY Left     Tendon repair    CARPAL TUNNEL RELEASE Bilateral      SECTION, LOW TRANSVERSE      COLONOSCOPY      COLONOSCOPY N/A 2018    Procedure: COLONOSCOPY;  Surgeon: Quin Santoyo MD;  [facility]; Service: Gastroenterology    [facility] INJECTION RIGHT HIP (ARTHROGRAM)  9/15/2020    HERNIA REPAIR      umbilical    HYSTERECTOMY      KNEE ARTHROSCOPY Right     LIPECTOMY      Multipe lipoma removals    LIPOMA RESECTION      PARATHYROIDECTOMY      [place] COLONOSCOPY FLX DX W/COLLJ SPEC WHEN PFRMD N/A 2016    Procedure: COLONOSCOPY;  Surgeon: Claudette Alvarado DO;  Location: [facility];   Service: Gastroenterology    [place] REPAIR FLEX LEG TENDON,SECOND,EA Left 2016    Procedure: REPAIR OF LEFT POSTERIOR TIBIAL TENDON WITH GRAFT, EXPLORATION LEFT ANKLE ;  Surgeon: Krystal Nieves DPM;  Location[facility];  Service: Podiatry    SHOULDER SURGERY Left     bicep tendon and labrum repair    TONSILLECTOMY      TOOTH EXTRACTION  2018       Current Outpatient Medications   Medication Sig Dispense Refill    albuterol (ACCUNEB) 1 25 MG/3ML nebulizer solution Take 1 ampule by nebulization as needed for wheezing       carisoprodol (SOMA) 350 mg tablet Take 1 tablet (350 mg total) by mouth 3 (three) times a day 30 tablet 0    diclofenac (VOLTAREN) 75 mg EC tablet Take 1 tablet (75 mg total) by mouth 2 (two) times a day as needed (joint pain) 60 tablet 2    famotidine (PEPCID) 20 mg tablet TAKE ONE TABLET BY MOUTH EVERY DAY AT BEDTIME 90 tablet 0    fluconazole (DIFLUCAN) 200 mg tablet Take 1 tablet (200 mg total) by mouth once a week 12 tablet 3    inFLIXimab (REMICADE) 100 mg Infuse into a venous catheter every 56 days      levothyroxine 50 mcg tablet TAKE ONE TABLET BY MOUTH DAILY 30 tablet 0    LORazepam (Ativan) 0 5 mg tablet Take 1 tablet (0 5 mg total) by mouth daily as needed for anxiety 30 tablet 0    naloxone (NARCAN) 4 mg/0 1 mL nasal spray Administer 1 spray into a nostril  If no response after 2-3 minutes, give another dose in the other nostril using a new spray  1 each 1    ondansetron (ZOFRAN-ODT) 4 mg disintegrating tablet Take 1 tablet (4 mg total) by mouth every 6 (six) hours as needed for nausea or vomiting (Patient taking differently: Take 4 mg by mouth as needed for nausea or vomiting) 30 tablet 1    predniSONE 10 mg tablet Take 1 tablet (10 mg total) by mouth daily Pred 10mg, 4 tabs for 2 days , 3 tabs for 2 days, 2 tabs for 2 days and 1 tab for 2 days  # 20  (Patient taking differently: Take 10 mg by mouth as needed Pred 10mg, 4 tabs for 2 days , 3 tabs for 2 days, 2 tabs for 2 days and 1 tab for 2 days  # 20 ) 20 tablet 0    traMADol (ULTRAM) 50 mg tablet Take 1 tablet (50 mg total) by mouth as needed in the morning for moderate pain  30 tablet 1    triamcinolone (KENALOG) 0 5 % cream Apply topically 3 (three) times a day (Patient taking differently: Apply topically as needed) 30 g 0    vitamin C (VITAMIN C) 500 mg tablet Take 1 tablet (500 mg total) by mouth daily 30 tablet     zolpidem (AMBIEN) 10 mg tablet TAKE ONE TABLET BY MOUTH EVERY DAY AT BEDTIME AS NEEDED FOR SLEEP 30 tablet 0     No current facility-administered medications for this visit       Facility-Administered Medications Ordered in Other Visits   Medication Dose Route Frequency Provider Last Rate Last Admin    diphenhydrAMINE (BENADRYL) tablet 25 mg  25 mg Oral Once [employer], DO        methylPREDNISolone sodium succinate (Solu-MEDROL) injection 40 mg  40 mg Intravenous Once [employer], DO            No Known Allergies    Review of Systems   Constitutional: Negative  Negative for fatigue and fever  HENT: Negative  Eyes: Negative  Respiratory: Negative  Negative for cough  Cardiovascular: Negative  Gastrointestinal: Negative  Endocrine: Negative  Genitourinary: Negative  Musculoskeletal: Negative  Skin: Negative  Allergic/Immunologic: Negative  Neurological: Negative  Psychiatric/Behavioral: The patient is nervous/anxious  Video Exam    There were no vitals filed for this visit  Physical Exam     I spent 15 minutes directly with the patient during this visit    Elise Ma verbally agrees to participate in [employer]  Pt is aware that [employer] could be limited without vital signs or the ability to perform a full hands-on physical exam  Leilani Blount understands she or the provider may request at any time to terminate the video visit and request the patient to seek care or treatment in person

## 2022-06-28 NOTE — ASSESSMENT & PLAN NOTE
Called and spoke with patient and his wife informing them of MD information below. Patient is having phone issues right now and will call later to make a post-op appt with MD. Patient verbalizes understanding and has no further questions.      MD Sondra Kline RN      Tests look fine.   He can proceed with surgery and follow up with us postoperatively if he wishes to         Lorazepam as needed for anxiety

## 2022-06-30 DIAGNOSIS — M62.838 MUSCLE SPASM: ICD-10-CM

## 2022-06-30 RX ORDER — CARISOPRODOL 350 MG/1
350 TABLET ORAL 3 TIMES DAILY
Qty: 30 TABLET | Refills: 0 | Status: SHIPPED | OUTPATIENT
Start: 2022-06-30

## 2022-06-30 NOTE — TELEPHONE ENCOUNTER
Patient is asking if you can send her Luis Fernando Schaefer to [facility] in [place]  She will pay cash through [employer] since it requires prior auth

## 2022-07-01 ENCOUNTER — TELEPHONE (OUTPATIENT)
Dept: GASTROENTEROLOGY | Facility: MEDICAL CENTER | Age: 57
End: 2022-07-01

## 2022-07-01 LAB
INFLIXIMAB AB SERPL-MCNC: <22 NG/ML
INFLIXIMAB SERPL-MCNC: 18 UG/ML

## 2022-07-01 NOTE — TELEPHONE ENCOUNTER
Emailed [facility] - they have already initiated a new auth for 10mg/kg dosing  I asked them to please contact insurance and ask them to switch to 7 5mg dosing  Edy Mcdaniel will take care of it

## 2022-07-01 NOTE — TELEPHONE ENCOUNTER
----- Message from Jenny Farris DO sent at 7/1/2022  2:38 PM EDT -----  Please let patient know that her infliximab drug levels were good so we are able to decrease her dose from 10 to 7 5 mg/kg dosing

## 2022-07-01 NOTE — RESULT ENCOUNTER NOTE
Please let patient know that her infliximab drug levels were good so we are able to decrease her dose from 10 to 7 5 mg/kg dosing

## 2022-07-06 NOTE — TELEPHONE ENCOUNTER
[facility] initiates authorization when it fall on their WQ about 2 weeks prior to infusion  Patient next scheduled 8/22

## 2022-07-08 NOTE — TELEPHONE ENCOUNTER
Patient made aware of Dr She Lynch communication by phone  Patient is happy of the outcome and aware we are working on a new authorization

## 2022-07-16 DIAGNOSIS — E03.9 ACQUIRED HYPOTHYROIDISM: ICD-10-CM

## 2022-07-16 DIAGNOSIS — F51.04 CHRONIC INSOMNIA: ICD-10-CM

## 2022-07-16 RX ORDER — LEVOTHYROXINE SODIUM 0.05 MG/1
TABLET ORAL
Qty: 30 TABLET | Refills: 0 | Status: SHIPPED | OUTPATIENT
Start: 2022-07-16 | End: 2022-08-14

## 2022-07-18 RX ORDER — ZOLPIDEM TARTRATE 10 MG/1
TABLET ORAL
Qty: 30 TABLET | Refills: 0 | Status: SHIPPED | OUTPATIENT
Start: 2022-07-18 | End: 2022-08-16

## 2022-07-19 ENCOUNTER — TELEPHONE (OUTPATIENT)
Dept: PODIATRY | Facility: CLINIC | Age: 57
End: 2022-07-19

## 2022-07-20 DIAGNOSIS — M72.2 PLANTAR FASCIITIS: Primary | ICD-10-CM

## 2022-07-20 NOTE — TELEPHONE ENCOUNTER
If you need us to check for orthotic coverage and if she can get a peer to peer, please put in prior auth for this patient

## 2022-08-02 ENCOUNTER — TELEPHONE (OUTPATIENT)
Dept: FAMILY MEDICINE CLINIC | Facility: CLINIC | Age: 57
End: 2022-08-02

## 2022-08-02 NOTE — TELEPHONE ENCOUNTER
----- Message from Michelle Leroy sent at 8/2/2022  1:04 PM EDT -----  Regarding: Mammogram   Can you place an order for a mammogram? Im ready to be compliant :) I have a dexa scan scheduled for sept 27th and the mammogram is in the same building so I thought I should get it done!  Ty

## 2022-08-17 NOTE — TELEPHONE ENCOUNTER
Approval:  [id number]   Buy and bill   Codes approved:  [id number]   Place of service: [facility]   Valid dates : 8/10/2022 to 8/10/2023

## 2022-08-22 ENCOUNTER — HOSPITAL ENCOUNTER (OUTPATIENT)
Dept: INFUSION CENTER | Facility: HOSPITAL | Age: 57
Discharge: HOME | End: 2022-08-22
Payer: COMMERCIAL

## 2022-08-22 VITALS
BODY MASS INDEX: 28.28 KG/M2 | HEART RATE: 62 BPM | WEIGHT: 159.61 LBS | SYSTOLIC BLOOD PRESSURE: 135 MMHG | TEMPERATURE: 98.4 F | RESPIRATION RATE: 14 BRPM | OXYGEN SATURATION: 100 % | DIASTOLIC BLOOD PRESSURE: 61 MMHG | HEIGHT: 63 IN

## 2022-08-22 DIAGNOSIS — K51.011 ULCERATIVE PANCOLITIS WITH RECTAL BLEEDING (HCC): Primary | ICD-10-CM

## 2022-08-22 PROCEDURE — 96413 CHEMO IV INFUSION 1 HR: CPT

## 2022-08-22 PROCEDURE — 96415 CHEMO IV INFUSION ADDL HR: CPT

## 2022-08-22 RX ORDER — ACETAMINOPHEN 325 MG/1
650 TABLET ORAL ONCE
Status: DISCONTINUED | OUTPATIENT
Start: 2022-08-22 | End: 2022-08-25 | Stop reason: HOSPADM

## 2022-08-22 RX ORDER — ACETAMINOPHEN 325 MG/1
650 TABLET ORAL ONCE
Status: CANCELLED | OUTPATIENT
Start: 2022-10-17

## 2022-08-22 RX ORDER — SODIUM CHLORIDE 9 MG/ML
20 INJECTION, SOLUTION INTRAVENOUS ONCE
Status: COMPLETED | OUTPATIENT
Start: 2022-08-22 | End: 2022-08-22

## 2022-08-22 RX ORDER — METHYLPREDNISOLONE SODIUM SUCCINATE 40 MG/ML
40 INJECTION, POWDER, LYOPHILIZED, FOR SOLUTION INTRAMUSCULAR; INTRAVENOUS ONCE
Status: DISCONTINUED | OUTPATIENT
Start: 2022-08-22 | End: 2022-08-25 | Stop reason: HOSPADM

## 2022-08-22 RX ORDER — DIPHENHYDRAMINE HCL 25 MG
25 TABLET ORAL ONCE
Status: DISCONTINUED | OUTPATIENT
Start: 2022-08-22 | End: 2022-08-25 | Stop reason: HOSPADM

## 2022-08-22 RX ORDER — SODIUM CHLORIDE 9 MG/ML
20 INJECTION, SOLUTION INTRAVENOUS ONCE
Status: CANCELLED | OUTPATIENT
Start: 2022-10-17

## 2022-08-22 RX ORDER — METHYLPREDNISOLONE SODIUM SUCCINATE 40 MG/ML
40 INJECTION, POWDER, LYOPHILIZED, FOR SOLUTION INTRAMUSCULAR; INTRAVENOUS ONCE
Status: CANCELLED | OUTPATIENT
Start: 2022-10-17

## 2022-08-22 RX ORDER — DIPHENHYDRAMINE HCL 25 MG
25 TABLET ORAL ONCE
Status: CANCELLED | OUTPATIENT
Start: 2022-10-17

## 2022-08-22 RX ADMIN — INFLIXIMAB 537 MG: 100 INJECTION, POWDER, LYOPHILIZED, FOR SOLUTION INTRAVENOUS at 10:49

## 2022-08-22 RX ADMIN — SODIUM CHLORIDE 20 ML/HR: 9 INJECTION, SOLUTION INTRAVENOUS at 09:50

## 2022-09-11 DIAGNOSIS — E03.9 ACQUIRED HYPOTHYROIDISM: ICD-10-CM

## 2022-09-11 RX ORDER — LEVOTHYROXINE SODIUM 0.05 MG/1
TABLET ORAL
Qty: 30 TABLET | Refills: 0 | Status: SHIPPED | OUTPATIENT
Start: 2022-09-11 | End: 2022-10-11

## 2022-09-13 DIAGNOSIS — F41.9 ANXIETY: ICD-10-CM

## 2022-09-14 RX ORDER — LORAZEPAM 0.5 MG/1
TABLET ORAL
Qty: 30 TABLET | Refills: 0 | Status: SHIPPED | OUTPATIENT
Start: 2022-09-14 | End: 2022-10-24

## 2022-09-18 DIAGNOSIS — F51.04 CHRONIC INSOMNIA: ICD-10-CM

## 2022-09-18 RX ORDER — ZOLPIDEM TARTRATE 10 MG/1
TABLET ORAL
Qty: 30 TABLET | Refills: 0 | Status: SHIPPED | OUTPATIENT
Start: 2022-09-18 | End: 2022-10-11

## 2022-09-18 NOTE — TELEPHONE ENCOUNTER
Medication:Ambien  10mgs  Medication failed [facility] protocol  Please forward to your office staff for further review as this medication was reviewed by a [employer] RN

## 2022-09-27 ENCOUNTER — HOSPITAL ENCOUNTER (OUTPATIENT)
Dept: BONE DENSITY | Facility: IMAGING CENTER | Age: 57
Discharge: HOME | End: 2022-09-27
Payer: COMMERCIAL

## 2022-09-27 ENCOUNTER — HOSPITAL ENCOUNTER (OUTPATIENT)
Dept: MAMMOGRAPHY | Facility: IMAGING CENTER | Age: 57
Discharge: HOME | End: 2022-09-27
Payer: COMMERCIAL

## 2022-09-27 ENCOUNTER — HOSPITAL ENCOUNTER (OUTPATIENT)
Dept: ULTRASOUND IMAGING | Facility: HOSPITAL | Age: 57
Discharge: HOME | End: 2022-09-27
Attending: INTERNAL MEDICINE
Payer: COMMERCIAL

## 2022-09-27 VITALS — WEIGHT: 159 LBS | HEIGHT: 63 IN | BODY MASS INDEX: 28.17 KG/M2

## 2022-09-27 DIAGNOSIS — E21.3 HYPERPARATHYROIDISM (HCC): ICD-10-CM

## 2022-09-27 DIAGNOSIS — E04.2 MULTIPLE THYROID NODULES: ICD-10-CM

## 2022-09-27 DIAGNOSIS — Z12.31 ENCOUNTER FOR SCREENING MAMMOGRAM FOR MALIGNANT NEOPLASM OF BREAST: ICD-10-CM

## 2022-09-27 DIAGNOSIS — M85.80 OSTEOPENIA, UNSPECIFIED LOCATION: ICD-10-CM

## 2022-09-27 PROCEDURE — 77080 DXA BONE DENSITY AXIAL: CPT

## 2022-09-27 PROCEDURE — 77063 BREAST TOMOSYNTHESIS BI: CPT

## 2022-09-27 PROCEDURE — 76536 US EXAM OF HEAD AND NECK: CPT

## 2022-09-27 PROCEDURE — 77067 SCR MAMMO BI INCL CAD: CPT

## 2022-09-29 ENCOUNTER — TELEPHONE (OUTPATIENT)
Dept: FAMILY MEDICINE CLINIC | Facility: CLINIC | Age: 57
End: 2022-09-29

## 2022-09-29 NOTE — TELEPHONE ENCOUNTER
Patient just saw her results in My Chart for her abnormal mammogram   Can you please advise of results and enter orders so she can put this in place  Please advise patient when complete  Thank you!

## 2022-09-30 DIAGNOSIS — R92.8 ABNORMAL MAMMOGRAM OF LEFT BREAST: Primary | ICD-10-CM

## 2022-10-10 DIAGNOSIS — F51.04 CHRONIC INSOMNIA: ICD-10-CM

## 2022-10-10 DIAGNOSIS — E03.9 ACQUIRED HYPOTHYROIDISM: ICD-10-CM

## 2022-10-11 RX ORDER — LEVOTHYROXINE SODIUM 0.05 MG/1
TABLET ORAL
Qty: 30 TABLET | Refills: 0 | Status: SHIPPED | OUTPATIENT
Start: 2022-10-11

## 2022-10-11 RX ORDER — ZOLPIDEM TARTRATE 10 MG/1
TABLET ORAL
Qty: 30 TABLET | Refills: 0 | Status: SHIPPED | OUTPATIENT
Start: 2022-10-11

## 2022-10-20 ENCOUNTER — HOSPITAL ENCOUNTER (OUTPATIENT)
Dept: INFUSION CENTER | Facility: HOSPITAL | Age: 57
Discharge: HOME | End: 2022-10-20
Payer: COMMERCIAL

## 2022-10-20 VITALS
SYSTOLIC BLOOD PRESSURE: 131 MMHG | OXYGEN SATURATION: 98 % | WEIGHT: 161.6 LBS | HEIGHT: 63 IN | BODY MASS INDEX: 28.63 KG/M2 | RESPIRATION RATE: 14 BRPM | HEART RATE: 76 BPM | DIASTOLIC BLOOD PRESSURE: 73 MMHG | TEMPERATURE: 97.7 F

## 2022-10-20 DIAGNOSIS — K51.011 ULCERATIVE PANCOLITIS WITH RECTAL BLEEDING (HCC): Primary | ICD-10-CM

## 2022-10-20 PROCEDURE — 96415 CHEMO IV INFUSION ADDL HR: CPT

## 2022-10-20 PROCEDURE — 96413 CHEMO IV INFUSION 1 HR: CPT

## 2022-10-20 RX ORDER — DIPHENHYDRAMINE HCL 25 MG
25 TABLET ORAL ONCE
OUTPATIENT
Start: 2022-12-15

## 2022-10-20 RX ORDER — METHYLPREDNISOLONE SODIUM SUCCINATE 40 MG/ML
40 INJECTION, POWDER, LYOPHILIZED, FOR SOLUTION INTRAMUSCULAR; INTRAVENOUS ONCE
Status: DISCONTINUED | OUTPATIENT
Start: 2022-10-20 | End: 2022-10-23 | Stop reason: HOSPADM

## 2022-10-20 RX ORDER — ACETAMINOPHEN 325 MG/1
650 TABLET ORAL ONCE
OUTPATIENT
Start: 2022-12-15

## 2022-10-20 RX ORDER — SODIUM CHLORIDE 9 MG/ML
20 INJECTION, SOLUTION INTRAVENOUS ONCE
OUTPATIENT
Start: 2022-12-15

## 2022-10-20 RX ORDER — ACETAMINOPHEN 325 MG/1
650 TABLET ORAL ONCE
Status: DISCONTINUED | OUTPATIENT
Start: 2022-10-20 | End: 2022-10-23 | Stop reason: HOSPADM

## 2022-10-20 RX ORDER — SODIUM CHLORIDE 9 MG/ML
20 INJECTION, SOLUTION INTRAVENOUS ONCE
Status: COMPLETED | OUTPATIENT
Start: 2022-10-20 | End: 2022-10-20

## 2022-10-20 RX ORDER — METHYLPREDNISOLONE SODIUM SUCCINATE 40 MG/ML
40 INJECTION, POWDER, LYOPHILIZED, FOR SOLUTION INTRAMUSCULAR; INTRAVENOUS ONCE
OUTPATIENT
Start: 2022-12-15

## 2022-10-20 RX ORDER — DIPHENHYDRAMINE HCL 25 MG
25 TABLET ORAL ONCE
Status: DISCONTINUED | OUTPATIENT
Start: 2022-10-20 | End: 2022-10-23 | Stop reason: HOSPADM

## 2022-10-20 RX ADMIN — SODIUM CHLORIDE 20 ML/HR: 9 INJECTION, SOLUTION INTRAVENOUS at 11:09

## 2022-10-20 RX ADMIN — INFLIXIMAB 541 MG: 100 INJECTION, POWDER, LYOPHILIZED, FOR SOLUTION INTRAVENOUS at 11:10

## 2022-10-23 DIAGNOSIS — F41.9 ANXIETY: ICD-10-CM

## 2022-10-24 RX ORDER — LORAZEPAM 0.5 MG/1
TABLET ORAL
Qty: 30 TABLET | Refills: 0 | Status: SHIPPED | OUTPATIENT
Start: 2022-10-24

## 2022-10-26 ENCOUNTER — HOSPITAL ENCOUNTER (OUTPATIENT)
Dept: MAMMOGRAPHY | Facility: CLINIC | Age: 57
Discharge: HOME | End: 2022-10-26
Payer: COMMERCIAL

## 2022-10-26 ENCOUNTER — HOSPITAL ENCOUNTER (OUTPATIENT)
Dept: ULTRASOUND IMAGING | Facility: CLINIC | Age: 57
Discharge: HOME | End: 2022-10-26
Payer: COMMERCIAL

## 2022-10-26 DIAGNOSIS — R92.8 ABNORMAL MAMMOGRAM OF LEFT BREAST: ICD-10-CM

## 2022-10-26 PROCEDURE — 77065 DX MAMMO INCL CAD UNI: CPT

## 2022-10-26 PROCEDURE — 76642 ULTRASOUND BREAST LIMITED: CPT

## 2022-10-26 PROCEDURE — G0279 TOMOSYNTHESIS, MAMMO: HCPCS

## 2022-10-27 ENCOUNTER — TELEPHONE (OUTPATIENT)
Dept: FAMILY MEDICINE CLINIC | Facility: CLINIC | Age: 57
End: 2022-10-27

## 2022-10-27 DIAGNOSIS — R92.8 ABNORMAL MAMMOGRAM OF LEFT BREAST: Primary | ICD-10-CM

## 2022-10-27 NOTE — TELEPHONE ENCOUNTER
Patient is requesting orders be put in now for a 6 month follow up of a breast mass in her left breast as requested by Dr Catalan Argue  She needs orders for:    Us breast left (diagnostic)  Mammo left w 3d & cad (diagnostic)    Left breast mass diagnosis      Thank you!!

## 2022-11-04 ENCOUNTER — PATIENT MESSAGE (OUTPATIENT)
Dept: DERMATOLOGY | Facility: CLINIC | Age: 57
End: 2022-11-04

## 2022-11-04 DIAGNOSIS — B35.1 ONYCHOMYCOSIS: Primary | ICD-10-CM

## 2022-11-09 RX ORDER — KETOCONAZOLE 20 MG/G
CREAM TOPICAL
Qty: 15 G | Refills: 2 | Status: SHIPPED | OUTPATIENT
Start: 2022-11-09

## 2022-11-12 DIAGNOSIS — F51.04 CHRONIC INSOMNIA: ICD-10-CM

## 2022-11-12 DIAGNOSIS — E03.9 ACQUIRED HYPOTHYROIDISM: ICD-10-CM

## 2022-11-12 DIAGNOSIS — K21.9 GASTROESOPHAGEAL REFLUX DISEASE WITHOUT ESOPHAGITIS: ICD-10-CM

## 2022-11-12 RX ORDER — FAMOTIDINE 20 MG/1
TABLET, FILM COATED ORAL
Qty: 90 TABLET | Refills: 0 | Status: SHIPPED | OUTPATIENT
Start: 2022-11-12

## 2022-11-12 RX ORDER — LEVOTHYROXINE SODIUM 0.05 MG/1
TABLET ORAL
Qty: 30 TABLET | Refills: 0 | Status: SHIPPED | OUTPATIENT
Start: 2022-11-12

## 2022-11-13 RX ORDER — ZOLPIDEM TARTRATE 10 MG/1
TABLET ORAL
Qty: 30 TABLET | Refills: 0 | Status: SHIPPED | OUTPATIENT
Start: 2022-11-13

## 2022-11-28 DIAGNOSIS — F41.9 ANXIETY: ICD-10-CM

## 2022-11-28 RX ORDER — LORAZEPAM 0.5 MG/1
TABLET ORAL
Qty: 30 TABLET | Refills: 0 | Status: SHIPPED | OUTPATIENT
Start: 2022-11-28

## 2022-12-10 DIAGNOSIS — E03.9 ACQUIRED HYPOTHYROIDISM: ICD-10-CM

## 2022-12-10 RX ORDER — LEVOTHYROXINE SODIUM 0.05 MG/1
TABLET ORAL
Qty: 30 TABLET | Refills: 0 | Status: SHIPPED | OUTPATIENT
Start: 2022-12-10

## 2022-12-12 ENCOUNTER — HOSPITAL ENCOUNTER (OUTPATIENT)
Dept: INFUSION CENTER | Facility: HOSPITAL | Age: 57
Discharge: HOME | End: 2022-12-12

## 2022-12-12 VITALS
WEIGHT: 161.38 LBS | DIASTOLIC BLOOD PRESSURE: 83 MMHG | TEMPERATURE: 97.5 F | BODY MASS INDEX: 28.59 KG/M2 | SYSTOLIC BLOOD PRESSURE: 153 MMHG | HEIGHT: 63 IN | HEART RATE: 49 BPM | RESPIRATION RATE: 16 BRPM

## 2022-12-12 DIAGNOSIS — K51.011 ULCERATIVE PANCOLITIS WITH RECTAL BLEEDING (HCC): Primary | ICD-10-CM

## 2022-12-12 RX ORDER — SODIUM CHLORIDE 9 MG/ML
20 INJECTION, SOLUTION INTRAVENOUS ONCE
OUTPATIENT
Start: 2023-02-06

## 2022-12-12 RX ORDER — DIPHENHYDRAMINE HCL 25 MG
25 TABLET ORAL ONCE
Status: DISCONTINUED | OUTPATIENT
Start: 2022-12-12 | End: 2022-12-15 | Stop reason: HOSPADM

## 2022-12-12 RX ORDER — DIPHENHYDRAMINE HCL 25 MG
25 TABLET ORAL ONCE
OUTPATIENT
Start: 2023-02-06

## 2022-12-12 RX ORDER — ACETAMINOPHEN 325 MG/1
650 TABLET ORAL ONCE
Status: DISCONTINUED | OUTPATIENT
Start: 2022-12-12 | End: 2022-12-15 | Stop reason: HOSPADM

## 2022-12-12 RX ORDER — METHYLPREDNISOLONE SODIUM SUCCINATE 40 MG/ML
40 INJECTION, POWDER, LYOPHILIZED, FOR SOLUTION INTRAMUSCULAR; INTRAVENOUS ONCE
OUTPATIENT
Start: 2023-02-06

## 2022-12-12 RX ORDER — SODIUM CHLORIDE 9 MG/ML
20 INJECTION, SOLUTION INTRAVENOUS ONCE
Status: COMPLETED | OUTPATIENT
Start: 2022-12-12 | End: 2022-12-12

## 2022-12-12 RX ORDER — ACETAMINOPHEN 325 MG/1
650 TABLET ORAL ONCE
OUTPATIENT
Start: 2023-02-06

## 2022-12-12 RX ORDER — METHYLPREDNISOLONE SODIUM SUCCINATE 40 MG/ML
40 INJECTION, POWDER, LYOPHILIZED, FOR SOLUTION INTRAMUSCULAR; INTRAVENOUS ONCE
Status: DISCONTINUED | OUTPATIENT
Start: 2022-12-12 | End: 2022-12-15 | Stop reason: HOSPADM

## 2022-12-12 RX ADMIN — INFLIXIMAB 550 MG: 100 INJECTION, POWDER, LYOPHILIZED, FOR SOLUTION INTRAVENOUS at 09:29

## 2022-12-12 RX ADMIN — SODIUM CHLORIDE 20 ML/HR: 9 INJECTION, SOLUTION INTRAVENOUS at 09:25

## 2022-12-17 DIAGNOSIS — F51.04 CHRONIC INSOMNIA: ICD-10-CM

## 2022-12-18 RX ORDER — ZOLPIDEM TARTRATE 10 MG/1
TABLET ORAL
Qty: 30 TABLET | Refills: 0 | Status: SHIPPED | OUTPATIENT
Start: 2022-12-18

## 2022-12-18 NOTE — TELEPHONE ENCOUNTER
Intermittent technical issues with PDMP Patient Report    Medication failed [facility] protocol  Please forward to your office staff for further review as this medication was reviewed by a [employer] RN

## 2022-12-29 ENCOUNTER — OFFICE VISIT (OUTPATIENT)
Dept: FAMILY MEDICINE CLINIC | Facility: CLINIC | Age: 57
End: 2022-12-29

## 2022-12-29 VITALS
HEART RATE: 60 BPM | DIASTOLIC BLOOD PRESSURE: 70 MMHG | HEIGHT: 63 IN | WEIGHT: 161 LBS | SYSTOLIC BLOOD PRESSURE: 120 MMHG | OXYGEN SATURATION: 98 % | BODY MASS INDEX: 28.53 KG/M2 | TEMPERATURE: 96 F

## 2022-12-29 DIAGNOSIS — R50.9 FEVER, UNSPECIFIED FEVER CAUSE: ICD-10-CM

## 2022-12-29 DIAGNOSIS — H66.002 NON-RECURRENT ACUTE SUPPURATIVE OTITIS MEDIA OF LEFT EAR WITHOUT SPONTANEOUS RUPTURE OF TYMPANIC MEMBRANE: Primary | ICD-10-CM

## 2022-12-29 DIAGNOSIS — F41.9 ANXIETY: ICD-10-CM

## 2022-12-29 LAB
SL AMB POCT RAPID FLU A: NORMAL
SL AMB POCT RAPID FLU B: NORMAL

## 2022-12-29 RX ORDER — LATANOPROST 50 UG/ML
SOLUTION/ DROPS OPHTHALMIC
COMMUNITY
Start: 2022-12-13

## 2022-12-29 RX ORDER — AMOXICILLIN 500 MG/1
500 TABLET, FILM COATED ORAL 2 TIMES DAILY
Qty: 20 TABLET | Refills: 0 | Status: SHIPPED | OUTPATIENT
Start: 2022-12-29 | End: 2023-01-08

## 2022-12-29 NOTE — PROGRESS NOTES
Name: Donal Robles      : 1965      MRN: [id number]  Encounter Provider: YFN Bah  Encounter Date: 2022   Encounter department: [facility]    Assessment & Plan     1  Non-recurrent acute suppurative otitis media of left ear without spontaneous rupture of tympanic membrane  Assessment & Plan:  Amoxicillin twice daily x 10 days, take with food  Tylenol and motrin as needed for body aches, fevers, headaches  Nasal saline spray  Steamy showers  Sleep with head of bed elevated  Drink plenty of fluids    Orders:  -     amoxicillin (AMOXIL) 500 MG tablet; Take 1 tablet (500 mg total) by mouth 2 (two) times a day for 10 days  -     POCT rapid flu A and B    2  Fever, unspecified fever cause  -     POCT rapid flu A and B         Subjective      Came out of the blue yesterday afternoon, with body aches, sore throat, + PND  Extreme fatigue  Took nyquil  Teeth hurt  Pressure in sinuses and ears             Review of Systems   Constitutional: Positive for chills and fatigue  Negative for fever  HENT: Positive for congestion, ear pain, postnasal drip, rhinorrhea, sinus pressure, sinus pain and sore throat  Negative for ear discharge, hearing loss and sneezing  Eyes: Negative for pain, discharge and itching  Respiratory: Positive for cough  Negative for chest tightness, shortness of breath and wheezing  Cardiovascular: Negative for chest pain, palpitations and leg swelling  Gastrointestinal: Negative for abdominal pain, diarrhea, nausea and vomiting  Genitourinary: Negative for dysuria  Musculoskeletal: Positive for myalgias  Skin: Negative for rash  Allergic/Immunologic: Positive for environmental allergies  Neurological: Positive for headaches  Negative for dizziness and light-headedness  Hematological: Negative for adenopathy         Current Outpatient Medications on File Prior to Visit   Medication Sig   • albuterol (ACCUNEB) 1 25 MG/3ML nebulizer solution Take 1 ampule by nebulization as needed for wheezing    • carisoprodol (SOMA) 350 mg tablet Take 1 tablet (350 mg total) by mouth 3 (three) times a day   • diclofenac (VOLTAREN) 75 mg EC tablet Take 1 tablet (75 mg total) by mouth 2 (two) times a day as needed (joint pain)   • famotidine (PEPCID) 20 mg tablet TAKE ONE TABLET BY MOUTH EVERY DAY AT BEDTIME   • fluconazole (DIFLUCAN) 200 mg tablet Take 1 tablet (200 mg total) by mouth once a week   • inFLIXimab (REMICADE) 100 mg Infuse into a venous catheter every 56 days   • ketoconazole (NIZORAL) 2 % cream Apply 3-4 times per day to affected nails for suppression   • latanoprost (XALATAN) 0 005 % ophthalmic solution    • levothyroxine 50 mcg tablet TAKE ONE TABLET BY MOUTH DAILY   • LORazepam (ATIVAN) 0 5 mg tablet TAKE ONE TABLET BY MOUTH EVERY DAY AS NEEDED FOR ANXIETY   • naloxone (NARCAN) 4 mg/0 1 mL nasal spray Administer 1 spray into a nostril  If no response after 2-3 minutes, give another dose in the other nostril using a new spray  • ondansetron (ZOFRAN-ODT) 4 mg disintegrating tablet Take 1 tablet (4 mg total) by mouth every 6 (six) hours as needed for nausea or vomiting (Patient taking differently: Take 4 mg by mouth as needed for nausea or vomiting)   • predniSONE 10 mg tablet Take 1 tablet (10 mg total) by mouth daily Pred 10mg, 4 tabs for 2 days , 3 tabs for 2 days, 2 tabs for 2 days and 1 tab for 2 days  # 20  (Patient taking differently: Take 10 mg by mouth as needed Pred 10mg, 4 tabs for 2 days , 3 tabs for 2 days, 2 tabs for 2 days and 1 tab for 2 days  # 20 )   • traMADol (ULTRAM) 50 mg tablet Take 1 tablet (50 mg total) by mouth as needed in the morning for moderate pain     • triamcinolone (KENALOG) 0 5 % cream Apply topically 3 (three) times a day (Patient taking differently: Apply topically as needed)   • vitamin C (VITAMIN C) 500 mg tablet Take 1 tablet (500 mg total) by mouth daily   • zolpidem (AMBIEN) 10 mg tablet TAKE ONE TABLET BY MOUTH EVERY DAY AT BEDTIME AS NEEDED FOR SLEEP       Objective     /70   Pulse 60   Temp (!) 96 °F (35 6 °C) (Tympanic)   Ht 5' 3" (1 6 m)   Wt 73 kg (161 lb)   SpO2 98%   BMI 28 52 kg/m²     Physical Exam  Constitutional:       General: She is not in acute distress  Appearance: Normal appearance  She is not ill-appearing  HENT:      Right Ear: Tympanic membrane, ear canal and external ear normal       Left Ear: Ear canal and external ear normal  Tympanic membrane is erythematous  Nose: Congestion and rhinorrhea present  Mouth/Throat:      Mouth: Mucous membranes are moist       Pharynx: Posterior oropharyngeal erythema present  Eyes:      General: No scleral icterus  Right eye: No discharge  Left eye: No discharge  Extraocular Movements: Extraocular movements intact  Conjunctiva/sclera: Conjunctivae normal       Pupils: Pupils are equal, round, and reactive to light  Cardiovascular:      Rate and Rhythm: Normal rate and regular rhythm  Heart sounds: Normal heart sounds  No murmur heard  Pulmonary:      Effort: Pulmonary effort is normal       Breath sounds: Normal breath sounds  No wheezing or rhonchi  Abdominal:      Palpations: Abdomen is soft  Tenderness: There is no abdominal tenderness  Musculoskeletal:      Cervical back: Neck supple  Lymphadenopathy:      Cervical: No cervical adenopathy  Skin:     General: Skin is warm  Findings: No rash  Neurological:      Mental Status: She is alert         [facility]

## 2022-12-29 NOTE — PATIENT INSTRUCTIONS
Amoxicillin twice daily x 10 days, take with food  Tylenol and motrin as needed for body aches, fevers, headaches  Nasal saline spray  Steamy showers  Sleep with head of bed elevated  Drink plenty of fluids

## 2022-12-30 DIAGNOSIS — K51.011 ULCERATIVE PANCOLITIS WITH RECTAL BLEEDING (HCC): Primary | ICD-10-CM

## 2022-12-30 RX ORDER — LORAZEPAM 0.5 MG/1
TABLET ORAL
Qty: 30 TABLET | Refills: 0 | Status: SHIPPED | OUTPATIENT
Start: 2022-12-30

## 2023-01-07 DIAGNOSIS — E03.9 ACQUIRED HYPOTHYROIDISM: ICD-10-CM

## 2023-01-07 RX ORDER — LEVOTHYROXINE SODIUM 0.05 MG/1
TABLET ORAL
Qty: 30 TABLET | Refills: 0 | Status: SHIPPED | OUTPATIENT
Start: 2023-01-07

## 2023-01-12 ENCOUNTER — OFFICE VISIT (OUTPATIENT)
Dept: FAMILY MEDICINE CLINIC | Facility: CLINIC | Age: 58
End: 2023-01-12

## 2023-01-12 VITALS
HEART RATE: 66 BPM | DIASTOLIC BLOOD PRESSURE: 71 MMHG | HEIGHT: 63 IN | SYSTOLIC BLOOD PRESSURE: 116 MMHG | OXYGEN SATURATION: 100 % | WEIGHT: 155 LBS | TEMPERATURE: 96.9 F | BODY MASS INDEX: 27.46 KG/M2

## 2023-01-12 DIAGNOSIS — M79.671 RIGHT FOOT PAIN: Primary | ICD-10-CM

## 2023-01-12 DIAGNOSIS — J01.90 ACUTE NON-RECURRENT SINUSITIS, UNSPECIFIED LOCATION: ICD-10-CM

## 2023-01-12 RX ORDER — AMOXICILLIN 500 MG/1
CAPSULE ORAL
COMMUNITY
Start: 2022-12-29 | End: 2023-01-12

## 2023-01-12 RX ORDER — LEVOFLOXACIN 500 MG/1
500 TABLET, FILM COATED ORAL EVERY 24 HOURS
Qty: 7 TABLET | Refills: 0 | Status: SHIPPED | OUTPATIENT
Start: 2023-01-12 | End: 2023-01-19

## 2023-01-12 NOTE — PROGRESS NOTES
Name: Janet Justcie      : 1965      MRN: [id number]  Encounter Provider: YFN Begum  Encounter Date: 2023   Encounter department: [facility]    Assessment & Plan     1  Right foot pain  Assessment & Plan:  Check xray  Follow up with podiatry    Orders:  -     XR foot 3+ vw right; Future; Expected date: 2023    2  Acute non-recurrent sinusitis, unspecified location  Assessment & Plan:  levaquin daily  Increase fluids  Nasal saline spray  mucinex or robitussin to help with congestion    Orders:  -     levofloxacin (LEVAQUIN) 500 mg tablet; Take 1 tablet (500 mg total) by mouth every 24 hours for 7 days         Subjective      Finished antibiotic over the weekend, symptoms returned on middle of the night Wednesday with sore throat, dry cough  COVID test negative yesterday  Took nyquil  Review of Systems   Constitutional: Positive for chills, fatigue and fever  HENT: Positive for congestion, postnasal drip, rhinorrhea, sinus pressure, sinus pain and sore throat  Eyes: Negative  Respiratory: Positive for cough and shortness of breath (mild)  Cardiovascular: Negative  Gastrointestinal: Negative  Genitourinary: Negative  Musculoskeletal: Positive for myalgias  Skin: Negative  Neurological: Positive for headaches  Hematological: Negative          Current Outpatient Medications on File Prior to Visit   Medication Sig   • albuterol (ACCUNEB) 1 25 MG/3ML nebulizer solution Take 1 ampule by nebulization as needed for wheezing    • carisoprodol (SOMA) 350 mg tablet Take 1 tablet (350 mg total) by mouth 3 (three) times a day   • diclofenac (VOLTAREN) 75 mg EC tablet Take 1 tablet (75 mg total) by mouth 2 (two) times a day as needed (joint pain)   • famotidine (PEPCID) 20 mg tablet TAKE ONE TABLET BY MOUTH EVERY DAY AT BEDTIME   • fluconazole (DIFLUCAN) 200 mg tablet Take 1 tablet (200 mg total) by mouth once a week   • inFLIXimab (REMICADE) 100 mg Infuse into a venous catheter every 56 days   • ketoconazole (NIZORAL) 2 % cream Apply 3-4 times per day to affected nails for suppression   • latanoprost (XALATAN) 0 005 % ophthalmic solution    • levothyroxine 50 mcg tablet TAKE ONE TABLET BY MOUTH DAILY   • LORazepam (ATIVAN) 0 5 mg tablet TAKE ONE TABLET BY MOUTH EVERY DAY AS NEEDED FOR ANXIETY   • naloxone (NARCAN) 4 mg/0 1 mL nasal spray Administer 1 spray into a nostril  If no response after 2-3 minutes, give another dose in the other nostril using a new spray  • ondansetron (ZOFRAN-ODT) 4 mg disintegrating tablet Take 1 tablet (4 mg total) by mouth every 6 (six) hours as needed for nausea or vomiting (Patient taking differently: Take 4 mg by mouth as needed for nausea or vomiting)   • traMADol (ULTRAM) 50 mg tablet Take 1 tablet (50 mg total) by mouth as needed in the morning for moderate pain  • triamcinolone (KENALOG) 0 5 % cream Apply topically 3 (three) times a day (Patient taking differently: Apply topically as needed)   • vitamin C (VITAMIN C) 500 mg tablet Take 1 tablet (500 mg total) by mouth daily   • zolpidem (AMBIEN) 10 mg tablet TAKE ONE TABLET BY MOUTH EVERY DAY AT BEDTIME AS NEEDED FOR SLEEP       Objective     /71   Pulse 66   Temp (!) 96 9 °F (36 1 °C) (Tympanic)   Ht 5' 3" (1 6 m)   Wt 70 3 kg (155 lb)   SpO2 100%   BMI 27 46 kg/m²     Physical Exam  Vitals and nursing note reviewed  Constitutional:       General: She is not in acute distress  Appearance: Normal appearance  She is ill-appearing  HENT:      Right Ear: Tympanic membrane, ear canal and external ear normal       Left Ear: Tympanic membrane, ear canal and external ear normal  Tympanic membrane is not erythematous  Nose: Congestion and rhinorrhea present  Mouth/Throat:      Mouth: Mucous membranes are moist       Pharynx: Posterior oropharyngeal erythema present  Eyes:      General: No scleral icterus  Right eye: No discharge  Left eye: No discharge  Extraocular Movements: Extraocular movements intact  Conjunctiva/sclera: Conjunctivae normal       Pupils: Pupils are equal, round, and reactive to light  Cardiovascular:      Rate and Rhythm: Normal rate and regular rhythm  Heart sounds: Normal heart sounds  No murmur heard  Pulmonary:      Effort: Pulmonary effort is normal       Breath sounds: Normal breath sounds  No wheezing or rhonchi  Abdominal:      Palpations: Abdomen is soft  Tenderness: There is no abdominal tenderness  Musculoskeletal:         General: Tenderness (right foot) present  Cervical back: Neck supple  Right lower leg: No edema  Left lower leg: No edema  Lymphadenopathy:      Cervical: No cervical adenopathy  Skin:     General: Skin is warm  Findings: No rash  Neurological:      Mental Status: She is alert and oriented to person, place, and time     Psychiatric:         Mood and Affect: Mood normal          Behavior: Behavior normal        Domonique Daniel

## 2023-01-13 PROBLEM — M79.671 RIGHT FOOT PAIN: Status: ACTIVE | Noted: 2023-01-13

## 2023-01-13 PROBLEM — J01.90 ACUTE NON-RECURRENT SINUSITIS: Status: ACTIVE | Noted: 2023-01-13

## 2023-01-16 ENCOUNTER — APPOINTMENT (OUTPATIENT)
Dept: RADIOLOGY | Facility: CLINIC | Age: 58
End: 2023-01-16

## 2023-01-16 DIAGNOSIS — F51.04 CHRONIC INSOMNIA: ICD-10-CM

## 2023-01-16 DIAGNOSIS — M79.671 RIGHT FOOT PAIN: ICD-10-CM

## 2023-01-16 RX ORDER — ZOLPIDEM TARTRATE 10 MG/1
TABLET ORAL
Qty: 30 TABLET | Refills: 0 | Status: SHIPPED | OUTPATIENT
Start: 2023-01-16

## 2023-01-24 NOTE — PLAN OF CARE
Problem: PAIN - ADULT  Goal: Verbalizes/displays adequate comfort level or baseline comfort level  Description  Interventions:  - Encourage patient to monitor pain and request assistance  - Assess pain using appropriate pain scale  - Administer analgesics based on type and severity of pain and evaluate response  - Implement non-pharmacological measures as appropriate and evaluate response  - Consider cultural and social influences on pain and pain management  - Notify physician/advanced practitioner if interventions unsuccessful or patient reports new pain  Outcome: Progressing     Problem: INFECTION - ADULT  Goal: Absence or prevention of progression during hospitalization  Description  INTERVENTIONS:  - Assess and monitor for signs and symptoms of infection  - Monitor lab/diagnostic results  - Monitor all insertion sites, i e  indwelling lines, tubes, and drains  - Monitor endotracheal if appropriate and nasal secretions for changes in amount and color  - [place] appropriate cooling/warming therapies per order  - Administer medications as ordered  - Instruct and encourage patient and family to use good hand hygiene technique  - Identify and instruct in appropriate isolation precautions for identified infection/condition  Outcome: Progressing     Problem: SAFETY ADULT  Goal: Patient will remain free of falls  Description  INTERVENTIONS:  - Assess patient frequently for physical needs  -  Identify cognitive and physical deficits and behaviors that affect risk of falls    -  [place] fall precautions as indicated by assessment   - Educate patient/family on patient safety including physical limitations  - Instruct patient to call for assistance with activity based on assessment  - Modify environment to reduce risk of injury  - Consider OT/PT consult to assist with strengthening/mobility  Outcome: Progressing     Problem: DISCHARGE PLANNING  Goal: Discharge to home or other facility with appropriate resources  Description  INTERVENTIONS:  - Identify barriers to discharge w/patient and caregiver  - Arrange for needed discharge resources and transportation as appropriate  - Identify discharge learning needs (meds, wound care, etc )  - Arrange for interpretive services to assist at discharge as needed  - Refer to Case Management Department for coordinating discharge planning if the patient needs post-hospital services based on physician/advanced practitioner order or complex needs related to functional status, cognitive ability, or social support system  Outcome: Progressing     Problem: Knowledge Deficit  Goal: Patient/family/caregiver demonstrates understanding of disease process, treatment plan, medications, and discharge instructions  Description  Complete learning assessment and assess knowledge base    Interventions:  - Provide teaching at level of understanding  - Provide teaching via preferred learning methods  Outcome: Progressing     Problem: GASTROINTESTINAL - ADULT  Goal: Minimal or absence of nausea and/or vomiting  Description  INTERVENTIONS:  - Administer IV fluids if ordered to ensure adequate hydration  - Maintain NPO status until nausea and vomiting are resolved  - Nasogastric tube if ordered  - Administer ordered antiemetic medications as needed  - Provide nonpharmacologic comfort measures as appropriate  - Advance diet as tolerated, if ordered  - Consider nutrition services referral to assist patient with adequate nutrition and appropriate food choices  Outcome: Progressing  Goal: Maintains or returns to baseline bowel function  Description  INTERVENTIONS:  - Assess bowel function  - Encourage oral fluids to ensure adequate hydration  - Administer IV fluids if ordered to ensure adequate hydration  - Administer ordered medications as needed  - Encourage mobilization and activity  - Consider nutritional services referral to assist patient with adequate nutrition and appropriate food choices  Outcome: Progressing  Goal: Maintains adequate nutritional intake  Description  INTERVENTIONS:  - Monitor percentage of each meal consumed  - Identify factors contributing to decreased intake, treat as appropriate  - Assist with meals as needed  - Monitor I&O, weight, and lab values if indicated  - Obtain nutrition services referral as needed  Outcome: Progressing to pacu/1 pair

## 2023-01-30 DIAGNOSIS — F41.9 ANXIETY: ICD-10-CM

## 2023-01-31 RX ORDER — LORAZEPAM 0.5 MG/1
TABLET ORAL
Qty: 30 TABLET | Refills: 0 | Status: SHIPPED | OUTPATIENT
Start: 2023-01-31

## 2023-02-01 ENCOUNTER — OFFICE VISIT (OUTPATIENT)
Dept: PODIATRY | Facility: CLINIC | Age: 58
End: 2023-02-01

## 2023-02-01 VITALS
DIASTOLIC BLOOD PRESSURE: 75 MMHG | HEIGHT: 63 IN | SYSTOLIC BLOOD PRESSURE: 115 MMHG | WEIGHT: 155 LBS | HEART RATE: 79 BPM | BODY MASS INDEX: 27.46 KG/M2

## 2023-02-01 DIAGNOSIS — G57.61 LESION OF RIGHT PLANTAR NERVE: Primary | ICD-10-CM

## 2023-02-01 DIAGNOSIS — M79.671 PAIN IN RIGHT FOOT: ICD-10-CM

## 2023-02-01 DIAGNOSIS — M72.2 PLANTAR FASCIITIS: ICD-10-CM

## 2023-02-01 NOTE — PROGRESS NOTES
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  She also has plantar fasciitis  The diagnosis, treatment options and prognosis were discussed with the patient  4  Patient not amenable for injection at this time  Start her on CAM walker for partial immobilization  5  Recommended orthotics  Will call her insurance  6  Instructed supportive care, home exercise, icing, and proper footwear/ arch support  Discussed possible further images depending on the progress  7  Patient will return to have casting for orthotics  Subjective:     HPI  The patients presents with chief complaint of right foot pain  She has pain on the ball of right foot  She also feels click and walking on mere  She had it for 2-3 weeks  Pain presents with walking and standing  She saw her PCP and X-ray was taken  She does not recall any injury  Denied any swelling  No numbness or dysfunction  Increased right heel pain since her orthotics worn out  The following portions of the patient's history were reviewed and updated as appropriate: allergies, current medications, past family history, past medical history, past social history, past surgical history and problem list   All pertinent labs and images were reviewed      Past Medical History  Past Medical History:   Diagnosis Date   • Adjustment disorder     last assessed 05/16/12   • Anemia     HX of   • Asthma     mild - intermittent   • Bilateral leg edema    • Blepharitis last assessed 16   • Bulging lumbar disc    • Carpal tunnel syndrome     Unspecified laterality   • Colitis, acute    • Colon polyp    • Disease of thyroid gland    • Dyspareunia in female    • Edema     last assessed 06/22/15   • Ganglion    • Glaucoma    • Herniated cervical disc    • History of transfusion    • Hypokalemia    • Hypothyroidism    • Hypothyroidism    • Insomnia    • Lumps on the skin     last assessed 14   • Mouth ulcers     last assessed 06/22/15   • Nontraumatic tear of left tibialis posterior tendon     Traumatic teart    • Polyarthritis     last assessed 16   • Raynaud's disease    • Temporomandibular disorder     Joint   • Thyroid disease    • Ulcerative colitis ([facility] [address] )    • Ulcerative colitis ([facility] [address] )        Past Surgical History  Past Surgical History:   Procedure Laterality Date   • ANKLE SURGERY Left     Tendon repair   • CARPAL TUNNEL RELEASE Bilateral    •  SECTION, LOW TRANSVERSE     • COLONOSCOPY     • COLONOSCOPY N/A 2018    Procedure: COLONOSCOPY;  Surgeon: Kerry Martinez MD;  Location: [facility]; Service: Gastroenterology   • [facility] INJECTION RIGHT HIP (ARTHROGRAM)  9/15/2020   • HERNIA REPAIR      umbilical   • HYSTERECTOMY     • KNEE ARTHROSCOPY Right    • LIPECTOMY      Multipe lipoma removals   • LIPOMA RESECTION     • PARATHYROIDECTOMY     • [place] COLONOSCOPY FLX DX W/COLLJ SPEC WHEN PFRMD N/A 2016    Procedure: COLONOSCOPY;  Surgeon: Isaura Mera DO;  Location[facility]; Service: Gastroenterology   • [place] RPR FLEXOR TENDON LEG SECONDARY W/O GRAFT EACH Left 2016    Procedure: REPAIR OF LEFT POSTERIOR TIBIAL TENDON WITH GRAFT, EXPLORATION LEFT ANKLE ;  Surgeon: Bernardo Doran DPM;  Location: [facility] OR;  Service: Podiatry   • SHOULDER SURGERY Left     bicep tendon and labrum repair   • TONSILLECTOMY     • TOOTH EXTRACTION  2018        Allergies:  Patient has no known allergies      Medications:  Current Outpatient Medications   Medication Sig Dispense Refill   • albuterol (ACCUNEB) 1 25 MG/3ML nebulizer solution Take 1 ampule by nebulization as needed for wheezing      • carisoprodol (SOMA) 350 mg tablet Take 1 tablet (350 mg total) by mouth 3 (three) times a day 30 tablet 0   • diclofenac (VOLTAREN) 75 mg EC tablet Take 1 tablet (75 mg total) by mouth 2 (two) times a day as needed (joint pain) 60 tablet 2   • famotidine (PEPCID) 20 mg tablet TAKE ONE TABLET BY MOUTH EVERY DAY AT BEDTIME 90 tablet 0   • fluconazole (DIFLUCAN) 200 mg tablet Take 1 tablet (200 mg total) by mouth once a week 12 tablet 3   • inFLIXimab (REMICADE) 100 mg Infuse into a venous catheter every 56 days     • ketoconazole (NIZORAL) 2 % cream Apply 3-4 times per day to affected nails for suppression 15 g 2   • latanoprost (XALATAN) 0 005 % ophthalmic solution      • levothyroxine 50 mcg tablet TAKE ONE TABLET BY MOUTH DAILY 30 tablet 0   • LORazepam (ATIVAN) 0 5 mg tablet TAKE ONE TABLET BY MOUTH EVERY DAY AS NEEDED FOR ANXIETY 30 tablet 0   • naloxone (NARCAN) 4 mg/0 1 mL nasal spray Administer 1 spray into a nostril  If no response after 2-3 minutes, give another dose in the other nostril using a new spray  1 each 1   • ondansetron (ZOFRAN-ODT) 4 mg disintegrating tablet Take 1 tablet (4 mg total) by mouth every 6 (six) hours as needed for nausea or vomiting (Patient taking differently: Take 4 mg by mouth as needed for nausea or vomiting) 30 tablet 1   • traMADol (ULTRAM) 50 mg tablet Take 1 tablet (50 mg total) by mouth as needed in the morning for moderate pain   30 tablet 1   • triamcinolone (KENALOG) 0 5 % cream Apply topically 3 (three) times a day (Patient taking differently: Apply topically as needed) 30 g 0   • vitamin C (VITAMIN C) 500 mg tablet Take 1 tablet (500 mg total) by mouth daily 30 tablet    • zolpidem (AMBIEN) 10 mg tablet TAKE ONE TABLET BY MOUTH EVERY DAY AT BEDTIME AS NEEDED FOR SLEEP 30 tablet 0     No current facility-administered medications for this visit  Social History:  Social History     Socioeconomic History   • Marital status: /Civil Union     Spouse name: None   • Number of children: None   • Years of education: None   • Highest education level: None   Occupational History   • Occupation: RN at [employer]   Tobacco Use   • Smoking status: Former     Types: Cigarettes     Quit date: 2003     Years since quittin 8   • Smokeless tobacco: Never   • Tobacco comments:     Quit , rare use for 2 years   Vaping Use   • Vaping Use: Never used   Substance and Sexual Activity   • Alcohol use: Yes     Comment: social 1 drink per weeek   • Drug use: No   • Sexual activity: Yes     Partners: Male   Other Topics Concern   • None   Social History Narrative   • None     Social Determinants of Health     Financial Resource Strain: Not on file   Food Insecurity: Not on file   Transportation Needs: Not on file   Physical Activity: Not on file   Stress: Not on file   Social Connections: Not on file   Intimate Partner Violence: Not on file   Housing Stability: Not on file        Review of Systems   Constitutional: Negative for chills and fever  Respiratory: Negative for shortness of breath  Cardiovascular: Negative for chest pain and leg swelling  Gastrointestinal: Negative for nausea and vomiting  Skin: Negative for color change  Neurological: Negative for weakness and numbness  Objective:      /75 (BP Location: Left arm, Patient Position: Sitting, Cuff Size: Standard)   Pulse 79   Ht 5' 3" (1 6 m)   Wt 70 3 kg (155 lb)   BMI 27 46 kg/m²          Physical Exam  Vitals reviewed  Constitutional:       General: She is not in acute distress  Appearance: Normal appearance  She is well-developed  She is not ill-appearing  Cardiovascular:      Rate and Rhythm: Normal rate and regular rhythm  Pulses: Normal pulses  Dorsalis pedis pulses are 2+ on the right side and 2+ on the left side          Posterior tibial pulses are 2+ on the right side and 2+ on the left side  Comments: No ischemia  CRT WNL  Pulmonary:      Effort: Pulmonary effort is normal  No respiratory distress  Musculoskeletal:         General: Tenderness present  No swelling or signs of injury  Right lower leg: No edema  Left lower leg: No edema  Right foot: Normal range of motion  No foot drop  Left foot: Normal range of motion  No foot drop  Comments: Flexible pes planus noted bilaterally  Tenderness in arch / heel right foot  Focal pain right 3rd interspace  Pain presents with lateral compression right foot  Skin:     General: Skin is warm  Capillary Refill: Capillary refill takes less than 2 seconds  Coloration: Skin is not cyanotic  Findings: No ecchymosis, erythema, petechiae, rash or wound  Rash is not purpuric  Nails: There is no clubbing  Neurological:      General: No focal deficit present  Mental Status: She is alert and oriented to person, place, and time  Cranial Nerves: No cranial nerve deficit  Motor: No weakness or abnormal muscle tone  Gait: Gait normal    Psychiatric:         Mood and Affect: Mood normal          Behavior: Behavior normal          Thought Content:  Thought content normal          Judgment: Judgment normal

## 2023-02-03 ENCOUNTER — OFFICE VISIT (OUTPATIENT)
Dept: FAMILY MEDICINE CLINIC | Facility: CLINIC | Age: 58
End: 2023-02-03

## 2023-02-03 VITALS
DIASTOLIC BLOOD PRESSURE: 76 MMHG | BODY MASS INDEX: 28.35 KG/M2 | HEIGHT: 63 IN | TEMPERATURE: 96.7 F | OXYGEN SATURATION: 98 % | WEIGHT: 160 LBS | SYSTOLIC BLOOD PRESSURE: 128 MMHG | HEART RATE: 72 BPM

## 2023-02-03 DIAGNOSIS — L40.50 PSORIATIC ARTHRITIS (HCC): ICD-10-CM

## 2023-02-03 DIAGNOSIS — E03.9 HYPOTHYROIDISM, UNSPECIFIED TYPE: ICD-10-CM

## 2023-02-03 DIAGNOSIS — K51.011 ULCERATIVE PANCOLITIS WITH RECTAL BLEEDING (HCC): ICD-10-CM

## 2023-02-03 DIAGNOSIS — M79.671 RIGHT FOOT PAIN: ICD-10-CM

## 2023-02-03 DIAGNOSIS — Z13.1 SCREENING FOR DIABETES MELLITUS: ICD-10-CM

## 2023-02-03 DIAGNOSIS — E21.3 HYPERPARATHYROIDISM (HCC): ICD-10-CM

## 2023-02-03 DIAGNOSIS — F32.1 MODERATE MAJOR DEPRESSION (HCC): Primary | ICD-10-CM

## 2023-02-03 DIAGNOSIS — F51.01 PERSISTENT INSOMNIA OF NON-ORGANIC ORIGIN: ICD-10-CM

## 2023-02-03 DIAGNOSIS — Z13.0 SCREENING, ANEMIA, DEFICIENCY, IRON: ICD-10-CM

## 2023-02-03 DIAGNOSIS — F41.9 ANXIETY: ICD-10-CM

## 2023-02-03 DIAGNOSIS — Z13.220 SCREENING, LIPID: ICD-10-CM

## 2023-02-03 RX ORDER — VENLAFAXINE HYDROCHLORIDE 37.5 MG/1
37.5 CAPSULE, EXTENDED RELEASE ORAL
Qty: 90 CAPSULE | Refills: 1 | Status: SHIPPED | OUTPATIENT
Start: 2023-02-03

## 2023-02-03 NOTE — PATIENT INSTRUCTIONS
Start venlafaxine  Put  referral in for  to get assistance  Recommend speaking with therapist  Check labs  Continue medication regimen  Depression   AMBULATORY CARE:   Depression  is a medical condition that causes feelings of sadness or hopelessness that do not go away  Depression may cause you to lose interest in things you used to enjoy  These feelings may interfere with your daily life  Common symptoms include the following:   Appetite changes, or weight gain or loss    Trouble going to sleep or staying asleep, or sleeping too much    Fatigue or lack of energy    Feeling restless, irritable, or withdrawn    Feeling worthless, hopeless, discouraged, or guilty    Trouble concentrating, remembering things, doing daily tasks, or making decisions    Thoughts about hurting or killing yourself    Call your local emergency number (911 in the [address] [place] [address],[address]) if:   You think about harming yourself or someone else  You have done something on purpose to hurt yourself  Call your therapist or doctor if:   Your symptoms do not improve  You cannot make it to your next appointment  You have new symptoms  You have questions or concerns about your condition or care  The following resources are available at any time to help you, if needed:   [address] [place] [address]: [phone] (5-340-920-XDPQ)     Suicide Hotline: 9-500.304.9379 (3-757-XNRZPZG)     For a list of international numbers: https://save org/find-help/international-resources/    Treatment for depression  may include medicine to relieve depression  Medicine is often used together with therapy  Therapy is a way for you to talk about your feelings and anything that may be causing depression  Therapy can be done alone or in a group  It may also be done with family members or a significant other  Self-care:   Get regular physical activity  Try to be active for 30 minutes, 3 to 5 days a week   Physical activity can help relieve depression  Work with your healthcare provider to develop a plan that you enjoy  It may help to ask someone to be active with you  Create a regular sleep schedule  A routine can help you relax before bed  Listen to music, read, or do yoga  Try to go to bed and wake up at the same time every day  Sleep is important for emotional health  Eat a variety of healthy foods  Healthy foods include fruits, vegetables, whole-grain breads, low-fat dairy products, lean meats, fish, and cooked beans  A healthy meal plan is low in fat, salt, and added sugar  Do not drink alcohol or use drugs  Alcohol and drugs can make depression worse  Talk to your therapist or doctor if you need help quitting  Follow up with your healthcare provider as directed: Your healthcare provider will monitor your progress at follow-up visits  He or she will also monitor your medicine if you take antidepressants  Your healthcare provider will ask if the medicine is helping  Tell him or her about any side effects or problems you may have with your medicine  The type or amount of medicine may need to be changed  Write down your questions so you remember to ask them during your visits  © Copyright [employer] 2022 Information is for End User's use only and may not be sold, redistributed or otherwise used for commercial purposes  All illustrations and images included in CareNotes® are the copyrighted property of [employer]  or [facility] Delia Cooper   The above information is an  only  It is not intended as medical advice for individual conditions or treatments  Talk to your doctor, nurse or pharmacist before following any medical regimen to see if it is safe and effective for you
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
## 2023-02-04 DIAGNOSIS — K21.9 GASTROESOPHAGEAL REFLUX DISEASE WITHOUT ESOPHAGITIS: ICD-10-CM

## 2023-02-04 RX ORDER — FAMOTIDINE 20 MG/1
TABLET, FILM COATED ORAL
Qty: 90 TABLET | Refills: 0 | Status: SHIPPED | OUTPATIENT
Start: 2023-02-04

## 2023-02-06 ENCOUNTER — HOSPITAL ENCOUNTER (OUTPATIENT)
Dept: INFUSION CENTER | Facility: HOSPITAL | Age: 58
Discharge: HOME | End: 2023-02-06

## 2023-02-06 ENCOUNTER — TELEPHONE (OUTPATIENT)
Dept: FAMILY MEDICINE CLINIC | Facility: CLINIC | Age: 58
End: 2023-02-06

## 2023-02-06 VITALS
DIASTOLIC BLOOD PRESSURE: 78 MMHG | RESPIRATION RATE: 16 BRPM | HEART RATE: 73 BPM | BODY MASS INDEX: 28.34 KG/M2 | TEMPERATURE: 98.3 F | SYSTOLIC BLOOD PRESSURE: 142 MMHG | WEIGHT: 160 LBS

## 2023-02-06 DIAGNOSIS — E03.9 HYPOTHYROIDISM, UNSPECIFIED TYPE: ICD-10-CM

## 2023-02-06 DIAGNOSIS — K51.011 ULCERATIVE PANCOLITIS WITH RECTAL BLEEDING (HCC): Primary | ICD-10-CM

## 2023-02-06 LAB — TSH SERPL DL<=0.05 MIU/L-ACNC: 0.84 UIU/ML (ref 0.45–4.5)

## 2023-02-06 RX ORDER — METHYLPREDNISOLONE SODIUM SUCCINATE 40 MG/ML
40 INJECTION, POWDER, LYOPHILIZED, FOR SOLUTION INTRAMUSCULAR; INTRAVENOUS ONCE
Status: DISCONTINUED | OUTPATIENT
Start: 2023-02-06 | End: 2023-02-09 | Stop reason: HOSPADM

## 2023-02-06 RX ORDER — DIPHENHYDRAMINE HCL 25 MG
25 TABLET ORAL ONCE
Status: DISCONTINUED | OUTPATIENT
Start: 2023-02-06 | End: 2023-02-09 | Stop reason: HOSPADM

## 2023-02-06 RX ORDER — ACETAMINOPHEN 325 MG/1
650 TABLET ORAL ONCE
Status: DISCONTINUED | OUTPATIENT
Start: 2023-02-06 | End: 2023-02-09 | Stop reason: HOSPADM

## 2023-02-06 RX ORDER — SODIUM CHLORIDE 9 MG/ML
20 INJECTION, SOLUTION INTRAVENOUS ONCE
OUTPATIENT
Start: 2023-04-03

## 2023-02-06 RX ORDER — DIPHENHYDRAMINE HCL 25 MG
25 TABLET ORAL ONCE
OUTPATIENT
Start: 2023-04-03

## 2023-02-06 RX ORDER — SODIUM CHLORIDE 9 MG/ML
20 INJECTION, SOLUTION INTRAVENOUS ONCE
Status: COMPLETED | OUTPATIENT
Start: 2023-02-06 | End: 2023-02-06

## 2023-02-06 RX ORDER — METHYLPREDNISOLONE SODIUM SUCCINATE 40 MG/ML
40 INJECTION, POWDER, LYOPHILIZED, FOR SOLUTION INTRAMUSCULAR; INTRAVENOUS ONCE
OUTPATIENT
Start: 2023-04-03

## 2023-02-06 RX ORDER — ACETAMINOPHEN 325 MG/1
650 TABLET ORAL ONCE
OUTPATIENT
Start: 2023-04-03

## 2023-02-06 RX ADMIN — INFLIXIMAB 527 MG: 100 INJECTION, POWDER, LYOPHILIZED, FOR SOLUTION INTRAVENOUS at 10:42

## 2023-02-06 RX ADMIN — SODIUM CHLORIDE 20 ML/HR: 9 INJECTION, SOLUTION INTRAVENOUS at 10:42

## 2023-02-06 NOTE — PROGRESS NOTES
Pt tolerated remicade without incident  Next appt scheduled, AVS declined  Pt ambulatory off unit in stable condition

## 2023-02-06 NOTE — TELEPHONE ENCOUNTER
LABS ONLY-    Pt called in because she had a missed call  Pt advised that Dr Kirk Blunt requested completion of TSH so that she can order thyroid medication 90 supply  Pt stated that she will complete other labs in April as she is on the employee plan  Pt aware

## 2023-02-09 ENCOUNTER — TELEPHONE (OUTPATIENT)
Dept: PODIATRY | Facility: CLINIC | Age: 58
End: 2023-02-09

## 2023-02-09 PROBLEM — H66.002 NON-RECURRENT ACUTE SUPPURATIVE OTITIS MEDIA OF LEFT EAR WITHOUT SPONTANEOUS RUPTURE OF TYMPANIC MEMBRANE: Status: RESOLVED | Noted: 2022-12-29 | Resolved: 2023-02-09

## 2023-02-09 PROBLEM — J01.90 ACUTE NON-RECURRENT SINUSITIS: Status: RESOLVED | Noted: 2023-01-13 | Resolved: 2023-02-09

## 2023-02-09 PROBLEM — F32.1 MODERATE MAJOR DEPRESSION (HCC): Status: ACTIVE | Noted: 2023-02-09

## 2023-02-09 NOTE — TELEPHONE ENCOUNTER
Caller: Polo Irving    Doctor: Phillip Bermeo DPM    Reason for call: Win Pickens is scheduled for 2/15/23 to come in and discuss orthotics  She called back today and said she definitely wants them  She has the hospital insurance and was told by [facility] that she does have the coverage  She wants them either way  I will call her and cancel 2/15/23 and she will need to be called back to schedule in the [facility] after the insurance has been checked      Call back#: [phone]

## 2023-02-10 ENCOUNTER — TELEPHONE (OUTPATIENT)
Dept: PODIATRY | Facility: CLINIC | Age: 58
End: 2023-02-10

## 2023-02-10 NOTE — TELEPHONE ENCOUNTER
Caller: Marisela Parson    Doctor/Office: Dr Ricarda Mcdonald    #: [phone]    Escalation: Appointment/asking for call back about Orthotics/wants to be casted 2/15 as boot is not helping/making her worse/she called insurance/said covered/explained there is a process/she will pay if not covered  Please call her back to discuss/advise   Thanks

## 2023-02-22 ENCOUNTER — APPOINTMENT (OUTPATIENT)
Dept: LAB | Facility: HOSPITAL | Age: 58
End: 2023-02-22

## 2023-02-22 DIAGNOSIS — Z13.220 SCREENING, LIPID: ICD-10-CM

## 2023-02-22 DIAGNOSIS — E21.3 HYPERPARATHYROIDISM (HCC): ICD-10-CM

## 2023-02-22 DIAGNOSIS — Z13.1 SCREENING FOR DIABETES MELLITUS: ICD-10-CM

## 2023-02-22 LAB
25(OH)D3 SERPL-MCNC: 35.5 NG/ML (ref 30–100)
ALBUMIN SERPL BCP-MCNC: 4 G/DL (ref 3.5–5)
ALP SERPL-CCNC: 53 U/L (ref 46–116)
ALT SERPL W P-5'-P-CCNC: 49 U/L (ref 12–78)
ANION GAP SERPL CALCULATED.3IONS-SCNC: 5 MMOL/L (ref 4–13)
AST SERPL W P-5'-P-CCNC: 32 U/L (ref 5–45)
BASOPHILS # BLD AUTO: 0.06 THOUSANDS/ÂΜL (ref 0–0.1)
BASOPHILS NFR BLD AUTO: 1 % (ref 0–1)
BILIRUB SERPL-MCNC: 0.38 MG/DL (ref 0.2–1)
BUN SERPL-MCNC: 13 MG/DL (ref 5–25)
CALCIUM SERPL-MCNC: 9 MG/DL (ref 8.3–10.1)
CHLORIDE SERPL-SCNC: 108 MMOL/L (ref 96–108)
CHOLEST SERPL-MCNC: 201 MG/DL
CO2 SERPL-SCNC: 27 MMOL/L (ref 21–32)
CREAT SERPL-MCNC: 0.72 MG/DL (ref 0.6–1.3)
EOSINOPHIL # BLD AUTO: 0.07 THOUSAND/ÂΜL (ref 0–0.61)
EOSINOPHIL NFR BLD AUTO: 2 % (ref 0–6)
ERYTHROCYTE [DISTWIDTH] IN BLOOD BY AUTOMATED COUNT: 12.5 % (ref 11.6–15.1)
FERRITIN SERPL-MCNC: 69 NG/ML (ref 8–388)
GFR SERPL CREATININE-BSD FRML MDRD: 93 ML/MIN/1.73SQ M
GLUCOSE P FAST SERPL-MCNC: 80 MG/DL (ref 65–99)
HCT VFR BLD AUTO: 41.3 % (ref 34.8–46.1)
HDLC SERPL-MCNC: 112 MG/DL
HGB BLD-MCNC: 13.4 G/DL (ref 11.5–15.4)
IMM GRANULOCYTES # BLD AUTO: 0.01 THOUSAND/UL (ref 0–0.2)
IMM GRANULOCYTES NFR BLD AUTO: 0 % (ref 0–2)
IRON SATN MFR SERPL: 22 % (ref 15–50)
IRON SERPL-MCNC: 68 UG/DL (ref 50–170)
LDLC SERPL CALC-MCNC: 81 MG/DL (ref 0–100)
LYMPHOCYTES # BLD AUTO: 2.62 THOUSANDS/ÂΜL (ref 0.6–4.47)
LYMPHOCYTES NFR BLD AUTO: 57 % (ref 14–44)
MCH RBC QN AUTO: 30.9 PG (ref 26.8–34.3)
MCHC RBC AUTO-ENTMCNC: 32.4 G/DL (ref 31.4–37.4)
MCV RBC AUTO: 95 FL (ref 82–98)
MONOCYTES # BLD AUTO: 0.4 THOUSAND/ÂΜL (ref 0.17–1.22)
MONOCYTES NFR BLD AUTO: 9 % (ref 4–12)
NEUTROPHILS # BLD AUTO: 1.44 THOUSANDS/ÂΜL (ref 1.85–7.62)
NEUTS SEG NFR BLD AUTO: 31 % (ref 43–75)
NONHDLC SERPL-MCNC: 89 MG/DL
NRBC BLD AUTO-RTO: 0 /100 WBCS
PHOSPHATE SERPL-MCNC: 2.8 MG/DL (ref 2.7–4.5)
PLATELET # BLD AUTO: 328 THOUSANDS/UL (ref 149–390)
PMV BLD AUTO: 10.4 FL (ref 8.9–12.7)
POTASSIUM SERPL-SCNC: 3.5 MMOL/L (ref 3.5–5.3)
PROT SERPL-MCNC: 7.4 G/DL (ref 6.4–8.4)
PTH-INTACT SERPL-MCNC: 33.8 PG/ML (ref 18.4–80.1)
RBC # BLD AUTO: 4.34 MILLION/UL (ref 3.81–5.12)
SODIUM SERPL-SCNC: 140 MMOL/L (ref 135–147)
TIBC SERPL-MCNC: 311 UG/DL (ref 250–450)
TRIGL SERPL-MCNC: 39 MG/DL
WBC # BLD AUTO: 4.6 THOUSAND/UL (ref 4.31–10.16)

## 2023-02-23 LAB
EST. AVERAGE GLUCOSE BLD GHB EST-MCNC: 100 MG/DL
HBA1C MFR BLD: 5.1 %

## 2023-03-01 ENCOUNTER — PROCEDURE VISIT (OUTPATIENT)
Dept: PODIATRY | Facility: CLINIC | Age: 58
End: 2023-03-01

## 2023-03-01 VITALS
DIASTOLIC BLOOD PRESSURE: 89 MMHG | HEART RATE: 66 BPM | SYSTOLIC BLOOD PRESSURE: 135 MMHG | BODY MASS INDEX: 28.35 KG/M2 | HEIGHT: 63 IN | WEIGHT: 160 LBS

## 2023-03-01 DIAGNOSIS — G57.61 LESION OF RIGHT PLANTAR NERVE: Primary | ICD-10-CM

## 2023-03-01 DIAGNOSIS — M72.2 PLANTAR FASCIITIS: ICD-10-CM

## 2023-03-01 NOTE — PROGRESS NOTES
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

## 2023-03-04 DIAGNOSIS — F41.9 ANXIETY: ICD-10-CM

## 2023-03-04 DIAGNOSIS — E03.9 ACQUIRED HYPOTHYROIDISM: ICD-10-CM

## 2023-03-04 DIAGNOSIS — F51.04 CHRONIC INSOMNIA: ICD-10-CM

## 2023-03-04 RX ORDER — LEVOTHYROXINE SODIUM 0.05 MG/1
TABLET ORAL
Qty: 90 TABLET | Refills: 0 | Status: SHIPPED | OUTPATIENT
Start: 2023-03-04

## 2023-03-04 RX ORDER — LORAZEPAM 0.5 MG/1
TABLET ORAL
Qty: 30 TABLET | Refills: 0 | Status: SHIPPED | OUTPATIENT
Start: 2023-03-04

## 2023-03-04 RX ORDER — ZOLPIDEM TARTRATE 10 MG/1
TABLET ORAL
Qty: 30 TABLET | Refills: 0 | Status: SHIPPED | OUTPATIENT
Start: 2023-03-04

## 2023-03-10 ENCOUNTER — NEW PATIENT COMPREHENSIVE (OUTPATIENT)
Dept: URBAN - METROPOLITAN AREA CLINIC 6 | Facility: CLINIC | Age: 58
End: 2023-03-10

## 2023-03-10 DIAGNOSIS — H40.023: ICD-10-CM

## 2023-03-10 DIAGNOSIS — H02.831: ICD-10-CM

## 2023-03-10 DIAGNOSIS — H25.813: ICD-10-CM

## 2023-03-10 DIAGNOSIS — H02.834: ICD-10-CM

## 2023-03-10 PROCEDURE — 92083 EXTENDED VISUAL FIELD XM: CPT

## 2023-03-10 PROCEDURE — 99204 OFFICE O/P NEW MOD 45 MIN: CPT

## 2023-03-10 ASSESSMENT — VISUAL ACUITY
OD_PH: 20/60-1
OD_CC: 20/100+1
OU_CC: J1
OS_CC: 20/30-2

## 2023-03-10 ASSESSMENT — TONOMETRY
OS_IOP_MMHG: 22
OD_IOP_MMHG: 21
OS_IOP_MMHG: 23
OD_IOP_MMHG: 22

## 2023-04-03 ENCOUNTER — OFFICE VISIT (OUTPATIENT)
Dept: PODIATRY | Facility: CLINIC | Age: 58
End: 2023-04-03

## 2023-04-03 ENCOUNTER — HOSPITAL ENCOUNTER (OUTPATIENT)
Dept: INFUSION CENTER | Facility: HOSPITAL | Age: 58
Discharge: HOME | End: 2023-04-03

## 2023-04-03 VITALS
SYSTOLIC BLOOD PRESSURE: 136 MMHG | TEMPERATURE: 97 F | WEIGHT: 159.6 LBS | DIASTOLIC BLOOD PRESSURE: 75 MMHG | HEART RATE: 75 BPM | BODY MASS INDEX: 28.28 KG/M2 | RESPIRATION RATE: 16 BRPM | HEIGHT: 63 IN | OXYGEN SATURATION: 100 %

## 2023-04-03 DIAGNOSIS — G57.61 LESION OF RIGHT PLANTAR NERVE: Primary | ICD-10-CM

## 2023-04-03 DIAGNOSIS — M72.2 PLANTAR FASCIITIS: ICD-10-CM

## 2023-04-03 DIAGNOSIS — K51.011 ULCERATIVE PANCOLITIS WITH RECTAL BLEEDING (HCC): Primary | ICD-10-CM

## 2023-04-03 RX ORDER — DIPHENHYDRAMINE HCL 25 MG
25 TABLET ORAL ONCE
OUTPATIENT
Start: 2023-05-29

## 2023-04-03 RX ORDER — SODIUM CHLORIDE 9 MG/ML
20 INJECTION, SOLUTION INTRAVENOUS ONCE
OUTPATIENT
Start: 2023-05-29

## 2023-04-03 RX ORDER — METHYLPREDNISOLONE SODIUM SUCCINATE 40 MG/ML
40 INJECTION, POWDER, LYOPHILIZED, FOR SOLUTION INTRAMUSCULAR; INTRAVENOUS ONCE
Status: DISCONTINUED | OUTPATIENT
Start: 2023-04-03 | End: 2023-04-06 | Stop reason: HOSPADM

## 2023-04-03 RX ORDER — METHYLPREDNISOLONE SODIUM SUCCINATE 40 MG/ML
40 INJECTION, POWDER, LYOPHILIZED, FOR SOLUTION INTRAMUSCULAR; INTRAVENOUS ONCE
OUTPATIENT
Start: 2023-05-29

## 2023-04-03 RX ORDER — DIPHENHYDRAMINE HCL 25 MG
25 TABLET ORAL ONCE
Status: DISCONTINUED | OUTPATIENT
Start: 2023-04-03 | End: 2023-04-06 | Stop reason: HOSPADM

## 2023-04-03 RX ORDER — SODIUM CHLORIDE 9 MG/ML
20 INJECTION, SOLUTION INTRAVENOUS ONCE
Status: COMPLETED | OUTPATIENT
Start: 2023-04-03 | End: 2023-04-03

## 2023-04-03 RX ORDER — ACETAMINOPHEN 325 MG/1
650 TABLET ORAL ONCE
Status: DISCONTINUED | OUTPATIENT
Start: 2023-04-03 | End: 2023-04-06 | Stop reason: HOSPADM

## 2023-04-03 RX ORDER — ACETAMINOPHEN 325 MG/1
650 TABLET ORAL ONCE
OUTPATIENT
Start: 2023-05-29

## 2023-04-03 RX ADMIN — INFLIXIMAB 545 MG: 100 INJECTION, POWDER, LYOPHILIZED, FOR SOLUTION INTRAVENOUS at 10:04

## 2023-04-03 RX ADMIN — SODIUM CHLORIDE 20 ML/HR: 9 INJECTION, SOLUTION INTRAVENOUS at 09:56

## 2023-04-13 ENCOUNTER — FOLLOW UP (OUTPATIENT)
Dept: URBAN - METROPOLITAN AREA CLINIC 6 | Facility: CLINIC | Age: 58
End: 2023-04-13

## 2023-04-13 DIAGNOSIS — H02.834: ICD-10-CM

## 2023-04-13 DIAGNOSIS — H40.023: ICD-10-CM

## 2023-04-13 DIAGNOSIS — H25.813: ICD-10-CM

## 2023-04-13 DIAGNOSIS — H02.831: ICD-10-CM

## 2023-04-13 PROCEDURE — 92012 INTRM OPH EXAM EST PATIENT: CPT

## 2023-04-13 ASSESSMENT — VISUAL ACUITY
OD_GLARE: <20/400
OS_CC: 20/30+1
OD_CC: 20/60
OS_GLARE: 20/400

## 2023-04-13 ASSESSMENT — TONOMETRY
OD_IOP_MMHG: 30
OS_IOP_MMHG: 22
OS_IOP_MMHG: 25
OD_IOP_MMHG: 22

## 2023-04-22 DIAGNOSIS — F51.04 CHRONIC INSOMNIA: ICD-10-CM

## 2023-04-23 RX ORDER — ZOLPIDEM TARTRATE 10 MG/1
TABLET ORAL
Qty: 30 TABLET | Refills: 0 | Status: SHIPPED | OUTPATIENT
Start: 2023-04-23

## 2023-04-27 ENCOUNTER — IOP CHECK (OUTPATIENT)
Dept: URBAN - METROPOLITAN AREA CLINIC 6 | Facility: CLINIC | Age: 58
End: 2023-04-27

## 2023-04-27 ENCOUNTER — HOSPITAL ENCOUNTER (OUTPATIENT)
Dept: MAMMOGRAPHY | Facility: CLINIC | Age: 58
Discharge: HOME | End: 2023-04-27

## 2023-04-27 DIAGNOSIS — H02.834: ICD-10-CM

## 2023-04-27 DIAGNOSIS — H25.813: ICD-10-CM

## 2023-04-27 DIAGNOSIS — H40.023: ICD-10-CM

## 2023-04-27 DIAGNOSIS — H02.831: ICD-10-CM

## 2023-04-27 PROCEDURE — 92012 INTRM OPH EXAM EST PATIENT: CPT

## 2023-04-27 ASSESSMENT — TONOMETRY
OD_IOP_MMHG: 23
OD_IOP_MMHG: 16
OS_IOP_MMHG: 16
OS_IOP_MMHG: 19

## 2023-04-27 ASSESSMENT — VISUAL ACUITY
OU_CC: J1-1
OD_CC: 20/30-2
OS_CC: 20/25

## 2023-05-03 ENCOUNTER — HOSPITAL ENCOUNTER (OUTPATIENT)
Dept: ULTRASOUND IMAGING | Facility: CLINIC | Age: 58
Discharge: HOME | End: 2023-05-03

## 2023-05-03 ENCOUNTER — HOSPITAL ENCOUNTER (OUTPATIENT)
Dept: MAMMOGRAPHY | Facility: CLINIC | Age: 58
Discharge: HOME | End: 2023-05-03

## 2023-05-03 VITALS — WEIGHT: 159 LBS | BODY MASS INDEX: 28.17 KG/M2 | HEIGHT: 63 IN

## 2023-05-03 DIAGNOSIS — R92.8 ABNORMAL MAMMOGRAM OF LEFT BREAST: ICD-10-CM

## 2023-05-08 ENCOUNTER — TELEPHONE (OUTPATIENT)
Dept: FAMILY MEDICINE CLINIC | Facility: CLINIC | Age: 58
End: 2023-05-08

## 2023-05-08 NOTE — TELEPHONE ENCOUNTER
----- Message from Marleny Walton DO sent at 5/5/2023  5:15 PM EDT -----  The follow up test on the left breast looks ok  Continue to follow up in 6 months for any changes- diagnostic mammogram and ultrasound

## 2023-05-13 DIAGNOSIS — F51.04 CHRONIC INSOMNIA: ICD-10-CM

## 2023-05-13 DIAGNOSIS — K21.9 GASTROESOPHAGEAL REFLUX DISEASE WITHOUT ESOPHAGITIS: ICD-10-CM

## 2023-05-13 RX ORDER — FAMOTIDINE 20 MG/1
TABLET, FILM COATED ORAL
Qty: 90 TABLET | Refills: 0 | Status: SHIPPED | OUTPATIENT
Start: 2023-05-13

## 2023-05-14 RX ORDER — ZOLPIDEM TARTRATE 10 MG/1
TABLET ORAL
Qty: 30 TABLET | Refills: 0 | Status: SHIPPED | OUTPATIENT
Start: 2023-05-14

## 2023-05-23 ENCOUNTER — TELEPHONE (OUTPATIENT)
Dept: GASTROENTEROLOGY | Facility: CLINIC | Age: 58
End: 2023-05-23

## 2023-05-23 ENCOUNTER — PRE-OP CATARACT MEASUREMENTS (OUTPATIENT)
Dept: URBAN - METROPOLITAN AREA CLINIC 6 | Facility: CLINIC | Age: 58
End: 2023-05-23

## 2023-05-23 DIAGNOSIS — H25.813: ICD-10-CM

## 2023-05-23 DIAGNOSIS — H40.1131: ICD-10-CM

## 2023-05-23 PROCEDURE — 92014 COMPRE OPH EXAM EST PT 1/>: CPT

## 2023-05-23 PROCEDURE — 92020 GONIOSCOPY: CPT

## 2023-05-23 PROCEDURE — 76519 ECHO EXAM OF EYE: CPT

## 2023-05-23 ASSESSMENT — KERATOMETRY
OD_K1POWER_DIOPTERS: 43.75
OD_K2POWER_DIOPTERS: 45.25
OS_K1POWER_DIOPTERS: 44.25
OD_AXISANGLE_DEGREES: 136
OS_AXISANGLE2_DEGREES: 138
OD_AXISANGLE2_DEGREES: 46
OS_K2POWER_DIOPTERS: 45.25
OS_AXISANGLE_DEGREES: 48

## 2023-05-23 ASSESSMENT — VISUAL ACUITY
OS_GLARE: 20/70
OD_CC: 20/30
OS_CC: 20/30
OD_GLARE: 20/400

## 2023-05-23 ASSESSMENT — TONOMETRY
OS_IOP_MMHG: 18
OD_IOP_MMHG: 17

## 2023-05-23 NOTE — TELEPHONE ENCOUNTER
----- Message from Mynor Bnods sent at 5/23/2023 10:29 AM EDT -----    ----- Message -----  From: Kellie Holley DO  Sent: 5/23/2023  10:24 AM EDT  To: , #    Please schedule patient for outpatient follow-up visit with me or PA, no rush as it is just routine,  She will also need her Remicade renewed after her annual visit

## 2023-05-31 ENCOUNTER — HOSPITAL ENCOUNTER (OUTPATIENT)
Dept: INFUSION CENTER | Facility: HOSPITAL | Age: 58
Discharge: HOME | End: 2023-05-31
Payer: COMMERCIAL

## 2023-05-31 ENCOUNTER — APPOINTMENT (OUTPATIENT)
Dept: RADIOLOGY | Facility: CLINIC | Age: 58
End: 2023-05-31
Payer: OTHER MISCELLANEOUS

## 2023-05-31 ENCOUNTER — OCCMED (OUTPATIENT)
Dept: URGENT CARE | Facility: CLINIC | Age: 58
End: 2023-05-31
Payer: OTHER MISCELLANEOUS

## 2023-05-31 VITALS
SYSTOLIC BLOOD PRESSURE: 150 MMHG | TEMPERATURE: 96.8 F | WEIGHT: 162.4 LBS | DIASTOLIC BLOOD PRESSURE: 82 MMHG | RESPIRATION RATE: 16 BRPM | BODY MASS INDEX: 28.77 KG/M2 | HEART RATE: 50 BPM | HEIGHT: 63 IN

## 2023-05-31 DIAGNOSIS — K51.011 ULCERATIVE PANCOLITIS WITH RECTAL BLEEDING (HCC): Primary | ICD-10-CM

## 2023-05-31 DIAGNOSIS — M25.512 ACUTE PAIN OF LEFT SHOULDER: Primary | ICD-10-CM

## 2023-05-31 DIAGNOSIS — M25.512 ACUTE PAIN OF LEFT SHOULDER: ICD-10-CM

## 2023-05-31 PROCEDURE — 96413 CHEMO IV INFUSION 1 HR: CPT

## 2023-05-31 PROCEDURE — 99283 EMERGENCY DEPT VISIT LOW MDM: CPT | Performed by: FAMILY MEDICINE

## 2023-05-31 PROCEDURE — 96415 CHEMO IV INFUSION ADDL HR: CPT

## 2023-05-31 PROCEDURE — 73030 X-RAY EXAM OF SHOULDER: CPT

## 2023-05-31 PROCEDURE — G0382 LEV 3 HOSP TYPE B ED VISIT: HCPCS | Performed by: FAMILY MEDICINE

## 2023-05-31 RX ORDER — ACETAMINOPHEN 325 MG/1
650 TABLET ORAL ONCE
Status: DISCONTINUED | OUTPATIENT
Start: 2023-05-31 | End: 2023-06-03 | Stop reason: HOSPADM

## 2023-05-31 RX ORDER — SODIUM CHLORIDE 9 MG/ML
20 INJECTION, SOLUTION INTRAVENOUS ONCE
Status: COMPLETED | OUTPATIENT
Start: 2023-05-31 | End: 2023-05-31

## 2023-05-31 RX ORDER — METHYLPREDNISOLONE SODIUM SUCCINATE 40 MG/ML
40 INJECTION, POWDER, LYOPHILIZED, FOR SOLUTION INTRAMUSCULAR; INTRAVENOUS ONCE
OUTPATIENT
Start: 2023-07-26

## 2023-05-31 RX ORDER — METHYLPREDNISOLONE SODIUM SUCCINATE 40 MG/ML
40 INJECTION, POWDER, LYOPHILIZED, FOR SOLUTION INTRAMUSCULAR; INTRAVENOUS ONCE
Status: DISCONTINUED | OUTPATIENT
Start: 2023-05-31 | End: 2023-06-03 | Stop reason: HOSPADM

## 2023-05-31 RX ORDER — DIPHENHYDRAMINE HCL 25 MG
25 TABLET ORAL ONCE
OUTPATIENT
Start: 2023-07-26

## 2023-05-31 RX ORDER — DIPHENHYDRAMINE HCL 25 MG
25 TABLET ORAL ONCE
Status: DISCONTINUED | OUTPATIENT
Start: 2023-05-31 | End: 2023-06-03 | Stop reason: HOSPADM

## 2023-05-31 RX ORDER — ACETAMINOPHEN 325 MG/1
650 TABLET ORAL ONCE
OUTPATIENT
Start: 2023-07-26

## 2023-05-31 RX ORDER — SODIUM CHLORIDE 9 MG/ML
20 INJECTION, SOLUTION INTRAVENOUS ONCE
OUTPATIENT
Start: 2023-07-26

## 2023-05-31 RX ADMIN — INFLIXIMAB 541 MG: 100 INJECTION, POWDER, LYOPHILIZED, FOR SOLUTION INTRAVENOUS at 09:35

## 2023-05-31 RX ADMIN — SODIUM CHLORIDE 20 ML/HR: 0.9 INJECTION, SOLUTION INTRAVENOUS at 09:20

## 2023-06-02 ENCOUNTER — APPOINTMENT (OUTPATIENT)
Dept: URGENT CARE | Facility: CLINIC | Age: 58
End: 2023-06-02
Payer: OTHER MISCELLANEOUS

## 2023-06-02 PROCEDURE — 99213 OFFICE O/P EST LOW 20 MIN: CPT

## 2023-06-05 DIAGNOSIS — L40.50 PSORIATIC ARTHRITIS (HCC): ICD-10-CM

## 2023-06-06 RX ORDER — DICLOFENAC SODIUM 75 MG/1
TABLET, DELAYED RELEASE ORAL
Qty: 60 TABLET | Refills: 0 | Status: SHIPPED | OUTPATIENT
Start: 2023-06-06

## 2023-06-16 DIAGNOSIS — F51.04 CHRONIC INSOMNIA: ICD-10-CM

## 2023-06-16 DIAGNOSIS — E03.9 ACQUIRED HYPOTHYROIDISM: ICD-10-CM

## 2023-06-16 RX ORDER — LEVOTHYROXINE SODIUM 0.05 MG/1
TABLET ORAL
Qty: 90 TABLET | Refills: 0 | Status: SHIPPED | OUTPATIENT
Start: 2023-06-16

## 2023-06-18 RX ORDER — ZOLPIDEM TARTRATE 10 MG/1
TABLET ORAL
Qty: 30 TABLET | Refills: 0 | Status: SHIPPED | OUTPATIENT
Start: 2023-06-18

## 2023-06-19 ENCOUNTER — CONSULT EP (OUTPATIENT)
Dept: URBAN - METROPOLITAN AREA CLINIC 6 | Facility: CLINIC | Age: 58
End: 2023-06-19

## 2023-06-19 DIAGNOSIS — H25.813: ICD-10-CM

## 2023-06-19 DIAGNOSIS — K51.011 ULCERATIVE PANCOLITIS WITH RECTAL BLEEDING (HCC): Primary | ICD-10-CM

## 2023-06-19 DIAGNOSIS — H02.831: ICD-10-CM

## 2023-06-19 DIAGNOSIS — H40.1131: ICD-10-CM

## 2023-06-19 DIAGNOSIS — H02.834: ICD-10-CM

## 2023-06-19 PROCEDURE — 99214 OFFICE O/P EST MOD 30 MIN: CPT

## 2023-06-19 ASSESSMENT — VISUAL ACUITY
OS_CC: 20/60+2
OD_CC: 20/400

## 2023-06-19 ASSESSMENT — KERATOMETRY
OD_K1POWER_DIOPTERS: 43.75
OD_K2POWER_DIOPTERS: 45.25
OS_K2POWER_DIOPTERS: 45.25
OD_AXISANGLE_DEGREES: 136
OS_AXISANGLE2_DEGREES: 138
OS_AXISANGLE_DEGREES: 48
OS_K1POWER_DIOPTERS: 44.25
OD_AXISANGLE2_DEGREES: 46

## 2023-07-07 ENCOUNTER — TELEPHONE (OUTPATIENT)
Dept: FAMILY MEDICINE CLINIC | Facility: CLINIC | Age: 58
End: 2023-07-07

## 2023-07-07 DIAGNOSIS — F41.9 ANXIETY: ICD-10-CM

## 2023-07-07 DIAGNOSIS — F32.1 MODERATE MAJOR DEPRESSION (HCC): Primary | ICD-10-CM

## 2023-07-07 RX ORDER — BUPROPION HYDROCHLORIDE 75 MG/1
75 TABLET ORAL 2 TIMES DAILY
Qty: 60 TABLET | Refills: 0 | Status: SHIPPED | OUTPATIENT
Start: 2023-07-07

## 2023-07-07 NOTE — TELEPHONE ENCOUNTER
----- Message from Pepe Gleason sent at 7/7/2023  8:25 AM EDT -----  Regarding: Effexor   Contact: [phone]  Hi, I wanted to let you know I’m weaning off Effexor. I feel it did help control my anxiety/depression however I’m embarrassed to say I’ve gained significant weight since starting without changing my eating habits. I was 167 this morning and I’m u comfortable, I need to get back down to 150. Hoping you can switch me to a Wellbutrin.  Thank you jet me know your thoughts, Annemarie Elias

## 2023-07-08 ENCOUNTER — COSMETIC (OUTPATIENT)
Dept: PLASTIC SURGERY | Facility: CLINIC | Age: 58
End: 2023-07-08

## 2023-07-08 DIAGNOSIS — Z41.1 ENCOUNTER FOR COSMETIC PROCEDURE: ICD-10-CM

## 2023-07-08 PROCEDURE — BOTOX1U PR BOTOX BY THE UNIT: Performed by: STUDENT IN AN ORGANIZED HEALTH CARE EDUCATION/TRAINING PROGRAM

## 2023-07-08 PROCEDURE — BOTOX3 THREE OR MORE AREAS OR GREATER THAN 75 UNITS: Performed by: STUDENT IN AN ORGANIZED HEALTH CARE EDUCATION/TRAINING PROGRAM

## 2023-07-08 NOTE — PROGRESS NOTES
Botox Consult     First time?: yes  Allergies: NKDA  Blood thinners: none   Pregnant: no  Neuromuscular conditions: none    Patient has never had botox, interested in maintenance. Particular areas of concern:  11s. Will proceed with 40 units of botox     Risks and benefits discussed, patient agreed to proceed.      10 units to each crows feet  14 units to glabellum  6 units to forehead     Total 40 units, 30% off     Patient tolerated well, f/u in 3 months        Conchita Renteria MD   [facility] Plastic and Reconstructive Surgery   [facility], [address]   Office: [phone]

## 2023-07-24 DIAGNOSIS — F41.9 ANXIETY: Primary | ICD-10-CM

## 2023-07-24 DIAGNOSIS — F51.04 CHRONIC INSOMNIA: ICD-10-CM

## 2023-07-24 RX ORDER — ZOLPIDEM TARTRATE 10 MG/1
TABLET ORAL
Qty: 30 TABLET | Refills: 0 | Status: SHIPPED | OUTPATIENT
Start: 2023-07-24 | End: 2023-07-26 | Stop reason: SDUPTHER

## 2023-07-24 RX ORDER — BUPROPION HYDROCHLORIDE 100 MG/1
100 TABLET ORAL 2 TIMES DAILY
Qty: 180 TABLET | Refills: 0 | Status: SHIPPED | OUTPATIENT
Start: 2023-07-24 | End: 2023-08-24

## 2023-07-24 NOTE — TELEPHONE ENCOUNTER
----- Message from Syeda Neri sent at 7/24/2023  9:43 AM EDT -----  Regarding: Wellbutrin   Contact: [phone]  Hi I’ve been taking 75mg twice daily for two weeks and haven’t notice any change and no side effects. You had Mentioned increasing the dose after the first month. I would like to use [facility] in \[facility]\"" so takes a little longer than picking up at local pharmacy. Would you please send the new dose that you want me to take to the pharmacy.  Thank you, hSay Ortiz

## 2023-07-26 ENCOUNTER — HOSPITAL ENCOUNTER (OUTPATIENT)
Dept: INFUSION CENTER | Facility: HOSPITAL | Age: 58
Discharge: HOME | End: 2023-07-26
Payer: COMMERCIAL

## 2023-07-26 VITALS
HEIGHT: 63 IN | WEIGHT: 163 LBS | HEART RATE: 61 BPM | DIASTOLIC BLOOD PRESSURE: 72 MMHG | SYSTOLIC BLOOD PRESSURE: 135 MMHG | OXYGEN SATURATION: 99 % | RESPIRATION RATE: 16 BRPM | TEMPERATURE: 96 F | BODY MASS INDEX: 28.88 KG/M2

## 2023-07-26 DIAGNOSIS — F51.04 CHRONIC INSOMNIA: ICD-10-CM

## 2023-07-26 DIAGNOSIS — K51.011 ULCERATIVE PANCOLITIS WITH RECTAL BLEEDING (HCC): Primary | ICD-10-CM

## 2023-07-26 PROCEDURE — 96415 CHEMO IV INFUSION ADDL HR: CPT

## 2023-07-26 PROCEDURE — 96413 CHEMO IV INFUSION 1 HR: CPT

## 2023-07-26 RX ORDER — ACETAMINOPHEN 325 MG/1
650 TABLET ORAL ONCE
Status: DISCONTINUED | OUTPATIENT
Start: 2023-07-26 | End: 2023-07-29 | Stop reason: HOSPADM

## 2023-07-26 RX ORDER — SODIUM CHLORIDE 9 MG/ML
20 INJECTION, SOLUTION INTRAVENOUS ONCE
Status: COMPLETED | OUTPATIENT
Start: 2023-07-26 | End: 2023-07-26

## 2023-07-26 RX ORDER — DIPHENHYDRAMINE HCL 25 MG
25 TABLET ORAL ONCE
Status: DISCONTINUED | OUTPATIENT
Start: 2023-07-26 | End: 2023-07-29 | Stop reason: HOSPADM

## 2023-07-26 RX ORDER — METHYLPREDNISOLONE SODIUM SUCCINATE 40 MG/ML
40 INJECTION, POWDER, LYOPHILIZED, FOR SOLUTION INTRAMUSCULAR; INTRAVENOUS ONCE
Status: DISCONTINUED | OUTPATIENT
Start: 2023-07-26 | End: 2023-07-29 | Stop reason: HOSPADM

## 2023-07-26 RX ORDER — DIPHENHYDRAMINE HCL 25 MG
25 TABLET ORAL ONCE
OUTPATIENT
Start: 2023-09-20

## 2023-07-26 RX ORDER — METHYLPREDNISOLONE SODIUM SUCCINATE 40 MG/ML
40 INJECTION, POWDER, LYOPHILIZED, FOR SOLUTION INTRAMUSCULAR; INTRAVENOUS ONCE
OUTPATIENT
Start: 2023-09-20

## 2023-07-26 RX ORDER — ACETAMINOPHEN 325 MG/1
650 TABLET ORAL ONCE
OUTPATIENT
Start: 2023-09-20

## 2023-07-26 RX ORDER — ZOLPIDEM TARTRATE 10 MG/1
10 TABLET ORAL
Qty: 90 TABLET | Refills: 0 | Status: SHIPPED | OUTPATIENT
Start: 2023-07-26 | End: 2023-08-28 | Stop reason: SDUPTHER

## 2023-07-26 RX ORDER — SODIUM CHLORIDE 9 MG/ML
20 INJECTION, SOLUTION INTRAVENOUS ONCE
OUTPATIENT
Start: 2023-09-20

## 2023-07-26 RX ADMIN — SODIUM CHLORIDE 20 ML/HR: 9 INJECTION, SOLUTION INTRAVENOUS at 09:43

## 2023-07-26 RX ADMIN — INFLIXIMAB 553 MG: 100 INJECTION, POWDER, LYOPHILIZED, FOR SOLUTION INTRAVENOUS at 09:50

## 2023-08-02 ENCOUNTER — OFFICE VISIT (OUTPATIENT)
Dept: OBGYN CLINIC | Facility: CLINIC | Age: 58
End: 2023-08-02
Payer: OTHER MISCELLANEOUS

## 2023-08-02 VITALS
BODY MASS INDEX: 28.88 KG/M2 | SYSTOLIC BLOOD PRESSURE: 128 MMHG | WEIGHT: 163 LBS | HEIGHT: 63 IN | DIASTOLIC BLOOD PRESSURE: 80 MMHG

## 2023-08-02 DIAGNOSIS — M75.42 IMPINGEMENT SYNDROME OF LEFT SHOULDER: ICD-10-CM

## 2023-08-02 DIAGNOSIS — M77.8 LEFT SHOULDER TENDINITIS: Primary | ICD-10-CM

## 2023-08-02 PROCEDURE — 99214 OFFICE O/P EST MOD 30 MIN: CPT | Performed by: ORTHOPAEDIC SURGERY

## 2023-08-02 NOTE — PROGRESS NOTES
Assessment:     1. Left shoulder tendinitis    2. Impingement syndrome of left shoulder        Plan:     Problem List Items Addressed This Visit        Musculoskeletal and Integument    Left shoulder tendinitis - Primary    Impingement syndrome of left shoulder       Findings today are consistent with left rotator cuff strain with tendinitis and impingement syndrome. I discussed the prognosis of her shoulder condition. Patient's left shoulder x-rays and arthrogram MRI were reviewed with her. We discussed that her bicep, rotator cuff tendons and labrum appear intact per the MRI. We discussed that repetitive activities with the sternal rubs and sudden hard work with the CPR were the most likely cause of her pain. Impingement syndrome was discussed anatomically with her. She has been making progress with a HEP. Physical Therapy was offered but she will continue with a home exercise program per her request. Pt was advised she can call to have a script for PT if she feels she is not making any more progress. She was advised to avoid heavy lifting and lifting while reaching. She will follow up in 4 weeks. All patient's questions were answered to her satisfaction. This note is created using dictation transcription. It may contain typographical errors, grammatical errors, improperly dictated words, background noise and other errors. Subjective:     Patient ID: Raul Hardy is a 62 y.o. female. Chief Complaint:  63 yo female who presents for evaluation of Left shoulder pain. 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 She was doing sternal rubs and then CRP on 5/18/2023. She did not have pain at first but once adrenaline wore off, she had pain. She rested her arm but it did not improve. She reports she had pain with reaching as well as overhead activities. She tried to modify her work due to the pain. She went to care now 5/31/2023 due to limited improvement in her shoulder pain.  She then got a arthrogram MRI. She was not advsied to seek treatment so she has been doing HEP from her past Labral repair on the shoulder since this time. Overall her shoulder has improved 80% since the initial injury from doing the HEP and using Tramadol. She continues to have pain with reaching and occasional popping in her shoulder. Pain is mostly localized in the posterior aspect of the shoulder. She does feel the shoulder is not as strong, but improving. Information on patient's intake form was reviewed. Allergy:  No Known Allergies  Medications:  all current active meds have been reviewed  Past Medical History:  Past Medical History:   Diagnosis Date   • Adjustment disorder     last assessed 12   • Anemia     HX of   • Asthma     mild - intermittent   • Bilateral leg edema    • Blepharitis     last assessed 16   • Bulging lumbar disc    • Carpal tunnel syndrome     Unspecified laterality   • Colitis, acute    • Colon polyp    • Disease of thyroid gland    • Dyspareunia in female    • Edema     last assessed 06/22/15   • Ganglion    • Glaucoma    • Herniated cervical disc    • History of transfusion    • Hypokalemia    • Hypothyroidism    • Hypothyroidism    • Insomnia    • Lumps on the skin     last assessed 14   • Mouth ulcers     last assessed 06/22/15   • Nontraumatic tear of left tibialis posterior tendon     Traumatic teart    • Polyarthritis     last assessed 16   • Raynaud's disease    • Temporomandibular disorder     Joint   • Thyroid disease    • Ulcerative colitis ([address])    • Ulcerative colitis ([address])      Past Surgical History:  Past Surgical History:   Procedure Laterality Date   • ANKLE SURGERY Left     Tendon repair   • CARPAL TUNNEL RELEASE Bilateral    •  SECTION, LOW TRANSVERSE     • COLONOSCOPY     • COLONOSCOPY N/A 2018    Procedure: COLONOSCOPY;  Surgeon: Jan Nguyen MD;  Location: [facility];   Service: Gastroenterology   • [facility] INJECTION RIGHT HIP (ARTHROGRAM)  9/15/2020   • HERNIA REPAIR      umbilical   • HYSTERECTOMY     • KNEE ARTHROSCOPY Right    • LIPECTOMY      Multipe lipoma removals   • LIPOMA RESECTION     • PARATHYROIDECTOMY     • WY COLONOSCOPY FLX DX W/COLLJ SPEC WHEN PFRMD N/A 11/1/2016    Procedure: COLONOSCOPY;  Surgeon: Oneyda Jackson DO;  Location: [facility];   Service: Gastroenterology   • WY RPR FLEXOR TENDON LEG SECONDARY W/O GRAFT EACH Left 8/12/2016    Procedure: REPAIR OF LEFT POSTERIOR TIBIAL TENDON WITH GRAFT, EXPLORATION LEFT ANKLE ;  Surgeon: Hubert Harris DPM;  Location: [facility] OR;  Service: Podiatry   • SHOULDER SURGERY Left     bicep tendon and labrum repair   • TONSILLECTOMY     • TOOTH EXTRACTION  12/05/2018     Family History:  Family History   Problem Relation Age of Onset   • Parkinsonism Mother    • Rheum arthritis Mother    • Thyroid disease unspecified Mother    • Hypothyroidism Mother    • Skin cancer Mother    • Heart attack Father    • Hypertension Father    • Osteoporosis Father    • Prostate cancer Father    • Nephrolithiasis Father    • Skin cancer Father    • Hashimoto's thyroiditis Sister    • Thyroid disease unspecified Sister    • Hypothyroidism Sister    • Skin cancer Sister    • No Known Problems Maternal Grandmother    • No Known Problems Maternal Grandfather    • No Known Problems Paternal Grandmother    • Prostate cancer Paternal Grandfather    • Rheum arthritis Maternal Aunt    • Thyroid disease unspecified Maternal Aunt    • Hypothyroidism Maternal Aunt    • No Known Problems Maternal Aunt    • Crohn's disease Maternal Uncle    • Psoriasis Maternal Uncle    • Ulcerative colitis Maternal Uncle    • Rheum arthritis Maternal Uncle    • Crohn's disease Other    • Crohn's disease Family    • Osteoarthritis Family    • Rheum arthritis Family    • Ulcerative colitis Family      Social History:  Social History     Substance and Sexual Activity   Alcohol Use Yes    Comment: social 1 drink per weeek     Social History     Substance and Sexual Activity   Drug Use No     Social History     Tobacco Use   Smoking Status Former   • Types: Cigarettes   • Quit date: 2003   • Years since quittin.3   Smokeless Tobacco Never   Tobacco Comments    Quit , rare use for 2 years     Review of Systems   Constitutional: Negative for chills and fever. HENT: Negative for ear pain and sore throat. Eyes: Negative for pain and visual disturbance. Respiratory: Negative for cough and shortness of breath. Cardiovascular: Negative for chest pain and palpitations. Gastrointestinal: Negative for abdominal pain and vomiting. Genitourinary: Negative for dysuria and hematuria. Musculoskeletal: Positive for arthralgias (Left shoulder). Negative for back pain, joint swelling and neck pain. Skin: Negative for color change and rash. Neurological: Positive for weakness (Left shoulder). Negative for seizures and syncope. Psychiatric/Behavioral: Negative. All other systems reviewed and are negative. Objective:  BP Readings from Last 1 Encounters:   23 128/80      Wt Readings from Last 1 Encounters:   23 73.9 kg (163 lb)      BMI:   Estimated body mass index is 28.87 kg/m² as calculated from the following:    Height as of this encounter: 5' 3" (1.6 m). Weight as of this encounter: 73.9 kg (163 lb). BSA:   Estimated body surface area is 1.77 meters squared as calculated from the following:    Height as of this encounter: 5' 3" (1.6 m). Weight as of this encounter: 73.9 kg (163 lb). Physical Exam  Vitals and nursing note reviewed. Constitutional:       Appearance: Normal appearance. She is well-developed. HENT:      Head: Normocephalic and atraumatic. Right Ear: External ear normal.      Left Ear: External ear normal.   Eyes:      Extraocular Movements: Extraocular movements intact.       Conjunctiva/sclera: Conjunctivae normal.   Pulmonary:      Effort: Pulmonary effort is normal.   Musculoskeletal:      Cervical back: Neck supple. Skin:     General: Skin is warm and dry. Neurological:      Mental Status: She is alert and oriented to person, place, and time. Deep Tendon Reflexes: Reflexes are normal and symmetric. Psychiatric:         Mood and Affect: Mood normal.         Behavior: Behavior normal.       Left Shoulder Exam     Tenderness   The patient is experiencing no tenderness. Range of Motion   External rotation: 40   Forward flexion: normal     Muscle Strength   Abduction: 5/5   Internal rotation: 5/5   External rotation: 5/5   Biceps: 5/5     Tests   Cross arm: negative  Impingement: positive  Drop arm: negative    Other   Erythema: absent  Sensation: normal  Pulse: present     Comments:  Negative Speed test  Mild pain with resistant abduction  positive empty can            I have personally reviewed pertinent films in PACS and my interpretation is Left shoulder x-ray show good joint alignments. No soft tissue calcification or DJD. Arthrogram MRI of the left shoulder show intact superior posterior labrum's. Bicep anchor is intact. There is some dye in the anterior aspect of the shoulder the anteroinferior labrum may be frayed but I do not believe is torn. Lefty Schwab

## 2023-08-07 ENCOUNTER — TELEPHONE (OUTPATIENT)
Age: 58
End: 2023-08-07

## 2023-08-07 NOTE — TELEPHONE ENCOUNTER
Caller: NELSON    Doctor/Office: Thelma      What needs to be faxed: Office note 8/2/23     ATTN to: [phone]     Fax#: [phone]      Documents were successfully e-faxed

## 2023-08-22 DIAGNOSIS — M51.16 INTERVERTEBRAL DISC DISORDER WITH RADICULOPATHY OF LUMBAR REGION: ICD-10-CM

## 2023-08-22 DIAGNOSIS — F41.9 ANXIETY: ICD-10-CM

## 2023-08-22 DIAGNOSIS — M25.551 RIGHT HIP PAIN: ICD-10-CM

## 2023-08-23 RX ORDER — LORAZEPAM 0.5 MG/1
TABLET ORAL
Qty: 30 TABLET | Refills: 0 | Status: SHIPPED | OUTPATIENT
Start: 2023-08-23

## 2023-08-23 NOTE — TELEPHONE ENCOUNTER
Pt last seen by FQ 2/17/21  Pt had consult w/ cintron 3/2021  Pt has opioid agreement w/ [facility] and LF tramadol on 5/19/22. Should pt contact PCP to cont med? Pt will need OV for rf w/ [facility].

## 2023-08-24 ENCOUNTER — TELEPHONE (OUTPATIENT)
Dept: GASTROENTEROLOGY | Facility: CLINIC | Age: 58
End: 2023-08-24

## 2023-08-24 ENCOUNTER — OFFICE VISIT (OUTPATIENT)
Dept: GASTROENTEROLOGY | Facility: CLINIC | Age: 58
End: 2023-08-24
Payer: COMMERCIAL

## 2023-08-24 VITALS
DIASTOLIC BLOOD PRESSURE: 80 MMHG | BODY MASS INDEX: 28.87 KG/M2 | HEIGHT: 63 IN | SYSTOLIC BLOOD PRESSURE: 138 MMHG | TEMPERATURE: 98.1 F

## 2023-08-24 DIAGNOSIS — K51.011 ULCERATIVE PANCOLITIS WITH RECTAL BLEEDING (HCC): Primary | ICD-10-CM

## 2023-08-24 DIAGNOSIS — D84.821 IMMUNOSUPPRESSION DUE TO DRUG THERAPY (HCC): ICD-10-CM

## 2023-08-24 DIAGNOSIS — Z79.899 IMMUNOSUPPRESSION DUE TO DRUG THERAPY (HCC): ICD-10-CM

## 2023-08-24 PROCEDURE — 99214 OFFICE O/P EST MOD 30 MIN: CPT | Performed by: INTERNAL MEDICINE

## 2023-08-24 RX ORDER — BIMATOPROST 0.1 MG/ML
SOLUTION/ DROPS OPHTHALMIC
COMMUNITY
Start: 2023-07-03

## 2023-08-24 RX ORDER — POLYETHYLENE GLYCOL 3350 17 G/17G
238 POWDER, FOR SOLUTION ORAL ONCE
Qty: 238 G | Refills: 0 | Status: SHIPPED | OUTPATIENT
Start: 2023-08-24 | End: 2023-08-24

## 2023-08-24 RX ORDER — BISACODYL 5 MG/1
10 TABLET, DELAYED RELEASE ORAL ONCE
Qty: 2 TABLET | Refills: 0 | Status: SHIPPED | OUTPATIENT
Start: 2023-08-24 | End: 2023-08-24

## 2023-08-24 NOTE — PROGRESS NOTES
Niranjan Peck [facility] - Outpatient Follow-up Note  Abelardo Thakur 62 y.o. female MRN: [id number]  Encounter: [id number]          ASSESSMENT AND PLAN:  62year old female here for evaluation. 1. Ulcerative pancolitis with rectal bleeding ([address])  -originally diagnosed in 2010, last colon in 2021; will repeat now; has had disease over 10 years- needs colonoscopy every 1-2 years   -continue remicade   -s/p hysterectomy  -has appt with Derm   -uptodate on pneumococcal    2. Immunosuppression due to drug therapy ([address])  Uptodate; needs updated PNA vaccines next year  Can get new shingles vaccine  Annual flu vaccine    Follow-up annually    ___________________________________________________________    SUBJECTIVE:  62year old female here for follow up. She is on her third biologic. She failed humira and entyvio. 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 Doing 7.5 mg/kg            REVIEW OF SYSTEMS IS OTHERWISE NEGATIVE.       Historical Information   Past Medical History:   Diagnosis Date   • Adjustment disorder     last assessed 05/16/12   • Anemia     HX of   • Asthma     mild - intermittent   • Bilateral leg edema    • Blepharitis     last assessed 02/04/16   • Bulging lumbar disc    • Carpal tunnel syndrome     Unspecified laterality   • Colitis, acute    • Colon polyp    • Disease of thyroid gland    • Dyspareunia in female    • Edema     last assessed 06/22/15   • Ganglion    • Glaucoma    • Herniated cervical disc    • History of transfusion    • Hypokalemia    • Hypothyroidism    • Hypothyroidism    • Insomnia    • Lumps on the skin     last assessed 03/12/14   • Mouth ulcers     last assessed 06/22/15   • Nontraumatic tear of left tibialis posterior tendon     Traumatic teart    • Polyarthritis     last assessed 06/24/16   • Raynaud's disease    • Temporomandibular disorder     Joint   • Thyroid disease    • Ulcerative colitis ([address])    • Ulcerative colitis ([address]) Past Surgical History:   Procedure Laterality Date   • ANKLE SURGERY Left     Tendon repair   • CARPAL TUNNEL RELEASE Bilateral    •  SECTION, LOW TRANSVERSE     • COLONOSCOPY     • COLONOSCOPY N/A 2018    Procedure: COLONOSCOPY;  Surgeon: Joseph Lopez MD;  Location: [facility]; Service: Gastroenterology   • [facility] INJECTION RIGHT HIP (ARTHROGRAM)  9/15/2020   • HERNIA REPAIR      umbilical   • HYSTERECTOMY     • KNEE ARTHROSCOPY Right    • LIPECTOMY      Multipe lipoma removals   • LIPOMA RESECTION     • PARATHYROIDECTOMY     • ME COLONOSCOPY FLX DX W/COLLJ SPEC WHEN PFRMD N/A 2016    Procedure: COLONOSCOPY;  Surgeon: Kiran Daugherty DO;  Location: [facility];   Service: Gastroenterology   • ME RPR FLEXOR TENDON LEG SECONDARY W/O GRAFT EACH Left 2016    Procedure: REPAIR OF LEFT POSTERIOR TIBIAL TENDON WITH GRAFT, EXPLORATION LEFT ANKLE ;  Surgeon: Sindy Ortega DPM;  Location: [facility] OR;  Service: Podiatry   • SHOULDER SURGERY Left     bicep tendon and labrum repair   • TONSILLECTOMY     • TOOTH EXTRACTION  2018     Social History   Social History     Substance and Sexual Activity   Alcohol Use Yes    Comment: social 1 drink per weeek     Social History     Substance and Sexual Activity   Drug Use No     Social History     Tobacco Use   Smoking Status Former   • Types: Cigarettes   • Quit date: 2003   • Years since quittin.3   Smokeless Tobacco Never   Tobacco Comments    Quit , rare use for 2 years     Family History   Problem Relation Age of Onset   • Parkinsonism Mother    • Rheum arthritis Mother    • Thyroid disease unspecified Mother    • Hypothyroidism Mother    • Skin cancer Mother    • Heart attack Father    • Hypertension Father    • Osteoporosis Father    • Prostate cancer Father    • Nephrolithiasis Father    • Skin cancer Father    • Hashimoto's thyroiditis Sister    • Thyroid disease unspecified Sister    • Hypothyroidism Sister    • Skin cancer Sister    • No Known Problems Maternal Grandmother    • No Known Problems Maternal Grandfather    • No Known Problems Paternal Grandmother    • Prostate cancer Paternal Grandfather    • Rheum arthritis Maternal Aunt    • Thyroid disease unspecified Maternal Aunt    • Hypothyroidism Maternal Aunt    • No Known Problems Maternal Aunt    • Crohn's disease Maternal Uncle    • Psoriasis Maternal Uncle    • Ulcerative colitis Maternal Uncle    • Rheum arthritis Maternal Uncle    • Crohn's disease Other    • Crohn's disease Family    • Osteoarthritis Family    • Rheum arthritis Family    • Ulcerative colitis Family        Meds/Allergies       Current Outpatient Medications:   •  LORazepam (ATIVAN) 0.5 mg tablet  •  albuterol (ACCUNEB) 1.25 MG/3ML nebulizer solution  •  buPROPion (WELLBUTRIN) 100 mg tablet  •  carisoprodol (SOMA) 350 mg tablet  •  diclofenac (VOLTAREN) 75 mg EC tablet  •  famotidine (PEPCID) 20 mg tablet  •  inFLIXimab (REMICADE) 100 mg  •  ketoconazole (NIZORAL) 2 % cream  •  latanoprost (XALATAN) 0.005 % ophthalmic solution  •  levothyroxine 50 mcg tablet  •  naloxone (NARCAN) 4 mg/0.1 mL nasal spray  •  ondansetron (ZOFRAN-ODT) 4 mg disintegrating tablet  •  traMADol (ULTRAM) 50 mg tablet  •  triamcinolone (KENALOG) 0.5 % cream  •  venlafaxine (EFFEXOR-XR) 37.5 mg 24 hr capsule  •  vitamin C (VITAMIN C) 500 mg tablet  •  zolpidem (AMBIEN) 10 mg tablet    No Known Allergies        Objective     There were no vitals taken for this visit. There is no height or weight on file to calculate BMI. PHYSICAL EXAM:      General Appearance:   Alert, cooperative, no distress   HEENT:   Normocephalic, atraumatic, anicteric.     Neck:  Supple, symmetrical, trachea midline   Lungs:   Clear to auscultation bilaterally; no rales, rhonchi or wheezing; respirations unlabored    Heart[de-identified]   Regular rate and rhythm; no murmur, rub, or gallop.    Abdomen:   Soft, non-tender, non-distended; normal bowel sounds; no masses, no organomegaly    Genitalia:   Deferred    Rectal:   Deferred    Extremities:  No cyanosis, clubbing or edema    Pulses:  2+ and symmetric    Skin:  No jaundice, rashes, or lesions    Lymph nodes:  No palpable cervical lymphadenopathy        Lab Results:   No visits with results within 1 Day(s) from this visit.    Latest known visit with results is:   Appointment on 02/22/2023   Component Date Value   • Cholesterol 02/22/2023 201 (H)    • Triglycerides 02/22/2023 39    • HDL, Direct 02/22/2023 112    • LDL Calculated 02/22/2023 81    • Non-HDL-Chol (CHOL-HDL) 02/22/2023 89    • Sodium 02/22/2023 140    • Potassium 02/22/2023 3.5    • Chloride 02/22/2023 108    • CO2 02/22/2023 27    • ANION GAP 02/22/2023 5    • BUN 02/22/2023 13    • Creatinine 02/22/2023 0.72    • Glucose, Fasting 02/22/2023 80    • Calcium 02/22/2023 9.0    • AST 02/22/2023 32    • ALT 02/22/2023 49    • Alkaline Phosphatase 02/22/2023 53    • Total Protein 02/22/2023 7.4    • Albumin 02/22/2023 4.0    • Total Bilirubin 02/22/2023 0.38    • eGFR 02/22/2023 93    • WBC 02/22/2023 4.60    • RBC 02/22/2023 4.34    • Hemoglobin 02/22/2023 13.4    • Hematocrit 02/22/2023 41.3    • MCV 02/22/2023 95    • MCH 02/22/2023 30.9    • MCHC 02/22/2023 32.4    • RDW 02/22/2023 12.5    • MPV 02/22/2023 10.4    • Platelets 53/65/4866 328    • nRBC 02/22/2023 0    • Neutrophils Relative 02/22/2023 31 (L)    • Immat GRANS % 02/22/2023 0    • Lymphocytes Relative 02/22/2023 57 (H)    • Monocytes Relative 02/22/2023 9    • Eosinophils Relative 02/22/2023 2    • Basophils Relative 02/22/2023 1    • Neutrophils Absolute 02/22/2023 1.44 (L)    • Immature Grans Absolute 02/22/2023 0.01    • Lymphocytes Absolute 02/22/2023 2.62    • Monocytes Absolute 02/22/2023 0.40    • Eosinophils Absolute 02/22/2023 0.07    • Basophils Absolute 02/22/2023 0.06    • PTH 02/22/2023 33.8    • Hemoglobin A1C 02/22/2023 5.1    • EAG 02/22/2023 100    • Vit D, 25-Hydroxy 02/22/2023 35.5    • Phosphorus 02/22/2023 2.8          Radiology Results:   No results found.

## 2023-08-24 NOTE — PATIENT INSTRUCTIONS
Scheduled date of colonoscopy (as of today): 12/5/23  Physician performing colonoscopy: Dr. Su Cabrera  Location of colonoscopy:  [address] [place] [address]  Desired bowel prep reviewed with patient: Miralax  Instructions reviewed with patient by: Theresa  Clearances:  N/A

## 2023-08-25 ENCOUNTER — APPOINTMENT (OUTPATIENT)
Dept: LAB | Facility: HOSPITAL | Age: 58
End: 2023-08-25
Payer: COMMERCIAL

## 2023-08-25 DIAGNOSIS — K51.011 ULCERATIVE PANCOLITIS WITH RECTAL BLEEDING (HCC): ICD-10-CM

## 2023-08-25 DIAGNOSIS — K21.9 GASTROESOPHAGEAL REFLUX DISEASE WITHOUT ESOPHAGITIS: ICD-10-CM

## 2023-08-25 LAB — CRP SERPL QL: 5.3 MG/L

## 2023-08-25 PROCEDURE — 36415 COLL VENOUS BLD VENIPUNCTURE: CPT

## 2023-08-25 PROCEDURE — 86140 C-REACTIVE PROTEIN: CPT

## 2023-08-25 PROCEDURE — 86480 TB TEST CELL IMMUN MEASURE: CPT

## 2023-08-25 RX ORDER — TRAMADOL HYDROCHLORIDE 50 MG/1
50 TABLET ORAL DAILY PRN
Qty: 30 TABLET | Refills: 0 | OUTPATIENT
Start: 2023-08-25

## 2023-08-26 DIAGNOSIS — K21.9 GASTROESOPHAGEAL REFLUX DISEASE WITHOUT ESOPHAGITIS: ICD-10-CM

## 2023-08-26 DIAGNOSIS — F51.04 CHRONIC INSOMNIA: ICD-10-CM

## 2023-08-28 DIAGNOSIS — F51.04 CHRONIC INSOMNIA: ICD-10-CM

## 2023-08-28 LAB
GAMMA INTERFERON BACKGROUND BLD IA-ACNC: 0.05 IU/ML
M TB IFN-G BLD-IMP: NEGATIVE
M TB IFN-G CD4+ BCKGRND COR BLD-ACNC: 0 IU/ML
M TB IFN-G CD4+ BCKGRND COR BLD-ACNC: 0 IU/ML
MITOGEN IGNF BCKGRD COR BLD-ACNC: 5.34 IU/ML

## 2023-08-28 RX ORDER — FAMOTIDINE 20 MG/1
20 TABLET, FILM COATED ORAL
Qty: 90 TABLET | Refills: 0 | OUTPATIENT
Start: 2023-08-28

## 2023-08-28 RX ORDER — FAMOTIDINE 20 MG/1
TABLET, FILM COATED ORAL
Qty: 90 TABLET | Refills: 1 | Status: SHIPPED | OUTPATIENT
Start: 2023-08-28

## 2023-08-28 RX ORDER — ZOLPIDEM TARTRATE 10 MG/1
10 TABLET ORAL
Qty: 30 TABLET | Refills: 0 | Status: CANCELLED | OUTPATIENT
Start: 2023-08-28 | End: 2023-09-27

## 2023-08-28 RX ORDER — BIMATOPROST 0.1 MG/ML
SOLUTION/ DROPS OPHTHALMIC
Refills: 0 | OUTPATIENT
Start: 2023-08-28

## 2023-08-28 RX ORDER — ZOLPIDEM TARTRATE 10 MG/1
10 TABLET ORAL
Qty: 90 TABLET | Refills: 0 | Status: SHIPPED | OUTPATIENT
Start: 2023-08-28 | End: 2023-09-27

## 2023-08-28 RX ORDER — ZOLPIDEM TARTRATE 10 MG/1
TABLET ORAL
Qty: 30 TABLET | Refills: 0 | OUTPATIENT
Start: 2023-08-28

## 2023-08-28 NOTE — TELEPHONE ENCOUNTER
Addended by: CHIDI CUNNINGAHM on: 5/20/2019 01:48 PM     Modules accepted: Orders     Please REVIEW RED X

## 2023-08-29 DIAGNOSIS — F51.04 CHRONIC INSOMNIA: ICD-10-CM

## 2023-08-29 RX ORDER — ZOLPIDEM TARTRATE 10 MG/1
TABLET ORAL
Qty: 30 TABLET | Refills: 0 | OUTPATIENT
Start: 2023-08-29

## 2023-09-13 ENCOUNTER — TRANSCRIBE ORDERS (OUTPATIENT)
Dept: GASTROENTEROLOGY | Facility: CLINIC | Age: 58
End: 2023-09-13

## 2023-09-14 DIAGNOSIS — E03.9 ACQUIRED HYPOTHYROIDISM: ICD-10-CM

## 2023-09-14 RX ORDER — LEVOTHYROXINE SODIUM 0.05 MG/1
TABLET ORAL
Qty: 90 TABLET | Refills: 0 | Status: SHIPPED | OUTPATIENT
Start: 2023-09-14

## 2023-09-14 RX ORDER — LEVOTHYROXINE SODIUM 0.05 MG/1
TABLET ORAL
Qty: 90 TABLET | Refills: 0 | OUTPATIENT
Start: 2023-09-14

## 2023-09-15 ENCOUNTER — OFFICE VISIT (OUTPATIENT)
Dept: FAMILY MEDICINE CLINIC | Facility: CLINIC | Age: 58
End: 2023-09-15
Payer: COMMERCIAL

## 2023-09-15 VITALS
BODY MASS INDEX: 28.53 KG/M2 | TEMPERATURE: 98 F | HEART RATE: 68 BPM | WEIGHT: 161 LBS | OXYGEN SATURATION: 99 % | SYSTOLIC BLOOD PRESSURE: 134 MMHG | DIASTOLIC BLOOD PRESSURE: 86 MMHG | HEIGHT: 63 IN

## 2023-09-15 DIAGNOSIS — M62.81 MUSCLE WEAKNESS OF RIGHT UPPER EXTREMITY: ICD-10-CM

## 2023-09-15 DIAGNOSIS — M25.50 ARTHRALGIA OF MULTIPLE JOINTS: Primary | ICD-10-CM

## 2023-09-15 DIAGNOSIS — M51.16 INTERVERTEBRAL DISC DISORDER WITH RADICULOPATHY OF LUMBAR REGION: ICD-10-CM

## 2023-09-15 DIAGNOSIS — M62.838 MUSCLE SPASM: ICD-10-CM

## 2023-09-15 DIAGNOSIS — L40.50 PSORIATIC ARTHRITIS (HCC): ICD-10-CM

## 2023-09-15 DIAGNOSIS — M25.551 RIGHT HIP PAIN: ICD-10-CM

## 2023-09-15 PROCEDURE — 99214 OFFICE O/P EST MOD 30 MIN: CPT | Performed by: NURSE PRACTITIONER

## 2023-09-15 RX ORDER — PREDNISONE 10 MG/1
TABLET ORAL
Qty: 20 TABLET | Refills: 0 | Status: SHIPPED | OUTPATIENT
Start: 2023-09-15 | End: 2023-09-22

## 2023-09-15 RX ORDER — PREDNISONE 10 MG/1
TABLET ORAL
Qty: 20 TABLET | Refills: 0 | Status: SHIPPED | OUTPATIENT
Start: 2023-09-15 | End: 2023-09-15 | Stop reason: CLARIF

## 2023-09-15 RX ORDER — DICLOFENAC SODIUM 75 MG/1
75 TABLET, DELAYED RELEASE ORAL 2 TIMES DAILY
Qty: 60 TABLET | Refills: 0 | Status: SHIPPED | OUTPATIENT
Start: 2023-09-15

## 2023-09-15 RX ORDER — CARISOPRODOL 350 MG/1
350 TABLET ORAL 3 TIMES DAILY PRN
Qty: 30 TABLET | Refills: 0 | Status: SHIPPED | OUTPATIENT
Start: 2023-09-15

## 2023-09-15 RX ORDER — TRAMADOL HYDROCHLORIDE 50 MG/1
50 TABLET ORAL DAILY PRN
Qty: 20 TABLET | Refills: 0 | Status: SHIPPED | OUTPATIENT
Start: 2023-09-15

## 2023-09-15 NOTE — PROGRESS NOTES
Name: Jimmie Crocker      : 1965      MRN: [id number]  Encounter Provider: YFN Gregg  Encounter Date: 9/15/2023   Encounter department: [facility]    Assessment & Plan     1. Arthralgia of multiple joints  Assessment & Plan:  Check labs  Schedule with rheumatology    Orders:  -     Sedimentation rate, automated; Future  -     Lyme Total AB W Reflex to IGM/IGG; Future  -     EMELY Screen w/ Reflex to Titer/Pattern; Future  -     RF Screen w/ Reflex to Titer; Future  -     C-reactive protein; Future  -     predniSONE 10 mg tablet; Take 4 tablets (40 mg total) by mouth daily for 2 days, THEN 3 tablets (30 mg total) daily for 2 days, THEN 2 tablets (20 mg total) daily for 2 days, THEN 1 tablet (10 mg total) daily for 2 days. 2. Psoriatic arthritis (HCC)  -     diclofenac (VOLTAREN) 75 mg EC tablet; Take 1 tablet (75 mg total) by mouth 2 (two) times a day    3. Muscle spasm  -     carisoprodol (SOMA) 350 mg tablet; Take 1 tablet (350 mg total) by mouth 3 (three) times a day as needed for muscle spasms    4. Intervertebral disc disorder with radiculopathy of lumbar region  -     traMADol (ULTRAM) 50 mg tablet; Take 1 tablet (50 mg total) by mouth daily as needed for moderate pain    5. Right hip pain  -     traMADol (ULTRAM) 50 mg tablet; Take 1 tablet (50 mg total) by mouth daily as needed for moderate pain    6. Muscle weakness of right upper extremity  -     Sedimentation rate, automated; Future  -     Lyme Total AB W Reflex to IGM/IGG; Future  -     EMELY Screen w/ Reflex to Titer/Pattern; Future  -     RF Screen w/ Reflex to Titer; Future         Subjective          Started 23 with couldn't bend forward, pain in her upper back flared and couldn't lift her arms up to the side. 20 years ago herniated cervical disc and had steroid injections and symptoms resolved.  Immediately took soma 350mg 1/2 tab, 1/2 tab of tramadol and voltaren, applied heat next morning had residual neck stiffness but by end of day fully resolved. Went to work went a week without pain. Happened again over the weekend, couldn't rotate her wrists, feels the right hand is slightly swollen. Then last night couldn't lift her arm up on the right side. Middle of night right ankle became stiff and painful. Remicade infusions for last 4 years- gets every 56 days. Review of Systems   Constitutional: Negative. HENT: Negative. Eyes: Negative. Respiratory: Negative for cough, shortness of breath and wheezing. Cardiovascular: Negative. Gastrointestinal: Negative. Endocrine: Negative. Hot flashes     Musculoskeletal: Positive for arthralgias, joint swelling and myalgias. Neurological: Positive for weakness. Psychiatric/Behavioral: Positive for dysphoric mood and sleep disturbance. The patient is nervous/anxious. Current Outpatient Medications on File Prior to Visit   Medication Sig   • albuterol (ACCUNEB) 1.25 MG/3ML nebulizer solution Take 1 ampule by nebulization as needed for wheezing    • bisacodyl (DULCOLAX) 5 mg EC tablet Take 2 tablets (10 mg total) by mouth once for 1 dose   • famotidine (PEPCID) 20 mg tablet TAKE ONE TABLET BY MOUTH EVERY DAY AT BEDTIME   • inFLIXimab (REMICADE) 100 mg Infuse into a venous catheter every 56 days   • ketoconazole (NIZORAL) 2 % cream Apply 3-4 times per day to affected nails for suppression   • latanoprost (XALATAN) 0.005 % ophthalmic solution    • levothyroxine 50 mcg tablet TAKE ONE TABLET BY MOUTH DAILY   • LORazepam (ATIVAN) 0.5 mg tablet TAKE ONE TABLET BY MOUTH EVERY DAY AS NEEDED FOR ANXIETY   • Lumigan 0.01 % ophthalmic drops    • naloxone (NARCAN) 4 mg/0.1 mL nasal spray Administer 1 spray into a nostril. If no response after 2-3 minutes, give another dose in the other nostril using a new spray.    • ondansetron (ZOFRAN-ODT) 4 mg disintegrating tablet Take 1 tablet (4 mg total) by mouth every 6 (six) hours as needed for nausea or vomiting (Patient taking differently: Take 4 mg by mouth as needed for nausea or vomiting)   • polyethylene glycol (GLYCOLAX) 17 GM/SCOOP powder Take 238 g by mouth once for 1 dose Use as directed for bowel prep   • triamcinolone (KENALOG) 0.5 % cream Apply topically 3 (three) times a day   • vitamin C (VITAMIN C) 500 mg tablet Take 1 tablet (500 mg total) by mouth daily   • zolpidem (AMBIEN) 10 mg tablet Take 1 tablet (10 mg total) by mouth daily at bedtime as needed for sleep       Objective     /86   Pulse 68   Temp 98 °F (36.7 °C)   Ht 5' 3" (1.6 m)   Wt 73 kg (161 lb)   SpO2 99%   BMI 28.52 kg/m²     Physical Exam  Vitals and nursing note reviewed. Constitutional:       Appearance: Normal appearance. HENT:      Head: Normocephalic. Right Ear: Tympanic membrane, ear canal and external ear normal.      Left Ear: Tympanic membrane, ear canal and external ear normal.      Nose: Nose normal.      Mouth/Throat:      Mouth: Mucous membranes are moist.      Pharynx: Oropharynx is clear. Eyes:      Conjunctiva/sclera: Conjunctivae normal.      Pupils: Pupils are equal, round, and reactive to light. Cardiovascular:      Rate and Rhythm: Normal rate and regular rhythm. Heart sounds: Normal heart sounds. No murmur heard. Pulmonary:      Effort: Pulmonary effort is normal. No respiratory distress. Breath sounds: Normal breath sounds. Abdominal:      General: Bowel sounds are normal.      Palpations: Abdomen is soft. Musculoskeletal:         General: Tenderness present. No swelling or deformity. Right lower leg: No edema. Left lower leg: No edema. Lymphadenopathy:      Cervical: No cervical adenopathy. Skin:     Findings: No rash. Neurological:      Mental Status: She is alert and oriented to person, place, and time. Motor: Weakness (RUL mildly) present.    Psychiatric:         Mood and Affect: Mood normal.         Behavior: Behavior normal.       Theoplis Bogus Leoncio Fajardo

## 2023-09-18 ENCOUNTER — APPOINTMENT (OUTPATIENT)
Dept: LAB | Facility: HOSPITAL | Age: 58
End: 2023-09-18
Payer: COMMERCIAL

## 2023-09-18 DIAGNOSIS — M25.50 ARTHRALGIA OF MULTIPLE JOINTS: ICD-10-CM

## 2023-09-18 DIAGNOSIS — M62.81 MUSCLE WEAKNESS OF RIGHT UPPER EXTREMITY: ICD-10-CM

## 2023-09-18 LAB
ANA SER QL IA: NEGATIVE
B BURGDOR IGG+IGM SER QL IA: NEGATIVE
CRP SERPL QL: 7.3 MG/L
ERYTHROCYTE [SEDIMENTATION RATE] IN BLOOD: 15 MM/HOUR (ref 0–29)
RHEUMATOID FACT SER QL LA: NEGATIVE

## 2023-09-18 PROCEDURE — 84550 ASSAY OF BLOOD/URIC ACID: CPT | Performed by: NURSE PRACTITIONER

## 2023-09-18 PROCEDURE — 85652 RBC SED RATE AUTOMATED: CPT

## 2023-09-18 PROCEDURE — 86038 ANTINUCLEAR ANTIBODIES: CPT

## 2023-09-18 PROCEDURE — 36415 COLL VENOUS BLD VENIPUNCTURE: CPT

## 2023-09-18 PROCEDURE — 86140 C-REACTIVE PROTEIN: CPT

## 2023-09-18 PROCEDURE — 86618 LYME DISEASE ANTIBODY: CPT

## 2023-09-18 PROCEDURE — 82607 VITAMIN B-12: CPT | Performed by: NURSE PRACTITIONER

## 2023-09-18 PROCEDURE — 82746 ASSAY OF FOLIC ACID SERUM: CPT | Performed by: NURSE PRACTITIONER

## 2023-09-18 PROCEDURE — 86430 RHEUMATOID FACTOR TEST QUAL: CPT

## 2023-09-19 ENCOUNTER — TELEPHONE (OUTPATIENT)
Dept: FAMILY MEDICINE CLINIC | Facility: CLINIC | Age: 58
End: 2023-09-19

## 2023-09-19 DIAGNOSIS — M25.50 ARTHRALGIA, UNSPECIFIED JOINT: Primary | ICD-10-CM

## 2023-09-19 DIAGNOSIS — M62.81 MUSCLE WEAKNESS: ICD-10-CM

## 2023-09-19 DIAGNOSIS — M62.81 MUSCLE WEAKNESS: Primary | ICD-10-CM

## 2023-09-19 LAB
FOLATE SERPL-MCNC: 9.7 NG/ML
URATE SERPL-MCNC: 3.2 MG/DL (ref 2–7.5)
VIT B12 SERPL-MCNC: 504 PG/ML (ref 180–914)

## 2023-09-19 NOTE — TELEPHONE ENCOUNTER
Called Pt. Spoke to Pt, and Pt advised on results your labs show and elevated CRP, negative lyme, auto immune, rheumatoid factor and normal sed rate. I saw your message about all the additional testing for uric acid, PTH, calcium and vitamin E, D, B.  You had PTH and Vitamin D checked in February - both were normal. Advised added vitB12,folate,uric acid    Patient curious if PTH and ionized calcium are normal b/c last was February and CRP increased and worried if it changed, wants to ensure calcium not hurting joints

## 2023-09-19 NOTE — TELEPHONE ENCOUNTER
----- Message from Michelle Lockhart, [address] [facility] sent at 9/19/2023  1:05 PM EDT -----  Ritchie Sanbrice, Your labs show and elevated CRP, negative lyme, auto immune, rheumatoid factor and normal sed rate. I saw your message about all the additional testing for uric acid, PTH, calcium and vitamin E, D, B. You had PTH and Vitamin D checked in February - both were normal. I can add a vitamin E and B12 and folate levels with diagnosis code of muscle weakness R53.1. I will have the staff try to add these tests on to your labs from yesterday.

## 2023-09-19 NOTE — TELEPHONE ENCOUNTER
Called [facility] to add on the following tests to her blood work completed yesterday:  - Vitamin E - Muscle weakness [M62.81]  - Vitamin B12 - Muscle weakness [M62.81]  - Folate - Muscle weakness [M62.81]  - Uric Acid - Arthralgia, unspecified joint [M25.50]    The lab said not sure if can add everything     The Vit E cannot be added on b/c it is protect from light and must be completed right away     Vit B12, folate, and uric acid are able to be added.

## 2023-09-20 ENCOUNTER — TELEPHONE (OUTPATIENT)
Dept: FAMILY MEDICINE CLINIC | Facility: CLINIC | Age: 58
End: 2023-09-20

## 2023-09-20 ENCOUNTER — HOSPITAL ENCOUNTER (OUTPATIENT)
Dept: INFUSION CENTER | Facility: HOSPITAL | Age: 58
Discharge: HOME | End: 2023-09-20

## 2023-09-20 VITALS — WEIGHT: 160 LBS | BODY MASS INDEX: 28.35 KG/M2 | HEIGHT: 63 IN

## 2023-09-20 NOTE — TELEPHONE ENCOUNTER
Submitted prior auth through [employer] for patients Tramadol. Awaiting determination.

## 2023-09-22 ENCOUNTER — HOSPITAL ENCOUNTER (OUTPATIENT)
Dept: INFUSION CENTER | Facility: HOSPITAL | Age: 58
End: 2023-09-22
Payer: COMMERCIAL

## 2023-09-22 VITALS
TEMPERATURE: 98.9 F | HEART RATE: 99 BPM | SYSTOLIC BLOOD PRESSURE: 139 MMHG | DIASTOLIC BLOOD PRESSURE: 65 MMHG | RESPIRATION RATE: 16 BRPM | WEIGHT: 160 LBS | OXYGEN SATURATION: 100 % | BODY MASS INDEX: 28.35 KG/M2 | HEIGHT: 63 IN

## 2023-09-22 DIAGNOSIS — E21.3 HYPERPARATHYROIDISM (HCC): Primary | ICD-10-CM

## 2023-09-22 DIAGNOSIS — K51.011 ULCERATIVE PANCOLITIS WITH RECTAL BLEEDING (HCC): Primary | ICD-10-CM

## 2023-09-22 PROCEDURE — 96415 CHEMO IV INFUSION ADDL HR: CPT

## 2023-09-22 PROCEDURE — 96413 CHEMO IV INFUSION 1 HR: CPT

## 2023-09-22 RX ORDER — ACETAMINOPHEN 325 MG/1
650 TABLET ORAL ONCE
Status: DISCONTINUED | OUTPATIENT
Start: 2023-09-22 | End: 2023-09-25 | Stop reason: HOSPADM

## 2023-09-22 RX ORDER — ACETAMINOPHEN 325 MG/1
650 TABLET ORAL ONCE
OUTPATIENT
Start: 2023-11-17

## 2023-09-22 RX ORDER — METHYLPREDNISOLONE SODIUM SUCCINATE 40 MG/ML
40 INJECTION, POWDER, LYOPHILIZED, FOR SOLUTION INTRAMUSCULAR; INTRAVENOUS ONCE
OUTPATIENT
Start: 2023-11-17

## 2023-09-22 RX ORDER — METHYLPREDNISOLONE SODIUM SUCCINATE 40 MG/ML
40 INJECTION, POWDER, LYOPHILIZED, FOR SOLUTION INTRAMUSCULAR; INTRAVENOUS ONCE
Status: DISCONTINUED | OUTPATIENT
Start: 2023-09-22 | End: 2023-09-25 | Stop reason: HOSPADM

## 2023-09-22 RX ORDER — SODIUM CHLORIDE 9 MG/ML
20 INJECTION, SOLUTION INTRAVENOUS ONCE
Status: COMPLETED | OUTPATIENT
Start: 2023-09-22 | End: 2023-09-22

## 2023-09-22 RX ORDER — DIPHENHYDRAMINE HCL 25 MG
25 TABLET ORAL ONCE
OUTPATIENT
Start: 2023-11-17

## 2023-09-22 RX ORDER — DIPHENHYDRAMINE HCL 25 MG
25 TABLET ORAL ONCE
Status: DISCONTINUED | OUTPATIENT
Start: 2023-09-22 | End: 2023-09-25 | Stop reason: HOSPADM

## 2023-09-22 RX ORDER — SODIUM CHLORIDE 9 MG/ML
20 INJECTION, SOLUTION INTRAVENOUS ONCE
OUTPATIENT
Start: 2023-11-17

## 2023-09-22 RX ADMIN — INFLIXIMAB 545 MG: 100 INJECTION, POWDER, LYOPHILIZED, FOR SOLUTION INTRAVENOUS at 10:41

## 2023-09-22 RX ADMIN — SODIUM CHLORIDE 20 ML/HR: 0.9 INJECTION, SOLUTION INTRAVENOUS at 10:23

## 2023-09-22 NOTE — TELEPHONE ENCOUNTER
Add on labs for folate, b12 and uric acid normal  I ordered for her to complete vitamin E, PTH calcium

## 2023-09-22 NOTE — PROGRESS NOTES
Pt tolerated Remicade infusion with no adverse reaction. Pt left unit ambulatory with steady gait.  Refused AVS.

## 2023-09-25 DIAGNOSIS — Z23 NEEDS FLU SHOT: Primary | ICD-10-CM

## 2023-09-25 PROCEDURE — 90471 IMMUNIZATION ADMIN: CPT

## 2023-09-25 PROCEDURE — 90686 IIV4 VACC NO PRSV 0.5 ML IM: CPT

## 2023-09-28 ENCOUNTER — OFFICE VISIT (OUTPATIENT)
Dept: DERMATOLOGY | Facility: CLINIC | Age: 58
End: 2023-09-28
Payer: COMMERCIAL

## 2023-09-28 VITALS — WEIGHT: 161 LBS | HEIGHT: 63 IN | TEMPERATURE: 98.4 F | BODY MASS INDEX: 28.53 KG/M2

## 2023-09-28 DIAGNOSIS — D22.62 MULTIPLE BENIGN MELANOCYTIC NEVI OF BOTH UPPER EXTREMITIES, BOTH LOWER EXTREMITIES, AND TRUNK: ICD-10-CM

## 2023-09-28 DIAGNOSIS — D18.01 CHERRY ANGIOMA: ICD-10-CM

## 2023-09-28 DIAGNOSIS — D22.61 MULTIPLE BENIGN MELANOCYTIC NEVI OF BOTH UPPER EXTREMITIES, BOTH LOWER EXTREMITIES, AND TRUNK: ICD-10-CM

## 2023-09-28 DIAGNOSIS — L72.0 MILIA: ICD-10-CM

## 2023-09-28 DIAGNOSIS — D22.72 MULTIPLE BENIGN MELANOCYTIC NEVI OF BOTH UPPER EXTREMITIES, BOTH LOWER EXTREMITIES, AND TRUNK: ICD-10-CM

## 2023-09-28 DIAGNOSIS — L81.4 LENTIGO: ICD-10-CM

## 2023-09-28 DIAGNOSIS — D22.5 MULTIPLE BENIGN MELANOCYTIC NEVI OF BOTH UPPER EXTREMITIES, BOTH LOWER EXTREMITIES, AND TRUNK: ICD-10-CM

## 2023-09-28 DIAGNOSIS — D22.71 MULTIPLE BENIGN MELANOCYTIC NEVI OF BOTH UPPER EXTREMITIES, BOTH LOWER EXTREMITIES, AND TRUNK: ICD-10-CM

## 2023-09-28 DIAGNOSIS — L82.1 SEBORRHEIC KERATOSIS: Primary | ICD-10-CM

## 2023-09-28 PROCEDURE — 99213 OFFICE O/P EST LOW 20 MIN: CPT | Performed by: DERMATOLOGY

## 2023-09-28 NOTE — PATIENT INSTRUCTIONS
SEBORRHEIC KERATOSES  - Relevant exam: Scattered over the trunk/extremities are waxy brown to black plaques and papules with stuck on appearance and consistent dermoscopy  - Exam and clinical history consistent with seborrheic keratoses  - Counseled that these are benign growths that do not require treatment  - Counseled that removal of lesions is considered cosmetic and so would incur a fee should patient elect to move forward. MELANOCYTIC NEVI  -Relevant exam: Scattered over the trunk/extremities are homogenously pigmented brown macules and papules. ELM performed and without concerning findings. No outliers unless otherwise noted in today's note  - Exam and clinical history consistent with melanocytic nevi  - Educated on the ABCDE's of melanoma; handout provided  - Counseled to return to clinic prior to scheduled appointment should any of these lesions change or should any new lesions of concern arise  - Counseled on use of sun protection daily. Reviewed latest FDA sunscreen guidelines, including use of broad spectrum (UVA and UVB blocking) sunscreen or sun protective clothing with SPF 30-50 every 2-3 hours and reapplied after exposure to water; use of photoprotective clothing, including a broad brim hat and UPF rated clothing if outdoors for several hours; avoid use of tanning beds as these pose significant risk for melanoma and skin cancer. LENTIGINES  OTHER SKIN CHANGES DUE TO CHRONIC EXPOSURE TO NONIONIZING RADIATION  - Relevant exam: Over sun exposed areas are brown macules. ELM performed and without concerning findings. - Exam and clinical history consistent with lentigines. - Educated that these are indicative of prior sun exposure. - Counseled to return to clinic prior to scheduled appointment should any of these lesions change or should any new lesions of concern arise.  - Recommended use of sunscreen as above and below. - Counseled on use of sun protection daily.  Reviewed latest FDA sunscreen guidelines, including use of broad spectrum (UVA and UVB blocking) sunscreen or sun protective clothing with SPF 30-50 every 2-3 hours and reapplied after exposure to water; use of photoprotective clothing, including a broad brim hat and UPF rated clothing if outdoors for several hours; avoid use of tanning beds as these pose significant risk for melanoma and skin cancer. CHERRY ANGIOMAS  - Relevant exam: Scattered over the trunk/extremities are red papules  - Exam and clinical history consistent with cherry angiomas  - Educated that these are benign  - Educated that removal is considered aesthetic and would incur a fee. MILIUM     Assessment and Plan:  Based on a thorough discussion of this condition and the management approach to it (including a comprehensive discussion of the known risks, side effects and potential benefits of treatment), the patient (family) agrees to implement the following specific plan:  Reassured benign    Assessment and Plan  A milium is a small cyst containing keratin (the skin protein); they are usually multiple and are then known as milia. These harmless cysts present as tiny pearly-white bumps just under the surface of the skin. Milia are common in all ages and both sexes. They most often arise on the face and are particularly prominent on the eyelids and cheeks, but they may occur elsewhere. There are various kinds of milia.  milia: Affect 40-50% of  babies, few to numerous lesions, often seen on the nose, but may also arise inside the mouth on the mucosa (Belén pearls) or palate (Tammie nodules) or more widely on the scalp, face and upper trunk, Heal spontaneously within a few weeks of birth. Primary milia in children and adults: found around eyelids, cheeks, forehead and genitalia, in young children, a row of milia may appear along the nasal crease, may clear in a few weeks or persist for months or longer.     Juvenile milia: associated with Rombo syndrome, basal cell naevus syndrome, Ewfme-Sbrip-Xxcuvvuy syndrome, pachyonychia congenita, Santana syndrome and other genetic disorders, may be congenital (present at birth) or appear later in life. Milia en plaque: multiple milia appear on within an inflamed plaque up to several centimeters in diameter, usually found on an eyelid, behind the ear, on a cheek or jaw. 3. Affect children and adults, especially middle-aged women. 4. Sometimes associated with another skin disease including pseudoxanthoma elasticum, discoid lupus erythematosus, lichen planus. Multiple eruptive milia: crops of numerous milia appear over a few weeks to months, lesions may be asymptomatic or itchy, most often affect the face, upper arms and upper trunk. Traumatic milia: occur at the site of injury as skin heals, arise from eccrine sweat ducts, examples include thermal burns, dermabrasion, blistering rashes such as bullous pemphigoid, often seen on the back of hands and fingers in porphyria cutanea tarda, a milia-like calcified nodule may develop after  heel stick blood test.   Milia associated with drugs: may rarely follow the use of topical medication, such as phenols, hydroquinone, 5-fluorouracil cream, and a corticosteroid. Milia have a characteristic appearance. However, on occasion, a skin biopsy may be performed. This shows a small epidermoid cyst coming from a vellus hair follicle. Milia should be distinguished from other types of cyst, comedones, xanthelasma and syringomas. Colloid milia are rousseau coloured bumps on cheeks and temples associated with excessive exposure to sunlight. They should also be distinguished from milia-like cysts noted on dermoscopy in seborrhoeic keratoses, papillomatous moles and some basal cell carcinomas. Milia do not need to be treated unless they are a cause for concern for the patient. They often clear up by themselves within a few months.  Where possible, further trauma should be minimised to reduce the development of new lesions. The lesion may be de-roofed using a sterile needle or blade and the contents squeezed or pricked out. They may be destroyed using diathermy and curettage, or cryotherapy. For widespread lesions, topical retinoids may be helpful. Chemical peels, dermabrasion and laser ablation have been reported to be effective when used for very extensive milia. Milia en plaque may improve with minocycline (a tetracycline antibiotic).

## 2023-09-28 NOTE — PROGRESS NOTES
[facility] Note     Patient Name: Walter Alejandro  Encounter Date: 09/28/2023     Have you been cared for by a [facility] Dermatologist in the last 3 years and, if so, which description applies to you? Yes. I have been here within the last 3 years, and my medical history has NOT changed since that time. I am FEMALE/of child-bearing potential.    REVIEW OF SYSTEMS:  Have you recently had or currently have any of the following? · No changes in my recent health. PAST MEDICAL HISTORY:  Have you personally ever had or currently have any of the following? If "YES," then please provide more detail. · No changes in my medical history. FAMILY HISTORY:  Any "first degree relatives" (parent, brother, sister, or child) with the following? • No changes in my family's known health. PATIENT EXPERIENCE:    • Do you want the Dermatologist to perform a COMPLETE skin exam today including a clinical examination under the "bra and underwear" areas? NO  • If necessary, do we have your permission to call and leave a detailed message on your Preferred Phone number that includes your specific medical information?   NO      No Known Allergies   Current Outpatient Medications:   •  albuterol (ACCUNEB) 1.25 MG/3ML nebulizer solution, Take 1 ampule by nebulization as needed for wheezing , Disp: , Rfl:   •  bisacodyl (DULCOLAX) 5 mg EC tablet, Take 2 tablets (10 mg total) by mouth once for 1 dose, Disp: 2 tablet, Rfl: 0  •  carisoprodol (SOMA) 350 mg tablet, Take 1 tablet (350 mg total) by mouth 3 (three) times a day as needed for muscle spasms, Disp: 30 tablet, Rfl: 0  •  diclofenac (VOLTAREN) 75 mg EC tablet, Take 1 tablet (75 mg total) by mouth 2 (two) times a day, Disp: 60 tablet, Rfl: 0  •  famotidine (PEPCID) 20 mg tablet, TAKE ONE TABLET BY MOUTH EVERY DAY AT BEDTIME, Disp: 90 tablet, Rfl: 1  •  inFLIXimab (REMICADE) 100 mg, Infuse into a venous catheter every 56 days, Disp: , Rfl:   •  ketoconazole (NIZORAL) 2 % cream, Apply 3-4 times per day to affected nails for suppression, Disp: 15 g, Rfl: 2  •  latanoprost (XALATAN) 0.005 % ophthalmic solution, , Disp: , Rfl:   •  levothyroxine 50 mcg tablet, TAKE ONE TABLET BY MOUTH DAILY, Disp: 90 tablet, Rfl: 0  •  LORazepam (ATIVAN) 0.5 mg tablet, TAKE ONE TABLET BY MOUTH EVERY DAY AS NEEDED FOR ANXIETY, Disp: 30 tablet, Rfl: 0  •  Lumigan 0.01 % ophthalmic drops, , Disp: , Rfl:   •  naloxone (NARCAN) 4 mg/0.1 mL nasal spray, Administer 1 spray into a nostril.  If no response after 2-3 minutes, give another dose in the other nostril using a new spray., Disp: 1 each, Rfl: 1  •  ondansetron (ZOFRAN-ODT) 4 mg disintegrating tablet, Take 1 tablet (4 mg total) by mouth every 6 (six) hours as needed for nausea or vomiting (Patient taking differently: Take 4 mg by mouth as needed for nausea or vomiting), Disp: 30 tablet, Rfl: 1  •  polyethylene glycol (GLYCOLAX) 17 GM/SCOOP powder, Take 238 g by mouth once for 1 dose Use as directed for bowel prep, Disp: 238 g, Rfl: 0  •  traMADol (ULTRAM) 50 mg tablet, Take 1 tablet (50 mg total) by mouth daily as needed for moderate pain, Disp: 20 tablet, Rfl: 0  •  triamcinolone (KENALOG) 0.5 % cream, Apply topically 3 (three) times a day, Disp: 30 g, Rfl: 0  •  vitamin C (VITAMIN C) 500 mg tablet, Take 1 tablet (500 mg total) by mouth daily, Disp: 30 tablet, Rfl:   •  zolpidem (AMBIEN) 10 mg tablet, Take 1 tablet (10 mg total) by mouth daily at bedtime as needed for sleep, Disp: 90 tablet, Rfl: 0          • Whom besides the patient is providing clinical information about today's encounter?   o NO ADDITIONAL HISTORIAN (patient alone provided history)    Physical Exam and Assessment/Plan by Diagnosis:    SEBORRHEIC KERATOSES  - Relevant exam: Scattered over the trunk/extremities are waxy brown to black plaques and papules with stuck on appearance and consistent dermoscopy  - Exam and clinical history consistent with seborrheic keratoses  - Counseled that these are benign growths that do not require treatment  - Counseled that removal of lesions is considered cosmetic and so would incur a fee should patient elect to move forward. MELANOCYTIC NEVI  -Relevant exam: Scattered over the trunk/extremities are homogenously pigmented brown macules and papules. ELM performed and without concerning findings. No outliers unless otherwise noted in today's note  - Exam and clinical history consistent with melanocytic nevi  - Educated on the ABCDE's of melanoma; handout provided  - Counseled to return to clinic prior to scheduled appointment should any of these lesions change or should any new lesions of concern arise  - Counseled on use of sun protection daily. Reviewed latest FDA sunscreen guidelines, including use of broad spectrum (UVA and UVB blocking) sunscreen or sun protective clothing with SPF 30-50 every 2-3 hours and reapplied after exposure to water; use of photoprotective clothing, including a broad brim hat and UPF rated clothing if outdoors for several hours; avoid use of tanning beds as these pose significant risk for melanoma and skin cancer. LENTIGINES  OTHER SKIN CHANGES DUE TO CHRONIC EXPOSURE TO NONIONIZING RADIATION  - Relevant exam: Over sun exposed areas are brown macules. ELM performed and without concerning findings. - Exam and clinical history consistent with lentigines. - Educated that these are indicative of prior sun exposure. - Counseled to return to clinic prior to scheduled appointment should any of these lesions change or should any new lesions of concern arise.  - Recommended use of sunscreen as above and below. - Counseled on use of sun protection daily.  Reviewed latest FDA sunscreen guidelines, including use of broad spectrum (UVA and UVB blocking) sunscreen or sun protective clothing with SPF 30-50 every 2-3 hours and reapplied after exposure to water; use of photoprotective clothing, including a broad brim hat and UPF rated clothing if outdoors for several hours; avoid use of tanning beds as these pose significant risk for melanoma and skin cancer. CHERRY ANGIOMAS  - Relevant exam: Scattered over the trunk/extremities are red papules  - Exam and clinical history consistent with cherry angiomas  - Educated that these are benign  - Educated that removal is considered aesthetic and would incur a fee. MILIUM     Physical Exam:  • Anatomic Location Affected:  Scattered face and eyelids  • Morphological Description:  small white papules  • Pertinent Positives:  • Pertinent Negatives: Additional History of Present Condition:  Found on exam    Assessment and Plan:  Based on a thorough discussion of this condition and the management approach to it (including a comprehensive discussion of the known risks, side effects and potential benefits of treatment), the patient (family) agrees to implement the following specific plan:  • Reassured benign    Assessment and Plan  A milium is a small cyst containing keratin (the skin protein); they are usually multiple and are then known as milia. These harmless cysts present as tiny pearly-white bumps just under the surface of the skin. Milia are common in all ages and both sexes. They most often arise on the face and are particularly prominent on the eyelids and cheeks, but they may occur elsewhere. There are various kinds of milia. •  milia: Affect 40-50% of  babies, few to numerous lesions, often seen on the nose, but may also arise inside the mouth on the mucosa (Belén pearls) or palate (Tammie nodules) or more widely on the scalp, face and upper trunk, Heal spontaneously within a few weeks of birth. • Primary milia in children and adults: found around eyelids, cheeks, forehead and genitalia, in young children, a row of milia may appear along the nasal crease, may clear in a few weeks or persist for months or longer.     • Juvenile milia: associated with Rombo syndrome, basal cell naevus syndrome, Omren-Vsooa-Cytbnoom syndrome, pachyonychia congenita, Santana syndrome and other genetic disorders, may be congenital (present at birth) or appear later in life. • Milia en plaque: multiple milia appear on within an inflamed plaque up to several centimeters in diameter, usually found on an eyelid, behind the ear, on a cheek or jaw. 3. Affect children and adults, especially middle-aged women. 4. Sometimes associated with another skin disease including pseudoxanthoma elasticum, discoid lupus erythematosus, lichen planus. • Multiple eruptive milia: crops of numerous milia appear over a few weeks to months, lesions may be asymptomatic or itchy, most often affect the face, upper arms and upper trunk. • Traumatic milia: occur at the site of injury as skin heals, arise from eccrine sweat ducts, examples include thermal burns, dermabrasion, blistering rashes such as bullous pemphigoid, often seen on the back of hands and fingers in porphyria cutanea tarda, a milia-like calcified nodule may develop after  heel stick blood test.   • Milia associated with drugs: may rarely follow the use of topical medication, such as phenols, hydroquinone, 5-fluorouracil cream, and a corticosteroid. Milia have a characteristic appearance. However, on occasion, a skin biopsy may be performed. This shows a small epidermoid cyst coming from a vellus hair follicle. Milia should be distinguished from other types of cyst, comedones, xanthelasma and syringomas. Colloid milia are rousseau coloured bumps on cheeks and temples associated with excessive exposure to sunlight. They should also be distinguished from milia-like cysts noted on dermoscopy in seborrhoeic keratoses, papillomatous moles and some basal cell carcinomas. Milia do not need to be treated unless they are a cause for concern for the patient. They often clear up by themselves within a few months.  Where possible, further trauma should be minimised to reduce the development of new lesions. • The lesion may be de-roofed using a sterile needle or blade and the contents squeezed or pricked out. • They may be destroyed using diathermy and curettage, or cryotherapy. • For widespread lesions, topical retinoids may be helpful. • Chemical peels, dermabrasion and laser ablation have been reported to be effective when used for very extensive milia. • Milia en plaque may improve with minocycline (a tetracycline antibiotic). Of note, patient has a history of intermittent rash on upper lip not active right now. Advised patient to send photos for next flare. 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 Patient agrees to plan.       Scribe Attestation      I,:  Tanya Echeverria am acting as a scribe while in the presence of the attending physician.:       I,:  South Guzman MD personally performed the services described in this documentation    as scribed in my presence.:

## 2023-10-09 DIAGNOSIS — F41.9 ANXIETY: ICD-10-CM

## 2023-10-09 RX ORDER — LORAZEPAM 0.5 MG/1
0.5 TABLET ORAL DAILY PRN
Qty: 30 TABLET | Refills: 0 | Status: SHIPPED | OUTPATIENT
Start: 2023-10-09

## 2023-11-01 ENCOUNTER — TELEPHONE (OUTPATIENT)
Age: 58
End: 2023-11-01

## 2023-11-01 NOTE — TELEPHONE ENCOUNTER
Patients GI provider:  Dr. Madina Madera      Number to return call: [phone]    Reason for call: Pt calling to reschedule her colonoscopy and wanted me to tell Jose Raul Galvez why; Pt was scheduled for 12.05.23 but her  is being moved into and assisted living situation and she is having to have several appt to accommodate this, including one to apply for his new disability insurance which is schedule for 12.05.23. she feels she cannot miss that appt at this time and so has rescheduled.  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 she wants to apologize to the Dr but unfortunately this is the best she can do at this time     Scheduled procedure/appointment date if applicable: 42.10.52

## 2023-11-03 DIAGNOSIS — F41.9 ANXIETY: ICD-10-CM

## 2023-11-06 ENCOUNTER — HOSPITAL ENCOUNTER (OUTPATIENT)
Dept: MAMMOGRAPHY | Facility: CLINIC | Age: 58
Discharge: HOME | End: 2023-11-06
Payer: COMMERCIAL

## 2023-11-06 ENCOUNTER — HOSPITAL ENCOUNTER (OUTPATIENT)
Dept: ULTRASOUND IMAGING | Facility: CLINIC | Age: 58
Discharge: HOME | End: 2023-11-06
Payer: COMMERCIAL

## 2023-11-06 VITALS — WEIGHT: 161 LBS | HEIGHT: 63 IN | BODY MASS INDEX: 28.53 KG/M2

## 2023-11-06 DIAGNOSIS — R92.8 ABNORMAL MAMMOGRAM: ICD-10-CM

## 2023-11-06 PROCEDURE — G0279 TOMOSYNTHESIS, MAMMO: HCPCS

## 2023-11-06 PROCEDURE — 77066 DX MAMMO INCL CAD BI: CPT

## 2023-11-06 PROCEDURE — 76642 ULTRASOUND BREAST LIMITED: CPT

## 2023-11-06 RX ORDER — LORAZEPAM 0.5 MG/1
0.5 TABLET ORAL DAILY PRN
Qty: 30 TABLET | Refills: 0 | Status: SHIPPED | OUTPATIENT
Start: 2023-11-06

## 2023-11-07 ENCOUNTER — TELEPHONE (OUTPATIENT)
Dept: FAMILY MEDICINE CLINIC | Facility: CLINIC | Age: 58
End: 2023-11-07

## 2023-11-07 NOTE — TELEPHONE ENCOUNTER
Called Pt. Spoke to Pt, and Pt advised on results. The 2 areas in the left breast appear benign- stable.    A repeat ultrasound in 1 year for the left and a diagnostic mammogram for both breasts in 1 year is recommended

## 2023-11-07 NOTE — TELEPHONE ENCOUNTER
----- Message from Xiomy Forman DO sent at 11/6/2023  5:22 PM EST -----  The 2 areas in the left breast appear benign- stable.   A repeat ultrasound in 1 year for the left and a diagnostic mammogram for both breasts in 1 year is recommended

## 2023-11-13 ENCOUNTER — PATIENT MESSAGE (OUTPATIENT)
Dept: DERMATOLOGY | Facility: CLINIC | Age: 58
End: 2023-11-13

## 2023-11-13 DIAGNOSIS — L30.9 DERMATITIS: Primary | ICD-10-CM

## 2023-11-27 DIAGNOSIS — K51.011 ULCERATIVE PANCOLITIS WITH RECTAL BLEEDING (HCC): Primary | ICD-10-CM

## 2023-11-27 RX ORDER — DIPHENHYDRAMINE HCL 25 MG
25 TABLET ORAL ONCE
Status: CANCELLED | OUTPATIENT
Start: 2023-11-29

## 2023-11-27 RX ORDER — METHYLPREDNISOLONE SODIUM SUCCINATE 40 MG/ML
40 INJECTION, POWDER, LYOPHILIZED, FOR SOLUTION INTRAMUSCULAR; INTRAVENOUS ONCE
Status: CANCELLED | OUTPATIENT
Start: 2023-11-29

## 2023-11-27 RX ORDER — ACETAMINOPHEN 325 MG/1
650 TABLET ORAL ONCE
Status: CANCELLED | OUTPATIENT
Start: 2023-11-29

## 2023-11-27 RX ORDER — SODIUM CHLORIDE 9 MG/ML
20 INJECTION, SOLUTION INTRAVENOUS ONCE
Status: CANCELLED | OUTPATIENT
Start: 2023-11-29

## 2023-11-29 ENCOUNTER — HOSPITAL ENCOUNTER (OUTPATIENT)
Dept: INFUSION CENTER | Facility: HOSPITAL | Age: 58
Discharge: HOME | End: 2023-11-29
Payer: COMMERCIAL

## 2023-11-29 VITALS
RESPIRATION RATE: 16 BRPM | HEART RATE: 60 BPM | OXYGEN SATURATION: 100 % | BODY MASS INDEX: 28.7 KG/M2 | WEIGHT: 162 LBS | TEMPERATURE: 97.5 F | DIASTOLIC BLOOD PRESSURE: 73 MMHG | SYSTOLIC BLOOD PRESSURE: 146 MMHG | HEIGHT: 63 IN

## 2023-11-29 DIAGNOSIS — K51.011 ULCERATIVE PANCOLITIS WITH RECTAL BLEEDING (HCC): Primary | ICD-10-CM

## 2023-11-29 PROCEDURE — 96413 CHEMO IV INFUSION 1 HR: CPT

## 2023-11-29 PROCEDURE — 96415 CHEMO IV INFUSION ADDL HR: CPT

## 2023-11-29 RX ORDER — METHYLPREDNISOLONE SODIUM SUCCINATE 40 MG/ML
40 INJECTION, POWDER, LYOPHILIZED, FOR SOLUTION INTRAMUSCULAR; INTRAVENOUS ONCE
OUTPATIENT
Start: 2024-01-24

## 2023-11-29 RX ORDER — METHYLPREDNISOLONE SODIUM SUCCINATE 40 MG/ML
40 INJECTION, POWDER, LYOPHILIZED, FOR SOLUTION INTRAMUSCULAR; INTRAVENOUS ONCE
Status: DISCONTINUED | OUTPATIENT
Start: 2023-11-29 | End: 2023-12-02 | Stop reason: HOSPADM

## 2023-11-29 RX ORDER — DIPHENHYDRAMINE HCL 25 MG
25 TABLET ORAL ONCE
Status: DISCONTINUED | OUTPATIENT
Start: 2023-11-29 | End: 2023-12-02 | Stop reason: HOSPADM

## 2023-11-29 RX ORDER — SODIUM CHLORIDE 9 MG/ML
20 INJECTION, SOLUTION INTRAVENOUS ONCE
Status: COMPLETED | OUTPATIENT
Start: 2023-11-29 | End: 2023-11-29

## 2023-11-29 RX ORDER — SODIUM CHLORIDE 9 MG/ML
20 INJECTION, SOLUTION INTRAVENOUS ONCE
OUTPATIENT
Start: 2024-01-24

## 2023-11-29 RX ORDER — ACETAMINOPHEN 325 MG/1
650 TABLET ORAL ONCE
Status: DISCONTINUED | OUTPATIENT
Start: 2023-11-29 | End: 2023-12-02 | Stop reason: HOSPADM

## 2023-11-29 RX ORDER — ACETAMINOPHEN 325 MG/1
650 TABLET ORAL ONCE
OUTPATIENT
Start: 2024-01-24

## 2023-11-29 RX ORDER — DIPHENHYDRAMINE HCL 25 MG
25 TABLET ORAL ONCE
OUTPATIENT
Start: 2024-01-24

## 2023-11-29 RX ADMIN — INFLIXIMAB 548 MG: 100 INJECTION, POWDER, LYOPHILIZED, FOR SOLUTION INTRAVENOUS at 08:15

## 2023-11-29 RX ADMIN — SODIUM CHLORIDE 20 ML/HR: 9 INJECTION, SOLUTION INTRAVENOUS at 08:15

## 2023-12-08 DIAGNOSIS — L30.9 DERMATITIS OF LIP: Primary | ICD-10-CM

## 2023-12-08 RX ORDER — PIMECROLIMUS 10 MG/G
CREAM TOPICAL
Qty: 30 G | Refills: 5 | Status: SHIPPED | OUTPATIENT
Start: 2023-12-08

## 2023-12-09 DIAGNOSIS — E03.9 ACQUIRED HYPOTHYROIDISM: ICD-10-CM

## 2023-12-09 RX ORDER — LEVOTHYROXINE SODIUM 0.05 MG/1
TABLET ORAL
Qty: 90 TABLET | Refills: 0 | Status: SHIPPED | OUTPATIENT
Start: 2023-12-09

## 2023-12-25 DIAGNOSIS — M25.551 RIGHT HIP PAIN: ICD-10-CM

## 2023-12-25 DIAGNOSIS — M51.16 INTERVERTEBRAL DISC DISORDER WITH RADICULOPATHY OF LUMBAR REGION: ICD-10-CM

## 2023-12-25 DIAGNOSIS — F51.04 CHRONIC INSOMNIA: ICD-10-CM

## 2023-12-26 RX ORDER — ZOLPIDEM TARTRATE 10 MG/1
10 TABLET ORAL
Qty: 90 TABLET | Refills: 0 | Status: SHIPPED | OUTPATIENT
Start: 2023-12-26 | End: 2024-01-25

## 2023-12-26 NOTE — TELEPHONE ENCOUNTER
Called Pt. Left a voicemail to call the office back  Advised due for office visit physical and to schedule. Provided call back #

## 2023-12-28 DIAGNOSIS — F41.9 ANXIETY: ICD-10-CM

## 2023-12-29 RX ORDER — TRAMADOL HYDROCHLORIDE 50 MG/1
50 TABLET ORAL DAILY PRN
Qty: 20 TABLET | Refills: 0 | Status: SHIPPED | OUTPATIENT
Start: 2023-12-29

## 2023-12-29 RX ORDER — LORAZEPAM 0.5 MG/1
0.5 TABLET ORAL DAILY PRN
Qty: 30 TABLET | Refills: 0 | Status: SHIPPED | OUTPATIENT
Start: 2023-12-29

## 2023-12-29 NOTE — TELEPHONE ENCOUNTER
Refills have been requested for the following medications:         LORazepam (ATIVAN) 0.5 mg tablet [Jessica Mejia]     Preferred pharmacy: [facility] [place] - [id number]

## 2024-01-08 ENCOUNTER — OFFICE VISIT (OUTPATIENT)
Dept: FAMILY MEDICINE CLINIC | Facility: CLINIC | Age: 59
End: 2024-01-08
Payer: COMMERCIAL

## 2024-01-08 VITALS
TEMPERATURE: 97.2 F | HEIGHT: 63 IN | WEIGHT: 167 LBS | BODY MASS INDEX: 29.59 KG/M2 | OXYGEN SATURATION: 98 % | DIASTOLIC BLOOD PRESSURE: 82 MMHG | HEART RATE: 73 BPM | SYSTOLIC BLOOD PRESSURE: 132 MMHG

## 2024-01-08 DIAGNOSIS — K21.9 GASTROESOPHAGEAL REFLUX DISEASE WITHOUT ESOPHAGITIS: ICD-10-CM

## 2024-01-08 DIAGNOSIS — Z13.29 SCREENING FOR THYROID DISORDER: ICD-10-CM

## 2024-01-08 DIAGNOSIS — E55.9 VITAMIN D DEFICIENCY: ICD-10-CM

## 2024-01-08 DIAGNOSIS — Z00.00 WELL ADULT EXAM: Primary | ICD-10-CM

## 2024-01-08 DIAGNOSIS — Z13.220 SCREENING, LIPID: ICD-10-CM

## 2024-01-08 DIAGNOSIS — Z13.1 SCREENING FOR DIABETES MELLITUS: ICD-10-CM

## 2024-01-08 DIAGNOSIS — M25.50 ARTHRALGIA OF MULTIPLE JOINTS: ICD-10-CM

## 2024-01-08 DIAGNOSIS — Z13.0 SCREENING, ANEMIA, DEFICIENCY, IRON: ICD-10-CM

## 2024-01-08 DIAGNOSIS — M62.838 MUSCLE SPASM: ICD-10-CM

## 2024-01-08 DIAGNOSIS — E03.9 ACQUIRED HYPOTHYROIDISM: ICD-10-CM

## 2024-01-08 DIAGNOSIS — F51.04 CHRONIC INSOMNIA: ICD-10-CM

## 2024-01-08 PROCEDURE — 99396 PREV VISIT EST AGE 40-64: CPT | Performed by: FAMILY MEDICINE

## 2024-01-08 RX ORDER — TIZANIDINE 4 MG/1
4 TABLET ORAL EVERY 8 HOURS PRN
Qty: 30 TABLET | Refills: 0 | Status: SHIPPED | OUTPATIENT
Start: 2024-01-08

## 2024-01-08 RX ORDER — BUPROPION HYDROCHLORIDE 100 MG/1
100 TABLET ORAL 2 TIMES DAILY
COMMUNITY

## 2024-01-08 RX ORDER — PANTOPRAZOLE SODIUM 40 MG/1
40 TABLET, DELAYED RELEASE ORAL
Qty: 30 TABLET | Refills: 0 | Status: SHIPPED | OUTPATIENT
Start: 2024-01-08 | End: 2024-07-06

## 2024-01-08 NOTE — PROGRESS NOTES
ADULT ANNUAL PHYSICAL  [facility]    NAME: Leilani Blount  AGE: 58 y.o. SEX: female  : 1965     DATE: 2024     Assessment and Plan:     1. Well adult exam    2. Muscle spasm  Assessment & Plan:  Tizanidine during the day as needed  Soma at bedtime  Heat/ice alternating     Orders:  -     tiZANidine (ZANAFLEX) 4 mg tablet; Take 1 tablet (4 mg total) by mouth every 8 (eight) hours as needed for muscle spasms    3. Gastroesophageal reflux disease without esophagitis  Assessment & Plan:  Break through at night with taking pepcid daily at night. Tries tums and gaviscon without relief.   Change to pantoprazole 1 tab daily for 4 weeks then if all symptoms resolve, back to pepcid     Orders:  -     pantoprazole (PROTONIX) 40 mg tablet; Take 1 tablet (40 mg total) by mouth daily before breakfast    4. Chronic insomnia  Assessment & Plan:  Medication agreement up to date. Ambien helping with sleep. Started wellbutrin for anxiety/depression with increased stress with  moving into extended care facility for dementia.       5. Acquired hypothyroidism  Assessment & Plan:  Due for tsh after . Continue levothyroxine       6. Vitamin D deficiency  -     Vitamin D 25 hydroxy; Future    7. Arthralgia of multiple joints  -     C-reactive protein; Future    8. Screening, anemia, deficiency, iron  -     CBC and differential; Future    9. Screening for diabetes mellitus  -     Comprehensive metabolic panel; Future  -     Hemoglobin A1C; Future    10. Screening, lipid  -     Lipid Panel with Direct LDL reflex; Future    11. Screening for thyroid disorder  -     TSH, 3rd generation with Free T4 reflex; Future    12. BMI 29.0-29.9,adult        Immunizations and preventive care screenings were discussed with patient today. Appropriate education was printed on patient's after visit summary.    Counseling:  Dental Health: discussed importance of regular tooth brushing,  flossing, and dental visits.  Exercise: the importance of regular exercise/physical activity was discussed. Recommend exercise 3-5 times per week for at least 30 minutes.       Depression Screening and Follow-up Plan: Patient's depression screening was positive with a PHQ-9 score of 9. Patient assessed for underlying major depression. Brief counseling provided and recommend additional follow-up/re-evaluation next office visit.         No follow-ups on file.     Chief Complaint:     Chief Complaint   Patient presents with    Physical Exam     Physical   No Qs       History of Present Illness:     Pt is here for physical -   Increased stress- pt  is the nursing home- with early onset dementia. Restarted wellbutrin- started yesterday  Could not tolerate effexor- had numbness to feelings and had weight gain.   Woke up with back pain this am, across low back. Spasms across low back  Had Robaxin from 2021- took 1 this am, soma at bedtime.   Seen by dermatology  Heartburn intermittent taking pepcid daily - mostly at night.   Seen by dermatology, Dr. diony Quinteros            Review of Systems:     Review of Systems   Constitutional: Negative.  Negative for fatigue and fever.   HENT: Negative.     Eyes: Negative.    Respiratory: Negative.  Negative for cough.    Cardiovascular: Negative.    Gastrointestinal:         Heartburn   Endocrine: Negative.    Genitourinary: Negative.    Musculoskeletal:  Positive for arthralgias and back pain.   Skin: Negative.    Allergic/Immunologic: Negative.    Neurological: Negative.    Psychiatric/Behavioral:  Positive for dysphoric mood and sleep disturbance. The patient is nervous/anxious.       Past Medical History:     Past Medical History:   Diagnosis Date    Adjustment disorder     last assessed 05/16/12    Anemia     HX of    Asthma     mild - intermittent    Bilateral leg edema     Blepharitis     last assessed 02/04/16    Bulging lumbar disc     Carpal tunnel syndrome      Unspecified laterality    Colitis, acute     Colon polyp     Disease of thyroid gland     Dyspareunia in female     Edema     last assessed 06/22/15    Ganglion     Glaucoma     Glaucoma     Herniated cervical disc     History of transfusion     Hypokalemia     Hypothyroidism     Hypothyroidism     Insomnia     Lumps on the skin     last assessed 14    Mouth ulcers     last assessed 06/22/15    Nontraumatic tear of left tibialis posterior tendon     Traumatic teart     Polyarthritis     last assessed 16    Raynaud's disease     Temporomandibular disorder     Joint    Thyroid disease     Ulcerative colitis (HCC)     Ulcerative colitis (HCC)       Past Surgical History:     Past Surgical History:   Procedure Laterality Date    ANKLE SURGERY Left     Tendon repair    CARPAL TUNNEL RELEASE Bilateral      SECTION, LOW TRANSVERSE      COLONOSCOPY      COLONOSCOPY N/A 2018    Procedure: COLONOSCOPY;  Surgeon: Quincy Fierro MD;  Location: [facility];  Service: Gastroenterology    FL INJECTION RIGHT HIP (ARTHROGRAM)  9/15/2020    HERNIA REPAIR      umbilical    HYSTERECTOMY      KNEE ARTHROSCOPY Right     LIPECTOMY      Multipe lipoma removals    LIPOMA RESECTION      PARATHYROIDECTOMY      PA COLONOSCOPY FLX DX W/COLLJ SPEC WHEN PFRMD N/A 2016    Procedure: COLONOSCOPY;  Surgeon: Stephanie Vasquez DO;  Location: [facility];  Service: Gastroenterology    PA RPR FLEXOR TENDON LEG SECONDARY W/O GRAFT EACH Left 2016    Procedure: REPAIR OF LEFT POSTERIOR TIBIAL TENDON WITH GRAFT, EXPLORATION LEFT ANKLE ;  Surgeon: Brandyn Menendez DPM;  Location: [facility] OR;  Service: Podiatry    SHOULDER SURGERY Left     bicep tendon and labrum repair    TONSILLECTOMY      TOOTH EXTRACTION  2018      Social History:     Social History     Socioeconomic History    Marital status: /Civil Union     Spouse name: None    Number of children: None    Years of education: None    Highest education level: None    Occupational History    Occupation: RN at [employer]   Tobacco Use    Smoking status: Former     Current packs/day: 0.00     Types: Cigarettes     Quit date: 2003     Years since quittin.7    Smokeless tobacco: Never    Tobacco comments:     Quit , rare use for 2 years   Vaping Use    Vaping status: Never Used   Substance and Sexual Activity    Alcohol use: Yes     Comment: social 1 drink per weeek    Drug use: No    Sexual activity: Yes     Partners: Male   Other Topics Concern    None   Social History Narrative    None     Social Determinants of Health     Financial Resource Strain: Not on file   Food Insecurity: Not on file   Transportation Needs: Not on file   Physical Activity: Not on file   Stress: Not on file   Social Connections: Not on file   Intimate Partner Violence: Not on file   Housing Stability: Not on file      Family History:     Family History   Problem Relation Age of Onset    Parkinsonism Mother     Rheum arthritis Mother     Thyroid disease unspecified Mother     Hypothyroidism Mother     Skin cancer Mother     Heart attack Father     Hypertension Father     Osteoporosis Father     Prostate cancer Father     Nephrolithiasis Father     Skin cancer Father     Hashimoto's thyroiditis Sister     Thyroid disease unspecified Sister     Hypothyroidism Sister     Skin cancer Sister     No Known Problems Maternal Grandmother     No Known Problems Maternal Grandfather     No Known Problems Paternal Grandmother     Prostate cancer Paternal Grandfather     Rheum arthritis Maternal Aunt     Thyroid disease unspecified Maternal Aunt     Hypothyroidism Maternal Aunt     No Known Problems Maternal Aunt     Crohn's disease Maternal Uncle     Psoriasis Maternal Uncle     Ulcerative colitis Maternal Uncle     Rheum arthritis Maternal Uncle     Crohn's disease Other     Crohn's disease Family     Osteoarthritis Family     Rheum arthritis Family     Ulcerative colitis Family       Current Medications:      Current Outpatient Medications   Medication Sig Dispense Refill    albuterol (ACCUNEB) 1.25 MG/3ML nebulizer solution Take 1 ampule by nebulization as needed for wheezing       bisacodyl (DULCOLAX) 5 mg EC tablet Take 2 tablets (10 mg total) by mouth once for 1 dose 2 tablet 0    buPROPion (WELLBUTRIN) 100 mg tablet Take 100 mg by mouth 2 (two) times a day      carisoprodol (SOMA) 350 mg tablet Take 1 tablet (350 mg total) by mouth 3 (three) times a day as needed for muscle spasms 30 tablet 0    diclofenac (VOLTAREN) 75 mg EC tablet Take 1 tablet (75 mg total) by mouth 2 (two) times a day 60 tablet 0    famotidine (PEPCID) 20 mg tablet TAKE ONE TABLET BY MOUTH EVERY DAY AT BEDTIME 90 tablet 1    hydrocortisone 2.5 % ointment Apply topically 2 (two) times a day For no longer than 5-7 days to affected skin on the face. Use only for flares, and take 1-2 week break between using. 30 g 0    inFLIXimab (REMICADE) 100 mg Infuse into a venous catheter every 56 days      ketoconazole (NIZORAL) 2 % cream Apply 3-4 times per day to affected nails for suppression 15 g 2    latanoprost (XALATAN) 0.005 % ophthalmic solution       levothyroxine 50 mcg tablet TAKE ONE TABLET BY MOUTH DAILY 90 tablet 0    LORazepam (ATIVAN) 0.5 mg tablet Take 1 tablet (0.5 mg total) by mouth daily as needed for anxiety 30 tablet 0    Lumigan 0.01 % ophthalmic drops       naloxone (NARCAN) 4 mg/0.1 mL nasal spray Administer 1 spray into a nostril. If no response after 2-3 minutes, give another dose in the other nostril using a new spray. 1 each 1    ondansetron (ZOFRAN-ODT) 4 mg disintegrating tablet Take 1 tablet (4 mg total) by mouth every 6 (six) hours as needed for nausea or vomiting (Patient taking differently: Take 4 mg by mouth as needed for nausea or vomiting) 30 tablet 1    pantoprazole (PROTONIX) 40 mg tablet Take 1 tablet (40 mg total) by mouth daily before breakfast 30 tablet 0    pimecrolimus (ELIDEL) 1 % cream Apply BID to affected  "skin 30 g 5    polyethylene glycol (GLYCOLAX) 17 GM/SCOOP powder Take 238 g by mouth once for 1 dose Use as directed for bowel prep 238 g 0    tiZANidine (ZANAFLEX) 4 mg tablet Take 1 tablet (4 mg total) by mouth every 8 (eight) hours as needed for muscle spasms 30 tablet 0    traMADol (ULTRAM) 50 mg tablet Take 1 tablet (50 mg total) by mouth daily as needed for moderate pain 20 tablet 0    triamcinolone (KENALOG) 0.5 % cream Apply topically 3 (three) times a day 30 g 0    vitamin C (VITAMIN C) 500 mg tablet Take 1 tablet (500 mg total) by mouth daily 30 tablet     zolpidem (AMBIEN) 10 mg tablet Take 1 tablet (10 mg total) by mouth daily at bedtime as needed for sleep 90 tablet 0     No current facility-administered medications for this visit.      Allergies:     No Known Allergies   Physical Exam:     /82   Pulse 73   Temp (!) 97.2 °F (36.2 °C)   Ht 5' 2.99\" (1.6 m)   Wt 75.8 kg (167 lb)   SpO2 98%   BMI 29.59 kg/m²     Physical Exam  Vitals and nursing note reviewed.   Constitutional:       Appearance: She is well-developed.   HENT:      Head: Normocephalic and atraumatic.      Right Ear: External ear normal.      Left Ear: External ear normal.      Nose: Nose normal.   Eyes:      Conjunctiva/sclera: Conjunctivae normal.      Pupils: Pupils are equal, round, and reactive to light.   Cardiovascular:      Rate and Rhythm: Normal rate and regular rhythm.      Heart sounds: Normal heart sounds.   Pulmonary:      Effort: Pulmonary effort is normal.      Breath sounds: Normal breath sounds.   Abdominal:      General: Bowel sounds are normal.      Palpations: Abdomen is soft.   Musculoskeletal:         General: Tenderness present. No swelling, deformity or signs of injury. Normal range of motion.      Cervical back: Normal range of motion and neck supple.      Comments: Tenderness paraspinal L1-L3 right greater than left    Skin:     General: Skin is warm and dry.   Neurological:      Mental Status: She is " alert and oriented to person, place, and time.   Psychiatric:         Behavior: Behavior normal.         Thought Content: Thought content normal.         Judgment: Judgment normal.        Depression Screening Follow-up Plan: Patient's depression screening was positive with a PHQ-2 score of . Their PHQ-9 score was 9. Patient assessed for underlying major depression. They have no active suicidal ideations. Brief counseling provided and recommend additional follow-up/re-evaluation next office visit.   Restarting wellbutrin. Mostly circumstances with pt  going into nursing home.   [facility]

## 2024-01-08 NOTE — PATIENT INSTRUCTIONS
Tizanidine 1 tab 3 times a day as needed  Soma at bedtime as needed    Wellness Visit for Adults   AMBULATORY CARE:   A wellness visit  is when you see your healthcare provider to get screened for health problems. Your healthcare provider will also give you advice on how to stay healthy. Write down your questions so you remember to ask them. Ask your healthcare provider how often you should have a wellness visit.  What happens at a wellness visit:  Your healthcare provider will ask about your health, and your family history of health problems. This includes high blood pressure, heart disease, and cancer. He or she will ask if you have symptoms that concern you, if you smoke, and about your mood. You may also be asked about your intake of medicines, supplements, food, and alcohol. Any of the following may be done:  Your weight  will be checked. Your height may also be checked so your body mass index (BMI) can be calculated. Your BMI shows if you are at a healthy weight.    Your blood pressure  and heart rate will be checked. Your temperature may also be checked.    Blood and urine tests  may be done. Blood tests may be done to check your cholesterol levels. Abnormal cholesterol levels increase your risk for heart disease and stroke. You may also need a blood or urine test to check for diabetes if you are at increased risk. Urine tests may be done to look for signs of an infection or kidney disease.    A physical exam  includes checking your heartbeat and lungs with a stethoscope. Your healthcare provider may also check your skin to look for sun damage.    Screening tests  may be recommended. A screening test is done to check for diseases that may not cause symptoms. The screening tests you may need depend on your age, gender, family history, and lifestyle habits. For example, colorectal screening may be recommended if you are 50 years old or older.    Screening tests you need if you are a woman:   A Pap smear  is  used to screen for cervical cancer. Pap smears are usually done every 3 to 5 years depending on your age. You may need them more often if you have had abnormal Pap smear test results in the past. Ask your healthcare provider how often you should have a Pap smear.    A mammogram  is an x-ray of your breasts to screen for breast cancer. Experts recommend mammograms every 2 years starting at age 50 years. You may need a mammogram at age 49 years or younger if you have an increased risk for breast cancer. Talk to your healthcare provider about when you should start having mammograms and how often you need them.    Vaccines you may need:   Get an influenza vaccine  every year. The influenza vaccine protects you from the flu. Several types of viruses cause the flu. The viruses change over time, so new vaccines are made each year.    Get a tetanus-diphtheria (Td) booster vaccine  every 10 years. This vaccine protects you against tetanus and diphtheria. Tetanus is a severe infection that may cause painful muscle spasms and lockjaw. Diphtheria is a severe bacterial infection that causes a thick covering in the back of your mouth and throat.    Get a human papillomavirus (HPV) vaccine  if you are female and aged 19 to 26 or male 19 to 21 and never received it. This vaccine protects you from HPV infection. HPV is the most common infection spread by sexual contact. HPV may also cause vaginal, penile, and anal cancers.    Get a pneumococcal vaccine  if you are aged 65 years or older. The pneumococcal vaccine is an injection given to protect you from pneumococcal disease. Pneumococcal disease is an infection caused by pneumococcal bacteria. The infection may cause pneumonia, meningitis, or an ear infection.    Get a shingles vaccine  if you are 60 or older, even if you have had shingles before. The shingles vaccine is an injection to protect you from the varicella-zoster virus. This is the same virus that causes chickenpox.  Shingles is a painful rash that develops in people who had chickenpox or have been exposed to the virus.    How to eat healthy:  My Plate is a model for planning healthy meals. It shows the types and amounts of foods that should go on your plate. Fruits and vegetables make up about half of your plate, and grains and protein make up the other half. A serving of dairy is included on the side of your plate. The amount of calories and serving sizes you need depends on your age, gender, weight, and height. Examples of healthy foods are listed below:  Eat a variety of vegetables  such as dark green, red, and orange vegetables. You can also include canned vegetables low in sodium (salt) and frozen vegetables without added butter or sauces.    Eat a variety of fresh fruits , canned fruit in 100% juice, frozen fruit, and dried fruit.    Include whole grains.  At least half of the grains you eat should be whole grains. Examples include whole-wheat bread, wheat pasta, brown rice, and whole-grain cereals such as oatmeal.    Eat a variety of protein foods such as seafood (fish and shellfish), lean meat, and poultry without skin (turkey and chicken). Examples of lean meats include pork leg, shoulder, or tenderloin, and beef round, sirloin, tenderloin, and extra lean ground beef. Other protein foods include eggs and egg substitutes, beans, peas, soy products, nuts, and seeds.    Choose low-fat dairy products such as skim or 1% milk or low-fat yogurt, cheese, and cottage cheese.    Limit unhealthy fats  such as butter, hard margarine, and shortening.       Exercise:  Exercise at least 30 minutes per day on most days of the week. Some examples of exercise include walking, biking, dancing, and swimming. You can also fit in more physical activity by taking the stairs instead of the elevator or parking farther away from stores. Include muscle strengthening activities 2 days each week. Regular exercise provides many health benefits. It  helps you manage your weight, and decreases your risk for type 2 diabetes, heart disease, stroke, and high blood pressure. Exercise can also help improve your mood. Ask your healthcare provider about the best exercise plan for you.       General health and safety guidelines:   Do not smoke.  Nicotine and other chemicals in cigarettes and cigars can cause lung damage. Ask your healthcare provider for information if you currently smoke and need help to quit. E-cigarettes or smokeless tobacco still contain nicotine. Talk to your healthcare provider before you use these products.    Limit alcohol.  A drink of alcohol is 12 ounces of beer, 5 ounces of wine, or 1½ ounces of liquor.    Lose weight, if needed.  Being overweight increases your risk of certain health conditions. These include heart disease, high blood pressure, type 2 diabetes, and certain types of cancer.    Protect your skin.  Do not sunbathe or use tanning beds. Use sunscreen with a SPF 15 or higher. Apply sunscreen at least 15 minutes before you go outside. Reapply sunscreen every 2 hours. Wear protective clothing, hats, and sunglasses when you are outside.    Drive safely.  Always wear your seatbelt. Make sure everyone in your car wears a seatbelt. A seatbelt can save your life if you are in an accident. Do not use your cell phone when you are driving. This could distract you and cause an accident. Pull over if you need to make a call or send a text message.    Practice safe sex.  Use latex condoms if are sexually active and have more than one partner. Your healthcare provider may recommend screening tests for sexually transmitted infections (STIs).    Wear helmets, lifejackets, and protective gear.  Always wear a helmet when you ride a bike or motorcycle, go skiing, or play sports that could cause a head injury. Wear protective equipment when you play sports. Wear a lifejacket when you are on a boat or doing water sports.    © Copyright [employer] 2023  Information is for End User's use only and may not be sold, redistributed or otherwise used for commercial purposes.  The above information is an  only. It is not intended as medical advice for individual conditions or treatments. Talk to your doctor, nurse or pharmacist before following any medical regimen to see if it is safe and effective for you.

## 2024-01-08 NOTE — ASSESSMENT & PLAN NOTE
Medication agreement up to date. Ambien helping with sleep. Started wellbutrin for anxiety/depression with increased stress with  moving into extended care facility for dementia.

## 2024-01-08 NOTE — ASSESSMENT & PLAN NOTE
Break through at night with taking pepcid daily at night. Tries tums and gaviscon without relief.   Change to pantoprazole 1 tab daily for 4 weeks then if all symptoms resolve, back to pepcid

## 2024-01-21 DIAGNOSIS — F51.04 CHRONIC INSOMNIA: ICD-10-CM

## 2024-01-22 RX ORDER — ZOLPIDEM TARTRATE 10 MG/1
10 TABLET ORAL
Qty: 30 TABLET | Refills: 0 | Status: SHIPPED | OUTPATIENT
Start: 2024-01-22 | End: 2024-02-21

## 2024-01-25 ENCOUNTER — HOSPITAL ENCOUNTER (OUTPATIENT)
Dept: INFUSION CENTER | Facility: HOSPITAL | Age: 59
Discharge: HOME | End: 2024-01-25
Payer: COMMERCIAL

## 2024-01-25 VITALS
BODY MASS INDEX: 29.1 KG/M2 | RESPIRATION RATE: 16 BRPM | TEMPERATURE: 98.7 F | WEIGHT: 164.2 LBS | SYSTOLIC BLOOD PRESSURE: 126 MMHG | DIASTOLIC BLOOD PRESSURE: 74 MMHG | HEART RATE: 62 BPM

## 2024-01-25 DIAGNOSIS — K21.9 GASTROESOPHAGEAL REFLUX DISEASE WITHOUT ESOPHAGITIS: ICD-10-CM

## 2024-01-25 DIAGNOSIS — K51.011 ULCERATIVE PANCOLITIS WITH RECTAL BLEEDING (HCC): Primary | ICD-10-CM

## 2024-01-25 PROCEDURE — 96413 CHEMO IV INFUSION 1 HR: CPT

## 2024-01-25 PROCEDURE — 96415 CHEMO IV INFUSION ADDL HR: CPT

## 2024-01-25 RX ORDER — SODIUM CHLORIDE 9 MG/ML
20 INJECTION, SOLUTION INTRAVENOUS ONCE
OUTPATIENT
Start: 2024-03-21

## 2024-01-25 RX ORDER — ACETAMINOPHEN 325 MG/1
650 TABLET ORAL ONCE
Status: DISCONTINUED | OUTPATIENT
Start: 2024-01-25 | End: 2024-01-28 | Stop reason: HOSPADM

## 2024-01-25 RX ORDER — METHYLPREDNISOLONE SODIUM SUCCINATE 40 MG/ML
40 INJECTION, POWDER, LYOPHILIZED, FOR SOLUTION INTRAMUSCULAR; INTRAVENOUS ONCE
Status: DISCONTINUED | OUTPATIENT
Start: 2024-01-25 | End: 2024-01-28 | Stop reason: HOSPADM

## 2024-01-25 RX ORDER — SODIUM CHLORIDE 9 MG/ML
20 INJECTION, SOLUTION INTRAVENOUS ONCE
Status: COMPLETED | OUTPATIENT
Start: 2024-01-25 | End: 2024-01-25

## 2024-01-25 RX ORDER — ACETAMINOPHEN 325 MG/1
650 TABLET ORAL ONCE
OUTPATIENT
Start: 2024-03-21

## 2024-01-25 RX ORDER — DIPHENHYDRAMINE HCL 25 MG
25 TABLET ORAL ONCE
Status: DISCONTINUED | OUTPATIENT
Start: 2024-01-25 | End: 2024-01-28 | Stop reason: HOSPADM

## 2024-01-25 RX ORDER — METHYLPREDNISOLONE SODIUM SUCCINATE 40 MG/ML
40 INJECTION, POWDER, LYOPHILIZED, FOR SOLUTION INTRAMUSCULAR; INTRAVENOUS ONCE
OUTPATIENT
Start: 2024-03-21

## 2024-01-25 RX ORDER — DIPHENHYDRAMINE HCL 25 MG
25 TABLET ORAL ONCE
OUTPATIENT
Start: 2024-03-21

## 2024-01-25 RX ADMIN — SODIUM CHLORIDE 20 ML/HR: 9 INJECTION, SOLUTION INTRAVENOUS at 09:27

## 2024-01-25 RX ADMIN — INFLIXIMAB 569 MG: 100 INJECTION, POWDER, LYOPHILIZED, FOR SOLUTION INTRAVENOUS at 09:27

## 2024-01-25 NOTE — PROGRESS NOTES
Leilani Blount  tolerated treatment well with no complications.      Leilani Blount is aware of future appt on 3/20 at 0930.     AVS printed and given to Leilani Blount:  No (Declined by Leilani Blount)

## 2024-01-26 RX ORDER — PANTOPRAZOLE SODIUM 40 MG/1
40 TABLET, DELAYED RELEASE ORAL
Qty: 90 TABLET | Refills: 0 | Status: SHIPPED | OUTPATIENT
Start: 2024-01-26 | End: 2024-07-24

## 2024-02-06 DIAGNOSIS — F32.1 MODERATE MAJOR DEPRESSION (HCC): Primary | ICD-10-CM

## 2024-02-06 DIAGNOSIS — F32.1 MODERATE MAJOR DEPRESSION (HCC): ICD-10-CM

## 2024-02-06 RX ORDER — BUPROPION HYDROCHLORIDE 300 MG/1
300 TABLET ORAL EVERY MORNING
Qty: 30 TABLET | Refills: 5 | Status: SHIPPED | OUTPATIENT
Start: 2024-02-06 | End: 2024-02-06 | Stop reason: SDUPTHER

## 2024-02-06 RX ORDER — BUPROPION HYDROCHLORIDE 300 MG/1
300 TABLET ORAL EVERY MORNING
Qty: 90 TABLET | Refills: 1 | Status: SHIPPED | OUTPATIENT
Start: 2024-02-06 | End: 2024-08-04

## 2024-02-18 DIAGNOSIS — M25.551 RIGHT HIP PAIN: ICD-10-CM

## 2024-02-18 DIAGNOSIS — F41.9 ANXIETY: ICD-10-CM

## 2024-02-18 DIAGNOSIS — M62.838 MUSCLE SPASM: ICD-10-CM

## 2024-02-18 DIAGNOSIS — M51.16 INTERVERTEBRAL DISC DISORDER WITH RADICULOPATHY OF LUMBAR REGION: ICD-10-CM

## 2024-02-18 DIAGNOSIS — L40.50 PSORIATIC ARTHRITIS (HCC): ICD-10-CM

## 2024-02-18 DIAGNOSIS — K21.9 GASTROESOPHAGEAL REFLUX DISEASE WITHOUT ESOPHAGITIS: ICD-10-CM

## 2024-02-18 DIAGNOSIS — F51.04 CHRONIC INSOMNIA: ICD-10-CM

## 2024-02-18 DIAGNOSIS — E03.9 ACQUIRED HYPOTHYROIDISM: ICD-10-CM

## 2024-02-19 RX ORDER — TRAMADOL HYDROCHLORIDE 50 MG/1
50 TABLET ORAL DAILY PRN
Qty: 20 TABLET | Refills: 0 | Status: SHIPPED | OUTPATIENT
Start: 2024-02-19

## 2024-02-19 RX ORDER — FAMOTIDINE 20 MG/1
20 TABLET, FILM COATED ORAL
Qty: 90 TABLET | Refills: 1 | Status: SHIPPED | OUTPATIENT
Start: 2024-02-19

## 2024-02-19 RX ORDER — TIZANIDINE 4 MG/1
4 TABLET ORAL EVERY 8 HOURS PRN
Qty: 30 TABLET | Refills: 0 | Status: SHIPPED | OUTPATIENT
Start: 2024-02-19

## 2024-02-19 RX ORDER — DICLOFENAC SODIUM 75 MG/1
75 TABLET, DELAYED RELEASE ORAL 2 TIMES DAILY
Qty: 60 TABLET | Refills: 0 | Status: SHIPPED | OUTPATIENT
Start: 2024-02-19

## 2024-02-19 RX ORDER — ZOLPIDEM TARTRATE 10 MG/1
10 TABLET ORAL
Qty: 30 TABLET | Refills: 0 | Status: SHIPPED | OUTPATIENT
Start: 2024-02-19 | End: 2024-03-20

## 2024-02-19 RX ORDER — LORAZEPAM 0.5 MG/1
0.5 TABLET ORAL DAILY PRN
Qty: 30 TABLET | Refills: 0 | Status: SHIPPED | OUTPATIENT
Start: 2024-02-19

## 2024-02-19 RX ORDER — LEVOTHYROXINE SODIUM 0.05 MG/1
50 TABLET ORAL DAILY
Qty: 90 TABLET | Refills: 3 | Status: SHIPPED | OUTPATIENT
Start: 2024-02-19

## 2024-02-21 PROBLEM — Z00.00 WELL ADULT EXAM: Status: RESOLVED | Noted: 2021-11-06 | Resolved: 2024-02-21

## 2024-02-21 PROBLEM — Z12.39 SCREENING FOR BREAST CANCER: Status: RESOLVED | Noted: 2019-04-17 | Resolved: 2024-02-21

## 2024-02-21 PROBLEM — Z12.39 BREAST CANCER SCREENING: Status: RESOLVED | Noted: 2020-05-29 | Resolved: 2024-02-21

## 2024-02-27 ENCOUNTER — TELEPHONE (OUTPATIENT)
Dept: GASTROENTEROLOGY | Facility: CLINIC | Age: 59
End: 2024-02-27

## 2024-02-29 ENCOUNTER — ANESTHESIA (OUTPATIENT)
Dept: ANESTHESIOLOGY | Facility: HOSPITAL | Age: 59
End: 2024-02-29

## 2024-02-29 ENCOUNTER — ANESTHESIA EVENT (OUTPATIENT)
Dept: ANESTHESIOLOGY | Facility: HOSPITAL | Age: 59
End: 2024-02-29

## 2024-03-05 ENCOUNTER — OFFICE VISIT (OUTPATIENT)
Dept: FAMILY MEDICINE CLINIC | Facility: CLINIC | Age: 59
End: 2024-03-05
Payer: COMMERCIAL

## 2024-03-05 VITALS
DIASTOLIC BLOOD PRESSURE: 88 MMHG | BODY MASS INDEX: 28.35 KG/M2 | SYSTOLIC BLOOD PRESSURE: 136 MMHG | HEIGHT: 63 IN | TEMPERATURE: 99.9 F | WEIGHT: 160 LBS

## 2024-03-05 DIAGNOSIS — M79.10 MYALGIA: ICD-10-CM

## 2024-03-05 DIAGNOSIS — J02.9 PHARYNGITIS, UNSPECIFIED ETIOLOGY: Primary | ICD-10-CM

## 2024-03-05 PROCEDURE — 99213 OFFICE O/P EST LOW 20 MIN: CPT | Performed by: FAMILY MEDICINE

## 2024-03-05 RX ORDER — AMOXICILLIN 875 MG/1
875 TABLET, COATED ORAL 2 TIMES DAILY
Qty: 14 TABLET | Refills: 0 | Status: SHIPPED | OUTPATIENT
Start: 2024-03-05 | End: 2024-03-12

## 2024-03-05 NOTE — LETTER
March 5, 2024     Patient: Leilani Blount  YOB: 1965  Date of Visit: 3/5/2024      To Whom it May Concern:    Leilani Blount is under my professional care. Leilani was seen in my office . Leilani will be out 3/7/2024 and 3/8/2024.     If you have any questions or concerns, please don't hesitate to call.         Sincerely,          Jessica Mejia,         CC: No Recipients

## 2024-03-05 NOTE — PROGRESS NOTES
"Assessment/Plan:      1. Pharyngitis, unspecified etiology  Assessment & Plan:  Rapid strep is negative. Amoxicillin 1 tab twice a day.     Orders:  -     amoxicillin (AMOXIL) 875 mg tablet; Take 1 tablet (875 mg total) by mouth 2 (two) times a day for 7 days    2. Myalgia  Assessment & Plan:  Influenza and covid rapid testing negative.            Subjective:  Chief Complaint   Patient presents with    Follow-up     sick, started 9pm yesterday and scratchy throat, felt like fire/glass in throat, fatigued, chills, no fxning thermometer, teeth hurt, feel like hit by bus, aches, no covid test exp jan 23 adv take and wear mask  OTC nyquil tylenol  Temp in office 99.9F        Patient ID: Leilani Blount is a 58 y.o. female.    Pt started with symptoms last night with a scratchy throat. Thought it was a pretzel that scratched her throat.  Complains of fatigue. Had trouble sleeping last  night. Hurts to swallow. Achey all over. Feels like she was hit by a truck.  Took tylenol at 11 . Feels feverish.          Review of Systems   Constitutional:  Positive for fatigue and fever.   HENT:  Positive for sore throat.    Eyes: Negative.    Respiratory: Negative.  Negative for cough.    Cardiovascular: Negative.    Gastrointestinal: Negative.    Endocrine: Negative.    Genitourinary: Negative.    Musculoskeletal:  Positive for myalgias.   Skin: Negative.    Allergic/Immunologic: Negative.    Neurological: Negative.    Psychiatric/Behavioral: Negative.           The following portions of the patient's history were reviewed and updated as appropriate: allergies, current medications, past family history, past medical history, past social history, past surgical history and problem list.    Objective:  Vitals:    03/05/24 1352   BP: 136/88   Temp: 99.9 °F (37.7 °C)   Weight: 72.6 kg (160 lb)   Height: 5' 2.99\" (1.6 m)      Physical Exam  Vitals and nursing note reviewed.   Constitutional:       Appearance: She is well-developed.   HENT: "      Head: Normocephalic and atraumatic.      Right Ear: External ear normal.      Left Ear: External ear normal.      Nose: Nose normal.      Mouth/Throat:      Pharynx: Posterior oropharyngeal erythema present. No oropharyngeal exudate.   Eyes:      Conjunctiva/sclera: Conjunctivae normal.      Pupils: Pupils are equal, round, and reactive to light.   Cardiovascular:      Rate and Rhythm: Normal rate and regular rhythm.      Heart sounds: Normal heart sounds.   Pulmonary:      Effort: Pulmonary effort is normal.      Breath sounds: Normal breath sounds.   Abdominal:      General: Bowel sounds are normal.      Palpations: Abdomen is soft.   Musculoskeletal:         General: Normal range of motion.      Cervical back: Normal range of motion and neck supple.   Skin:     General: Skin is warm and dry.   Neurological:      Mental Status: She is alert and oriented to person, place, and time.   Psychiatric:         Behavior: Behavior normal.         Thought Content: Thought content normal.         Judgment: Judgment normal.

## 2024-03-09 ENCOUNTER — ANESTHESIA (OUTPATIENT)
Dept: ANESTHESIOLOGY | Facility: HOSPITAL | Age: 59
End: 2024-03-09

## 2024-03-09 ENCOUNTER — ANESTHESIA EVENT (OUTPATIENT)
Dept: ANESTHESIOLOGY | Facility: HOSPITAL | Age: 59
End: 2024-03-09

## 2024-03-10 NOTE — ANESTHESIA PREPROCEDURE EVALUATION
Procedure:  PRE-OP ONLY    Relevant Problems   ENDO   (+) Hyperparathyroidism (HCC)   (+) Hypothyroidism      GI/HEPATIC   (+) Gastroesophageal reflux disease without esophagitis      GYN   (+) History of hysterectomy      HEMATOLOGY   (+) Anemia   (+) Iron deficiency anemia      MUSCULOSKELETAL   (+) Chronic bilateral low back pain without sciatica   (+) Impingement syndrome of left shoulder   (+) Lumbar spondylosis   (+) Psoriatic arthritis (HCC)   (+) Sacroiliitis (HCC)      NEURO/PSYCH   (+) Anxiety   (+) Chronic bilateral low back pain without sciatica   (+) Moderate major depression (HCC)      PULMONARY   (+) Asthma, mild intermittent        Physical Exam    Airway    Mallampati score: II  TM Distance: >3 FB  Neck ROM: full     Dental   No notable dental hx     Cardiovascular  Cardiovascular exam normal    Pulmonary  Pulmonary exam normal     Other Findings  post-pubertal.      Anesthesia Plan  ASA Score- 2     Anesthesia Type- IV sedation with anesthesia with ASA Monitors.         Additional Monitors:     Airway Plan:            Plan Factors-Exercise tolerance (METS): >4 METS.    Chart reviewed. EKG reviewed. Imaging results reviewed. Existing labs reviewed. Patient summary reviewed.    Patient is not a current smoker.              Induction-     Postoperative Plan-     Informed Consent- Anesthetic plan and risks discussed with patient.  I personally reviewed this patient with the CRNA. Discussed and agreed on the Anesthesia Plan with the CRNA..

## 2024-03-11 ENCOUNTER — ANESTHESIA EVENT (OUTPATIENT)
Dept: GASTROENTEROLOGY | Facility: AMBULARY SURGERY CENTER | Age: 59
End: 2024-03-11

## 2024-03-11 ENCOUNTER — HOSPITAL ENCOUNTER (OUTPATIENT)
Dept: GASTROENTEROLOGY | Facility: AMBULARY SURGERY CENTER | Age: 59
Setting detail: OUTPATIENT SURGERY
Discharge: HOME | End: 2024-03-11
Attending: INTERNAL MEDICINE
Payer: COMMERCIAL

## 2024-03-11 ENCOUNTER — ANESTHESIA (OUTPATIENT)
Dept: GASTROENTEROLOGY | Facility: AMBULARY SURGERY CENTER | Age: 59
End: 2024-03-11

## 2024-03-11 VITALS
BODY MASS INDEX: 27.46 KG/M2 | OXYGEN SATURATION: 94 % | RESPIRATION RATE: 22 BRPM | SYSTOLIC BLOOD PRESSURE: 117 MMHG | WEIGHT: 155 LBS | TEMPERATURE: 97.3 F | HEART RATE: 53 BPM | DIASTOLIC BLOOD PRESSURE: 76 MMHG | HEIGHT: 63 IN

## 2024-03-11 DIAGNOSIS — K51.011 ULCERATIVE PANCOLITIS WITH RECTAL BLEEDING (HCC): ICD-10-CM

## 2024-03-11 PROCEDURE — 45380 COLONOSCOPY AND BIOPSY: CPT | Performed by: INTERNAL MEDICINE

## 2024-03-11 PROCEDURE — 88305 TISSUE EXAM BY PATHOLOGIST: CPT | Performed by: PATHOLOGY

## 2024-03-11 RX ORDER — PROPOFOL 10 MG/ML
INJECTION, EMULSION INTRAVENOUS AS NEEDED
Status: DISCONTINUED | OUTPATIENT
Start: 2024-03-11 | End: 2024-03-11

## 2024-03-11 RX ORDER — SODIUM CHLORIDE, SODIUM LACTATE, POTASSIUM CHLORIDE, CALCIUM CHLORIDE 600; 310; 30; 20 MG/100ML; MG/100ML; MG/100ML; MG/100ML
INJECTION, SOLUTION INTRAVENOUS CONTINUOUS PRN
Status: DISCONTINUED | OUTPATIENT
Start: 2024-03-11 | End: 2024-03-11

## 2024-03-11 RX ORDER — LIDOCAINE HYDROCHLORIDE 10 MG/ML
INJECTION, SOLUTION EPIDURAL; INFILTRATION; INTRACAUDAL; PERINEURAL AS NEEDED
Status: DISCONTINUED | OUTPATIENT
Start: 2024-03-11 | End: 2024-03-11

## 2024-03-11 RX ADMIN — SODIUM CHLORIDE, SODIUM LACTATE, POTASSIUM CHLORIDE, AND CALCIUM CHLORIDE: .6; .31; .03; .02 INJECTION, SOLUTION INTRAVENOUS at 07:28

## 2024-03-11 RX ADMIN — PROPOFOL 100 MG: 10 INJECTION, EMULSION INTRAVENOUS at 07:35

## 2024-03-11 RX ADMIN — LIDOCAINE HYDROCHLORIDE 50 MG: 10 INJECTION, SOLUTION EPIDURAL; INFILTRATION; INTRACAUDAL; PERINEURAL at 07:35

## 2024-03-11 RX ADMIN — PROPOFOL 20 MG: 10 INJECTION, EMULSION INTRAVENOUS at 07:53

## 2024-03-11 RX ADMIN — PROPOFOL 30 MG: 10 INJECTION, EMULSION INTRAVENOUS at 07:51

## 2024-03-11 RX ADMIN — PROPOFOL 50 MG: 10 INJECTION, EMULSION INTRAVENOUS at 07:39

## 2024-03-11 RX ADMIN — PROPOFOL 50 MG: 10 INJECTION, EMULSION INTRAVENOUS at 07:47

## 2024-03-11 RX ADMIN — PROPOFOL 50 MG: 10 INJECTION, EMULSION INTRAVENOUS at 07:43

## 2024-03-11 NOTE — H&P
H&P EXAM - Outpatient Endoscopy   Leilani Blount 58 y.o. female MRN: [id number]    [facility]   Encounter: [id number]        History and Physical -  [facility]  Leilani Blount 58 y.o. female MRN: [id number]                  HPI: Leilani Blount is a 58 y.o. year old female who presents for colonoscopy fort history of UC.       REVIEW OF SYSTEMS: Per the HPI, and otherwise unremarkable.    Historical Information   Past Medical History:   Diagnosis Date    Adjustment disorder     last assessed 12    Anemia     HX of    Asthma     mild - intermittent    Bilateral leg edema     Blepharitis     last assessed 16    Bulging lumbar disc     Carpal tunnel syndrome     Unspecified laterality    Colitis, acute     Colon polyp     Disease of thyroid gland     Dyspareunia in female     Edema     last assessed 06/22/15    Ganglion     GERD (gastroesophageal reflux disease)     Glaucoma     Glaucoma     H/O parathyroid disease     Herniated cervical disc     History of transfusion     Hypokalemia     Hypothyroidism     Hypothyroidism     Insomnia     Lumps on the skin     last assessed 14    Mouth ulcers     last assessed 06/22/15    Nontraumatic tear of left tibialis posterior tendon     Traumatic teart     Polyarthritis     last assessed 16    Raynaud's disease     Temporomandibular disorder     Joint    Thyroid disease     Ulcerative colitis (HCC)     Ulcerative colitis (HCC)      Past Surgical History:   Procedure Laterality Date    ANKLE SURGERY Left     Tendon repair    CARPAL TUNNEL RELEASE Bilateral      SECTION, LOW TRANSVERSE      COLONOSCOPY      COLONOSCOPY N/A 2018    Procedure: COLONOSCOPY;  Surgeon: Quincy Fierro MD;  Location: [facility];  Service: Gastroenterology    FL INJECTION RIGHT HIP (ARTHROGRAM)  9/15/2020    HERNIA REPAIR      umbilical    HYSTERECTOMY      KNEE ARTHROSCOPY Right     LIPECTOMY      Multipe lipoma removals     LIPOMA RESECTION      PARATHYROIDECTOMY      AZ COLONOSCOPY FLX DX W/COLLJ SPEC WHEN PFRMD N/A 2016    Procedure: COLONOSCOPY;  Surgeon: Stephanie Vasquez DO;  Location: [facility];  Service: Gastroenterology    AZ RPR FLEXOR TENDON LEG SECONDARY W/O GRAFT EACH Left 2016    Procedure: REPAIR OF LEFT POSTERIOR TIBIAL TENDON WITH GRAFT, EXPLORATION LEFT ANKLE ;  Surgeon: Brandyn Menendez DPM;  Location: [facility] OR;  Service: Podiatry    SHOULDER SURGERY Left     bicep tendon and labrum repair    TONSILLECTOMY      TOOTH EXTRACTION  2018     Social History   Social History     Substance and Sexual Activity   Alcohol Use Yes    Comment: social 1 drink per weeek     Social History     Substance and Sexual Activity   Drug Use No     Social History     Tobacco Use   Smoking Status Former    Current packs/day: 0.00    Types: Cigarettes    Quit date: 2003    Years since quittin.9   Smokeless Tobacco Never   Tobacco Comments    Quit , rare use for 2 years     Family History   Problem Relation Age of Onset    Parkinsonism Mother     Rheum arthritis Mother     Thyroid disease unspecified Mother     Hypothyroidism Mother     Skin cancer Mother     Heart attack Father     Hypertension Father     Osteoporosis Father     Prostate cancer Father     Nephrolithiasis Father     Skin cancer Father     Hashimoto's thyroiditis Sister     Thyroid disease unspecified Sister     Hypothyroidism Sister     Skin cancer Sister     No Known Problems Maternal Grandmother     No Known Problems Maternal Grandfather     Colon cancer Paternal Grandmother     Prostate cancer Paternal Grandfather     Rheum arthritis Maternal Aunt     Thyroid disease unspecified Maternal Aunt     Hypothyroidism Maternal Aunt     No Known Problems Maternal Aunt     Crohn's disease Maternal Uncle     Psoriasis Maternal Uncle     Ulcerative colitis Maternal Uncle     Rheum arthritis Maternal Uncle     Crohn's disease Other     Crohn's disease Family  "    Osteoarthritis Family     Rheum arthritis Family     Ulcerative colitis Family        Meds/Allergies     (Not in a hospital admission)      No Known Allergies    Objective     /65   Pulse 65   Temp 98 °F (36.7 °C) (Temporal)   Resp 18   Ht 5' 3\" (1.6 m)   Wt 70.3 kg (155 lb)   SpO2 99%   BMI 27.46 kg/m²       PHYSICAL EXAM    Gen: NAD  CV: RRR  CHEST: Clear  ABD: soft, NT/ND  EXT: no edema      ASSESSMENT/PLAN:  This is a 58 y.o. year old female here for colonoscopy, and she is stable and optimized for her procedure.     "

## 2024-03-11 NOTE — ANESTHESIA PREPROCEDURE EVALUATION
Procedure:  COLONOSCOPY    Relevant Problems   ENDO   (+) Hyperparathyroidism (HCC)   (+) Hypothyroidism      GI/HEPATIC   (+) Gastroesophageal reflux disease without esophagitis      GYN   (+) History of hysterectomy      HEMATOLOGY   (+) Anemia   (+) Iron deficiency anemia      MUSCULOSKELETAL   (+) Chronic bilateral low back pain without sciatica   (+) Impingement syndrome of left shoulder   (+) Lumbar spondylosis   (+) Psoriatic arthritis (HCC)   (+) Sacroiliitis (HCC)      NEURO/PSYCH   (+) Anxiety   (+) Chronic bilateral low back pain without sciatica   (+) Moderate major depression (HCC)      PULMONARY   (+) Asthma, mild intermittent        Physical Exam    Airway    Mallampati score: II  TM Distance: >3 FB  Neck ROM: full     Dental   No notable dental hx     Cardiovascular  Cardiovascular exam normal    Pulmonary  Pulmonary exam normal     Other Findings  post-pubertal.      Anesthesia Plan  ASA Score- 2     Anesthesia Type- IV sedation with anesthesia with ASA Monitors.         Additional Monitors:     Airway Plan:            Plan Factors-Exercise tolerance (METS): >4 METS.    Chart reviewed. EKG reviewed. Imaging results reviewed. Existing labs reviewed. Patient summary reviewed.    Patient is not a current smoker.              Induction-     Postoperative Plan-     Informed Consent- Anesthetic plan and risks discussed with patient.  I personally reviewed this patient with the CRNA. Discussed and agreed on the Anesthesia Plan with the CRNA..

## 2024-03-11 NOTE — ANESTHESIA POSTPROCEDURE EVALUATION
Post-Op Assessment Note    CV Status:  Stable  Pain Score: 0    Pain management: adequate       Mental Status:  Alert and awake   Hydration Status:  Euvolemic   PONV Controlled:  Controlled   Airway Patency:  Patent     Post Op Vitals Reviewed: Yes    No anethesia notable event occurred.    Staff: CRNA               /60 (03/11/24 0757)    Temp      Pulse 60 (03/11/24 0757)   Resp 15 (03/11/24 0757)    SpO2 98 % (03/11/24 0757)

## 2024-03-13 DIAGNOSIS — K51.011 ULCERATIVE PANCOLITIS WITH RECTAL BLEEDING (HCC): Primary | ICD-10-CM

## 2024-03-13 PROCEDURE — 88305 TISSUE EXAM BY PATHOLOGIST: CPT | Performed by: PATHOLOGY

## 2024-03-20 ENCOUNTER — HOSPITAL ENCOUNTER (OUTPATIENT)
Dept: INFUSION CENTER | Facility: HOSPITAL | Age: 59
Discharge: HOME | End: 2024-03-20
Attending: INTERNAL MEDICINE
Payer: COMMERCIAL

## 2024-03-20 VITALS
DIASTOLIC BLOOD PRESSURE: 78 MMHG | HEART RATE: 56 BPM | BODY MASS INDEX: 27.57 KG/M2 | TEMPERATURE: 98.9 F | HEIGHT: 63 IN | RESPIRATION RATE: 18 BRPM | SYSTOLIC BLOOD PRESSURE: 142 MMHG | WEIGHT: 155.6 LBS | OXYGEN SATURATION: 100 %

## 2024-03-20 DIAGNOSIS — M62.81 MUSCLE WEAKNESS: ICD-10-CM

## 2024-03-20 DIAGNOSIS — Z13.0 SCREENING, ANEMIA, DEFICIENCY, IRON: ICD-10-CM

## 2024-03-20 DIAGNOSIS — M25.50 ARTHRALGIA OF MULTIPLE JOINTS: ICD-10-CM

## 2024-03-20 DIAGNOSIS — Z13.220 SCREENING, LIPID: ICD-10-CM

## 2024-03-20 DIAGNOSIS — E21.3 HYPERPARATHYROIDISM (HCC): ICD-10-CM

## 2024-03-20 DIAGNOSIS — Z13.1 SCREENING FOR DIABETES MELLITUS: ICD-10-CM

## 2024-03-20 DIAGNOSIS — Z13.29 SCREENING FOR THYROID DISORDER: ICD-10-CM

## 2024-03-20 DIAGNOSIS — E55.9 VITAMIN D DEFICIENCY: ICD-10-CM

## 2024-03-20 DIAGNOSIS — K51.011 ULCERATIVE PANCOLITIS WITH RECTAL BLEEDING (HCC): Primary | ICD-10-CM

## 2024-03-20 LAB
25(OH)D3 SERPL-MCNC: 44 NG/ML (ref 30–100)
ALBUMIN SERPL BCP-MCNC: 4.4 G/DL (ref 3.5–5)
ALP SERPL-CCNC: 49 U/L (ref 34–104)
ALT SERPL W P-5'-P-CCNC: 15 U/L (ref 7–52)
ANION GAP SERPL CALCULATED.3IONS-SCNC: 5 MMOL/L (ref 4–13)
AST SERPL W P-5'-P-CCNC: 17 U/L (ref 13–39)
BASOPHILS # BLD AUTO: 0.03 THOUSANDS/ÂΜL (ref 0–0.1)
BASOPHILS NFR BLD AUTO: 1 % (ref 0–1)
BILIRUB SERPL-MCNC: 0.45 MG/DL (ref 0.2–1)
BUN SERPL-MCNC: 9 MG/DL (ref 5–25)
CA-I BLD-SCNC: 1.15 MMOL/L (ref 1.12–1.32)
CALCIUM SERPL-MCNC: 9.3 MG/DL (ref 8.4–10.2)
CHLORIDE SERPL-SCNC: 104 MMOL/L (ref 96–108)
CHOLEST SERPL-MCNC: 201 MG/DL
CO2 SERPL-SCNC: 29 MMOL/L (ref 21–32)
CREAT SERPL-MCNC: 0.78 MG/DL (ref 0.6–1.3)
CRP SERPL QL: 2.9 MG/L
EOSINOPHIL # BLD AUTO: 0.06 THOUSAND/ÂΜL (ref 0–0.61)
EOSINOPHIL NFR BLD AUTO: 1 % (ref 0–6)
ERYTHROCYTE [DISTWIDTH] IN BLOOD BY AUTOMATED COUNT: 12.1 % (ref 11.6–15.1)
EST. AVERAGE GLUCOSE BLD GHB EST-MCNC: 123 MG/DL
GFR SERPL CREATININE-BSD FRML MDRD: 84 ML/MIN/1.73SQ M
GLUCOSE SERPL-MCNC: 86 MG/DL (ref 65–140)
HBA1C MFR BLD: 5.9 %
HCT VFR BLD AUTO: 40.6 % (ref 34.8–46.1)
HDLC SERPL-MCNC: 66 MG/DL
HGB BLD-MCNC: 13.3 G/DL (ref 11.5–15.4)
IMM GRANULOCYTES # BLD AUTO: 0.02 THOUSAND/UL (ref 0–0.2)
IMM GRANULOCYTES NFR BLD AUTO: 1 % (ref 0–2)
LDLC SERPL CALC-MCNC: 121 MG/DL (ref 0–100)
LYMPHOCYTES # BLD AUTO: 1.99 THOUSANDS/ÂΜL (ref 0.6–4.47)
LYMPHOCYTES NFR BLD AUTO: 47 % (ref 14–44)
MCH RBC QN AUTO: 31.1 PG (ref 26.8–34.3)
MCHC RBC AUTO-ENTMCNC: 32.8 G/DL (ref 31.4–37.4)
MCV RBC AUTO: 95 FL (ref 82–98)
MONOCYTES # BLD AUTO: 0.35 THOUSAND/ÂΜL (ref 0.17–1.22)
MONOCYTES NFR BLD AUTO: 8 % (ref 4–12)
NEUTROPHILS # BLD AUTO: 1.75 THOUSANDS/ÂΜL (ref 1.85–7.62)
NEUTS SEG NFR BLD AUTO: 42 % (ref 43–75)
NRBC BLD AUTO-RTO: 0 /100 WBCS
PLATELET # BLD AUTO: 333 THOUSANDS/UL (ref 149–390)
PMV BLD AUTO: 9.8 FL (ref 8.9–12.7)
POTASSIUM SERPL-SCNC: 3.8 MMOL/L (ref 3.5–5.3)
PROT SERPL-MCNC: 7.2 G/DL (ref 6.4–8.4)
PTH-INTACT SERPL-MCNC: 26 PG/ML (ref 12–88)
RBC # BLD AUTO: 4.28 MILLION/UL (ref 3.81–5.12)
SODIUM SERPL-SCNC: 138 MMOL/L (ref 135–147)
TRIGL SERPL-MCNC: 71 MG/DL
TSH SERPL DL<=0.05 MIU/L-ACNC: 2.23 UIU/ML (ref 0.45–4.5)
WBC # BLD AUTO: 4.2 THOUSAND/UL (ref 4.31–10.16)

## 2024-03-20 PROCEDURE — 85025 COMPLETE CBC W/AUTO DIFF WBC: CPT

## 2024-03-20 PROCEDURE — 96415 CHEMO IV INFUSION ADDL HR: CPT

## 2024-03-20 PROCEDURE — 83036 HEMOGLOBIN GLYCOSYLATED A1C: CPT

## 2024-03-20 PROCEDURE — 82330 ASSAY OF CALCIUM: CPT

## 2024-03-20 PROCEDURE — 82397 CHEMILUMINESCENT ASSAY: CPT

## 2024-03-20 PROCEDURE — 80230 DRUG ASSAY INFLIXIMAB: CPT

## 2024-03-20 PROCEDURE — 80053 COMPREHEN METABOLIC PANEL: CPT

## 2024-03-20 PROCEDURE — 80061 LIPID PANEL: CPT

## 2024-03-20 PROCEDURE — 83970 ASSAY OF PARATHORMONE: CPT

## 2024-03-20 PROCEDURE — 82306 VITAMIN D 25 HYDROXY: CPT

## 2024-03-20 PROCEDURE — 96413 CHEMO IV INFUSION 1 HR: CPT

## 2024-03-20 PROCEDURE — 84446 ASSAY OF VITAMIN E: CPT

## 2024-03-20 PROCEDURE — 86140 C-REACTIVE PROTEIN: CPT

## 2024-03-20 PROCEDURE — 84443 ASSAY THYROID STIM HORMONE: CPT

## 2024-03-20 RX ORDER — ACETAMINOPHEN 325 MG/1
650 TABLET ORAL ONCE
OUTPATIENT
Start: 2024-05-15

## 2024-03-20 RX ORDER — ACETAMINOPHEN 325 MG/1
650 TABLET ORAL ONCE
Status: DISCONTINUED | OUTPATIENT
Start: 2024-03-20 | End: 2024-03-23 | Stop reason: HOSPADM

## 2024-03-20 RX ORDER — METHYLPREDNISOLONE SODIUM SUCCINATE 40 MG/ML
40 INJECTION, POWDER, LYOPHILIZED, FOR SOLUTION INTRAMUSCULAR; INTRAVENOUS ONCE
Status: DISCONTINUED | OUTPATIENT
Start: 2024-03-20 | End: 2024-03-23 | Stop reason: HOSPADM

## 2024-03-20 RX ORDER — SODIUM CHLORIDE 9 MG/ML
20 INJECTION, SOLUTION INTRAVENOUS ONCE
Status: COMPLETED | OUTPATIENT
Start: 2024-03-20 | End: 2024-03-20

## 2024-03-20 RX ORDER — DIPHENHYDRAMINE HCL 25 MG
25 TABLET ORAL ONCE
OUTPATIENT
Start: 2024-05-15

## 2024-03-20 RX ORDER — METHYLPREDNISOLONE SODIUM SUCCINATE 40 MG/ML
40 INJECTION, POWDER, LYOPHILIZED, FOR SOLUTION INTRAMUSCULAR; INTRAVENOUS ONCE
OUTPATIENT
Start: 2024-05-15

## 2024-03-20 RX ORDER — DIPHENHYDRAMINE HCL 25 MG
25 TABLET ORAL ONCE
Status: DISCONTINUED | OUTPATIENT
Start: 2024-03-20 | End: 2024-03-23 | Stop reason: HOSPADM

## 2024-03-20 RX ORDER — SODIUM CHLORIDE 9 MG/ML
20 INJECTION, SOLUTION INTRAVENOUS ONCE
OUTPATIENT
Start: 2024-05-15

## 2024-03-20 RX ADMIN — INFLIXIMAB 527 MG: 100 INJECTION, POWDER, LYOPHILIZED, FOR SOLUTION INTRAVENOUS at 09:26

## 2024-03-20 RX ADMIN — SODIUM CHLORIDE 20 ML/HR: 9 INJECTION, SOLUTION INTRAVENOUS at 09:21

## 2024-03-20 NOTE — PROGRESS NOTES
Leilani Blount  tolerated treatment well with no complications.      Leilani Blount is aware of future appt on 5/13/24 at 0930.     AVS printed and given to Leilani Blount:  No (Declined by Leilani Blount)

## 2024-03-23 DIAGNOSIS — F51.04 CHRONIC INSOMNIA: ICD-10-CM

## 2024-03-25 ENCOUNTER — TELEPHONE (OUTPATIENT)
Dept: FAMILY MEDICINE CLINIC | Facility: CLINIC | Age: 59
End: 2024-03-25

## 2024-03-25 RX ORDER — ZOLPIDEM TARTRATE 10 MG/1
10 TABLET ORAL
Qty: 30 TABLET | Refills: 0 | Status: SHIPPED | OUTPATIENT
Start: 2024-03-25 | End: 2024-04-24

## 2024-03-25 NOTE — TELEPHONE ENCOUNTER
----- Message from Jessica Mejia DO sent at 3/25/2024 12:40 AM EDT -----  Your sugar level is in the prediabetic range.   Your LDL is higher than last time even though the total cholesterol is the same.  Your hdl is lower.   Your white blood count is slightly low. Your red blood count and your platelets are normal  Your vitamin d level is ok and your thyroid level is normal  Your liver and kidney functions are good.  Your crp is normal

## 2024-03-27 LAB
A-TOCOPHEROL VIT E SERPL-MCNC: 8 MG/L (ref 7–25.1)
GAMMA TOCOPHEROL SERPL-MCNC: 0.5 MG/L (ref 0.5–5.5)

## 2024-03-28 LAB
INFLIXIMAB AB SERPL-MCNC: 630 NG/ML
INFLIXIMAB SERPL-MCNC: <0.4 UG/ML

## 2024-04-02 DIAGNOSIS — K51.011 ULCERATIVE PANCOLITIS WITH RECTAL BLEEDING (HCC): Primary | ICD-10-CM

## 2024-04-03 DIAGNOSIS — K21.9 GASTROESOPHAGEAL REFLUX DISEASE WITHOUT ESOPHAGITIS: ICD-10-CM

## 2024-04-03 DIAGNOSIS — K51.011 ULCERATIVE PANCOLITIS WITH RECTAL BLEEDING (HCC): Primary | ICD-10-CM

## 2024-04-03 RX ORDER — MESALAMINE 1.2 G/1
4800 TABLET, DELAYED RELEASE ORAL
Qty: 360 TABLET | Refills: 3 | Status: SHIPPED | OUTPATIENT
Start: 2024-04-03

## 2024-04-04 RX ORDER — PANTOPRAZOLE SODIUM 40 MG/1
40 TABLET, DELAYED RELEASE ORAL
Qty: 90 TABLET | Refills: 0 | Status: SHIPPED | OUTPATIENT
Start: 2024-04-04 | End: 2024-10-01

## 2024-04-05 DIAGNOSIS — L30.9 DERMATITIS OF LIP: ICD-10-CM

## 2024-04-05 RX ORDER — PIMECROLIMUS 10 MG/G
CREAM TOPICAL
Qty: 30 G | Refills: 2 | Status: SHIPPED | OUTPATIENT
Start: 2024-04-05

## 2024-04-21 DIAGNOSIS — M51.16 INTERVERTEBRAL DISC DISORDER WITH RADICULOPATHY OF LUMBAR REGION: ICD-10-CM

## 2024-04-21 DIAGNOSIS — F41.9 ANXIETY: ICD-10-CM

## 2024-04-21 DIAGNOSIS — M62.838 MUSCLE SPASM: ICD-10-CM

## 2024-04-21 DIAGNOSIS — M25.551 RIGHT HIP PAIN: ICD-10-CM

## 2024-04-21 DIAGNOSIS — F51.04 CHRONIC INSOMNIA: ICD-10-CM

## 2024-04-21 DIAGNOSIS — F32.1 MODERATE MAJOR DEPRESSION (HCC): ICD-10-CM

## 2024-04-22 DIAGNOSIS — Z00.6 ENCOUNTER FOR EXAMINATION FOR NORMAL COMPARISON OR CONTROL IN CLINICAL RESEARCH PROGRAM: ICD-10-CM

## 2024-04-22 NOTE — TELEPHONE ENCOUNTER
Refills have been requested for the following medications:         buPROPion (WELLBUTRIN XL) 300 mg 24 hr tablet [Jessica Mejia, DO]         traMADol (ULTRAM) 50 mg tablet [Jessica Mjeia DO]         LORazepam (ATIVAN) 0.5 mg tablet [Jessica Mejia, DO]         tiZANidine (ZANAFLEX) 4 mg tablet [Jessica Mejia, DO]         zolpidem (AMBIEN) 10 mg tablet [Jessica Mejia DO]     Preferred pharmacy: [facility] [place], [place] - [address]        Medication renewals requested in this message routed separately:         carisoprodol (SOMA) 350 mg tablet [YFN Mcmahon]

## 2024-04-23 RX ORDER — CARISOPRODOL 350 MG/1
350 TABLET ORAL 3 TIMES DAILY PRN
Qty: 30 TABLET | Refills: 0 | Status: SHIPPED | OUTPATIENT
Start: 2024-04-23

## 2024-04-23 RX ORDER — ZOLPIDEM TARTRATE 10 MG/1
10 TABLET ORAL
Qty: 30 TABLET | Refills: 0 | Status: SHIPPED | OUTPATIENT
Start: 2024-04-23 | End: 2024-05-23

## 2024-04-23 RX ORDER — LORAZEPAM 0.5 MG/1
0.5 TABLET ORAL DAILY PRN
Qty: 30 TABLET | Refills: 0 | Status: SHIPPED | OUTPATIENT
Start: 2024-04-23

## 2024-04-23 RX ORDER — TIZANIDINE 4 MG/1
4 TABLET ORAL EVERY 8 HOURS PRN
Qty: 30 TABLET | Refills: 0 | Status: SHIPPED | OUTPATIENT
Start: 2024-04-23

## 2024-04-23 RX ORDER — TRAMADOL HYDROCHLORIDE 50 MG/1
50 TABLET ORAL DAILY PRN
Qty: 20 TABLET | Refills: 0 | Status: SHIPPED | OUTPATIENT
Start: 2024-04-23

## 2024-04-23 RX ORDER — BUPROPION HYDROCHLORIDE 300 MG/1
300 TABLET ORAL EVERY MORNING
Qty: 90 TABLET | Refills: 0 | Status: SHIPPED | OUTPATIENT
Start: 2024-04-23 | End: 2024-10-20

## 2024-04-24 ENCOUNTER — TELEPHONE (OUTPATIENT)
Age: 59
End: 2024-04-24

## 2024-04-24 NOTE — TELEPHONE ENCOUNTER
Received call from [facility] regarding concern for potential medication interaction and insurance kick-back for duplicate therapy. Per Pharmacy, insurance is flagging prescriptions for carisoprodol (SOMA) 350 mg and tiZANidine (ZANAFLEX) 4 mg as they are both written for as needed use for muscle spasms. Additionally Pharmacy expressed concern for potential medication interaction for prescribed traMADol (ULTRAM) 50 mg,   LORazepam (ATIVAN) 0.5 mg and zolpidem (AMBIEN) 10 mg with concern for oversedation or combination CNS suppressant effects. Spoke with office staff, informed that these are long-standing medications and patient is aware of potential interactions and safe use. Advised that PCP is out of office but another provider is covering patients and available to review. Pharmacy requesting call back from office with verbal permission to fill medications as ordered.

## 2024-04-26 ENCOUNTER — TELEPHONE (OUTPATIENT)
Dept: FAMILY MEDICINE CLINIC | Facility: CLINIC | Age: 59
End: 2024-04-26

## 2024-04-26 NOTE — TELEPHONE ENCOUNTER
Received fax from [facility] in [place] that a Prior Authorization has been started for patient for Carisoprodol 350MG Tablets.    Key: [id number]  Patient Last Name: FORTINO  : 1965

## 2024-04-29 NOTE — TELEPHONE ENCOUNTER
PA for Carisoprodol     Submitted via    [x]CMM-KEY [id number] - Rx #: [id number]  []Surescripts-Case ID #    []Faxed to plan   []Other website    []Phone call Case ID #      Office notes sent, clinical questions answered. Awaiting determination    Turnaround time for your insurance to make a decision on your Prior Authorization can take 7-21 business days.    Outcome Additional Information Required  The [facility] Prior Authorization Team is unable to review this request for prior authorization as there is a clinical denial on file with duplicate information, Case ID [id number]. Please review the decision letter sent to your office on 7/11/2022 for denial reason and next steps.

## 2024-04-29 NOTE — TELEPHONE ENCOUNTER
Patient is aware of this and pays out of pocket for this medication. Not sure if there is somewhere in her chart where we can document that for the future.

## 2024-04-30 ENCOUNTER — APPOINTMENT (OUTPATIENT)
Dept: LAB | Facility: HOSPITAL | Age: 59
End: 2024-04-30
Payer: COMMERCIAL

## 2024-04-30 DIAGNOSIS — Z00.6 ENCOUNTER FOR EXAMINATION FOR NORMAL COMPARISON OR CONTROL IN CLINICAL RESEARCH PROGRAM: ICD-10-CM

## 2024-04-30 DIAGNOSIS — K51.011 ULCERATIVE PANCOLITIS WITH RECTAL BLEEDING (HCC): ICD-10-CM

## 2024-04-30 LAB — CRP SERPL QL: 2.9 MG/L

## 2024-04-30 PROCEDURE — 86140 C-REACTIVE PROTEIN: CPT

## 2024-04-30 PROCEDURE — 36415 COLL VENOUS BLD VENIPUNCTURE: CPT

## 2024-05-07 ENCOUNTER — TELEPHONE (OUTPATIENT)
Dept: GASTROENTEROLOGY | Facility: CLINIC | Age: 59
End: 2024-05-07

## 2024-05-07 DIAGNOSIS — K51.011 ULCERATIVE PANCOLITIS WITH RECTAL BLEEDING (HCC): Primary | ICD-10-CM

## 2024-05-07 NOTE — TELEPHONE ENCOUNTER
I spoke to patient she stated that was ok to repeat in 6 months and if she feels like its not working she will call in to the office, Thank you

## 2024-05-07 NOTE — TELEPHONE ENCOUNTER
I spoke to patient she is now aware of the results and yes she is taking the mesalamine and would like to know how often to repeat CRP test to monitoring levels please advise, thank you

## 2024-05-12 LAB
APOB+LDLR+PCSK9 GENE MUT ANL BLD/T: NOT DETECTED
BRCA1+BRCA2 DEL+DUP + FULL MUT ANL BLD/T: NOT DETECTED
MLH1+MSH2+MSH6+PMS2 GN DEL+DUP+FUL M: NOT DETECTED

## 2024-05-26 DIAGNOSIS — K21.9 GASTROESOPHAGEAL REFLUX DISEASE WITHOUT ESOPHAGITIS: ICD-10-CM

## 2024-05-26 DIAGNOSIS — F41.9 ANXIETY: ICD-10-CM

## 2024-05-26 DIAGNOSIS — F51.04 CHRONIC INSOMNIA: ICD-10-CM

## 2024-05-26 DIAGNOSIS — E03.9 ACQUIRED HYPOTHYROIDISM: ICD-10-CM

## 2024-05-27 RX ORDER — LEVOTHYROXINE SODIUM 0.05 MG/1
50 TABLET ORAL DAILY
Qty: 90 TABLET | Refills: 1 | Status: SHIPPED | OUTPATIENT
Start: 2024-05-27

## 2024-05-29 RX ORDER — ZOLPIDEM TARTRATE 10 MG/1
10 TABLET ORAL
Qty: 30 TABLET | Refills: 0 | Status: SHIPPED | OUTPATIENT
Start: 2024-05-29 | End: 2024-06-28

## 2024-05-29 RX ORDER — FAMOTIDINE 20 MG/1
20 TABLET, FILM COATED ORAL
Qty: 90 TABLET | Refills: 1 | Status: SHIPPED | OUTPATIENT
Start: 2024-05-29

## 2024-05-29 RX ORDER — LORAZEPAM 0.5 MG/1
0.5 TABLET ORAL DAILY PRN
Qty: 30 TABLET | Refills: 0 | Status: SHIPPED | OUTPATIENT
Start: 2024-05-29

## 2024-06-14 DIAGNOSIS — K21.9 GASTROESOPHAGEAL REFLUX DISEASE WITHOUT ESOPHAGITIS: Primary | ICD-10-CM

## 2024-06-14 RX ORDER — ESOMEPRAZOLE MAGNESIUM 40 MG/1
40 CAPSULE, DELAYED RELEASE ORAL DAILY
Qty: 90 CAPSULE | Refills: 3 | Status: SHIPPED | OUTPATIENT
Start: 2024-06-14

## 2024-06-25 DIAGNOSIS — F51.04 CHRONIC INSOMNIA: ICD-10-CM

## 2024-06-26 RX ORDER — ZOLPIDEM TARTRATE 10 MG/1
10 TABLET ORAL
Qty: 30 TABLET | Refills: 0 | Status: SHIPPED | OUTPATIENT
Start: 2024-06-26 | End: 2024-07-26

## 2024-07-05 DIAGNOSIS — K21.9 GASTROESOPHAGEAL REFLUX DISEASE WITHOUT ESOPHAGITIS: ICD-10-CM

## 2024-07-05 RX ORDER — ESOMEPRAZOLE MAGNESIUM 40 MG/1
40 CAPSULE, DELAYED RELEASE ORAL DAILY
Qty: 100 CAPSULE | Refills: 1 | Status: SHIPPED | OUTPATIENT
Start: 2024-07-05

## 2024-07-08 DIAGNOSIS — L30.9 DERMATITIS OF LIP: ICD-10-CM

## 2024-07-08 DIAGNOSIS — F32.1 MODERATE MAJOR DEPRESSION (HCC): ICD-10-CM

## 2024-07-08 DIAGNOSIS — K51.011 ULCERATIVE PANCOLITIS WITH RECTAL BLEEDING (HCC): ICD-10-CM

## 2024-07-08 RX ORDER — BUPROPION HYDROCHLORIDE 300 MG/1
300 TABLET ORAL EVERY MORNING
Qty: 100 TABLET | Refills: 0 | Status: SHIPPED | OUTPATIENT
Start: 2024-07-08 | End: 2024-10-16

## 2024-07-09 RX ORDER — PIMECROLIMUS 10 MG/G
CREAM TOPICAL
Qty: 30 G | Refills: 0 | OUTPATIENT
Start: 2024-07-09

## 2024-07-09 RX ORDER — MESALAMINE 1.2 G/1
4800 TABLET, DELAYED RELEASE ORAL
Qty: 400 TABLET | Refills: 1 | Status: SHIPPED | OUTPATIENT
Start: 2024-07-09

## 2024-07-10 ENCOUNTER — NURSE TRIAGE (OUTPATIENT)
Age: 59
End: 2024-07-10

## 2024-07-10 NOTE — TELEPHONE ENCOUNTER
Called patient.  She states that her face remains swollen and she does have the sensation that she has a lump in her throat.  She denies any numbness or tingling of her lips and throat or shortness of breath.  Her symptoms did seem to worsen 2 hours after taking her mesalamine.  Ever since starting mesalamine she has had an intermittent sensation of globus sensation.  She has tried multiple different PPIs and is currently on Nexium without any relief.    It is possible the patient is having an allergic reaction to mesalamine.  I recommend she start an antihistamine and discontinue the mesalamine.  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

## 2024-07-10 NOTE — TELEPHONE ENCOUNTER
"Patient started on Mesalamine a little over 3 months ago. States that she had on and off feelings of having a lump in her throat. States that she has been in contact with Dr. Vasquez and that she has had some medication changes. She initially thought it was all reflux.   Today  states that her voice is raspy and feels that her throat is a little swollen.  Co-workers states that her face is swollen today.   Patient declines the ED and states that she does not need to go to the ED.        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 NAME of MEDICATION: \"What medicine are you calling about?\"      Mesalamine    Protocols used: Medication Question Call-ADULT-[place], Face Swelling-ADULT-[place]    "

## 2024-07-11 NOTE — TELEPHONE ENCOUNTER
Called and left a message for the patient letting her know given that we never seen her in clinic for this issue before we would recommend an in office visit before we do another refill

## 2024-07-11 NOTE — TELEPHONE ENCOUNTER
Patient called asking for the medication refill and was concerned how we are stating that she was never seen for this in the office. She advised that she saw Dr Mcgraw and the same concern was discussed.    Patient asking to look into it further and to please send her prescription in to [facility] pharmacy on chart.    Thank you

## 2024-07-12 DIAGNOSIS — L30.9 DERMATITIS OF LIP: ICD-10-CM

## 2024-07-12 RX ORDER — PIMECROLIMUS 10 MG/G
CREAM TOPICAL
Qty: 30 G | Refills: 2 | Status: SHIPPED | OUTPATIENT
Start: 2024-07-12 | End: 2024-07-15

## 2024-07-12 NOTE — TELEPHONE ENCOUNTER
Dr. Mcgraw Please review, looks like you were in contact with the patient through [place] and prescribed patient Elidel for her lip dermatitis but it was being refused.

## 2024-07-15 DIAGNOSIS — L30.9 DERMATITIS OF LIP: ICD-10-CM

## 2024-07-15 RX ORDER — PIMECROLIMUS 10 MG/G
CREAM TOPICAL
Qty: 30 G | Refills: 2 | Status: SHIPPED | OUTPATIENT
Start: 2024-07-15

## 2024-07-22 DIAGNOSIS — F51.04 CHRONIC INSOMNIA: ICD-10-CM

## 2024-07-23 RX ORDER — ZOLPIDEM TARTRATE 10 MG/1
10 TABLET ORAL
Qty: 30 TABLET | Refills: 0 | Status: SHIPPED | OUTPATIENT
Start: 2024-07-23 | End: 2024-08-22

## 2024-08-20 DIAGNOSIS — K21.9 GASTROESOPHAGEAL REFLUX DISEASE WITHOUT ESOPHAGITIS: ICD-10-CM

## 2024-08-21 RX ORDER — FAMOTIDINE 20 MG/1
20 TABLET, FILM COATED ORAL
Qty: 90 TABLET | Refills: 1 | Status: SHIPPED | OUTPATIENT
Start: 2024-08-21

## 2024-09-17 ENCOUNTER — TELEPHONE (OUTPATIENT)
Dept: GASTROENTEROLOGY | Facility: CLINIC | Age: 59
End: 2024-09-17

## 2024-09-17 NOTE — TELEPHONE ENCOUNTER
Pt called back for apt. Pt would like a VV. The only apts for Dr. Vasquez are in October, and pt states she needs to be seen in Dec or after. Please advise.

## 2024-09-17 NOTE — TELEPHONE ENCOUNTER
I called and spoke with the patient in regards to office appointment with Dr. Vasquez on 12/9 needing to be rescheduled. The patient was advised to call the office back to reschedule accordingly. She will call us back later to be rescheduled.

## 2024-09-18 NOTE — TELEPHONE ENCOUNTER
I called the patient back and was able to schedule VV for January 2025. I sent a message to Dr. Vasquez per patient request about ordering extra labs/ stool studies before her f/u next year.

## 2024-09-23 DIAGNOSIS — F32.1 MODERATE MAJOR DEPRESSION (HCC): ICD-10-CM

## 2024-09-23 DIAGNOSIS — F51.04 CHRONIC INSOMNIA: ICD-10-CM

## 2024-09-23 DIAGNOSIS — K21.9 GASTROESOPHAGEAL REFLUX DISEASE WITHOUT ESOPHAGITIS: ICD-10-CM

## 2024-09-24 RX ORDER — ZOLPIDEM TARTRATE 10 MG/1
10 TABLET ORAL
Qty: 30 TABLET | Refills: 0 | Status: SHIPPED | OUTPATIENT
Start: 2024-09-24 | End: 2024-10-24

## 2024-09-24 RX ORDER — BUPROPION HYDROCHLORIDE 300 MG/1
300 TABLET ORAL EVERY MORNING
Qty: 100 TABLET | Refills: 1 | Status: SHIPPED | OUTPATIENT
Start: 2024-09-24 | End: 2025-04-12

## 2024-09-24 RX ORDER — ESOMEPRAZOLE MAGNESIUM 40 MG/1
40 CAPSULE, DELAYED RELEASE ORAL DAILY
Qty: 100 CAPSULE | Refills: 1 | Status: SHIPPED | OUTPATIENT
Start: 2024-09-24

## 2024-09-30 DIAGNOSIS — F41.9 ANXIETY: ICD-10-CM

## 2024-10-01 RX ORDER — LORAZEPAM 0.5 MG/1
0.5 TABLET ORAL DAILY PRN
Qty: 30 TABLET | Refills: 0 | Status: SHIPPED | OUTPATIENT
Start: 2024-10-01

## 2024-10-29 DIAGNOSIS — F51.04 CHRONIC INSOMNIA: ICD-10-CM

## 2024-10-29 DIAGNOSIS — F41.9 ANXIETY: ICD-10-CM

## 2024-10-29 DIAGNOSIS — E03.9 ACQUIRED HYPOTHYROIDISM: ICD-10-CM

## 2024-10-29 DIAGNOSIS — M62.838 MUSCLE SPASM: ICD-10-CM

## 2024-10-29 RX ORDER — LEVOTHYROXINE SODIUM 50 UG/1
50 TABLET ORAL DAILY
Qty: 90 TABLET | Refills: 0 | Status: SHIPPED | OUTPATIENT
Start: 2024-10-29

## 2024-10-30 RX ORDER — LORAZEPAM 0.5 MG/1
0.5 TABLET ORAL DAILY PRN
Qty: 30 TABLET | Refills: 0 | Status: SHIPPED | OUTPATIENT
Start: 2024-10-30

## 2024-10-30 RX ORDER — ZOLPIDEM TARTRATE 10 MG/1
10 TABLET ORAL
Qty: 30 TABLET | Refills: 0 | Status: SHIPPED | OUTPATIENT
Start: 2024-10-30 | End: 2024-11-29

## 2024-10-30 RX ORDER — CARISOPRODOL 350 MG/1
350 TABLET ORAL
Qty: 30 TABLET | Refills: 0 | Status: SHIPPED | OUTPATIENT
Start: 2024-10-30

## 2024-11-05 ENCOUNTER — TELEMEDICINE (OUTPATIENT)
Dept: FAMILY MEDICINE CLINIC | Facility: CLINIC | Age: 59
End: 2024-11-05
Payer: COMMERCIAL

## 2024-11-05 VITALS — WEIGHT: 152 LBS | BODY MASS INDEX: 26.93 KG/M2

## 2024-11-05 DIAGNOSIS — F51.04 CHRONIC INSOMNIA: ICD-10-CM

## 2024-11-05 DIAGNOSIS — F32.1 MODERATE MAJOR DEPRESSION (HCC): ICD-10-CM

## 2024-11-05 DIAGNOSIS — I10 PRIMARY HYPERTENSION: ICD-10-CM

## 2024-11-05 DIAGNOSIS — L30.9 DERMATITIS OF LIP: Primary | ICD-10-CM

## 2024-11-05 DIAGNOSIS — F41.9 ANXIETY: ICD-10-CM

## 2024-11-05 PROCEDURE — 99214 OFFICE O/P EST MOD 30 MIN: CPT | Performed by: FAMILY MEDICINE

## 2024-11-05 RX ORDER — PIMECROLIMUS 10 MG/G
CREAM TOPICAL
Qty: 30 G | Refills: 2 | Status: SHIPPED | OUTPATIENT
Start: 2024-11-05

## 2024-11-05 RX ORDER — LISINOPRIL 10 MG/1
10 TABLET ORAL DAILY
Qty: 90 TABLET | Refills: 1 | Status: SHIPPED | OUTPATIENT
Start: 2024-11-05

## 2024-11-05 NOTE — PROGRESS NOTES
Virtual Regular Visit  Name: Leilani Blount      : 1965      MRN: [id number]  Encounter Provider: Jessica Mejia DO  Encounter Date: 2024   Encounter department: [facility]    Verification of patient location:    Patient is located at Home in the following state in which I hold an active license [place]    Assessment & Plan  Dermatitis of lip  Pt requesting a new prescription for elidel to use as needed  Orders:    pimecrolimus (ELIDEL) 1 % cream; Apply BID to affected skin    Primary hypertension  Controlled on lisinopril 10mg- pt monitoring bp at home  Orders:    lisinopril (ZESTRIL) 10 mg tablet; Take 1 tablet (10 mg total) by mouth daily    Moderate major depression (HCC)  Depression Screening Follow-up Plan: Patient's depression screening was positive with a PHQ-9 score of 8. Patient assessed for underlying major depression. They have no active suicidal ideations. Brief counseling provided and recommend additional follow-up/re-evaluation next office visit.  Increased symptoms with  in nursing home and now on hospice with progressive dementia.  Pt would like to stay on wellbutrin for now       Anxiety  Visit is virtual. Pt is aware that she needs a new med agreement signed for lorazepam. Pdmp reviewed regularly.         Chronic insomnia  Pt was seen virtually and aware that she needs a new medication agreement signed for zolpidem           Depression Screening and Follow-up Plan: Patient's depression screening was positive with a PHQ-9 score of 8. Patient assessed for underlying major depression. Brief counseling provided and recommend additional follow-up/re-evaluation next office visit.         Encounter provider Jessica Mejia DO    The patient was identified by name and date of birth. Leilani Blount was informed that this is a telemedicine visit and that the visit is being conducted through the Epic Embedded platform. She agrees to proceed..  My office door was closed.  No one else was in the room.  She acknowledged consent and understanding of privacy and security of the video platform. The patient has agreed to participate and understands they can discontinue the visit at any time.    Patient is aware this is a billable service.     History of Present Illness     Pt is seen for a virtual visit. Increased stress- pt  in a nursing home, on hospice since February.  Weight -had been eating poorly  Blood pressure 130-140/80s  Built antibodies to remicade.         History obtained from : patient  Review of Systems   Constitutional: Negative.  Negative for fatigue and fever.   HENT: Negative.     Eyes: Negative.    Respiratory: Negative.  Negative for cough.    Cardiovascular: Negative.    Gastrointestinal: Negative.    Endocrine: Negative.    Genitourinary: Negative.    Musculoskeletal: Negative.    Skin: Negative.    Allergic/Immunologic: Negative.    Neurological: Negative.    Psychiatric/Behavioral:  Positive for dysphoric mood and sleep disturbance. The patient is nervous/anxious.      Medical History Reviewed by provider this encounter:  Tobacco  Allergies  Meds  Problems  Med Hx  Surg Hx  Fam Hx     .  Current Outpatient Medications on File Prior to Visit   Medication Sig Dispense Refill    albuterol (ACCUNEB) 1.25 MG/3ML nebulizer solution Take 1 ampule by nebulization as needed for wheezing       buPROPion (WELLBUTRIN XL) 300 mg 24 hr tablet Take 1 tablet (300 mg total) by mouth every morning 100 tablet 1    carisoprodol (SOMA) 350 mg tablet Take 1 tablet (350 mg total) by mouth daily at bedtime as needed for muscle spasms 30 tablet 0    diclofenac (VOLTAREN) 75 mg EC tablet Take 1 tablet (75 mg total) by mouth 2 (two) times a day 60 tablet 0    esomeprazole (NexIUM) 40 MG capsule Take 1 capsule (40 mg total) by mouth daily 100 capsule 1    hydrocortisone 2.5 % ointment Apply topically 2 (two) times a day For no longer than 5-7 days to affected skin on the  face. Use only for flares, and take 1-2 week break between using. 30 g 0    ketoconazole (NIZORAL) 2 % cream Apply 3-4 times per day to affected nails for suppression 15 g 2    latanoprost (XALATAN) 0.005 % ophthalmic solution       levothyroxine 50 mcg tablet Take 1 tablet (50 mcg total) by mouth daily 90 tablet 0    Lumigan 0.01 % ophthalmic drops       naloxone (NARCAN) 4 mg/0.1 mL nasal spray Administer 1 spray into a nostril. If no response after 2-3 minutes, give another dose in the other nostril using a new spray. 1 each 1    ondansetron (ZOFRAN-ODT) 4 mg disintegrating tablet Take 1 tablet (4 mg total) by mouth every 6 (six) hours as needed for nausea or vomiting (Patient taking differently: Take 4 mg by mouth as needed for nausea or vomiting) 30 tablet 1    tiZANidine (ZANAFLEX) 4 mg tablet Take 1 tablet (4 mg total) by mouth every 8 (eight) hours as needed for muscle spasms 30 tablet 0    traMADol (ULTRAM) 50 mg tablet Take 1 tablet (50 mg total) by mouth daily as needed for moderate pain 20 tablet 0    triamcinolone (KENALOG) 0.5 % cream Apply topically 3 (three) times a day 30 g 0    vitamin C (VITAMIN C) 500 mg tablet Take 1 tablet (500 mg total) by mouth daily 30 tablet      No current facility-administered medications on file prior to visit.      Social History     Tobacco Use    Smoking status: Former     Current packs/day: 0.00     Types: Cigarettes     Quit date: 2003     Years since quittin.6    Smokeless tobacco: Never    Tobacco comments:     Quit , rare use for 2 years   Vaping Use    Vaping status: Never Used   Substance and Sexual Activity    Alcohol use: Yes     Comment: social 1 drink per weeek    Drug use: No    Sexual activity: Yes     Partners: Male         Objective     Wt 68.9 kg (152 lb)   BMI 26.93 kg/m²   Physical Exam  HENT:      Head: Normocephalic and atraumatic.   Pulmonary:      Effort: Pulmonary effort is normal.   Neurological:      Mental Status: She is alert  and oriented to person, place, and time.   Psychiatric:         Mood and Affect: Mood normal.         Behavior: Behavior normal.         Thought Content: Thought content normal.         Judgment: Judgment normal.         Visit Time  Total Visit Duration: 20 min

## 2024-11-07 ENCOUNTER — TELEPHONE (OUTPATIENT)
Dept: GASTROENTEROLOGY | Facility: CLINIC | Age: 59
End: 2024-11-07

## 2024-11-07 ENCOUNTER — HOSPITAL ENCOUNTER (OUTPATIENT)
Dept: ULTRASOUND IMAGING | Facility: CLINIC | Age: 59
Discharge: HOME | End: 2024-11-07
Payer: COMMERCIAL

## 2024-11-07 ENCOUNTER — HOSPITAL ENCOUNTER (OUTPATIENT)
Dept: MAMMOGRAPHY | Facility: CLINIC | Age: 59
Discharge: HOME | End: 2024-11-07
Payer: COMMERCIAL

## 2024-11-07 VITALS — HEIGHT: 63 IN | BODY MASS INDEX: 26.93 KG/M2 | WEIGHT: 152 LBS

## 2024-11-07 DIAGNOSIS — R92.8 FOLLOW-UP EXAMINATION OF ABNORMAL MAMMOGRAM: ICD-10-CM

## 2024-11-07 DIAGNOSIS — R92.8 ABNORMAL MAMMOGRAM: ICD-10-CM

## 2024-11-07 PROCEDURE — 77066 DX MAMMO INCL CAD BI: CPT

## 2024-11-07 PROCEDURE — 76642 ULTRASOUND BREAST LIMITED: CPT

## 2024-11-07 PROCEDURE — G0279 TOMOSYNTHESIS, MAMMO: HCPCS

## 2024-11-07 NOTE — TELEPHONE ENCOUNTER
Called and left a message for the patient to call back to reschedule office visit for 1/27/25 due to a change in the providers schedule. As of this time Dr. Vasquez has some times available on 1/9/25 at [place] in the afternoon

## 2024-11-11 ENCOUNTER — TELEPHONE (OUTPATIENT)
Dept: FAMILY MEDICINE CLINIC | Facility: CLINIC | Age: 59
End: 2024-11-11

## 2024-11-11 NOTE — TELEPHONE ENCOUNTER
----- Message from Jessica Mejia DO sent at 11/8/2024  5:53 PM EST -----  The areas in the left breast appear unchanged - benign  Continue with yearly screenings.

## 2024-11-13 DIAGNOSIS — F51.04 CHRONIC INSOMNIA: ICD-10-CM

## 2024-11-13 DIAGNOSIS — F41.9 ANXIETY: ICD-10-CM

## 2024-11-14 ENCOUNTER — APPOINTMENT (OUTPATIENT)
Dept: LAB | Facility: HOSPITAL | Age: 59
End: 2024-11-14
Payer: COMMERCIAL

## 2024-11-14 DIAGNOSIS — K51.011 ULCERATIVE PANCOLITIS WITH RECTAL BLEEDING (HCC): ICD-10-CM

## 2024-11-14 LAB — CRP SERPL QL: 3.1 MG/L

## 2024-11-14 PROCEDURE — 86140 C-REACTIVE PROTEIN: CPT

## 2024-11-14 PROCEDURE — 36415 COLL VENOUS BLD VENIPUNCTURE: CPT

## 2024-11-14 RX ORDER — LORAZEPAM 0.5 MG/1
0.5 TABLET ORAL DAILY PRN
Qty: 30 TABLET | Refills: 0 | Status: SHIPPED | OUTPATIENT
Start: 2024-11-14

## 2024-11-14 RX ORDER — ZOLPIDEM TARTRATE 10 MG/1
10 TABLET ORAL
Qty: 30 TABLET | Refills: 0 | Status: SHIPPED | OUTPATIENT
Start: 2024-11-14 | End: 2024-12-14

## 2024-11-15 ENCOUNTER — RESULTS FOLLOW-UP (OUTPATIENT)
Dept: GASTROENTEROLOGY | Facility: CLINIC | Age: 59
End: 2024-11-15

## 2024-11-24 DIAGNOSIS — K21.9 GASTROESOPHAGEAL REFLUX DISEASE WITHOUT ESOPHAGITIS: ICD-10-CM

## 2024-11-26 RX ORDER — FAMOTIDINE 20 MG/1
20 TABLET, FILM COATED ORAL
Qty: 90 TABLET | Refills: 0 | Status: SHIPPED | OUTPATIENT
Start: 2024-11-26

## 2024-12-01 PROBLEM — Z86.16 PERSONAL HISTORY OF COVID-19: Status: ACTIVE | Noted: 2022-01-13

## 2024-12-01 PROBLEM — L30.9 DERMATITIS OF LIP: Status: ACTIVE | Noted: 2024-12-01

## 2024-12-01 PROBLEM — J02.9 PHARYNGITIS: Status: RESOLVED | Noted: 2024-03-05 | Resolved: 2024-12-01

## 2024-12-01 NOTE — ASSESSMENT & PLAN NOTE
Pt requesting a new prescription for elidel to use as needed  Orders:    pimecrolimus (ELIDEL) 1 % cream; Apply BID to affected skin

## 2024-12-01 NOTE — ASSESSMENT & PLAN NOTE
Depression Screening Follow-up Plan: Patient's depression screening was positive with a PHQ-9 score of 8. Patient assessed for underlying major depression. They have no active suicidal ideations. Brief counseling provided and recommend additional follow-up/re-evaluation next office visit.  Increased symptoms with  in nursing home and now on hospice with progressive dementia.  Pt would like to stay on wellbutrin for now

## 2024-12-01 NOTE — ASSESSMENT & PLAN NOTE
Pt was seen virtually and aware that she needs a new medication agreement signed for zolpidem

## 2024-12-01 NOTE — ASSESSMENT & PLAN NOTE
Visit is virtual. Pt is aware that she needs a new med agreement signed for lorazepam. Pdmp reviewed regularly.

## 2024-12-05 ENCOUNTER — OFFICE VISIT (OUTPATIENT)
Dept: FAMILY MEDICINE CLINIC | Facility: CLINIC | Age: 59
End: 2024-12-05
Payer: COMMERCIAL

## 2024-12-05 VITALS
SYSTOLIC BLOOD PRESSURE: 142 MMHG | OXYGEN SATURATION: 100 % | HEART RATE: 64 BPM | DIASTOLIC BLOOD PRESSURE: 86 MMHG | BODY MASS INDEX: 27.46 KG/M2 | HEIGHT: 63 IN | WEIGHT: 155 LBS | TEMPERATURE: 98.4 F

## 2024-12-05 DIAGNOSIS — J32.1 FRONTAL SINUSITIS, UNSPECIFIED CHRONICITY: Primary | ICD-10-CM

## 2024-12-05 PROCEDURE — 99213 OFFICE O/P EST LOW 20 MIN: CPT

## 2024-12-05 RX ORDER — AMOXICILLIN 875 MG/1
875 TABLET, COATED ORAL 2 TIMES DAILY
Qty: 14 TABLET | Refills: 0 | Status: SHIPPED | OUTPATIENT
Start: 2024-12-05 | End: 2024-12-12

## 2024-12-05 RX ORDER — GUAIFENESIN 600 MG/1
1200 TABLET, EXTENDED RELEASE ORAL EVERY 12 HOURS SCHEDULED
Qty: 120 TABLET | Refills: 0 | Status: SHIPPED | OUTPATIENT
Start: 2024-12-05

## 2024-12-05 NOTE — LETTER
December 5, 2024     Patient: Leilani Blount  YOB: 1965  Date of Visit: 12/5/2024      To Whom it May Concern:    Leilani Blount is under my professional care. Leilani was seen in my office on 12/5/2024. Leilani may return to work on Monday, 12/9/24 .    If you have any questions or concerns, please don't hesitate to call.         Sincerely,          Shannon Pulido DO        CC: No Recipients

## 2024-12-05 NOTE — PROGRESS NOTES
"Name: Leilani Blount      : 1965      MRN: [id number]  Encounter Provider: Shannon Pulido DO  Encounter Date: 2024   Encounter department: [facility]  :  Assessment & Plan  Frontal sinusitis, unspecified chronicity  -amoxicillin 875mg BID x7 days  -supportive care: rest, adequate hydration, tylenol/ibuprofen, mucinex  Orders:    amoxicillin (AMOXIL) 875 mg tablet; Take 1 tablet (875 mg total) by mouth 2 (two) times a day for 7 days    guaiFENesin (MUCINEX) 600 mg 12 hr tablet; Take 2 tablets (1,200 mg total) by mouth every 12 (twelve) hours           History of Present Illness     Patient presents for acute visit. Day 2 of fever, sinus pressure, headache, congestion. States this feels like her typical sinus infection.         Fatigue  Associated symptoms include congestion, coughing and fatigue. Pertinent negatives include no abdominal pain, arthralgias, chest pain, chills, fever, rash, sore throat or vomiting.     Review of Systems   Constitutional:  Positive for fatigue. Negative for chills and fever.   HENT:  Positive for congestion, sinus pressure and sinus pain. Negative for ear pain and sore throat.    Eyes:  Negative for pain and visual disturbance.   Respiratory:  Positive for cough. Negative for shortness of breath.    Cardiovascular:  Negative for chest pain and palpitations.   Gastrointestinal:  Negative for abdominal pain and vomiting.   Genitourinary:  Negative for dysuria and hematuria.   Musculoskeletal:  Negative for arthralgias and back pain.   Skin:  Negative for color change and rash.   Neurological:  Negative for seizures and syncope.   All other systems reviewed and are negative.         Objective   /86 (BP Location: Left arm, Patient Position: Sitting, Cuff Size: Adult)   Pulse 64   Temp 98.4 °F (36.9 °C) (Tympanic)   Ht 5' 3\" (1.6 m)   Wt 70.3 kg (155 lb)   SpO2 100%   BMI 27.46 kg/m²      Physical Exam  Constitutional:       General: She is not in " acute distress.     Appearance: Normal appearance. She is not ill-appearing or toxic-appearing.   HENT:      Head: Normocephalic and atraumatic.      Right Ear: External ear normal.      Left Ear: External ear normal. Tympanic membrane is erythematous and bulging.      Nose:      Right Sinus: Maxillary sinus tenderness and frontal sinus tenderness present.      Left Sinus: Maxillary sinus tenderness and frontal sinus tenderness present.   Eyes:      Conjunctiva/sclera: Conjunctivae normal.   Cardiovascular:      Rate and Rhythm: Normal rate and regular rhythm.      Heart sounds: Normal heart sounds. No murmur heard.  Pulmonary:      Effort: Pulmonary effort is normal. No respiratory distress.      Breath sounds: Normal breath sounds. No wheezing, rhonchi or rales.   Musculoskeletal:         General: Normal range of motion.   Skin:     General: Skin is warm and dry.   Neurological:      General: No focal deficit present.      Mental Status: She is alert and oriented to person, place, and time.   Psychiatric:         Mood and Affect: Mood normal.         Behavior: Behavior normal.

## 2024-12-19 DIAGNOSIS — M51.16 INTERVERTEBRAL DISC DISORDER WITH RADICULOPATHY OF LUMBAR REGION: ICD-10-CM

## 2024-12-19 DIAGNOSIS — L30.9 DERMATITIS OF LIP: ICD-10-CM

## 2024-12-19 DIAGNOSIS — F41.9 ANXIETY: ICD-10-CM

## 2024-12-19 DIAGNOSIS — K21.9 GASTROESOPHAGEAL REFLUX DISEASE WITHOUT ESOPHAGITIS: ICD-10-CM

## 2024-12-19 DIAGNOSIS — F51.04 CHRONIC INSOMNIA: ICD-10-CM

## 2024-12-19 DIAGNOSIS — M25.551 RIGHT HIP PAIN: ICD-10-CM

## 2024-12-19 DIAGNOSIS — F32.1 MODERATE MAJOR DEPRESSION (HCC): ICD-10-CM

## 2024-12-19 DIAGNOSIS — M62.838 MUSCLE SPASM: ICD-10-CM

## 2024-12-20 RX ORDER — PIMECROLIMUS 10 MG/G
CREAM TOPICAL
Qty: 30 G | Refills: 0 | Status: SHIPPED | OUTPATIENT
Start: 2024-12-20

## 2024-12-20 RX ORDER — ZOLPIDEM TARTRATE 10 MG/1
10 TABLET ORAL
Qty: 30 TABLET | Refills: 0 | Status: SHIPPED | OUTPATIENT
Start: 2024-12-20 | End: 2025-01-19

## 2024-12-20 RX ORDER — BUPROPION HYDROCHLORIDE 300 MG/1
300 TABLET ORAL EVERY MORNING
Qty: 100 TABLET | Refills: 1 | Status: SHIPPED | OUTPATIENT
Start: 2024-12-20 | End: 2025-07-08

## 2024-12-20 RX ORDER — CARISOPRODOL 350 MG/1
350 TABLET ORAL
Qty: 30 TABLET | Refills: 0 | Status: SHIPPED | OUTPATIENT
Start: 2024-12-20

## 2024-12-20 RX ORDER — BIMATOPROST 0.1 MG/ML
SOLUTION/ DROPS OPHTHALMIC
Refills: 0 | OUTPATIENT
Start: 2024-12-20

## 2024-12-20 RX ORDER — FAMOTIDINE 20 MG/1
20 TABLET, FILM COATED ORAL
Qty: 90 TABLET | Refills: 1 | Status: SHIPPED | OUTPATIENT
Start: 2024-12-20

## 2024-12-20 RX ORDER — ESOMEPRAZOLE MAGNESIUM 40 MG/1
40 CAPSULE, DELAYED RELEASE ORAL DAILY
Qty: 100 CAPSULE | Refills: 1 | Status: SHIPPED | OUTPATIENT
Start: 2024-12-20

## 2024-12-20 RX ORDER — LORAZEPAM 0.5 MG/1
0.5 TABLET ORAL DAILY PRN
Qty: 30 TABLET | Refills: 0 | Status: SHIPPED | OUTPATIENT
Start: 2024-12-20

## 2024-12-20 RX ORDER — TRAMADOL HYDROCHLORIDE 50 MG/1
50 TABLET ORAL DAILY PRN
Qty: 20 TABLET | Refills: 0 | Status: SHIPPED | OUTPATIENT
Start: 2024-12-20

## 2024-12-23 ENCOUNTER — TELEPHONE (OUTPATIENT)
Age: 59
End: 2024-12-23

## 2024-12-23 NOTE — TELEPHONE ENCOUNTER
Pharmacy called they only have the 60 g tube available, would the provider approve the 60 g tube so they can get the prescription out to the patient, pharmacy is there until 6 pm please call [phone] or [phone] to give the approval of 60 g tube of patients pimecrolimus (ELIDEL) 1 % cream   Thank you

## 2025-01-09 ENCOUNTER — OFFICE VISIT (OUTPATIENT)
Dept: GASTROENTEROLOGY | Facility: CLINIC | Age: 60
End: 2025-01-09
Payer: COMMERCIAL

## 2025-01-09 VITALS
HEIGHT: 63 IN | TEMPERATURE: 98 F | DIASTOLIC BLOOD PRESSURE: 81 MMHG | BODY MASS INDEX: 28 KG/M2 | SYSTOLIC BLOOD PRESSURE: 132 MMHG | WEIGHT: 158 LBS

## 2025-01-09 DIAGNOSIS — K51.011 ULCERATIVE PANCOLITIS WITH RECTAL BLEEDING (HCC): Primary | ICD-10-CM

## 2025-01-09 DIAGNOSIS — K21.9 GASTROESOPHAGEAL REFLUX DISEASE WITHOUT ESOPHAGITIS: ICD-10-CM

## 2025-01-09 PROCEDURE — 99214 OFFICE O/P EST MOD 30 MIN: CPT | Performed by: INTERNAL MEDICINE

## 2025-01-09 NOTE — PATIENT INSTRUCTIONS
Patient will follow up with Dr. Vasquez  07/23/2025 ay 9:20 at [place]  and will do labs next month

## 2025-01-09 NOTE — PROGRESS NOTES
"Name: Leilani Blount      : 1965      MRN: [id number]  Encounter Provider: Stephanie Vasquez DO  Encounter Date: 2025   Encounter department: [facility] [place]  :  Assessment & Plan  Ulcerative pancolitis with rectal bleeding (HCC)  Failed humira and entyvio and then developed antibodies to remicade, she currently wants to continue to have watchful waiting so will continue to watch CRP  Orders:  •  C-reactive protein; Future    Gastroesophageal reflux disease without esophagitis  Continue PPI           History of Present Illness   HPI  Leilani Blount is a 59 y.o. female who presents for follow up.    She stopped her remicade in March of this year.      Started on mesalamine and then started to have issues with throat closing.  STopped mesalamine after this.     Brother and sister are taking GLP-1 analogs for prediabetes and HTN in her sister and brother has Type 2 DM.      She was prescribed lisinopril for 140/80-90.     History obtained from: patient    Review of Systems       Objective   /81 (BP Location: Left arm, Patient Position: Sitting, Cuff Size: Large)   Temp 98 °F (36.7 °C) (Tympanic)   Ht 5' 3\" (1.6 m)   Wt 71.7 kg (158 lb)   BMI 27.99 kg/m²      Physical Exam      "

## 2025-01-09 NOTE — ASSESSMENT & PLAN NOTE
Failed humira and entyvio and then developed antibodies to remicade, she currently wants to continue to have watchful waiting so will continue to watch CRP  Orders:  •  C-reactive protein; Future

## 2025-01-12 ENCOUNTER — NURSE TRIAGE (OUTPATIENT)
Dept: OTHER | Facility: OTHER | Age: 60
End: 2025-01-12

## 2025-01-12 DIAGNOSIS — U07.1 COVID: Primary | ICD-10-CM

## 2025-01-12 RX ORDER — NIRMATRELVIR AND RITONAVIR 300-100 MG
3 KIT ORAL 2 TIMES DAILY
Qty: 30 TABLET | Refills: 0 | Status: SHIPPED | OUTPATIENT
Start: 2025-01-12 | End: 2025-01-17

## 2025-01-12 RX ORDER — NIRMATRELVIR AND RITONAVIR 300-100 MG
3 KIT ORAL 2 TIMES DAILY
Qty: 30 TABLET | Refills: 0 | Status: SHIPPED | OUTPATIENT
Start: 2025-01-12 | End: 2025-01-12

## 2025-01-12 NOTE — TELEPHONE ENCOUNTER
Per on call, paxlovid sent to pharmacy per pt request. Pt called and notified. Verbalized understanding.

## 2025-01-12 NOTE — TELEPHONE ENCOUNTER
"Reason for Disposition   [1] Patient is NOT HIGH RISK AND [2] strongly requests antiviral medicine AND [3] COVID-19 symptoms present < 5 days    Answer Assessment - Initial Assessment Questions  1. SYMPTOMS: \"What is your main symptom or concern?\" (e.g., cough, fever, shortness of breath, muscle aches)        Sore throat, fatigue, muscle aches, fatigue, headache/pressure    2. ONSET: \"When did the symptoms start?\"         Friday    3. COUGH: \"Do you have a cough?\" If Yes, ask: \"How bad is the cough?\"          Yes, yellow sputum    4. FEVER: \"Do you have a fever?\" If Yes, ask: \"What is your temperature, how was it measured, and when did it start?\"        101.5 T max    5. BREATHING DIFFICULTY: \"Are you having any difficulty breathing?\" (e.g., normal; shortness of breath, wheezing, unable to speak)         denies    6. BETTER-SAME-WORSE: \"Are you getting better, staying the same or getting worse compared to yesterday?\"  If getting worse, ask, \"In what way?\"        Worse than yesterday    7. OTHER SYMPTOMS: \"Do you have any other symptoms?\"  (e.g., chills, fatigue, headache, loss of smell or taste, muscle pain, sore throat)        Headache, aching back, dizziness with position change this AM    8. COVID-19 DIAGNOSIS: \"How do you know that you have COVID?\" (e.g., positive lab test or self-test, diagnosed by doctor or NP/PA, symptoms after exposure).        Home test positive this AM    9. COVID-19 EXPOSURE: \"Was there any known exposure to COVID before the symptoms began?\"         Works in infusion center    10. COVID-19 VACCINE: \"Have you had the COVID-19 vaccine?\" If Yes, ask: \"When did you last get it?\"          2021 series    11. HIGH RISK DISEASE: \"Do you have any chronic medical problems?\" (e.g., asthma, heart or lung disease, weak immune system, obesity, etc.)          UC, no longer on biologic agent      13. O2 SATURATION MONITOR:  \"Do you use an oxygen saturation monitor (pulse oximeter) at home?\" If Yes, ask " "\"What is your reading (oxygen level) today?\" \"What is your usual oxygen saturation reading?\" (e.g., 95%)          N/a    Protocols used: COVID-19 - Diagnosed or Suspected-Adult-    ESC to on call provider- tested positive for covid this AM, symptoms since Friday- sore throat, cough productive of yellow sputum, fever Tmax 101.5, body aches, headache. No major risk factors, states she has used inhaler in the past for reactive airway but denies any SOB at this time. Is requesting Paxlovid to be sent to pharmacy today. Please advise.   "

## 2025-01-12 NOTE — TELEPHONE ENCOUNTER
ESC from provider- pt to try OTC remedies plus hydration and vitamin C as paxlovid carries more risk of side effects.     Discussed with pt, states that she has been using all the recommended OTC without relief.     On call notified.

## 2025-01-23 DIAGNOSIS — F51.04 CHRONIC INSOMNIA: ICD-10-CM

## 2025-01-23 DIAGNOSIS — F41.9 ANXIETY: ICD-10-CM

## 2025-01-23 DIAGNOSIS — E03.9 ACQUIRED HYPOTHYROIDISM: ICD-10-CM

## 2025-01-23 RX ORDER — LEVOTHYROXINE SODIUM 50 UG/1
50 TABLET ORAL DAILY
Qty: 90 TABLET | Refills: 0 | Status: SHIPPED | OUTPATIENT
Start: 2025-01-23

## 2025-01-24 RX ORDER — ZOLPIDEM TARTRATE 10 MG/1
10 TABLET ORAL
Qty: 30 TABLET | Refills: 0 | Status: SHIPPED | OUTPATIENT
Start: 2025-01-24 | End: 2025-02-23

## 2025-01-24 RX ORDER — LORAZEPAM 0.5 MG/1
0.5 TABLET ORAL DAILY PRN
Qty: 30 TABLET | Refills: 0 | Status: SHIPPED | OUTPATIENT
Start: 2025-01-24

## 2025-02-20 ENCOUNTER — TELEPHONE (OUTPATIENT)
Dept: GASTROENTEROLOGY | Facility: CLINIC | Age: 60
End: 2025-02-20

## 2025-02-20 DIAGNOSIS — F41.9 ANXIETY: ICD-10-CM

## 2025-02-20 DIAGNOSIS — F51.04 CHRONIC INSOMNIA: ICD-10-CM

## 2025-02-20 DIAGNOSIS — K21.9 GASTROESOPHAGEAL REFLUX DISEASE WITHOUT ESOPHAGITIS: ICD-10-CM

## 2025-02-20 DIAGNOSIS — F32.1 MODERATE MAJOR DEPRESSION (HCC): ICD-10-CM

## 2025-02-20 NOTE — TELEPHONE ENCOUNTER
Called the patient to schedule 1 year repeat Colonoscopy from recall with Dr. Vasquez. The patient stated that at her last office visit her and dr. Vasquez discussed holding off on it. She asked if I could ask Dr. Vasquez and let her know. I stated yes. The patient is agreeable to have the procedure done if Dr Vasquez states she still needs it. Sent a message to Dr Vasquez to advise on next steps.

## 2025-02-21 RX ORDER — FAMOTIDINE 20 MG/1
20 TABLET, FILM COATED ORAL
Qty: 90 TABLET | Refills: 1 | Status: SHIPPED | OUTPATIENT
Start: 2025-02-21

## 2025-02-21 RX ORDER — LORAZEPAM 0.5 MG/1
0.5 TABLET ORAL DAILY PRN
Qty: 30 TABLET | Refills: 0 | Status: SHIPPED | OUTPATIENT
Start: 2025-02-21

## 2025-02-21 RX ORDER — ZOLPIDEM TARTRATE 10 MG/1
10 TABLET ORAL
Qty: 30 TABLET | Refills: 0 | Status: SHIPPED | OUTPATIENT
Start: 2025-02-21 | End: 2025-03-23

## 2025-02-21 RX ORDER — BUPROPION HYDROCHLORIDE 300 MG/1
300 TABLET ORAL EVERY MORNING
Qty: 90 TABLET | Refills: 1 | Status: SHIPPED | OUTPATIENT
Start: 2025-02-21 | End: 2025-09-09

## 2025-02-26 ENCOUNTER — APPOINTMENT (OUTPATIENT)
Dept: LAB | Facility: HOSPITAL | Age: 60
End: 2025-02-26
Payer: COMMERCIAL

## 2025-02-26 DIAGNOSIS — K51.011 ULCERATIVE PANCOLITIS WITH RECTAL BLEEDING (HCC): ICD-10-CM

## 2025-02-26 LAB — CRP SERPL QL: 1.5 MG/L

## 2025-02-26 PROCEDURE — 36415 COLL VENOUS BLD VENIPUNCTURE: CPT

## 2025-02-26 PROCEDURE — 86140 C-REACTIVE PROTEIN: CPT

## 2025-03-03 ENCOUNTER — RESULTS FOLLOW-UP (OUTPATIENT)
Dept: GASTROENTEROLOGY | Facility: CLINIC | Age: 60
End: 2025-03-03

## 2025-03-17 NOTE — PROGRESS NOTES
AVS virtually reviewed with patient in its entirety with emphasis on diet, medications, follow-up appointments and reasons to return to the ED or contact the [facility] On Call Nurse Care Line. Patient also encouraged to utilize their patient portal. Ease and convenience of use reiterated. Education complete and patient voiced understanding. All questions answered. Discharge teaching complete.    Pt  Received remicade today without incident  Pt  Refused AVS  Confirmed next appt  For 2/24/19 @ 0900

## 2025-03-29 DIAGNOSIS — I10 PRIMARY HYPERTENSION: ICD-10-CM

## 2025-03-29 DIAGNOSIS — F51.04 CHRONIC INSOMNIA: ICD-10-CM

## 2025-03-29 DIAGNOSIS — E03.9 ACQUIRED HYPOTHYROIDISM: ICD-10-CM

## 2025-03-29 DIAGNOSIS — K21.9 GASTROESOPHAGEAL REFLUX DISEASE WITHOUT ESOPHAGITIS: ICD-10-CM

## 2025-03-29 DIAGNOSIS — M51.16 INTERVERTEBRAL DISC DISORDER WITH RADICULOPATHY OF LUMBAR REGION: ICD-10-CM

## 2025-03-29 DIAGNOSIS — F41.9 ANXIETY: ICD-10-CM

## 2025-03-29 DIAGNOSIS — M25.551 RIGHT HIP PAIN: ICD-10-CM

## 2025-03-31 RX ORDER — ESOMEPRAZOLE MAGNESIUM 40 MG/1
40 CAPSULE, DELAYED RELEASE ORAL DAILY
Qty: 100 CAPSULE | Refills: 1 | Status: SHIPPED | OUTPATIENT
Start: 2025-03-31

## 2025-04-01 DIAGNOSIS — E87.6 HYPOKALEMIA: ICD-10-CM

## 2025-04-01 DIAGNOSIS — Z13.220 SCREENING FOR LIPID DISORDERS: ICD-10-CM

## 2025-04-01 DIAGNOSIS — Z13.1 SCREENING FOR DIABETES MELLITUS (DM): ICD-10-CM

## 2025-04-01 DIAGNOSIS — E03.9 HYPOTHYROIDISM, UNSPECIFIED TYPE: Primary | ICD-10-CM

## 2025-04-01 DIAGNOSIS — R60.0 BILATERAL LEG EDEMA: ICD-10-CM

## 2025-04-01 DIAGNOSIS — D50.8 OTHER IRON DEFICIENCY ANEMIA: ICD-10-CM

## 2025-04-01 RX ORDER — LEVOTHYROXINE SODIUM 50 UG/1
50 TABLET ORAL DAILY
Qty: 90 TABLET | Refills: 0 | Status: SHIPPED | OUTPATIENT
Start: 2025-04-01

## 2025-04-01 RX ORDER — ZOLPIDEM TARTRATE 10 MG/1
10 TABLET ORAL
Qty: 30 TABLET | Refills: 0 | Status: SHIPPED | OUTPATIENT
Start: 2025-04-01 | End: 2025-05-01

## 2025-04-01 RX ORDER — LISINOPRIL 10 MG/1
10 TABLET ORAL DAILY
Qty: 90 TABLET | Refills: 0 | Status: SHIPPED | OUTPATIENT
Start: 2025-04-01

## 2025-04-01 RX ORDER — TRAMADOL HYDROCHLORIDE 50 MG/1
50 TABLET ORAL DAILY PRN
Qty: 20 TABLET | Refills: 0 | Status: SHIPPED | OUTPATIENT
Start: 2025-04-01

## 2025-04-01 RX ORDER — LORAZEPAM 0.5 MG/1
0.5 TABLET ORAL DAILY PRN
Qty: 30 TABLET | Refills: 0 | Status: SHIPPED | OUTPATIENT
Start: 2025-04-01

## 2025-04-03 ENCOUNTER — APPOINTMENT (OUTPATIENT)
Dept: LAB | Facility: HOSPITAL | Age: 60
End: 2025-04-03
Payer: COMMERCIAL

## 2025-04-03 DIAGNOSIS — Z00.8 ENCOUNTER FOR OTHER GENERAL EXAMINATION: ICD-10-CM

## 2025-04-03 DIAGNOSIS — E03.9 HYPOTHYROIDISM, UNSPECIFIED TYPE: ICD-10-CM

## 2025-04-03 DIAGNOSIS — R60.0 BILATERAL LEG EDEMA: ICD-10-CM

## 2025-04-03 DIAGNOSIS — E87.6 HYPOKALEMIA: ICD-10-CM

## 2025-04-03 DIAGNOSIS — D50.8 OTHER IRON DEFICIENCY ANEMIA: ICD-10-CM

## 2025-04-03 DIAGNOSIS — Z13.1 SCREENING FOR DIABETES MELLITUS (DM): ICD-10-CM

## 2025-04-03 LAB
ALBUMIN SERPL BCG-MCNC: 4.5 G/DL (ref 3.5–5)
ALP SERPL-CCNC: 52 U/L (ref 34–104)
ALT SERPL W P-5'-P-CCNC: 12 U/L (ref 7–52)
ANION GAP SERPL CALCULATED.3IONS-SCNC: 8 MMOL/L (ref 4–13)
AST SERPL W P-5'-P-CCNC: 18 U/L (ref 13–39)
BASOPHILS # BLD AUTO: 0.03 THOUSANDS/ÂΜL (ref 0–0.1)
BASOPHILS NFR BLD AUTO: 1 % (ref 0–1)
BILIRUB SERPL-MCNC: 0.38 MG/DL (ref 0.2–1)
BUN SERPL-MCNC: 11 MG/DL (ref 5–25)
CALCIUM SERPL-MCNC: 9.3 MG/DL (ref 8.4–10.2)
CHLORIDE SERPL-SCNC: 102 MMOL/L (ref 96–108)
CHOLEST SERPL-MCNC: 214 MG/DL (ref ?–200)
CO2 SERPL-SCNC: 29 MMOL/L (ref 21–32)
CREAT SERPL-MCNC: 0.86 MG/DL (ref 0.6–1.3)
EOSINOPHIL # BLD AUTO: 0.09 THOUSAND/ÂΜL (ref 0–0.61)
EOSINOPHIL NFR BLD AUTO: 2 % (ref 0–6)
ERYTHROCYTE [DISTWIDTH] IN BLOOD BY AUTOMATED COUNT: 12.2 % (ref 11.6–15.1)
GFR SERPL CREATININE-BSD FRML MDRD: 74 ML/MIN/1.73SQ M
GLUCOSE SERPL-MCNC: 74 MG/DL (ref 65–140)
HCT VFR BLD AUTO: 37.1 % (ref 34.8–46.1)
HDLC SERPL-MCNC: 95 MG/DL
HGB BLD-MCNC: 12.3 G/DL (ref 11.5–15.4)
IMM GRANULOCYTES # BLD AUTO: 0.01 THOUSAND/UL (ref 0–0.2)
IMM GRANULOCYTES NFR BLD AUTO: 0 % (ref 0–2)
LDLC SERPL CALC-MCNC: 98 MG/DL (ref 0–100)
LYMPHOCYTES # BLD AUTO: 1.99 THOUSANDS/ÂΜL (ref 0.6–4.47)
LYMPHOCYTES NFR BLD AUTO: 43 % (ref 14–44)
MCH RBC QN AUTO: 31.9 PG (ref 26.8–34.3)
MCHC RBC AUTO-ENTMCNC: 33.2 G/DL (ref 31.4–37.4)
MCV RBC AUTO: 96 FL (ref 82–98)
MONOCYTES # BLD AUTO: 0.36 THOUSAND/ÂΜL (ref 0.17–1.22)
MONOCYTES NFR BLD AUTO: 8 % (ref 4–12)
NEUTROPHILS # BLD AUTO: 2.19 THOUSANDS/ÂΜL (ref 1.85–7.62)
NEUTS SEG NFR BLD AUTO: 46 % (ref 43–75)
NONHDLC SERPL-MCNC: 119 MG/DL
NRBC BLD AUTO-RTO: 0 /100 WBCS
PLATELET # BLD AUTO: 288 THOUSANDS/UL (ref 149–390)
PMV BLD AUTO: 10 FL (ref 8.9–12.7)
POTASSIUM SERPL-SCNC: 3.7 MMOL/L (ref 3.5–5.3)
PROT SERPL-MCNC: 7 G/DL (ref 6.4–8.4)
RBC # BLD AUTO: 3.86 MILLION/UL (ref 3.81–5.12)
SODIUM SERPL-SCNC: 139 MMOL/L (ref 135–147)
TRIGL SERPL-MCNC: 104 MG/DL (ref ?–150)
TSH SERPL DL<=0.05 MIU/L-ACNC: 1.45 UIU/ML (ref 0.45–4.5)
WBC # BLD AUTO: 4.67 THOUSAND/UL (ref 4.31–10.16)

## 2025-04-03 PROCEDURE — 84443 ASSAY THYROID STIM HORMONE: CPT

## 2025-04-03 PROCEDURE — 80061 LIPID PANEL: CPT

## 2025-04-03 PROCEDURE — 80053 COMPREHEN METABOLIC PANEL: CPT

## 2025-04-03 PROCEDURE — 85025 COMPLETE CBC W/AUTO DIFF WBC: CPT

## 2025-04-03 PROCEDURE — 83036 HEMOGLOBIN GLYCOSYLATED A1C: CPT

## 2025-04-04 LAB
EST. AVERAGE GLUCOSE BLD GHB EST-MCNC: 111 MG/DL
HBA1C MFR BLD: 5.5 %

## 2025-04-06 ENCOUNTER — RESULTS FOLLOW-UP (OUTPATIENT)
Dept: FAMILY MEDICINE CLINIC | Facility: CLINIC | Age: 60
End: 2025-04-06

## 2025-04-07 NOTE — TELEPHONE ENCOUNTER
----- Message from Jessica Mejia DO sent at 4/6/2025 11:34 PM EDT -----  Your thyroid level is good  Your sugar, electrolytes, liver and kidney function are normal  Your blood count is normal

## 2025-04-18 DIAGNOSIS — F51.04 CHRONIC INSOMNIA: ICD-10-CM

## 2025-04-18 DIAGNOSIS — F32.1 MODERATE MAJOR DEPRESSION (HCC): ICD-10-CM

## 2025-04-18 RX ORDER — BUPROPION HYDROCHLORIDE 300 MG/1
300 TABLET ORAL EVERY MORNING
Qty: 90 TABLET | Refills: 0 | OUTPATIENT
Start: 2025-04-18 | End: 2025-11-04

## 2025-04-18 RX ORDER — ZOLPIDEM TARTRATE 10 MG/1
10 TABLET ORAL
Qty: 30 TABLET | Refills: 0 | OUTPATIENT
Start: 2025-04-18 | End: 2025-05-18

## 2025-05-02 DIAGNOSIS — F51.04 CHRONIC INSOMNIA: ICD-10-CM

## 2025-05-02 DIAGNOSIS — F32.1 MODERATE MAJOR DEPRESSION (HCC): ICD-10-CM

## 2025-05-05 RX ORDER — ZOLPIDEM TARTRATE 10 MG/1
10 TABLET ORAL
Qty: 30 TABLET | Refills: 0 | Status: SHIPPED | OUTPATIENT
Start: 2025-05-05 | End: 2025-06-04

## 2025-05-05 RX ORDER — BUPROPION HYDROCHLORIDE 300 MG/1
300 TABLET ORAL EVERY MORNING
Qty: 90 TABLET | Refills: 0 | Status: SHIPPED | OUTPATIENT
Start: 2025-05-05 | End: 2025-11-21

## 2025-05-05 NOTE — PROGRESS NOTES
Copied from CRM #[id number]. Topic: Needs Earlier Appointment  >> May 2, 2025 12:40 PM Ridge HECK wrote:  Pt called to book first available for Pulm referral.  Pt would like to be seen at an earlier time in [facility], or [place].   [facility] Follow Up Note    Patient Name: Bassam Irvin  Encounter Date: 02/07/2022    Today's Chief Concerns:  Ivory Mcdaniel Concern #1:  Follow up fungus and paronychia right 3rd and 4th fingernails      Current Medications:    Current Outpatient Medications:     albuterol (ACCUNEB) 1 25 MG/3ML nebulizer solution, Take 1 ampule by nebulization as needed for wheezing , Disp: , Rfl:     Ascorbic Acid (VITAMIN C) 1000 MG tablet, Take 1,000 mg by mouth daily, Disp: , Rfl:     carisoprodol (SOMA) 350 mg tablet, Take 1 tablet (350 mg total) by mouth 3 (three) times a day, Disp: 30 tablet, Rfl: 0    diclofenac (VOLTAREN) 75 mg EC tablet, Take 1 tablet (75 mg total) by mouth 2 (two) times a day, Disp: 60 tablet, Rfl: 2    famotidine (PEPCID) 20 mg tablet, TAKE ONE TABLET BY MOUTH EVERY DAY AT BEDTIME, Disp: 90 tablet, Rfl: 0    hyoscyamine (ANASPAZ,LEVSIN) 0 125 MG tablet, TAKE ONE TABLET BY MOUTH EVERY 4 HOURS AS NEEDED FOR CRAMPING (Patient taking differently: as needed ), Disp: 30 tablet, Rfl: 0    inFLIXimab (REMICADE) 100 mg, Infuse into a venous catheter every 56 days, Disp: , Rfl:     levothyroxine 50 mcg tablet, TAKE ONE TABLET BY MOUTH EVERY DAY IN THE EARLY MORNING, Disp: 30 tablet, Rfl: 0    ondansetron (ZOFRAN-ODT) 4 mg disintegrating tablet, Take 1 tablet (4 mg total) by mouth every 6 (six) hours as needed for nausea or vomiting (Patient taking differently: Take 4 mg by mouth as needed for nausea or vomiting ), Disp: 30 tablet, Rfl: 1    predniSONE 10 mg tablet, Take 1 tablet (10 mg total) by mouth daily Pred 10mg, 4 tabs for 2 days , 3 tabs for 2 days, 2 tabs for 2 days and 1 tab for 2 days  # 20  (Patient taking differently: Take 10 mg by mouth as needed Pred 10mg, 4 tabs for 2 days , 3 tabs for 2 days, 2 tabs for 2 days and 1 tab for 2 days  # 20 ), Disp: 20 tablet, Rfl: 0    terbinafine (LamISIL) 250 mg tablet, Take 1 tablet by mouth daily for 6 weeks  , Disp: 42 tablet, Rfl: 0   traMADol (ULTRAM) 50 mg tablet, TAKE ONE TABLET BY MOUTH 2 TIMES A DAY AS NEEDED FOR MODERATE PAIN (Patient taking differently: as needed ), Disp: 30 tablet, Rfl: 1    traZODone (DESYREL) 50 mg tablet, Take 1 tablet (50 mg total) by mouth daily at bedtime, Disp: 90 tablet, Rfl: 0    triamcinolone (KENALOG) 0 5 % cream, Apply topically 3 (three) times a day (Patient taking differently: Apply topically as needed ), Disp: 30 g, Rfl: 0    zolpidem (AMBIEN) 10 mg tablet, Take 1 tablet (10 mg total) by mouth daily at bedtime as needed for sleep, Disp: 30 tablet, Rfl: 0    clobetasol (TEMOVATE) 0 05 % cream, Apply topically 2 (two) times a day To nail for maximum of 30 days (Patient not taking: Reported on 2/7/2022 ), Disp: 30 g, Rfl: 0    CONSTITUTIONAL:   Vitals:    02/07/22 1012   Temp: 97 9 °F (36 6 °C)   TempSrc: Temporal   Height: 5' 3" (1 6 m)         Specific Alerts:    Have you been seen by a [facility]ologist in the last 3 years? YES    Are you pregnant or planning to become pregnant? No    Are you currently or planning to be nursing or breast feeding? No    No Known Allergies    May we call your Preferred Phone number to discuss your specific medical information? YES    May we leave a detailed message that includes your specific medical information? YES    Have you traveled outside of the [facility] in the past 3 months? No    Do you currently have a pacemaker or defibrillator? No    Do you have any artificial heart valves, joints, plates, screws, rods, stents, pins, etc? No   - If Yes, were any placed within the last 2 years? Do you require any medications prior to a surgical procedure? No   - If Yes, for which procedure? - If Yes, what medications to you require? Are you taking any medications that cause you to bleed more easily ("blood thinners") No    Have you ever experienced a rapid heartbeat with epinephrine?  No    Have you ever been treated with "gold" (gold sodium thiomalate) therapy? No    [facility] can help with wrinkles, "laugh lines," facial volume loss, "double chin," "love handles," age spots, and more  Are you interested in learning today about some of the skin enhancement procedures that we offer? (If Yes, please provide more detail) No    Review of Systems:  Have you recently had or currently have any of the following? · Fever or chills: No  · Night Sweats: No  · Headaches:No  · Weight Gain: No  · Weight Loss: No  · Blurry Vision: No  · Nausea: No  · Vomiting: No  · Diarrhea: No  · Blood in Stool: No  · Abdominal Pain: No  · Itchy Skin: YES  · Painful Joints: YES  · Swollen Joints: YES  · Muscle Pain: No  · Irregular Mole: No  · Sun Burn: No  · Dry Skin: No  · Skin Color Changes: No  · Scar or Keloid: No  · Cold Sores/Fever Blisters: No  · Bacterial Infections/MRSA: No  · Anxiety: No  · Depression: No  · Suicidal or Homicidal Thoughts: No      PSYCH: Normal mood and affect  EYES: Normal conjunctiva  RESPIRATORY: Normal respirations    FULL ORGAN SYSTEM SKIN EXAM (SKIN)            Right Hand/Fingers Normal except as noted below in Assessment                                   1  ONYCHOMYCOSIS ("FUNGAL NAIL")    Physical Exam:   Anatomic Location Affected:  Right 3rd and 4th fingernails   Morphological Description:  See photos         Additional History of Present Condition:  Patient has 2 weeks of oral terbinafine  Assessment and Plan:  Based on a thorough discussion of this condition and the management approach to it (including a comprehensive discussion of the known risks, side effects and potential benefits of treatment), the patient (family) agrees to implement the following specific plan:   Finish taking the oral terbinafine   Hepatic panel ordered to be done after completing the terbiunafine   Negligible paronychia today  What are fungal nail infections? Fungal infection of the nails is also known as onychomycosis   It is increasingly common with increased age  It rarely affects children  Which organisms cause onychomycosis? Onychomycosis can be due to:  Dermatophytes such as Trichophyton rubrum (T  rubrum), T  interdigitale (tinea unguium)   Yeasts such as Candida albicans   Molds such as Scopulariopsis brevicaulis and Fusarium species  What are the clinical features of onychomycosis? Onychomycosis may affect one or more toenails and fingernails and most often involves the great toenail or the little toenail  It can present in one or several different patterns   Lateral onychomycosis: a white or yellow opaque streak appears at one side of the nail   Subungual hyperkeratosis: scaling occurs under the nail   Distal onycholysis: the end of the nail lifts  The free edge often crumbles   Superficial white onychomycosis: flaky white patches and pits appear on the top of the nail plate   Proximal onychomycosis:yellow spots appear in the half-moon (lunula)   Onychoma or dermatophytoma:a thick localized area of infection in the nail plate    Destruction of the nail    Tinea unguium often results from untreated tinea pedis (feet) or tinea manuum (hand)  It may follow an injury to the nail or inflammatory disease of the nail  Candida infection of the nail plate generally results from paronychia and starts near the nail fold (the cuticle)  The nail fold is swollen and red, lifted off the nail plate  White, yellow, green or black marks appear on the nearby nail and spread  The nail may lift off its bed and is tender if you press on it  Mold infections are similar in appearance to tinea unguium  Onychomycosis must be distinguished from other nail disorders     Bacterial infection especially Pseudomonas aeruginosa, which turns the nail black or green    Psoriasis    Eczema or dermatitis    Lichen planus    Viral warts    Onycholysis    Onychogryphosis (nail thickening and scaling under the nail), common in the elderly    How is the diagnosis of onychomycosis confirmed? Clippings should be taken from crumbling tissue at the end of the infected nail  The discolored surface of the nails can be scraped off  The debris can be scooped out from under the nail  The clippings and scrapings are sent to a mycology laboratory for microscopy and culture  Previous treatment can reduce the chance of growing the fungus successfully in a culture, so it is best to take the clippings before any treatment is commenced:   To confirm the diagnosis -- antifungal treatment will not be successful if there is another explanation for the nail condition    To identify the responsible organism  Molds and yeasts may require different treatment from dermatophyte fungi   Treatment may be required for a prolonged period and is expensive  Partially treated infection may be impossible to prove for many months as antifungal drugs can be detected even a year later  A nail biopsy may also reveal characteristic histopathological features of onychomycosis  What is the treatment of onychomycosis? Fingernail infections are usually cured more quickly and effectively than toenail infections  Mild infections affecting less than 50% of one or two nails may respond to topical antifungal medications, but cure usually requires an oral antifungal medication for several months  Devices used to treat onychomycosis  Recently, non-drug treatment has been developed to treat onychomycosis thus avoiding the side effects and risks of oral antifungal drugs  Lasers emitting infrared radiation are thought to kill fungi by the production of heat within the infected tissue  Laser treatment is reported to safely eradicate nail fungi with one to three, almost painless, sessions  Several lasers have been approved for this purpose by the FDA and other regulatory authorities   However, high-quality studies of efficacy are lacking, and existing studies indicate that laser treatment is less medically effective than topical or oral antifungal agents    Nd:YAG continuous, long or short-pulsed lasers   Ti:Sapphire modelocked laser Diode laser    Scribe Attestation    I,:  Preeti German MA am acting as a scribe while in the presence of the attending physician :       I,:  Mahnaz Pressley MD personally performed the services described in this documentation    as scribed in my presence :

## 2025-05-06 ENCOUNTER — OFFICE VISIT (OUTPATIENT)
Dept: FAMILY MEDICINE CLINIC | Facility: CLINIC | Age: 60
End: 2025-05-06
Payer: COMMERCIAL

## 2025-05-06 VITALS
TEMPERATURE: 97.5 F | HEART RATE: 72 BPM | HEIGHT: 63 IN | SYSTOLIC BLOOD PRESSURE: 128 MMHG | OXYGEN SATURATION: 99 % | BODY MASS INDEX: 27.11 KG/M2 | WEIGHT: 153 LBS | DIASTOLIC BLOOD PRESSURE: 70 MMHG

## 2025-05-06 DIAGNOSIS — J40 BRONCHITIS: ICD-10-CM

## 2025-05-06 DIAGNOSIS — J32.9 SINUSITIS, UNSPECIFIED CHRONICITY, UNSPECIFIED LOCATION: Primary | ICD-10-CM

## 2025-05-06 PROCEDURE — 99213 OFFICE O/P EST LOW 20 MIN: CPT

## 2025-05-06 RX ORDER — DOXYCYCLINE HYCLATE 100 MG
100 TABLET ORAL 2 TIMES DAILY
Qty: 14 TABLET | Refills: 0 | Status: SHIPPED | OUTPATIENT
Start: 2025-05-06 | End: 2025-05-13

## 2025-05-06 RX ORDER — ALBUTEROL SULFATE 90 UG/1
2 INHALANT RESPIRATORY (INHALATION) EVERY 6 HOURS PRN
Qty: 6.7 G | Refills: 5 | Status: SHIPPED | OUTPATIENT
Start: 2025-05-06

## 2025-05-06 RX ORDER — BENZONATATE 100 MG/1
100 CAPSULE ORAL 3 TIMES DAILY PRN
Qty: 20 CAPSULE | Refills: 0 | Status: SHIPPED | OUTPATIENT
Start: 2025-05-06

## 2025-05-06 RX ORDER — PREDNISONE 10 MG/1
10 TABLET ORAL SEE ADMIN INSTRUCTIONS
Qty: 20 TABLET | Refills: 0 | Status: SHIPPED | OUTPATIENT
Start: 2025-05-06

## 2025-05-06 NOTE — PROGRESS NOTES
Name: Leilani Blount      : 1965      MRN: [id number]  Encounter Provider: Shannon Pulido DO  Encounter Date: 2025   Encounter department: [facility]  :  Assessment & Plan  Sinusitis, unspecified chronicity, unspecified location  -doxycycline, prednisone taper   -Supportive care: tylenol/ibuprofen prn pain/fever, maintain adequate hydration, rest, steamy showers/humidifier, honey for sore throat    Orders:    predniSONE 10 mg tablet; Take 1 tablet (10 mg total) by mouth see administration instructions Take 40mg (4 tablets) for 2 days, then take 30mg (3 tablets) for 2 days, then take 20mg (2 tablets) for 2 days, then take 10mg (1 tablet) for 2 days.    doxycycline hyclate (VIBRA-TABS) 100 mg tablet; Take 1 tablet (100 mg total) by mouth 2 (two) times a day for 7 days    Bronchitis  -doxycycline, prednisone taper  -albuterol inhaler prn, tessalon perles   Orders:    predniSONE 10 mg tablet; Take 1 tablet (10 mg total) by mouth see administration instructions Take 40mg (4 tablets) for 2 days, then take 30mg (3 tablets) for 2 days, then take 20mg (2 tablets) for 2 days, then take 10mg (1 tablet) for 2 days.    doxycycline hyclate (VIBRA-TABS) 100 mg tablet; Take 1 tablet (100 mg total) by mouth 2 (two) times a day for 7 days    albuterol (Proventil HFA) 90 mcg/act inhaler; Inhale 2 puffs every 6 (six) hours as needed for wheezing    benzonatate (TESSALON PERLES) 100 mg capsule; Take 1 capsule (100 mg total) by mouth 3 (three) times a day as needed for cough           History of Present Illness   Patient presents for a few days of cough, congestion, wheezing. No fever/chills. Green to yellow ocngestion, sinus pressure.      Review of Systems   Constitutional:  Negative for chills and fever.   HENT:  Positive for congestion. Negative for ear pain and sore throat.    Eyes:  Negative for pain and visual disturbance.   Respiratory:  Positive for cough and wheezing. Negative for shortness of  "breath.    Cardiovascular:  Negative for chest pain and palpitations.   Gastrointestinal:  Negative for abdominal pain and vomiting.   Genitourinary:  Negative for dysuria and hematuria.   Musculoskeletal:  Negative for arthralgias and back pain.   Skin:  Negative for color change and rash.   Neurological:  Negative for seizures and syncope.   All other systems reviewed and are negative.      Objective   /70 (BP Location: Left arm, Patient Position: Sitting, Cuff Size: Large)   Pulse 72   Temp 97.5 °F (36.4 °C) (Tympanic)   Ht 5' 3\" (1.6 m)   Wt 69.4 kg (153 lb)   SpO2 99%   BMI 27.10 kg/m²      Physical Exam  Constitutional:       General: She is not in acute distress.     Appearance: Normal appearance. She is not ill-appearing or toxic-appearing.   HENT:      Head: Normocephalic and atraumatic.      Right Ear: Tympanic membrane, ear canal and external ear normal.      Left Ear: Tympanic membrane, ear canal and external ear normal.      Nose: Congestion present.      Right Sinus: Maxillary sinus tenderness and frontal sinus tenderness present.      Left Sinus: Maxillary sinus tenderness and frontal sinus tenderness present.      Mouth/Throat:      Mouth: Mucous membranes are moist.      Pharynx: Oropharynx is clear. Postnasal drip present. No oropharyngeal exudate or posterior oropharyngeal erythema.     Eyes:      Extraocular Movements: Extraocular movements intact.      Conjunctiva/sclera: Conjunctivae normal.      Pupils: Pupils are equal, round, and reactive to light.       Cardiovascular:      Rate and Rhythm: Normal rate and regular rhythm.      Heart sounds: Normal heart sounds. No murmur heard.  Pulmonary:      Effort: Pulmonary effort is normal. No respiratory distress.      Breath sounds: Wheezing (faint throughout) present. No rhonchi or rales.   Abdominal:      General: Bowel sounds are normal. There is no distension.      Palpations: Abdomen is soft.      Tenderness: There is no abdominal " tenderness. There is no guarding or rebound.     Musculoskeletal:         General: Normal range of motion.      Cervical back: Normal range of motion. No rigidity or tenderness.   Lymphadenopathy:      Cervical: No cervical adenopathy.     Skin:     General: Skin is warm and dry.     Neurological:      General: No focal deficit present.      Mental Status: She is alert and oriented to person, place, and time.     Psychiatric:         Mood and Affect: Mood normal.         Behavior: Behavior normal.

## 2025-05-25 DIAGNOSIS — F41.9 ANXIETY: ICD-10-CM

## 2025-05-25 DIAGNOSIS — F51.04 CHRONIC INSOMNIA: ICD-10-CM

## 2025-05-25 DIAGNOSIS — K21.9 GASTROESOPHAGEAL REFLUX DISEASE WITHOUT ESOPHAGITIS: ICD-10-CM

## 2025-05-27 RX ORDER — FAMOTIDINE 20 MG/1
20 TABLET, FILM COATED ORAL
Qty: 90 TABLET | Refills: 1 | Status: SHIPPED | OUTPATIENT
Start: 2025-05-27

## 2025-05-27 RX ORDER — ZOLPIDEM TARTRATE 10 MG/1
10 TABLET ORAL
Qty: 30 TABLET | Refills: 0 | Status: SHIPPED | OUTPATIENT
Start: 2025-05-27 | End: 2025-06-26

## 2025-05-27 RX ORDER — LORAZEPAM 0.5 MG/1
0.5 TABLET ORAL DAILY PRN
Qty: 30 TABLET | Refills: 0 | Status: SHIPPED | OUTPATIENT
Start: 2025-05-27

## 2025-06-30 DIAGNOSIS — F51.04 CHRONIC INSOMNIA: ICD-10-CM

## 2025-06-30 RX ORDER — ZOLPIDEM TARTRATE 10 MG/1
10 TABLET ORAL
Qty: 30 TABLET | Refills: 0 | Status: SHIPPED | OUTPATIENT
Start: 2025-06-30 | End: 2025-07-30

## 2025-07-07 RX ORDER — BRIMONIDINE TARTRATE AND TIMOLOL MALEATE 2; 5 MG/ML; MG/ML
SOLUTION OPHTHALMIC
COMMUNITY
Start: 2025-06-02

## 2025-07-08 ENCOUNTER — OFFICE VISIT (OUTPATIENT)
Dept: FAMILY MEDICINE CLINIC | Facility: CLINIC | Age: 60
End: 2025-07-08
Payer: COMMERCIAL

## 2025-07-08 VITALS
OXYGEN SATURATION: 98 % | SYSTOLIC BLOOD PRESSURE: 126 MMHG | WEIGHT: 153 LBS | DIASTOLIC BLOOD PRESSURE: 80 MMHG | HEART RATE: 52 BPM | BODY MASS INDEX: 27.1 KG/M2 | TEMPERATURE: 96.4 F

## 2025-07-08 DIAGNOSIS — Z00.00 WELL ADULT EXAM: Primary | ICD-10-CM

## 2025-07-08 DIAGNOSIS — I10 PRIMARY HYPERTENSION: ICD-10-CM

## 2025-07-08 DIAGNOSIS — F32.1 MODERATE MAJOR DEPRESSION (HCC): ICD-10-CM

## 2025-07-08 DIAGNOSIS — K51.011 ULCERATIVE PANCOLITIS WITH RECTAL BLEEDING (HCC): ICD-10-CM

## 2025-07-08 DIAGNOSIS — E03.9 ACQUIRED HYPOTHYROIDISM: ICD-10-CM

## 2025-07-08 DIAGNOSIS — Z78.0 POSTMENOPAUSE: ICD-10-CM

## 2025-07-08 DIAGNOSIS — Z12.11 SCREEN FOR COLON CANCER: ICD-10-CM

## 2025-07-08 DIAGNOSIS — R11.0 NAUSEA: ICD-10-CM

## 2025-07-08 DIAGNOSIS — F51.01 PERSISTENT INSOMNIA OF NON-ORGANIC ORIGIN: ICD-10-CM

## 2025-07-08 DIAGNOSIS — R07.89 ATYPICAL CHEST PAIN: ICD-10-CM

## 2025-07-08 DIAGNOSIS — Z23 ENCOUNTER FOR IMMUNIZATION: ICD-10-CM

## 2025-07-08 PROCEDURE — 99213 OFFICE O/P EST LOW 20 MIN: CPT | Performed by: FAMILY MEDICINE

## 2025-07-08 PROCEDURE — 90471 IMMUNIZATION ADMIN: CPT

## 2025-07-08 PROCEDURE — 96372 THER/PROPH/DIAG INJ SC/IM: CPT | Performed by: FAMILY MEDICINE

## 2025-07-08 PROCEDURE — 99396 PREV VISIT EST AGE 40-64: CPT | Performed by: FAMILY MEDICINE

## 2025-07-08 PROCEDURE — 90677 PCV20 VACCINE IM: CPT

## 2025-07-08 RX ORDER — ONDANSETRON 4 MG/1
4 TABLET, ORALLY DISINTEGRATING ORAL EVERY 6 HOURS PRN
Qty: 90 TABLET | Refills: 2 | Status: SHIPPED | OUTPATIENT
Start: 2025-07-08

## 2025-07-08 RX ORDER — BUPROPION HYDROCHLORIDE 150 MG/1
150 TABLET ORAL EVERY MORNING
Qty: 90 TABLET | Refills: 3 | Status: SHIPPED | OUTPATIENT
Start: 2025-07-08 | End: 2026-01-04

## 2025-07-08 RX ORDER — METHOCARBAMOL 500 MG/1
500 TABLET, FILM COATED ORAL AS NEEDED
COMMUNITY

## 2025-07-08 NOTE — PATIENT INSTRUCTIONS
"Increase bupropion from 300mg to 450mg- 300mg plus 150mg daily   Schedule echo  Schedule dexa       Patient Education     Routine physical for adults   The Basics   Written by the doctors and editors at [facility]   What is a physical? -- A physical is a routine visit, or \"check-up,\" with your doctor. You might also hear it called a \"wellness visit\" or \"preventive visit.\"  During each visit, the doctor will:   Ask about your physical and mental health   Ask about your habits, behaviors, and lifestyle   Do an exam   Give you vaccines if needed   Talk to you about any medicines you take   Give advice about your health   Answer your questions  Getting regular check-ups is an important part of taking care of your health. It can help your doctor find and treat any problems you have. But it's also important for preventing health problems.  A routine physical is different from a \"sick visit.\" A sick visit is when you see a doctor because of a health concern or problem. Since physicals are scheduled ahead of time, you can think about what you want to ask the doctor.  How often should I get a physical? -- It depends on your age and health. In general, for people age 21 years and older:   If you are younger than 50 years, you might be able to get a physical every 3 years.   If you are 50 years or older, your doctor might recommend a physical every year.  If you have an ongoing health condition, like diabetes or high blood pressure, your doctor will probably want to see you more often.  What happens during a physical? -- In general, each visit will include:   Physical exam - The doctor or nurse will check your height, weight, heart rate, and blood pressure. They will also look at your eyes and ears. They will ask about how you are feeling and whether you have any symptoms that bother you.   Medicines - It's a good idea to bring a list of all the medicines you take to each doctor visit. Your doctor will talk to you about your " "medicines and answer any questions. Tell them if you are having any side effects that bother you. You should also tell them if you are having trouble paying for any of your medicines.   Habits and behaviors - This includes:   Your diet   Your exercise habits   Whether you smoke, drink alcohol, or use drugs   Whether you are sexually active   Whether you feel safe at home  Your doctor will talk to you about things you can do to improve your health and lower your risk of health problems. They will also offer help and support. For example, if you want to quit smoking, they can give you advice and might prescribe medicines. If you want to improve your diet or get more physical activity, they can help you with this, too.   Lab tests, if needed - The tests you get will depend on your age and situation. For example, your doctor might want to check your:   Cholesterol   Blood sugar   Iron level   Vaccines - The recommended vaccines will depend on your age, health, and what vaccines you already had. Vaccines are very important because they can prevent certain serious or deadly infections.   Discussion of screening - \"Screening\" means checking for diseases or other health problems before they cause symptoms. Your doctor can recommend screening based on your age, risk, and preferences. This might include tests to check for:   Cancer, such as breast, prostate, cervical, ovarian, colorectal, prostate, lung, or skin cancer   Sexually transmitted infections, such as chlamydia and gonorrhea   Mental health conditions like depression and anxiety  Your doctor will talk to you about the different types of screening tests. They can help you decide which screenings to have. They can also explain what the results might mean.   Answering questions - The physical is a good time to ask the doctor or nurse questions about your health. If needed, they can refer you to other doctors or specialists, too.  Adults older than 65 years often need " other care, too. As you get older, your doctor will talk to you about:   How to prevent falling at home   Hearing or vision tests   Memory testing   How to take your medicines safely   Making sure that you have the help and support you need at home  All topics are updated as new evidence becomes available and our peer review process is complete.  This topic retrieved from [employer] on: May 02, 2024.  Topic 221552 Version 1.0  Release: 32.4.3 - C32.122  © 2024 UpToDate, Inc. and/or its affiliates. All rights reserved.  Consumer Information Use and Disclaimer   Disclaimer: This generalized information is a limited summary of diagnosis, treatment, and/or medication information. It is not meant to be comprehensive and should be used as a tool to help the user understand and/or assess potential diagnostic and treatment options. It does NOT include all information about conditions, treatments, medications, side effects, or risks that may apply to a specific patient. It is not intended to be medical advice or a substitute for the medical advice, diagnosis, or treatment of a health care provider based on the health care provider's examination and assessment of a patient's specific and unique circumstances. Patients must speak with a health care provider for complete information about their health, medical questions, and treatment options, including any risks or benefits regarding use of medications. This information does not endorse any treatments or medications as safe, effective, or approved for treating a specific patient. UpToDate, Inc. and its affiliates disclaim any warranty or liability relating to this information or the use thereof.The use of this information is governed by the Terms of Use, available at https://www.wolterskluwer.com/en/know/clinical-effectiveness-terms. 2024© UpToDate, Inc. and its affiliates and/or licensors. All rights reserved.  Copyright   © 2024 UpToDate, Inc. and/or its affiliates. All rights  reserved.

## 2025-07-08 NOTE — ASSESSMENT & PLAN NOTE
Pt complains of chest pain, intermittent, usually worse when she lays down. Not with activity. No shortness of breath.

## 2025-07-08 NOTE — PROGRESS NOTES
ADULT ANNUAL PHYSICAL  [facility]    NAME: Leilani Blount  AGE: 59 y.o. SEX: female  : 1965     DATE: 2025     Assessment and Plan:     1. Well adult exam  2. Moderate major depression (HCC)  Assessment & Plan:  Increase bupropion to 300mg plus 150mg daily   Orders:  -     buPROPion (WELLBUTRIN XL) 150 mg 24 hr tablet; Take 1 tablet (150 mg total) by mouth every morning  3. Primary hypertension  Assessment & Plan:  Controlled on lisinopril 10mg daily - has now been taking medication regularly  Orders:  -     Echo complete w/ contrast if indicated; Future; Expected date: 2025  4. Ulcerative pancolitis with rectal bleeding (HCC)  Assessment & Plan:  Currently stable without medications. Follows with GI   Orders:  -     Ambulatory Referral to Gastroenterology; Future  5. Persistent insomnia of non-organic origin  Assessment & Plan:  Needs new medication agreement for zolpidem  6. Nausea  -     ondansetron (ZOFRAN-ODT) 4 mg disintegrating tablet; Take 1 tablet (4 mg total) by mouth every 6 (six) hours as needed for nausea or vomiting  7. Atypical chest pain  Assessment & Plan:  Pt complains of chest pain, intermittent, usually worse when she lays down. Not with activity. No shortness of breath.   Orders:  -     Echo complete w/ contrast if indicated; Future; Expected date: 2025  8. Postmenopause  -     DXA bone density spine hip and pelvis; Future; Expected date: 2025  9. Encounter for immunization  -     Pneumococcal Conjugate Vaccine 20-valent (Pcv20)  10. Screen for colon cancer  -     Ambulatory Referral to Gastroenterology; Future  11. Acquired hypothyroidism  Assessment & Plan:  Recent blood work in April euthyroid- continue levothyroxine 50mcg daily       Immunizations and preventive care screenings were discussed with patient today. Appropriate education was printed on patient's after visit summary.    Counseling:  Dental Health:  discussed importance of regular tooth brushing, flossing, and dental visits.  Exercise: the importance of regular exercise/physical activity was discussed. Recommend exercise 3-5 times per week for at least 30 minutes.          No follow-ups on file.     Chief Complaint:     Chief Complaint   Patient presents with    Physical Exam     Annual physical - med check      History of Present Illness:     Pt is here for a physical. Father  in march- very stressful. And her  who was in a nursing home for dementia  soon after.   She only had 3 days of bereavement for each and has been working. States tolerating wellbutrin but with increased stress does not seem to be working as well. Did not want to add SSRI due to concerns with weight gain. Would like to increase bupropion. Would like updated prevnar 20  Bowels have been ok- no bleeding. She is off medications.           Review of Systems:     Review of Systems   Constitutional: Negative.  Negative for fatigue and fever.   HENT: Negative.     Eyes: Negative.    Respiratory: Negative.  Negative for cough.    Cardiovascular:  Positive for chest pain.   Gastrointestinal: Negative.    Endocrine: Negative.    Genitourinary: Negative.    Musculoskeletal: Negative.    Skin: Negative.    Allergic/Immunologic: Negative.    Neurological: Negative.    Psychiatric/Behavioral:  Positive for decreased concentration, dysphoric mood and sleep disturbance.       Past Medical History:     Past Medical History[1]   Past Surgical History:     Past Surgical History[2]   Social History:     Social History[3]   Family History:     Family History[4]   Current Medications:     Current Medications[5]   Allergies:     Allergies[6]   Physical Exam:     /80 (BP Location: Left arm, Patient Position: Sitting, Cuff Size: Standard)   Pulse (!) 52   Temp (!) 96.4 °F (35.8 °C) (Tympanic)   Wt 69.4 kg (153 lb) Comment: pt stated - refused scale at office  SpO2 98%   BMI 27.10 kg/m²      Physical Exam  Vitals and nursing note reviewed.   Constitutional:       Appearance: She is well-developed.   HENT:      Head: Normocephalic and atraumatic.      Right Ear: External ear normal.      Left Ear: External ear normal.      Nose: Nose normal.     Eyes:      Conjunctiva/sclera: Conjunctivae normal.      Pupils: Pupils are equal, round, and reactive to light.       Cardiovascular:      Rate and Rhythm: Normal rate and regular rhythm.      Heart sounds: Normal heart sounds.   Pulmonary:      Effort: Pulmonary effort is normal.      Breath sounds: Normal breath sounds.   Abdominal:      General: Bowel sounds are normal.      Palpations: Abdomen is soft. There is no mass.      Tenderness: There is abdominal tenderness.      Comments: Generalized mild tenderness lower abdomen      Musculoskeletal:         General: Normal range of motion.      Cervical back: Normal range of motion and neck supple.     Skin:     General: Skin is warm and dry.     Neurological:      Mental Status: She is alert and oriented to person, place, and time.     Psychiatric:         Behavior: Behavior normal.         Thought Content: Thought content normal.         Judgment: Judgment normal.          Jessica Mejia,   [facility]         [1]   Past Medical History:  Diagnosis Date    Adjustment disorder     last assessed 05/16/12    Anemia     HX of    Asthma     mild - intermittent    Bilateral leg edema     Blepharitis     last assessed 02/04/16    Bulging lumbar disc     Carpal tunnel syndrome     Unspecified laterality    Colitis, acute     Colon polyp     Disease of thyroid gland     Dyspareunia in female     Edema     last assessed 06/22/15    Ganglion     GERD (gastroesophageal reflux disease)     Glaucoma     Glaucoma     H/O parathyroid disease     Herniated cervical disc     History of transfusion     Hypokalemia     Hypothyroidism     Hypothyroidism     Insomnia     Lumps on the skin     last assessed  14    Mouth ulcers     last assessed 06/22/15    Nontraumatic tear of left tibialis posterior tendon     Traumatic teart     Polyarthritis     last assessed 16    Raynaud's disease     Temporomandibular disorder     Joint    Thyroid disease     Ulcerative colitis (HCC)     Ulcerative colitis (HCC)    [2]   Past Surgical History:  Procedure Laterality Date    ANKLE SURGERY Left     Tendon repair    CARPAL TUNNEL RELEASE Bilateral      SECTION, LOW TRANSVERSE      COLONOSCOPY      COLONOSCOPY N/A 2018    Procedure: COLONOSCOPY;  Surgeon: Quincy Fierro MD;  Location: [facility];  Service: Gastroenterology    FL INJECTION RIGHT HIP (ARTHROGRAM)  9/15/2020    HERNIA REPAIR      umbilical    HYSTERECTOMY      KNEE ARTHROSCOPY Right     LIPECTOMY      Multipe lipoma removals    LIPOMA RESECTION      PARATHYROIDECTOMY      LA COLONOSCOPY FLX DX W/COLLJ SPEC WHEN PFRMD N/A 2016    Procedure: COLONOSCOPY;  Surgeon: Stephanie Vasquez DO;  Location: [facility];  Service: Gastroenterology    LA RPR FLEXOR TENDON LEG SECONDARY W/O GRAFT EACH Left 2016    Procedure: REPAIR OF LEFT POSTERIOR TIBIAL TENDON WITH GRAFT, EXPLORATION LEFT ANKLE ;  Surgeon: Brandyn Menendez DPM;  Location: [facility] OR;  Service: Podiatry    SHOULDER SURGERY Left     bicep tendon and labrum repair    TONSILLECTOMY      TOOTH EXTRACTION  2018   [3]   Social History  Socioeconomic History    Marital status: /Civil Union   Occupational History    Occupation: RN at [employer]   Tobacco Use    Smoking status: Former     Current packs/day: 0.00     Types: Cigarettes     Quit date: 2003     Years since quittin.2    Smokeless tobacco: Never    Tobacco comments:     Quit , rare use for 2 years   Vaping Use    Vaping status: Never Used   Substance and Sexual Activity    Alcohol use: Yes     Comment: social 1 drink per weeek    Drug use: No    Sexual activity: Yes     Partners: Male     Social Drivers of Health      Food Insecurity: No Food Insecurity (7/8/2025)    Nursing - Inadequate Food Risk Classification     Worried About Running Out of Food in the Last Year: Never true     Ran Out of Food in the Last Year: Never true   Transportation Needs: No Transportation Needs (7/8/2025)    PRAPARE - Transportation     Lack of Transportation (Medical): No     Lack of Transportation (Non-Medical): No   Housing Stability: Unknown (7/8/2025)    Housing Stability Vital Sign     Unable to Pay for Housing in the Last Year: No     Homeless in the Last Year: No   [4]   Family History  Problem Relation Name Age of Onset    Parkinsonism Mother      Rheum arthritis Mother      Thyroid disease unspecified Mother      Hypothyroidism Mother      Skin cancer Mother      Heart attack Father      Hypertension Father      Osteoporosis Father      Prostate cancer Father      Nephrolithiasis Father      Skin cancer Father      Hashimoto's thyroiditis Sister      Thyroid disease unspecified Sister      Hypothyroidism Sister      Skin cancer Sister      No Known Problems Maternal Grandmother      No Known Problems Maternal Grandfather      Colon cancer Paternal Grandmother      Prostate cancer Paternal Grandfather      Rheum arthritis Maternal Aunt      Thyroid disease unspecified Maternal Aunt      Hypothyroidism Maternal Aunt      No Known Problems Maternal Aunt      Crohn's disease Maternal Uncle      Psoriasis Maternal Uncle      Ulcerative colitis Maternal Uncle      Rheum arthritis Maternal Uncle      Crohn's disease Other      Crohn's disease Family      Osteoarthritis Family      Rheum arthritis Family      Ulcerative colitis Family Niece    [5]   Current Outpatient Medications   Medication Sig Dispense Refill    brimonidine-timolol (COMBIGAN) 0.2-0.5 %       buPROPion (WELLBUTRIN XL) 150 mg 24 hr tablet Take 1 tablet (150 mg total) by mouth every morning 90 tablet 3    buPROPion (WELLBUTRIN XL) 300 mg 24 hr tablet Take 1 tablet (300 mg total)  by mouth every morning 90 tablet 0    diclofenac (VOLTAREN) 75 mg EC tablet Take 1 tablet (75 mg total) by mouth 2 (two) times a day 60 tablet 0    esomeprazole (NexIUM) 40 MG capsule Take 1 capsule (40 mg total) by mouth daily 100 capsule 1    famotidine (PEPCID) 20 mg tablet Take 1 tablet (20 mg total) by mouth daily at bedtime 90 tablet 1    hydrocortisone 2.5 % ointment Apply topically 2 (two) times a day For no longer than 5-7 days to affected skin on the face. Use only for flares, and take 1-2 week break between using. 30 g 0    ketoconazole (NIZORAL) 2 % cream Apply 3-4 times per day to affected nails for suppression 15 g 2    latanoprost (XALATAN) 0.005 % ophthalmic solution       levothyroxine 50 mcg tablet Take 1 tablet (50 mcg total) by mouth daily 90 tablet 0    lisinopril (ZESTRIL) 10 mg tablet Take 1 tablet (10 mg total) by mouth daily 90 tablet 0    LORazepam (ATIVAN) 0.5 mg tablet Take 1 tablet (0.5 mg total) by mouth daily as needed for anxiety 30 tablet 0    Lumigan 0.01 % ophthalmic drops       methocarbamol (ROBAXIN) 500 mg tablet Take 500 mg by mouth as needed for muscle spasms      naloxone (NARCAN) 4 mg/0.1 mL nasal spray Administer 1 spray into a nostril. If no response after 2-3 minutes, give another dose in the other nostril using a new spray. 1 each 1    ondansetron (ZOFRAN-ODT) 4 mg disintegrating tablet Take 1 tablet (4 mg total) by mouth every 6 (six) hours as needed for nausea or vomiting 90 tablet 2    pimecrolimus (ELIDEL) 1 % cream Apply BID to affected skin 30 g 0    traMADol (ULTRAM) 50 mg tablet Take 1 tablet (50 mg total) by mouth daily as needed for moderate pain 20 tablet 0    triamcinolone (KENALOG) 0.5 % cream Apply topically 3 (three) times a day 30 g 0    zolpidem (AMBIEN) 10 mg tablet Take 1 tablet (10 mg total) by mouth daily at bedtime as needed for sleep 30 tablet 0     No current facility-administered medications for this visit.   [6]   Allergies  Allergen Reactions     Mesalamine Anaphylaxis

## 2025-07-10 RX ORDER — LISINOPRIL 10 MG/1
10 TABLET ORAL DAILY
Qty: 90 TABLET | Refills: 1 | Status: SHIPPED | OUTPATIENT
Start: 2025-07-10

## 2025-07-10 RX ORDER — LEVOTHYROXINE SODIUM 50 UG/1
50 TABLET ORAL DAILY
Qty: 90 TABLET | Refills: 1 | Status: SHIPPED | OUTPATIENT
Start: 2025-07-10

## 2025-07-13 DIAGNOSIS — F51.04 CHRONIC INSOMNIA: ICD-10-CM

## 2025-07-13 DIAGNOSIS — F32.1 MODERATE MAJOR DEPRESSION (HCC): ICD-10-CM

## 2025-07-13 DIAGNOSIS — F41.9 ANXIETY: ICD-10-CM

## 2025-07-14 RX ORDER — ZOLPIDEM TARTRATE 10 MG/1
10 TABLET ORAL
Qty: 30 TABLET | Refills: 0 | OUTPATIENT
Start: 2025-07-14 | End: 2025-08-13

## 2025-07-14 RX ORDER — BUPROPION HYDROCHLORIDE 300 MG/1
300 TABLET ORAL EVERY MORNING
Qty: 90 TABLET | Refills: 1 | Status: SHIPPED | OUTPATIENT
Start: 2025-07-14 | End: 2026-01-30

## 2025-07-14 RX ORDER — LORAZEPAM 0.5 MG/1
0.5 TABLET ORAL DAILY PRN
Qty: 30 TABLET | Refills: 0 | Status: SHIPPED | OUTPATIENT
Start: 2025-07-14

## 2025-08-02 DIAGNOSIS — F51.04 CHRONIC INSOMNIA: ICD-10-CM

## 2025-08-04 RX ORDER — ZOLPIDEM TARTRATE 10 MG/1
10 TABLET ORAL
Qty: 30 TABLET | Refills: 0 | Status: SHIPPED | OUTPATIENT
Start: 2025-08-04 | End: 2025-09-03

## 2025-08-07 PROBLEM — Z12.11 SCREEN FOR COLON CANCER: Status: RESOLVED | Noted: 2025-07-08 | Resolved: 2025-08-07

## 2025-08-07 PROBLEM — Z00.00 WELL ADULT EXAM: Status: RESOLVED | Noted: 2021-11-06 | Resolved: 2025-08-07

## 2025-08-21 ENCOUNTER — HOSPITAL ENCOUNTER (OUTPATIENT)
Dept: NON INVASIVE DIAGNOSTICS | Facility: HOSPITAL | Age: 60
Discharge: HOME | End: 2025-08-21
Payer: COMMERCIAL

## 2025-08-21 VITALS
BODY MASS INDEX: 27.11 KG/M2 | DIASTOLIC BLOOD PRESSURE: 80 MMHG | WEIGHT: 153 LBS | SYSTOLIC BLOOD PRESSURE: 126 MMHG | HEIGHT: 63 IN | HEART RATE: 50 BPM

## 2025-08-21 DIAGNOSIS — R07.89 ATYPICAL CHEST PAIN: ICD-10-CM

## 2025-08-21 DIAGNOSIS — I10 PRIMARY HYPERTENSION: ICD-10-CM

## 2025-08-21 PROCEDURE — 93306 TTE W/DOPPLER COMPLETE: CPT

## 2025-08-21 PROCEDURE — 93306 TTE W/DOPPLER COMPLETE: CPT | Performed by: INTERNAL MEDICINE

## 2025-08-22 LAB
AORTIC ROOT: 2.9 CM
ASCENDING AORTA: 2.5 CM
BSA FOR ECHO PROCEDURE: 1.73 M2
E WAVE DECELERATION TIME: 196 MS
E/A RATIO: 1.37
FRACTIONAL SHORTENING: 33 (ref 28–44)
INTERVENTRICULAR SEPTUM IN DIASTOLE (PARASTERNAL SHORT AXIS VIEW): 0.9 CM
INTERVENTRICULAR SEPTUM: 0.9 CM (ref 0.6–1.1)
LA/AORTA RATIO 2D: 1.1
LAAS-AP2: 14.8 CM2
LAAS-AP4: 20.7 CM2
LEFT ATRIUM SIZE: 3.2 CM
LEFT INTERNAL DIMENSION IN SYSTOLE: 3.1 CM (ref 2.1–4)
LEFT VENTRICLE DIASTOLIC VOLUME (MOD BIPLANE): 114 ML
LEFT VENTRICLE DIASTOLIC VOLUME INDEX (MOD BIPLANE): 65.9 ML/M2
LEFT VENTRICLE SYSTOLIC VOLUME (MOD BIPLANE): 39 ML
LEFT VENTRICLE SYSTOLIC VOLUME INDEX (MOD BIPLANE): 22.5 ML/M2
LEFT VENTRICULAR INTERNAL DIMENSION IN DIASTOLE: 4.6 CM (ref 3.5–6)
LEFT VENTRICULAR POSTERIOR WALL IN END DIASTOLE: 1.1 CM
LEFT VENTRICULAR STROKE VOLUME: 59 ML
LV EF BIPLANE MOD: 66 %
LV EF US.2D.A4C+ESTIMATED: 67 %
LVSV (TEICH): 59 ML
MV E'TISSUE VEL-LAT: 12 CM/S
MV E'TISSUE VEL-SEP: 15 CM/S
MV PEAK A VEL: 0.79 M/S
MV PEAK E VEL: 108 CM/S
MV STENOSIS PRESSURE HALF TIME: 57 MS
MV VALVE AREA P 1/2 METHOD: 3.9
RIGHT ATRIUM AREA SYSTOLE A4C: 16.9 CM2
RIGHT VENTRICLE ID DIMENSION: 3.5 CM
SL CV LV EF: 60
SL CV PED ECHO LEFT VENTRICLE DIASTOLIC VOLUME (MOD BIPLANE) 2D: 98 ML
SL CV PED ECHO LEFT VENTRICLE SYSTOLIC VOLUME (MOD BIPLANE) 2D: 39 ML
TR MAX PG: 31 MMHG
TRICUSPID ANNULAR PLANE SYSTOLIC EXCURSION: 2.3 CM

## 2025-08-27 PROBLEM — G57.61 LESION OF RIGHT PLANTAR NERVE: Status: ACTIVE | Noted: 2025-08-27

## 2025-08-27 PROBLEM — M77.41 METATARSALGIA, RIGHT FOOT: Status: ACTIVE | Noted: 2023-01-13

## (undated) RX ORDER — BIMATOPROST 0.1 MG/ML
1 SOLUTION/ DROPS OPHTHALMIC EVERY EVENING
Start: 2023-04-13